# Patient Record
Sex: FEMALE | Race: BLACK OR AFRICAN AMERICAN | NOT HISPANIC OR LATINO | Employment: OTHER | URBAN - METROPOLITAN AREA
[De-identification: names, ages, dates, MRNs, and addresses within clinical notes are randomized per-mention and may not be internally consistent; named-entity substitution may affect disease eponyms.]

---

## 2017-05-25 PROBLEM — M15.9 PRIMARY OSTEOARTHRITIS INVOLVING MULTIPLE JOINTS: Status: ACTIVE | Noted: 2017-05-25

## 2017-07-17 ENCOUNTER — HOSPITAL ENCOUNTER (EMERGENCY)
Facility: HOSPITAL | Age: 62
Discharge: HOME | End: 2017-07-17
Attending: SURGERY
Payer: MEDICAID

## 2017-07-17 VITALS
TEMPERATURE: 98 F | RESPIRATION RATE: 18 BRPM | HEIGHT: 65 IN | SYSTOLIC BLOOD PRESSURE: 160 MMHG | BODY MASS INDEX: 35.99 KG/M2 | HEART RATE: 66 BPM | OXYGEN SATURATION: 98 % | WEIGHT: 216 LBS | DIASTOLIC BLOOD PRESSURE: 70 MMHG

## 2017-07-17 DIAGNOSIS — M54.50 LOW BACK PAIN WITHOUT SCIATICA, UNSPECIFIED BACK PAIN LATERALITY, UNSPECIFIED CHRONICITY: Primary | ICD-10-CM

## 2017-07-17 PROCEDURE — 63600175 PHARM REV CODE 636 W HCPCS: Performed by: SURGERY

## 2017-07-17 PROCEDURE — 99283 EMERGENCY DEPT VISIT LOW MDM: CPT | Mod: 25

## 2017-07-17 PROCEDURE — 96372 THER/PROPH/DIAG INJ SC/IM: CPT

## 2017-07-17 RX ORDER — HYDROCODONE BITARTRATE AND ACETAMINOPHEN 5; 325 MG/1; MG/1
1 TABLET ORAL EVERY 4 HOURS PRN
Qty: 8 TABLET | Refills: 0 | Status: SHIPPED | OUTPATIENT
Start: 2017-07-17 | End: 2017-07-27

## 2017-07-17 RX ORDER — CYCLOBENZAPRINE HCL 10 MG
10 TABLET ORAL 3 TIMES DAILY PRN
Qty: 10 TABLET | Refills: 0 | Status: SHIPPED | OUTPATIENT
Start: 2017-07-17 | End: 2017-07-22

## 2017-07-17 RX ORDER — HYDROMORPHONE HYDROCHLORIDE 1 MG/ML
1 INJECTION, SOLUTION INTRAMUSCULAR; INTRAVENOUS; SUBCUTANEOUS
Status: COMPLETED | OUTPATIENT
Start: 2017-07-17 | End: 2017-07-17

## 2017-07-17 RX ORDER — ONDANSETRON 2 MG/ML
4 INJECTION INTRAMUSCULAR; INTRAVENOUS
Status: COMPLETED | OUTPATIENT
Start: 2017-07-17 | End: 2017-07-17

## 2017-07-17 RX ADMIN — ONDANSETRON 4 MG: 2 INJECTION INTRAMUSCULAR; INTRAVENOUS at 04:07

## 2017-07-17 RX ADMIN — HYDROMORPHONE HYDROCHLORIDE 1 MG: 1 INJECTION, SOLUTION INTRAMUSCULAR; INTRAVENOUS; SUBCUTANEOUS at 04:07

## 2017-07-17 NOTE — ED TRIAGE NOTES
Patient presents to the ER with c/o flaring up of chronic low back pain.  Patient has been suffering with her lower back for years.  Patient reports that the last 3 days have been really bad.  Patient reports that her pain medication is just not working anymore.

## 2017-07-17 NOTE — ED NOTES
Patient discharged home with spouse.  Discharge instructions given with patient verbalizing understanding.  Patient will follow up with PCP next week.  Patient has no questions or concerns at this time.

## 2017-07-17 NOTE — ED PROVIDER NOTES
[facility]                                        2017                   Chief Complaint  62 y.o. female with Back Pain    History of Present Illness  Mary Carrillo presents to the emergency room with chronic low back pain  Patient on exam has a normal lumbar spine just above deformity noted now  Patient states she has chronic low back pain, denies any acute injury today  Normal bowel movements and urination; no signs of cauda equina syndrome  Patient has good distal pulses and capillary refill, neurovascular intact on exam  Patient states she suffers from arthritis, normal lower leg strength noted today    The history is provided by the patient    Past Medical History   -- Arthritis    -- Asthma    -- Encounter for blood transfusion    -- Glaucoma    -- Gout    -- Hyperlipidemia    -- Hypertension    -- Sarcoidosis    -- Vitritis      Surgical history: Cataract, , hysterectomy  ALLERGIES: Naproxen  Social history: Smoker: Children  Family history: Diabetes and kidney disease (mother, father)    Review of Systems and Physical Exam     Review of Systems  -- Constitution - no fever, denies fatigue, no weakness, no chills  -- Eyes - no tearing or redness, no visual disturbance  -- Ear, Nose - no tinnitus or earache, no nasal congestion or discharge  -- Mouth,Throat - no sore throat, no toothache, normal voice, normal swallowing  -- Respiratory - denies cough and congestion, no shortness of breath, no CARRASCO  -- Cardiovascular - denies chest pain, no palpitations, denies claudication  -- Gastrointestinal - denies abdominal pain, nausea, vomiting, or diarrhea  -- Genitourinary - no dysuria, no denies flank pain, no hematuria or frequency   -- Musculoskeletal - back pain, negative for myalgias and arthralgias   -- Neurological - no headache, denies weakness or seizure; no LOC  -- Skin - denies pallor, rash, or changes in skin. no hives or welts noted    Vital Signs  -- Her blood  pressure is 160/70 (abnormal) and her pulse is 66.   -- Her respiration is 18 and oxygen saturation is 98%.      Physical Exam  -- Nursing note and vitals reviewed  -- Constitutional: Appears well-developed and well-nourished  -- Head: Atraumatic. Normocephalic. No obvious abnormality  -- Eyes: Pupils are equal and reactive to light. Normal conjunctiva and lids  -- Cardiac: Normal rate, regular rhythm and normal heart sounds  -- Pulmonary: Normal respiratory effort, breath sounds clear to auscultation  -- Abdominal: Soft, no tenderness. Normal bowel sounds. Normal liver edge  -- Musculoskeletal: Normal range of motion, no effusions. Joints stable   -- Neurological: No focal deficits. Showed good interaction with staff    Emergency Room Course     L-spine x-ray  -- No evidence of acute fracture.  Multilevel degenerative changes.   -- Incompletely visualized cortex along the anterior superior endplate   -- of L1.  Is there a history to suggest a marrow infiltrative process?    -- Non-emergent followup recommended.    Medications Given  -- HYDROmorphone injection 1 mg (1 mg Intramuscular Given 7/17/17 1618)   -- ondansetron injection 4 mg (4 mg Intramuscular Given 7/17/17 1619)     Diagnosis  -- Low back pain without sciatica    Disposition and Plan  -- Disposition: home  -- Condition: stable  -- Follow-up: Patient to follow up with Jose Khan MD in 1-2 days.  -- I advised the patient that we have found no life threatening condition today  -- At this time, I believe the patient is clinically stable for discharge.   -- The patient acknowledges that close follow up with a MD is required   -- Patient agrees to comply with all instruction and direction given in the ER    This note is dictated on Dragon Natural Speaking word recognition program.  There are word recognition mistakes that are occasionally missed on review.           Benjamin Tyler MD  07/17/17 5283

## 2017-08-26 ENCOUNTER — HOSPITAL ENCOUNTER (EMERGENCY)
Facility: HOSPITAL | Age: 62
Discharge: HOME | End: 2017-08-26
Attending: EMERGENCY MEDICINE
Payer: MEDICAID

## 2017-08-26 VITALS
WEIGHT: 218 LBS | DIASTOLIC BLOOD PRESSURE: 67 MMHG | SYSTOLIC BLOOD PRESSURE: 137 MMHG | HEIGHT: 64 IN | HEART RATE: 80 BPM | BODY MASS INDEX: 37.22 KG/M2 | TEMPERATURE: 99 F

## 2017-08-26 DIAGNOSIS — G89.29 CHRONIC LOW BACK PAIN, UNSPECIFIED BACK PAIN LATERALITY, WITH SCIATICA PRESENCE UNSPECIFIED: Primary | ICD-10-CM

## 2017-08-26 DIAGNOSIS — M54.5 CHRONIC LOW BACK PAIN, UNSPECIFIED BACK PAIN LATERALITY, WITH SCIATICA PRESENCE UNSPECIFIED: Primary | ICD-10-CM

## 2017-08-26 PROCEDURE — 63600175 PHARM REV CODE 636 W HCPCS: Performed by: EMERGENCY MEDICINE

## 2017-08-26 PROCEDURE — 99283 EMERGENCY DEPT VISIT LOW MDM: CPT | Mod: 25

## 2017-08-26 PROCEDURE — 96372 THER/PROPH/DIAG INJ SC/IM: CPT

## 2017-08-26 RX ORDER — METHOCARBAMOL 500 MG/1
1000 TABLET, FILM COATED ORAL
Status: DISCONTINUED | OUTPATIENT
Start: 2017-08-26 | End: 2017-08-26

## 2017-08-26 RX ORDER — KETOROLAC TROMETHAMINE 30 MG/ML
60 INJECTION, SOLUTION INTRAMUSCULAR; INTRAVENOUS
Status: COMPLETED | OUTPATIENT
Start: 2017-08-26 | End: 2017-08-26

## 2017-08-26 RX ORDER — KETOROLAC TROMETHAMINE 10 MG/1
10 TABLET, FILM COATED ORAL EVERY 6 HOURS
Qty: 20 TABLET | Refills: 0 | Status: ON HOLD | OUTPATIENT
Start: 2017-08-26 | End: 2018-01-09 | Stop reason: HOSPADM

## 2017-08-26 RX ORDER — METHOCARBAMOL 500 MG/1
1000 TABLET, FILM COATED ORAL 3 TIMES DAILY
Qty: 30 TABLET | Refills: 0 | Status: SHIPPED | OUTPATIENT
Start: 2017-08-26 | End: 2017-08-31

## 2017-08-26 RX ORDER — DEXAMETHASONE SODIUM PHOSPHATE 4 MG/ML
12 INJECTION, SOLUTION INTRA-ARTICULAR; INTRALESIONAL; INTRAMUSCULAR; INTRAVENOUS; SOFT TISSUE
Status: COMPLETED | OUTPATIENT
Start: 2017-08-26 | End: 2017-08-26

## 2017-08-26 RX ADMIN — DEXAMETHASONE SODIUM PHOSPHATE 12 MG: 4 INJECTION, SOLUTION INTRAMUSCULAR; INTRAVENOUS at 05:08

## 2017-08-26 RX ADMIN — KETOROLAC TROMETHAMINE 60 MG: 30 INJECTION, SOLUTION INTRAMUSCULAR at 05:08

## 2017-08-26 NOTE — ED TRIAGE NOTES
"Patient comes in today with complaint of low back pain that she has had for "awhile".  It comes and goes.  No known injury.  "

## 2017-08-26 NOTE — ED PROVIDER NOTES
"[facility]                                                  Chief Complaint  62 y.o. female with Back Pain (low back pain.   has had for a long time.  comes and goes.)    History of Present Illness  Mary Carrillo presents to the emergency room with acute exacerbation of chronic back pain.  Patient reports right lower lumbar pain consistent with her previous and chronic back pain.  She denies fall or other trauma.  She denies urinary symptoms.      Past Medical History:   Diagnosis Date    Arthritis     Asthma     Encounter for blood transfusion     Glaucoma     Gout     Hyperlipidemia     Hypertension     Sarcoidosis     Vitritis     Vitritis      Past Surgical History:   Procedure Laterality Date    CATARACT EXTRACTION Bilateral      SECTION      HYSTERECTOMY      uterus/cervix d/t uterine fibroids.      Review of patient's allergies indicates:   Allergen Reactions    Neurontin [gabapentin]     Prednisone Hives and Itching     Pills only    Naproxen Rash        Review of Systems and Physical Exam     Review of Systems  -- Constitution - no fever, denies fatigue, no weakness, no chills  -- Eyes - no tearing or redness, no visual disturbance  -- Ear, Nose - no tinnitus or earache, no nasal congestion or discharge  -- Mouth,Throat - no sore throat, no toothache, normal voice, normal swallowing  -- Respiratory - denies cough and congestion, no shortness of breath, no wheezing  -- Cardiovascular - denies chest pain, no palpitations, denies claudication  -- Gastrointestinal - denies abdominal pain, nausea, vomiting, or diarrhea  -- Genitourinary - no dysuria, no denies flank pain, no hematuria or frequency   -- Musculoskeletal - reports back pain, negative for myalgias and arthralgias   -- Neurological - no headache, denies weakness or seizure; no LOC  -- Skin - denies pallor, rash, or changes in skin. no hives or welts noted    Vital Signs   height is 5' 4" (1.626 m) and " weight is 98.9 kg (218 lb). Her oral temperature is 99 °F (37.2 °C). Her blood pressure is 137/67 and her pulse is 80.      Physical Exam  -- Nursing note and vitals reviewed  -- Constitutional: Appears well-developed and well-nourished  -- Head: Atraumatic. Normocephalic. No obvious abnormality  -- Eyes: Pupils are equal and reactive to light. Normal conjunctiva and lids  -- Nose: Nose normal in appearance, nares grossly normal. No discharge  -- Throat: Mucous membranes moist, pharynx normal, normal tonsils. No lesions   -- Ears: External ears and TM normal bilaterally. Normal hearing and no drainage  -- Neck: Normal range of motion. Neck supple. No masses, trachea midline  -- Cardiac: Normal rate, regular rhythm and normal heart sounds  -- Pulmonary: Normal respiratory effort, breath sounds clear to auscultation  -- Abdominal: Soft, no tenderness. Normal bowel sounds. Normal liver edge  -- Musculoskeletal: Normal range of motion, no effusions. Joints stable mild paraspinal lumbar pain on right  -- Neurological: No focal deficits. Showed good interaction with staff  -- Vascular: Posterior tibial, dorsalis pedis and radial pulses 2+ bilaterally    -- Lymphatics: No cervical or peripheral lymphadenopathy. No edema noted  -- Skin: Warm and dry. No evidence of rash or cellulitis  -- Psychiatric: Normal mood and affect. Bedside behavior is appropriate    Emergency Room Course     Treatment and Evaluation  1.  Physical exam unremarkable  2.  Toradol 60 IM  3.  Decadron 12 IM  4.  DC home follow-up PCP    Abnormal lab values  Labs Reviewed - No data to display    Medications Given  Medications   methocarbamol tablet 1,000 mg (not administered)   dexamethasone injection 12 mg (12 mg Intramuscular Given 8/26/17 1734)   ketorolac injection 60 mg (60 mg Intramuscular Given 8/26/17 1734)         Diagnosis  -- Acute exacerbation of chronic lumbar back pain    Disposition and Plan  -- Disposition: home  -- Condition: stable  --  Follow-up: Patient to follow up with Jose Khan MD in 1-2 days.  -- I advised the patient that we have found no life threatening condition today  -- At this time, I believe the patient is clinically stable for discharge.   -- The patient acknowledges that close follow up with a MD is required   -- Patient agrees to comply with all instruction and direction given in the ER  -- Patient counseled on strict return precautions as discussed       Dana Edouard MD  08/26/17 1176

## 2017-09-05 ENCOUNTER — HOSPITAL ENCOUNTER (EMERGENCY)
Facility: HOSPITAL | Age: 62
Discharge: HOME | End: 2017-09-05
Attending: SURGERY
Payer: MEDICAID

## 2017-09-05 VITALS
DIASTOLIC BLOOD PRESSURE: 75 MMHG | OXYGEN SATURATION: 99 % | HEIGHT: 64 IN | BODY MASS INDEX: 36.7 KG/M2 | HEART RATE: 85 BPM | TEMPERATURE: 98 F | RESPIRATION RATE: 20 BRPM | WEIGHT: 215 LBS | SYSTOLIC BLOOD PRESSURE: 145 MMHG

## 2017-09-05 DIAGNOSIS — M25.551 RIGHT HIP PAIN: Primary | ICD-10-CM

## 2017-09-05 PROCEDURE — 96372 THER/PROPH/DIAG INJ SC/IM: CPT

## 2017-09-05 PROCEDURE — 63600175 PHARM REV CODE 636 W HCPCS: Performed by: SURGERY

## 2017-09-05 PROCEDURE — 99283 EMERGENCY DEPT VISIT LOW MDM: CPT | Mod: 25

## 2017-09-05 RX ORDER — ONDANSETRON 2 MG/ML
4 INJECTION INTRAMUSCULAR; INTRAVENOUS
Status: COMPLETED | OUTPATIENT
Start: 2017-09-05 | End: 2017-09-05

## 2017-09-05 RX ORDER — ACETAMINOPHEN AND CODEINE PHOSPHATE 300; 30 MG/1; MG/1
1 TABLET ORAL EVERY 6 HOURS PRN
Qty: 10 TABLET | Refills: 0 | Status: SHIPPED | OUTPATIENT
Start: 2017-09-05 | End: 2017-09-15

## 2017-09-05 RX ORDER — HYDROMORPHONE HYDROCHLORIDE 1 MG/ML
0.5 INJECTION, SOLUTION INTRAMUSCULAR; INTRAVENOUS; SUBCUTANEOUS
Status: COMPLETED | OUTPATIENT
Start: 2017-09-05 | End: 2017-09-05

## 2017-09-05 RX ADMIN — ONDANSETRON 4 MG: 2 INJECTION INTRAMUSCULAR; INTRAVENOUS at 01:09

## 2017-09-05 RX ADMIN — HYDROMORPHONE HYDROCHLORIDE 0.5 MG: 1 INJECTION, SOLUTION INTRAMUSCULAR; INTRAVENOUS; SUBCUTANEOUS at 01:09

## 2017-09-05 NOTE — ED TRIAGE NOTES
"Pt c/o hip pain "for a while now." When I lay down it gets betters. Reports she was seen in the ED recently for the same problem.   "

## 2017-09-06 NOTE — ED PROVIDER NOTES
[facility]                                     2017                   Chief Complaint  62 y.o. female with Hip Pain (right)    History of Present Illness  Mary Carrillo presents to the emergency room with chronic hip pain and arthritis issues  Patient has had long-standing issue with arthritis and lower degenerative disc disease   Patient denies any trauma or fall, denies any acute injury on interview in the ER today  Patient states that her right hip arthritis is acting up, medication at home is not helping  Patient on exam has a normal right hip with no deformity instability or leg shortening now  Patient has good distal pulses and capillary refill, is neurovascular intact on exam today    The history is provided by the patient    Past Medical History   -- Arthritis    -- Asthma    -- Encounter for blood transfusion    -- Glaucoma    -- Gout    -- Hyperlipidemia    -- Hypertension    -- Sarcoidosis    -- Vitritis      Past Surgical History   -- CATARACT EXTRACTION     --  SECTION     -- HYSTERECTOMY        Review of patient's allergies indicates:   -- Neurontin [gabapentin]    -- Prednisone    -- Naproxen       Review of Systems and Physical Exam     Review of Systems  -- Constitution - no fever, denies fatigue, no weakness, no chills  -- Eyes - no tearing or redness, no visual disturbance  -- Ear, Nose - no tinnitus or earache, no nasal congestion or discharge  -- Mouth,Throat - no sore throat, no toothache, normal voice, normal swallowing  -- Respiratory - denies cough and congestion, no shortness of breath, no CARRASCO  -- Cardiovascular - denies chest pain, no palpitations, denies claudication  -- Gastrointestinal - denies abdominal pain, nausea, vomiting, or diarrhea  -- Musculoskeletal - right hip pain with a history of arthritis  -- Neurological - no headache, denies weakness or seizure; no LOC  -- Skin - denies pallor, rash, or changes in skin. no hives or welts  noted    Vital Signs  -- Her temperature is 97.6 °F (36.4 °C).   -- Her blood pressure is 145/75 (abnormal) and her pulse is 85.   -- Her respiration is 20 and oxygen saturation is 99%.      Physical Exam  -- Nursing note and vitals reviewed  -- Head: Atraumatic. Normocephalic. No obvious abnormality  -- Eyes: Pupils are equal and reactive to light. Normal conjunctiva and lids  -- Cardiac: Normal rate, regular rhythm and normal heart sounds  -- Pulmonary: Normal respiratory effort, breath sounds clear to auscultation  -- Abdominal: Soft, no tenderness. Normal bowel sounds. Normal liver edge  -- Musculoskeletal: Normal range of motion, no effusions. Joints stable   -- Neurological: No focal deficits. Showed good interaction with staff  -- Vascular: Posterior tibial, dorsalis pedis and radial pulses 2+ bilaterally      Emergency Room Course     Treatment and Evaluation  -- Preliminary ER x-ray readings showed no evidence of fracture or dislocation  -- All x-rays are reviewed with a final disposition given by the radiologist   -- IM 0.5 mg Dilaudid given today in the ER  -- IM 4 mg Zofran given today in the ER   Diagnosis  -- The encounter diagnosis was Right hip pain.    Disposition and Plan  -- Disposition: home  -- Condition: stable  -- Follow-up: Patient to follow up with Jose Khan MD in 1-2 days.  -- I advised the patient that we have found no life threatening condition today  -- At this time, I believe the patient is clinically stable for discharge.   -- The patient acknowledges that close follow up with a MD is required   -- Patient agrees to comply with all instruction and direction given in the ER    This note is dictated on Dragon Natural Speaking word recognition program.  There are word recognition mistakes that are occasionally missed on review.           Benjamin Tyler MD  09/06/17 0539

## 2017-12-08 PROBLEM — M46.45 DISCITIS OF THORACOLUMBAR REGION: Status: ACTIVE | Noted: 2017-12-08

## 2017-12-09 PROBLEM — R63.4 UNINTENTIONAL WEIGHT LOSS: Status: ACTIVE | Noted: 2017-12-09

## 2017-12-09 PROBLEM — N83.8 OVARIAN MASS: Status: ACTIVE | Noted: 2017-12-09

## 2017-12-09 PROBLEM — R63.8 INADEQUATE ORAL INTAKE: Status: ACTIVE | Noted: 2017-12-09

## 2017-12-10 PROBLEM — R10.9 LEFT FLANK PAIN: Status: ACTIVE | Noted: 2017-12-10

## 2017-12-10 PROBLEM — E87.6 HYPOKALEMIA: Status: ACTIVE | Noted: 2017-12-10

## 2017-12-10 PROBLEM — M46.40 DISCITIS: Status: ACTIVE | Noted: 2017-12-08

## 2017-12-11 PROBLEM — R09.89 RALES: Status: ACTIVE | Noted: 2017-12-11

## 2017-12-13 PROBLEM — R79.89 AZOTEMIA: Status: ACTIVE | Noted: 2017-12-13

## 2017-12-20 PROBLEM — R52 INTRACTABLE PAIN: Status: ACTIVE | Noted: 2017-12-20

## 2017-12-21 PROBLEM — R06.00 DYSPNEA: Status: ACTIVE | Noted: 2017-12-21

## 2017-12-22 PROBLEM — J18.9 PNEUMONIA: Status: ACTIVE | Noted: 2017-12-22

## 2017-12-25 PROBLEM — L27.0 DRUG RASH: Status: ACTIVE | Noted: 2017-12-25

## 2017-12-26 PROBLEM — M46.44 DISCITIS OF THORACIC REGION: Status: ACTIVE | Noted: 2017-12-08

## 2017-12-27 PROBLEM — A31.9 MYCOBACTERIAL INFECTION: Status: ACTIVE | Noted: 2017-12-27

## 2017-12-31 PROBLEM — A15.9 TUBERCULOSIS: Status: ACTIVE | Noted: 2017-12-27

## 2018-01-03 PROBLEM — B37.0 THRUSH: Status: ACTIVE | Noted: 2018-01-03

## 2018-01-06 PROBLEM — A18.01: Status: ACTIVE | Noted: 2017-12-08

## 2018-01-09 PROBLEM — M46.40 DISCITIS: Status: ACTIVE | Noted: 2018-01-09

## 2018-03-16 PROBLEM — G89.29 ACUTE EXACERBATION OF CHRONIC LOW BACK PAIN: Status: ACTIVE | Noted: 2018-03-16

## 2018-03-16 PROBLEM — M54.50 ACUTE EXACERBATION OF CHRONIC LOW BACK PAIN: Status: ACTIVE | Noted: 2018-03-16

## 2018-03-16 NOTE — PLAN OF CARE
Outside Transfer Acceptance Note    Transferring Provider/Specialty:  Dr Bud Julien    Accepting Physician: Vikash Washington     Date of Acceptance: 03/16/2018     Code Status: Full    Transferring Facility/Hospital: [facility]    Reason for Transfer to [facility]: NS evaluation    Report from Transferring provider/ Hospital course:     61 yo F with history of Sarcoidosis, HTN, Pott's disease who presented to the ED with worsening pain and weakness of her lower extremities.  She had a prolonged hospital stay at [facility] from 12/17-01/18 for fevers, night sweats, back pain and weakness.  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  Since being discharged pt reports being unable to walk, mostly being bed bound or on the sofa due to severe back pain.  She is currently getting antibiotics via the health unit for her TB. She reports numbness in her feet as well as tingling which has been ongoing for about 1 month.  She wears a diaper because she cannot get out of bed to use the bathroom.  She states that recently there have been times she doesn't realize that she urinated.  She denies any burning on urination, fevers, chills, night sweats at this time. (from [facility] record).      AFB cultures (12/27) > M Tb complex sensitive to RIF/ Strep/ INH/ EMB/ Pyrazinamide per quest. No other microbiology is in the record    The referring providers spoke to NS at [facility] ( Dr Santiago)   [facility]) who recommended transfer for evaluation with consideration for surgical intervention    There is consolidation in the lung apices bilaterally, more prominent on the left than on the right. Additional atelectasis is noted in the right middle lobe lobe and inferior lingula.     There are persistent destructive changes involving the T12 and L1 vertebrae compatible with discitis osteomyelitis. Spondylotic changes are noted along the  right anterolateral aspect of the thoracic spine and there is dextroscoliosis of the thoracic spine.    The referring provider (Dr Julien) was advised to place the patient in respiratory isolation immediately. EMS was advised to place the patient on respiratory precautions for the transport    To do list upon patient arrival:     Consult ID, Infection control, neurosurgery  Place the patient in respiratory isolation  AFB sputum x 3    Please call extension [phone] upon patient arrival to floor for Hospital Medicine admit team assignment and for additional admit orders.    Vikash Washington MD, Staff Physician, Hospital Medicine, [phone]

## 2018-03-17 ENCOUNTER — ANESTHESIA EVENT (OUTPATIENT)
Dept: SURGERY | Facility: HOSPITAL | Age: 63
DRG: 454 | End: 2018-03-17
Payer: MEDICARE

## 2018-03-17 ENCOUNTER — HOSPITAL ENCOUNTER (INPATIENT)
Facility: HOSPITAL | Age: 63
LOS: 10 days | Discharge: NURSING, REHAB OR LONG-TERM CARE | DRG: 454 | End: 2018-03-27
Attending: HOSPITALIST | Admitting: HOSPITALIST
Payer: MEDICARE

## 2018-03-17 DIAGNOSIS — A15.0 PULMONARY TUBERCULOSIS: ICD-10-CM

## 2018-03-17 DIAGNOSIS — I95.9 HYPOTENSION: ICD-10-CM

## 2018-03-17 DIAGNOSIS — A18.01 POTT'S DISEASE (SPINAL TUBERCULOSIS): ICD-10-CM

## 2018-03-17 DIAGNOSIS — M46.45 DISCITIS OF THORACOLUMBAR REGION: ICD-10-CM

## 2018-03-17 DIAGNOSIS — D62 ACUTE BLOOD LOSS AS CAUSE OF POSTOPERATIVE ANEMIA: ICD-10-CM

## 2018-03-17 DIAGNOSIS — G95.20 SPINAL CORD COMPRESSION: ICD-10-CM

## 2018-03-17 PROBLEM — R10.9 LEFT FLANK PAIN: Status: RESOLVED | Noted: 2017-12-10 | Resolved: 2018-03-17

## 2018-03-17 PROBLEM — E87.6 HYPOKALEMIA: Status: RESOLVED | Noted: 2017-12-10 | Resolved: 2018-03-17

## 2018-03-17 PROBLEM — L27.0 DRUG RASH: Status: RESOLVED | Noted: 2017-12-25 | Resolved: 2018-03-17

## 2018-03-17 PROBLEM — J18.9 PNEUMONIA: Status: RESOLVED | Noted: 2017-12-22 | Resolved: 2018-03-17

## 2018-03-17 PROBLEM — R09.89 RALES: Status: RESOLVED | Noted: 2017-12-11 | Resolved: 2018-03-17

## 2018-03-17 LAB
ALBUMIN SERPL BCP-MCNC: 2.7 G/DL
ALP SERPL-CCNC: 67 U/L
ALT SERPL W/O P-5'-P-CCNC: 13 U/L
ANION GAP SERPL CALC-SCNC: 10 MMOL/L
ANION GAP SERPL CALC-SCNC: 10 MMOL/L
AST SERPL-CCNC: 24 U/L
BACTERIA #/AREA URNS AUTO: ABNORMAL /HPF
BASOPHILS # BLD AUTO: 0.04 K/UL
BASOPHILS NFR BLD: 0.5 %
BILIRUB SERPL-MCNC: 0.8 MG/DL
BILIRUB UR QL STRIP: NEGATIVE
BUN SERPL-MCNC: 25 MG/DL
BUN SERPL-MCNC: 29 MG/DL
CALCIUM SERPL-MCNC: 10 MG/DL
CALCIUM SERPL-MCNC: 9.6 MG/DL
CHLORIDE SERPL-SCNC: 105 MMOL/L
CHLORIDE SERPL-SCNC: 108 MMOL/L
CLARITY UR REFRACT.AUTO: ABNORMAL
CO2 SERPL-SCNC: 21 MMOL/L
CO2 SERPL-SCNC: 25 MMOL/L
COLOR UR AUTO: ABNORMAL
CREAT SERPL-MCNC: 1.3 MG/DL
CREAT SERPL-MCNC: 1.3 MG/DL
CREAT UR-MCNC: 138 MG/DL
DIFFERENTIAL METHOD: ABNORMAL
EOSINOPHIL # BLD AUTO: 0 K/UL
EOSINOPHIL NFR BLD: 0.5 %
ERYTHROCYTE [DISTWIDTH] IN BLOOD BY AUTOMATED COUNT: 17.2 %
EST. GFR  (AFRICAN AMERICAN): 50.1 ML/MIN/1.73 M^2
EST. GFR  (AFRICAN AMERICAN): 50.1 ML/MIN/1.73 M^2
EST. GFR  (NON AFRICAN AMERICAN): 43.5 ML/MIN/1.73 M^2
EST. GFR  (NON AFRICAN AMERICAN): 43.5 ML/MIN/1.73 M^2
GLUCOSE SERPL-MCNC: 122 MG/DL
GLUCOSE SERPL-MCNC: 91 MG/DL
GLUCOSE UR QL STRIP: ABNORMAL
HCT VFR BLD AUTO: 36.5 %
HGB BLD-MCNC: 11.5 G/DL
HGB UR QL STRIP: ABNORMAL
HYALINE CASTS UR QL AUTO: 2 /LPF
IMM GRANULOCYTES # BLD AUTO: 0.08 K/UL
IMM GRANULOCYTES NFR BLD AUTO: 0.9 %
KETONES UR QL STRIP: NEGATIVE
LEUKOCYTE ESTERASE UR QL STRIP: NEGATIVE
LYMPHOCYTES # BLD AUTO: 2.1 K/UL
LYMPHOCYTES NFR BLD: 24.3 %
MAGNESIUM SERPL-MCNC: 1.6 MG/DL
MCH RBC QN AUTO: 27.5 PG
MCHC RBC AUTO-ENTMCNC: 31.5 G/DL
MCV RBC AUTO: 87 FL
MICROSCOPIC COMMENT: ABNORMAL
MONOCYTES # BLD AUTO: 1 K/UL
MONOCYTES NFR BLD: 11 %
NEUTROPHILS # BLD AUTO: 5.5 K/UL
NEUTROPHILS NFR BLD: 62.8 %
NITRITE UR QL STRIP: NEGATIVE
NRBC BLD-RTO: 0 /100 WBC
PH UR STRIP: 5 [PH] (ref 5–8)
PHOSPHATE SERPL-MCNC: 5.8 MG/DL
PLATELET # BLD AUTO: 233 K/UL
PMV BLD AUTO: 9.7 FL
POTASSIUM SERPL-SCNC: 4.6 MMOL/L
POTASSIUM SERPL-SCNC: 4.8 MMOL/L
PROT SERPL-MCNC: 7.7 G/DL
PROT UR QL STRIP: ABNORMAL
RBC # BLD AUTO: 4.18 M/UL
RBC #/AREA URNS AUTO: 19 /HPF (ref 0–4)
SODIUM SERPL-SCNC: 139 MMOL/L
SODIUM SERPL-SCNC: 140 MMOL/L
SODIUM UR-SCNC: 84 MMOL/L
SP GR UR STRIP: 1.03 (ref 1–1.03)
SQUAMOUS #/AREA URNS AUTO: 2 /HPF
URN SPEC COLLECT METH UR: ABNORMAL
UROBILINOGEN UR STRIP-ACNC: NEGATIVE EU/DL
WBC # BLD AUTO: 8.75 K/UL
WBC #/AREA URNS AUTO: 8 /HPF (ref 0–5)

## 2018-03-17 PROCEDURE — 63600175 PHARM REV CODE 636 W HCPCS: Performed by: STUDENT IN AN ORGANIZED HEALTH CARE EDUCATION/TRAINING PROGRAM

## 2018-03-17 PROCEDURE — 85025 COMPLETE CBC W/AUTO DIFF WBC: CPT

## 2018-03-17 PROCEDURE — 99223 1ST HOSP IP/OBS HIGH 75: CPT | Mod: ,,, | Performed by: HOSPITALIST

## 2018-03-17 PROCEDURE — 99223 1ST HOSP IP/OBS HIGH 75: CPT | Mod: ,,, | Performed by: INTERNAL MEDICINE

## 2018-03-17 PROCEDURE — 80048 BASIC METABOLIC PNL TOTAL CA: CPT

## 2018-03-17 PROCEDURE — 25000003 PHARM REV CODE 250

## 2018-03-17 PROCEDURE — 99233 SBSQ HOSP IP/OBS HIGH 50: CPT | Mod: ,,, | Performed by: INTERNAL MEDICINE

## 2018-03-17 PROCEDURE — 36415 COLL VENOUS BLD VENIPUNCTURE: CPT

## 2018-03-17 PROCEDURE — 80053 COMPREHEN METABOLIC PANEL: CPT

## 2018-03-17 PROCEDURE — 81001 URINALYSIS AUTO W/SCOPE: CPT

## 2018-03-17 PROCEDURE — 83735 ASSAY OF MAGNESIUM: CPT

## 2018-03-17 PROCEDURE — 99233 SBSQ HOSP IP/OBS HIGH 50: CPT | Mod: ,,, | Performed by: NEUROLOGICAL SURGERY

## 2018-03-17 PROCEDURE — 25000003 PHARM REV CODE 250: Performed by: STUDENT IN AN ORGANIZED HEALTH CARE EDUCATION/TRAINING PROGRAM

## 2018-03-17 PROCEDURE — 84300 ASSAY OF URINE SODIUM: CPT

## 2018-03-17 PROCEDURE — 84100 ASSAY OF PHOSPHORUS: CPT

## 2018-03-17 PROCEDURE — 82570 ASSAY OF URINE CREATININE: CPT

## 2018-03-17 PROCEDURE — 11000001 HC ACUTE MED/SURG PRIVATE ROOM

## 2018-03-17 RX ORDER — HYDROMORPHONE HYDROCHLORIDE 2 MG/1
2 TABLET ORAL
Status: DISCONTINUED | OUTPATIENT
Start: 2018-03-17 | End: 2018-03-17

## 2018-03-17 RX ORDER — DEXAMETHASONE SODIUM PHOSPHATE 4 MG/ML
8 INJECTION, SOLUTION INTRA-ARTICULAR; INTRALESIONAL; INTRAMUSCULAR; INTRAVENOUS; SOFT TISSUE ONCE
Status: COMPLETED | OUTPATIENT
Start: 2018-03-17 | End: 2018-03-17

## 2018-03-17 RX ORDER — HYDROMORPHONE HYDROCHLORIDE 2 MG/1
2 TABLET ORAL EVERY 4 HOURS PRN
Status: DISCONTINUED | OUTPATIENT
Start: 2018-03-17 | End: 2018-03-18

## 2018-03-17 RX ORDER — ACETAMINOPHEN 325 MG/1
650 TABLET ORAL EVERY 4 HOURS PRN
Status: DISCONTINUED | OUTPATIENT
Start: 2018-03-17 | End: 2018-03-27 | Stop reason: HOSPADM

## 2018-03-17 RX ORDER — MEPERIDINE HYDROCHLORIDE 50 MG/ML
50 INJECTION INTRAMUSCULAR; INTRAVENOUS; SUBCUTANEOUS
Status: DISCONTINUED | OUTPATIENT
Start: 2018-03-17 | End: 2018-03-17

## 2018-03-17 RX ORDER — GLUCAGON 1 MG
1 KIT INJECTION
Status: DISCONTINUED | OUTPATIENT
Start: 2018-03-17 | End: 2018-03-27 | Stop reason: HOSPADM

## 2018-03-17 RX ORDER — OXYCODONE AND ACETAMINOPHEN 10; 325 MG/1; MG/1
1 TABLET ORAL EVERY 4 HOURS PRN
Status: DISCONTINUED | OUTPATIENT
Start: 2018-03-17 | End: 2018-03-18

## 2018-03-17 RX ORDER — IBUPROFEN 200 MG
16 TABLET ORAL
Status: DISCONTINUED | OUTPATIENT
Start: 2018-03-17 | End: 2018-03-27 | Stop reason: HOSPADM

## 2018-03-17 RX ORDER — ENOXAPARIN SODIUM 100 MG/ML
40 INJECTION SUBCUTANEOUS EVERY 24 HOURS
Status: DISCONTINUED | OUTPATIENT
Start: 2018-03-17 | End: 2018-03-17

## 2018-03-17 RX ORDER — DEXAMETHASONE SODIUM PHOSPHATE 4 MG/ML
8 INJECTION, SOLUTION INTRA-ARTICULAR; INTRALESIONAL; INTRAMUSCULAR; INTRAVENOUS; SOFT TISSUE EVERY 6 HOURS
Status: DISCONTINUED | OUTPATIENT
Start: 2018-03-17 | End: 2018-03-17

## 2018-03-17 RX ORDER — PYRAZINAMIDE TABLET 500 MG/1
2000 TABLET ORAL DAILY
Status: DISCONTINUED | OUTPATIENT
Start: 2018-03-17 | End: 2018-03-17

## 2018-03-17 RX ORDER — ETHAMBUTOL HYDROCHLORIDE 400 MG/1
1600 TABLET, FILM COATED ORAL DAILY
Status: DISCONTINUED | OUTPATIENT
Start: 2018-03-17 | End: 2018-03-17

## 2018-03-17 RX ORDER — INSULIN ASPART 100 [IU]/ML
0-5 INJECTION, SOLUTION INTRAVENOUS; SUBCUTANEOUS
Status: DISCONTINUED | OUTPATIENT
Start: 2018-03-17 | End: 2018-03-27 | Stop reason: HOSPADM

## 2018-03-17 RX ORDER — DEXAMETHASONE SODIUM PHOSPHATE 4 MG/ML
4 INJECTION, SOLUTION INTRA-ARTICULAR; INTRALESIONAL; INTRAMUSCULAR; INTRAVENOUS; SOFT TISSUE EVERY 6 HOURS
Status: DISCONTINUED | OUTPATIENT
Start: 2018-03-17 | End: 2018-03-19

## 2018-03-17 RX ORDER — IBUPROFEN 200 MG
24 TABLET ORAL
Status: DISCONTINUED | OUTPATIENT
Start: 2018-03-17 | End: 2018-03-27 | Stop reason: HOSPADM

## 2018-03-17 RX ORDER — ISONIAZID 300 MG/1
300 TABLET ORAL DAILY
Status: DISCONTINUED | OUTPATIENT
Start: 2018-03-17 | End: 2018-03-17

## 2018-03-17 RX ORDER — SODIUM CHLORIDE 0.9 % (FLUSH) 0.9 %
5 SYRINGE (ML) INJECTION
Status: DISCONTINUED | OUTPATIENT
Start: 2018-03-17 | End: 2018-03-27 | Stop reason: HOSPADM

## 2018-03-17 RX ORDER — RIFAMPIN 300 MG/1
600 CAPSULE ORAL DAILY
Status: DISCONTINUED | OUTPATIENT
Start: 2018-03-17 | End: 2018-03-17

## 2018-03-17 RX ORDER — PYRIDOXINE HCL (VITAMIN B6) 25 MG
50 TABLET ORAL DAILY
Status: DISCONTINUED | OUTPATIENT
Start: 2018-03-17 | End: 2018-03-17

## 2018-03-17 RX ADMIN — SODIUM CHLORIDE 1000 ML: 0.9 INJECTION, SOLUTION INTRAVENOUS at 11:03

## 2018-03-17 RX ADMIN — OXYCODONE HYDROCHLORIDE AND ACETAMINOPHEN 1 TABLET: 10; 325 TABLET ORAL at 08:03

## 2018-03-17 RX ADMIN — DEXAMETHASONE SODIUM PHOSPHATE 4 MG: 4 INJECTION, SOLUTION INTRAMUSCULAR; INTRAVENOUS at 06:03

## 2018-03-17 RX ADMIN — DEXAMETHASONE SODIUM PHOSPHATE 8 MG: 4 INJECTION, SOLUTION INTRAMUSCULAR; INTRAVENOUS at 05:03

## 2018-03-17 RX ADMIN — MEPERIDINE HYDROCHLORIDE 50 MG: 50 INJECTION, SOLUTION INTRAMUSCULAR; INTRAVENOUS; SUBCUTANEOUS at 09:03

## 2018-03-17 RX ADMIN — OXYCODONE HYDROCHLORIDE AND ACETAMINOPHEN 1 TABLET: 10; 325 TABLET ORAL at 05:03

## 2018-03-17 NOTE — H&P
[facility] Medicine  History & Physical    Patient Name: Mary Carrillo  MRN: [id number]  Admission Date: 3/17/2018  Attending Physician: Aleisha Gee MD   Primary Care Provider: Jose Khan MD    [facility] Medicine Team: Networked reference to record PCT  Mukesh Solis MD     Patient information was obtained from patient, past medical records and ER records.     Subjective:     Principal Problem:Pott's disease (spinal tuberculosis)    Chief Complaint: No chief complaint on file.       HPI: Ms. 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 MRI notable for severe compression of the adjacent cord at the T12-L1 level. Transferred to [facility] for Neurosurgery evaluation.    Patient history dates back to December when she was admitted to [facility] [place] [facility] from 12/17-01/18 for fevers, night sweats, back pain and weakness.  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  Since being discharged pt reports being unable to walk, mostly being bed bound or on the sofa due to severe back pain.  She is currently getting antibiotics via the health unit for her TB.  A home health nurse comes to her house to give her TB meds every Tues and Fri.  She reports numbness in her feet as well as tingling which has been ongoing for about 1 month.  She wears a diaper because she cannot get out of bed to use the bathroom.  She states that recently there have been times she doesn't realize that she urinated.  She denies any burning on urination, fevers, chills, night sweats at this time.        AFB cultures (12/27) > M Tb complex sensitive to RIF/ Strep/ INH/ EMB/  Pyrazinamide per quest.     Past Medical History:   Diagnosis Date    Arthritis     Asthma     Back pain     Encounter for blood transfusion     Glaucoma     Gout     Hyperlipidemia     Hypertension     Sarcoidosis     Vitritis     Vitritis        Past Surgical History:   Procedure Laterality Date    CATARACT EXTRACTION Bilateral      SECTION      HYSTERECTOMY  1977    uterus/cervix d/t uterine fibroids.       Review of patient's allergies indicates:   Allergen Reactions    Prednisone Hives and Itching     Pills only, can take if she needs to    Naproxen Rash       Current Facility-Administered Medications on File Prior to Encounter   Medication    [COMPLETED] amLODIPine tablet 10 mg    [COMPLETED] gadodiamide solution 20 mL    [COMPLETED] hydromorphone (PF) injection 1 mg    [COMPLETED] hydromorphone (PF) injection 1 mg    [DISCONTINUED] acetaminophen tablet 650 mg    [DISCONTINUED] albuterol-ipratropium 2.5mg-0.5mg/3mL nebulizer solution 3 mL    [DISCONTINUED] amLODIPine tablet 10 mg    [DISCONTINUED] carvedilol tablet 25 mg    [DISCONTINUED] dextrose 50% injection 12.5 g    [DISCONTINUED] dextrose 50% injection 25 g    [DISCONTINUED] doxazosin tablet 4 mg    [DISCONTINUED] enoxaparin injection 40 mg    [DISCONTINUED] ethambutol tablet 1,200 mg    [DISCONTINUED] folic acid tablet 1 mg    [DISCONTINUED] glucagon (human recombinant) injection 1 mg    [DISCONTINUED] glucose chewable tablet 16 g    [DISCONTINUED] glucose chewable tablet 24 g    [DISCONTINUED] hydromorphone (PF) (DILAUDID) 2 mg/mL injection    [DISCONTINUED] insulin aspart U-100 pen 0-5 Units    [DISCONTINUED] isoniazid tablet 300 mg    [DISCONTINUED] isosorbide dinitrate tablet 20 mg    [DISCONTINUED] isosorbide-hydrALAZINE 20-37.5 mg per tablet 1 tablet    [DISCONTINUED] ondansetron injection 4 mg    [DISCONTINUED] oxyCODONE-acetaminophen  mg per tablet 1 tablet    [DISCONTINUED]  oxyCODONE-acetaminophen 7.5-325 mg per tablet 1 tablet    [DISCONTINUED] polyethylene glycol packet 17 g    [DISCONTINUED] pyrazinamide tablet 1,500 mg    [DISCONTINUED] pyridoxine (vitamin B6) tablet 50 mg    [DISCONTINUED] rifAMpin capsule 600 mg    [DISCONTINUED] sodium chloride 0.9% flush 5 mL    [DISCONTINUED] traZODone tablet 100 mg     Current Outpatient Prescriptions on File Prior to Encounter   Medication Sig    acetaminophen-codeine 300-60mg (TYLENOL #4) 300-60 mg Tab Take by mouth.    acetaminophen-codeine 300-60mg (TYLENOL #4) 300-60 mg Tab TAKE ONE TABLET BY MOUTH 4 TIMES DAILY AS NEEDED    albuterol (PROVENTIL) 2.5 mg /3 mL (0.083 %) nebulizer solution Take 3 mLs (2.5 mg total) by nebulization every 4 (four) hours as needed for Wheezing or Shortness of Breath (cough). Rescue    albuterol 90 mcg/actuation inhaler Inhale 2 puffs into the lungs every 4 (four) hours as needed for Wheezing or Shortness of Breath (cough).    amLODIPine (NORVASC) 10 MG tablet Take 1 tablet (10 mg total) by mouth once daily.    carvedilol (COREG) 25 MG tablet Take 1 tablet (25 mg total) by mouth 2 (two) times daily with meals.    doxazosin (CARDURA) 4 MG tablet Take 1 tablet (4 mg total) by mouth once daily.    folic acid (FOLVITE) 1 MG tablet Take 1 tablet (1 mg total) by mouth once daily.    isosorbide dinitrate (ISORDIL) 20 MG tablet Take 1 tablet (20 mg total) by mouth 3 (three) times daily.    isosorbide-hydrALAZINE 20-37.5 mg (BIDIL) 20-37.5 mg Tab Take 1 tablet by mouth 3 (three) times daily.    oxyCODONE-acetaminophen (PERCOCET)  mg per tablet 1 max 2 tabs every 4 to 6 hours prn pain.  Causes constipation.    pyridoxine, vitamin B6, (B-6) 50 MG Tab Take 1 tablet (50 mg total) by mouth once daily.    trazodone (DESYREL) 100 MG tablet TAKE 1 TO 3 TABLETS BY MOUTH NIGHTLY AS NEEDED     Family History     Problem Relation (Age of Onset)    Diabetes Mother, Father    Kidney disease Mother, Father         Social History Main Topics    Smoking status: Former Smoker     Packs/day: 1.00     Years: 35.00     Types: Cigarettes     Start date: 1/7/1967     Quit date: 12/23/1994    Smokeless tobacco: Never Used    Alcohol use No    Drug use: No    Sexual activity: Yes     Partners: Male     Birth control/ protection: Post-menopausal, See Surgical Hx     Review of Systems   Constitutional: Negative for chills and fever.   HENT: Negative for congestion, rhinorrhea and sore throat.    Eyes: Negative.    Respiratory: Negative for chest tightness and shortness of breath.    Cardiovascular: Negative for chest pain and leg swelling.   Gastrointestinal: Negative for abdominal pain, diarrhea, nausea and vomiting.   Endocrine: Negative.    Genitourinary: Negative for difficulty urinating, dysuria and hematuria. Urinary incontinence   Musculoskeletal: Positive for back pain. Negative for arthralgias and myalgias.   Skin: Negative.    Neurological: Positive for weakness and numbness. Negative for syncope and headaches.   Hematological: Negative.    Psychiatric/Behavioral: Negative.    All other systems reviewed and are negative.  Objective:     Vital Signs (Most Recent):  Temp: 97.9 °F (36.6 °C) (03/17/18 0355)  Pulse: 96 (03/17/18 0355)  Resp: 18 (03/17/18 0355)  BP: (!) 95/52 (03/17/18 0355)  SpO2: (!) 94 % (03/17/18 0355) Vital Signs (24h Range):  Temp:  [96.1 °F (35.6 °C)-98.4 °F (36.9 °C)] 97.9 °F (36.6 °C)  Pulse:  [76-96] 96  Resp:  [18-20] 18  SpO2:  [94 %-99 %] 94 %  BP: ()/() 95/52        Body mass index is 31.04 kg/m².    Physical Exam      Constitutional: She is oriented to person, place, and time. No distress.   HENT:   Head: Normocephalic and atraumatic.   Eyes: Conjunctivae and EOM are normal. Pupils are equal, round, and reactive to light.   Neck: Normal range of motion. Neck supple.   Cardiovascular: Normal rate, regular rhythm and normal heart sounds.  Exam reveals no gallop and no friction rub.     No murmur heard.  Pulmonary/Chest: Effort normal and breath sounds normal. No respiratory distress. She has no wheezes. She has no rales.   Abdominal: Soft. Bowel sounds are normal. She exhibits no distension. There is no tenderness.   Musculoskeletal: Normal range of motion. She exhibits no edema.   Neurological: She is alert and oriented to person, place, and time. She displays abnormal reflex. No sensory deficit.   Reflex Scores:       Patellar reflexes are 2+ on the right side and 2+ on the left side.       Achilles reflexes are 3+ on the right side and 2+ on the left side.  Clonus with R achilles DTR   Skin: Skin is warm and dry.   Psychiatric: She has a normal mood and affect. Her behavior is normal. Judgment and thought content normal.   Nursing note and vitals reviewed.    Significant Labs:   Blood Culture: No results for input(s): LABBLOO in the last 48 hours.  CBC:   Recent Labs  Lab 03/15/18  2205   WBC 8.27   HGB 12.1   HCT 38.2        CMP:   Recent Labs  Lab 03/15/18  2205      K 3.6      CO2 23      BUN 16   CREATININE 0.8   CALCIUM 9.4   PROT 7.9   ALBUMIN 3.0*   BILITOT 0.4   ALKPHOS 65   AST 32   ALT 13   ANIONGAP 10   EGFRNONAA >60.0     Coagulation:   Recent Labs  Lab 03/16/18  1058   INR 1.1   APTT 29.9     Magnesium: No results for input(s): MG in the last 48 hours.  All pertinent labs within the past 24 hours have been reviewed.    Significant Imaging: I have reviewed and interpreted all pertinent imaging results/findings within the past 24 hours.     MRI (3/17)  Changes of vertebral osteomyelitis/discitis again noted T12-L1 with progression of changes including greater degree of effacement of the ventral and dorsal subarachnoid spaces and now with severe compression of the adjacent cord at the T12-L1 level although no definite cord edema appreciated at this time.    Assessment/Plan:     * Pott's disease (spinal tuberculosis)    -Currently being treated by ID,  medications not on med list but is getting them via home health nurse on Tues and Fri  - Had extensive stay at [facility] 3 months ago when this was diagnosed, since diagnosis pts pain has been unbearable and pt unable to walk  - CT of chest and abdomen done showing signs of discitis/osteomyelitis, possible epidural thickening  - MRI (3/17) Changes of vertebral osteomyelitis/discitis again noted T12-L1 with progression of changes including greater degree of effacement of the ventral and dorsal subarachnoid spaces and now with severe compression of the adjacent cord at the T12-L1 level although no definite cord edema appreciated at this time.    - NPO  - Neurosurgery consult  - TB meds; unknown?  - pain control (Percocet, Dilaudid)  - ID consult; unsure which continuation phase of tx is she in? \  - start RIPE abx.   - Decadron 8mg IV x 1         Sarcoidosis    - Patient of Dr. SANGEETA Ram (Rheum) and Dr. KIRSTIE Hopkins ([place] [facility] [place]  - Currently hold MTX   - Currently not on any treatment due to current treatment of TB          HTN (hypertension), benign    Home meds: Coreg, Norvasc, Cardura, Isosorbide  - hypotensive   - holding BP meds  - restart when appropriate           VTE Risk Mitigation         Ordered     IP VTE LOW RISK PATIENT  Once      03/17/18 7807             Mukesh Solis MD  Department of Hospital Medicine   [facility]

## 2018-03-17 NOTE — HPI
Ms. 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 MRI notable for severe compression of the adjacent cord at the T12-L1 level. Transferred to [facility] for Neurosurgery evaluation.    Patient history dates back to December when she was admitted to [facility] from 12/17-01/18 for fevers, night sweats, back pain and weakness.  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  Since being discharged pt reports being unable to walk, mostly being bed bound or on the sofa due to severe back pain.  She is currently getting antibiotics via the health unit for her TB.  A home health nurse comes to her house to give her TB meds every Tues and Fri.  She reports numbness in her feet as well as tingling which has been ongoing for about 1 month.  She wears a diaper because she cannot get out of bed to use the bathroom.  She states that recently there have been times she doesn't realize that she urinated.  She denies any burning on urination, fevers, chills, night sweats at this time.        AFB cultures (12/27) > M Tb complex sensitive to RIF/ Strep/ INH/ EMB/ Pyrazinamide per quest.

## 2018-03-17 NOTE — SUBJECTIVE & OBJECTIVE
Past Medical History:   Diagnosis Date    Arthritis     Asthma     Back pain     Encounter for blood transfusion     Glaucoma     Gout     Hyperlipidemia     Hypertension     Sarcoidosis     Vitritis     Vitritis        Past Surgical History:   Procedure Laterality Date    CATARACT EXTRACTION Bilateral      SECTION      HYSTERECTOMY  1977    uterus/cervix d/t uterine fibroids.       Review of patient's allergies indicates:   Allergen Reactions    Prednisone Hives and Itching     Pills only, can take if she needs to    Naproxen Rash       Current Facility-Administered Medications on File Prior to Encounter   Medication    [COMPLETED] amLODIPine tablet 10 mg    [COMPLETED] gadodiamide solution 20 mL    [COMPLETED] hydromorphone (PF) injection 1 mg    [COMPLETED] hydromorphone (PF) injection 1 mg    [DISCONTINUED] acetaminophen tablet 650 mg    [DISCONTINUED] albuterol-ipratropium 2.5mg-0.5mg/3mL nebulizer solution 3 mL    [DISCONTINUED] amLODIPine tablet 10 mg    [DISCONTINUED] carvedilol tablet 25 mg    [DISCONTINUED] dextrose 50% injection 12.5 g    [DISCONTINUED] dextrose 50% injection 25 g    [DISCONTINUED] doxazosin tablet 4 mg    [DISCONTINUED] enoxaparin injection 40 mg    [DISCONTINUED] ethambutol tablet 1,200 mg    [DISCONTINUED] folic acid tablet 1 mg    [DISCONTINUED] glucagon (human recombinant) injection 1 mg    [DISCONTINUED] glucose chewable tablet 16 g    [DISCONTINUED] glucose chewable tablet 24 g    [DISCONTINUED] hydromorphone (PF) (DILAUDID) 2 mg/mL injection    [DISCONTINUED] insulin aspart U-100 pen 0-5 Units    [DISCONTINUED] isoniazid tablet 300 mg    [DISCONTINUED] isosorbide dinitrate tablet 20 mg    [DISCONTINUED] isosorbide-hydrALAZINE 20-37.5 mg per tablet 1 tablet    [DISCONTINUED] ondansetron injection 4 mg    [DISCONTINUED] oxyCODONE-acetaminophen  mg per tablet 1 tablet    [DISCONTINUED] oxyCODONE-acetaminophen 7.5-325 mg per  tablet 1 tablet    [DISCONTINUED] polyethylene glycol packet 17 g    [DISCONTINUED] pyrazinamide tablet 1,500 mg    [DISCONTINUED] pyridoxine (vitamin B6) tablet 50 mg    [DISCONTINUED] rifAMpin capsule 600 mg    [DISCONTINUED] sodium chloride 0.9% flush 5 mL    [DISCONTINUED] traZODone tablet 100 mg     Current Outpatient Prescriptions on File Prior to Encounter   Medication Sig    acetaminophen-codeine 300-60mg (TYLENOL #4) 300-60 mg Tab Take by mouth.    acetaminophen-codeine 300-60mg (TYLENOL #4) 300-60 mg Tab TAKE ONE TABLET BY MOUTH 4 TIMES DAILY AS NEEDED    albuterol (PROVENTIL) 2.5 mg /3 mL (0.083 %) nebulizer solution Take 3 mLs (2.5 mg total) by nebulization every 4 (four) hours as needed for Wheezing or Shortness of Breath (cough). Rescue    albuterol 90 mcg/actuation inhaler Inhale 2 puffs into the lungs every 4 (four) hours as needed for Wheezing or Shortness of Breath (cough).    amLODIPine (NORVASC) 10 MG tablet Take 1 tablet (10 mg total) by mouth once daily.    carvedilol (COREG) 25 MG tablet Take 1 tablet (25 mg total) by mouth 2 (two) times daily with meals.    doxazosin (CARDURA) 4 MG tablet Take 1 tablet (4 mg total) by mouth once daily.    folic acid (FOLVITE) 1 MG tablet Take 1 tablet (1 mg total) by mouth once daily.    isosorbide dinitrate (ISORDIL) 20 MG tablet Take 1 tablet (20 mg total) by mouth 3 (three) times daily.    isosorbide-hydrALAZINE 20-37.5 mg (BIDIL) 20-37.5 mg Tab Take 1 tablet by mouth 3 (three) times daily.    oxyCODONE-acetaminophen (PERCOCET)  mg per tablet 1 max 2 tabs every 4 to 6 hours prn pain.  Causes constipation.    pyridoxine, vitamin B6, (B-6) 50 MG Tab Take 1 tablet (50 mg total) by mouth once daily.    trazodone (DESYREL) 100 MG tablet TAKE 1 TO 3 TABLETS BY MOUTH NIGHTLY AS NEEDED     Family History     Problem Relation (Age of Onset)    Diabetes Mother, Father    Kidney disease Mother, Father        Social History Main Topics     Smoking status: Former Smoker     Packs/day: 1.00     Years: 35.00     Types: Cigarettes     Start date: 1/7/1967     Quit date: 12/23/1994    Smokeless tobacco: Never Used    Alcohol use No    Drug use: No    Sexual activity: Yes     Partners: Male     Birth control/ protection: Post-menopausal, See Surgical Hx     Review of Systems   Constitutional: Negative for chills and fever.   HENT: Negative for congestion, rhinorrhea and sore throat.    Eyes: Negative.    Respiratory: Negative for chest tightness and shortness of breath.    Cardiovascular: Negative for chest pain and leg swelling.   Gastrointestinal: Negative for abdominal pain, diarrhea, nausea and vomiting.   Endocrine: Negative.    Genitourinary: Negative for difficulty urinating, dysuria and hematuria. Urinary incontinence   Musculoskeletal: Positive for back pain. Negative for arthralgias and myalgias.   Skin: Negative.    Neurological: Positive for weakness and numbness. Negative for syncope and headaches.   Hematological: Negative.    Psychiatric/Behavioral: Negative.    All other systems reviewed and are negative.  Objective:     Vital Signs (Most Recent):  Temp: 97.9 °F (36.6 °C) (03/17/18 0355)  Pulse: 96 (03/17/18 0355)  Resp: 18 (03/17/18 0355)  BP: (!) 95/52 (03/17/18 0355)  SpO2: (!) 94 % (03/17/18 0355) Vital Signs (24h Range):  Temp:  [96.1 °F (35.6 °C)-98.4 °F (36.9 °C)] 97.9 °F (36.6 °C)  Pulse:  [76-96] 96  Resp:  [18-20] 18  SpO2:  [94 %-99 %] 94 %  BP: ()/() 95/52        Body mass index is 31.04 kg/m².    Physical Exam      Constitutional: She is oriented to person, place, and time. No distress.   HENT:   Head: Normocephalic and atraumatic.   Eyes: Conjunctivae and EOM are normal. Pupils are equal, round, and reactive to light.   Neck: Normal range of motion. Neck supple.   Cardiovascular: Normal rate, regular rhythm and normal heart sounds.  Exam reveals no gallop and no friction rub.    No murmur  heard.  Pulmonary/Chest: Effort normal and breath sounds normal. No respiratory distress. She has no wheezes. She has no rales.   Abdominal: Soft. Bowel sounds are normal. She exhibits no distension. There is no tenderness.   Musculoskeletal: Normal range of motion. She exhibits no edema.   Neurological: She is alert and oriented to person, place, and time. She displays abnormal reflex. No sensory deficit.   Reflex Scores:       Patellar reflexes are 2+ on the right side and 2+ on the left side.       Achilles reflexes are 3+ on the right side and 2+ on the left side.  Clonus with R achilles DTR   Skin: Skin is warm and dry.   Psychiatric: She has a normal mood and affect. Her behavior is normal. Judgment and thought content normal.   Nursing note and vitals reviewed.    Significant Labs:   Blood Culture: No results for input(s): LABBLOO in the last 48 hours.  CBC:   Recent Labs  Lab 03/15/18  2205   WBC 8.27   HGB 12.1   HCT 38.2        CMP:   Recent Labs  Lab 03/15/18  2205      K 3.6      CO2 23      BUN 16   CREATININE 0.8   CALCIUM 9.4   PROT 7.9   ALBUMIN 3.0*   BILITOT 0.4   ALKPHOS 65   AST 32   ALT 13   ANIONGAP 10   EGFRNONAA >60.0     Coagulation:   Recent Labs  Lab 03/16/18  1058   INR 1.1   APTT 29.9     Magnesium: No results for input(s): MG in the last 48 hours.  All pertinent labs within the past 24 hours have been reviewed.    Significant Imaging: I have reviewed and interpreted all pertinent imaging results/findings within the past 24 hours.     MRI (3/17)  Changes of vertebral osteomyelitis/discitis again noted T12-L1 with progression of changes including greater degree of effacement of the ventral and dorsal subarachnoid spaces and now with severe compression of the adjacent cord at the T12-L1 level although no definite cord edema appreciated at this time.

## 2018-03-17 NOTE — SUBJECTIVE & OBJECTIVE
Past Medical History:   Diagnosis Date    Arthritis     Asthma     Back pain     Encounter for blood transfusion     Glaucoma     Gout     Hyperlipidemia     Hypertension     Sarcoidosis     Vitritis     Vitritis        Past Surgical History:   Procedure Laterality Date    CATARACT EXTRACTION Bilateral      SECTION      HYSTERECTOMY  1977    uterus/cervix d/t uterine fibroids.       Review of patient's allergies indicates:   Allergen Reactions    Prednisone Hives and Itching     Pills only, can take if she needs to    Naproxen Rash       Medications:  Prescriptions Prior to Admission   Medication Sig    acetaminophen-codeine 300-60mg (TYLENOL #4) 300-60 mg Tab Take by mouth.    acetaminophen-codeine 300-60mg (TYLENOL #4) 300-60 mg Tab TAKE ONE TABLET BY MOUTH 4 TIMES DAILY AS NEEDED    albuterol (PROVENTIL) 2.5 mg /3 mL (0.083 %) nebulizer solution Take 3 mLs (2.5 mg total) by nebulization every 4 (four) hours as needed for Wheezing or Shortness of Breath (cough). Rescue    albuterol 90 mcg/actuation inhaler Inhale 2 puffs into the lungs every 4 (four) hours as needed for Wheezing or Shortness of Breath (cough).    amLODIPine (NORVASC) 10 MG tablet Take 1 tablet (10 mg total) by mouth once daily.    carvedilol (COREG) 25 MG tablet Take 1 tablet (25 mg total) by mouth 2 (two) times daily with meals.    doxazosin (CARDURA) 4 MG tablet Take 1 tablet (4 mg total) by mouth once daily.    folic acid (FOLVITE) 1 MG tablet Take 1 tablet (1 mg total) by mouth once daily.    isosorbide dinitrate (ISORDIL) 20 MG tablet Take 1 tablet (20 mg total) by mouth 3 (three) times daily.    isosorbide-hydrALAZINE 20-37.5 mg (BIDIL) 20-37.5 mg Tab Take 1 tablet by mouth 3 (three) times daily.    oxyCODONE-acetaminophen (PERCOCET)  mg per tablet 1 max 2 tabs every 4 to 6 hours prn pain.  Causes constipation.    pyridoxine, vitamin B6, (B-6) 50 MG Tab Take 1 tablet (50 mg total) by mouth once  daily.    trazodone (DESYREL) 100 MG tablet TAKE 1 TO 3 TABLETS BY MOUTH NIGHTLY AS NEEDED     Antibiotics     None        Antifungals     None        Antivirals     None           Immunization History   Administered Date(s) Administered    Influenza - Quadrivalent - PF 12/29/2015, 11/14/2016, 10/02/2017    Influenza - Trivalent - PF (PED) 12/26/2014    Tdap 05/27/2016       Family History     Problem Relation (Age of Onset)    Diabetes Mother, Father    Kidney disease Mother, Father        Social History     Social History    Marital status:      Spouse name: N/A    Number of children: 2    Years of education: 10     Social History Main Topics    Smoking status: Former Smoker     Packs/day: 1.00     Years: 35.00     Types: Cigarettes     Start date: 1/7/1967     Quit date: 12/23/1994    Smokeless tobacco: Never Used    Alcohol use No    Drug use: No    Sexual activity: Yes     Partners: Male     Birth control/ protection: Post-menopausal, See Surgical Hx     Other Topics Concern    None     Social History Narrative    None     Review of Systems   Constitutional: Negative for chills, fatigue and fever.   Genitourinary: Positive for enuresis and flank pain.   Musculoskeletal: Positive for back pain and myalgias.     Objective:     Vital Signs (Most Recent):  Temp: 96.4 °F (35.8 °C) (03/17/18 1536)  Pulse: 71 (03/17/18 1536)  Resp: 18 (03/17/18 0745)  BP: 132/63 (03/17/18 1536)  SpO2: 98 % (03/17/18 0745) Vital Signs (24h Range):  Temp:  [96.4 °F (35.8 °C)-98.3 °F (36.8 °C)] 96.4 °F (35.8 °C)  Pulse:  [71-96] 71  Resp:  [18-20] 18  SpO2:  [94 %-98 %] 98 %  BP: ()/(52-85) 132/63     Weight: 84.6 kg (186 lb 9.6 oz)  Body mass index is 31.05 kg/m².    Estimated Creatinine Clearance: 46.9 mL/min (based on SCr of 1.3 mg/dL).    Physical Exam   Constitutional: She is oriented to person, place, and time. She appears well-developed and well-nourished.   HENT:   Head: Normocephalic and atraumatic.    Eyes: EOM are normal. Pupils are equal, round, and reactive to light.   Neck: Normal range of motion.   Cardiovascular: Normal rate, regular rhythm and normal heart sounds.    Pulmonary/Chest: Effort normal and breath sounds normal.   Abdominal: Soft. Bowel sounds are normal.   Musculoskeletal: Normal range of motion. She exhibits no edema.   Neurological: She is alert and oriented to person, place, and time.   Skin: No rash noted.       Significant Labs:   Blood Culture:   Recent Labs  Lab 12/08/17  1810 12/08/17  1825   LABBLOO No growth after 5 days. No growth after 5 days.     BMP:   Recent Labs  Lab 03/17/18  0538 03/17/18  1306   GLU 91 122*    139   K 4.6 4.8    108   CO2 25 21*   BUN 25* 29*   CREATININE 1.3 1.3   CALCIUM 10.0 9.6   MG 1.6  --      CBC:   Recent Labs  Lab 03/15/18  2205 03/17/18  0538   WBC 8.27 8.75   HGB 12.1 11.5*   HCT 38.2 36.5*    233     Microbiology Results (last 7 days)     Procedure Component Value Units Date/Time    AFB Culture & Smear [[id number]]     Order Status:  No result Specimen:  Respiratory from Sputum, Expectorated     AFB Culture & Smear [[id number]]     Order Status:  No result Specimen:  Respiratory from Sputum, Expectorated     AFB Culture & Smear [[id number]]     Order Status:  No result Specimen:  Respiratory from Sputum, Expectorated           Significant Imaging: I have reviewed all pertinent imaging results/findings within the past 24 hours.

## 2018-03-17 NOTE — PLAN OF CARE
Problem: Fall Risk (Adult)  Goal: Absence of Falls  Patient will demonstrate the desired outcomes by discharge/transition of care.   Fall precaution maintained this shift call bell in reach bed in low position. Instructed pt to call for assistance no acute distress noted. Family at bedside    Problem: Infection, Risk/Actual (Adult)  Goal: Infection Prevention/Resolution  Patient will demonstrate the desired outcomes by discharge/transition of care.   Isolation precaution maintained this shift vs stable pt remained afebrile

## 2018-03-17 NOTE — CONSULTS
Consult received. Full note to follow.    Please call for questions.    Thanks,  Franki Chavez MD  Infectious Disease Fellow, PGY-4  Pager: [phone]  [facility]

## 2018-03-17 NOTE — CONSULTS
[facility]  Infectious Disease  Consult Note    Patient Name: Mayr Carrillo  MRN: [id number]  Admission Date: 3/17/2018  Hospital Length of Stay: 0 days  Attending Physician: Aleisha Gee MD  Primary Care Provider: Jose Khan MD     Isolation Status: No active isolations    Patient information was obtained from patient and ER records.      Consults  Assessment/Plan:     Pott's disease (spinal tuberculosis)    63 y/o female with Matta disease on DOT RIPE from the twice a week frequency and the amount of pills  we estimate currently patient is taking Isoniazid 900 mg and 600 mg rifampin from doctor Thalia's note patient should have completed 2 months of initial RIPE therapy. Progression of neurological symptoms and worsening of MRI findings not necessary failure of therapy may need decompression surgery due to nature of disease. We recommend if possible to obtain sample during procedure for new culture and sensitives of Tb. Will recommend to evaluate drug levels of rifampin and isoniazid to determine if therapeutic levels in blood are achieved. Will call health department on Monday to confirm dosis and current regimen of therapy. Last dose was on Friday next to be due on Tuesday.     Recommendation   - Obtain if possible AFB smear and culture from discitis region  - Will contact health department to confirm therapy   - Next dose of DOT due on Tuesday   900mg Isoniazid, 600 mg rifampin with B6   - Does not requre airborne isolation patient completed 2 weeks on inpatient RIPE            Thank you for your consult. I will follow-up with patient. Please contact us if you have any additional questions.    Franki Dougherty MD  Infectious Disease  [facility]    Subjective:     Principal Problem: Spinal cord compression    HPI: Case of a 63 yo F with a medical history significant for HTN, Sarcoidosis, Pott's disease (compliant w/ RIPE / B6) DOT who is transferred from [facility]  [facility] where she presented with w/ advancing neurological sx, incontinence and LE worsening pain. MRI notable for severe compression of the adjacent cord at the T12-L1 level. Transferred to [facility] for Neurosurgery evaluation. Patient history dates back to [place] when [place] [facility] [place] for fevers, night sweats, back pain and weakness.  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  Since being discharged pt reports being unable to walk, mostly being bed bound or on the sofa due to severe back pain.  She is currently getting antibiotics via the health unit for her TB.  A home health nurse comes to her house to give her TB meds every Tues and Fri.  She reports numbness in her feet as well as tingling which has been ongoing for about 1 month.  She wears a diaper because she cannot get out of bed to use the bathroom.  She states that recently there have been times she doesn't realize that she urinated.  She denies any burning on urination, fevers, chills, night sweats at this time.  AFB cultures () > M Tb complex sensitive to RIF/ Strep/ INH/ EMB/ Pyrazinamide per quest. ID consulted for additional recommendations     Past Medical History:   Diagnosis Date    Arthritis     Asthma     Back pain     Encounter for blood transfusion     Glaucoma     Gout     Hyperlipidemia     Hypertension     Sarcoidosis     Vitritis     Vitritis        Past Surgical History:   Procedure Laterality Date    CATARACT EXTRACTION Bilateral      SECTION      HYSTERECTOMY      uterus/cervix d/t uterine fibroids.       Review of patient's allergies indicates:   Allergen Reactions    Prednisone Hives and Itching     Pills only, can take if she needs to    Naproxen Rash       Medications:  Prescriptions Prior to Admission   Medication  Sig    acetaminophen-codeine 300-60mg (TYLENOL #4) 300-60 mg Tab Take by mouth.    acetaminophen-codeine 300-60mg (TYLENOL #4) 300-60 mg Tab TAKE ONE TABLET BY MOUTH 4 TIMES DAILY AS NEEDED    albuterol (PROVENTIL) 2.5 mg /3 mL (0.083 %) nebulizer solution Take 3 mLs (2.5 mg total) by nebulization every 4 (four) hours as needed for Wheezing or Shortness of Breath (cough). Rescue    albuterol 90 mcg/actuation inhaler Inhale 2 puffs into the lungs every 4 (four) hours as needed for Wheezing or Shortness of Breath (cough).    amLODIPine (NORVASC) 10 MG tablet Take 1 tablet (10 mg total) by mouth once daily.    carvedilol (COREG) 25 MG tablet Take 1 tablet (25 mg total) by mouth 2 (two) times daily with meals.    doxazosin (CARDURA) 4 MG tablet Take 1 tablet (4 mg total) by mouth once daily.    folic acid (FOLVITE) 1 MG tablet Take 1 tablet (1 mg total) by mouth once daily.    isosorbide dinitrate (ISORDIL) 20 MG tablet Take 1 tablet (20 mg total) by mouth 3 (three) times daily.    isosorbide-hydrALAZINE 20-37.5 mg (BIDIL) 20-37.5 mg Tab Take 1 tablet by mouth 3 (three) times daily.    oxyCODONE-acetaminophen (PERCOCET)  mg per tablet 1 max 2 tabs every 4 to 6 hours prn pain.  Causes constipation.    pyridoxine, vitamin B6, (B-6) 50 MG Tab Take 1 tablet (50 mg total) by mouth once daily.    trazodone (DESYREL) 100 MG tablet TAKE 1 TO 3 TABLETS BY MOUTH NIGHTLY AS NEEDED     Antibiotics     None        Antifungals     None        Antivirals     None           Immunization History   Administered Date(s) Administered    Influenza - Quadrivalent - PF 12/29/2015, 11/14/2016, 10/02/2017    Influenza - Trivalent - PF (PED) 12/26/2014    Tdap 05/27/2016       Family History     Problem Relation (Age of Onset)    Diabetes Mother, Father    Kidney disease Mother, Father        Social History     Social History    Marital status:      Spouse name: N/A    Number of children: 2    Years of education:  10     Social History Main Topics    Smoking status: Former Smoker     Packs/day: 1.00     Years: 35.00     Types: Cigarettes     Start date: 1/7/1967     Quit date: 12/23/1994    Smokeless tobacco: Never Used    Alcohol use No    Drug use: No    Sexual activity: Yes     Partners: Male     Birth control/ protection: Post-menopausal, See Surgical Hx     Other Topics Concern    None     Social History Narrative    None     Review of Systems   Constitutional: Negative for chills, fatigue and fever.   Genitourinary: Positive for enuresis and flank pain.   Musculoskeletal: Positive for back pain and myalgias.     Objective:     Vital Signs (Most Recent):  Temp: 96.4 °F (35.8 °C) (03/17/18 1536)  Pulse: 71 (03/17/18 1536)  Resp: 18 (03/17/18 0745)  BP: 132/63 (03/17/18 1536)  SpO2: 98 % (03/17/18 0745) Vital Signs (24h Range):  Temp:  [96.4 °F (35.8 °C)-98.3 °F (36.8 °C)] 96.4 °F (35.8 °C)  Pulse:  [71-96] 71  Resp:  [18-20] 18  SpO2:  [94 %-98 %] 98 %  BP: ()/(52-85) 132/63     Weight: 84.6 kg (186 lb 9.6 oz)  Body mass index is 31.05 kg/m².    Estimated Creatinine Clearance: 46.9 mL/min (based on SCr of 1.3 mg/dL).    Physical Exam   Constitutional: She is oriented to person, place, and time. She appears well-developed and well-nourished.   HENT:   Head: Normocephalic and atraumatic.   Eyes: EOM are normal. Pupils are equal, round, and reactive to light.   Neck: Normal range of motion.   Cardiovascular: Normal rate, regular rhythm and normal heart sounds.    Pulmonary/Chest: Effort normal and breath sounds normal.   Abdominal: Soft. Bowel sounds are normal.   Musculoskeletal: Normal range of motion. She exhibits no edema.   Neurological: She is alert and oriented to person, place, and time.   Skin: No rash noted.       Significant Labs:   Blood Culture:   Recent Labs  Lab 12/08/17 1810 12/08/17 1825   LABBLOO No growth after 5 days. No growth after 5 days.     BMP:   Recent Labs  Lab 03/17/18  0538  03/17/18  1306   GLU 91 122*    139   K 4.6 4.8    108   CO2 25 21*   BUN 25* 29*   CREATININE 1.3 1.3   CALCIUM 10.0 9.6   MG 1.6  --      CBC:   Recent Labs  Lab 03/15/18  2205 03/17/18  0538   WBC 8.27 8.75   HGB 12.1 11.5*   HCT 38.2 36.5*    233     Microbiology Results (last 7 days)     Procedure Component Value Units Date/Time    AFB Culture & Smear [[id number]]     Order Status:  No result Specimen:  Respiratory from Sputum, Expectorated     AFB Culture & Smear [[id number]]     Order Status:  No result Specimen:  Respiratory from Sputum, Expectorated     AFB Culture & Smear [[id number]]     Order Status:  No result Specimen:  Respiratory from Sputum, Expectorated           Significant Imaging: I have reviewed all pertinent imaging results/findings within the past 24 hours.

## 2018-03-17 NOTE — ASSESSMENT & PLAN NOTE
- Patient of Dr. SANGEETA Ram (Rheum) and Dr. KIRSTIE Hopkins (Pulm) at [facility]  - Currently hold MTX   - Currently not on any treatment due to current treatment of TB

## 2018-03-17 NOTE — CONSULTS
[facility]  Neurosurgery  Consult Note    Inpatient consult to Neurosurgery  Consult performed by: NOMI HILLIARD  Consult ordered by: REMIGIO CORADO        Subjective:     Chief Complaint/Reason for Admission: Matta disease    History of Present Illness: 63 yo F with history of Sarcoidosis, HTN, Pott's disease who presented to the ED with worsening pain and weakness of her lower extremities.  She had a prolonged hospital stay at [facility] from 12/17-01/18 for fevers, night sweats, back pain and weakness.  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  Since being discharged pt reports being unable to walk, mostly being bed bound or on the sofa due to severe back pain.  She is currently getting antibiotics via the health unit for her TB.  She endorses back pain radiating down both legs to toes.  She reports numbness in her feet as well as tingling which has been ongoing for about 1 month.  She denies overt bowel/bladder incontinence or saddle anesthesia.    PTA Medications   Medication Sig    acetaminophen-codeine 300-60mg (TYLENOL #4) 300-60 mg Tab Take by mouth.    acetaminophen-codeine 300-60mg (TYLENOL #4) 300-60 mg Tab TAKE ONE TABLET BY MOUTH 4 TIMES DAILY AS NEEDED    albuterol (PROVENTIL) 2.5 mg /3 mL (0.083 %) nebulizer solution Take 3 mLs (2.5 mg total) by nebulization every 4 (four) hours as needed for Wheezing or Shortness of Breath (cough). Rescue    albuterol 90 mcg/actuation inhaler Inhale 2 puffs into the lungs every 4 (four) hours as needed for Wheezing or Shortness of Breath (cough).    amLODIPine (NORVASC) 10 MG tablet Take 1 tablet (10 mg total) by mouth once daily.    carvedilol (COREG) 25 MG tablet Take 1 tablet (25 mg total) by mouth 2 (two) times daily with meals.    doxazosin (CARDURA) 4 MG tablet Take 1 tablet (4 mg  total) by mouth once daily.    folic acid (FOLVITE) 1 MG tablet Take 1 tablet (1 mg total) by mouth once daily.    isosorbide dinitrate (ISORDIL) 20 MG tablet Take 1 tablet (20 mg total) by mouth 3 (three) times daily.    isosorbide-hydrALAZINE 20-37.5 mg (BIDIL) 20-37.5 mg Tab Take 1 tablet by mouth 3 (three) times daily.    oxyCODONE-acetaminophen (PERCOCET)  mg per tablet 1 max 2 tabs every 4 to 6 hours prn pain.  Causes constipation.    pyridoxine, vitamin B6, (B-6) 50 MG Tab Take 1 tablet (50 mg total) by mouth once daily.    trazodone (DESYREL) 100 MG tablet TAKE 1 TO 3 TABLETS BY MOUTH NIGHTLY AS NEEDED       Review of patient's allergies indicates:   Allergen Reactions    Prednisone Hives and Itching     Pills only, can take if she needs to    Naproxen Rash       Past Medical History:   Diagnosis Date    Arthritis     Asthma     Back pain     Encounter for blood transfusion     Glaucoma     Gout     Hyperlipidemia     Hypertension     Sarcoidosis     Vitritis     Vitritis      Past Surgical History:   Procedure Laterality Date    CATARACT EXTRACTION Bilateral      SECTION      HYSTERECTOMY      uterus/cervix d/t uterine fibroids.     Family History     Problem Relation (Age of Onset)    Diabetes Mother, Father    Kidney disease Mother, Father        Social History Main Topics    Smoking status: Former Smoker     Packs/day: 1.00     Years: 35.00     Types: Cigarettes     Start date: 1967     Quit date: 1994    Smokeless tobacco: Never Used    Alcohol use No    Drug use: No    Sexual activity: Yes     Partners: Male     Birth control/ protection: Post-menopausal, See Surgical Hx     Review of Systems  Objective:        Body mass index is 31.04 kg/m².  Vital Signs (Most Recent):  Temp: 97.9 °F (36.6 °C) (18)  Pulse: 96 (18)  Resp: 18 (18)  BP: (!) 95/52 (18)  SpO2: (!) 94 % (18) Vital Signs (24h  Range):  Temp:  [96.1 °F (35.6 °C)-98.4 °F (36.9 °C)] 97.9 °F (36.6 °C)  Pulse:  [76-96] 96  Resp:  [18-20] 18  SpO2:  [94 %-99 %] 94 %  BP: ()/() 95/52                           Neurosurgery Physical Exam   AOX3, speech fluent  PERRL  5/5 in BUE  3/5 in bilateral HF,KF,KE. 4+/5 in PF,DF, EHL  Diminished sensation below ankles bilaterally  No clonus, no babinski, no ricardo's    Significant Labs:    Recent Labs  Lab 03/15/18  2205         K 3.6      CO2 23   BUN 16   CREATININE 0.8   CALCIUM 9.4       Recent Labs  Lab 03/15/18  2205   WBC 8.27   HGB 12.1   HCT 38.2          Recent Labs  Lab 03/15/18  2205 03/16/18  1058   INR 1.0 1.1   APTT  --  29.9     Microbiology Results (last 7 days)     Procedure Component Value Units Date/Time    AFB Culture & Smear [[id number]]     Order Status:  No result Specimen:  Respiratory from Sputum, Expectorated     AFB Culture & Smear [[id number]]     Order Status:  No result Specimen:  Respiratory from Sputum, Expectorated     AFB Culture & Smear [[id number]]     Order Status:  No result Specimen:  Respiratory from Sputum, Expectorated           Significant Diagnostics:  MRI T/L spine: reviewed.  T12/L1 osteomyelitis with ventral epidural abscess and moderate to severe canal compromise    Assessment/Plan:     Active Diagnoses:    Diagnosis Date Noted POA   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] 12/08/2017 Yes    HTN (hypertension), benign [I10] 12/26/2014 Yes    Sarcoidosis [D86.9] 12/23/2014 Yes      Problems Resolved During this Admission:    Diagnosis Date Noted Date Resolved POA     62 F with hx of pott's disease and T12/L1 osteo/diskitis.  -Pt with pain limited weakness in BLE  -Rec post void residual, please document in chart  -Continue TB tx, fu ID recs  -IR guided bone biopsy of T12/L1 lesions.  -CT of thoracolumbar jxn  -Rec TLSO brace  -Medical mgmt per primary  -Further recs pending      Keith Guillaume,    Neurosurgery  [facility]

## 2018-03-17 NOTE — ASSESSMENT & PLAN NOTE
-Currently being treated by ID, medications not on med list but is getting them via home health nurse on Tues and Fri  - Had extensive stay at [facility] 3 months ago when this was diagnosed, since diagnosis pts pain has been unbearable and pt unable to walk  - CT of chest and abdomen done showing signs of discitis/osteomyelitis, possible epidural thickening  - MRI (3/17) Changes of vertebral osteomyelitis/discitis again noted T12-L1 with progression of changes including greater degree of effacement of the ventral and dorsal subarachnoid spaces and now with severe compression of the adjacent cord at the T12-L1 level although no definite cord edema appreciated at this time.    - To OR with NSGY today  - Sarcoid vs Pott's (biopsy in Febryary with negative AFB stains)  - pain control (Percocet, Dilaudid)  - Infectious Disease consulted, recs appreciated   - Decadron 4 mg IV q6

## 2018-03-17 NOTE — ASSESSMENT & PLAN NOTE
Home meds: Coreg, Norvasc, Cardura, Isosorbide  - hypotensive   - holding BP meds  - restart when appropriate

## 2018-03-17 NOTE — ANESTHESIA PREPROCEDURE EVALUATION
03/17/2018  [facility]  Anesthesia Pre-Operative Evaluation         Patient Name: Mary Carrillo  YOB: 1955  MRN: [id number]    SUBJECTIVE:     Pre-operative evaluation for Procedure(s) (LRB):  CORPECTOMY THORACIC T12-L1 corpectomy with T10-L3 fusion, neuromonitoring, globus (Left)     03/17/2018    Mary Carrillo is a 62 y.o. female w/ a significant PMHx of HTN, sarcoidosis, Pott's disease (compliant w/ RIPE/B6) who is transferred from [facility] where she presented with w/ advancing neurological sx, incontinence and LE worsening pain. MRI notable for severe compression of the adjacent cord at the T12-L1 level. Transferred to [facility] for Neurosurgery evaluation.    Patient now presents for the above procedure(s).      LDA:        Peripheral IV - Single Lumen 03/15/18 2204 Right Forearm (Active) 20G   Site Assessment Clean;Dry;Intact 3/17/2018 12:28 AM   Line Status Saline locked 3/17/2018 12:28 AM   Dressing Status Clean;Dry;Intact 3/16/2018  7:10 PM   Dressing Intervention Dressing reinforced 3/16/2018 10:48 AM   Dressing Change Due 03/20/18 3/16/2018 10:48 AM   Site Change Due 03/20/18 3/16/2018 10:48 AM   Reason Not Rotated Not due 3/17/2018 12:28 AM   Number of days: 1       Prev airway:   Placement Date: 12/19/17; Placement Time: 1235; Method of Intubation: Direct laryngoscopy; Inserted by: CRNA; Airway Device: Endotracheal Tube; Mask Ventilation: Not Attempted; Intubated: Postinduction; Blade: Artis #3; Airway Device Size: 7.0; Style: Cuffed; Cuff Inflation: Minimal occlusive pressure; Placement Verified By: Auscultation, Capnometry, ETT Condensation; Grade: Grade I; Complicating Factors: None    Drips: None documented.      Patient Active Problem List   Diagnosis    Hemorrhoids, internal    Gout, chronic    Sarcoidosis    Hyperlipidemia     HTN (hypertension), benign    Back pain    Abnormal chest CT    Uveitis    Fatigue    Pulmonary hypertension    CARRASCO (dyspnea on exertion)    IFG (impaired fasting glucose)    Primary osteoarthritis involving multiple joints    Pott's disease (spinal tuberculosis)    Inadequate oral intake    Ovarian mass    Unintentional weight loss    Azotemia    Intractable pain    Dyspnea    Tuberculosis    Thrush    Discitis    Acute exacerbation of chronic low back pain    Spinal cord compression       Review of patient's allergies indicates:   Allergen Reactions    Prednisone Hives and Itching     Pills only, can take if she needs to    Naproxen Rash       Current Inpatient Medications:   enoxaparin  40 mg Subcutaneous Daily       No current facility-administered medications on file prior to encounter.      Current Outpatient Prescriptions on File Prior to Encounter   Medication Sig Dispense Refill    acetaminophen-codeine 300-60mg (TYLENOL #4) 300-60 mg Tab Take by mouth.      acetaminophen-codeine 300-60mg (TYLENOL #4) 300-60 mg Tab TAKE ONE TABLET BY MOUTH 4 TIMES DAILY AS NEEDED 120 tablet 2    albuterol (PROVENTIL) 2.5 mg /3 mL (0.083 %) nebulizer solution Take 3 mLs (2.5 mg total) by nebulization every 4 (four) hours as needed for Wheezing or Shortness of Breath (cough). Rescue 1 Box 0    albuterol 90 mcg/actuation inhaler Inhale 2 puffs into the lungs every 4 (four) hours as needed for Wheezing or Shortness of Breath (cough). 1 Inhaler 0    amLODIPine (NORVASC) 10 MG tablet Take 1 tablet (10 mg total) by mouth once daily. 90 tablet 3    carvedilol (COREG) 25 MG tablet Take 1 tablet (25 mg total) by mouth 2 (two) times daily with meals. 60 tablet 11    doxazosin (CARDURA) 4 MG tablet Take 1 tablet (4 mg total) by mouth once daily. 30 tablet 11    folic acid (FOLVITE) 1 MG tablet Take 1 tablet (1 mg total) by mouth once daily. 90 tablet 3    isosorbide dinitrate (ISORDIL) 20 MG tablet Take  1 tablet (20 mg total) by mouth 3 (three) times daily. 90 tablet 11    isosorbide-hydrALAZINE 20-37.5 mg (BIDIL) 20-37.5 mg Tab Take 1 tablet by mouth 3 (three) times daily. 90 tablet 11    oxyCODONE-acetaminophen (PERCOCET)  mg per tablet 1 max 2 tabs every 4 to 6 hours prn pain.  Causes constipation. 30 tablet 0    pyridoxine, vitamin B6, (B-6) 50 MG Tab Take 1 tablet (50 mg total) by mouth once daily.  0    trazodone (DESYREL) 100 MG tablet TAKE 1 TO 3 TABLETS BY MOUTH NIGHTLY AS NEEDED 90 tablet 3       Past Surgical History:   Procedure Laterality Date    CATARACT EXTRACTION Bilateral      SECTION      HYSTERECTOMY      uterus/cervix d/t uterine fibroids.       Social History     Social History    Marital status:      Spouse name: N/A    Number of children: 2    Years of education: 10     Occupational History    Not on file.     Social History Main Topics    Smoking status: Former Smoker     Packs/day: 1.00     Years: 35.00     Types: Cigarettes     Start date: 1967     Quit date: 1994    Smokeless tobacco: Never Used    Alcohol use No    Drug use: No    Sexual activity: Yes     Partners: Male     Birth control/ protection: Post-menopausal, See Surgical Hx     Other Topics Concern    Not on file     Social History Narrative    No narrative on file       OBJECTIVE:     Vital Signs Range (Last 24H):  Temp:  [35.6 °C (96.1 °F)-36.8 °C (98.3 °F)]   Pulse:  [76-96]   Resp:  [18-20]   BP: ()/(52-85)   SpO2:  [94 %-99 %]       CBC:   Recent Labs      03/15/18   2205  03/17/18   0538   WBC  8.27  8.75   RBC  4.40  4.18   HGB  12.1  11.5*   HCT  38.2  36.5*   PLT  221  233   MCV  87  87   MCH  27.5  27.5   MCHC  31.7*  31.5*       CMP:   Recent Labs      03/15/18   2205  03/17/18   0538  18   1306   NA  137  140  139   K  3.6  4.6  4.8   CL  104  105  108   CO2  23  25  21*   BUN  16  25*  29*   CREATININE  0.8  1.3  1.3   GLU  105  91  122*   MG   --    1.6   --    PHOS   --   5.8*   --    CALCIUM  9.4  10.0  9.6   ALBUMIN  3.0*  2.7*   --    PROT  7.9  7.7   --    ALKPHOS  65  67   --    ALT  13  13   --    AST  32  24   --    BILITOT  0.4  0.8   --        INR:  Recent Labs      03/15/18   2205  03/16/18   1058   INR  1.0  1.1   APTT   --   29.9       Diagnostic Studies: No relevant studies.    EKG: No recent studies available.    2D ECHO:  Results for orders placed or performed during the hospital encounter of 10/01/15   2D Echo w/ Color Flow Doppler   Result Value Ref Range    EF 50 55 - 65    Mitral Valve Regurgitation TRIVIAL     Diastolic Dysfunction Yes (A)     Est. PA Systolic Pressure 30.88     Tricuspid Valve Regurgitation MILD          ASSESSMENT/PLAN:     Anesthesia Evaluation    I have reviewed the Patient Summary Reports.    I have reviewed the Nursing Notes.   I have reviewed the Medications.     Review of Systems  Anesthesia Hx:  No problems with previous Anesthesia  History of prior surgery of interest to airway management or planning: Previous anesthesia: General Denies Family Hx of Anesthesia complications.   Denies Personal Hx of Anesthesia complications.   Social:  Non-Smoker, No Alcohol Use    Hematology/Oncology:  Hematology Normal   Oncology Normal   Denies Current/Recent Cancer   EENT/Dental:  EENT/Dental Normal denies chronic allergic rhinitis    Cardiovascular:   Hypertension, well controlled Denies MI.  Denies CAD.    Denies CABG/stent.  Denies Dysrhythmias.          hyperlipidemia    Pulmonary:   Denies COPD. Asthma asymptomatic  Denies Recent URI.  Denies Sleep Apnea.    Renal/:  Renal/ Normal  Denies Chronic Renal Disease.     Hepatic/GI:   Bowel Prep. Denies GERD. Denies Liver Disease.    Musculoskeletal:   Arthritis     Neurological:  Neurology Normal Denies TIA.  Denies CVA. Denies Seizures.    Endocrine:  Endocrine Normal Denies Diabetes.    Psych:  Psychiatric Normal  Denies Psychiatric History.          Physical  Exam  General:  Well nourished, Obesity    Airway/Jaw/Neck:  Airway Findings: Mouth Opening: Normal Tongue: Large  Pre-Existing Airway Tube(s): Oral Endotracheal tube  General Airway Assessment: Adult  Mallampati: III  Improves to II with phonation.  TM Distance: Normal, at least 6 cm  Jaw/Neck Findings:  Neck ROM: Normal ROM  Neck Findings:  Girth Increased      Dental:  Dental Findings: loose teeth, Periodontal disease, Severe    Chest/Lungs:  Chest/Lungs Findings: Clear to auscultation     Heart/Vascular:  Heart Findings: Rate: Normal  Rhythm: Regular Rhythm  Sounds: Normal     Abdomen:  Abdomen Findings:  Normal, Soft, Nontender     Musculoskeletal:  Musculoskeletal Findings: Normal   Skin:  Skin Findings: Normal    Mental Status:  Mental Status Findings:  Cooperative, Alert and Oriented         Anesthesia Plan  Type of Anesthesia, risks & benefits discussed:  Anesthesia Type:  general  Patient's Preference:   Intra-op Monitoring Plan: standard ASA monitors and arterial line  Intra-op Monitoring Plan Comments:   Post Op Pain Control Plan: multimodal analgesia, IV/PO Opioids PRN and per primary service following discharge from PACU  Post Op Pain Control Plan Comments:   Induction:   IV  Beta Blocker:  Patient is not currently on a Beta-Blocker (No further documentation required).       Informed Consent: Patient understands risks and agrees with Anesthesia plan.  Questions answered. Anesthesia consent signed with patient.  ASA Score: 3     Day of Surgery Review of History & Physical:    H&P update referred to the surgeon.         Ready For Surgery From Anesthesia Perspective.

## 2018-03-17 NOTE — ASSESSMENT & PLAN NOTE
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 Progression of neurological symptoms and worsening of MRI findings not necessary failure of therapy may need decompression surgery due to nature of disease. We recommend if possible to obtain sample during procedure for new culture and sensitives of Tb. Will recommend to evaluate drug levels of rifampin and isoniazid to determine if therapeutic levels in blood are achieved. Will call health department on Monday to confirm dosis and current regimen of therapy. Last dose was on Friday next to be due on Tuesday.     Recommendation   - Obtain if possible AFB smear and culture from discitis region  - Will contact health department to confirm therapy   - Next dose of DOT due on Tuesday   900mg Isoniazid, 600 mg rifampin with B6   - Does not requre airborne isolation patient completed 2 weeks on inpatient RIPE

## 2018-03-17 NOTE — NURSING
Patient expressed that she did not know about a surgery and refused to sign the anesthesiologists consents and requests to speak to a doctor regarding the surgery.  Call placed to team 5 and explained that the patient would like to speak to a doctor regarding surgery.

## 2018-03-17 NOTE — HPI
Case of a 61 yo F with a medical history significant for HTN, Sarcoidosis, Pott's disease (compliant w/ RIPE / B6) DOT who is transferred from [facility] where she presented with w/ advancing neurological sx, incontinence and LE worsening pain. MRI notable for severe compression of the adjacent cord at the T12-L1 level. Transferred to [facility] for Neurosurgery evaluation. Patient history dates back to December when she was admitted to [facility] from 12/17-01/18 for fevers, night sweats, back pain and weakness.  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  Since being discharged pt reports being unable to walk, mostly being bed bound or on the sofa due to severe back pain.  She is currently getting antibiotics via the health unit for her TB.  A home health nurse comes to her house to give her TB meds every Tues and Fri.  She reports numbness in her feet as well as tingling which has been ongoing for about 1 month.  She wears a diaper because she cannot get out of bed to use the bathroom.  She states that recently there have been times she doesn't realize that she urinated.  She denies any burning on urination, fevers, chills, night sweats at this time.  AFB cultures (12/27) > M Tb complex sensitive to RIF/ Strep/ INH/ EMB/ Pyrazinamide per quest. ID consulted for additional recommendations

## 2018-03-17 NOTE — ASSESSMENT & PLAN NOTE
-Currently being treated by ID, medications not on med list but is getting them via home health nurse on Tues and Fri  - Had extensive stay at [facility] 3 months ago when this was diagnosed, since diagnosis pts pain has been unbearable and pt unable to walk  - CT of chest and abdomen done showing signs of discitis/osteomyelitis, possible epidural thickening  - MRI (3/17) Changes of vertebral osteomyelitis/discitis again noted T12-L1 with progression of changes including greater degree of effacement of the ventral and dorsal subarachnoid spaces and now with severe compression of the adjacent cord at the T12-L1 level although no definite cord edema appreciated at this time.    - NPO  - Neurosurgery consult  - TB meds; unknown?  - pain control (Percocet, Dilaudid)  - ID consult; unsure which continuation phase of tx is she in? \  - start RIPE abx.

## 2018-03-18 ENCOUNTER — ANESTHESIA (OUTPATIENT)
Dept: SURGERY | Facility: HOSPITAL | Age: 63
DRG: 454 | End: 2018-03-18
Payer: MEDICARE

## 2018-03-18 LAB
ABO + RH BLD: NORMAL
ALBUMIN SERPL BCP-MCNC: 2.8 G/DL
ALLENS TEST: ABNORMAL
ALP SERPL-CCNC: 67 U/L
ALT SERPL W/O P-5'-P-CCNC: 15 U/L
ANION GAP SERPL CALC-SCNC: 13 MMOL/L
ANION GAP SERPL CALC-SCNC: 9 MMOL/L
APTT BLDCRRT: 23.5 SEC
AST SERPL-CCNC: 24 U/L
BASOPHILS # BLD AUTO: 0.01 K/UL
BASOPHILS # BLD AUTO: 0.02 K/UL
BASOPHILS NFR BLD: 0.1 %
BASOPHILS NFR BLD: 0.2 %
BILIRUB SERPL-MCNC: 0.3 MG/DL
BLD GP AB SCN CELLS X3 SERPL QL: NORMAL
BLD PROD TYP BPU: NORMAL
BLOOD UNIT EXPIRATION DATE: NORMAL
BLOOD UNIT TYPE CODE: 5100
BLOOD UNIT TYPE CODE: 6200
BLOOD UNIT TYPE: NORMAL
BUN SERPL-MCNC: 27 MG/DL
BUN SERPL-MCNC: 36 MG/DL
CALCIUM SERPL-MCNC: 8.4 MG/DL
CALCIUM SERPL-MCNC: 9.6 MG/DL
CHLORIDE SERPL-SCNC: 106 MMOL/L
CHLORIDE SERPL-SCNC: 107 MMOL/L
CO2 SERPL-SCNC: 20 MMOL/L
CO2 SERPL-SCNC: 23 MMOL/L
CODING SYSTEM: NORMAL
CREAT SERPL-MCNC: 1 MG/DL
CREAT SERPL-MCNC: 1.5 MG/DL
DELSYS: ABNORMAL
DIFFERENTIAL METHOD: ABNORMAL
DIFFERENTIAL METHOD: ABNORMAL
DISPENSE STATUS: NORMAL
EOSINOPHIL # BLD AUTO: 0 K/UL
EOSINOPHIL # BLD AUTO: 0.1 K/UL
EOSINOPHIL NFR BLD: 0 %
EOSINOPHIL NFR BLD: 0.7 %
ERYTHROCYTE [DISTWIDTH] IN BLOOD BY AUTOMATED COUNT: 14.8 %
ERYTHROCYTE [DISTWIDTH] IN BLOOD BY AUTOMATED COUNT: 17.2 %
ERYTHROCYTE [SEDIMENTATION RATE] IN BLOOD BY WESTERGREN METHOD: 14 MM/H
EST. GFR  (AFRICAN AMERICAN): 42.1 ML/MIN/1.73 M^2
EST. GFR  (AFRICAN AMERICAN): >60 ML/MIN/1.73 M^2
EST. GFR  (NON AFRICAN AMERICAN): 36.6 ML/MIN/1.73 M^2
EST. GFR  (NON AFRICAN AMERICAN): 59.7 ML/MIN/1.73 M^2
FIO2: 40
GLUCOSE SERPL-MCNC: 121 MG/DL
GLUCOSE SERPL-MCNC: 130 MG/DL
GRAM STN SPEC: NORMAL
HCO3 UR-SCNC: 22.2 MMOL/L (ref 24–28)
HCT VFR BLD AUTO: 25.9 %
HCT VFR BLD AUTO: 34.2 %
HGB BLD-MCNC: 10.7 G/DL
HGB BLD-MCNC: 8.7 G/DL
IMM GRANULOCYTES # BLD AUTO: 0.03 K/UL
IMM GRANULOCYTES # BLD AUTO: 0.15 K/UL
IMM GRANULOCYTES NFR BLD AUTO: 0.4 %
IMM GRANULOCYTES NFR BLD AUTO: 1.3 %
INR PPP: 1.1
LYMPHOCYTES # BLD AUTO: 1.3 K/UL
LYMPHOCYTES # BLD AUTO: 1.9 K/UL
LYMPHOCYTES NFR BLD: 11.1 %
LYMPHOCYTES NFR BLD: 22.8 %
MAGNESIUM SERPL-MCNC: 1.8 MG/DL
MCH RBC QN AUTO: 27.7 PG
MCH RBC QN AUTO: 29.7 PG
MCHC RBC AUTO-ENTMCNC: 31.3 G/DL
MCHC RBC AUTO-ENTMCNC: 33.6 G/DL
MCV RBC AUTO: 88 FL
MCV RBC AUTO: 89 FL
MIN VOL: 10.1
MODE: ABNORMAL
MONOCYTES # BLD AUTO: 0.7 K/UL
MONOCYTES # BLD AUTO: 1 K/UL
MONOCYTES NFR BLD: 7.9 %
MONOCYTES NFR BLD: 8 %
NEUTROPHILS # BLD AUTO: 5.8 K/UL
NEUTROPHILS # BLD AUTO: 9.5 K/UL
NEUTROPHILS NFR BLD: 68.1 %
NEUTROPHILS NFR BLD: 79.4 %
NRBC BLD-RTO: 0 /100 WBC
NRBC BLD-RTO: 0 /100 WBC
PCO2 BLDA: 29.2 MMHG (ref 35–45)
PEEP: 5
PH SMN: 7.49 [PH] (ref 7.35–7.45)
PHOSPHATE SERPL-MCNC: 4.8 MG/DL
PIP: 24
PLATELET # BLD AUTO: 126 K/UL
PLATELET # BLD AUTO: 235 K/UL
PMV BLD AUTO: 10.2 FL
PMV BLD AUTO: 9.9 FL
PO2 BLDA: 119 MMHG (ref 80–100)
POC BE: -1 MMOL/L
POC SATURATED O2: 99 % (ref 95–100)
POC TCO2: 23 MMOL/L (ref 23–27)
POCT GLUCOSE: 124 MG/DL (ref 70–110)
POCT GLUCOSE: 126 MG/DL (ref 70–110)
POCT GLUCOSE: 135 MG/DL (ref 70–110)
POTASSIUM SERPL-SCNC: 3.4 MMOL/L
POTASSIUM SERPL-SCNC: 4.1 MMOL/L
PROT SERPL-MCNC: 7.9 G/DL
PROTHROMBIN TIME: 11.2 SEC
RBC # BLD AUTO: 2.93 M/UL
RBC # BLD AUTO: 3.86 M/UL
SAMPLE: ABNORMAL
SITE: ABNORMAL
SODIUM SERPL-SCNC: 139 MMOL/L
SODIUM SERPL-SCNC: 139 MMOL/L
SP02: 100
TRANS ERYTHROCYTES VOL PATIENT: NORMAL ML
TRANS PLATPHERESIS VOL PATIENT: NORMAL ML
VT: 500
WBC # BLD AUTO: 11.96 K/UL
WBC # BLD AUTO: 8.5 K/UL

## 2018-03-18 PROCEDURE — 20930 SP BONE ALGRFT MORSEL ADD-ON: CPT | Mod: ,,, | Performed by: NEUROLOGICAL SURGERY

## 2018-03-18 PROCEDURE — 22532 ARTHRD LAT XTRCVTRY TQ THRC: CPT | Mod: 51,,, | Performed by: NEUROLOGICAL SURGERY

## 2018-03-18 PROCEDURE — 37000008 HC ANESTHESIA 1ST 15 MINUTES: Performed by: NEUROLOGICAL SURGERY

## 2018-03-18 PROCEDURE — D9220A PRA ANESTHESIA: Mod: CRNA,,, | Performed by: NURSE ANESTHETIST, CERTIFIED REGISTERED

## 2018-03-18 PROCEDURE — 87102 FUNGUS ISOLATION CULTURE: CPT | Mod: 59

## 2018-03-18 PROCEDURE — 0QS004Z REPOSITION LUMBAR VERTEBRA WITH INTERNAL FIXATION DEVICE, OPEN APPROACH: ICD-10-PCS | Performed by: NEUROLOGICAL SURGERY

## 2018-03-18 PROCEDURE — P9017 PLASMA 1 DONOR FRZ W/IN 8 HR: HCPCS

## 2018-03-18 PROCEDURE — 25000003 PHARM REV CODE 250: Performed by: NEUROLOGICAL SURGERY

## 2018-03-18 PROCEDURE — 85025 COMPLETE CBC W/AUTO DIFF WBC: CPT | Mod: 91

## 2018-03-18 PROCEDURE — 25000003 PHARM REV CODE 250: Performed by: NURSE PRACTITIONER

## 2018-03-18 PROCEDURE — 0RG60K1 FUSION OF THORACIC VERTEBRAL JOINT WITH NONAUTOLOGOUS TISSUE SUBSTITUTE, POSTERIOR APPROACH, POSTERIOR COLUMN, OPEN APPROACH: ICD-10-PCS | Performed by: NEUROLOGICAL SURGERY

## 2018-03-18 PROCEDURE — 80048 BASIC METABOLIC PNL TOTAL CA: CPT

## 2018-03-18 PROCEDURE — P9021 RED BLOOD CELLS UNIT: HCPCS

## 2018-03-18 PROCEDURE — D9220A PRA ANESTHESIA: Mod: ANES,,, | Performed by: ANESTHESIOLOGY

## 2018-03-18 PROCEDURE — 0PS404Z REPOSITION THORACIC VERTEBRA WITH INTERNAL FIXATION DEVICE, OPEN APPROACH: ICD-10-PCS | Performed by: NEUROLOGICAL SURGERY

## 2018-03-18 PROCEDURE — C1729 CATH, DRAINAGE: HCPCS | Performed by: NEUROLOGICAL SURGERY

## 2018-03-18 PROCEDURE — 63600175 PHARM REV CODE 636 W HCPCS: Performed by: NURSE ANESTHETIST, CERTIFIED REGISTERED

## 2018-03-18 PROCEDURE — 84100 ASSAY OF PHOSPHORUS: CPT

## 2018-03-18 PROCEDURE — 87070 CULTURE OTHR SPECIMN AEROBIC: CPT

## 2018-03-18 PROCEDURE — 25000003 PHARM REV CODE 250: Performed by: NURSE ANESTHETIST, CERTIFIED REGISTERED

## 2018-03-18 PROCEDURE — 27201423 OPTIME MED/SURG SUP & DEVICES STERILE SUPPLY: Performed by: NEUROLOGICAL SURGERY

## 2018-03-18 PROCEDURE — 25000003 PHARM REV CODE 250: Performed by: STUDENT IN AN ORGANIZED HEALTH CARE EDUCATION/TRAINING PROGRAM

## 2018-03-18 PROCEDURE — 63600175 PHARM REV CODE 636 W HCPCS: Performed by: STUDENT IN AN ORGANIZED HEALTH CARE EDUCATION/TRAINING PROGRAM

## 2018-03-18 PROCEDURE — 99232 SBSQ HOSP IP/OBS MODERATE 35: CPT | Mod: ,,, | Performed by: INTERNAL MEDICINE

## 2018-03-18 PROCEDURE — 25000003 PHARM REV CODE 250

## 2018-03-18 PROCEDURE — 82803 BLOOD GASES ANY COMBINATION: CPT

## 2018-03-18 PROCEDURE — 27201037 HC PRESSURE MONITORING SET UP

## 2018-03-18 PROCEDURE — 0RGA0AJ FUSION OF THORACOLUMBAR VERTEBRAL JOINT WITH INTERBODY FUSION DEVICE, POSTERIOR APPROACH, ANTERIOR COLUMN, OPEN APPROACH: ICD-10-PCS | Performed by: NEUROLOGICAL SURGERY

## 2018-03-18 PROCEDURE — 63103 REMOVE VERTEBRAL BODY ADD-ON: CPT | Mod: ,,, | Performed by: NEUROLOGICAL SURGERY

## 2018-03-18 PROCEDURE — 63016 REMOVE SPINE LAMINA >2 THRC: CPT | Mod: 59,,, | Performed by: NEUROLOGICAL SURGERY

## 2018-03-18 PROCEDURE — 94761 N-INVAS EAR/PLS OXIMETRY MLT: CPT

## 2018-03-18 PROCEDURE — 27100025 HC TUBING, SET FLUID WARMER: Performed by: NURSE ANESTHETIST, CERTIFIED REGISTERED

## 2018-03-18 PROCEDURE — 87206 SMEAR FLUORESCENT/ACID STAI: CPT | Mod: 91

## 2018-03-18 PROCEDURE — 36000711: Performed by: NEUROLOGICAL SURGERY

## 2018-03-18 PROCEDURE — 63101 REMOVE VERT BODY DCMPRN THRC: CPT | Mod: ,,, | Performed by: NEUROLOGICAL SURGERY

## 2018-03-18 PROCEDURE — C1713 ANCHOR/SCREW BN/BN,TIS/BN: HCPCS | Performed by: NEUROLOGICAL SURGERY

## 2018-03-18 PROCEDURE — 87176 TISSUE HOMOGENIZATION CULTR: CPT

## 2018-03-18 PROCEDURE — 86850 RBC ANTIBODY SCREEN: CPT

## 2018-03-18 PROCEDURE — 36620 INSERTION CATHETER ARTERY: CPT | Mod: 59,,, | Performed by: ANESTHESIOLOGY

## 2018-03-18 PROCEDURE — 36415 COLL VENOUS BLD VENIPUNCTURE: CPT

## 2018-03-18 PROCEDURE — 94002 VENT MGMT INPAT INIT DAY: CPT

## 2018-03-18 PROCEDURE — C9290 INJ, BUPIVACAINE LIPOSOME: HCPCS | Performed by: NEUROLOGICAL SURGERY

## 2018-03-18 PROCEDURE — 63600175 PHARM REV CODE 636 W HCPCS: Performed by: NEUROLOGICAL SURGERY

## 2018-03-18 PROCEDURE — 87205 SMEAR GRAM STAIN: CPT | Mod: 59

## 2018-03-18 PROCEDURE — P9045 ALBUMIN (HUMAN), 5%, 250 ML: HCPCS | Mod: JG | Performed by: NURSE ANESTHETIST, CERTIFIED REGISTERED

## 2018-03-18 PROCEDURE — 87075 CULTR BACTERIA EXCEPT BLOOD: CPT

## 2018-03-18 PROCEDURE — P9035 PLATELET PHERES LEUKOREDUCED: HCPCS

## 2018-03-18 PROCEDURE — 99900035 HC TECH TIME PER 15 MIN (STAT)

## 2018-03-18 PROCEDURE — 22610 ARTHRD PST TQ 1NTRSPC THRC: CPT | Mod: 51,,, | Performed by: NEUROLOGICAL SURGERY

## 2018-03-18 PROCEDURE — 37000009 HC ANESTHESIA EA ADD 15 MINS: Performed by: NEUROLOGICAL SURGERY

## 2018-03-18 PROCEDURE — 85730 THROMBOPLASTIN TIME PARTIAL: CPT

## 2018-03-18 PROCEDURE — 85610 PROTHROMBIN TIME: CPT

## 2018-03-18 PROCEDURE — 86920 COMPATIBILITY TEST SPIN: CPT

## 2018-03-18 PROCEDURE — 80053 COMPREHEN METABOLIC PANEL: CPT

## 2018-03-18 PROCEDURE — 99233 SBSQ HOSP IP/OBS HIGH 50: CPT | Mod: 57,,, | Performed by: NEUROLOGICAL SURGERY

## 2018-03-18 PROCEDURE — 83735 ASSAY OF MAGNESIUM: CPT

## 2018-03-18 PROCEDURE — 20000000 HC ICU ROOM

## 2018-03-18 PROCEDURE — 22534 ARTHRD LAT XTRCVTRY TQ EA AD: CPT | Mod: ,,, | Performed by: NEUROLOGICAL SURGERY

## 2018-03-18 PROCEDURE — S0020 INJECTION, BUPIVICAINE HYDRO: HCPCS | Performed by: NEUROLOGICAL SURGERY

## 2018-03-18 PROCEDURE — 99232 SBSQ HOSP IP/OBS MODERATE 35: CPT | Mod: ,,, | Performed by: NURSE PRACTITIONER

## 2018-03-18 PROCEDURE — 36000710: Performed by: NEUROLOGICAL SURGERY

## 2018-03-18 PROCEDURE — 22614 ARTHRD PST TQ 1NTRSPC EA ADD: CPT | Mod: ,,, | Performed by: NEUROLOGICAL SURGERY

## 2018-03-18 PROCEDURE — 27800903 OPTIME MED/SURG SUP & DEVICES OTHER IMPLANTS: Performed by: NEUROLOGICAL SURGERY

## 2018-03-18 PROCEDURE — 37799 UNLISTED PX VASCULAR SURGERY: CPT

## 2018-03-18 PROCEDURE — 0RGA0K1 FUSION OF THORACOLUMBAR VERTEBRAL JOINT WITH NONAUTOLOGOUS TISSUE SUBSTITUTE, POSTERIOR APPROACH, POSTERIOR COLUMN, OPEN APPROACH: ICD-10-PCS | Performed by: NEUROLOGICAL SURGERY

## 2018-03-18 PROCEDURE — 87116 MYCOBACTERIA CULTURE: CPT | Mod: 59

## 2018-03-18 DEVICE — CAP SPINAL LOCK THRD CREO 5.5: Type: IMPLANTABLE DEVICE | Site: BACK | Status: FUNCTIONAL

## 2018-03-18 DEVICE — SCREW BONE SPINAL CREO 6.5X50: Type: IMPLANTABLE DEVICE | Site: BACK | Status: FUNCTIONAL

## 2018-03-18 DEVICE — PUTTY DBX 10CC: Type: IMPLANTABLE DEVICE | Site: BACK | Status: FUNCTIONAL

## 2018-03-18 DEVICE — IMPLANTABLE DEVICE: Type: IMPLANTABLE DEVICE | Site: BACK | Status: FUNCTIONAL

## 2018-03-18 DEVICE — SCREW BONE SPINAL CREO 6.5X45: Type: IMPLANTABLE DEVICE | Site: BACK | Status: FUNCTIONAL

## 2018-03-18 RX ORDER — NICARDIPINE HYDROCHLORIDE 0.2 MG/ML
INJECTION INTRAVENOUS
Status: COMPLETED
Start: 2018-03-18 | End: 2018-03-18

## 2018-03-18 RX ORDER — FENTANYL CITRATE 50 UG/ML
50 INJECTION, SOLUTION INTRAMUSCULAR; INTRAVENOUS
Status: DISCONTINUED | OUTPATIENT
Start: 2018-03-18 | End: 2018-03-18

## 2018-03-18 RX ORDER — CHLORHEXIDINE GLUCONATE ORAL RINSE 1.2 MG/ML
15 SOLUTION DENTAL 2 TIMES DAILY
Status: DISCONTINUED | OUTPATIENT
Start: 2018-03-18 | End: 2018-03-20

## 2018-03-18 RX ORDER — ROCURONIUM BROMIDE 10 MG/ML
INJECTION, SOLUTION INTRAVENOUS
Status: DISCONTINUED | OUTPATIENT
Start: 2018-03-18 | End: 2018-03-18

## 2018-03-18 RX ORDER — FENTANYL CITRATE 50 UG/ML
INJECTION, SOLUTION INTRAMUSCULAR; INTRAVENOUS
Status: DISCONTINUED | OUTPATIENT
Start: 2018-03-18 | End: 2018-03-18

## 2018-03-18 RX ORDER — OXYCODONE AND ACETAMINOPHEN 10; 325 MG/1; MG/1
2 TABLET ORAL EVERY 4 HOURS PRN
Status: DISCONTINUED | OUTPATIENT
Start: 2018-03-18 | End: 2018-03-21

## 2018-03-18 RX ORDER — LABETALOL HYDROCHLORIDE 5 MG/ML
10 INJECTION, SOLUTION INTRAVENOUS ONCE
Status: DISCONTINUED | OUTPATIENT
Start: 2018-03-18 | End: 2018-03-18

## 2018-03-18 RX ORDER — ETOMIDATE 2 MG/ML
20 INJECTION INTRAVENOUS ONCE
Status: DISCONTINUED | OUTPATIENT
Start: 2018-03-18 | End: 2018-03-18

## 2018-03-18 RX ORDER — FENTANYL CITRATE 50 UG/ML
50 INJECTION, SOLUTION INTRAMUSCULAR; INTRAVENOUS
Status: DISCONTINUED | OUTPATIENT
Start: 2018-03-18 | End: 2018-03-19

## 2018-03-18 RX ORDER — NICARDIPINE HYDROCHLORIDE 0.2 MG/ML
2.5 INJECTION INTRAVENOUS CONTINUOUS
Status: DISCONTINUED | OUTPATIENT
Start: 2018-03-18 | End: 2018-03-19

## 2018-03-18 RX ORDER — PHENYLEPHRINE HYDROCHLORIDE 10 MG/ML
INJECTION INTRAVENOUS
Status: DISCONTINUED | OUTPATIENT
Start: 2018-03-18 | End: 2018-03-18

## 2018-03-18 RX ORDER — ACETAMINOPHEN 10 MG/ML
INJECTION, SOLUTION INTRAVENOUS
Status: DISCONTINUED | OUTPATIENT
Start: 2018-03-18 | End: 2018-03-18

## 2018-03-18 RX ORDER — PROPOFOL 10 MG/ML
VIAL (ML) INTRAVENOUS CONTINUOUS PRN
Status: DISCONTINUED | OUTPATIENT
Start: 2018-03-18 | End: 2018-03-18

## 2018-03-18 RX ORDER — SODIUM CHLORIDE 9 MG/ML
INJECTION, SOLUTION INTRAVENOUS CONTINUOUS PRN
Status: DISCONTINUED | OUTPATIENT
Start: 2018-03-18 | End: 2018-03-18

## 2018-03-18 RX ORDER — ONDANSETRON 2 MG/ML
INJECTION INTRAMUSCULAR; INTRAVENOUS
Status: DISCONTINUED | OUTPATIENT
Start: 2018-03-18 | End: 2018-03-18

## 2018-03-18 RX ORDER — LIDOCAINE HCL/PF 100 MG/5ML
SYRINGE (ML) INTRAVENOUS
Status: DISCONTINUED | OUTPATIENT
Start: 2018-03-18 | End: 2018-03-18

## 2018-03-18 RX ORDER — SODIUM CHLORIDE 9 MG/ML
INJECTION, SOLUTION INTRAVENOUS CONTINUOUS
Status: DISCONTINUED | OUTPATIENT
Start: 2018-03-18 | End: 2018-03-20

## 2018-03-18 RX ORDER — HYDROMORPHONE HYDROCHLORIDE 2 MG/1
4 TABLET ORAL EVERY 4 HOURS PRN
Status: DISCONTINUED | OUTPATIENT
Start: 2018-03-18 | End: 2018-03-19

## 2018-03-18 RX ORDER — ALBUMIN HUMAN 50 G/1000ML
SOLUTION INTRAVENOUS CONTINUOUS PRN
Status: DISCONTINUED | OUTPATIENT
Start: 2018-03-18 | End: 2018-03-18

## 2018-03-18 RX ORDER — KETAMINE HCL IN 0.9 % NACL 50 MG/5 ML
SYRINGE (ML) INTRAVENOUS
Status: DISCONTINUED | OUTPATIENT
Start: 2018-03-18 | End: 2018-03-18

## 2018-03-18 RX ORDER — BACITRACIN 50000 [IU]/1
INJECTION, POWDER, FOR SOLUTION INTRAMUSCULAR
Status: DISCONTINUED | OUTPATIENT
Start: 2018-03-18 | End: 2018-03-18 | Stop reason: HOSPADM

## 2018-03-18 RX ORDER — SODIUM CHLORIDE 0.9 % (FLUSH) 0.9 %
3 SYRINGE (ML) INJECTION
Status: DISCONTINUED | OUTPATIENT
Start: 2018-03-18 | End: 2018-03-22

## 2018-03-18 RX ORDER — GLYCOPYRROLATE 0.2 MG/ML
INJECTION INTRAMUSCULAR; INTRAVENOUS
Status: DISCONTINUED | OUTPATIENT
Start: 2018-03-18 | End: 2018-03-18

## 2018-03-18 RX ORDER — MIDAZOLAM HYDROCHLORIDE 1 MG/ML
INJECTION, SOLUTION INTRAMUSCULAR; INTRAVENOUS
Status: DISCONTINUED | OUTPATIENT
Start: 2018-03-18 | End: 2018-03-18

## 2018-03-18 RX ORDER — DEXAMETHASONE SODIUM PHOSPHATE 4 MG/ML
INJECTION, SOLUTION INTRA-ARTICULAR; INTRALESIONAL; INTRAMUSCULAR; INTRAVENOUS; SOFT TISSUE
Status: DISCONTINUED | OUTPATIENT
Start: 2018-03-18 | End: 2018-03-18

## 2018-03-18 RX ORDER — LIDOCAINE HYDROCHLORIDE AND EPINEPHRINE 10; 10 MG/ML; UG/ML
INJECTION, SOLUTION INFILTRATION; PERINEURAL
Status: DISCONTINUED | OUTPATIENT
Start: 2018-03-18 | End: 2018-03-18 | Stop reason: HOSPADM

## 2018-03-18 RX ORDER — BUPIVACAINE HYDROCHLORIDE 5 MG/ML
INJECTION, SOLUTION EPIDURAL; INTRACAUDAL
Status: DISCONTINUED | OUTPATIENT
Start: 2018-03-18 | End: 2018-03-18 | Stop reason: HOSPADM

## 2018-03-18 RX ORDER — POLYETHYLENE GLYCOL 3350 17 G/17G
17 POWDER, FOR SOLUTION ORAL DAILY
Status: DISCONTINUED | OUTPATIENT
Start: 2018-03-18 | End: 2018-03-27 | Stop reason: HOSPADM

## 2018-03-18 RX ORDER — ISOSORBIDE DINITRATE AND HYDRALAZINE HYDROCHLORIDE 37.5; 2 MG/1; MG/1
1 TABLET ORAL 3 TIMES DAILY
Status: DISCONTINUED | OUTPATIENT
Start: 2018-03-18 | End: 2018-03-19

## 2018-03-18 RX ORDER — AMLODIPINE BESYLATE 10 MG/1
10 TABLET ORAL DAILY
Status: DISCONTINUED | OUTPATIENT
Start: 2018-03-18 | End: 2018-03-19

## 2018-03-18 RX ORDER — HYDROMORPHONE HYDROCHLORIDE 1 MG/ML
0.2 INJECTION, SOLUTION INTRAMUSCULAR; INTRAVENOUS; SUBCUTANEOUS EVERY 5 MIN PRN
Status: DISCONTINUED | OUTPATIENT
Start: 2018-03-18 | End: 2018-03-19

## 2018-03-18 RX ORDER — KETAMINE HYDROCHLORIDE 100 MG/ML
INJECTION, SOLUTION INTRAMUSCULAR; INTRAVENOUS
Status: DISCONTINUED | OUTPATIENT
Start: 2018-03-18 | End: 2018-03-18

## 2018-03-18 RX ORDER — PANTOPRAZOLE SODIUM 40 MG/1
40 FOR SUSPENSION ORAL DAILY
Status: DISCONTINUED | OUTPATIENT
Start: 2018-03-19 | End: 2018-03-20

## 2018-03-18 RX ORDER — AMOXICILLIN 250 MG
1 CAPSULE ORAL DAILY PRN
Status: DISCONTINUED | OUTPATIENT
Start: 2018-03-18 | End: 2018-03-22

## 2018-03-18 RX ORDER — PROPOFOL 10 MG/ML
VIAL (ML) INTRAVENOUS
Status: DISCONTINUED | OUTPATIENT
Start: 2018-03-18 | End: 2018-03-18

## 2018-03-18 RX ORDER — NICARDIPINE HYDROCHLORIDE 0.2 MG/ML
1 INJECTION INTRAVENOUS CONTINUOUS
Status: DISCONTINUED | OUTPATIENT
Start: 2018-03-18 | End: 2018-03-18

## 2018-03-18 RX ORDER — PROPOFOL 10 MG/ML
5 INJECTION, EMULSION INTRAVENOUS CONTINUOUS
Status: DISCONTINUED | OUTPATIENT
Start: 2018-03-18 | End: 2018-03-19

## 2018-03-18 RX ORDER — CARVEDILOL 25 MG/1
25 TABLET ORAL 2 TIMES DAILY WITH MEALS
Status: DISCONTINUED | OUTPATIENT
Start: 2018-03-18 | End: 2018-03-19

## 2018-03-18 RX ORDER — PROPOFOL 10 MG/ML
5 INJECTION, EMULSION INTRAVENOUS CONTINUOUS
Status: DISCONTINUED | OUTPATIENT
Start: 2018-03-18 | End: 2018-03-18

## 2018-03-18 RX ORDER — DOXAZOSIN 2 MG/1
4 TABLET ORAL DAILY
Status: DISCONTINUED | OUTPATIENT
Start: 2018-03-18 | End: 2018-03-19

## 2018-03-18 RX ORDER — VANCOMYCIN HYDROCHLORIDE 1 G/20ML
INJECTION, POWDER, LYOPHILIZED, FOR SOLUTION INTRAVENOUS
Status: DISCONTINUED | OUTPATIENT
Start: 2018-03-18 | End: 2018-03-18 | Stop reason: HOSPADM

## 2018-03-18 RX ADMIN — SODIUM CHLORIDE 0.4 MCG/KG/MIN: 9 INJECTION, SOLUTION INTRAVENOUS at 10:03

## 2018-03-18 RX ADMIN — DEXAMETHASONE SODIUM PHOSPHATE 4 MG: 4 INJECTION, SOLUTION INTRAMUSCULAR; INTRAVENOUS at 06:03

## 2018-03-18 RX ADMIN — SODIUM CHLORIDE, SODIUM GLUCONATE, SODIUM ACETATE, POTASSIUM CHLORIDE, MAGNESIUM CHLORIDE, SODIUM PHOSPHATE, DIBASIC, AND POTASSIUM PHOSPHATE: .53; .5; .37; .037; .03; .012; .00082 INJECTION, SOLUTION INTRAVENOUS at 04:03

## 2018-03-18 RX ADMIN — ROCURONIUM BROMIDE 30 MG: 10 INJECTION, SOLUTION INTRAVENOUS at 09:03

## 2018-03-18 RX ADMIN — OXYCODONE HYDROCHLORIDE AND ACETAMINOPHEN 2 TABLET: 10; 325 TABLET ORAL at 10:03

## 2018-03-18 RX ADMIN — SODIUM CHLORIDE, SODIUM GLUCONATE, SODIUM ACETATE, POTASSIUM CHLORIDE, MAGNESIUM CHLORIDE, SODIUM PHOSPHATE, DIBASIC, AND POTASSIUM PHOSPHATE: .53; .5; .37; .037; .03; .012; .00082 INJECTION, SOLUTION INTRAVENOUS at 02:03

## 2018-03-18 RX ADMIN — SODIUM CHLORIDE: 0.9 INJECTION, SOLUTION INTRAVENOUS at 09:03

## 2018-03-18 RX ADMIN — PHENYLEPHRINE HYDROCHLORIDE 100 MCG: 10 INJECTION INTRAVENOUS at 03:03

## 2018-03-18 RX ADMIN — FENTANYL CITRATE 100 MCG: 50 INJECTION, SOLUTION INTRAMUSCULAR; INTRAVENOUS at 09:03

## 2018-03-18 RX ADMIN — SODIUM CHLORIDE, SODIUM GLUCONATE, SODIUM ACETATE, POTASSIUM CHLORIDE, MAGNESIUM CHLORIDE, SODIUM PHOSPHATE, DIBASIC, AND POTASSIUM PHOSPHATE: .53; .5; .37; .037; .03; .012; .00082 INJECTION, SOLUTION INTRAVENOUS at 09:03

## 2018-03-18 RX ADMIN — PROPOFOL 50 MG: 10 INJECTION, EMULSION INTRAVENOUS at 10:03

## 2018-03-18 RX ADMIN — DEXAMETHASONE SODIUM PHOSPHATE 4 MG: 4 INJECTION, SOLUTION INTRAMUSCULAR; INTRAVENOUS at 05:03

## 2018-03-18 RX ADMIN — DEXTROSE 2 G: 50 INJECTION, SOLUTION INTRAVENOUS at 02:03

## 2018-03-18 RX ADMIN — ALBUMIN (HUMAN): 12.5 SOLUTION INTRAVENOUS at 11:03

## 2018-03-18 RX ADMIN — Medication 20 MG: at 09:03

## 2018-03-18 RX ADMIN — SODIUM CHLORIDE: 0.9 INJECTION, SOLUTION INTRAVENOUS at 08:03

## 2018-03-18 RX ADMIN — PHENYLEPHRINE HYDROCHLORIDE 100 MCG: 10 INJECTION INTRAVENOUS at 12:03

## 2018-03-18 RX ADMIN — REMIFENTANIL HYDROCHLORIDE 0.05 MCG/KG/MIN: 1 INJECTION, POWDER, LYOPHILIZED, FOR SOLUTION INTRAVENOUS at 09:03

## 2018-03-18 RX ADMIN — PHENYLEPHRINE HYDROCHLORIDE 100 MCG: 10 INJECTION INTRAVENOUS at 09:03

## 2018-03-18 RX ADMIN — PROPOFOL 120 MG: 10 INJECTION, EMULSION INTRAVENOUS at 09:03

## 2018-03-18 RX ADMIN — MIDAZOLAM HYDROCHLORIDE 2 MG: 1 INJECTION, SOLUTION INTRAMUSCULAR; INTRAVENOUS at 09:03

## 2018-03-18 RX ADMIN — PHENYLEPHRINE HYDROCHLORIDE 100 MCG: 10 INJECTION INTRAVENOUS at 02:03

## 2018-03-18 RX ADMIN — OXYCODONE HYDROCHLORIDE AND ACETAMINOPHEN 1 TABLET: 10; 325 TABLET ORAL at 05:03

## 2018-03-18 RX ADMIN — CALCIUM CHLORIDE 0.2 G: 100 INJECTION, SOLUTION INTRAVENOUS at 02:03

## 2018-03-18 RX ADMIN — ACETAMINOPHEN 1000 MG: 10 INJECTION, SOLUTION INTRAVENOUS at 10:03

## 2018-03-18 RX ADMIN — HYDROMORPHONE HYDROCHLORIDE 2 MG: 2 TABLET ORAL at 01:03

## 2018-03-18 RX ADMIN — ONDANSETRON 4 MG: 2 INJECTION INTRAMUSCULAR; INTRAVENOUS at 05:03

## 2018-03-18 RX ADMIN — CALCIUM CHLORIDE 0.5 G: 100 INJECTION, SOLUTION INTRAVENOUS at 03:03

## 2018-03-18 RX ADMIN — Medication 10 MG: at 01:03

## 2018-03-18 RX ADMIN — Medication 10 MG: at 10:03

## 2018-03-18 RX ADMIN — NICARDIPINE HYDROCHLORIDE 2.5 MG/HR: 0.2 INJECTION, SOLUTION INTRAVENOUS at 08:03

## 2018-03-18 RX ADMIN — PROPOFOL 100 MCG/KG/MIN: 10 INJECTION, EMULSION INTRAVENOUS at 09:03

## 2018-03-18 RX ADMIN — REMIFENTANIL HYDROCHLORIDE: 1 INJECTION, POWDER, LYOPHILIZED, FOR SOLUTION INTRAVENOUS at 04:03

## 2018-03-18 RX ADMIN — LIDOCAINE HYDROCHLORIDE 100 MG: 20 INJECTION, SOLUTION INTRAVENOUS at 09:03

## 2018-03-18 RX ADMIN — POLYETHYLENE GLYCOL 3350 17 G: 17 POWDER, FOR SOLUTION ORAL at 10:03

## 2018-03-18 RX ADMIN — ALBUMIN (HUMAN): 12.5 SOLUTION INTRAVENOUS at 02:03

## 2018-03-18 RX ADMIN — OXYCODONE HYDROCHLORIDE AND ACETAMINOPHEN 1 TABLET: 10; 325 TABLET ORAL at 12:03

## 2018-03-18 RX ADMIN — DEXAMETHASONE SODIUM PHOSPHATE 8 MG: 4 INJECTION, SOLUTION INTRAMUSCULAR; INTRAVENOUS at 09:03

## 2018-03-18 RX ADMIN — CALCIUM CHLORIDE 0.1 G: 100 INJECTION, SOLUTION INTRAVENOUS at 02:03

## 2018-03-18 RX ADMIN — CHLORHEXIDINE GLUCONATE 15 ML: 1.2 RINSE ORAL at 10:03

## 2018-03-18 RX ADMIN — DEXTROSE 2 G: 50 INJECTION, SOLUTION INTRAVENOUS at 04:03

## 2018-03-18 RX ADMIN — FENTANYL CITRATE 100 MCG: 50 INJECTION, SOLUTION INTRAMUSCULAR; INTRAVENOUS at 04:03

## 2018-03-18 RX ADMIN — GLYCOPYRROLATE 0.2 MG: 0.2 INJECTION, SOLUTION INTRAMUSCULAR; INTRAVENOUS at 11:03

## 2018-03-18 RX ADMIN — PHENYLEPHRINE HYDROCHLORIDE 200 MCG: 10 INJECTION INTRAVENOUS at 02:03

## 2018-03-18 RX ADMIN — DEXAMETHASONE SODIUM PHOSPHATE 4 MG: 4 INJECTION, SOLUTION INTRAMUSCULAR; INTRAVENOUS at 12:03

## 2018-03-18 RX ADMIN — Medication 10 MG: at 12:03

## 2018-03-18 RX ADMIN — DEXTROSE 2 G: 50 INJECTION, SOLUTION INTRAVENOUS at 10:03

## 2018-03-18 RX ADMIN — SODIUM CHLORIDE, SODIUM GLUCONATE, SODIUM ACETATE, POTASSIUM CHLORIDE, MAGNESIUM CHLORIDE, SODIUM PHOSPHATE, DIBASIC, AND POTASSIUM PHOSPHATE: .53; .5; .37; .037; .03; .012; .00082 INJECTION, SOLUTION INTRAVENOUS at 12:03

## 2018-03-18 RX ADMIN — HYDROMORPHONE HYDROCHLORIDE 4 MG: 2 TABLET ORAL at 08:03

## 2018-03-18 NOTE — ASSESSMENT & PLAN NOTE
- Patient of Dr. SANGEETA Ram (Rheum) and Dr. KIRSTIE Hopkins (Pulm) at [facility]  - Currently hold MTX   - Currently not on any treatment due to current treatment of TB

## 2018-03-18 NOTE — ASSESSMENT & PLAN NOTE
- TB regimen 900mg Isoniazid, 600 mg rifampin with B6, Fridays and Tuesdays   - Does not requre airborne isolation patient completed 2 weeks on inpatient RIPE

## 2018-03-18 NOTE — HOSPITAL COURSE
3/18 admitted to NICU s/p T12-L1 corpectomy and T10-L3 fusion.  Arrived on phenylephrine gtt @ .4mkm; propofol 75mkm and intubated, on AC 14/500/40%/P5.  3/19 Extubated; Pending 2D Echo, ID recommendations to discontinue Airborne Precautions due to pt being completely treated for TB.   3/20: Patient doing well today, no acute events. De-escalating ICU needs.  3/21: Patient with no acute events overnight, exam stable and patient is stable for transfer to floor  3/22: Patient stable overnight, no acute events, exam stable; awaiting transfer to medicine

## 2018-03-18 NOTE — PLAN OF CARE
Patient was note in her room at the time of rounds.  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  On my review of her EPIC records, her spine was biopsied in the past showed non-necrotizing granulomas raising concerns for TB involvement of her spine.  She will need 9 months of anti-TB therapy.  Per patient, her night sweats and weight loss stopped after initiating TB therapy suggesting some clinic response to anti-TB tretment.  Her worsening back pain may not indicate treatment failure but rather a need for surgery.      Assessment  1.  Tuberculosis    Plan  1.  Will confirm her treatment doses with the health department tomorrow.  She suggests that she is on twice weekly (tuesday and Friday) isoniazid 900mg, rifampin 600mg and vitamin b6 50 mg.

## 2018-03-18 NOTE — ASSESSMENT & PLAN NOTE
62 F with hx of TB and now T12/L1 osteodiscitis concerning for pott's disease  -OR today for T12/L1 corpectomy and T10-L3 fusion.

## 2018-03-18 NOTE — ANESTHESIA PROCEDURE NOTES
Arterial    Diagnosis: Corpectomy/Matta Disease  Doctor requesting consult: Wiliam    Patient location during procedure: done in OR  Procedure start time: 3/18/2018 1:22 PM  Timeout: 3/18/2018 1:20 PM  Procedure end time: 3/18/2018 1:27 PM  Staffing  Anesthesiologist: RACIEL MANCILLA  Resident/CRNA: GRACIELA SHAW  Performed: resident/CRNA   Preanesthetic Checklist  Completed: patient identified, site marked, surgical consent, pre-op evaluation, timeout performed, IV checked, risks and benefits discussed, monitors and equipment checked and anesthesia consent givenArterial  Skin Prep: chlorhexidine gluconate  Local Infiltration: lidocaine  Orientation: right  Location: radial  Catheter Size: 20 G  Catheter placement by Anatomical landmarks. Heme positive aspiration all ports.Insertion Attempts: 2  Assessment  Dressing: secured with tape and tegaderm

## 2018-03-18 NOTE — HPI
63 yo F with history of Sarcoidosis, HTN, HLD, arthritis, gout, asthma Pott's disease who is a transfer from [facility]  [place] [facility] [place] with worsening pain and weakness of her lower extremities. She is here for neurosurgical evaluation and treatment of spinal cord compression due to Matta disease of the spine  She had a prolonged hospital stay at [facility] from 12/17-01/18 for fevers, night sweats, back pain and weakness.  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  Since being discharged pt reports being unable to walk, mostly being bed bound or on the sofa due to severe back pain.  She is currently getting antibiotics via the health unit for her TB.  She endorses back pain radiating down both legs to toes.  She reports numbness in her feet as well as tingling which has been ongoing for about 1 month.  She denies overt bowel/bladder incontinence or saddle anesthesia.   She presents to NICU S/P T12-L1 corpectomy and T10-L3 fusion.

## 2018-03-18 NOTE — PROGRESS NOTES
Pt came up from OR intubated. Placed pt on ventilator at documented settings. Pt tolerating well.

## 2018-03-18 NOTE — HOSPITAL COURSE
3/18: NAEON  3/22: stable for transfer to floor  3/32: drain output 180, continue.   3/25: Drain output 160, no acute events overnight, exam stable

## 2018-03-18 NOTE — PT/OT/SLP PROGRESS
Occupational Therapy      Patient Name:  Mary Carrillo   MRN:  [id number]    Patient not seen today secondary to  (pt being transported to surgery at time of session attempt.). Will follow-up once new orders written post surgery.    DK Souza  3/18/2018

## 2018-03-18 NOTE — PROGRESS NOTES
[facility] Hospital Medicine  Progress Note    Patient Name: Mary Carrillo  MRN: [id number]  Patient Class: IP- Inpatient   Admission Date: 3/17/2018  Length of Stay: 1 days  Attending Physician: Aleisha Gee MD  Primary Care Provider: Jose Khan MD    [facility] Medicine Team: Networked reference to record PCT  Dong Lindsay MD    Subjective:     Principal Problem:Spinal cord compression    HPI:  Ms. Carrillo is a 63 yo F with a medical history significant for HTN, Sarcoidosis, Pott's disease (compliant w/ RIPE / B6) who is transferred from [facility] where she presented with w/ advancing neurological sx, incontinence and LE worsening pain. MRI notable for severe compression of the adjacent cord at the T12-L1 level. Transferred to [facility] for Neurosurgery evaluation.    Patient history dates back to December when she was [place] [facility] [place] for fevers, night sweats, back pain and weakness.  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  Since being discharged pt reports being unable to walk, mostly being bed bound or on the sofa due to severe back pain.  She is currently getting antibiotics via the health unit for her TB.  A home health nurse comes to her house to give her TB meds every Tues and Fri.  She reports numbness in her feet as well as tingling which has been ongoing for about 1 month.  She wears a diaper because she cannot get out of bed to use the bathroom.  She states that recently there have been times she doesn't realize that she urinated.  She denies any burning on urination, fevers, chills, night sweats at this time.        AFB cultures (12/27) > M Tb complex sensitive to RIF/ Strep/ INH/ EMB/ Pyrazinamide per quest.     Hospital Course:  03/18/2018 To OR with NSGY. Patient initially hypotensive at  admission, home BP meds held. Hypertensive overnight, will restart after procedure.     Interval History: Please see above.     Review of Systems  Objective:     Vital Signs (Most Recent):  Temp: 98.1 °F (36.7 °C) (03/18/18 0805)  Pulse: 74 (03/18/18 0805)  Resp: 17 (03/18/18 0724)  BP: (!) 193/94 (03/18/18 0805)  SpO2: 96 % (03/18/18 0724) Vital Signs (24h Range):  Temp:  [96.4 °F (35.8 °C)-98.3 °F (36.8 °C)] 98.1 °F (36.7 °C)  Pulse:  [71-84] 74  Resp:  [17-20] 17  SpO2:  [95 %-100 %] 96 %  BP: (132-197)/(63-94) 193/94     Weight: 84.6 kg (186 lb 9.6 oz)  Body mass index is 31.05 kg/m².    Intake/Output Summary (Last 24 hours) at 03/18/18 1407  Last data filed at 03/18/18 1401   Gross per 24 hour   Intake             4928 ml   Output             2335 ml   Net             2593 ml      Physical Exam   Constitutional: She is oriented to person, place, and time. She appears well-developed and well-nourished. She appears distressed.   HENT:   Head: Normocephalic and atraumatic.   Eyes: Conjunctivae are normal. No scleral icterus.   Neck: Normal range of motion. Neck supple.   Cardiovascular: Normal rate, regular rhythm, normal heart sounds and intact distal pulses.    Pulmonary/Chest: Effort normal and breath sounds normal. No respiratory distress.   Abdominal: Soft. She exhibits no distension. There is no tenderness.   Musculoskeletal: She exhibits no tenderness.   Neurological: She is alert and oriented to person, place, and time. No cranial nerve deficit.   Weakness in bilateral LE; however exam also limited by pain  TLSO in place   Skin: Skin is warm and dry.   Psychiatric: She has a normal mood and affect. Her behavior is normal.   Nursing note and vitals reviewed.      Significant Labs: All pertinent labs within the past 24 hours have been reviewed.    Significant Imaging: I have reviewed and interpreted all pertinent imaging results/findings within the past 24 hours.    Assessment/Plan:      * Spinal cord  compression    - To OR with NSGY today        Tuberculosis    - TB regimen 900mg Isoniazid, 600 mg rifampin with B6, Fridays and Tuesdays   - Does not requre airborne isolation patient completed 2 weeks on inpatient RIPE          Discitis of thoracolumbar region    -Currently being treated by ID, medications not on med list but is getting them via home health nurse on Tues and Fri  - Had [place] [facility] [place] when this was diagnosed, since diagnosis pts pain has been unbearable and pt unable to walk  - CT of chest and abdomen done showing signs of discitis/osteomyelitis, possible epidural thickening  - MRI (3/17) Changes of vertebral osteomyelitis/discitis again noted T12-L1 with progression of changes including greater degree of effacement of the ventral and dorsal subarachnoid spaces and now with severe compression of the adjacent cord at the T12-L1 level although no definite cord edema appreciated at this time.    - To OR with NSGY today  - Sarcoid vs Pott's (biopsy in Febryary with negative AFB stains)  - pain control (Percocet, Dilaudid)  - Infectious Disease consulted, recs appreciated   - Decadron 4 mg IV q6        HTN (hypertension), benign    Home meds: Coreg, Norvasc, Cardura, Isosorbide  - hypotensive at admission, BP meds held  - Will restart after surgery as patient was hypertensive overnight        Sarcoidosis    - Patient of Dr. SANGEETA Ram (Rheum) and Dr. KIRSTIE Hopkins (Pulm[place] [facility]  - Currently hold MTX   - Currently not on any treatment due to current treatment of TB            VTE Risk Mitigation         Ordered     Place sequential compression device  Until discontinued      03/18/18 1134     IP VTE LOW RISK PATIENT  Once      03/17/18 0424          Dong Lindsay MD   Internal Medicine, PGY-1    Department of Hospital Medicine   [facility]

## 2018-03-18 NOTE — TRANSFER OF CARE
"Anesthesia Transfer of Care Note    Patient: Mary Carrillo    Procedure(s) Performed: Procedure(s) (LRB):  CORPECTOMY THORACIC T12-L1 corpectomy with T10-L3 fusion, neuromonitoring, globus (Left)    Patient location: ICU    Anesthesia Type: general    Transport from OR: Continuos invasive BP monitoring in transport. Continuous SpO2 monitoring in transport. Continuous ECG monitoring in transport. Transported from OR on 100% O2 by closed face mask with adequate spontaneous ventilation    Post pain: adequate analgesia    Post assessment: no apparent anesthetic complications    Post vital signs: stable    Level of consciousness: sedated    Nausea/Vomiting: no nausea/vomiting    Complications: none    Transfer of care protocol was followed      Last vitals:   Visit Vitals  BP (!) 193/94 (Patient Position: Lying)   Pulse 74   Temp 36.7 °C (98.1 °F) (Oral)   Resp 17   Ht 5' 5" (1.651 m)   Wt 84.6 kg (186 lb 9.6 oz)   LMP  (LMP Unknown)   SpO2 96%   Breastfeeding? No   BMI 31.05 kg/m²     "

## 2018-03-18 NOTE — SUBJECTIVE & OBJECTIVE
Interval History: NAEON    Medications:  Continuous Infusions:  Scheduled Meds:   dexamethasone  4 mg Intravenous Q6H    sodium chloride 0.9%  1,000 mL Intravenous Once     PRN Meds:acetaminophen, dextrose 50%, dextrose 50%, glucagon (human recombinant), glucose, glucose, HYDROmorphone, insulin aspart U-100, oxyCODONE-acetaminophen, sodium chloride 0.9%     Review of Systems  Objective:     Weight: 84.6 kg (186 lb 9.6 oz)  Body mass index is 31.05 kg/m².  Vital Signs (Most Recent):  Temp: 98.1 °F (36.7 °C) (03/18/18 0805)  Pulse: 74 (03/18/18 0805)  Resp: 17 (03/18/18 0724)  BP: (!) 193/94 (03/18/18 0805)  SpO2: 96 % (03/18/18 0724) Vital Signs (24h Range):  Temp:  [96.4 °F (35.8 °C)-98.3 °F (36.8 °C)] 98.1 °F (36.7 °C)  Pulse:  [71-84] 74  Resp:  [17-20] 17  SpO2:  [95 %-100 %] 96 %  BP: (130-197)/(63-94) 193/94                           Neurosurgery Physical Exam   AOX3, speech fluent  PERRL  5/5 in BUE  3/5 in bilateral HF,KF,KE. 4+/5 in PF,DF, EHL  Diminished sensation below ankles bilaterally  No clonus, no babinski, no ricardo's       Significant Labs:    Recent Labs  Lab 03/17/18  0538 03/17/18  1306 03/18/18  0417   GLU 91 122* 130*    139 139   K 4.6 4.8 4.1    108 107   CO2 25 21* 23   BUN 25* 29* 36*   CREATININE 1.3 1.3 1.5*   CALCIUM 10.0 9.6 9.6   MG 1.6  --  1.8       Recent Labs  Lab 03/17/18  0538 03/18/18  0417   WBC 8.75 8.50   HGB 11.5* 10.7*   HCT 36.5* 34.2*    235       Recent Labs  Lab 03/16/18  1058   INR 1.1   APTT 29.9     Microbiology Results (last 7 days)     Procedure Component Value Units Date/Time    AFB Culture & Smear [[id number]]     Order Status:  No result Specimen:  Respiratory from Sputum, Expectorated     AFB Culture & Smear [[id number]]     Order Status:  No result Specimen:  Respiratory from Sputum, Expectorated     AFB Culture & Smear [[id number]]     Order Status:  No result Specimen:  Respiratory from Sputum, Expectorated             Significant  Diagnostics:

## 2018-03-18 NOTE — ANESTHESIA POSTPROCEDURE EVALUATION
"Anesthesia Post Evaluation    Patient: Mary Carrillo    Procedure(s) Performed: Procedure(s) (LRB):  CORPECTOMY THORACIC T12-L1 corpectomy with T10-L3 fusion, neuromonitoring, globus (Left)    Final Anesthesia Type: general  Patient location during evaluation: NICU  Patient participation: No - Unable to Participate, Intubation  Level of consciousness: sedated  Post-procedure vital signs: reviewed and stable  Pain management: adequate  Airway patency: patent  PONV status at discharge: No PONV  Anesthetic complications: no      Cardiovascular status: hemodynamically stable  Respiratory status: ETT, intubated and ventilator  Hydration status: euvolemic  Follow-up not needed.        Visit Vitals  BP (!) 193/94 (Patient Position: Lying)   Pulse 60   Temp 36.7 °C (98.1 °F) (Oral)   Resp (!) 2   Ht 5' 5" (1.651 m)   Wt 84.6 kg (186 lb 9.6 oz)   LMP  (LMP Unknown)   SpO2 100%   Breastfeeding? No   BMI 31.05 kg/m²       Pain/Isabell Score: Pain Assessment Performed: Yes (3/18/2018  7:24 AM)  Presence of Pain: complains of pain/discomfort (3/18/2018  7:24 AM)  Pain Rating Prior to Med Admin: 10 (3/18/2018  5:15 AM)  Pain Rating Post Med Admin: 5 (3/18/2018  7:24 AM)      "

## 2018-03-18 NOTE — SUBJECTIVE & OBJECTIVE
Interval History: Please see above.     Review of Systems  Objective:     Vital Signs (Most Recent):  Temp: 98.1 °F (36.7 °C) (03/18/18 0805)  Pulse: 74 (03/18/18 0805)  Resp: 17 (03/18/18 0724)  BP: (!) 193/94 (03/18/18 0805)  SpO2: 96 % (03/18/18 0724) Vital Signs (24h Range):  Temp:  [96.4 °F (35.8 °C)-98.3 °F (36.8 °C)] 98.1 °F (36.7 °C)  Pulse:  [71-84] 74  Resp:  [17-20] 17  SpO2:  [95 %-100 %] 96 %  BP: (132-197)/(63-94) 193/94     Weight: 84.6 kg (186 lb 9.6 oz)  Body mass index is 31.05 kg/m².    Intake/Output Summary (Last 24 hours) at 03/18/18 1407  Last data filed at 03/18/18 1401   Gross per 24 hour   Intake             4928 ml   Output             2335 ml   Net             2593 ml      Physical Exam   Constitutional: She is oriented to person, place, and time. She appears well-developed and well-nourished. She appears distressed.   HENT:   Head: Normocephalic and atraumatic.   Eyes: Conjunctivae are normal. No scleral icterus.   Neck: Normal range of motion. Neck supple.   Cardiovascular: Normal rate, regular rhythm, normal heart sounds and intact distal pulses.    Pulmonary/Chest: Effort normal and breath sounds normal. No respiratory distress.   Abdominal: Soft. She exhibits no distension. There is no tenderness.   Musculoskeletal: She exhibits no tenderness.   Neurological: She is alert and oriented to person, place, and time. No cranial nerve deficit.   Weakness in bilateral LE; however exam also limited by pain  TLSO in place   Skin: Skin is warm and dry.   Psychiatric: She has a normal mood and affect. Her behavior is normal.   Nursing note and vitals reviewed.      Significant Labs: All pertinent labs within the past 24 hours have been reviewed.    Significant Imaging: I have reviewed and interpreted all pertinent imaging results/findings within the past 24 hours.

## 2018-03-18 NOTE — BRIEF OP NOTE
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 Item Serial No.  Lot No. LRB No. Used   ViaCell 10cc   [id number] GLOBUS  Left 1   PUTTY DBX 10CC - [id number]  PUTTY DBX 10CC [id number] MUSCULOSKELETAL TRANSPLANT FND  Left 1   [employer]ell     [id number] GLOBUS   Left 1              Condition: Stable    Disposition: PACU - hemodynamically stable.    Attestation: I was present and scrubbed for the entire procedure.

## 2018-03-18 NOTE — PROGRESS NOTES
Discussed with radiology about stat CT ordered this morning, they said patient had refused when called for transport.  After discussing with the patient she says that when anesthesia came to consent her for surgery nobody had discussed surgery with her and she didn't know what was planned.  She wanted to speak to her surgeon before she went for any additional testing or surgery.  It is unclear if this was communicated to the neurosurgery team as it was not communicated to the primary team.  I asked her to go ahead and get the CT scans so we have all the information we need and I would try and contact neurosurgery and ask them to explain what they have planned.  She is agreeable to CT scan but would like to discuss surgery before proceeding.  Ms. Carrillo is on the OR schedule tomorrow at 8am for a thoracic corpectomy T12-L1 with T10-L3 fusion according to anesthesia note and patient calendar.  Awaiting call back from neurosurgery now.    Aleisha Gee MD  [phone]    Spoke to neurosurgery, the person on call does not know the patient or what is planned.  Will discuss with day team in the morning.    Aleisha Gee MD  [phone]

## 2018-03-18 NOTE — NURSING
Pt has orders for prn insulin. Spoke with on call MD for IM 5 for accuchek orders. Per orders will perform BS checks and admin insulin as needed.

## 2018-03-18 NOTE — PROGRESS NOTES
[facility]  Neurosurgery  Progress Note    Subjective:     History of Present Illness: No notes on file    Post-Op Info:  Procedure(s) (LRB):  CORPECTOMY THORACIC T12-L1 corpectomy with T10-L3 fusion, neuromonitoring, globus (Left)   Day of Surgery     Interval History: NAEON    Medications:  Continuous Infusions:  Scheduled Meds:   dexamethasone  4 mg Intravenous Q6H    sodium chloride 0.9%  1,000 mL Intravenous Once     PRN Meds:acetaminophen, dextrose 50%, dextrose 50%, glucagon (human recombinant), glucose, glucose, HYDROmorphone, insulin aspart U-100, oxyCODONE-acetaminophen, sodium chloride 0.9%     Review of Systems  Objective:     Weight: 84.6 kg (186 lb 9.6 oz)  Body mass index is 31.05 kg/m².  Vital Signs (Most Recent):  Temp: 98.1 °F (36.7 °C) (03/18/18 0805)  Pulse: 74 (03/18/18 0805)  Resp: 17 (03/18/18 0724)  BP: (!) 193/94 (03/18/18 0805)  SpO2: 96 % (03/18/18 0724) Vital Signs (24h Range):  Temp:  [96.4 °F (35.8 °C)-98.3 °F (36.8 °C)] 98.1 °F (36.7 °C)  Pulse:  [71-84] 74  Resp:  [17-20] 17  SpO2:  [95 %-100 %] 96 %  BP: (130-197)/(63-94) 193/94                           Neurosurgery Physical Exam   AOX3, speech fluent  PERRL  5/5 in BUE  3/5 in bilateral HF,KF,KE. 4+/5 in PF,DF, EHL  Diminished sensation below ankles bilaterally  No clonus, no babinski, no ricardo's       Significant Labs:    Recent Labs  Lab 03/17/18  0538 03/17/18  1306 03/18/18  0417   GLU 91 122* 130*    139 139   K 4.6 4.8 4.1    108 107   CO2 25 21* 23   BUN 25* 29* 36*   CREATININE 1.3 1.3 1.5*   CALCIUM 10.0 9.6 9.6   MG 1.6  --  1.8       Recent Labs  Lab 03/17/18  0538 03/18/18  0417   WBC 8.75 8.50   HGB 11.5* 10.7*   HCT 36.5* 34.2*    235       Recent Labs  Lab 03/16/18  1058   INR 1.1   APTT 29.9     Microbiology Results (last 7 days)     Procedure Component Value Units Date/Time    AFB Culture & Smear [[id number]]     Order Status:  No result Specimen:  Respiratory from Sputum,  Expectorated     AFB Culture & Smear [[id number]]     Order Status:  No result Specimen:  Respiratory from Sputum, Expectorated     AFB Culture & Smear [[id number]]     Order Status:  No result Specimen:  Respiratory from Sputum, Expectorated             Significant Diagnostics:      Assessment/Plan:     Pott's disease (spinal tuberculosis)    62 F with hx of TB and now T12/L1 osteodiscitis concerning for pott's disease  -OR today for T12/L1 corpectomy and T10-L3 fusion.            Keith Guillaume, DO  Neurosurgery  [facility]

## 2018-03-18 NOTE — ASSESSMENT & PLAN NOTE
Home meds: Coreg, Norvasc, Cardura, Isosorbide  - hypotensive at admission, BP meds held  - Will restart after surgery as patient was hypertensive overnight

## 2018-03-18 NOTE — PROGRESS NOTES
0724- /88, pt asymptomatic, but does verbalize anxiety concerning surgery. Pt does c/o back pain, though states relief from PRN meds. IM 5 paged.     Spoke with MD concerning BP, informed that pt is not scheduled to receive any BP medications this AM. Asked about bolus orders- MD states to recheck BP and hold bolus unless SBP <170. No new medication orders at this time.     0805- /94. Will hold bolus per MD order.

## 2018-03-19 LAB
ALBUMIN SERPL BCP-MCNC: 2.8 G/DL
ALLENS TEST: ABNORMAL
ALP SERPL-CCNC: 42 U/L
ALT SERPL W/O P-5'-P-CCNC: 9 U/L
ANION GAP SERPL CALC-SCNC: 12 MMOL/L
AORTIC VALVE STENOSIS: ABNORMAL
AST SERPL-CCNC: 32 U/L
BASOPHILS # BLD AUTO: 0.01 K/UL
BASOPHILS NFR BLD: 0.1 %
BILIRUB SERPL-MCNC: 0.5 MG/DL
BLD PROD TYP BPU: NORMAL
BLOOD UNIT EXPIRATION DATE: NORMAL
BLOOD UNIT TYPE CODE: 5100
BLOOD UNIT TYPE CODE: 6200
BLOOD UNIT TYPE CODE: 7300
BLOOD UNIT TYPE CODE: 7300
BLOOD UNIT TYPE: NORMAL
BUN SERPL-MCNC: 27 MG/DL
CALCIUM SERPL-MCNC: 8.2 MG/DL
CHLORIDE SERPL-SCNC: 108 MMOL/L
CO2 SERPL-SCNC: 20 MMOL/L
CODING SYSTEM: NORMAL
CREAT SERPL-MCNC: 1 MG/DL
DELSYS: ABNORMAL
DIASTOLIC DYSFUNCTION: YES
DIFFERENTIAL METHOD: ABNORMAL
DISPENSE STATUS: NORMAL
EOSINOPHIL # BLD AUTO: 0 K/UL
EOSINOPHIL NFR BLD: 0 %
ERYTHROCYTE [DISTWIDTH] IN BLOOD BY AUTOMATED COUNT: 15.4 %
ERYTHROCYTE [SEDIMENTATION RATE] IN BLOOD BY WESTERGREN METHOD: 14 MM/H
EST. GFR  (AFRICAN AMERICAN): >60 ML/MIN/1.73 M^2
EST. GFR  (NON AFRICAN AMERICAN): 59.7 ML/MIN/1.73 M^2
ESTIMATED PA SYSTOLIC PRESSURE: 33.64
FIO2: 40
GLUCOSE SERPL-MCNC: 112 MG/DL (ref 70–110)
GLUCOSE SERPL-MCNC: 114 MG/DL
GLUCOSE SERPL-MCNC: 119 MG/DL (ref 70–110)
GLUCOSE SERPL-MCNC: 132 MG/DL (ref 70–110)
HCO3 UR-SCNC: 20.3 MMOL/L (ref 24–28)
HCO3 UR-SCNC: 20.4 MMOL/L (ref 24–28)
HCO3 UR-SCNC: 20.5 MMOL/L (ref 24–28)
HCO3 UR-SCNC: 20.5 MMOL/L (ref 24–28)
HCO3 UR-SCNC: 21.1 MMOL/L (ref 24–28)
HCO3 UR-SCNC: 22.9 MMOL/L (ref 24–28)
HCO3 UR-SCNC: 23.7 MMOL/L (ref 24–28)
HCT VFR BLD AUTO: 25 %
HCT VFR BLD CALC: 20 %PCV (ref 36–54)
HCT VFR BLD CALC: 20 %PCV (ref 36–54)
HCT VFR BLD CALC: 29 %PCV (ref 36–54)
HGB BLD-MCNC: 8.6 G/DL
IMM GRANULOCYTES # BLD AUTO: 0.1 K/UL
IMM GRANULOCYTES NFR BLD AUTO: 1 %
LYMPHOCYTES # BLD AUTO: 1.3 K/UL
LYMPHOCYTES NFR BLD: 13.3 %
MAGNESIUM SERPL-MCNC: 1.7 MG/DL
MCH RBC QN AUTO: 29 PG
MCHC RBC AUTO-ENTMCNC: 34.4 G/DL
MCV RBC AUTO: 84 FL
MIN VOL: 11.1
MIN VOL: 12
MIN VOL: 12.3
MIN VOL: 12.5
MODE: ABNORMAL
MONOCYTES # BLD AUTO: 1 K/UL
MONOCYTES NFR BLD: 10.5 %
NEUTROPHILS # BLD AUTO: 7.3 K/UL
NEUTROPHILS NFR BLD: 75.1 %
NRBC BLD-RTO: 0 /100 WBC
NUM UNITS TRANS FFP: NORMAL
PCO2 BLDA: 22.5 MMHG (ref 35–45)
PCO2 BLDA: 23 MMHG (ref 35–45)
PCO2 BLDA: 23.6 MMHG (ref 35–45)
PCO2 BLDA: 28.8 MMHG (ref 35–45)
PCO2 BLDA: 33.7 MMHG (ref 35–45)
PCO2 BLDA: 36.1 MMHG (ref 35–45)
PCO2 BLDA: 38.9 MMHG (ref 35–45)
PEEP: 5
PH SMN: 7.39 [PH] (ref 7.35–7.45)
PH SMN: 7.41 [PH] (ref 7.35–7.45)
PH SMN: 7.41 [PH] (ref 7.35–7.45)
PH SMN: 7.46 [PH] (ref 7.35–7.45)
PH SMN: 7.54 [PH] (ref 7.35–7.45)
PH SMN: 7.56 [PH] (ref 7.35–7.45)
PH SMN: 7.57 [PH] (ref 7.35–7.45)
PHOSPHATE SERPL-MCNC: 2.7 MG/DL
PIP: 25
PIP: 28
PIP: 34
PLATELET # BLD AUTO: 127 K/UL
PMV BLD AUTO: 10.6 FL
PO2 BLDA: 124 MMHG (ref 80–100)
PO2 BLDA: 128 MMHG (ref 80–100)
PO2 BLDA: 132 MMHG (ref 80–100)
PO2 BLDA: 140 MMHG (ref 80–100)
PO2 BLDA: 278 MMHG (ref 80–100)
PO2 BLDA: 310 MMHG (ref 80–100)
PO2 BLDA: 71 MMHG (ref 40–60)
POC BE: -1 MMOL/L
POC BE: -2 MMOL/L
POC BE: -3 MMOL/L
POC BE: -4 MMOL/L
POC IONIZED CALCIUM: 0.97 MMOL/L (ref 1.06–1.42)
POC IONIZED CALCIUM: 0.99 MMOL/L (ref 1.06–1.42)
POC IONIZED CALCIUM: 1.07 MMOL/L (ref 1.06–1.42)
POC SATURATED O2: 100 % (ref 95–100)
POC SATURATED O2: 100 % (ref 95–100)
POC SATURATED O2: 94 % (ref 95–100)
POC SATURATED O2: 99 % (ref 95–100)
POC TCO2: 21 MMOL/L (ref 23–27)
POC TCO2: 22 MMOL/L (ref 23–27)
POC TCO2: 24 MMOL/L (ref 24–29)
POC TCO2: 25 MMOL/L (ref 23–27)
POCT GLUCOSE: 110 MG/DL (ref 70–110)
POCT GLUCOSE: 115 MG/DL (ref 70–110)
POTASSIUM BLD-SCNC: 3.2 MMOL/L (ref 3.5–5.1)
POTASSIUM BLD-SCNC: 3.5 MMOL/L (ref 3.5–5.1)
POTASSIUM BLD-SCNC: 4 MMOL/L (ref 3.5–5.1)
POTASSIUM SERPL-SCNC: 3.4 MMOL/L
PROT SERPL-MCNC: 5.2 G/DL
PS: 7
RBC # BLD AUTO: 2.97 M/UL
RETIRED EF AND QEF - SEE NOTES: 55 (ref 55–65)
SAMPLE: ABNORMAL
SITE: ABNORMAL
SODIUM BLD-SCNC: 139 MMOL/L (ref 136–145)
SODIUM BLD-SCNC: 141 MMOL/L (ref 136–145)
SODIUM BLD-SCNC: 141 MMOL/L (ref 136–145)
SODIUM SERPL-SCNC: 140 MMOL/L
SP02: 100
SPONT RATE: 32
VT: 420
VT: 450
VT: 500
WBC # BLD AUTO: 9.75 K/UL

## 2018-03-19 PROCEDURE — 37799 UNLISTED PX VASCULAR SURGERY: CPT

## 2018-03-19 PROCEDURE — 20000000 HC ICU ROOM

## 2018-03-19 PROCEDURE — 25000003 PHARM REV CODE 250: Performed by: STUDENT IN AN ORGANIZED HEALTH CARE EDUCATION/TRAINING PROGRAM

## 2018-03-19 PROCEDURE — 99232 SBSQ HOSP IP/OBS MODERATE 35: CPT | Mod: ,,, | Performed by: PHYSICIAN ASSISTANT

## 2018-03-19 PROCEDURE — 83735 ASSAY OF MAGNESIUM: CPT

## 2018-03-19 PROCEDURE — 85025 COMPLETE CBC W/AUTO DIFF WBC: CPT

## 2018-03-19 PROCEDURE — 93306 TTE W/DOPPLER COMPLETE: CPT

## 2018-03-19 PROCEDURE — 27000221 HC OXYGEN, UP TO 24 HOURS

## 2018-03-19 PROCEDURE — 25000003 PHARM REV CODE 250

## 2018-03-19 PROCEDURE — 63600175 PHARM REV CODE 636 W HCPCS: Performed by: STUDENT IN AN ORGANIZED HEALTH CARE EDUCATION/TRAINING PROGRAM

## 2018-03-19 PROCEDURE — 99900026 HC AIRWAY MAINTENANCE (STAT)

## 2018-03-19 PROCEDURE — 25000003 PHARM REV CODE 250: Performed by: NURSE PRACTITIONER

## 2018-03-19 PROCEDURE — 84100 ASSAY OF PHOSPHORUS: CPT

## 2018-03-19 PROCEDURE — 63600175 PHARM REV CODE 636 W HCPCS: Performed by: INTERNAL MEDICINE

## 2018-03-19 PROCEDURE — 99233 SBSQ HOSP IP/OBS HIGH 50: CPT | Mod: ,,, | Performed by: INTERNAL MEDICINE

## 2018-03-19 PROCEDURE — 99900035 HC TECH TIME PER 15 MIN (STAT)

## 2018-03-19 PROCEDURE — 93306 TTE W/DOPPLER COMPLETE: CPT | Mod: 26,,, | Performed by: INTERNAL MEDICINE

## 2018-03-19 PROCEDURE — 94761 N-INVAS EAR/PLS OXIMETRY MLT: CPT

## 2018-03-19 PROCEDURE — 80053 COMPREHEN METABOLIC PANEL: CPT

## 2018-03-19 PROCEDURE — 82803 BLOOD GASES ANY COMBINATION: CPT

## 2018-03-19 RX ORDER — RIFAMPIN 300 MG/1
600 CAPSULE ORAL
Status: DISCONTINUED | OUTPATIENT
Start: 2018-03-20 | End: 2018-03-27 | Stop reason: HOSPADM

## 2018-03-19 RX ORDER — LANOLIN ALCOHOL/MO/W.PET/CERES
50 CREAM (GRAM) TOPICAL DAILY
Status: DISCONTINUED | OUTPATIENT
Start: 2018-03-20 | End: 2018-03-27 | Stop reason: HOSPADM

## 2018-03-19 RX ORDER — ISONIAZID 300 MG/1
900 TABLET ORAL
Status: DISCONTINUED | OUTPATIENT
Start: 2018-03-20 | End: 2018-03-27 | Stop reason: HOSPADM

## 2018-03-19 RX ORDER — PHENYLEPHRINE HCL IN 0.9% NACL 1 MG/10 ML
SYRINGE (ML) INTRAVENOUS
Status: DISCONTINUED
Start: 2018-03-19 | End: 2018-03-19 | Stop reason: WASHOUT

## 2018-03-19 RX ADMIN — OXYCODONE HYDROCHLORIDE AND ACETAMINOPHEN 2 TABLET: 10; 325 TABLET ORAL at 08:03

## 2018-03-19 RX ADMIN — DEXAMETHASONE SODIUM PHOSPHATE 4 MG: 4 INJECTION, SOLUTION INTRAMUSCULAR; INTRAVENOUS at 05:03

## 2018-03-19 RX ADMIN — DEXAMETHASONE SODIUM PHOSPHATE 4 MG: 4 INJECTION, SOLUTION INTRAMUSCULAR; INTRAVENOUS at 12:03

## 2018-03-19 RX ADMIN — CHLORHEXIDINE GLUCONATE 15 ML: 1.2 RINSE ORAL at 08:03

## 2018-03-19 RX ADMIN — DOXAZOSIN 4 MG: 2 TABLET ORAL at 08:03

## 2018-03-19 RX ADMIN — HYDRALAZINE HYDROCHLORIDE AND ISOSORBIDE DINITRATE 1 TABLET: 37.5; 2 TABLET, FILM COATED ORAL at 08:03

## 2018-03-19 RX ADMIN — POLYETHYLENE GLYCOL 3350 17 G: 17 POWDER, FOR SOLUTION ORAL at 08:03

## 2018-03-19 RX ADMIN — OXYCODONE HYDROCHLORIDE AND ACETAMINOPHEN 2 TABLET: 10; 325 TABLET ORAL at 02:03

## 2018-03-19 RX ADMIN — HYDROMORPHONE HYDROCHLORIDE 4 MG: 2 TABLET ORAL at 05:03

## 2018-03-19 RX ADMIN — OXYCODONE HYDROCHLORIDE AND ACETAMINOPHEN 2 TABLET: 10; 325 TABLET ORAL at 04:03

## 2018-03-19 RX ADMIN — FENTANYL CITRATE 50 MCG: 50 INJECTION INTRAMUSCULAR; INTRAVENOUS at 08:03

## 2018-03-19 RX ADMIN — PANTOPRAZOLE SODIUM 40 MG: 40 GRANULE, DELAYED RELEASE ORAL at 08:03

## 2018-03-19 RX ADMIN — AMLODIPINE BESYLATE 10 MG: 10 TABLET ORAL at 08:03

## 2018-03-19 RX ADMIN — HYDROMORPHONE HYDROCHLORIDE 4 MG: 2 TABLET ORAL at 12:03

## 2018-03-19 RX ADMIN — OXYCODONE HYDROCHLORIDE AND ACETAMINOPHEN 2 TABLET: 10; 325 TABLET ORAL at 06:03

## 2018-03-19 RX ADMIN — CARVEDILOL 25 MG: 25 TABLET, FILM COATED ORAL at 08:03

## 2018-03-19 NOTE — ASSESSMENT & PLAN NOTE
Goal SBP < 180 MAP> 65  Post-op arrived on phenylephrine gtt, able to wean to off MAPs >65  cardene gtt to keep SBP <180  Gradually resume antihypertensives, as long as MAPs >65      12/11/17 BRUCE 1.  No evidence of endocarditis.  2.  Bubble study positive for grade I Patent Foramen Ovale.  3.  Preserved left ventricular function, ejection fraction 55% to 60%.  4.  No significant valvular heart disease.  There is trivial mitral and  tricuspid regurgitation.  5.  Some sclerosis of the aortic valve and ascending aorta, but no stenosis  and no dilatation of the aorta.  6.  Tiny pericardial effusion of no hemodynamic significance.  7.  Bubble study positive for grade I Patent Foramen Ovale.  8.  Grade II atherosclerosis of descending aorta and arch.

## 2018-03-19 NOTE — H&P
[facility]  Neurocritical Care  History and Physical Note    Admit Date: 3/17/2018  Service Date: 03/18/2018  Length of Stay: 1    Subjective:     Chief Complaint: Spinal cord compression    History of Present Illness:  61 yo F with history of Sarcoidosis, HTN, HLD, arthritis, gout, asthma Pott's disease who is a transfer from [facility]  to [facility] ED with worsening pain and weakness of her lower extremities. She is here for neurosurgical evaluation and treatment of spinal cord compression due to Mtata disease of the spine  She had a prolonged hospital stay at [facility] from 12/17-01/18 for fevers, night sweats, back pain and weakness.  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  Since being discharged pt reports being unable to walk, mostly being bed bound or on the sofa due to severe back pain.  She is currently getting antibiotics via the health unit for her TB.  She endorses back pain radiating down both legs to toes.  She reports numbness in her feet as well as tingling which has been ongoing for about 1 month.  She denies overt bowel/bladder incontinence or saddle anesthesia.   She presents to NICU S/P T12-L1 corpectomy and T10-L3 fusion.    Hospital Course: 3/18 admitted to NICU s/p T12-L1 corpectomy and T10-L3 fusion.  Arrived on phenylephrine gtt @ .4mkm; propofol 75mkm and intubated, on AC 14/500/40%/P5.    Vitals:   Temp: 96.8 °F (36 °C) (placed heating blanket on pt )  Pulse: 73  BP: (!) 154/70  MAP (mmHg): 82  Resp: 17  SpO2: 100 %  Oxygen Concentration (%): 40  O2 Device (Oxygen Therapy): ventilator  Vent Mode: A/C  Set Rate: 14 bmp  Vt Set: 500 mL  Pressure Support: 0 cmH20  PEEP/CPAP: 5 cmH20  Peak Airway Pressure: 25 cmH2O  Mean Airway Pressure: 12 cmH20  Plateau Pressure: 0 cmH20    Temp  Min: 96.8 °F (36 °C)  Max: 98.1 °F (36.7 °C)  Pulse   Min: 59  Max: 84  BP  Min: 124/61  Max: 197/88  MAP (mmHg)  Min: 82  Max: 135  Resp  Min: 2  Max: 20  SpO2  Min: 95 %  Max: 100 %  Oxygen Concentration (%)  Min: 40  Max: 40    03/17 0701 - 03/18 0700  In: -   Out: 400 [Urine:400]   Unmeasured Output  Urine Occurrence: 0  Stool Occurrence: 1     Examination:   Constitutional: Well-nourished and -developed. No apparent distress.   Eyes: Conjunctiva clear, anicteric. Lids no lesions.  Head/Ears/Nose/Mouth/Throat/Neck: Moist mucous membranes. External ears, nose atraumatic.   Cardiovascular: Regular rhythm. No murmurs. No leg edema.  Respiratory: Comfortable respirations. Clear to auscultation.  Gastrointestinal: No hernia. Soft, nondistended, nontender. + bowel sounds.    Neurologic:   Initially sedated on propofol weaned to off for exam  -[id number]  -Sedated  And intubated on arrival off propofol pt opening eyes to voice  Following simple commands PALOMARES PERRL 3+, + cough, gag, corneals  -Cranial nerves II-XII grossly intact  bilat UE 4/5 moves spontaneously  -- able to move legs weakly 2/5, not able to lift against gravity   sensation intact bilat to all extremities.  Numbness and tingling to lower extremities    Unable to test orientation, language, memory, judgment, insight, fund of knowledge, coordination, gait due to level of consciousness.    Today I independently reviewed pertinent medications, lines/drains/airways, imaging, lab results, microbiology results, notably:   Assessment/Plan:     Neuro   * Spinal cord compression    S/P Corpectomy T12-L1 and T10-L3 fusion POD#0  With 2 drains  NSGY following  Neuro checks Q1h   Keep SBP < 180;  MAPs > 65  Pain management:   fentanyl 50mcg q2h prn severe pain 7-10/10  Dilaudid 4mg q4h prn 7-10/10 pain  Percocet  q4h prn pain 4-6/10  Tylenol 650 q6h prn mild pain and temp  Will need TLSO brace when able to be up out of bed  PT/OT/SLP when pt able to be mobile and extubated          Pulmonary    Received post -op  intubated   7.5 ETT 25@lip  Vent settings: AC 14/500/40%/P5  Monitor ABG/CXR  On propofol gtt weaning to off as tolerated  If stable in a.m. Plan to CPAP and possible extubation.        Cardiac/Vascular   HTN (hypertension), benign    Goal SBP < 180 MAP> 65  Post-op arrived on phenylephrine gtt, able to wean to off MAPs >65  cardene gtt to keep SBP <180  Gradually resume antihypertensives, as long as MAPs >65      12/11/17 BRUCE 1.  No evidence of endocarditis.  2.  Bubble study positive for grade I Patent Foramen Ovale.  3.  Preserved left ventricular function, ejection fraction 55% to 60%.  4.  No significant valvular heart disease.  There is trivial mitral and  tricuspid regurgitation.  5.  Some sclerosis of the aortic valve and ascending aorta, but no stenosis  and no dilatation of the aorta.  6.  Tiny pericardial effusion of no hemodynamic significance.  7.  Bubble study positive for grade I Patent Foramen Ovale.  8.  Grade II atherosclerosis of descending aorta and arch.           Renal/    Monitor Strict I/O  Daily CMP  BUN/Cr 27/1.0        ID   Tuberculosis    ID following: Recommendations:   - Obtain if possible AFB smear and culture from discitis region  - Will contact health department to confirm therapy   - Next dose of DOT due on Tuesday   900mg Isoniazid, 600 mg rifampin with B6   - Does not requre airborne isolation patient completed 2 weeks on inpatient RIPE          Discitis of thoracolumbar region    S/p corpectomy T12-L1 and T10-L3 fusion  Back tissue sent  From surgeryfor AFB smear and culture;aerobic/anerobic and fungal cultures  ID following:         Immunology/Multi System   Sarcoidosis    See spinal cord compression        Endocrine    accuchecks Q6h with SSI  A1C 5.6            Prophylaxis:  Venous Thromboembolism: mechanical  Stress Ulcer: PPI  Ventilator Pneumonia: yes     Activity Orders          Ambulate with assistance starting at 03/17 0901        Full Code    Manda Ugalde,  NP  Neurocritical Care  [facility]

## 2018-03-19 NOTE — PLAN OF CARE
SW is following this Pt for DC planning needs. There are no identified needs at this time. Pt went to OR 3/18 and will need therapy orders when appropriate.    Chrissie Sanchez LMSW  Neurocritical Care   [facility]  [id number]

## 2018-03-19 NOTE — OP NOTE
DATE OF SURGERY: 3/18/18      PREOPERATIVE DIAGNOSIS:  1. 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 H/o TB and Matta Disease     POSTOPERATIVE DIAGNOSIS:  1. 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 H/o TB and Matta Disease     PROCEDURE PERFORMED:  1. Open reduction and internal fixation of pathologic spinal fracture, T12 and L1  2. Laminectomy for decompression of thecal sac, T11, T12, and L1  3. Corpectomy from right posterolateral extracavitary approach, T12 and L1  4. Application of expandable titanium corpectomy cage, spanning T11 to L2 (Globus)  5. Posterior spinal fusion, T10 to L3  6. Segmental fixation with pedicle screws and rods, T10 to L3  7. Viacell allograft   8. Use of intraoperative neuromonitoring with MEPs  9. Use of intraoperative flouroscopy     PRIMARY SURGEON: Andi Swain D.O.     ASSITANT: Keith Guillaume D.O.     ANESTHESIA: GETA     ESTIMATED BLOOD LOSS: 1L     COMPLICATIONS: None     DRAINS: Subfascial Hemovac x2     SPECIMENS SENT: T12 and L1 cultures, disc and vertebral body specimen for microbiology     FINDINGS: Discitis/osteomyelitis.  Her tissues including muscle, fascia, and bone were very oozy despite constant attempts at hemostasis.  She remained hemodynamically stable throughout the case (refer to anesthesia notes).     INDICATIONS:  63 yo F with history of Sarcoidosis, HTN, HLD, arthritis, gout, asthma Pott's disease who is a transfer from [facility]  to [facility] ED with worsening pain and weakness of her lower extremities. 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  She was found to have MTB in her spine and lungs and  was treated with RIPE for 2 weeks inpatient then discharged home for completion of treatment despite efforts of NH/SNF placement.  patient has been unable to walk and has been bed bound since January. She is currently getting antibiotics via the health unit for her TB.  She endorses back pain radiating down both legs to toes.  She reports numbness in her feet as well as tingling which has been ongoing for about 1 month.  She denies overt bowel/bladder incontinence or saddle anesthesia.  Decision was made to proceed with spinal cord decompression and stabilization via corpectomy and fusion.  Consents were obtained and all risks, benefits, and alternatives were discussed with the patient and family.  All were in agreement with the plan.        PROCEDURE:  The patient was brought to the operating room where she was intubated and placed under general anesthesia without difficulty.  All lines were placed.  She was positioned prone onto the operating room table, with his head resting on a prone surgical pillow  All pressure points were appropriately padded.  AP  x-rays were used to localize from T10-L3.  A timeout was performed prior to the procedure.  10ccs of lidocaine with epinephrine were injected into the skin.     A linear skin incision was made with the 10 blade from approximately T10-L3. Supra and subfascial midline dissection was carried out with Bovie electrocautery.  Subperiosteal dissection was carried out with Bovie electrocautery and Chang elevators to expose the posterior elements from approximately T10-L3.  At the end of our preliminary exposure we placed a hemostat at the right T12 and L1 pedicle and confirmed levels on x-ray. We finalized our exposure from T10-L3. Medial facetectomies were then performed from T10-T11 to T12-L1 with the osteotome.     Next, we placed pedicle screws bilaterally at T10, T11, L2 and L3 under flouro guidance.  First we bit off the transverse process of at each thoracic level  (t10-T12) exposing the pedicle and then cannulated with the lenke probe.  Breaches were checked with a ball tip probe to confirm that there were none and the final screws were placed.      We then turned our attention to the neurologic decompression.  A laminectomy was performed from T11 to L1 with a combination of high-speed drill, Leksell rongeurs and Kerrison punches. Adequate dorsal decompression of the thecal sac was confirmed with a [place] probe.     We then began our extra cavitary approach from the right side.  The right T12 rib was exposed with Bovie electrocautery. Both transverse processes of T12 and L1 were removed with the osteotome and careful drilling. The T12 rib head was resected with the kerrisons and leksell.  The T12 and L1 pedicles were resected with combination of the drill and the Leksell. The right T12 exiting nerve root was identified, ligated with silk sutures, and divided with scissors.  The thecal sac was then retracted medially to expose the T12 and L1 bodies.  The T12 and L1 corpectomies were performed with osteotomes, large curettes and pituitary rongeurs. Grossly infected bone and disc material was cultured and sent for microbiologic analysis. The posterior wall was removed with posterior wall impactors. At the end of our corpectomy more than 75% of both T12 and L1 were removed, the thecal sac was circumferentially decompressed and the T11 and L2 endplates were prepared for instrumentation. The defect was irrigated with a antibiotic solution and Vancomycin powder was placed.  The corpectomy defect was sized and an expandable titanium corpectomy cage was countersunk into the defect. ViaCell graft was packed into and around the cage.       The pathologic fracture with kyphosis was reduced with expansion of the cage. AP and lateral x-ray showed excellent position of all hardware.     Titanium rods were sequentially sized contoured and reduced into the tulip heads on both sides.  Set  screws were finally tightened.  A crosslink was placed at T12-L1  The wound was copiously irrigated with sterile normal saline and a dilute Betadyne solution.  Exposed bony surfaces from T10-L1 were decorticated with the high-speed drill. Local bone and remaining ViaCell and DBX was placed from T10-L1 for arthrodesis.      A watertight fascial closure was achieved with interrupted 0 Vicryl sutures and a running Stratafix suture.  Two subfascial Hemovac drains were placed and tunneled through separate incisions where they were secured with a Vicryl suture.     Patient was kept intubated and taken to the ICU were she was moving all extremities when sedation held.  She was hemodynamically stable.     WOUND:  Midline thoracic    WOUND CLASSIFICATION: Clean contaminated (prior infection)    CONDITION: stable    PROGNOSIS: Guarded

## 2018-03-19 NOTE — PLAN OF CARE
[facility] - [place] - [address]  [address]  [place] [address]  Phone: [phone] Fax: [phone]    This CM spoke with patient, spouse and daughter at bedside.       03/19/18 1324   Discharge Assessment   Assessment Type Discharge Planning Assessment   Confirmed/corrected address and phone number on facesheet? Yes   Assessment information obtained from? Caregiver   Expected Length of Stay (days) 3   Communicated expected length of stay with patient/caregiver yes   Prior to hospitilization cognitive status: Alert/Oriented   Prior to hospitalization functional status: Assistive Equipment   Current cognitive status: Alert/Oriented   Current Functional Status: Needs Assistance   Facility Arrived From: (from [facility] via ambulance)   Lives With spouse;child(bing), adult   Able to Return to Prior Arrangements yes   Is patient able to care for self after discharge? Unable to determine at this time (comments)   Who are your caregiver(s) and their phone number(s)? (daughter Alea Carrillo [phone])   Patient's perception of discharge disposition home or selfcare   Readmission Within The Last 30 Days no previous admission in last 30 days   Patient currently being followed by outpatient case management? No   Patient currently receives any other outside agency services? No   Equipment Currently Used at Home wheelchair;walker, standard   Do you have any problems affording any of your prescribed medications? No   Is the patient taking medications as prescribed? yes   Does the patient have transportation home? Yes   Transportation Available family or friend will provide   Does the patient receive services at the [facility]? No   Discharge Plan A Rehab   Discharge Plan B Home;Home Health   Patient/Family In Agreement With Plan yes     Deepali Altamirano RN/BSN/TREVON  [phone]  [facility]

## 2018-03-19 NOTE — ASSESSMENT & PLAN NOTE
S/P Corpectomy T12-L1 and T10-L3 fusion POD#0  With 2 drains  NSGY following  Neuro checks Q1h   Keep SBP < 180;  MAPs > 65  Pain management:   fentanyl 50mcg q2h prn severe pain 7-10/10  Dilaudid 4mg q4h prn 7-10/10 pain  Percocet  q4h prn pain 4-6/10  Tylenol 650 q6h prn mild pain and temp  Will need TLSO brace when able to be up out of bed  PT/OT/SLP when pt able to be mobile and extubated

## 2018-03-19 NOTE — ASSESSMENT & PLAN NOTE
Received post -op intubated   7.5 ETT 25@lip  Vent settings: AC 14/500/40%/P5  Monitor ABG/CXR  On propofol gtt weaning to off as tolerated  If stable in a.m. Plan to CPAP and possible extubation.

## 2018-03-19 NOTE — PROGRESS NOTES
Patient extubated to 2L Nasal Cannula post one hour CPAP trial and NIF (-44 best) and Vital capacity (967 ml best). Vitals and sats are good. Patient tolerating extubation well. Will continue to monitor.    Anna Lester, RRT

## 2018-03-19 NOTE — ASSESSMENT & PLAN NOTE
63 y/o female with Matta disease on DOT RIPE from the twice a week frequency; estimate currently regimen is  Isoniazid 900 mg and 600 mg rifampin. Dr. Vásquez's note suggests that the patient completed 2 months of initial RIPE therapy.We recommend if possible to obtain sample during procedure for new culture and sensitives of Tb. Will recommend to evaluate drug levels of rifampin and isoniazid to determine if therapeutic levels in blood are achieved. Will call health department on Monday to confirm doses and current regimen of therapy. Last dose was on Friday next to be due on Tuesday.     Recommendation   - POD#1 s/p T12-L1 corepctomy and T10/L3 fusion  - AFB smear and culture from discitis region pending/in process; Gram stains w/ rare WBCs and no AFB seen   - Will contact health department to confirm therapy   - Next dose of DOT RI due on Tuesday  900mg Isoniazid, 600 mg rifampin with B6   - Patient does not require airborne isolation precautions as she completed the initial RIPE therapy appropriately; spoke to IM5 resident about the lack of suspicion for active pulmonary TB and this should have been relayed to the current NICU team for clarification.   - Does not requre airborne isolation patient completed 2 weeks on inpatient RIPE

## 2018-03-19 NOTE — SUBJECTIVE & OBJECTIVE
Interval History:  Patient POD#1 s/pT12-T1 corpectomy and T10-L3 fusion. Currently intubated w.o sedation w/ planned extubation later today. Patient had significant EBL ( 3000 mL) during the procedure.  Able to nod appropriately to adequate pain control. Family at bedside.   Afebrile w/o leukocytosis.     Review of Systems   Constitutional: Negative for chills, fatigue and fever.   Genitourinary: Positive for enuresis and flank pain.   Musculoskeletal: Positive for back pain and myalgias.     Objective:     Vital Signs (Most Recent):  Temp: 99 °F (37.2 °C) (03/19/18 0701)  Pulse: 76 (03/19/18 1000)  Resp: 11 (03/19/18 1000)  BP: (!) 106/59 (03/19/18 1000)  SpO2: 100 % (03/19/18 1000) Vital Signs (24h Range):  Temp:  [96.8 °F (36 °C)-99.1 °F (37.3 °C)] 99 °F (37.2 °C)  Pulse:  [] 76  Resp:  [2-29] 11  SpO2:  [100 %] 100 %  BP: (105-169)/(55-81) 106/59  Arterial Line BP: (106-196)/(50-88) 106/50     Weight: 84.8 kg (186 lb 15.2 oz)  Body mass index is 31.11 kg/m².    Estimated Creatinine Clearance: 61.1 mL/min (based on SCr of 1 mg/dL).    Physical Exam   Constitutional: She is oriented to person, place, and time. She appears well-developed and well-nourished.   HENT:   Head: Normocephalic and atraumatic.   Eyes: EOM are normal. Pupils are equal, round, and reactive to light.   Neck: Normal range of motion.   Cardiovascular: Normal rate, regular rhythm and normal heart sounds.    Pulmonary/Chest: Effort normal and breath sounds normal.   Abdominal: Soft. Bowel sounds are normal.   Musculoskeletal: Normal range of motion. She exhibits no edema.   Neurological: She is alert and oriented to person, place, and time.   Skin: No rash noted.       Significant Labs:   Blood Culture:     Recent Labs  Lab 12/08/17 1810 12/08/17  1825   LABBLOO No growth after 5 days. No growth after 5 days.     BMP:     Recent Labs  Lab 03/19/18  0203   *      K 3.4*      CO2 20*   BUN 27*   CREATININE 1.0   CALCIUM  8.2*   MG 1.7     CBC:     Recent Labs  Lab 03/18/18  0417 03/18/18  1825 03/19/18  0203   WBC 8.50 11.96 9.75   HGB 10.7* 8.7* 8.6*   HCT 34.2* 25.9* 25.0*    126* 127*     Microbiology Results (last 7 days)     Procedure Component Value Units Date/Time    Culture, Anaerobe [[id number]] Collected:  03/18/18 1533    Order Status:  Completed Specimen:  Tissue from Back Updated:  03/19/18 0732     Anaerobic Culture Culture in progress     Anaerobic Culture Culture in progress    Narrative:       3. T12-L1 swab#2 Tuberculosis infection    Culture, Anaerobe [[id number]] Collected:  03/18/18 1533    Order Status:  Completed Specimen:  Tissue from Back Updated:  03/19/18 0732     Anaerobic Culture Culture in progress     Anaerobic Culture Culture in progress    Narrative:       1. T12-L1 bones and tissues Tuberculosis infection    Culture, Anaerobe [[id number]] Collected:  03/18/18 1533    Order Status:  Completed Specimen:  Tissue from Back Updated:  03/19/18 0732     Anaerobic Culture Culture in progress     Anaerobic Culture Culture in progress    Narrative:       2. T12-L1 swab#1 Tuberculosis infection    Aerobic culture [[id number]] Collected:  03/18/18 1533    Order Status:  Completed Specimen:  Tissue from Back Updated:  03/19/18 0719     Aerobic Bacterial Culture No growth    Narrative:       3. T12-L1 swab#2 Tuberculosis infection    Aerobic culture [[id number]] Collected:  03/18/18 1533    Order Status:  Completed Specimen:  Tissue from Back Updated:  03/19/18 0719     Aerobic Bacterial Culture No growth    Narrative:       2. T12-L1 swab#1 Tuberculosis infection    Aerobic culture [[id number]] Collected:  03/18/18 1533    Order Status:  Completed Specimen:  Tissue from Back Updated:  03/19/18 0717     Aerobic Bacterial Culture No growth    Narrative:       1. T12-L1 bones and tissues Tuberculosis infection    Gram stain [[id number]] Collected:  03/18/18 1533    Order Status:  Completed Specimen:  Tissue from  Back Updated:  03/18/18 1724     Gram Stain Result No WBC's      No organisms seen    Narrative:       3. T12-L1 swab#2 Tuberculosis infection    Gram stain [[id number]] Collected:  03/18/18 1533    Order Status:  Completed Specimen:  Tissue from Back Updated:  03/18/18 1720     Gram Stain Result Rare WBC's      No organisms seen    Narrative:       1. T12-L1 bones and tissues Tuberculosis infection    Gram stain [[id number]] Collected:  03/18/18 1533    Order Status:  Completed Specimen:  Tissue from Back Updated:  03/18/18 1705     Gram Stain Result Rare WBC's      No organisms seen    Narrative:       2. T12-L1 swab#1 Tuberculosis infection    Fungus culture [[id number]] Collected:  03/18/18 1533    Order Status:  Sent Specimen:  Tissue from Back Updated:  03/18/18 1618    AFB Culture & Smear [[id number]] Collected:  03/18/18 1533    Order Status:  Sent Specimen:  Tissue from Back Updated:  03/18/18 1618    Fungus culture [[id number]] Collected:  03/18/18 1533    Order Status:  Sent Specimen:  Tissue from Back Updated:  03/18/18 1617    AFB Culture & Smear [[id number]] Collected:  03/18/18 1533    Order Status:  Sent Specimen:  Tissue from Back Updated:  03/18/18 1617    Fungus culture [[id number]] Collected:  03/18/18 1533    Order Status:  Sent Specimen:  Tissue from Back Updated:  03/18/18 1615    AFB Culture & Smear [[id number]] Collected:  03/18/18 1533    Order Status:  Sent Specimen:  Tissue from Back Updated:  03/18/18 1615    AFB Culture & Smear [[id number]]     Order Status:  No result Specimen:  Respiratory from Sputum, Expectorated     AFB Culture & Smear [[id number]]     Order Status:  No result Specimen:  Respiratory from Sputum, Expectorated     AFB Culture & Smear [[id number]]     Order Status:  No result Specimen:  Respiratory from Sputum, Expectorated           Significant Imaging: I have reviewed all pertinent imaging results/findings within the past 24 hours.

## 2018-03-19 NOTE — PLAN OF CARE
Problem: Patient Care Overview  Goal: Plan of Care Review  Outcome: Ongoing (interventions implemented as appropriate)  Plan of care reviewed with patient at 1000. Patient unable to verbalize understanding due to intubation. All questions and concerns addressed today. Patient remains free from injury and falls. No acute events. Patient progressing towards goal. Will continue to monitor. See flowsheet for full assessment and vitals throughout shift.

## 2018-03-19 NOTE — ASSESSMENT & PLAN NOTE
ID following: Recommendations:   - Obtain if possible AFB smear and culture from discitis region  - Will contact health department to confirm therapy   - Next dose of DOT due on Tuesday   900mg Isoniazid, 600 mg rifampin with B6   - Does not requre airborne isolation patient completed 2 weeks on inpatient RIPE

## 2018-03-19 NOTE — ASSESSMENT & PLAN NOTE
S/p corpectomy T12-L1 and T10-L3 fusion  Back tissue sent  From surgeryfor AFB smear and culture;aerobic/anerobic and fungal cultures  ID following:

## 2018-03-19 NOTE — PROGRESS NOTES
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 Dr. Vásquez's note suggests that the patient completed 2 months of initial RIPE therapy.We recommend if possible to obtain sample during procedure for new culture and sensitives of Tb. Will recommend to evaluate drug levels of rifampin and isoniazid to determine if therapeutic levels in blood are achieved. Will call health department on Monday to confirm doses and current regimen of therapy. Last dose was on Friday next to be due on Tuesday.     Recommendation   - POD#1 s/p T12-L1 corepctomy and T10/L3 fusion  - AFB smear and culture from discitis region pending/in process; Gram stains w/ rare WBCs and no AFB seen   - Will contact health department to confirm therapy   - Next dose of DOT RI due on Tuesday  900mg Isoniazid, 600 mg rifampin with B6   - Patient does not require airborne isolation precautions as she completed the initial RIPE therapy appropriately; spoke to IM5 resident about the lack of suspicion for active pulmonary TB and this should have been relayed to the current NICU team for clarification.   - Does not requre airborne isolation patient completed 2 weeks on inpatient RIPE            Anticipated Disposition: TBD    Thank you for your consult. I will follow-up with patient. Please contact us if you have any additional questions.    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 MRI notable for severe compression of the adjacent cord at the T12-L1 level. Transferred to [facility] for Neurosurgery evaluation. Patient history dates back to December when she was admitted to [facility] from 12/17-01/18 for fevers, night sweats, back pain and weakness.  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  Since being discharged pt reports being unable to walk, mostly being bed bound or on the sofa due to severe back pain.  She is currently getting antibiotics via the health unit for her TB.  A home health nurse comes to her house to give her TB meds every Tues and Fri.  She reports numbness in her feet as well as tingling which has been ongoing for about 1 month.  She wears a diaper because she cannot get out of bed to use the bathroom.  She states that recently there have been times she doesn't realize that she urinated.  She denies any burning on urination, fevers, chills, night sweats at this time.  AFB cultures (12/27) > M Tb complex sensitive to RIF/ Strep/ INH/ EMB/ Pyrazinamide per quest. ID consulted for additional recommendations   Interval History:  Patient POD#1 s/pT12-T1 corpectomy and T10-L3 fusion. Currently intubated w.o sedation w/ planned extubation later today. Patient had significant EBL ( 3000 mL) during the procedure.  Able to nod appropriately to adequate pain control. Family at bedside.   Afebrile w/o leukocytosis.     Review of Systems   Constitutional: Negative for chills, fatigue and fever.   Genitourinary: Positive for enuresis and flank pain.   Musculoskeletal: Positive for back pain and myalgias.     Objective:     Vital  Signs (Most Recent):  Temp: 99 °F (37.2 °C) (03/19/18 0701)  Pulse: 76 (03/19/18 1000)  Resp: 11 (03/19/18 1000)  BP: (!) 106/59 (03/19/18 1000)  SpO2: 100 % (03/19/18 1000) Vital Signs (24h Range):  Temp:  [96.8 °F (36 °C)-99.1 °F (37.3 °C)] 99 °F (37.2 °C)  Pulse:  [] 76  Resp:  [2-29] 11  SpO2:  [100 %] 100 %  BP: (105-169)/(55-81) 106/59  Arterial Line BP: (106-196)/(50-88) 106/50     Weight: 84.8 kg (186 lb 15.2 oz)  Body mass index is 31.11 kg/m².    Estimated Creatinine Clearance: 61.1 mL/min (based on SCr of 1 mg/dL).    Physical Exam   Constitutional: She is oriented to person, place, and time. She appears well-developed and well-nourished.   HENT:   Head: Normocephalic and atraumatic.   Eyes: EOM are normal. Pupils are equal, round, and reactive to light.   Neck: Normal range of motion.   Cardiovascular: Normal rate, regular rhythm and normal heart sounds.    Pulmonary/Chest: Effort normal and breath sounds normal.   Abdominal: Soft. Bowel sounds are normal.   Musculoskeletal: Normal range of motion. She exhibits no edema.   Neurological: She is alert and oriented to person, place, and time.   Skin: No rash noted.       Significant Labs:   Blood Culture:     Recent Labs  Lab 12/08/17  1810 12/08/17  1825   LABBLOO No growth after 5 days. No growth after 5 days.     BMP:     Recent Labs  Lab 03/19/18  0203   *      K 3.4*      CO2 20*   BUN 27*   CREATININE 1.0   CALCIUM 8.2*   MG 1.7     CBC:     Recent Labs  Lab 03/18/18  0417 03/18/18  1825 03/19/18  0203   WBC 8.50 11.96 9.75   HGB 10.7* 8.7* 8.6*   HCT 34.2* 25.9* 25.0*    126* 127*     Microbiology Results (last 7 days)     Procedure Component Value Units Date/Time    Culture, Anaerobe [[id number]] Collected:  03/18/18 1533    Order Status:  Completed Specimen:  Tissue from Back Updated:  03/19/18 0732     Anaerobic Culture Culture in progress     Anaerobic Culture Culture in progress    Narrative:       3. T12-L1 swab#2  Tuberculosis infection    Culture, Anaerobe [[id number]] Collected:  03/18/18 1533    Order Status:  Completed Specimen:  Tissue from Back Updated:  03/19/18 0732     Anaerobic Culture Culture in progress     Anaerobic Culture Culture in progress    Narrative:       1. T12-L1 bones and tissues Tuberculosis infection    Culture, Anaerobe [[id number]] Collected:  03/18/18 1533    Order Status:  Completed Specimen:  Tissue from Back Updated:  03/19/18 0732     Anaerobic Culture Culture in progress     Anaerobic Culture Culture in progress    Narrative:       2. T12-L1 swab#1 Tuberculosis infection    Aerobic culture [[id number]] Collected:  03/18/18 1533    Order Status:  Completed Specimen:  Tissue from Back Updated:  03/19/18 0719     Aerobic Bacterial Culture No growth    Narrative:       3. T12-L1 swab#2 Tuberculosis infection    Aerobic culture [[id number]] Collected:  03/18/18 1533    Order Status:  Completed Specimen:  Tissue from Back Updated:  03/19/18 0719     Aerobic Bacterial Culture No growth    Narrative:       2. T12-L1 swab#1 Tuberculosis infection    Aerobic culture [[id number]] Collected:  03/18/18 1533    Order Status:  Completed Specimen:  Tissue from Back Updated:  03/19/18 0717     Aerobic Bacterial Culture No growth    Narrative:       1. T12-L1 bones and tissues Tuberculosis infection    Gram stain [[id number]] Collected:  03/18/18 1533    Order Status:  Completed Specimen:  Tissue from Back Updated:  03/18/18 1724     Gram Stain Result No WBC's      No organisms seen    Narrative:       3. T12-L1 swab#2 Tuberculosis infection    Gram stain [[id number]] Collected:  03/18/18 1533    Order Status:  Completed Specimen:  Tissue from Back Updated:  03/18/18 1720     Gram Stain Result Rare WBC's      No organisms seen    Narrative:       1. T12-L1 bones and tissues Tuberculosis infection    Gram stain [[id number]] Collected:  03/18/18 1533    Order Status:  Completed Specimen:  Tissue from Back Updated:   03/18/18 1705     Gram Stain Result Rare WBC's      No organisms seen    Narrative:       2. T12-L1 swab#1 Tuberculosis infection    Fungus culture [[id number]] Collected:  03/18/18 1533    Order Status:  Sent Specimen:  Tissue from Back Updated:  03/18/18 1618    AFB Culture & Smear [[id number]] Collected:  03/18/18 1533    Order Status:  Sent Specimen:  Tissue from Back Updated:  03/18/18 1618    Fungus culture [[id number]] Collected:  03/18/18 1533    Order Status:  Sent Specimen:  Tissue from Back Updated:  03/18/18 1617    AFB Culture & Smear [[id number]] Collected:  03/18/18 1533    Order Status:  Sent Specimen:  Tissue from Back Updated:  03/18/18 1617    Fungus culture [[id number]] Collected:  03/18/18 1533    Order Status:  Sent Specimen:  Tissue from Back Updated:  03/18/18 1615    AFB Culture & Smear [[id number]] Collected:  03/18/18 1533    Order Status:  Sent Specimen:  Tissue from Back Updated:  03/18/18 1615    AFB Culture & Smear [[id number]]     Order Status:  No result Specimen:  Respiratory from Sputum, Expectorated     AFB Culture & Smear [[id number]]     Order Status:  No result Specimen:  Respiratory from Sputum, Expectorated     AFB Culture & Smear [[id number]]     Order Status:  No result Specimen:  Respiratory from Sputum, Expectorated           Significant Imaging: I have reviewed all pertinent imaging results/findings within the past 24 hours.

## 2018-03-19 NOTE — PLAN OF CARE
Problem: Patient Care Overview  Goal: Plan of Care Review  Outcome: Ongoing (interventions implemented as appropriate)  POC reviewed with pt at 0500. Pt unable to verbalize understanding. Questions and concerns addressed with daughter while at bedside. No acute events overnight. Pt progressing toward goals. Will continue to monitor. See flowsheets for full assessment and VS info

## 2018-03-20 LAB
ALBUMIN SERPL BCP-MCNC: 2.4 G/DL
ALP SERPL-CCNC: 36 U/L
ALT SERPL W/O P-5'-P-CCNC: 7 U/L
ANION GAP SERPL CALC-SCNC: 9 MMOL/L
AST SERPL-CCNC: 30 U/L
BASOPHILS # BLD AUTO: 0.03 K/UL
BASOPHILS NFR BLD: 0.3 %
BILIRUB SERPL-MCNC: 0.4 MG/DL
BUN SERPL-MCNC: 22 MG/DL
CALCIUM SERPL-MCNC: 8 MG/DL
CHLORIDE SERPL-SCNC: 110 MMOL/L
CO2 SERPL-SCNC: 19 MMOL/L
CREAT SERPL-MCNC: 0.8 MG/DL
DIFFERENTIAL METHOD: ABNORMAL
EOSINOPHIL # BLD AUTO: 0.3 K/UL
EOSINOPHIL NFR BLD: 2.5 %
ERYTHROCYTE [DISTWIDTH] IN BLOOD BY AUTOMATED COUNT: 16.1 %
EST. GFR  (AFRICAN AMERICAN): >60 ML/MIN/1.73 M^2
EST. GFR  (NON AFRICAN AMERICAN): >60 ML/MIN/1.73 M^2
GLUCOSE SERPL-MCNC: 91 MG/DL
HCT VFR BLD AUTO: 21.5 %
HGB BLD-MCNC: 7.3 G/DL
IMM GRANULOCYTES # BLD AUTO: 0.08 K/UL
IMM GRANULOCYTES NFR BLD AUTO: 0.7 %
LYMPHOCYTES # BLD AUTO: 1.8 K/UL
LYMPHOCYTES NFR BLD: 16.5 %
MAGNESIUM SERPL-MCNC: 1.6 MG/DL
MCH RBC QN AUTO: 29.1 PG
MCHC RBC AUTO-ENTMCNC: 34 G/DL
MCV RBC AUTO: 86 FL
MONOCYTES # BLD AUTO: 1.7 K/UL
MONOCYTES NFR BLD: 15.3 %
NEUTROPHILS # BLD AUTO: 7.1 K/UL
NEUTROPHILS NFR BLD: 64.7 %
NRBC BLD-RTO: 0 /100 WBC
PHOSPHATE SERPL-MCNC: 2.8 MG/DL
PLATELET # BLD AUTO: 97 K/UL
PMV BLD AUTO: 11.2 FL
POCT GLUCOSE: 83 MG/DL (ref 70–110)
POCT GLUCOSE: 96 MG/DL (ref 70–110)
POTASSIUM SERPL-SCNC: 3.2 MMOL/L
PROT SERPL-MCNC: 5 G/DL
RBC # BLD AUTO: 2.51 M/UL
SODIUM SERPL-SCNC: 138 MMOL/L
WBC # BLD AUTO: 11 K/UL

## 2018-03-20 PROCEDURE — 83735 ASSAY OF MAGNESIUM: CPT

## 2018-03-20 PROCEDURE — 25000003 PHARM REV CODE 250: Performed by: STUDENT IN AN ORGANIZED HEALTH CARE EDUCATION/TRAINING PROGRAM

## 2018-03-20 PROCEDURE — 80053 COMPREHEN METABOLIC PANEL: CPT

## 2018-03-20 PROCEDURE — 25000003 PHARM REV CODE 250: Performed by: PHYSICIAN ASSISTANT

## 2018-03-20 PROCEDURE — 99233 SBSQ HOSP IP/OBS HIGH 50: CPT | Mod: ,,, | Performed by: INTERNAL MEDICINE

## 2018-03-20 PROCEDURE — 20000000 HC ICU ROOM

## 2018-03-20 PROCEDURE — 85025 COMPLETE CBC W/AUTO DIFF WBC: CPT

## 2018-03-20 PROCEDURE — 25000003 PHARM REV CODE 250: Performed by: INTERNAL MEDICINE

## 2018-03-20 PROCEDURE — 63600175 PHARM REV CODE 636 W HCPCS: Performed by: STUDENT IN AN ORGANIZED HEALTH CARE EDUCATION/TRAINING PROGRAM

## 2018-03-20 PROCEDURE — 25000003 PHARM REV CODE 250: Performed by: PSYCHIATRY & NEUROLOGY

## 2018-03-20 PROCEDURE — 84100 ASSAY OF PHOSPHORUS: CPT

## 2018-03-20 PROCEDURE — 25000003 PHARM REV CODE 250: Performed by: NURSE PRACTITIONER

## 2018-03-20 PROCEDURE — 99233 SBSQ HOSP IP/OBS HIGH 50: CPT | Mod: ,,, | Performed by: PSYCHIATRY & NEUROLOGY

## 2018-03-20 RX ORDER — AMLODIPINE BESYLATE 10 MG/1
10 TABLET ORAL DAILY
Status: DISCONTINUED | OUTPATIENT
Start: 2018-03-20 | End: 2018-03-27 | Stop reason: HOSPADM

## 2018-03-20 RX ORDER — GABAPENTIN 300 MG/1
300 CAPSULE ORAL EVERY 8 HOURS
Status: DISPENSED | OUTPATIENT
Start: 2018-03-20 | End: 2018-03-23

## 2018-03-20 RX ORDER — SODIUM,POTASSIUM PHOSPHATES 280-250MG
1 POWDER IN PACKET (EA) ORAL ONCE
Status: COMPLETED | OUTPATIENT
Start: 2018-03-20 | End: 2018-03-20

## 2018-03-20 RX ORDER — CARVEDILOL 6.25 MG/1
12.5 TABLET ORAL 2 TIMES DAILY
Status: DISCONTINUED | OUTPATIENT
Start: 2018-03-20 | End: 2018-03-25

## 2018-03-20 RX ORDER — LANOLIN ALCOHOL/MO/W.PET/CERES
100 CREAM (GRAM) TOPICAL DAILY
Status: DISCONTINUED | OUTPATIENT
Start: 2018-03-21 | End: 2018-03-20

## 2018-03-20 RX ORDER — CARVEDILOL 6.25 MG/1
6.25 TABLET ORAL 2 TIMES DAILY
Status: DISCONTINUED | OUTPATIENT
Start: 2018-03-20 | End: 2018-03-20

## 2018-03-20 RX ORDER — LANOLIN ALCOHOL/MO/W.PET/CERES
300 CREAM (GRAM) TOPICAL ONCE
Status: COMPLETED | OUTPATIENT
Start: 2018-03-20 | End: 2018-03-20

## 2018-03-20 RX ORDER — HEPARIN SODIUM 5000 [USP'U]/ML
5000 INJECTION, SOLUTION INTRAVENOUS; SUBCUTANEOUS EVERY 8 HOURS
Status: DISCONTINUED | OUTPATIENT
Start: 2018-03-20 | End: 2018-03-27 | Stop reason: HOSPADM

## 2018-03-20 RX ORDER — SENNOSIDES 8.6 MG/1
8.6 TABLET ORAL DAILY PRN
Status: DISCONTINUED | OUTPATIENT
Start: 2018-03-20 | End: 2018-03-27 | Stop reason: HOSPADM

## 2018-03-20 RX ADMIN — OXYCODONE HYDROCHLORIDE AND ACETAMINOPHEN 2 TABLET: 10; 325 TABLET ORAL at 09:03

## 2018-03-20 RX ADMIN — CHLORHEXIDINE GLUCONATE 15 ML: 1.2 RINSE ORAL at 08:03

## 2018-03-20 RX ADMIN — OXYCODONE HYDROCHLORIDE AND ACETAMINOPHEN 2 TABLET: 10; 325 TABLET ORAL at 01:03

## 2018-03-20 RX ADMIN — Medication 300 MG: at 05:03

## 2018-03-20 RX ADMIN — OXYCODONE HYDROCHLORIDE AND ACETAMINOPHEN 2 TABLET: 10; 325 TABLET ORAL at 05:03

## 2018-03-20 RX ADMIN — POLYETHYLENE GLYCOL 3350 17 G: 17 POWDER, FOR SOLUTION ORAL at 08:03

## 2018-03-20 RX ADMIN — CARVEDILOL 12.5 MG: 6.25 TABLET, FILM COATED ORAL at 09:03

## 2018-03-20 RX ADMIN — CARVEDILOL 6.25 MG: 6.25 TABLET, FILM COATED ORAL at 08:03

## 2018-03-20 RX ADMIN — GABAPENTIN 300 MG: 300 CAPSULE ORAL at 05:03

## 2018-03-20 RX ADMIN — PANTOPRAZOLE SODIUM 40 MG: 40 GRANULE, DELAYED RELEASE ORAL at 08:03

## 2018-03-20 RX ADMIN — ACETAMINOPHEN 650 MG: 325 TABLET ORAL at 08:03

## 2018-03-20 RX ADMIN — HEPARIN SODIUM 5000 UNITS: 5000 INJECTION, SOLUTION INTRAVENOUS; SUBCUTANEOUS at 09:03

## 2018-03-20 RX ADMIN — AMLODIPINE BESYLATE 10 MG: 10 TABLET ORAL at 10:03

## 2018-03-20 RX ADMIN — GABAPENTIN 300 MG: 300 CAPSULE ORAL at 09:03

## 2018-03-20 RX ADMIN — Medication 50 MG: at 08:03

## 2018-03-20 RX ADMIN — POTASSIUM & SODIUM PHOSPHATES POWDER PACK 280-160-250 MG 1 PACKET: 280-160-250 PACK at 10:03

## 2018-03-20 RX ADMIN — HEPARIN SODIUM 5000 UNITS: 5000 INJECTION, SOLUTION INTRAVENOUS; SUBCUTANEOUS at 02:03

## 2018-03-20 NOTE — SUBJECTIVE & OBJECTIVE
Interval History:  Patient POD#2 s/pT12-T1 corpectomy and T10-L3 fusion. Extubated and doing well. No other complaints at this time aside from surgical site pain  Afebrile w/o leukocytosis.     Review of Systems   Constitutional: Negative for chills, fatigue and fever.   Genitourinary: Positive for enuresis and flank pain.   Musculoskeletal: Positive for back pain and myalgias.     Objective:     Vital Signs (Most Recent):  Temp: 98.4 °F (36.9 °C) (03/20/18 1105)  Pulse: 93 (03/20/18 1705)  Resp: (!) 22 (03/20/18 1705)  BP: (!) 181/76 (03/20/18 1705)  SpO2: 100 % (03/20/18 1705) Vital Signs (24h Range):  Temp:  [98.4 °F (36.9 °C)-98.9 °F (37.2 °C)] 98.4 °F (36.9 °C)  Pulse:  [69-98] 93  Resp:  [0-91] 22  SpO2:  [93 %-100 %] 100 %  BP: (124-186)/() 181/76  Arterial Line BP: (107-207)/() 198/73     Weight: 84.8 kg (186 lb 15.2 oz)  Body mass index is 31.11 kg/m².    Estimated Creatinine Clearance: 76.4 mL/min (based on SCr of 0.8 mg/dL).    Physical Exam   Constitutional: She is oriented to person, place, and time. She appears well-developed and well-nourished.   HENT:   Head: Normocephalic and atraumatic.   Eyes: EOM are normal. Pupils are equal, round, and reactive to light.   Neck: Normal range of motion.   Cardiovascular: Normal rate, regular rhythm and normal heart sounds.    Pulmonary/Chest: Effort normal and breath sounds normal.   Abdominal: Soft. Bowel sounds are normal.   Musculoskeletal: Normal range of motion. She exhibits no edema.   2 post surgical drains present, w/ serosanguinous drainage noted    Neurological: She is alert and oriented to person, place, and time.   Skin: No rash noted.       Significant Labs:   Blood Culture:     Recent Labs  Lab 12/08/17 1810 12/08/17 1825   LABBLOO No growth after 5 days. No growth after 5 days.     BMP:     Recent Labs  Lab 03/20/18  0111   GLU 91      K 3.2*      CO2 19*   BUN 22   CREATININE 0.8   CALCIUM 8.0*   MG 1.6     CBC:      Recent Labs  Lab 03/18/18  1825 03/19/18  0203 03/20/18  0111   WBC 11.96 9.75 11.00   HGB 8.7* 8.6* 7.3*   HCT 25.9* 25.0* 21.5*   * 127* 97*     Microbiology Results (last 7 days)     Procedure Component Value Units Date/Time    Aerobic culture [[id number]] Collected:  03/18/18 1533    Order Status:  Completed Specimen:  Tissue from Back Updated:  03/20/18 1253     Aerobic Bacterial Culture No growth    Narrative:       1. T12-L1 bones and tissues Tuberculosis infection    Aerobic culture [[id number]] Collected:  03/18/18 1533    Order Status:  Completed Specimen:  Tissue from Back Updated:  03/20/18 1100     Aerobic Bacterial Culture No growth    Narrative:       3. T12-L1 swab#2 Tuberculosis infection    Aerobic culture [[id number]] Collected:  03/18/18 1533    Order Status:  Completed Specimen:  Tissue from Back Updated:  03/20/18 1100     Aerobic Bacterial Culture No growth    Narrative:       2. T12-L1 swab#1 Tuberculosis infection    AFB Culture & Smear [[id number]] Collected:  03/18/18 1533    Order Status:  Completed Specimen:  Tissue from Back Updated:  03/19/18 2127     AFB Culture & Smear Culture in progress     AFB CULTURE STAIN No acid fast bacilli seen.    Narrative:       1. T12-L1 bones and tissues Tuberculosis infection    AFB Culture & Smear [[id number]] Collected:  03/18/18 1533    Order Status:  Completed Specimen:  Tissue from Back Updated:  03/19/18 2127     AFB Culture & Smear Culture in progress     AFB CULTURE STAIN No acid fast bacilli seen.    Narrative:       2. T12-L1 swab#1 Tuberculosis infection    AFB Culture & Smear [[id number]] Collected:  03/18/18 1533    Order Status:  Completed Specimen:  Tissue from Back Updated:  03/19/18 2127     AFB Culture & Smear Culture in progress     AFB CULTURE STAIN No acid fast bacilli seen.    Narrative:       3. T12-L1 swab#2 Tuberculosis infection    Culture, Anaerobe [[id number]] Collected:  03/18/18 1533    Order Status:  Completed  Specimen:  Tissue from Back Updated:  03/19/18 0732     Anaerobic Culture Culture in progress     Anaerobic Culture Culture in progress    Narrative:       3. T12-L1 swab#2 Tuberculosis infection    Culture, Anaerobe [[id number]] Collected:  03/18/18 1533    Order Status:  Completed Specimen:  Tissue from Back Updated:  03/19/18 0732     Anaerobic Culture Culture in progress     Anaerobic Culture Culture in progress    Narrative:       1. T12-L1 bones and tissues Tuberculosis infection    Culture, Anaerobe [[id number]] Collected:  03/18/18 1533    Order Status:  Completed Specimen:  Tissue from Back Updated:  03/19/18 0732     Anaerobic Culture Culture in progress     Anaerobic Culture Culture in progress    Narrative:       2. T12-L1 swab#1 Tuberculosis infection    Gram stain [[id number]] Collected:  03/18/18 1533    Order Status:  Completed Specimen:  Tissue from Back Updated:  03/18/18 1724     Gram Stain Result No WBC's      No organisms seen    Narrative:       3. T12-L1 swab#2 Tuberculosis infection    Gram stain [[id number]] Collected:  03/18/18 1533    Order Status:  Completed Specimen:  Tissue from Back Updated:  03/18/18 1720     Gram Stain Result Rare WBC's      No organisms seen    Narrative:       1. T12-L1 bones and tissues Tuberculosis infection    Gram stain [[id number]] Collected:  03/18/18 1533    Order Status:  Completed Specimen:  Tissue from Back Updated:  03/18/18 1705     Gram Stain Result Rare WBC's      No organisms seen    Narrative:       2. T12-L1 swab#1 Tuberculosis infection    Fungus culture [[id number]] Collected:  03/18/18 1533    Order Status:  Sent Specimen:  Tissue from Back Updated:  03/18/18 1618    Fungus culture [[id number]] Collected:  03/18/18 1533    Order Status:  Sent Specimen:  Tissue from Back Updated:  03/18/18 1617    Fungus culture [[id number]] Collected:  03/18/18 1533    Order Status:  Sent Specimen:  Tissue from Back Updated:  03/18/18 1615    AFB Culture & Smear  [[id number]]     Order Status:  No result Specimen:  Respiratory from Sputum, Expectorated     AFB Culture & Smear [[id number]]     Order Status:  No result Specimen:  Respiratory from Sputum, Expectorated     AFB Culture & Smear [[id number]]     Order Status:  No result Specimen:  Respiratory from Sputum, Expectorated           Significant Imaging: I have reviewed all pertinent imaging results/findings within the past 24 hours.

## 2018-03-20 NOTE — PROGRESS NOTES
[facility]  Neurosurgery  Progress Note    Subjective:     History of Present Illness: No notes on file    Post-Op Info:  Procedure(s) (LRB):  CORPECTOMY THORACIC T12-L1 corpectomy with T10-L3 fusion, neuromonitoring, globus (Left)   2 Days Post-Op     Interval History: NAEON    Medications:  Continuous Infusions:   sodium chloride 0.9% 75 mL/hr at 03/20/18 1000     Scheduled Meds:   amLODIPine  10 mg Oral Daily    carvedilol  12.5 mg Oral BID    isoniazid  900 mg Oral Twice Weekly    pantoprazole  40 mg Per NG tube Daily    polyethylene glycol  17 g Oral Daily    pyridoxine (vitamin B6)  50 mg Oral Daily    rifAMpin  600 mg Oral Twice Weekly    sodium chloride 0.9%  1,000 mL Intravenous Once     PRN Meds:acetaminophen, dextrose 50%, dextrose 50%, glucagon (human recombinant), glucose, glucose, insulin aspart U-100, microfibrillar collagen, oxyCODONE-acetaminophen, senna, senna-docusate 8.6-50 mg, sodium chloride 0.9%, sodium chloride 0.9%     Review of Systems    Objective:     Weight: 84.8 kg (186 lb 15.2 oz)  Body mass index is 31.11 kg/m².  Vital Signs (Most Recent):  Temp: 98.9 °F (37.2 °C) (03/20/18 0705)  Pulse: 80 (03/20/18 1000)  Resp: (!) 24 (03/20/18 1000)  BP: (!) 186/84 (03/20/18 1000)  SpO2: 100 % (03/20/18 1000) Vital Signs (24h Range):  Temp:  [98.4 °F (36.9 °C)-99.1 °F (37.3 °C)] 98.9 °F (37.2 °C)  Pulse:  [69-98] 80  Resp:  [0-29] 24  SpO2:  [93 %-100 %] 100 %  BP: (110-186)/() 186/84  Arterial Line BP: (107-207)/() 207/82       Date 03/20/18 0700 - 03/21/18 0659   Shift 2977-3070 3056-1096 0992-7795 24 Hour Total   I  N  T  A  K  E   I.V.  (mL/kg) 300  (3.5)   300  (3.5)    Shift Total  (mL/kg) 300  (3.5)   300  (3.5)   O  U  T  P  U  T   Urine  (mL/kg/hr) 260   260    Shift Total  (mL/kg) 260  (3.1)   260  (3.1)   Weight (kg) 84.8 84.8 84.8 84.8              Vent Mode: Spont  Oxygen Concentration (%):  [40] 40  Resp Rate Total:  [19 br/min-29 br/min] 29  br/min  Vt Set:  [420 mL] 420 mL  PEEP/CPAP:  [5 cmH20] 5 cmH20  Pressure Support:  [7 cmH20] 7 cmH20  Mean Airway Pressure:  [7.3 cmH20-9.5 cmH20] 7.3 cmH20         Neurosurgery Physical Exam     AOX3, speech fluent  PERRL  5/5 in BUE  3/5 in bilateral HF,KF,KE. 4/5 in PF,DF, EHL  Diminished sensation below ankles bilaterally  No clonus, no babinski, no ricardo's       Significant Labs:    Recent Labs  Lab 03/18/18 1825 03/19/18  0203 03/20/18  0111   * 114* 91    140 138   K 3.4* 3.4* 3.2*    108 110   CO2 20* 20* 19*   BUN 27* 27* 22   CREATININE 1.0 1.0 0.8   CALCIUM 8.4* 8.2* 8.0*   MG  --  1.7 1.6       Recent Labs  Lab 03/18/18 1825 03/19/18  0203 03/20/18  0111   WBC 11.96 9.75 11.00   HGB 8.7* 8.6* 7.3*   HCT 25.9* 25.0* 21.5*   * 127* 97*       Recent Labs  Lab 03/18/18 1825   INR 1.1   APTT 23.5     Microbiology Results (last 7 days)     Procedure Component Value Units Date/Time    AFB Culture & Smear [[id number]] Collected:  03/18/18 1533    Order Status:  Completed Specimen:  Tissue from Back Updated:  03/19/18 2127     AFB Culture & Smear Culture in progress     AFB CULTURE STAIN No acid fast bacilli seen.    Narrative:       1. T12-L1 bones and tissues Tuberculosis infection    AFB Culture & Smear [[id number]] Collected:  03/18/18 1533    Order Status:  Completed Specimen:  Tissue from Back Updated:  03/19/18 2127     AFB Culture & Smear Culture in progress     AFB CULTURE STAIN No acid fast bacilli seen.    Narrative:       2. T12-L1 swab#1 Tuberculosis infection    AFB Culture & Smear [[id number]] Collected:  03/18/18 1533    Order Status:  Completed Specimen:  Tissue from Back Updated:  03/19/18 2127     AFB Culture & Smear Culture in progress     AFB CULTURE STAIN No acid fast bacilli seen.    Narrative:       3. T12-L1 swab#2 Tuberculosis infection    Culture, Anaerobe [[id number]] Collected:  03/18/18 8843    Order Status:  Completed Specimen:  Tissue from Back Updated:   03/19/18 0732     Anaerobic Culture Culture in progress     Anaerobic Culture Culture in progress    Narrative:       3. T12-L1 swab#2 Tuberculosis infection    Culture, Anaerobe [[id number]] Collected:  03/18/18 1533    Order Status:  Completed Specimen:  Tissue from Back Updated:  03/19/18 0732     Anaerobic Culture Culture in progress     Anaerobic Culture Culture in progress    Narrative:       1. T12-L1 bones and tissues Tuberculosis infection    Culture, Anaerobe [[id number]] Collected:  03/18/18 1533    Order Status:  Completed Specimen:  Tissue from Back Updated:  03/19/18 0732     Anaerobic Culture Culture in progress     Anaerobic Culture Culture in progress    Narrative:       2. T12-L1 swab#1 Tuberculosis infection    Aerobic culture [[id number]] Collected:  03/18/18 1533    Order Status:  Completed Specimen:  Tissue from Back Updated:  03/19/18 0719     Aerobic Bacterial Culture No growth    Narrative:       3. T12-L1 swab#2 Tuberculosis infection    Aerobic culture [[id number]] Collected:  03/18/18 1533    Order Status:  Completed Specimen:  Tissue from Back Updated:  03/19/18 0719     Aerobic Bacterial Culture No growth    Narrative:       2. T12-L1 swab#1 Tuberculosis infection    Aerobic culture [[id number]] Collected:  03/18/18 1533    Order Status:  Completed Specimen:  Tissue from Back Updated:  03/19/18 0717     Aerobic Bacterial Culture No growth    Narrative:       1. T12-L1 bones and tissues Tuberculosis infection    Gram stain [[id number]] Collected:  03/18/18 1533    Order Status:  Completed Specimen:  Tissue from Back Updated:  03/18/18 1724     Gram Stain Result No WBC's      No organisms seen    Narrative:       3. T12-L1 swab#2 Tuberculosis infection    Gram stain [[id number]] Collected:  03/18/18 1533    Order Status:  Completed Specimen:  Tissue from Back Updated:  03/18/18 1720     Gram Stain Result Rare WBC's      No organisms seen    Narrative:       1. T12-L1 bones and tissues  Tuberculosis infection    Gram stain [[id number]] Collected:  03/18/18 1533    Order Status:  Completed Specimen:  Tissue from Back Updated:  03/18/18 1705     Gram Stain Result Rare WBC's      No organisms seen    Narrative:       2. T12-L1 swab#1 Tuberculosis infection    Fungus culture [[id number]] Collected:  03/18/18 1533    Order Status:  Sent Specimen:  Tissue from Back Updated:  03/18/18 1618    Fungus culture [[id number]] Collected:  03/18/18 1533    Order Status:  Sent Specimen:  Tissue from Back Updated:  03/18/18 1617    Fungus culture [[id number]] Collected:  03/18/18 1533    Order Status:  Sent Specimen:  Tissue from Back Updated:  03/18/18 1615    AFB Culture & Smear [[id number]]     Order Status:  No result Specimen:  Respiratory from Sputum, Expectorated     AFB Culture & Smear [[id number]]     Order Status:  No result Specimen:  Respiratory from Sputum, Expectorated     AFB Culture & Smear [[id number]]     Order Status:  No result Specimen:  Respiratory from Sputum, Expectorated             Significant Diagnostics:      Assessment/Plan:     Discitis of thoracolumbar region    62 F with hx of TB and now T12/L1 osteodiscitis 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

## 2018-03-20 NOTE — ASSESSMENT & PLAN NOTE
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

## 2018-03-20 NOTE — ASSESSMENT & PLAN NOTE
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 Dr. Vásquez's note suggests that the patient completed 2 months of initial RIPE therapy.We recommend if possible to obtain sample during procedure for new culture and sensitives of Tb. Will recommend to evaluate drug levels of rifampin and isoniazid to determine if therapeutic levels in blood are achieved. Will call health department on Monday to confirm doses and current regimen of therapy. Last dose was on Friday next to be due on Tuesday.     Recommendation   - POD#2 s/p T12-L1 corepctomy and T10/L3 fusion  - AFB smear and culture from discitis region pending/in process; Gram stains w/ rare WBCs and no AFB seen   - Scheduled DOT RI due on Friday  900mg Isoniazid, 600 mg rifampin with B6   - Regimen confirmed w/ Department of health; Scheduled Isoniazid 900 and rifampin 600 w/ B6 scheduled for Tue/Fri; started on March 5th   - Continue the above therapy

## 2018-03-20 NOTE — PROGRESS NOTES
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  She was found to have MTB in her spine and lungs and was treated with RIPE for 2 weeks inpatient then discharged home for completion of treatment despite efforts of NH/SNF placement    Post-Op Info:  Procedure(s) (LRB):  CORPECTOMY THORACIC T12-L1 corpectomy with T10-L3 fusion, neuromonitoring, globus (Left)   2 Days Post-Op         Review of Systems    Objective:     Vitals:  Temp: 98.4 °F (36.9 °C)  Pulse: 74  Rhythm: normal sinus rhythm  BP: (!) 177/73  MAP (mmHg): 105  Resp: (!) 25  SpO2: 100 %  O2 Device (Oxygen Therapy): nasal cannula    Temp  Min: 98.4 °F (36.9 °C)  Max: 98.9 °F (37.2 °C)  Pulse  Min: 69  Max: 98  BP  Min: 115/59  Max: 186/78  MAP (mmHg)  Min: 80  Max: 128  Resp  Min: 0  Max: 29  SpO2  Min: 93 %  Max: 100 %  Oxygen Concentration (%)  Min: 40  Max: 40    03/19 0701 - 03/20 0700  In: 2125 [I.V.:2125]  Out: 1250 [Urine:835; Drains:415]   Unmeasured Output  Urine Occurrence: 0  Stool Occurrence: 1       Physical Exam  Physical Exam:  GA: Awake, Alert, Extubated, Oriented, Follows commands comfortable, no acute distress.   HEENT: No scleral icterus or JVD.   Pulmonary: Clear to auscultation Anterior. No wheezing, crackles, or rhonchi.  Cardiac: RRR S1 & S2 w/o rubs/murmurs/gallops.   Abdominal: Bowel sounds present x 4. No appreciable hepatosplenomegaly.  Skin: No jaundice, rashes, or visible lesions.  Neuro:  --GCS: E4 V5 M6  --Mental Status: Awake, Alert, Extubated,  Oriented, Follows Commands  --CN II-XII grossly intact.   --Pupils 4mm, PERRL.   --Corneal reflex, gag, cough intact.  --LUE strength: 5/5  --RUE strength: 5/5  --LLE strength: 4/5  --RLE strength: 4/5  Incision bandaged, c/d/i, 2 HV drains in place.    Medications:  Continuous    sodium chloride 0.9% Last Rate: 75 mL/hr at 03/20/18 1200   Scheduled    amLODIPine 10 mg Daily   carvedilol 12.5 mg BID   heparin (porcine) 5,000 Units Q8H   isoniazid 900 mg Twice Weekly   pantoprazole 40 mg Daily   polyethylene glycol 17 g Daily   pyridoxine (vitamin B6) 50 mg Daily   rifAMpin 600 mg Twice Weekly   sodium chloride 0.9% 1,000 mL Once   PRN    acetaminophen 650 mg Q4H PRN   dextrose 50% 12.5 g PRN   dextrose 50% 25 g PRN   glucagon (human recombinant) 1 mg PRN   glucose 16 g PRN   glucose 24 g PRN   insulin aspart U-100 0-5 Units QID (AC + HS) PRN   microfibrillar collagen 1 g PRN   oxyCODONE-acetaminophen 2 tablet Q4H PRN   senna 8.6 mg Daily PRN   senna-docusate 8.6-50 mg 1 tablet Daily PRN   sodium chloride 0.9% 3 mL PRN   sodium chloride 0.9% 5 mL PRN     Today I personally reviewed pertinent medications, lines/drains/airways, imaging, cardiology, lab results, microbiology results, notably:    Diet  Diet clear liquid  Diet clear liquid     CXR 3/20:  There is postoperative change, cardiomegaly, mild edema, aortic plaque, and slight improvement.    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    --Continue care per primary team.  --Continue subfascial hemovac drain x 2.  --Continue prophylactic antiobiotics while drains in place.  --Please obtain and place TLSO brace.  --Will obtain post-operative plain films once pt able to stand.  --Infectious disease consulted for management of osteomyelitis.  --We will continue to monitor closely, please contact us  with any questions or concerns.              Boston Malone MD  Neurosurgery  [facility]

## 2018-03-20 NOTE — PROGRESS NOTES
[facility]  Neurocritical Care  Progress Note    Admit Date: 3/17/2018  Service Date: 03/20/2018  Length of Stay: 3    Subjective:     Chief Complaint: Spinal cord compression    History of Present Illness:  61 yo F with history of Sarcoidosis, HTN, HLD, arthritis, gout, asthma Pott's disease who is a transfer from [facility]  to [facility] ED with worsening pain and weakness of her lower extremities. 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  Since being discharged pt reports being unable to walk, mostly being bed bound or on the sofa due to severe back pain.  She is currently getting antibiotics via the health unit for her TB.  She endorses back pain radiating down both legs to toes.  She reports numbness in her feet as well as tingling which has been ongoing for about 1 month.  She denies overt bowel/bladder incontinence or saddle anesthesia.   She presents to NICU S/P T12-L1 corpectomy and T10-L3 fusion.    Hospital Course: 3/18 admitted to NICU s/p T12-L1 corpectomy and T10-L3 fusion.  Arrived on phenylephrine gtt @ .4mkm; propofol 75mkm and intubated, on AC 14/500/40%/P5.  3/19 Extubated; Pending 2D Echo, ID recommendations to discontinue Airborne Precautions due to pt being completely treated for TB.   3/20: Patient doing well today, no acute events. De-escalating ICU needs.    Interval History:  Patient doing well today, no acute events. De-escalating ICU needs (discontinuing a-line and ge). No longer requiring pressors. Advancing diet. Pain controlled on PO medication. Continuing to trend H/H    Review of  Systems    Objective:     Vitals:  Temp: 98.9 °F (37.2 °C)  Pulse: 88  Rhythm: normal sinus rhythm  BP: (!) 157/100  MAP (mmHg): 117  Resp: (!) 29  SpO2: 96 %  O2 Device (Oxygen Therapy): nasal cannula    Temp  Min: 98.4 °F (36.9 °C)  Max: 99.1 °F (37.3 °C)  Pulse  Min: 69  Max: 98  BP  Min: 110/58  Max: 182/92  MAP (mmHg)  Min: 80  Max: 117  Resp  Min: 0  Max: 29  SpO2  Min: 93 %  Max: 100 %  Oxygen Concentration (%)  Min: 40  Max: 40    03/19 0701 - 03/20 0700  In: 2125 [I.V.:2125]  Out: 1250 [Urine:835; Drains:415]   Unmeasured Output  Urine Occurrence: 0  Stool Occurrence: 1       Physical Exam  Physical Exam:  GA: Awake, Alert, Extubated, Oriented, Follows commands comfortable, no acute distress.   HEENT: No scleral icterus or JVD.   Pulmonary: Clear to auscultation Anterior. No wheezing, crackles, or rhonchi.  Cardiac: RRR S1 & S2 w/o rubs/murmurs/gallops.   Abdominal: Bowel sounds present x 4. No appreciable hepatosplenomegaly.  Skin: No jaundice, rashes, or visible lesions.  Neuro:  --GCS: E4 V5 M6  --Mental Status: Awake, Alert, Extubated, Oriented, Follows Commands  --CN II-XII grossly intact.   --Pupils 4mm, PERRL.   --Corneal reflex, gag, cough intact.  --LUE strength: 5/5  --RUE strength: 5/5  --LLE strength: 4/5  --RLE strength: 4/5  Incision bandaged, c/d/i, 2 HV drains in place.    Medications:  Continuous  sodium chloride 0.9% Last Rate: 75 mL/hr at 03/20/18 0900   Scheduled  amLODIPine 10 mg Daily   carvedilol 6.25 mg BID   chlorhexidine 15 mL BID   isoniazid 900 mg Twice Weekly   pantoprazole 40 mg Daily   polyethylene glycol 17 g Daily   potassium, sodium phosphates 1 packet Once   pyridoxine (vitamin B6) 50 mg Daily   rifAMpin 600 mg Twice Weekly   sodium chloride 0.9% 1,000 mL Once   PRN  acetaminophen 650 mg Q4H PRN   dextrose 50% 12.5 g PRN   dextrose 50% 25 g PRN   glucagon (human recombinant) 1 mg PRN   glucose 16 g PRN   glucose 24 g PRN   insulin aspart U-100 0-5 Units QID (AC + HS) PRN    microfibrillar collagen 1 g PRN   oxyCODONE-acetaminophen 2 tablet Q4H PRN   senna 8.6 mg Daily PRN   senna-docusate 8.6-50 mg 1 tablet Daily PRN   sodium chloride 0.9% 3 mL PRN   sodium chloride 0.9% 5 mL PRN     Today I personally reviewed pertinent medications, lines/drains/airways, imaging, cardiology, lab results, microbiology results, notably:    Diet  Diet clear liquid  Diet clear liquid     CXR 3/20:  There is postoperative change, cardiomegaly, mild edema, aortic plaque, and slight improvement.    Assessment/Plan:     Neuro   * Spinal cord compression    S/P Corpectomy T12-L1 and T10-L3 fusion POD#0  With 2 drains  NSGY following  Neuro checks Q1h  Keep SBP < 180;  MAPs > 65  Pain management:  Percocet  q4h prn pain 4-6/10  Tylenol 650 q6h prn mild pain and temp  Will need TLSO brace when able to be up out of bed  PT/OT/SLP when pt able to be mobile and extubated  Pending further recommendations per NGSY   Diet ordered today  On bowel regimen  -discontinuing ge and a-line today          Cardiac/Vascular   HTN (hypertension), benign    Goal SBP < 180 MAP> 65  Post-op arrived on phenylephrine gtt, able to wean to off MAPs >65  SBP <180  Gradually resume antihypertensives, as long as MAPs >65  -restarted amolodipine 10mg daily  -restarted co-reg 6.25mg daily  Continuing to monitor drain output and trend H/H       12/11/17 BRUCE 1.  No evidence of endocarditis.  2.  Bubble study positive for grade I Patent Foramen Ovale.  3.  Preserved left ventricular function, ejection fraction 55% to 60%.  4.  No significant valvular heart disease.  There is trivial mitral and  tricuspid regurgitation.  5.  Some sclerosis of the aortic valve and ascending aorta, but no stenosis  and no dilatation of the aorta.  6.  Tiny pericardial effusion of no hemodynamic significance.  7.  Bubble study positive for grade I Patent Foramen Ovale.  8.  Grade II atherosclerosis of descending aorta and arch.           ID    Tuberculosis    ID following: Recommendations:   - Obtain if possible AFB smear and culture from discitis region  - CDC notified  to confirm therapy   - Next dose of DOT due today   900mg Isoniazid, 600 mg rifampin with B6   - Does not requre airborne isolation patient completed 2 weeks on inpatient RIPE          Discitis of thoracolumbar region    S/p corpectomy T12-L1 and T10-L3 fusion  Back tissue sent  From surgeryfor AFB smear and culture;aerobic/anerobic and fungal cultures  ID following  Discontinued TB precautions as pt fully tx        Immunology/Multi System   Sarcoidosis    See spinal cord compression            Prophylaxis:  Venous Thromboembolism: mechanical  Stress Ulcer: PPI  Ventilator Pneumonia: not applicable     Activity Orders          Discontinue arterial line starting at 03/20 1005    Ambulate with assistance starting at 03/17 2013        Full Code    Catracho Carolina MD  Neurocritical Care  [facility]

## 2018-03-20 NOTE — ASSESSMENT & PLAN NOTE
ID following: Recommendations:   - Obtain if possible AFB smear and culture from discitis region  - CDC notified  to confirm therapy   - Next dose of DOT due today   900mg Isoniazid, 600 mg rifampin with B6   - Does not requre airborne isolation patient completed 2 weeks on inpatient RIPE

## 2018-03-20 NOTE — PROGRESS NOTES
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 Dr. Vásquez's note suggests that the patient completed 2 months of initial RIPE therapy.We recommend if possible to obtain sample during procedure for new culture and sensitives of Tb. Will recommend to evaluate drug levels of rifampin and isoniazid to determine if therapeutic levels in blood are achieved. Will call health department on Monday to confirm doses and current regimen of therapy. Last dose was on Friday next to be due on Tuesday.     Recommendation   - POD#2 s/p T12-L1 corepctomy and T10/L3 fusion  - AFB smear and culture from discitis region pending/in process; Gram stains w/ rare WBCs and no AFB seen   - Scheduled DOT RI due on Friday  900mg Isoniazid, 600 mg rifampin with B6   - Regimen confirmed w/ Department of health; Scheduled Isoniazid 900 and rifampin 600 w/ B6 scheduled for Tue/Fri; started on March 5th   - Continue the above therapy               Thank you for your consult. I will sign off. Please contact us if you have any additional questions.    Alivia Luu MD  Infectious Disease  [facility]    Subjective:     Principal Problem:Spinal cord compression    HPI: Case of a 61 yo F with a medical history significant for HTN, Sarcoidosis, Pott's disease (compliant w/ RIPE / B6) DOT who is transferred from [facility] where she presented with w/ advancing neurological sx, incontinence and LE worsening pain. MRI notable for severe compression of the adjacent cord at the T12-L1  level. Transferred to [facility] for Neurosurgery evaluation. Patient history dates back to December when she was admitted to [facility] from 12/17-01/18 for fevers, night sweats, back pain and weakness.  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  Since being discharged pt reports being unable to walk, mostly being bed bound or on the sofa due to severe back pain.  She is currently getting antibiotics via the health unit for her TB.  A home health nurse comes to her house to give her TB meds every Tues and Fri.  She reports numbness in her feet as well as tingling which has been ongoing for about 1 month.  She wears a diaper because she cannot get out of bed to use the bathroom.  She states that recently there have been times she doesn't realize that she urinated.  She denies any burning on urination, fevers, chills, night sweats at this time.  AFB cultures (12/27) > M Tb complex sensitive to RIF/ Strep/ INH/ EMB/ Pyrazinamide per quest. ID consulted for additional recommendations   Interval History:  Patient POD#2 s/pT12-T1 corpectomy and T10-L3 fusion. Extubated and doing well. No other complaints at this time aside from surgical site pain  Afebrile w/o leukocytosis.     Review of Systems   Constitutional: Negative for chills, fatigue and fever.   Genitourinary: Positive for enuresis and flank pain.   Musculoskeletal: Positive for back pain and myalgias.     Objective:     Vital Signs (Most Recent):  Temp: 98.4 °F (36.9 °C) (03/20/18 1105)  Pulse: 93 (03/20/18 1705)  Resp: (!) 22 (03/20/18 1705)  BP: (!) 181/76 (03/20/18 1705)  SpO2: 100 % (03/20/18 1705) Vital Signs (24h Range):  Temp:  [98.4 °F (36.9 °C)-98.9 °F (37.2 °C)] 98.4 °F (36.9 °C)  Pulse:  [69-98] 93  Resp:  [0-91] 22  SpO2:  [93 %-100 %] 100 %  BP: (124-186)/() 181/76  Arterial Line BP:  (107-207)/() 198/73     Weight: 84.8 kg (186 lb 15.2 oz)  Body mass index is 31.11 kg/m².    Estimated Creatinine Clearance: 76.4 mL/min (based on SCr of 0.8 mg/dL).    Physical Exam   Constitutional: She is oriented to person, place, and time. She appears well-developed and well-nourished.   HENT:   Head: Normocephalic and atraumatic.   Eyes: EOM are normal. Pupils are equal, round, and reactive to light.   Neck: Normal range of motion.   Cardiovascular: Normal rate, regular rhythm and normal heart sounds.    Pulmonary/Chest: Effort normal and breath sounds normal.   Abdominal: Soft. Bowel sounds are normal.   Musculoskeletal: Normal range of motion. She exhibits no edema.   2 post surgical drains present, w/ serosanguinous drainage noted    Neurological: She is alert and oriented to person, place, and time.   Skin: No rash noted.       Significant Labs:   Blood Culture:     Recent Labs  Lab 12/08/17  1810 12/08/17  1825   LABBLOO No growth after 5 days. No growth after 5 days.     BMP:     Recent Labs  Lab 03/20/18  0111   GLU 91      K 3.2*      CO2 19*   BUN 22   CREATININE 0.8   CALCIUM 8.0*   MG 1.6     CBC:     Recent Labs  Lab 03/18/18  1825 03/19/18  0203 03/20/18  0111   WBC 11.96 9.75 11.00   HGB 8.7* 8.6* 7.3*   HCT 25.9* 25.0* 21.5*   * 127* 97*     Microbiology Results (last 7 days)     Procedure Component Value Units Date/Time    Aerobic culture [[id number]] Collected:  03/18/18 1533    Order Status:  Completed Specimen:  Tissue from Back Updated:  03/20/18 1253     Aerobic Bacterial Culture No growth    Narrative:       1. T12-L1 bones and tissues Tuberculosis infection    Aerobic culture [[id number]] Collected:  03/18/18 1533    Order Status:  Completed Specimen:  Tissue from Back Updated:  03/20/18 1100     Aerobic Bacterial Culture No growth    Narrative:       3. T12-L1 swab#2 Tuberculosis infection    Aerobic culture [[id number]] Collected:  03/18/18 1533    Order Status:   Completed Specimen:  Tissue from Back Updated:  03/20/18 1100     Aerobic Bacterial Culture No growth    Narrative:       2. T12-L1 swab#1 Tuberculosis infection    AFB Culture & Smear [[id number]] Collected:  03/18/18 1533    Order Status:  Completed Specimen:  Tissue from Back Updated:  03/19/18 2127     AFB Culture & Smear Culture in progress     AFB CULTURE STAIN No acid fast bacilli seen.    Narrative:       1. T12-L1 bones and tissues Tuberculosis infection    AFB Culture & Smear [[id number]] Collected:  03/18/18 1533    Order Status:  Completed Specimen:  Tissue from Back Updated:  03/19/18 2127     AFB Culture & Smear Culture in progress     AFB CULTURE STAIN No acid fast bacilli seen.    Narrative:       2. T12-L1 swab#1 Tuberculosis infection    AFB Culture & Smear [[id number]] Collected:  03/18/18 1533    Order Status:  Completed Specimen:  Tissue from Back Updated:  03/19/18 2127     AFB Culture & Smear Culture in progress     AFB CULTURE STAIN No acid fast bacilli seen.    Narrative:       3. T12-L1 swab#2 Tuberculosis infection    Culture, Anaerobe [[id number]] Collected:  03/18/18 1533    Order Status:  Completed Specimen:  Tissue from Back Updated:  03/19/18 0732     Anaerobic Culture Culture in progress     Anaerobic Culture Culture in progress    Narrative:       3. T12-L1 swab#2 Tuberculosis infection    Culture, Anaerobe [[id number]] Collected:  03/18/18 1533    Order Status:  Completed Specimen:  Tissue from Back Updated:  03/19/18 0732     Anaerobic Culture Culture in progress     Anaerobic Culture Culture in progress    Narrative:       1. T12-L1 bones and tissues Tuberculosis infection    Culture, Anaerobe [[id number]] Collected:  03/18/18 1533    Order Status:  Completed Specimen:  Tissue from Back Updated:  03/19/18 0732     Anaerobic Culture Culture in progress     Anaerobic Culture Culture in progress    Narrative:       2. T12-L1 swab#1 Tuberculosis infection    Gram stain [[id number]]  Collected:  03/18/18 1533    Order Status:  Completed Specimen:  Tissue from Back Updated:  03/18/18 1724     Gram Stain Result No WBC's      No organisms seen    Narrative:       3. T12-L1 swab#2 Tuberculosis infection    Gram stain [[id number]] Collected:  03/18/18 1533    Order Status:  Completed Specimen:  Tissue from Back Updated:  03/18/18 1720     Gram Stain Result Rare WBC's      No organisms seen    Narrative:       1. T12-L1 bones and tissues Tuberculosis infection    Gram stain [[id number]] Collected:  03/18/18 1533    Order Status:  Completed Specimen:  Tissue from Back Updated:  03/18/18 1705     Gram Stain Result Rare WBC's      No organisms seen    Narrative:       2. T12-L1 swab#1 Tuberculosis infection    Fungus culture [[id number]] Collected:  03/18/18 1533    Order Status:  Sent Specimen:  Tissue from Back Updated:  03/18/18 1618    Fungus culture [[id number]] Collected:  03/18/18 1533    Order Status:  Sent Specimen:  Tissue from Back Updated:  03/18/18 1617    Fungus culture [[id number]] Collected:  03/18/18 1533    Order Status:  Sent Specimen:  Tissue from Back Updated:  03/18/18 1615    AFB Culture & Smear [[id number]]     Order Status:  No result Specimen:  Respiratory from Sputum, Expectorated     AFB Culture & Smear [[id number]]     Order Status:  No result Specimen:  Respiratory from Sputum, Expectorated     AFB Culture & Smear [[id number]]     Order Status:  No result Specimen:  Respiratory from Sputum, Expectorated           Significant Imaging: I have reviewed all pertinent imaging results/findings within the past 24 hours.

## 2018-03-20 NOTE — ASSESSMENT & PLAN NOTE
62 F with hx of TB and now T12/L1 osteodiscitis concerning for pott's disease s/p T10-L3 fusion and T12,L1 corpectomy (3/18).    --Continue care per primary team.  --Continue subfascial hemovac drain x 2.  --Continue prophylactic antiobiotics while drains in place.  --Please obtain and place TLSO brace.  --Will obtain post-operative plain films once pt able to stand.  --Infectious disease consulted for management of osteomyelitis.  --We will continue to monitor closely, please contact us with any questions or concerns.

## 2018-03-20 NOTE — PROGRESS NOTES
Pt's SBP on A-line sustaining 200's. SBP on cuff reading 160's. MAPs correlating on both. A-line wave form appropriate. NCC team notified. Orders placed for 6.25 Coreg BID starting now. Will continue to monitor.

## 2018-03-20 NOTE — ASSESSMENT & PLAN NOTE
Goal SBP < 180 MAP> 65  Post-op arrived on phenylephrine gtt, able to wean to off MAPs >65  SBP <180  Gradually resume antihypertensives, as long as MAPs >65  -restarted amolodipine 10mg daily  -restarted co-reg 6.25mg daily  Continuing to monitor drain output and trend H/H       12/11/17 BRUCE 1.  No evidence of endocarditis.  2.  Bubble study positive for grade I Patent Foramen Ovale.  3.  Preserved left ventricular function, ejection fraction 55% to 60%.  4.  No significant valvular heart disease.  There is trivial mitral and  tricuspid regurgitation.  5.  Some sclerosis of the aortic valve and ascending aorta, but no stenosis  and no dilatation of the aorta.  6.  Tiny pericardial effusion of no hemodynamic significance.  7.  Bubble study positive for grade I Patent Foramen Ovale.  8.  Grade II atherosclerosis of descending aorta and arch.

## 2018-03-20 NOTE — ASSESSMENT & PLAN NOTE
S/p corpectomy T12-L1 and T10-L3 fusion  Back tissue sent  From surgeryfor AFB smear and culture;aerobic/anerobic and fungal cultures  ID following  Discontinued TB precautions as pt fully tx

## 2018-03-20 NOTE — PLAN OF CARE
Problem: Patient Care Overview  Goal: Plan of Care Review  Outcome: Ongoing (interventions implemented as appropriate)  POC reviewed with pt and pts spouse at 0500. Pt verbalized understanding. Questions and concerns addressed. No acute events overnight. Pt progressing toward goals. Will continue to monitor. See flowsheets for full assessment and VS info

## 2018-03-20 NOTE — HPI
61 yo F with history of Sarcoidosis, HTN, HLD, arthritis, gout, asthma Pott's disease who is a transfer from [facility]  to [facility] ED with worsening pain and weakness of her lower extremities. 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  She was found to have MTB in her spine and lungs and was treated with RIPE for 2 weeks inpatient then discharged home for completion of treatment despite efforts of NH/SNF placement

## 2018-03-20 NOTE — PROGRESS NOTES
Spoke w/ X-ray department regarding thoracolumbar x-rays. States pt can come down at 1300. Will continue to monitor.

## 2018-03-20 NOTE — SUBJECTIVE & OBJECTIVE
Interval History: NAEON    Medications:  Continuous Infusions:   sodium chloride 0.9% 75 mL/hr at 03/20/18 1000     Scheduled Meds:   amLODIPine  10 mg Oral Daily    carvedilol  12.5 mg Oral BID    isoniazid  900 mg Oral Twice Weekly    pantoprazole  40 mg Per NG tube Daily    polyethylene glycol  17 g Oral Daily    pyridoxine (vitamin B6)  50 mg Oral Daily    rifAMpin  600 mg Oral Twice Weekly    sodium chloride 0.9%  1,000 mL Intravenous Once     PRN Meds:acetaminophen, dextrose 50%, dextrose 50%, glucagon (human recombinant), glucose, glucose, insulin aspart U-100, microfibrillar collagen, oxyCODONE-acetaminophen, senna, senna-docusate 8.6-50 mg, sodium chloride 0.9%, sodium chloride 0.9%     Review of Systems    Objective:     Weight: 84.8 kg (186 lb 15.2 oz)  Body mass index is 31.11 kg/m².  Vital Signs (Most Recent):  Temp: 98.9 °F (37.2 °C) (03/20/18 0705)  Pulse: 80 (03/20/18 1000)  Resp: (!) 24 (03/20/18 1000)  BP: (!) 186/84 (03/20/18 1000)  SpO2: 100 % (03/20/18 1000) Vital Signs (24h Range):  Temp:  [98.4 °F (36.9 °C)-99.1 °F (37.3 °C)] 98.9 °F (37.2 °C)  Pulse:  [69-98] 80  Resp:  [0-29] 24  SpO2:  [93 %-100 %] 100 %  BP: (110-186)/() 186/84  Arterial Line BP: (107-207)/() 207/82       Date 03/20/18 0700 - 03/21/18 0659   Shift 8952-8203 8974-3582 3357-8482 24 Hour Total   I  N  T  A  K  E   I.V.  (mL/kg) 300  (3.5)   300  (3.5)    Shift Total  (mL/kg) 300  (3.5)   300  (3.5)   O  U  T  P  U  T   Urine  (mL/kg/hr) 260   260    Shift Total  (mL/kg) 260  (3.1)   260  (3.1)   Weight (kg) 84.8 84.8 84.8 84.8              Vent Mode: Spont  Oxygen Concentration (%):  [40] 40  Resp Rate Total:  [19 br/min-29 br/min] 29 br/min  Vt Set:  [420 mL] 420 mL  PEEP/CPAP:  [5 cmH20] 5 cmH20  Pressure Support:  [7 cmH20] 7 cmH20  Mean Airway Pressure:  [7.3 cmH20-9.5 cmH20] 7.3 cmH20         Neurosurgery Physical Exam     AOX3, speech fluent  PERRL  5/5 in BUE  3/5 in bilateral HF,KF,KE. 4/5 in  PF,DF, EHL  Diminished sensation below ankles bilaterally  No clonus, no babinski, no ricardo's       Significant Labs:    Recent Labs  Lab 03/18/18  1825 03/19/18  0203 03/20/18  0111   * 114* 91    140 138   K 3.4* 3.4* 3.2*    108 110   CO2 20* 20* 19*   BUN 27* 27* 22   CREATININE 1.0 1.0 0.8   CALCIUM 8.4* 8.2* 8.0*   MG  --  1.7 1.6       Recent Labs  Lab 03/18/18  1825 03/19/18  0203 03/20/18  0111   WBC 11.96 9.75 11.00   HGB 8.7* 8.6* 7.3*   HCT 25.9* 25.0* 21.5*   * 127* 97*       Recent Labs  Lab 03/18/18 1825   INR 1.1   APTT 23.5     Microbiology Results (last 7 days)     Procedure Component Value Units Date/Time    AFB Culture & Smear [[id number]] Collected:  03/18/18 1533    Order Status:  Completed Specimen:  Tissue from Back Updated:  03/19/18 2127     AFB Culture & Smear Culture in progress     AFB CULTURE STAIN No acid fast bacilli seen.    Narrative:       1. T12-L1 bones and tissues Tuberculosis infection    AFB Culture & Smear [[id number]] Collected:  03/18/18 1533    Order Status:  Completed Specimen:  Tissue from Back Updated:  03/19/18 2127     AFB Culture & Smear Culture in progress     AFB CULTURE STAIN No acid fast bacilli seen.    Narrative:       2. T12-L1 swab#1 Tuberculosis infection    AFB Culture & Smear [[id number]] Collected:  03/18/18 1533    Order Status:  Completed Specimen:  Tissue from Back Updated:  03/19/18 2127     AFB Culture & Smear Culture in progress     AFB CULTURE STAIN No acid fast bacilli seen.    Narrative:       3. T12-L1 swab#2 Tuberculosis infection    Culture, Anaerobe [[id number]] Collected:  03/18/18 1533    Order Status:  Completed Specimen:  Tissue from Back Updated:  03/19/18 0732     Anaerobic Culture Culture in progress     Anaerobic Culture Culture in progress    Narrative:       3. T12-L1 swab#2 Tuberculosis infection    Culture, Anaerobe [[id number]] Collected:  03/18/18 1533    Order Status:  Completed Specimen:  Tissue from  Back Updated:  03/19/18 0732     Anaerobic Culture Culture in progress     Anaerobic Culture Culture in progress    Narrative:       1. T12-L1 bones and tissues Tuberculosis infection    Culture, Anaerobe [[id number]] Collected:  03/18/18 1533    Order Status:  Completed Specimen:  Tissue from Back Updated:  03/19/18 0732     Anaerobic Culture Culture in progress     Anaerobic Culture Culture in progress    Narrative:       2. T12-L1 swab#1 Tuberculosis infection    Aerobic culture [[id number]] Collected:  03/18/18 1533    Order Status:  Completed Specimen:  Tissue from Back Updated:  03/19/18 0719     Aerobic Bacterial Culture No growth    Narrative:       3. T12-L1 swab#2 Tuberculosis infection    Aerobic culture [[id number]] Collected:  03/18/18 1533    Order Status:  Completed Specimen:  Tissue from Back Updated:  03/19/18 0719     Aerobic Bacterial Culture No growth    Narrative:       2. T12-L1 swab#1 Tuberculosis infection    Aerobic culture [[id number]] Collected:  03/18/18 1533    Order Status:  Completed Specimen:  Tissue from Back Updated:  03/19/18 0717     Aerobic Bacterial Culture No growth    Narrative:       1. T12-L1 bones and tissues Tuberculosis infection    Gram stain [[id number]] Collected:  03/18/18 1533    Order Status:  Completed Specimen:  Tissue from Back Updated:  03/18/18 1724     Gram Stain Result No WBC's      No organisms seen    Narrative:       3. T12-L1 swab#2 Tuberculosis infection    Gram stain [[id number]] Collected:  03/18/18 1533    Order Status:  Completed Specimen:  Tissue from Back Updated:  03/18/18 1720     Gram Stain Result Rare WBC's      No organisms seen    Narrative:       1. T12-L1 bones and tissues Tuberculosis infection    Gram stain [[id number]] Collected:  03/18/18 1533    Order Status:  Completed Specimen:  Tissue from Back Updated:  03/18/18 1705     Gram Stain Result Rare WBC's      No organisms seen    Narrative:       2. T12-L1 swab#1 Tuberculosis infection     Fungus culture [[id number]] Collected:  03/18/18 1533    Order Status:  Sent Specimen:  Tissue from Back Updated:  03/18/18 1618    Fungus culture [[id number]] Collected:  03/18/18 1533    Order Status:  Sent Specimen:  Tissue from Back Updated:  03/18/18 1617    Fungus culture [[id number]] Collected:  03/18/18 1533    Order Status:  Sent Specimen:  Tissue from Back Updated:  03/18/18 1615    AFB Culture & Smear [[id number]]     Order Status:  No result Specimen:  Respiratory from Sputum, Expectorated     AFB Culture & Smear [[id number]]     Order Status:  No result Specimen:  Respiratory from Sputum, Expectorated     AFB Culture & Smear [[id number]]     Order Status:  No result Specimen:  Respiratory from Sputum, Expectorated             Significant Diagnostics:

## 2018-03-20 NOTE — PROGRESS NOTES
1310- Pt transported to X-ray w/ RN and PCT while on portable ventilator and monitor. Pt stable during transport. Will continue to monitor in X-ray.

## 2018-03-20 NOTE — ASSESSMENT & PLAN NOTE
S/P Corpectomy T12-L1 and T10-L3 fusion POD#0  With 2 drains  NSGY following  Neuro checks Q1h  Keep SBP < 180;  MAPs > 65  Pain management:  Percocet  q4h prn pain 4-6/10  Tylenol 650 q6h prn mild pain and temp  Will need TLSO brace when able to be up out of bed  PT/OT/SLP when pt able to be mobile and extubated  Pending further recommendations per NGSY   Diet ordered today  On bowel regimen  -discontinuing ge and a-line today

## 2018-03-20 NOTE — SUBJECTIVE & OBJECTIVE
Interval History:  Patient doing well today, no acute events. De-escalating ICU needs (discontinuing a-line and ge). No longer requiring pressors. Advancing diet. Pain controlled on PO medication. Continuing to trend H/H    Review of Systems    Objective:     Vitals:  Temp: 98.9 °F (37.2 °C)  Pulse: 88  Rhythm: normal sinus rhythm  BP: (!) 157/100  MAP (mmHg): 117  Resp: (!) 29  SpO2: 96 %  O2 Device (Oxygen Therapy): nasal cannula    Temp  Min: 98.4 °F (36.9 °C)  Max: 99.1 °F (37.3 °C)  Pulse  Min: 69  Max: 98  BP  Min: 110/58  Max: 182/92  MAP (mmHg)  Min: 80  Max: 117  Resp  Min: 0  Max: 29  SpO2  Min: 93 %  Max: 100 %  Oxygen Concentration (%)  Min: 40  Max: 40    03/19 0701 - 03/20 0700  In: 2125 [I.V.:2125]  Out: 1250 [Urine:835; Drains:415]   Unmeasured Output  Urine Occurrence: 0  Stool Occurrence: 1       Physical Exam  Physical Exam:  GA: Awake, Alert, Extubated, Oriented, Follows commands comfortable, no acute distress.   HEENT: No scleral icterus or JVD.   Pulmonary: Clear to auscultation Anterior. No wheezing, crackles, or rhonchi.  Cardiac: RRR S1 & S2 w/o rubs/murmurs/gallops.   Abdominal: Bowel sounds present x 4. No appreciable hepatosplenomegaly.  Skin: No jaundice, rashes, or visible lesions.  Neuro:  --GCS: E4 V5 M6  --Mental Status: Awake, Alert, Extubated, Oriented, Follows Commands  --CN II-XII grossly intact.   --Pupils 4mm, PERRL.   --Corneal reflex, gag, cough intact.  --LUE strength: 5/5  --RUE strength: 5/5  --LLE strength: 4/5  --RLE strength: 4/5  Incision bandaged, c/d/i, 2 HV drains in place.    Medications:  Continuous  sodium chloride 0.9% Last Rate: 75 mL/hr at 03/20/18 0900   Scheduled  amLODIPine 10 mg Daily   carvedilol 6.25 mg BID   chlorhexidine 15 mL BID   isoniazid 900 mg Twice Weekly   pantoprazole 40 mg Daily   polyethylene glycol 17 g Daily   potassium, sodium phosphates 1 packet Once   pyridoxine (vitamin B6) 50 mg Daily   rifAMpin 600 mg Twice Weekly   sodium  chloride 0.9% 1,000 mL Once   PRN  acetaminophen 650 mg Q4H PRN   dextrose 50% 12.5 g PRN   dextrose 50% 25 g PRN   glucagon (human recombinant) 1 mg PRN   glucose 16 g PRN   glucose 24 g PRN   insulin aspart U-100 0-5 Units QID (AC + HS) PRN   microfibrillar collagen 1 g PRN   oxyCODONE-acetaminophen 2 tablet Q4H PRN   senna 8.6 mg Daily PRN   senna-docusate 8.6-50 mg 1 tablet Daily PRN   sodium chloride 0.9% 3 mL PRN   sodium chloride 0.9% 5 mL PRN     Today I personally reviewed pertinent medications, lines/drains/airways, imaging, cardiology, lab results, microbiology results, notably:    Diet  Diet clear liquid  Diet clear liquid     CXR 3/20:  There is postoperative change, cardiomegaly, mild edema, aortic plaque, and slight improvement.

## 2018-03-20 NOTE — PROGRESS NOTES
Pt's /77. [facility] team notified. Pt receiving 12.5 mg Coreg at 2100. Instructed to continue to monitor and administer Coreg at scheduled time. Will continue to monitor.

## 2018-03-20 NOTE — PLAN OF CARE
Problem: Patient Care Overview  Goal: Plan of Care Review  Outcome: Ongoing (interventions implemented as appropriate)  POC reviewed with pt and family at 1400. Pt and family verbalized understanding. Questions and concerns addressed. Diet started. Pain controlled w/ PRN medications. Spinal x-rays obtained. No acute events today. Pt progressing toward goals. Will continue to monitor. See flowsheets for full assessment and VS info.

## 2018-03-20 NOTE — SUBJECTIVE & OBJECTIVE
Review of Systems    Objective:     Vitals:  Temp: 98.4 °F (36.9 °C)  Pulse: 74  Rhythm: normal sinus rhythm  BP: (!) 177/73  MAP (mmHg): 105  Resp: (!) 25  SpO2: 100 %  O2 Device (Oxygen Therapy): nasal cannula    Temp  Min: 98.4 °F (36.9 °C)  Max: 98.9 °F (37.2 °C)  Pulse  Min: 69  Max: 98  BP  Min: 115/59  Max: 186/78  MAP (mmHg)  Min: 80  Max: 128  Resp  Min: 0  Max: 29  SpO2  Min: 93 %  Max: 100 %  Oxygen Concentration (%)  Min: 40  Max: 40    03/19 0701 - 03/20 0700  In: 2125 [I.V.:2125]  Out: 1250 [Urine:835; Drains:415]   Unmeasured Output  Urine Occurrence: 0  Stool Occurrence: 1       Physical Exam  Physical Exam:  GA: Awake, Alert, Extubated, Oriented, Follows commands comfortable, no acute distress.   HEENT: No scleral icterus or JVD.   Pulmonary: Clear to auscultation Anterior. No wheezing, crackles, or rhonchi.  Cardiac: RRR S1 & S2 w/o rubs/murmurs/gallops.   Abdominal: Bowel sounds present x 4. No appreciable hepatosplenomegaly.  Skin: No jaundice, rashes, or visible lesions.  Neuro:  --GCS: E4 V5 M6  --Mental Status: Awake, Alert, Extubated, Oriented, Follows Commands  --CN II-XII grossly intact.   --Pupils 4mm, PERRL.   --Corneal reflex, gag, cough intact.  --LUE strength: 5/5  --RUE strength: 5/5  --LLE strength: 4/5  --RLE strength: 4/5  Incision bandaged, c/d/i, 2 HV drains in place.    Medications:  Continuous    sodium chloride 0.9% Last Rate: 75 mL/hr at 03/20/18 1200   Scheduled    amLODIPine 10 mg Daily   carvedilol 12.5 mg BID   heparin (porcine) 5,000 Units Q8H   isoniazid 900 mg Twice Weekly   pantoprazole 40 mg Daily   polyethylene glycol 17 g Daily   pyridoxine (vitamin B6) 50 mg Daily   rifAMpin 600 mg Twice Weekly   sodium chloride 0.9% 1,000 mL Once   PRN    acetaminophen 650 mg Q4H PRN   dextrose 50% 12.5 g PRN   dextrose 50% 25 g PRN   glucagon (human recombinant) 1 mg PRN   glucose 16 g PRN   glucose 24 g PRN   insulin aspart U-100 0-5 Units QID (AC + HS) PRN   microfibrillar  collagen 1 g PRN   oxyCODONE-acetaminophen 2 tablet Q4H PRN   senna 8.6 mg Daily PRN   senna-docusate 8.6-50 mg 1 tablet Daily PRN   sodium chloride 0.9% 3 mL PRN   sodium chloride 0.9% 5 mL PRN     Today I personally reviewed pertinent medications, lines/drains/airways, imaging, cardiology, lab results, microbiology results, notably:    Diet  Diet clear liquid  Diet clear liquid     CXR 3/20:  There is postoperative change, cardiomegaly, mild edema, aortic plaque, and slight improvement.    Neurosurgery Physical Exam

## 2018-03-20 NOTE — PROGRESS NOTES
[facility]  Neurocritical Care  Progress Note    Admit Date: 3/17/2018  Service Date: 03/19/2018  Length of Stay: 2    Subjective:     Chief Complaint: Spinal cord compression    History of Present Illness:  63 yo F with history of Sarcoidosis, HTN, HLD, arthritis, gout, asthma Pott's disease who is a transfer from [facility]  to [facility] ED with worsening pain and weakness of her lower extremities. 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  Since being discharged pt reports being unable to walk, mostly being bed bound or on the sofa due to severe back pain.  She is currently getting antibiotics via the health unit for her TB.  She endorses back pain radiating down both legs to toes.  She reports numbness in her feet as well as tingling which has been ongoing for about 1 month.  She denies overt bowel/bladder incontinence or saddle anesthesia.   She presents to NICU S/P T12-L1 corpectomy and T10-L3 fusion.    Hospital Course: 3/18 admitted to NICU s/p T12-L1 corpectomy and T10-L3 fusion.  Arrived on phenylephrine gtt @ .4mkm; propofol 75mkm and intubated, on AC 14/500/40%/P5.  3/19 Extubated; Pending 2D Echo, ID recommendations to discontinue Airborne Precautions due to pt being completely treated for TB.       Interval History:   3/19 Extubated; Pending 2D Echo, ID recommendations to discontinue Airborne Precautions due to pt being completely treated for TB.   Review of Systems  Review of symptoms: Denies all   Constitutional: Denies fevers or chills.  Pulmonary: Denies shortness of breath or cough.  Cardiology: Denies chest pain or  palpitations.  GI: Denies abdominal pain or constipation.  Neurologic: Denies new weakness,  headache, or paresthesias.    Objective:     Vitals:  Temp: 99.1 °F (37.3 °C)  Pulse: 98  Rhythm: normal sinus rhythm  BP: (!) 182/92  MAP (mmHg): 89  Resp: (!) 21  SpO2: 99 %  Oxygen Concentration (%): 40  O2 Device (Oxygen Therapy): nasal cannula  Vent Mode: Spont  Set Rate: 0 bmp  Vt Set: 420 mL  Pressure Support: 7 cmH20  PEEP/CPAP: 5 cmH20  Peak Airway Pressure: 13 cmH2O  Mean Airway Pressure: 7.3 cmH20  Plateau Pressure: 0 cmH20    Temp  Min: 97.5 °F (36.4 °C)  Max: 99.1 °F (37.3 °C)  Pulse  Min: 67  Max: 101  BP  Min: 105/55  Max: 182/92  MAP (mmHg)  Min: 75  Max: 114  Resp  Min: 2  Max: 29  SpO2  Min: 97 %  Max: 100 %  Oxygen Concentration (%)  Min: 40  Max: 40    03/18 0701 - 03/19 0700  In: 90231.8 [I.V.:8426.8]  Out: 4915 [Urine:1530; Drains:385]   Unmeasured Output  Urine Occurrence: 0  Stool Occurrence: 1       Physical Exam   Physical Exam:  GA: Awake, Alert, Extubated, Oriented, Follows commands comfortable, no acute distress.   HEENT: No scleral icterus or JVD.   Pulmonary: Clear to auscultation Anterior. No wheezing, crackles, or rhonchi.  Cardiac: RRR S1 & S2 w/o rubs/murmurs/gallops.   Abdominal: Bowel sounds present x 4. No appreciable hepatosplenomegaly.  Skin: No jaundice, rashes, or visible lesions.  Neuro:  --GCS: E4 V5 M6  --Mental Status: Awake, Alert, Extubated, Oriented, Follows Commands  --CN II-XII grossly intact.   --Pupils 4mm, PERRL.   --Corneal reflex, gag, cough intact.  --LUE strength: 5/5  --RUE strength: 5/5  --LLE strength: 4/5  --RLE strength: 4/5  Unable to test gait due to level of consciousness.    Medications:  Continuous  sodium chloride 0.9% Last Rate: 75 mL/hr at 03/19/18 1800   Scheduled  chlorhexidine 15 mL BID   [START ON 3/20/2018] isoniazid 900 mg Twice Weekly   pantoprazole 40 mg Daily   polyethylene glycol 17 g Daily   [START ON 3/20/2018] pyridoxine (vitamin B6) 50 mg  Daily   [START ON 3/20/2018] rifAMpin 600 mg Twice Weekly   sodium chloride 0.9% 1,000 mL Once   PRN  acetaminophen 650 mg Q4H PRN   dextrose 50% 12.5 g PRN   dextrose 50% 25 g PRN   glucagon (human recombinant) 1 mg PRN   glucose 16 g PRN   glucose 24 g PRN   insulin aspart U-100 0-5 Units QID (AC + HS) PRN   microfibrillar collagen 1 g PRN   oxyCODONE-acetaminophen 2 tablet Q4H PRN   senna-docusate 8.6-50 mg 1 tablet Daily PRN   sodium chloride 0.9% 3 mL PRN   sodium chloride 0.9% 5 mL PRN     Today I personally reviewed pertinent medications, lines/drains/airways, lab results, notably:    Diet  Diet NPO Except for: Sips with Medication  Diet NPO Except for: Sips with Medication        Assessment/Plan:     Neuro   * Spinal cord compression    S/P Corpectomy T12-L1 and T10-L3 fusion POD#0  With 2 drains  NSGY following  Neuro checks Q1h   Keep SBP < 180;  MAPs > 65  Pain management:   fentanyl 50mcg q2h prn severe pain 7-10/10  Dilaudid 4mg q4h prn 7-10/10 pain  Percocet  q4h prn pain 4-6/10  Tylenol 650 q6h prn mild pain and temp  Will need TLSO brace when able to be up out of bed  PT/OT/SLP when pt able to be mobile and extubated  Pending further recommendations per NGSY           Pulmonary    Extubated today 3/19 today  Monitor ABG/CXR          Cardiac/Vascular   HTN (hypertension), benign    Goal SBP < 180 MAP> 65  Post-op arrived on phenylephrine gtt, able to wean to off MAPs >65  cardene gtt to keep SBP <180  Gradually resume antihypertensives, as long as MAPs >65      12/11/17 BRUCE 1.  No evidence of endocarditis.  2.  Bubble study positive for grade I Patent Foramen Ovale.  3.  Preserved left ventricular function, ejection fraction 55% to 60%.  4.  No significant valvular heart disease.  There is trivial mitral and  tricuspid regurgitation.  5.  Some sclerosis of the aortic valve and ascending aorta, but no stenosis  and no dilatation of the aorta.  6.  Tiny pericardial effusion of no hemodynamic  significance.  7.  Bubble study positive for grade I Patent Foramen Ovale.  8.  Grade II atherosclerosis of descending aorta and arch.           Renal/    Monitor Strict I/O  Daily CMP          ID   Discitis of thoracolumbar region    S/p corpectomy T12-L1 and T10-L3 fusion  Back tissue sent  From surgeryfor AFB smear and culture;aerobic/anerobic and fungal cultures  ID following  Discontinued TB precautions as pt fully tx        Immunology/Multi System   Sarcoidosis    See spinal cord compression        Endocrine    accuchecks Q6h with SSI  A1C 5.6            Prophylaxis:  Venous Thromboembolism: none  Stress Ulcer: None  Ventilator Pneumonia: not applicable     Activity Orders          Ambulate with assistance starting at 03/17 5183        Full Code    Adriane Garrett PA-C  Neurocritical Care  [facility]

## 2018-03-20 NOTE — ASSESSMENT & PLAN NOTE
S/P Corpectomy T12-L1 and T10-L3 fusion POD#0  With 2 drains  NSGY following  Neuro checks Q1h   Keep SBP < 180;  MAPs > 65  Pain management:   fentanyl 50mcg q2h prn severe pain 7-10/10  Dilaudid 4mg q4h prn 7-10/10 pain  Percocet  q4h prn pain 4-6/10  Tylenol 650 q6h prn mild pain and temp  Will need TLSO brace when able to be up out of bed  PT/OT/SLP when pt able to be mobile and extubated  Pending further recommendations per NGSY

## 2018-03-21 LAB
BACTERIA SPEC AEROBE CULT: NO GROWTH
BACTERIA SPEC AEROBE CULT: NO GROWTH
BASOPHILS # BLD AUTO: 0.03 K/UL
BASOPHILS NFR BLD: 0.2 %
DIFFERENTIAL METHOD: ABNORMAL
EOSINOPHIL # BLD AUTO: 0.4 K/UL
EOSINOPHIL NFR BLD: 2.3 %
ERYTHROCYTE [DISTWIDTH] IN BLOOD BY AUTOMATED COUNT: 16.3 %
HCT VFR BLD AUTO: 24.8 %
HGB BLD-MCNC: 8.1 G/DL
IMM GRANULOCYTES # BLD AUTO: 0.16 K/UL
IMM GRANULOCYTES NFR BLD AUTO: 1 %
LYMPHOCYTES # BLD AUTO: 2 K/UL
LYMPHOCYTES NFR BLD: 12.9 %
MCH RBC QN AUTO: 29.5 PG
MCHC RBC AUTO-ENTMCNC: 32.7 G/DL
MCV RBC AUTO: 90 FL
MONOCYTES # BLD AUTO: 1.7 K/UL
MONOCYTES NFR BLD: 11 %
NEUTROPHILS # BLD AUTO: 11.3 K/UL
NEUTROPHILS NFR BLD: 72.6 %
NRBC BLD-RTO: 0 /100 WBC
PLATELET # BLD AUTO: 137 K/UL
PMV BLD AUTO: 10.6 FL
POCT GLUCOSE: 156 MG/DL (ref 70–110)
POCT GLUCOSE: 181 MG/DL (ref 70–110)
POCT GLUCOSE: 184 MG/DL (ref 70–110)
RBC # BLD AUTO: 2.75 M/UL
WBC # BLD AUTO: 15.61 K/UL

## 2018-03-21 PROCEDURE — 25000003 PHARM REV CODE 250: Performed by: NURSE PRACTITIONER

## 2018-03-21 PROCEDURE — 63600175 PHARM REV CODE 636 W HCPCS: Performed by: STUDENT IN AN ORGANIZED HEALTH CARE EDUCATION/TRAINING PROGRAM

## 2018-03-21 PROCEDURE — 20000000 HC ICU ROOM

## 2018-03-21 PROCEDURE — 99024 POSTOP FOLLOW-UP VISIT: CPT | Mod: ,,, | Performed by: NEUROLOGICAL SURGERY

## 2018-03-21 PROCEDURE — 25000003 PHARM REV CODE 250: Performed by: INTERNAL MEDICINE

## 2018-03-21 PROCEDURE — 97166 OT EVAL MOD COMPLEX 45 MIN: CPT

## 2018-03-21 PROCEDURE — 97162 PT EVAL MOD COMPLEX 30 MIN: CPT

## 2018-03-21 PROCEDURE — 99233 SBSQ HOSP IP/OBS HIGH 50: CPT | Mod: ,,, | Performed by: PSYCHIATRY & NEUROLOGY

## 2018-03-21 PROCEDURE — 25000003 PHARM REV CODE 250: Performed by: STUDENT IN AN ORGANIZED HEALTH CARE EDUCATION/TRAINING PROGRAM

## 2018-03-21 PROCEDURE — 25000003 PHARM REV CODE 250: Performed by: PHYSICIAN ASSISTANT

## 2018-03-21 PROCEDURE — 85025 COMPLETE CBC W/AUTO DIFF WBC: CPT

## 2018-03-21 RX ORDER — OXYCODONE AND ACETAMINOPHEN 10; 325 MG/1; MG/1
1 TABLET ORAL EVERY 4 HOURS PRN
Status: DISCONTINUED | OUTPATIENT
Start: 2018-03-21 | End: 2018-03-27 | Stop reason: HOSPADM

## 2018-03-21 RX ORDER — OXYCODONE HCL 10 MG/1
10 TABLET, FILM COATED, EXTENDED RELEASE ORAL EVERY 12 HOURS
Status: DISCONTINUED | OUTPATIENT
Start: 2018-03-21 | End: 2018-03-27

## 2018-03-21 RX ADMIN — OXYCODONE HYDROCHLORIDE 10 MG: 10 TABLET, FILM COATED, EXTENDED RELEASE ORAL at 04:03

## 2018-03-21 RX ADMIN — AMLODIPINE BESYLATE 10 MG: 10 TABLET ORAL at 09:03

## 2018-03-21 RX ADMIN — CARVEDILOL 12.5 MG: 6.25 TABLET, FILM COATED ORAL at 08:03

## 2018-03-21 RX ADMIN — CARVEDILOL 12.5 MG: 6.25 TABLET, FILM COATED ORAL at 09:03

## 2018-03-21 RX ADMIN — OXYCODONE AND ACETAMINOPHEN 1 TABLET: 10; 325 TABLET ORAL at 11:03

## 2018-03-21 RX ADMIN — HEPARIN SODIUM 5000 UNITS: 5000 INJECTION, SOLUTION INTRAVENOUS; SUBCUTANEOUS at 09:03

## 2018-03-21 RX ADMIN — STANDARDIZED SENNA CONCENTRATE AND DOCUSATE SODIUM 1 TABLET: 8.6; 5 TABLET, FILM COATED ORAL at 09:03

## 2018-03-21 RX ADMIN — STANDARDIZED SENNA CONCENTRATE AND DOCUSATE SODIUM 1 TABLET: 8.6; 5 TABLET, FILM COATED ORAL at 11:03

## 2018-03-21 RX ADMIN — POLYETHYLENE GLYCOL 3350 17 G: 17 POWDER, FOR SOLUTION ORAL at 09:03

## 2018-03-21 RX ADMIN — GABAPENTIN 300 MG: 300 CAPSULE ORAL at 05:03

## 2018-03-21 RX ADMIN — HEPARIN SODIUM 5000 UNITS: 5000 INJECTION, SOLUTION INTRAVENOUS; SUBCUTANEOUS at 05:03

## 2018-03-21 RX ADMIN — Medication 50 MG: at 09:03

## 2018-03-21 RX ADMIN — HEPARIN SODIUM 5000 UNITS: 5000 INJECTION, SOLUTION INTRAVENOUS; SUBCUTANEOUS at 03:03

## 2018-03-21 NOTE — PROGRESS NOTES
Home Oxygen Evaluation    Patient seen and evaluated for home oxygen needs per physician order.  A summary of the evaluation is listed below.       03/01/18 1100   Exercise (Home) O2 Eval   Liter Flow 0   Heart rate at rest 86 beats/min   SpO2 at rest 94 %   Heart rate during activity 96 beats/min   SpO2 during activity 91 %   Heart rate after activity 97 beats/min   SpO2 after activity 92 %   Distance (feet) 25 ft   Tolerance fine   *ear probe used*        Recommendations are as follows: Patient does not require home oxygen at this time   [facility]  Neurocritical Care  Progress Note    Admit Date: 3/17/2018  Service Date: 03/21/2018  Length of Stay: 4    Subjective:     Chief Complaint: Spinal cord compression    History of Present Illness:  63 yo F with history of Sarcoidosis, HTN, HLD, arthritis, gout, asthma Pott's disease who is a transfer from [facility]  to [facility] with worsening pain and weakness of her lower extremities. 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  Since being discharged pt reports being unable to walk, mostly being bed bound or on the sofa due to severe back pain.  She is currently getting antibiotics via the health unit for her TB.  She endorses back pain radiating down both legs to toes.  She reports numbness in her feet as well as tingling which has been ongoing for about 1 month.  She denies overt bowel/bladder incontinence or saddle anesthesia.   She presents to NICU S/P T12-L1 corpectomy and T10-L3 fusion.    Hospital Course: 3/18 admitted to NICU s/p T12-L1 corpectomy and T10-L3 fusion.  Arrived on phenylephrine gtt @ .4mkm; propofol 75mkm and intubated, on AC 14/500/40%/P5.  3/19 Extubated; Pending 2D Echo, ID recommendations to discontinue Airborne Precautions due to pt being completely treated for TB.   3/20: Patient doing well today, no acute events. De-escalating ICU needs.  3/21: Patient with no acute events overnight, exam stable and patient is stable for transfer to floor    Interval History:  Patient doing well today, no acute events. Patient no longer with ICU needs and transfer to floor orders are in. No longer requiring  pressors. Advanced diet. PO pain control reconciled. Continuing to trend H/H    Review of Systems      Objective:     Vitals:  Temp: 98.6 °F (37 °C)  Pulse: 102  Rhythm: normal sinus rhythm  BP: 136/62  MAP (mmHg): 89  Resp: (!) 39  SpO2: 97 %  O2 Device (Oxygen Therapy): nasal cannula    Temp  Min: 98.5 °F (36.9 °C)  Max: 99.1 °F (37.3 °C)  Pulse  Min: 69  Max: 102  BP  Min: 110/54  Max: 186/77  MAP (mmHg)  Min: 78  Max: 124  Resp  Min: 15  Max: 39  SpO2  Min: 94 %  Max: 100 %    03/20 0701 - 03/21 0700  In: 825 [I.V.:825]  Out: 1905 [Urine:1685; Drains:220]   Unmeasured Output  Urine Occurrence: 0  Stool Occurrence: 0       Physical Exam    Physical Exam:  GA: Awake, Alert, Extubated, Oriented, Follows commands comfortable, no acute distress.   HEENT: No scleral icterus or JVD.   Pulmonary: Clear to auscultation Anterior. No wheezing, crackles, or rhonchi.  Cardiac: RRR S1 & S2 w/o rubs/murmurs/gallops.   Abdominal: Bowel sounds present x 4. No appreciable hepatosplenomegaly.  Skin: No jaundice, rashes, or visible lesions.  Neuro:  --GCS: E4 V5 M6  --Mental Status: Awake, Alert, Extubated, Oriented, Follows Commands  --CN II-XII grossly intact.   --Pupils 4mm, PERRL.   --Corneal reflex, gag, cough intact.  --LUE strength: 5/5  --RUE strength: 5/5  --LLE strength: 4/5  --RLE strength: 4/5  Incision bandaged, c/d/i, 2 HV drains in place.    Medications:  Continuous Scheduled    amLODIPine 10 mg Daily   carvedilol 12.5 mg BID   gabapentin 300 mg Q8H   heparin (porcine) 5,000 Units Q8H   isoniazid 900 mg Twice Weekly   polyethylene glycol 17 g Daily   pyridoxine (vitamin B6) 50 mg Daily   rifAMpin 600 mg Twice Weekly   sodium chloride 0.9% 1,000 mL Once   PRN    acetaminophen 650 mg Q4H PRN   dextrose 50% 12.5 g PRN   dextrose 50% 25 g PRN   glucagon (human recombinant) 1 mg PRN   glucose 16 g PRN   glucose 24 g PRN   insulin aspart U-100 0-5 Units QID (AC + HS) PRN   microfibrillar collagen 1 g PRN    oxyCODONE-acetaminophen 2 tablet Q4H PRN   senna 8.6 mg Daily PRN   senna-docusate 8.6-50 mg 1 tablet Daily PRN   sodium chloride 0.9% 3 mL PRN   sodium chloride 0.9% 5 mL PRN     Today I personally reviewed pertinent medications, lines/drains/airways, imaging, cardiology, lab results, microbiology results, notably:    Diet  Diet Adult Regular (IDDSI Level 7)  Diet Adult Regular (IDDSI Level 7)     Thoracolumbar spine AP/Lateral 3/20:  There is corpectomy fixation rods and pedicle screws from lower thoracic levels to the level of L3.  There is severe compression of L1.  Corpectomy spans from bottom of T11 to upper L2.  Bones show good alignment and no complication.  There is DJD.    Assessment/Plan:     Neuro   * Spinal cord compression    S/P Corpectomy T12-L1 and T10-L3 fusion POD#3  With 2 drains  NSGY following  Neuro checks Q1h  Keep SBP < 180  Pain management:  Oxycodone LR 10q12h scheduled  Percocet  q4h prn pain 4-6/10 for breakthrough  Tylenol 650 q6h prn mild pain and temp  Will need TLSO brace when able to be up out of bed  PT/OT/SLP when pt able to be mobile and extubated  Pending further recommendations per NGSY   Diet ordered today  On bowel regimen  -discontinuing ge and a-line today          Cardiac/Vascular   HTN (hypertension), benign    SBP <180, successfully controlled on PO medication  -Continue amolodipine 10mg daily  -Continue co-reg 12.5mg daily  Continuing to monitor drain output and trend H/H       12/11/17 BRUCE 1.  No evidence of endocarditis.  2.  Bubble study positive for grade I Patent Foramen Ovale.  3.  Preserved left ventricular function, ejection fraction 55% to 60%.  4.  No significant valvular heart disease.  There is trivial mitral and  tricuspid regurgitation.  5.  Some sclerosis of the aortic valve and ascending aorta, but no stenosis  and no dilatation of the aorta.  6.  Tiny pericardial effusion of no hemodynamic significance.  7.  Bubble study positive for grade I  Patent Foramen Ovale.  8.  Grade II atherosclerosis of descending aorta and arch.           ID   Tuberculosis    ID following: Recommendations:   - Obtain if possible AFB smear and culture from discitis region  - CDC notified  to confirm therapy   - DOT dosing per ID  900mg Isoniazid, 600 mg rifampin with B6   - Does not requre airborne isolation patient completed 2 weeks on inpatient RIPE          Discitis of thoracolumbar region    S/p corpectomy T12-L1 and T10-L3 fusion  Back tissue sent  From surgeryfor AFB smear and culture;aerobic/anerobic and fungal cultures  ID following  Discontinued TB precautions as pt fully tx        Immunology/Multi System   Sarcoidosis    See spinal cord compression             Prophylaxis:  Venous Thromboembolism: chemical  Stress Ulcer: None  Ventilator Pneumonia: not applicable     Activity Orders          Up with assistance starting at 03/21 0300    Ambulate with assistance starting at 03/17 0423        Full Code    Catracho Carloina MD  Neurocritical Care  [facility]

## 2018-03-21 NOTE — ASSESSMENT & PLAN NOTE
62 F with hx of TB and now T12/L1 osteodiscitis concerning for pott's disease s/p T10-L3 fusion and T12,L1 corpectomy (3/18).    --Continue care per primary team.  --Continue subfascial hemovac drain x 2, will discontinue one drain today.  --Continue prophylactic antiobiotics while drains in place.  --Continue TLSO brace.  --Will obtain post-operative plain films once pt able to stand.  --Pt cleared from neurosurgical perspective for stepdown to floor under medicine service.   --We will continue to monitor closely, please contact us with any questions or concerns.

## 2018-03-21 NOTE — PT/OT/SLP EVAL
Physical Therapy Evaluation    Patient Name:  Mary Carrillo   MRN:  [id number]    Recommendations:     Discharge Recommendations:  rehabilitation facility   Discharge Equipment Recommendations: other (see comments) (TBD)   Barriers to discharge: Decreased caregiver support    Assessment:     Mary Carrillo is a 62 y.o. female admitted with a medical diagnosis of Spinal cord compression.  She presents with the following impairments/functional limitations:  impaired endurance, weakness, impaired balance, decreased coordination, decreased safety awareness, pain, decreased lower extremity function, gait instability, impaired sensation, impaired functional mobilty, impaired coordination. Pt performed bed mobility total A and sat EOB for ~10min with max-CGA. Pt performed sit to stand transfers total A (assist x2), unable to fully ext hips. Pt will benefit from skilled PT to improve deficits and increase overall functional mobility.     Rehab Prognosis:  Good; patient would benefit from acute skilled PT services to address these deficits and reach maximum level of function.      Recent Surgery: Procedure(s) (LRB):  CORPECTOMY THORACIC T12-L1 corpectomy with T10-L3 fusion, neuromonitoring, globus (Left) 3 Days Post-Op    Plan:     During this hospitalization, patient to be seen 4 x/week to address the above listed problems via gait training, therapeutic activities, therapeutic exercises, neuromuscular re-education, wheelchair management/training  · Plan of Care Expires:  04/21/18   Plan of Care Reviewed with: patient    Subjective     Communicated with RN prior to session.  Patient found supine in bed upon PT entry to room, agreeable to evaluation.      Chief Complaint: back pain  Patient comments/goals: return home PLOF  Pain/Comfort:  Pain Rating 1: 7/10  Location - Side 1: Bilateral  Location - Orientation 1: generalized  Location 1: back  Pain Addressed 1: Reposition, Distraction  Pain Rating Post-Intervention 1:  7/10    Living Environment:  Pt lives with  in 1-story house without GASTON. Pt reports (I) with ADLs and amb. 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   Prior to admission, patients level of function was (I).  Patient has the following equipment: walker, rolling, wheelchair.  DME owned (not currently used): rolling walker and wheelchair.  Upon discharge, patient will have assistance from family.    Objective:     Patient found with: blood pressure cuff, pulse ox (continuous), telemetry, hemovac     General Precautions: Standard, fall   Orthopedic Precautions:spinal precautions   Braces: TLSO     Exams:  · Cognitive Exam:  Patient is oriented to Person, Place, Time and Situation and follows 100% of multi-step commands   · Gross Motor Coordination:  Impaired B LE; unable to perfrom heel-to-shin, R ankle clonus  · Postural Exam:  Patient presented with the following abnormalities:    · -       Rounded shoulders  · -       Forward head  · Sensation:    · -       Impaired  light/touch B LE  · Skin Integrity/Edema:      · -       Skin integrity: Visible skin intact  · RLE ROM: WFL  · RLE Strength: Deficits: gross 3/5, ankle DF 0/5  · LLE ROM: WFL  · LLE Strength: Deficits: gross 3/5, ankle DF 0/5    Functional Mobility:  Bed Mobility:     · Scooting: total assistance and of 2 persons  · Supine to Sit: total assistance and of 2 persons  · Sit to Supine: total assistance and of 2 persons    Transfers:     · Sit to Stand:  total assistance and of 2 persons with no AD, pt unable to fully ext hips; x2 trials    AM-PAC 6 CLICK MOBILITY  Total Score:7       Therapeutic Activities and Exercises:  Pt sat EOB for ~10min ranging from max to CGA for post and L lat lean.  Pt required assist with donning TLSO  Pt stood EOB with total A (assist x2) with increased flex posture and unable to fully ext hips to a full stand; x2 trials.   Pt educated on:  -role of PT/POC  -log  rolling  -safety with mobility  -bed in chair position until able to progress to chair transfer    Patient left with bed in chair position with all lines intact, call button in reach and RN notified.    GOALS:    Physical Therapy Goals        Problem: Physical Therapy Goal    Goal Priority Disciplines Outcome Goal Variances Interventions   Physical Therapy Goal     PT/OT, PT Ongoing (interventions implemented as appropriate)     Description:  Goals to be met by: 2018     Patient will increase functional independence with mobility by performin. Supine to sit with Moderate Assistance  2. Sit to supine with Moderate Assistance  3. Sit to stand transfer with Maximum Assistance  4. Bed to chair transfer with Maximum Assistance   5. Wheelchair propulsion x150 feet with Stand-by Assistance using bilateral uppper extremities  6. Lower extremity exercise program x15 reps per handout, with assistance as needed                      History:     Past Medical History:   Diagnosis Date    Arthritis     Asthma     Back pain     Encounter for blood transfusion     Glaucoma     Gout     Hyperlipidemia     Hypertension     Sarcoidosis     Vitritis     Vitritis        Past Surgical History:   Procedure Laterality Date    CATARACT EXTRACTION Bilateral      SECTION      HYSTERECTOMY      uterus/cervix d/t uterine fibroids.       Clinical Decision Making:     History  Co-morbidities and personal factors that may impact the plan of care Examination  Body Structures and Functions, activity limitations and participation restrictions that may impact the plan of care Clinical Presentation   Decision Making/ Complexity Score   Co-morbidities:   [x] Time since onset of injury / illness / exacerbation  [x] Status of current condition  []Patient's cognitive status and safety concerns    [x] Multiple Medical Problems (see med hx)  Personal Factors:   [] Patient's age  [] Prior Level of function   [x]  Patient's home situation (environment and family support)  [] Patient's level of motivation  [] Expected progression of patient      HISTORY:(criteria)    [] 06531 - no personal factors/history    [] 54635 - has 1-2 personal factor/comorbidity     [x] 50748 - has >3 personal factor/comorbidity     Body Regions:  [x] Objective examination findings  [] Head     []  Neck  [x] Trunk   [] Upper Extremity  [x] Lower Extremity    Body Systems:  [x] For communication ability, affect, cognition, language, and learning style: the assessment of the ability to make needs known, consciousness, orientation (person, place, and time), expected emotional /behavioral responses, and learning preferences (eg, learning barriers, education  needs)  [x] For the neuromuscular system: a general assessment of gross coordinated movement (eg, balance, gait, locomotion, transfers, and transitions) and motor function  (motor control and motor learning)  [x] For the musculoskeletal system: the assessment of gross symmetry, gross range of motion, gross strength, height, and weight  [] For the integumentary system: the assessment of pliability(texture), presence of scar formation, skin color, and skin integrity  [x] For cardiovascular/pulmonary system: the assessment of heart rate, respiratory rate, blood pressure, and edema     Activity limitations:    [x] Patient's cognitive status and saf ety concerns          [x] Status of current condition      [] Weight bearing restriction  [] Cardiopulmunary Restriction    Participation Restrictions:   [] Goals and goal agreement with the patient     [] Rehab potential (prognosis) and probable outcome      Examination of Body System: (criteria)    [] 22545 - addressing 1-2 elements    [] 43434 - addressing a total of 3 or more elements     [x] 19595 -  Addressing a total of 4 or more elements         Clinical Presentation: (criteria)  Evolving - 63431     On examination of body system using standardized  tests and measures patient presents with 3 or more elements from any of the following: body structures and functions, activity limitations, and/or participation restrictions.  Leading to a clinical presentation that is considered evolving with changing characteristics                              Clinical Decision Making  (Eval Complexity):  Moderate - 97335     Time Tracking:     PT Received On: 03/21/18  PT Start Time: 0907     PT Stop Time: 0937  PT Total Time (min): 30 min     Billable Minutes: Evaluation 30      ALFREDO DELAROSA, PT  03/21/2018

## 2018-03-21 NOTE — PLAN OF CARE
Problem: Physical Therapy Goal  Goal: Physical Therapy Goal  Goals to be met by: 2018     Patient will increase functional independence with mobility by performin. Supine to sit with Moderate Assistance  2. Sit to supine with Moderate Assistance  3. Sit to stand transfer with Maximum Assistance  4. Bed to chair transfer with Maximum Assistance   5. Wheelchair propulsion x150 feet with Stand-by Assistance using bilateral uppper extremities  6. Lower extremity exercise program x15 reps per handout, with assistance as needed    Outcome: Ongoing (interventions implemented as appropriate)  Pt evaluation complete.     ALFREDO DELAROSA, PT  3/21/2018

## 2018-03-21 NOTE — ASSESSMENT & PLAN NOTE
SBP <180, successfully controlled on PO medication  -Continue amolodipine 10mg daily  -Continue co-reg 12.5mg daily  Continuing to monitor drain output and trend H/H       12/11/17 BRUCE 1.  No evidence of endocarditis.  2.  Bubble study positive for grade I Patent Foramen Ovale.  3.  Preserved left ventricular function, ejection fraction 55% to 60%.  4.  No significant valvular heart disease.  There is trivial mitral and  tricuspid regurgitation.  5.  Some sclerosis of the aortic valve and ascending aorta, but no stenosis  and no dilatation of the aorta.  6.  Tiny pericardial effusion of no hemodynamic significance.  7.  Bubble study positive for grade I Patent Foramen Ovale.  8.  Grade II atherosclerosis of descending aorta and arch.

## 2018-03-21 NOTE — PROVIDER TRANSFER
Admit Date: 3/17/2018  Service Date: 03/21/2018  Length of Stay: 4     Subjective:      Chief Complaint: Spinal cord compression     History of Present Illness:  63 yo F with history of Sarcoidosis, HTN, HLD, arthritis, gout, asthma Pott's disease who is a transfer from [facility]  to [facility] ED with worsening pain and weakness of her lower extremities. 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  Since being discharged pt reports being unable to walk, mostly being bed bound or on the sofa due to severe back pain.  She is currently getting antibiotics via the health unit for her TB.  She endorses back pain radiating down both legs to toes.  She reports numbness in her feet as well as tingling which has been ongoing for about 1 month.  She denies overt bowel/bladder incontinence or saddle anesthesia.   She presents to NICU S/P T12-L1 corpectomy and T10-L3 fusion.     Hospital Course: 3/18 admitted to NICU s/p T12-L1 corpectomy and T10-L3 fusion.  Arrived on phenylephrine gtt @ .4mkm; propofol 75mkm and intubated, on AC 14/500/40%/P5.  3/19 Extubated; Pending 2D Echo, ID recommendations to discontinue Airborne Precautions due to pt being completely treated for TB.   3/20: Patient doing well today, no acute events. De-escalating ICU needs.  3/21: Patient with no acute events overnight, exam stable and patient is stable for transfer to floor     Interval History:  Patient doing well today, no acute events. Patient no longer with ICU needs and transfer to floor orders are in. No longer requiring pressors. Advanced diet. PO pain control reconciled. Continuing  to trend H/H     Review of Systems        Objective:      Vitals:  Temp: 98.6 °F (37 °C)  Pulse: 102  Rhythm: normal sinus rhythm  BP: 136/62  MAP (mmHg): 89  Resp: (!) 39  SpO2: 97 %  O2 Device (Oxygen Therapy): nasal cannula     Temp  Min: 98.5 °F (36.9 °C)  Max: 99.1 °F (37.3 °C)  Pulse  Min: 69  Max: 102  BP  Min: 110/54  Max: 186/77  MAP (mmHg)  Min: 78  Max: 124  Resp  Min: 15  Max: 39  SpO2  Min: 94 %  Max: 100 %     03/20 0701 - 03/21 0700  In: 825 [I.V.:825]  Out: 1905 [Urine:1685; Drains:220]   Unmeasured Output  Urine Occurrence: 0  Stool Occurrence: 0         Physical Exam     Physical Exam:  GA: Awake, Alert, Extubated, Oriented, Follows commands comfortable, no acute distress.   HEENT: No scleral icterus or JVD.   Pulmonary: Clear to auscultation Anterior. No wheezing, crackles, or rhonchi.  Cardiac: RRR S1 & S2 w/o rubs/murmurs/gallops.   Abdominal: Bowel sounds present x 4. No appreciable hepatosplenomegaly.  Skin: No jaundice, rashes, or visible lesions.  Neuro:  --GCS: E4 V5 M6  --Mental Status: Awake, Alert, Extubated, Oriented, Follows Commands  --CN II-XII grossly intact.   --Pupils 4mm, PERRL.   --Corneal reflex, gag, cough intact.  --LUE strength: 5/5  --RUE strength: 5/5  --LLE strength: 4/5  --RLE strength: 4/5  Incision bandaged, c/d/i, 2 HV drains in place.     Medications:  Continuous Scheduled     amLODIPine 10 mg Daily   carvedilol 12.5 mg BID   gabapentin 300 mg Q8H   heparin (porcine) 5,000 Units Q8H   isoniazid 900 mg Twice Weekly   polyethylene glycol 17 g Daily   pyridoxine (vitamin B6) 50 mg Daily   rifAMpin 600 mg Twice Weekly   sodium chloride 0.9% 1,000 mL Once   PRN     acetaminophen 650 mg Q4H PRN   dextrose 50% 12.5 g PRN   dextrose 50% 25 g PRN   glucagon (human recombinant) 1 mg PRN   glucose 16 g PRN   glucose 24 g PRN   insulin aspart U-100 0-5 Units QID (AC + HS) PRN   microfibrillar collagen 1 g PRN   oxyCODONE-acetaminophen 2 tablet Q4H PRN   senna 8.6 mg Daily PRN    senna-docusate 8.6-50 mg 1 tablet Daily PRN   sodium chloride 0.9% 3 mL PRN   sodium chloride 0.9% 5 mL PRN      Today I personally reviewed pertinent medications, lines/drains/airways, imaging, cardiology, lab results, microbiology results, notably:     Diet  Diet Adult Regular (IDDSI Level 7)  Diet Adult Regular (IDDSI Level 7)     Thoracolumbar spine AP/Lateral 3/20:  There is corpectomy fixation rods and pedicle screws from lower thoracic levels to the level of L3.  There is severe compression of L1.  Corpectomy spans from bottom of T11 to upper L2.  Bones show good alignment and no complication.  There is DJD.     Assessment/Plan:          Neuro   * Spinal cord compression     S/P Corpectomy T12-L1 and T10-L3 fusion POD#3  With 2 drains  NSGY following  Neuro checks Q1h  Keep SBP < 180  Pain management:  Oxycodone LR 10q12h scheduled  Percocet  q4h prn pain 4-6/10 for breakthrough  Tylenol 650 q6h prn mild pain and temp  Will need TLSO brace when able to be up out of bed  PT/OT/SLP when pt able to be mobile and extubated  Pending further recommendations per NGSY   Diet ordered  On bowel regimen  -discontinuing ge and a-line             Cardiac/Vascular   HTN (hypertension), benign     SBP <180, successfully controlled on PO medication  -Continue amolodipine 10mg daily  -Continue co-reg 12.5mg daily  Continuing to monitor drain output and trend H/H        12/11/17 BRUCE 1.  No evidence of endocarditis.  2.  Bubble study positive for grade I Patent Foramen Ovale.  3.  Preserved left ventricular function, ejection fraction 55% to 60%.  4.  No significant valvular heart disease.  There is trivial mitral and  tricuspid regurgitation.  5.  Some sclerosis of the aortic valve and ascending aorta, but no stenosis  and no dilatation of the aorta.  6.  Tiny pericardial effusion of no hemodynamic significance.  7.  Bubble study positive for grade I Patent Foramen Ovale.  8.  Grade II atherosclerosis of descending  aorta and arch.             ID   Tuberculosis     ID following: Recommendations:   - Obtain if possible AFB smear and culture from discitis region  - [facility] notified  to confirm therapy   - DOT dosing per ID  900mg Isoniazid, 600 mg rifampin with B6   - Does not requre airborne isolation patient completed 2 weeks on inpatient RIPE             Discitis of thoracolumbar region     S/p corpectomy T12-L1 and T10-L3 fusion  Back tissue sent  From surgeryfor AFB smear and culture;aerobic/anerobic and fungal cultures  ID following  Discontinued TB precautions as pt fully tx          Immunology/Multi System   Sarcoidosis     See spinal cord compression               Prophylaxis:  Venous Thromboembolism: chemical  Stress Ulcer: None  Ventilator Pneumonia: not applicable          Activity Orders            Up with assistance starting at 03/21 0300     Ambulate with assistance starting at 03/17 0423          Full Code     Catracho Carolina MD  Neurocritical Care  [facility]

## 2018-03-21 NOTE — PROGRESS NOTES
[facility]  Neurosurgery  Progress Note    Subjective:     History of Present Illness: 63 yo F with history of Sarcoidosis, HTN, HLD, arthritis, gout, asthma Pott's disease who is a transfer from [facility]  to [facility] ED with worsening pain and weakness of her lower extremities. 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  She was found to have MTB in her spine and lungs and was treated with RIPE for 2 weeks inpatient then discharged home for completion of treatment despite efforts of NH/SNF placement    Post-Op Info:  Procedure(s) (LRB):  CORPECTOMY THORACIC T12-L1 corpectomy with T10-L3 fusion, neuromonitoring, globus (Left)   3 Days Post-Op         Review of Systems    Objective:     Vitals:  Temp: 99.1 °F (37.3 °C)  Pulse: 80  Rhythm: normal sinus rhythm  BP: 128/61  MAP (mmHg): 88  Resp: 18  SpO2: 100 %  O2 Device (Oxygen Therapy): nasal cannula    Temp  Min: 98.4 °F (36.9 °C)  Max: 99.1 °F (37.3 °C)  Pulse  Min: 69  Max: 94  BP  Min: 118/72  Max: 186/77  MAP (mmHg)  Min: 84  Max: 128  Resp  Min: 15  Max: 91  SpO2  Min: 95 %  Max: 100 %    03/20 0701 - 03/21 0700  In: 825 [I.V.:825]  Out: 1905 [Urine:1685; Drains:220]   Unmeasured Output  Urine Occurrence: 0  Stool Occurrence: 0       Physical Exam  Physical Exam:  GA: Awake, Alert, Extubated, Oriented, Follows commands comfortable, no acute distress.   HEENT: No scleral icterus or JVD.   Pulmonary: Clear to auscultation Anterior. No wheezing, crackles, or rhonchi.  Cardiac: RRR S1 & S2 w/o rubs/murmurs/gallops.   Abdominal: Bowel sounds present x 4. No appreciable hepatosplenomegaly.  Skin: No jaundice, rashes, or visible lesions.  Neuro:  --GCS: E4 V5 M6  --Mental Status: Awake, Alert, Extubated, Oriented, Follows Commands  --CN II-XII grossly intact.    --Pupils 4mm, PERRL.   --Corneal reflex, gag, cough intact.  --LUE strength: 5/5  --RUE strength: 5/5  --LLE strength: 4/5  --RLE strength: 4/5  Incision bandaged, c/d/i, 2 HV drains in place.    Medications:  Continuous   Scheduled    amLODIPine 10 mg Daily   carvedilol 12.5 mg BID   gabapentin 300 mg Q8H   heparin (porcine) 5,000 Units Q8H   isoniazid 900 mg Twice Weekly   polyethylene glycol 17 g Daily   pyridoxine (vitamin B6) 50 mg Daily   rifAMpin 600 mg Twice Weekly   sodium chloride 0.9% 1,000 mL Once   PRN    acetaminophen 650 mg Q4H PRN   dextrose 50% 12.5 g PRN   dextrose 50% 25 g PRN   glucagon (human recombinant) 1 mg PRN   glucose 16 g PRN   glucose 24 g PRN   insulin aspart U-100 0-5 Units QID (AC + HS) PRN   microfibrillar collagen 1 g PRN   oxyCODONE-acetaminophen 2 tablet Q4H PRN   senna 8.6 mg Daily PRN   senna-docusate 8.6-50 mg 1 tablet Daily PRN   sodium chloride 0.9% 3 mL PRN   sodium chloride 0.9% 5 mL PRN     Today I personally reviewed pertinent medications, lines/drains/airways, imaging, cardiology, lab results, microbiology results, notably:    Diet  Diet Adult Regular (IDDSI Level 7)  Diet Adult Regular (IDDSI Level 7)     CXR 3/20:  There is postoperative change, cardiomegaly, mild edema, aortic plaque, and slight improvement.    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    --Continue care per primary team.  --Continue subfascial hemovac drain x 2, will discontinue one drain today.  --Continue prophylactic antiobiotics while drains in place.  --Continue TLSO brace.  --Will obtain post-operative plain films once pt able to stand.  --Pt cleared from neurosurgical perspective for stepdown to floor under medicine service.   --We will continue to monitor closely, please contact us with any questions or concerns.               Boston Malone MD  Neurosurgery  [facility]

## 2018-03-21 NOTE — PLAN OF CARE
Problem: Occupational Therapy Goal  Goal: Occupational Therapy Goal  Goals to be met by: 4/4   Patient will increase functional independence with ADLs by performing:    UE Dressing including TLSO brace with Moderate Assistance while seated.  LE Dressing with Moderate Assistance.  Grooming while seated at sink with Minimal Assistance.  Toileting from bedside commode with Moderate Assistance for hygiene and clothing management.   Sitting at edge of bed x20 minutes with CGA for postural control, no LOB.  Rolling to Bilateral with Minimal Assistance.   Supine to sit with Minimal Assistance while maintaining spinal precautions.  Squat pivot transfers to various surfaces (chair, BSC) with Moderate Assistance, use of AD as needed.  Upper extremity exercise program x15 reps per handout, with independence to increase endurance to functional tasks.  Pt/CG demo proper donning and doffing of TLSO brace with setup and assist as needed.   Pt verbalize and demo spinal precautions.        Outcome: Ongoing (interventions implemented as appropriate)  OT andrzej completed this date. OT to f/u in acute setting. Recommend Rehab upon D/C. ALMA Albert 3/21/2018

## 2018-03-21 NOTE — PLAN OF CARE
Problem: Patient Care Overview  Goal: Plan of Care Review  Outcome: Ongoing (interventions implemented as appropriate)  POC reviewed with pt and family at 1400. Pt verbalized understanding. Questions and concerns addressed. No acute events today. Plan to stepdown tonight. Pt progressing toward goals. Will continue to monitor. See flowsheets for full assessment and VS info.

## 2018-03-21 NOTE — PLAN OF CARE
Outside Transfer Summary Note    Transferring Physician or Mid Level Provider/Speciality:  Dr. Haines/ Neuro critical care    Accepting Physician: Yamileth Pisano     Date of Acceptance: 03/21/2018     Code Status:  Full     Reason for Transfer to [facility] For further care and treatment of discitis amd anti-TB meds, discitis of thorocolumbar region T12/ L1, hx of TB, s/p T10 fusion and s/p T12/L1 corpectomy     Report from Transferring Physician or Mid-Level provider/Hospital course:  61 yo F with history of Sarcoidosis, HTN, HLD, arthritis, gout, asthma Pott's disease who is a transfer from [facility]  to [facility] [place] with worsening pain and weakness of her lower extremities.  She is on the NCC service and is s/p  T12/L1 CORPECTOMY.  She was extubated and is stable from a pulmonary standpoint.     To do list   cont drains, neurosurgery managing and ID  following, cultures from spine pending, including AFB cultures  -TLSO brace to wear when Up or about,  not in bed  Pain xy la and q 4.  -f/u ID consult  -PT/OT    Medicine w ill accept pt in the am.     Yamileth Pisano M.D.  Pager:  [phone]  Spectrolink: [phone]

## 2018-03-21 NOTE — SUBJECTIVE & OBJECTIVE
Review of Systems    Objective:     Vitals:  Temp: 99.1 °F (37.3 °C)  Pulse: 80  Rhythm: normal sinus rhythm  BP: 128/61  MAP (mmHg): 88  Resp: 18  SpO2: 100 %  O2 Device (Oxygen Therapy): nasal cannula    Temp  Min: 98.4 °F (36.9 °C)  Max: 99.1 °F (37.3 °C)  Pulse  Min: 69  Max: 94  BP  Min: 118/72  Max: 186/77  MAP (mmHg)  Min: 84  Max: 128  Resp  Min: 15  Max: 91  SpO2  Min: 95 %  Max: 100 %    03/20 0701 - 03/21 0700  In: 825 [I.V.:825]  Out: 1905 [Urine:1685; Drains:220]   Unmeasured Output  Urine Occurrence: 0  Stool Occurrence: 0       Physical Exam  Physical Exam:  GA: Awake, Alert, Extubated, Oriented, Follows commands comfortable, no acute distress.   HEENT: No scleral icterus or JVD.   Pulmonary: Clear to auscultation Anterior. No wheezing, crackles, or rhonchi.  Cardiac: RRR S1 & S2 w/o rubs/murmurs/gallops.   Abdominal: Bowel sounds present x 4. No appreciable hepatosplenomegaly.  Skin: No jaundice, rashes, or visible lesions.  Neuro:  --GCS: E4 V5 M6  --Mental Status: Awake, Alert, Extubated, Oriented, Follows Commands  --CN II-XII grossly intact.   --Pupils 4mm, PERRL.   --Corneal reflex, gag, cough intact.  --LUE strength: 5/5  --RUE strength: 5/5  --LLE strength: 4/5  --RLE strength: 4/5  Incision bandaged, c/d/i, 2 HV drains in place.    Medications:  Continuous   Scheduled    amLODIPine 10 mg Daily   carvedilol 12.5 mg BID   gabapentin 300 mg Q8H   heparin (porcine) 5,000 Units Q8H   isoniazid 900 mg Twice Weekly   polyethylene glycol 17 g Daily   pyridoxine (vitamin B6) 50 mg Daily   rifAMpin 600 mg Twice Weekly   sodium chloride 0.9% 1,000 mL Once   PRN    acetaminophen 650 mg Q4H PRN   dextrose 50% 12.5 g PRN   dextrose 50% 25 g PRN   glucagon (human recombinant) 1 mg PRN   glucose 16 g PRN   glucose 24 g PRN   insulin aspart U-100 0-5 Units QID (AC + HS) PRN   microfibrillar collagen 1 g PRN   oxyCODONE-acetaminophen 2 tablet Q4H PRN   senna 8.6 mg Daily PRN   senna-docusate 8.6-50 mg 1  tablet Daily PRN   sodium chloride 0.9% 3 mL PRN   sodium chloride 0.9% 5 mL PRN     Today I personally reviewed pertinent medications, lines/drains/airways, imaging, cardiology, lab results, microbiology results, notably:    Diet  Diet Adult Regular (IDDSI Level 7)  Diet Adult Regular (IDDSI Level 7)     CXR 3/20:  There is postoperative change, cardiomegaly, mild edema, aortic plaque, and slight improvement.    Neurosurgery Physical Exam

## 2018-03-21 NOTE — PT/OT/SLP EVAL
"Occupational Therapy   Evaluation    Name: Mary Carrillo  MRN: [id number]  Admitting Diagnosis:  Spinal cord compression 3 Days Post-Op    Recommendations:     Discharge Recommendations: rehabilitation facility  Discharge Equipment Recommendations:   (TBD at next level of care )  Barriers to discharge:   (level of skilled assistance required )    History:     Occupational Profile:  Living Environment: Pt lives in a one story home with her . She has a walk in shower without a seat.   Previous level of function: Pt was independent with all ADL and IADL tasks, including driving. She cares for her 4 grandchildren (ages 1-10) during the day, and her daughter lives nearby. She was not utilizing any DME at baseline. She is retired from working in a nursing home for 18 years.     Roles and Routines: caretaker to self and home, caregiver to grandchildren, community dweller, , wife, grandmother, mother  Equipment Owned:  walker, rolling, wheelchair  Assistance upon Discharge: limited from  and daughter    Past Medical History:   Diagnosis Date    Arthritis     Asthma     Back pain     Encounter for blood transfusion     Glaucoma     Gout     Hyperlipidemia     Hypertension     Sarcoidosis     Vitritis     Vitritis        Past Surgical History:   Procedure Laterality Date    CATARACT EXTRACTION Bilateral      SECTION      HYSTERECTOMY      uterus/cervix d/t uterine fibroids.       Subjective     Chief Complaint: back pain  "it would be a dream to sit up in a chair"  Patient/Family stated goals: to get better and return to the things she was doing  Communicated with: RN prior to session. Hieu completed with PT.  Pain/Comfort:  · Pain Rating 1: 7/10  · Location - Side 1: Bilateral  · Location - Orientation 1: generalized  · Location 1: back  · Pain Addressed 1: Reposition, Distraction  · Pain Rating Post-Intervention 1: 7/10    Patients cultural, spiritual, Evangelical conflicts given " the current situation: none reported     Objective:     Patient found with: blood pressure cuff, peripheral IV, pulse ox (continuous), telemetry, SCD, hemovac    General Precautions: Standard, fall   Orthopedic Precautions:spinal precautions   Braces: TLSO     Occupational Performance:    Bed Mobility:    *TLSO donned at EOB and remained throughout session, doffed at EOB when finished*  · Patient completed Rolling/Turning to Right with total assistance  · Patient completed Scooting/Bridging with total assistance and in forward plane to EOB  · Patient completed Supine to Sit with total assistance  · Patient completed Sit to Supine with total assistance    Functional Mobility/Transfers:  · Patient completed Sit <> Stand Transfer with total assistance, of 2 persons and x2 trials from EOB  with  no assistive device , pt unable to fully extend hips and knees in order to complete upright position  · Functional Mobility: Not appropriate at this time due to impaired postural control and BLE function/sensation    Activities of Daily Living:  · Grooming: SBA to clean B hands at midline with sanitizing wipes after setup and while sitting with bed in chair position  · UB Dressing: maximal assistance to don/doff TLSO brace at EOB  · LB Dressing: total assistance at this time    Cognitive/Visual Perceptual:  Cognitive/Psychosocial Skills:     -       Oriented to: Person, Place, Time and Situation   -       Follows Commands/attention:Follows multistep  commands  -       Communication: clear/fluent  -       Safety awareness/insight to disability: intact   -       Mood/Affect/Coping skills/emotional control: Appropriate to situation, Tearful, Cooperative and Pleasant  Visual/Perceptual:      -Intact  tracking and saccades   -pt wears glasses at baseline, does not have them at this time    Physical Exam:  Balance:    -       initially max A due to posterior lean with static sitting; progressed to CGA-min A and L lateral lean with  static and dynamic sitting, required BUE support to maintain  Postural examination/scapula alignment:    -       Rounded shoulders  -       Posterior pelvic tilt  Skin integrity: Dry  Sensation:    -       Impaired  Proprioception and body awareness   Dominant hand:    -       R  Upper Extremity Range of Motion:     -       Right Upper Extremity: Deficits: shoulder flexion limited to 90*  -       Left Upper Extremity: Deficits: shoulder flexion limited to 90*  Upper Extremity Strength:    -       Right Upper Extremity: WFL  -       Left Upper Extremity: WFL   Strength:    -       Right Upper Extremity: WFL  -       Left Upper Extremity: WFL  Fine Motor Coordination:    -       Intact  Left hand thumb/finger opposition skills and Right hand thumb/finger opposition skills     BMI: 31.0 (obese)    Patient left with bed in chair position with all lines intact, call button in reach, bed alarm on and RN notified    [facility] 6 Click:  [facility] Total Score: 14    Treatment & Education:  -Communication board updated, no family present for education  -Education for OT role and POC  -Pt sat EOB for ~10 min, initially max A due to posterior lean    -Progressed to CGA with BUE support and min A with unilateral UE support and demo L lateral lean   -Pt reported feeling dizzy upon sitting up  -Questions and concerns addressed within OT scope of practice   Education:    Assessment:     Mary Carrillo is a 62 y.o. female with a medical diagnosis of Spinal cord compression.  She presents with impaired functional independence across various areas of her life. She demo decreased postural control and balance. She demo good motivation and willingness to participate. She demo good insight and future oriented goals. She demo decreased endurance to functional tasks this date. Pt would continue to benefit from skilled OT services for maximum functional outcome and safety. Performance deficits affecting function are weakness, impaired endurance,  "impaired sensation, impaired self care skills, impaired functional mobilty, decreased coordination, impaired balance, gait instability, decreased lower extremity function, decreased upper extremity function, pain, impaired coordination, impaired cardiopulmonary response to activity, orthopedic precautions.      Rehab Prognosis:  good; patient would benefit from acute skilled OT services to address these deficits and reach maximum level of function.         Clinical Decision Makin.  OT Mod:  "Pt evaluation falls under moderate complexity for evaluation coding due to identification of 3-5 performance deficits noted as stated above. Eval required Min/Mod assistance to complete on this date and detailed assessment(s) were utilized. Moreover, an expanded review of history and occupational profile obtained with additional review of cognitive, physical and psychosocial hx."     Plan:     Patient to be seen 4 x/week to address the above listed problems via self-care/home management, therapeutic activities, therapeutic exercises, neuromuscular re-education  · Plan of Care Expires: 18  · Plan of Care Reviewed with: patient    This Plan of care has been discussed with the patient who was involved in its development and understands and is in agreement with the identified goals and treatment plan    GOALS:    Occupational Therapy Goals        Problem: Occupational Therapy Goal    Goal Priority Disciplines Outcome Interventions   Occupational Therapy Goal     OT, PT/OT Ongoing (interventions implemented as appropriate)    Description:  Goals to be met by:    Patient will increase functional independence with ADLs by performing:    UE Dressing including TLSO brace with Moderate Assistance while seated.  LE Dressing with Moderate Assistance.  Grooming while seated at sink with Minimal Assistance.  Toileting from bedside commode with Moderate Assistance for hygiene and clothing management.   Sitting at edge of bed " x20 minutes with CGA for postural control, no LOB.  Rolling to Bilateral with Minimal Assistance.   Supine to sit with Minimal Assistance while maintaining spinal precautions.  Squat pivot transfers to various surfaces (chair, BSC) with Moderate Assistance, use of AD as needed.  Upper extremity exercise program x15 reps per handout, with independence to increase endurance to functional tasks.  Pt/CG demo proper donning and doffing of TLSO brace with setup and assist as needed.   Pt verbalize and demo spinal precautions.                         Time Tracking:     OT Date of Treatment: 03/21/18  OT Start Time: 0905  OT Stop Time: 0937  OT Total Time (min): 32 min    Billable Minutes:Evaluation 32    ALMA Albert  3/21/2018

## 2018-03-21 NOTE — PLAN OF CARE
SW met with Pt at bedside. No family was present. Discussed therapy recs for rehab and provided list. She will review with her daughter and give choices asap.    Chrissie Sanchez LMSW  Neurocritical Care   [facility]  [id number]

## 2018-03-21 NOTE — ASSESSMENT & PLAN NOTE
ID following: Recommendations:   - Obtain if possible AFB smear and culture from discitis region  - CDC notified  to confirm therapy   - DOT dosing per ID  900mg Isoniazid, 600 mg rifampin with B6   - Does not requre airborne isolation patient completed 2 weeks on inpatient [facility]

## 2018-03-21 NOTE — SUBJECTIVE & OBJECTIVE
Interval History:  Patient doing well today, no acute events. Patient no longer with ICU needs and transfer to floor orders are in. No longer requiring pressors. Advanced diet. PO pain control reconciled. Continuing to trend H/H    Review of Systems      Objective:     Vitals:  Temp: 98.6 °F (37 °C)  Pulse: 102  Rhythm: normal sinus rhythm  BP: 136/62  MAP (mmHg): 89  Resp: (!) 39  SpO2: 97 %  O2 Device (Oxygen Therapy): nasal cannula    Temp  Min: 98.5 °F (36.9 °C)  Max: 99.1 °F (37.3 °C)  Pulse  Min: 69  Max: 102  BP  Min: 110/54  Max: 186/77  MAP (mmHg)  Min: 78  Max: 124  Resp  Min: 15  Max: 39  SpO2  Min: 94 %  Max: 100 %    03/20 0701 - 03/21 0700  In: 825 [I.V.:825]  Out: 1905 [Urine:1685; Drains:220]   Unmeasured Output  Urine Occurrence: 0  Stool Occurrence: 0       Physical Exam    Physical Exam:  GA: Awake, Alert, Extubated, Oriented, Follows commands comfortable, no acute distress.   HEENT: No scleral icterus or JVD.   Pulmonary: Clear to auscultation Anterior. No wheezing, crackles, or rhonchi.  Cardiac: RRR S1 & S2 w/o rubs/murmurs/gallops.   Abdominal: Bowel sounds present x 4. No appreciable hepatosplenomegaly.  Skin: No jaundice, rashes, or visible lesions.  Neuro:  --GCS: E4 V5 M6  --Mental Status: Awake, Alert, Extubated, Oriented, Follows Commands  --CN II-XII grossly intact.   --Pupils 4mm, PERRL.   --Corneal reflex, gag, cough intact.  --LUE strength: 5/5  --RUE strength: 5/5  --LLE strength: 4/5  --RLE strength: 4/5  Incision bandaged, c/d/i, 2 HV drains in place.    Medications:  Continuous Scheduled    amLODIPine 10 mg Daily   carvedilol 12.5 mg BID   gabapentin 300 mg Q8H   heparin (porcine) 5,000 Units Q8H   isoniazid 900 mg Twice Weekly   polyethylene glycol 17 g Daily   pyridoxine (vitamin B6) 50 mg Daily   rifAMpin 600 mg Twice Weekly   sodium chloride 0.9% 1,000 mL Once   PRN    acetaminophen 650 mg Q4H PRN   dextrose 50% 12.5 g PRN   dextrose 50% 25 g PRN   glucagon (human  recombinant) 1 mg PRN   glucose 16 g PRN   glucose 24 g PRN   insulin aspart U-100 0-5 Units QID (AC + HS) PRN   microfibrillar collagen 1 g PRN   oxyCODONE-acetaminophen 2 tablet Q4H PRN   senna 8.6 mg Daily PRN   senna-docusate 8.6-50 mg 1 tablet Daily PRN   sodium chloride 0.9% 3 mL PRN   sodium chloride 0.9% 5 mL PRN     Today I personally reviewed pertinent medications, lines/drains/airways, imaging, cardiology, lab results, microbiology results, notably:    Diet  Diet Adult Regular (IDDSI Level 7)  Diet Adult Regular (IDDSI Level 7)     Thoracolumbar spine AP/Lateral 3/20:  There is corpectomy fixation rods and pedicle screws from lower thoracic levels to the level of L3.  There is severe compression of L1.  Corpectomy spans from bottom of T11 to upper L2.  Bones show good alignment and no complication.  There is DJD.

## 2018-03-21 NOTE — PHYSICIAN QUERY
"PT Name: Mary Carrillo  MR #: [id number]    Physician Query Form - Hematology Clarification      CDS/: Jaylin Calderon               Contact information: [phone]    This form is a permanent document in the medical record.      Query Date: March 21, 2018    By submitting this query, we are merely seeking further clarification of documentation. Please utilize your independent clinical judgment when addressing the question(s) below.    The Medical record contains the following:   Indicators  Supporting Clinical Findings Location in Medical Record   x "Anemia" documented Ms. Carrillo is a 61 yo F w/ a hx of sarcoid (on methotrexate) and asthma, here in clinic today for f/u visit.  Pt states she is doing well.  She is compliant w/meds (JULIAN and Advair).  Denies any SOB, F/C/night sweats.  Pt has had recent lab work - no signs of anemia.   Progress Note 3/22 PD   x H & H = Hgb 11.5..>7.3    Hct 36.5...>21.5   Labs 3/17...>3/20    BP =                     HR=      "GI bleeding" documented     x Acute bleeding (Non GI site) Estimated Blood Loss (EBL): 3000 mL    ESTIMATED BLOOD LOSS: 1L                 Brief OP Note 3/18      OP Note 3/19    Transfusion(s)      Treatment:      Other:        Provider, please specify diagnosis or diagnoses associated with above clinical findings.    [x  ] Acute blood loss anemia expected post-operatively  [  ] Acute blood loss anemia  [  ] Clinically Undetermined       Please document in your progress notes daily for the duration of treatment, until resolved, and include in your discharge summary.                                                                                                      "

## 2018-03-21 NOTE — ASSESSMENT & PLAN NOTE
S/P Corpectomy T12-L1 and T10-L3 fusion POD#3  With 2 drains  NSGY following  Neuro checks Q1h  Keep SBP < 180  Pain management:  Oxycodone LR 10q12h scheduled  Percocet  q4h prn pain 4-6/10 for breakthrough  Tylenol 650 q6h prn mild pain and temp  Will need TLSO brace when able to be up out of bed  PT/OT/SLP when pt able to be mobile and extubated  Pending further recommendations per NGSY   Diet ordered today  On bowel regimen  -discontinuing ge and a-line today

## 2018-03-22 LAB
ALBUMIN SERPL BCP-MCNC: 2 G/DL
ALP SERPL-CCNC: 65 U/L
ALT SERPL W/O P-5'-P-CCNC: 8 U/L
ANION GAP SERPL CALC-SCNC: 6 MMOL/L
AST SERPL-CCNC: 21 U/L
BACTERIA SPEC AEROBE CULT: NO GROWTH
BASOPHILS # BLD AUTO: 0.04 K/UL
BASOPHILS NFR BLD: 0.3 %
BILIRUB SERPL-MCNC: 0.2 MG/DL
BLD PROD TYP BPU: NORMAL
BLOOD UNIT EXPIRATION DATE: NORMAL
BLOOD UNIT TYPE CODE: 5100
BLOOD UNIT TYPE: NORMAL
BUN SERPL-MCNC: 19 MG/DL
CALCIUM SERPL-MCNC: 8 MG/DL
CHLORIDE SERPL-SCNC: 109 MMOL/L
CO2 SERPL-SCNC: 23 MMOL/L
CODING SYSTEM: NORMAL
CREAT SERPL-MCNC: 0.9 MG/DL
DIFFERENTIAL METHOD: ABNORMAL
DISPENSE STATUS: NORMAL
EOSINOPHIL # BLD AUTO: 0.5 K/UL
EOSINOPHIL NFR BLD: 3.8 %
ERYTHROCYTE [DISTWIDTH] IN BLOOD BY AUTOMATED COUNT: 16.5 %
EST. GFR  (AFRICAN AMERICAN): >60 ML/MIN/1.73 M^2
EST. GFR  (NON AFRICAN AMERICAN): >60 ML/MIN/1.73 M^2
GLUCOSE SERPL-MCNC: 103 MG/DL
HCT VFR BLD AUTO: 23 %
HGB BLD-MCNC: 7.4 G/DL
IMM GRANULOCYTES # BLD AUTO: 0.18 K/UL
IMM GRANULOCYTES NFR BLD AUTO: 1.4 %
LYMPHOCYTES # BLD AUTO: 2.4 K/UL
LYMPHOCYTES NFR BLD: 18.6 %
MAGNESIUM SERPL-MCNC: 1.5 MG/DL
MCH RBC QN AUTO: 29.5 PG
MCHC RBC AUTO-ENTMCNC: 32.2 G/DL
MCV RBC AUTO: 92 FL
MONOCYTES # BLD AUTO: 1.8 K/UL
MONOCYTES NFR BLD: 14.3 %
NEUTROPHILS # BLD AUTO: 7.8 K/UL
NEUTROPHILS NFR BLD: 61.6 %
NRBC BLD-RTO: 0 /100 WBC
PHOSPHATE SERPL-MCNC: 2.2 MG/DL
PLATELET # BLD AUTO: 144 K/UL
PMV BLD AUTO: 10.7 FL
POCT GLUCOSE: 100 MG/DL (ref 70–110)
POCT GLUCOSE: 113 MG/DL (ref 70–110)
POCT GLUCOSE: 144 MG/DL (ref 70–110)
POTASSIUM SERPL-SCNC: 3.1 MMOL/L
PROT SERPL-MCNC: 5.2 G/DL
RBC # BLD AUTO: 2.51 M/UL
SODIUM SERPL-SCNC: 138 MMOL/L
TRANS ERYTHROCYTES VOL PATIENT: NORMAL ML
WBC # BLD AUTO: 12.74 K/UL

## 2018-03-22 PROCEDURE — 25000003 PHARM REV CODE 250: Performed by: NURSE PRACTITIONER

## 2018-03-22 PROCEDURE — 80053 COMPREHEN METABOLIC PANEL: CPT

## 2018-03-22 PROCEDURE — 99024 POSTOP FOLLOW-UP VISIT: CPT | Mod: ,,, | Performed by: NEUROLOGICAL SURGERY

## 2018-03-22 PROCEDURE — 97112 NEUROMUSCULAR REEDUCATION: CPT

## 2018-03-22 PROCEDURE — 63600175 PHARM REV CODE 636 W HCPCS: Performed by: STUDENT IN AN ORGANIZED HEALTH CARE EDUCATION/TRAINING PROGRAM

## 2018-03-22 PROCEDURE — 25000003 PHARM REV CODE 250: Performed by: STUDENT IN AN ORGANIZED HEALTH CARE EDUCATION/TRAINING PROGRAM

## 2018-03-22 PROCEDURE — 84100 ASSAY OF PHOSPHORUS: CPT

## 2018-03-22 PROCEDURE — 85025 COMPLETE CBC W/AUTO DIFF WBC: CPT

## 2018-03-22 PROCEDURE — 20600001 HC STEP DOWN PRIVATE ROOM

## 2018-03-22 PROCEDURE — 25000003 PHARM REV CODE 250: Performed by: INTERNAL MEDICINE

## 2018-03-22 PROCEDURE — 97530 THERAPEUTIC ACTIVITIES: CPT

## 2018-03-22 PROCEDURE — 36415 COLL VENOUS BLD VENIPUNCTURE: CPT

## 2018-03-22 PROCEDURE — 99233 SBSQ HOSP IP/OBS HIGH 50: CPT | Mod: ,,, | Performed by: PSYCHIATRY & NEUROLOGY

## 2018-03-22 PROCEDURE — 83735 ASSAY OF MAGNESIUM: CPT

## 2018-03-22 RX ORDER — POTASSIUM CHLORIDE 20 MEQ/15ML
60 SOLUTION ORAL
Status: DISCONTINUED | OUTPATIENT
Start: 2018-03-22 | End: 2018-03-27 | Stop reason: HOSPADM

## 2018-03-22 RX ORDER — LANOLIN ALCOHOL/MO/W.PET/CERES
800 CREAM (GRAM) TOPICAL
Status: DISCONTINUED | OUTPATIENT
Start: 2018-03-22 | End: 2018-03-27 | Stop reason: HOSPADM

## 2018-03-22 RX ORDER — POTASSIUM CHLORIDE 20 MEQ/15ML
40 SOLUTION ORAL
Status: DISCONTINUED | OUTPATIENT
Start: 2018-03-22 | End: 2018-03-27 | Stop reason: HOSPADM

## 2018-03-22 RX ORDER — ISOSORBIDE DINITRATE 20 MG/1
20 TABLET ORAL 3 TIMES DAILY
Status: DISCONTINUED | OUTPATIENT
Start: 2018-03-22 | End: 2018-03-25

## 2018-03-22 RX ORDER — SODIUM,POTASSIUM PHOSPHATES 280-250MG
2 POWDER IN PACKET (EA) ORAL
Status: DISCONTINUED | OUTPATIENT
Start: 2018-03-22 | End: 2018-03-27 | Stop reason: HOSPADM

## 2018-03-22 RX ORDER — AMOXICILLIN 250 MG
2 CAPSULE ORAL DAILY
Status: DISCONTINUED | OUTPATIENT
Start: 2018-03-22 | End: 2018-03-27 | Stop reason: HOSPADM

## 2018-03-22 RX ADMIN — ISOSORBIDE DINITRATE 20 MG: 10 TABLET ORAL at 02:03

## 2018-03-22 RX ADMIN — OXYCODONE HYDROCHLORIDE 10 MG: 10 TABLET, FILM COATED, EXTENDED RELEASE ORAL at 09:03

## 2018-03-22 RX ADMIN — HEPARIN SODIUM 5000 UNITS: 5000 INJECTION, SOLUTION INTRAVENOUS; SUBCUTANEOUS at 02:03

## 2018-03-22 RX ADMIN — MAGNESIUM OXIDE TAB 400 MG (241.3 MG ELEMENTAL MG) 800 MG: 400 (241.3 MG) TAB at 02:03

## 2018-03-22 RX ADMIN — RIFAMPIN 600 MG: 300 CAPSULE ORAL at 10:03

## 2018-03-22 RX ADMIN — ISOSORBIDE DINITRATE 20 MG: 10 TABLET ORAL at 09:03

## 2018-03-22 RX ADMIN — CARVEDILOL 12.5 MG: 6.25 TABLET, FILM COATED ORAL at 09:03

## 2018-03-22 RX ADMIN — POTASSIUM CHLORIDE 40 MEQ: 20 SOLUTION ORAL at 12:03

## 2018-03-22 RX ADMIN — Medication 50 MG: at 09:03

## 2018-03-22 RX ADMIN — HEPARIN SODIUM 5000 UNITS: 5000 INJECTION, SOLUTION INTRAVENOUS; SUBCUTANEOUS at 05:03

## 2018-03-22 RX ADMIN — OXYCODONE AND ACETAMINOPHEN 1 TABLET: 10; 325 TABLET ORAL at 04:03

## 2018-03-22 RX ADMIN — ISONIAZID 900 MG: 300 TABLET ORAL at 10:03

## 2018-03-22 RX ADMIN — POTASSIUM CHLORIDE 40 MEQ: 20 SOLUTION ORAL at 09:03

## 2018-03-22 RX ADMIN — POTASSIUM & SODIUM PHOSPHATES POWDER PACK 280-160-250 MG 2 PACKET: 280-160-250 PACK at 12:03

## 2018-03-22 RX ADMIN — MAGNESIUM OXIDE TAB 400 MG (241.3 MG ELEMENTAL MG) 800 MG: 400 (241.3 MG) TAB at 10:03

## 2018-03-22 RX ADMIN — STANDARDIZED SENNA CONCENTRATE AND DOCUSATE SODIUM 2 TABLET: 8.6; 5 TABLET, FILM COATED ORAL at 09:03

## 2018-03-22 RX ADMIN — POTASSIUM & SODIUM PHOSPHATES POWDER PACK 280-160-250 MG 2 PACKET: 280-160-250 PACK at 09:03

## 2018-03-22 RX ADMIN — POLYETHYLENE GLYCOL 3350 17 G: 17 POWDER, FOR SOLUTION ORAL at 09:03

## 2018-03-22 RX ADMIN — HEPARIN SODIUM 5000 UNITS: 5000 INJECTION, SOLUTION INTRAVENOUS; SUBCUTANEOUS at 09:03

## 2018-03-22 RX ADMIN — AMLODIPINE BESYLATE 10 MG: 10 TABLET ORAL at 09:03

## 2018-03-22 RX ADMIN — ISOSORBIDE DINITRATE 20 MG: 10 TABLET ORAL at 08:03

## 2018-03-22 NOTE — PT/OT/SLP PROGRESS
Physical Therapy      Patient Name:  Mary Carrillo   MRN:  [id number]    Patient not seen today secondary to patient unavailable.  Patient was attempted in the ICU.  Transport was present to bring the patient to step down unit (10th floor).  Time needed to settle patient in new room and for RN assessment of patient.  PT unable to return on this date.  Will follow up per POC (4 days/wk).       Kim Aponte, PT   3/22/2018

## 2018-03-22 NOTE — ASSESSMENT & PLAN NOTE
S/P Corpectomy T12-L1 and T10-L3 fusion POD#4  1 HV drain remains  NSGY following  Neuro checks Q1h  Keep SBP < 180  Pain management:  -Oxycodone LR 10q12h scheduled  -Percocet  q4h prn pain 4-6/10 for breakthrough  -Tylenol 650 q6h prn mild pain and temp  Will need TLSO brace when able to be up out of bed  PT/OT/SLP daily  Pending further recommendations per NGSY   Diet  On bowel regimen  ge and a-line discontinued

## 2018-03-22 NOTE — ASSESSMENT & PLAN NOTE
ID following: Recommendations:   - Obtain if possible AFB smear and culture from discitis region  - CDC notified  to confirm therapy   - DOT dosing per ID  900mg Isoniazid, 600 mg rifampin with B6   - Does not requre airborne isolation, patient completed 2 weeks on inpatient [facility]

## 2018-03-22 NOTE — PROGRESS NOTES
Right forearm PIV dressing changed this morning. Upon charting, noted that right peripheral IV was inserted on 3/15 and should have been changed on 3/19.     Left hand IV flushed and leaking. PIV removed.     Will insert new IV today to replace right forearm IV.

## 2018-03-22 NOTE — ASSESSMENT & PLAN NOTE
SBP <180, successfully controlled on PO medication  -Continue amolodipine 10mg daily  -Continue co-reg 12.5mg daily  -Started isosorbide dinitrate 20mg TID  Continuing to monitor drain output and trend H/H        12/11/17 BRUCE 1.  No evidence of endocarditis.  2.  Bubble study positive for grade I Patent Foramen Ovale.  3.  Preserved left ventricular function, ejection fraction 55% to 60%.  4.  No significant valvular heart disease.  There is trivial mitral and  tricuspid regurgitation.  5.  Some sclerosis of the aortic valve and ascending aorta, but no stenosis  and no dilatation of the aorta.  6.  Tiny pericardial effusion of no hemodynamic significance.  7.  Bubble study positive for grade I Patent Foramen Ovale.  8.  Grade II atherosclerosis of descending aorta and arch.

## 2018-03-22 NOTE — PT/OT/SLP PROGRESS
Occupational Therapy   Treatment    Name: Mary Carrillo  MRN: [id number]  Admitting Diagnosis:  Spinal cord compression  4 Days Post-Op    Recommendations:     Discharge Recommendations: rehabilitation facility  Discharge Equipment Recommendations:   (TBD at next level of care )  Barriers to discharge:   (level of skilled assistance required)    Subjective     Communicated with: RN prior to session.  Pain/Comfort:  · Pain Rating 1: 8/10  · Location - Side 1: Bilateral  · Location - Orientation 1: generalized  · Location 1: back  · Pain Addressed 1: Reposition, Distraction, Cessation of Activity  · Pain Rating Post-Intervention 1: 8/10    Patients cultural, spiritual, Zoroastrianism conflicts given the current situation: none reported     Objective:     Patient found with: blood pressure cuff, peripheral IV, pulse ox (continuous), telemetry, SCD, hemovac    General Precautions: Standard, fall   Orthopedic Precautions:spinal precautions   Braces:  TLSO brace     Occupational Performance:    Bed Mobility:    *TLSO donned EOB, remained throughout session, and doffed EOB*  · Patient completed Rolling/Turning to Right with maximal assistance, with side rail and HOB flat, maintaining spinal precautions  · Patient completed Scooting/Bridging with maximal assistance and HOB flat, in forward and backward plane at EOB  · Max A and facilitation of trunk flexion to scoot laterally x2 to the right and x3 to the left   · Patient completed Supine to Sit with maximal assistance and HOB flat   · Pt reports feeling dizzy following this task and activity was halted temporarily  · Patient completed Sit to Supine with maximal assistance and HOB flat      Functional Mobility/Transfers:  · Not appropriate at this time due to impaired trunk control and decreased BLE functioning    Activities of Daily Living:  · UB Dressing: moderate assistance to don gown like jacket while seated EOB (additional assist for )    Patient left with  bed in chair position with all lines intact, call button in reach and RN notified of broken call button/remote    [facility] 6 Click:  [facility] Total Score: 14    Treatment & Education:  -Communication board updated, no family present for education  -Education for OT role and POC  -Education for log roll method due to spinal precautions  -Education for diaphragmatic breathing  -Education for preparatory activity of scooting as prep for transfers   -Dynamic seated task EOB with unilateral support and reaching forward for trunk flexion, emphasis on utilizing antigravity muscles to return to midline in order to increase postural control--x3reps per side performed with max A, B knee blocking, and facilitation at pelvis for trunk flexion  -Pt sat EOB for 20 minutes with min-max A varying for postural control across various activities and demo L lateral lean  Education:    Assessment:     Mary Carrillo is a 62 y.o. female with a medical diagnosis of Spinal cord compression.  She presents with impaired functional independence across various areas of her life. She demo good motivation and willingness to participate. Pt demo decreased endurance to functional tasks as she becomes SOB quickly. She demo decreased postural control and a L lateral lean at this time. Pt would continue to benefit from skilled OT services for maximum functional outcome ans safety. Performance deficits affecting function are weakness, impaired endurance, impaired sensation, impaired functional mobilty, gait instability, impaired self care skills, impaired balance, decreased lower extremity function, decreased upper extremity function, decreased coordination, pain, impaired coordination, impaired cardiopulmonary response to activity.      Rehab Prognosis:  good; patient would benefit from acute skilled OT services to address these deficits and reach maximum level of function.       Plan:     Patient to be seen 4 x/week to address the above listed problems via  self-care/home management, therapeutic activities, therapeutic exercises, neuromuscular re-education  · Plan of Care Expires: 04/19/18  · Plan of Care Reviewed with: patient    This Plan of care has been discussed with the patient who was involved in its development and understands and is in agreement with the identified goals and treatment plan    GOALS:    Occupational Therapy Goals        Problem: Occupational Therapy Goal    Goal Priority Disciplines Outcome Interventions   Occupational Therapy Goal     OT, PT/OT Ongoing (interventions implemented as appropriate)    Description:  Goals to be met by: 4/4   Patient will increase functional independence with ADLs by performing:    UE Dressing including TLSO brace with Moderate Assistance while seated.  LE Dressing with Moderate Assistance.  Grooming while seated at sink with Minimal Assistance.  Toileting from bedside commode with Moderate Assistance for hygiene and clothing management.   Sitting at edge of bed x20 minutes with CGA for postural control, no LOB.  Rolling to Bilateral with Minimal Assistance.   Supine to sit with Minimal Assistance while maintaining spinal precautions.  Squat pivot transfers to various surfaces (chair, BSC) with Moderate Assistance, use of AD as needed.  Upper extremity exercise program x15 reps per handout, with independence to increase endurance to functional tasks.  Pt/CG demo proper donning and doffing of TLSO brace with setup and assist as needed.   Pt verbalize and demo spinal precautions.                         Time Tracking:     OT Date of Treatment: 03/22/18  OT Start Time: 1303  OT Stop Time: 1335  OT Total Time (min): 32 min    Billable Minutes:Therapeutic Activity 15  Neuromuscular Re-education 12    ALMA Albert  3/22/2018

## 2018-03-22 NOTE — SUBJECTIVE & OBJECTIVE
Interval History:  Patient doing well today, no acute events. Patient no longer with ICU needs and transfer to floor orders are in pending medicine approval. No pressor needs. Advanced diet. Pain controlled overnight on new regimen. Continuing to trend H/H    Review of Systems      Objective:     Vitals:  Temp: 98.8 °F (37.1 °C)  Pulse: 66  Rhythm: normal sinus rhythm  BP: (!) 159/70  MAP (mmHg): 101  Resp: 18  SpO2: 100 %  O2 Device (Oxygen Therapy): room air    Temp  Min: 98 °F (36.7 °C)  Max: 99 °F (37.2 °C)  Pulse  Min: 65  Max: 99  BP  Min: 111/54  Max: 159/70  MAP (mmHg)  Min: 76  Max: 103  Resp  Min: 15  Max: 42  SpO2  Min: 47 %  Max: 100 %    03/21 0701 - 03/22 0700  In: 600 [P.O.:600]  Out: 560 [Urine:350; Drains:210]   Unmeasured Output  Urine Occurrence: 1  Stool Occurrence: 0  Pad Count: 2       Physical Exam    Physical Exam:  GA: Awake, Alert, Extubated, Oriented, Follows commands comfortable, no acute distress.   HEENT: No scleral icterus or JVD.   Pulmonary: Clear to auscultation Anterior. No wheezing, crackles, or rhonchi.  Cardiac: RRR S1 & S2 w/o rubs/murmurs/gallops.   Abdominal: Bowel sounds present x 4. No appreciable hepatosplenomegaly.  Skin: No jaundice, rashes, or visible lesions.  Neuro:  --GCS: E4 V5 M6  --Mental Status: Awake, Alert, Extubated, Oriented, Follows Commands  --CN II-XII grossly intact.   --Pupils 4mm, PERRL.   --Corneal reflex, gag, cough intact.  --LUE strength: 5/5  --RUE strength: 5/5  --LLE strength: 4/5  --RLE strength: 4/5  Incision bandaged, c/d/i, 2 HV drains in place.    Medications:  Continuous Scheduled    amLODIPine 10 mg Daily   carvedilol 12.5 mg BID   gabapentin 300 mg Q8H   heparin (porcine) 5,000 Units Q8H   isoniazid 900 mg Twice Weekly   isosorbide dinitrate 20 mg TID   oxyCODONE 10 mg Q12H   polyethylene glycol 17 g Daily   pyridoxine (vitamin B6) 50 mg Daily   rifAMpin 600 mg Twice Weekly   senna-docusate 8.6-50 mg 2 tablet Daily   PRN    acetaminophen  650 mg Q4H PRN   dextrose 50% 12.5 g PRN   dextrose 50% 25 g PRN   glucagon (human recombinant) 1 mg PRN   glucose 16 g PRN   glucose 24 g PRN   insulin aspart U-100 0-5 Units QID (AC + HS) PRN   magnesium oxide 800 mg PRN   magnesium oxide 800 mg PRN   microfibrillar collagen 1 g PRN   oxyCODONE-acetaminophen 1 tablet Q4H PRN   potassium chloride 10% 40 mEq PRN   potassium chloride 10% 40 mEq PRN   potassium chloride 10% 60 mEq PRN   potassium, sodium phosphates 2 packet PRN   potassium, sodium phosphates 2 packet PRN   potassium, sodium phosphates 2 packet PRN   senna 8.6 mg Daily PRN   sodium chloride 0.9% 5 mL PRN     Today I personally reviewed pertinent medications, lines/drains/airways, imaging, cardiology, lab results, microbiology results, notably:    Diet  Diet Adult Regular (IDDSI Level 7)  Diet Adult Regular (IDDSI Level 7)     Thoracolumbar spine AP/Lateral 3/20:  There is corpectomy fixation rods and pedicle screws from lower thoracic levels to the level of L3.  There is severe compression of L1.  Corpectomy spans from bottom of T11 to upper L2.  Bones show good alignment and no complication.  There is DJD.

## 2018-03-22 NOTE — ASSESSMENT & PLAN NOTE
S/p corpectomy T12-L1 and T10-L3 fusion  Back tissue sent  From surgeryfor AFB smear and culture;aerobic/anerobic and fungal cultures  ID following   Discontinued TB precautions as pt fully tx'd

## 2018-03-22 NOTE — PLAN OF CARE
Problem: Patient Care Overview  Goal: Plan of Care Review  Outcome: Ongoing (interventions implemented as appropriate)  Patient recently transferred from neuro ICU. Patient resting comfortably in bed with side rails x4. VS stable. Patient received PRN pain medications and states that she feels some relief. No acute events.

## 2018-03-22 NOTE — PLAN OF CARE
SW attempted to meet with the Pt to see if she had reviewed the list for rehab facilities. Pt was not at bedside and has stepped down. Pt will still need rehab preferences and referrals sent.    Chrissie Sanchez LMSW  Neurocritical Care   [facility]  [id number]

## 2018-03-22 NOTE — PROGRESS NOTES
[facility]  Neurocritical Care  Progress Note    Admit Date: 3/17/2018  Service Date: 03/22/2018  Length of Stay: 5    Subjective:     Chief Complaint: Spinal cord compression    History of Present Illness:  63 yo F with history of Sarcoidosis, HTN, HLD, arthritis, gout, asthma Pott's disease who is a transfer from [facility]  to [facility] ED with worsening pain and weakness of her lower extremities. 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  Since being discharged pt reports being unable to walk, mostly being bed bound or on the sofa due to severe back pain.  She is currently getting antibiotics via the health unit for her TB.  She endorses back pain radiating down both legs to toes.  She reports numbness in her feet as well as tingling which has been ongoing for about 1 month.  She denies overt bowel/bladder incontinence or saddle anesthesia.   She presents to NICU S/P T12-L1 corpectomy and T10-L3 fusion.    Hospital Course: 3/18 admitted to NICU s/p T12-L1 corpectomy and T10-L3 fusion.  Arrived on phenylephrine gtt @ .4mkm; propofol 75mkm and intubated, on AC 14/500/40%/P5.  3/19 Extubated; Pending 2D Echo, ID recommendations to discontinue Airborne Precautions due to pt being completely treated for TB.   3/20: Patient doing well today, no acute events. De-escalating ICU needs.  3/21: Patient with no acute events overnight, exam stable and patient is stable for transfer to floor  3/22: Patient stable overnight, no acute events, exam stable; awaiting transfer to medicine    Interval History:  Patient doing well today, no acute  events. Patient no longer with ICU needs and transfer to floor orders are in pending medicine approval. No pressor needs. Advanced diet. Pain controlled overnight on new regimen. Continuing to trend H/H    Review of Systems      Objective:     Vitals:  Temp: 98.8 °F (37.1 °C)  Pulse: 66  Rhythm: normal sinus rhythm  BP: (!) 159/70  MAP (mmHg): 101  Resp: 18  SpO2: 100 %  O2 Device (Oxygen Therapy): room air    Temp  Min: 98 °F (36.7 °C)  Max: 99 °F (37.2 °C)  Pulse  Min: 65  Max: 99  BP  Min: 111/54  Max: 159/70  MAP (mmHg)  Min: 76  Max: 103  Resp  Min: 15  Max: 42  SpO2  Min: 47 %  Max: 100 %    03/21 0701 - 03/22 0700  In: 600 [P.O.:600]  Out: 560 [Urine:350; Drains:210]   Unmeasured Output  Urine Occurrence: 1  Stool Occurrence: 0  Pad Count: 2       Physical Exam    Physical Exam:  GA: Awake, Alert, Extubated, Oriented, Follows commands comfortable, no acute distress.   HEENT: No scleral icterus or JVD.   Pulmonary: Clear to auscultation Anterior. No wheezing, crackles, or rhonchi.  Cardiac: RRR S1 & S2 w/o rubs/murmurs/gallops.   Abdominal: Bowel sounds present x 4. No appreciable hepatosplenomegaly.  Skin: No jaundice, rashes, or visible lesions.  Neuro:  --GCS: E4 V5 M6  --Mental Status: Awake, Alert, Extubated, Oriented, Follows Commands  --CN II-XII grossly intact.   --Pupils 4mm, PERRL.   --Corneal reflex, gag, cough intact.  --LUE strength: 5/5  --RUE strength: 5/5  --LLE strength: 4/5  --RLE strength: 4/5  Incision bandaged, c/d/i, 2 HV drains in place.    Medications:  Continuous Scheduled    amLODIPine 10 mg Daily   carvedilol 12.5 mg BID   gabapentin 300 mg Q8H   heparin (porcine) 5,000 Units Q8H   isoniazid 900 mg Twice Weekly   isosorbide dinitrate 20 mg TID   oxyCODONE 10 mg Q12H   polyethylene glycol 17 g Daily   pyridoxine (vitamin B6) 50 mg Daily   rifAMpin 600 mg Twice Weekly   senna-docusate 8.6-50 mg 2 tablet Daily   PRN    acetaminophen 650 mg Q4H PRN   dextrose 50% 12.5 g PRN   dextrose  50% 25 g PRN   glucagon (human recombinant) 1 mg PRN   glucose 16 g PRN   glucose 24 g PRN   insulin aspart U-100 0-5 Units QID (AC + HS) PRN   magnesium oxide 800 mg PRN   magnesium oxide 800 mg PRN   microfibrillar collagen 1 g PRN   oxyCODONE-acetaminophen 1 tablet Q4H PRN   potassium chloride 10% 40 mEq PRN   potassium chloride 10% 40 mEq PRN   potassium chloride 10% 60 mEq PRN   potassium, sodium phosphates 2 packet PRN   potassium, sodium phosphates 2 packet PRN   potassium, sodium phosphates 2 packet PRN   senna 8.6 mg Daily PRN   sodium chloride 0.9% 5 mL PRN     Today I personally reviewed pertinent medications, lines/drains/airways, imaging, cardiology, lab results, microbiology results, notably:    Diet  Diet Adult Regular (IDDSI Level 7)  Diet Adult Regular (IDDSI Level 7)     Thoracolumbar spine AP/Lateral 3/20:  There is corpectomy fixation rods and pedicle screws from lower thoracic levels to the level of L3.  There is severe compression of L1.  Corpectomy spans from bottom of T11 to upper L2.  Bones show good alignment and no complication.  There is DJD.    Assessment/Plan:     Neuro   * Spinal cord compression    S/P Corpectomy T12-L1 and T10-L3 fusion POD#4  1 HV drain remains  NSGY following  Neuro checks Q1h  Keep SBP < 180  Pain management:  -Oxycodone LR 10q12h scheduled  -Percocet  q4h prn pain 4-6/10 for breakthrough  -Tylenol 650 q6h prn mild pain and temp  Will need TLSO brace when able to be up out of bed  PT/OT/SLP daily  Pending further recommendations per NGSY   Diet  On bowel regimen  ge and a-line discontinued          Cardiac/Vascular   HTN (hypertension), benign    SBP <180, successfully controlled on PO medication  -Continue amolodipine 10mg daily  -Continue co-reg 12.5mg daily  -Started isosorbide dinitrate 20mg TID  Continuing to monitor drain output and trend H/H        12/11/17 BRUCE 1.  No evidence of endocarditis.  2.  Bubble study positive for grade I Patent Foramen  [place]vale.  3.  Preserved left ventricular function, ejection fraction 55% to 60%.  4.  No significant valvular heart disease.  There is trivial mitral and  tricuspid regurgitation.  5.  Some sclerosis of the aortic valve and ascending aorta, but no stenosis  and no dilatation of the aorta.  6.  Tiny pericardial effusion of no hemodynamic significance.  7.  Bubble study positive for grade I Patent Foramen [place]vale.  8.  Grade II atherosclerosis of descending aorta and arch.           ID   Tuberculosis    ID following: Recommendations:   - Obtain if possible AFB smear and culture from discitis region  - [facility] notified  to confirm therapy   - DOT dosing per ID  900mg Isoniazid, 600 mg rifampin with B6   - Does not requre airborne isolation, patient completed 2 weeks on inpatient RIPE          Discitis of thoracolumbar region    S/p corpectomy T12-L1 and T10-L3 fusion  Back tissue sent  From surgeryfor AFB smear and culture;aerobic/anerobic and fungal cultures  ID following   Discontinued TB precautions as pt fully tx'd        Immunology/Multi System   Sarcoidosis    See spinal cord compression            Prophylaxis:  Venous Thromboembolism: chemical  Stress Ulcer: None  Ventilator Pneumonia: not applicable     Activity Orders          Up with assistance starting at 03/21 0300    Ambulate with assistance starting at 03/17 0423        Full Code    Catracho Carolina MD  Neurocritical Care  [facility]

## 2018-03-22 NOTE — PROGRESS NOTES
[facility]  Neurosurgery  Progress Note    Subjective:     History of Present Illness: 61 yo F with history of Sarcoidosis, HTN, HLD, arthritis, gout, asthma Pott's disease who is a transfer from [facility]  to [facility] [place] with worsening pain and weakness of her lower extremities. 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  She was found to have MTB in her spine and lungs and was treated with RIPE for 2 weeks inpatient then discharged home for completion of treatment despite efforts of NH/SNF placement    Post-Op Info:  Procedure(s) (LRB):  CORPECTOMY THORACIC T12-L1 corpectomy with T10-L3 fusion, neuromonitoring, globus (Left)   4 Days Post-Op         Review of Systems    Objective:     Vitals:  Temp: 98.6 °F (37 °C)  Pulse: 65  Rhythm: normal sinus rhythm  BP: 136/64  MAP (mmHg): 92  Resp: 17  SpO2: 100 %    Temp  Min: 98 °F (36.7 °C)  Max: 99 °F (37.2 °C)  Pulse  Min: 65  Max: 102  BP  Min: 110/54  Max: 152/75  MAP (mmHg)  Min: 76  Max: 103  Resp  Min: 15  Max: 42  SpO2  Min: 47 %  Max: 100 %    03/21 0701 - 03/22 0700  In: 600 [P.O.:600]  Out: 560 [Urine:350; Drains:210]   Unmeasured Output  Urine Occurrence: 3  Stool Occurrence: 0       Physical Exam  Physical Exam:  GA: Awake, Alert, Extubated, Oriented, Follows commands comfortable, no acute distress.   HEENT: No scleral icterus or JVD.   Pulmonary: Clear to auscultation Anterior. No wheezing, crackles, or rhonchi.  Cardiac: RRR S1 & S2 w/o rubs/murmurs/gallops.   Abdominal: Bowel sounds present x 4. No appreciable hepatosplenomegaly.  Skin: No jaundice, rashes, or visible lesions.  Neuro:  --GCS: E4 V5 M6  --Mental Status: Awake, Alert, Extubated, Oriented, Follows Commands  --CN II-XII grossly intact.   --Pupils 4mm, PERRL.   --Corneal reflex, gag, cough  intact.  --LUE strength: 5/5  --RUE strength: 5/5  --LLE strength: 4/5  --RLE strength: 4/5  Incision bandaged, c/d/i, 2 HV drains in place.    Medications:  Continuous   Scheduled    amLODIPine 10 mg Daily   carvedilol 12.5 mg BID   gabapentin 300 mg Q8H   heparin (porcine) 5,000 Units Q8H   isoniazid 900 mg Twice Weekly   oxyCODONE 10 mg Q12H   polyethylene glycol 17 g Daily   pyridoxine (vitamin B6) 50 mg Daily   rifAMpin 600 mg Twice Weekly   sodium chloride 0.9% 1,000 mL Once   PRN    acetaminophen 650 mg Q4H PRN   dextrose 50% 12.5 g PRN   dextrose 50% 25 g PRN   glucagon (human recombinant) 1 mg PRN   glucose 16 g PRN   glucose 24 g PRN   insulin aspart U-100 0-5 Units QID (AC + HS) PRN   microfibrillar collagen 1 g PRN   oxyCODONE-acetaminophen 1 tablet Q4H PRN   senna 8.6 mg Daily PRN   senna-docusate 8.6-50 mg 1 tablet Daily PRN   sodium chloride 0.9% 5 mL PRN     Today I personally reviewed pertinent medications, lines/drains/airways, imaging, cardiology, lab results, microbiology results, notably:    Diet  Diet Adult Regular (IDDSI Level 7)  Diet Adult Regular (IDDSI Level 7)     CXR 3/20:  There is postoperative change, cardiomegaly, mild edema, aortic plaque, and slight improvement.    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    Interval discontinuation of subfascial hemovac drain.    --Continue care per primary team.  --Continue subfascial hemovac drain x 1.  --Continue prophylactic antiobiotics while drains in place.  --Continue TLSO brace.  --Pt cleared from neurosurgical perspective for stepdown to floor under medicine service.   --We will continue to monitor closely, please contact us with any questions or concerns.              Boston Malone MD  Neurosurgery  [facility]

## 2018-03-22 NOTE — PLAN OF CARE
Problem: Occupational Therapy Goal  Goal: Occupational Therapy Goal  Goals to be met by: 4/4   Patient will increase functional independence with ADLs by performing:    UE Dressing including TLSO brace with Moderate Assistance while seated.  LE Dressing with Moderate Assistance.  Grooming while seated at sink with Minimal Assistance.  Toileting from bedside commode with Moderate Assistance for hygiene and clothing management.   Sitting at edge of bed x20 minutes with CGA for postural control, no LOB.  Rolling to Bilateral with Minimal Assistance.   Supine to sit with Minimal Assistance while maintaining spinal precautions.  Squat pivot transfers to various surfaces (chair, BSC) with Moderate Assistance, use of AD as needed.  Upper extremity exercise program x15 reps per handout, with independence to increase endurance to functional tasks.  Pt/CG demo proper donning and doffing of TLSO brace with setup and assist as needed.   Pt verbalize and demo spinal precautions.        Outcome: Ongoing (interventions implemented as appropriate)  Goals remain appropriate. Pt progressing in POC. ALMA Albert 3/22/2018

## 2018-03-22 NOTE — PLAN OF CARE
Problem: Patient Care Overview  Goal: Plan of Care Review  Outcome: Ongoing (interventions implemented as appropriate)  POC reviewed with pt at 0500. Pt demonstrated and verbalized understanding. Questions and concerns addressed. No acute events overnight. Pt progressing toward goals. Will continue to monitor. See flowsheets for full assessment and VS info

## 2018-03-22 NOTE — SUBJECTIVE & OBJECTIVE
Review of Systems    Objective:     Vitals:  Temp: 98.6 °F (37 °C)  Pulse: 65  Rhythm: normal sinus rhythm  BP: 136/64  MAP (mmHg): 92  Resp: 17  SpO2: 100 %    Temp  Min: 98 °F (36.7 °C)  Max: 99 °F (37.2 °C)  Pulse  Min: 65  Max: 102  BP  Min: 110/54  Max: 152/75  MAP (mmHg)  Min: 76  Max: 103  Resp  Min: 15  Max: 42  SpO2  Min: 47 %  Max: 100 %    03/21 0701 - 03/22 0700  In: 600 [P.O.:600]  Out: 560 [Urine:350; Drains:210]   Unmeasured Output  Urine Occurrence: 3  Stool Occurrence: 0       Physical Exam  Physical Exam:  GA: Awake, Alert, Extubated, Oriented, Follows commands comfortable, no acute distress.   HEENT: No scleral icterus or JVD.   Pulmonary: Clear to auscultation Anterior. No wheezing, crackles, or rhonchi.  Cardiac: RRR S1 & S2 w/o rubs/murmurs/gallops.   Abdominal: Bowel sounds present x 4. No appreciable hepatosplenomegaly.  Skin: No jaundice, rashes, or visible lesions.  Neuro:  --GCS: E4 V5 M6  --Mental Status: Awake, Alert, Extubated, Oriented, Follows Commands  --CN II-XII grossly intact.   --Pupils 4mm, PERRL.   --Corneal reflex, gag, cough intact.  --LUE strength: 5/5  --RUE strength: 5/5  --LLE strength: 4/5  --RLE strength: 4/5  Incision bandaged, c/d/i, 2 HV drains in place.    Medications:  Continuous   Scheduled    amLODIPine 10 mg Daily   carvedilol 12.5 mg BID   gabapentin 300 mg Q8H   heparin (porcine) 5,000 Units Q8H   isoniazid 900 mg Twice Weekly   oxyCODONE 10 mg Q12H   polyethylene glycol 17 g Daily   pyridoxine (vitamin B6) 50 mg Daily   rifAMpin 600 mg Twice Weekly   sodium chloride 0.9% 1,000 mL Once   PRN    acetaminophen 650 mg Q4H PRN   dextrose 50% 12.5 g PRN   dextrose 50% 25 g PRN   glucagon (human recombinant) 1 mg PRN   glucose 16 g PRN   glucose 24 g PRN   insulin aspart U-100 0-5 Units QID (AC + HS) PRN   microfibrillar collagen 1 g PRN   oxyCODONE-acetaminophen 1 tablet Q4H PRN   senna 8.6 mg Daily PRN   senna-docusate 8.6-50 mg 1 tablet Daily PRN   sodium  chloride 0.9% 5 mL PRN     Today I personally reviewed pertinent medications, lines/drains/airways, imaging, cardiology, lab results, microbiology results, notably:    Diet  Diet Adult Regular (IDDSI Level 7)  Diet Adult Regular (IDDSI Level 7)     CXR 3/20:  There is postoperative change, cardiomegaly, mild edema, aortic plaque, and slight improvement.    Neurosurgery Physical Exam

## 2018-03-22 NOTE — ASSESSMENT & PLAN NOTE
62 F with hx of TB and now T12/L1 osteodiscitis concerning for pott's disease s/p T10-L3 fusion and T12,L1 corpectomy (3/18).    Interval discontinuation of subfascial hemovac drain.    --Continue care per primary team.  --Continue subfascial hemovac drain x 1.  --Continue prophylactic antiobiotics while drains in place.  --Continue TLSO brace.  --Pt cleared from neurosurgical perspective for stepdown to floor under medicine service.   --We will continue to monitor closely, please contact us with any questions or concerns.

## 2018-03-23 PROBLEM — A15.0 PULMONARY TUBERCULOSIS: Status: ACTIVE | Noted: 2017-12-27

## 2018-03-23 PROBLEM — D62 ACUTE BLOOD LOSS AS CAUSE OF POSTOPERATIVE ANEMIA: Status: ACTIVE | Noted: 2018-03-23

## 2018-03-23 LAB
ABO + RH BLD: NORMAL
ALBUMIN SERPL BCP-MCNC: 2 G/DL
ALP SERPL-CCNC: 61 U/L
ALT SERPL W/O P-5'-P-CCNC: 11 U/L
ANION GAP SERPL CALC-SCNC: 3 MMOL/L
AST SERPL-CCNC: 25 U/L
BASOPHILS # BLD AUTO: 0.04 K/UL
BASOPHILS # BLD AUTO: 0.05 K/UL
BASOPHILS NFR BLD: 0.3 %
BASOPHILS NFR BLD: 0.4 %
BILIRUB SERPL-MCNC: 0.4 MG/DL
BLD GP AB SCN CELLS X3 SERPL QL: NORMAL
BLD PROD TYP BPU: NORMAL
BLD PROD TYP BPU: NORMAL
BLOOD UNIT EXPIRATION DATE: NORMAL
BLOOD UNIT EXPIRATION DATE: NORMAL
BLOOD UNIT TYPE CODE: 5100
BLOOD UNIT TYPE CODE: 5100
BLOOD UNIT TYPE: NORMAL
BLOOD UNIT TYPE: NORMAL
BUN SERPL-MCNC: 16 MG/DL
CALCIUM SERPL-MCNC: 8.3 MG/DL
CHLORIDE SERPL-SCNC: 110 MMOL/L
CO2 SERPL-SCNC: 26 MMOL/L
CODING SYSTEM: NORMAL
CODING SYSTEM: NORMAL
CREAT SERPL-MCNC: 0.8 MG/DL
DIFFERENTIAL METHOD: ABNORMAL
DIFFERENTIAL METHOD: ABNORMAL
DISPENSE STATUS: NORMAL
DISPENSE STATUS: NORMAL
EOSINOPHIL # BLD AUTO: 0.4 K/UL
EOSINOPHIL # BLD AUTO: 0.4 K/UL
EOSINOPHIL NFR BLD: 3.2 %
EOSINOPHIL NFR BLD: 3.4 %
ERYTHROCYTE [DISTWIDTH] IN BLOOD BY AUTOMATED COUNT: 16.7 %
ERYTHROCYTE [DISTWIDTH] IN BLOOD BY AUTOMATED COUNT: 17 %
EST. GFR  (AFRICAN AMERICAN): >60 ML/MIN/1.73 M^2
EST. GFR  (NON AFRICAN AMERICAN): >60 ML/MIN/1.73 M^2
GLUCOSE SERPL-MCNC: 106 MG/DL
HCT VFR BLD AUTO: 20.7 %
HCT VFR BLD AUTO: 21.4 %
HGB BLD-MCNC: 6.6 G/DL
HGB BLD-MCNC: 6.8 G/DL
IMM GRANULOCYTES # BLD AUTO: 0.23 K/UL
IMM GRANULOCYTES # BLD AUTO: 0.31 K/UL
IMM GRANULOCYTES NFR BLD AUTO: 1.9 %
IMM GRANULOCYTES NFR BLD AUTO: 2.5 %
LYMPHOCYTES # BLD AUTO: 2.5 K/UL
LYMPHOCYTES # BLD AUTO: 2.5 K/UL
LYMPHOCYTES NFR BLD: 20 %
LYMPHOCYTES NFR BLD: 20.6 %
MAGNESIUM SERPL-MCNC: 1.7 MG/DL
MCH RBC QN AUTO: 29.5 PG
MCH RBC QN AUTO: 29.8 PG
MCHC RBC AUTO-ENTMCNC: 31.8 G/DL
MCHC RBC AUTO-ENTMCNC: 31.9 G/DL
MCV RBC AUTO: 92 FL
MCV RBC AUTO: 94 FL
MONOCYTES # BLD AUTO: 1.6 K/UL
MONOCYTES # BLD AUTO: 1.9 K/UL
MONOCYTES NFR BLD: 12.8 %
MONOCYTES NFR BLD: 15.5 %
NEUTROPHILS # BLD AUTO: 7.1 K/UL
NEUTROPHILS # BLD AUTO: 7.7 K/UL
NEUTROPHILS NFR BLD: 58.5 %
NEUTROPHILS NFR BLD: 60.9 %
NRBC BLD-RTO: 0 /100 WBC
NRBC BLD-RTO: 0 /100 WBC
PHOSPHATE SERPL-MCNC: 1.7 MG/DL
PLATELET # BLD AUTO: 185 K/UL
PLATELET # BLD AUTO: 206 K/UL
PMV BLD AUTO: 10 FL
PMV BLD AUTO: 10.4 FL
POCT GLUCOSE: 104 MG/DL (ref 70–110)
POCT GLUCOSE: 120 MG/DL (ref 70–110)
POCT GLUCOSE: 132 MG/DL (ref 70–110)
POCT GLUCOSE: 139 MG/DL (ref 70–110)
POCT GLUCOSE: 143 MG/DL (ref 70–110)
POTASSIUM SERPL-SCNC: 4.5 MMOL/L
PROT SERPL-MCNC: 5.4 G/DL
RBC # BLD AUTO: 2.24 M/UL
RBC # BLD AUTO: 2.28 M/UL
SODIUM SERPL-SCNC: 139 MMOL/L
TRANS ERYTHROCYTES VOL PATIENT: NORMAL ML
TRANS ERYTHROCYTES VOL PATIENT: NORMAL ML
WBC # BLD AUTO: 12.06 K/UL
WBC # BLD AUTO: 12.62 K/UL

## 2018-03-23 PROCEDURE — 86901 BLOOD TYPING SEROLOGIC RH(D): CPT

## 2018-03-23 PROCEDURE — 25000003 PHARM REV CODE 250: Performed by: PHYSICIAN ASSISTANT

## 2018-03-23 PROCEDURE — 97112 NEUROMUSCULAR REEDUCATION: CPT

## 2018-03-23 PROCEDURE — 36415 COLL VENOUS BLD VENIPUNCTURE: CPT

## 2018-03-23 PROCEDURE — 25000003 PHARM REV CODE 250: Performed by: STUDENT IN AN ORGANIZED HEALTH CARE EDUCATION/TRAINING PROGRAM

## 2018-03-23 PROCEDURE — 83735 ASSAY OF MAGNESIUM: CPT

## 2018-03-23 PROCEDURE — 25000003 PHARM REV CODE 250: Performed by: INTERNAL MEDICINE

## 2018-03-23 PROCEDURE — 20600001 HC STEP DOWN PRIVATE ROOM

## 2018-03-23 PROCEDURE — 86920 COMPATIBILITY TEST SPIN: CPT

## 2018-03-23 PROCEDURE — P9021 RED BLOOD CELLS UNIT: HCPCS

## 2018-03-23 PROCEDURE — 63600175 PHARM REV CODE 636 W HCPCS: Performed by: STUDENT IN AN ORGANIZED HEALTH CARE EDUCATION/TRAINING PROGRAM

## 2018-03-23 PROCEDURE — 80053 COMPREHEN METABOLIC PANEL: CPT

## 2018-03-23 PROCEDURE — 25000003 PHARM REV CODE 250: Performed by: NURSE PRACTITIONER

## 2018-03-23 PROCEDURE — 85025 COMPLETE CBC W/AUTO DIFF WBC: CPT | Mod: 91

## 2018-03-23 PROCEDURE — 97530 THERAPEUTIC ACTIVITIES: CPT

## 2018-03-23 PROCEDURE — 99232 SBSQ HOSP IP/OBS MODERATE 35: CPT | Mod: ,,, | Performed by: HOSPITALIST

## 2018-03-23 PROCEDURE — 84100 ASSAY OF PHOSPHORUS: CPT

## 2018-03-23 RX ORDER — RAMELTEON 8 MG/1
8 TABLET ORAL NIGHTLY PRN
Status: DISCONTINUED | OUTPATIENT
Start: 2018-03-23 | End: 2018-03-27 | Stop reason: HOSPADM

## 2018-03-23 RX ORDER — HYDROCODONE BITARTRATE AND ACETAMINOPHEN 500; 5 MG/1; MG/1
TABLET ORAL
Status: DISCONTINUED | OUTPATIENT
Start: 2018-03-23 | End: 2018-03-24

## 2018-03-23 RX ORDER — HYDROCODONE BITARTRATE AND ACETAMINOPHEN 500; 5 MG/1; MG/1
TABLET ORAL
Status: DISCONTINUED | OUTPATIENT
Start: 2018-03-23 | End: 2018-03-27 | Stop reason: HOSPADM

## 2018-03-23 RX ADMIN — AMLODIPINE BESYLATE 10 MG: 10 TABLET ORAL at 08:03

## 2018-03-23 RX ADMIN — OXYCODONE HYDROCHLORIDE 10 MG: 10 TABLET, FILM COATED, EXTENDED RELEASE ORAL at 08:03

## 2018-03-23 RX ADMIN — OXYCODONE AND ACETAMINOPHEN 1 TABLET: 10; 325 TABLET ORAL at 05:03

## 2018-03-23 RX ADMIN — HEPARIN SODIUM 5000 UNITS: 5000 INJECTION, SOLUTION INTRAVENOUS; SUBCUTANEOUS at 09:03

## 2018-03-23 RX ADMIN — ISOSORBIDE DINITRATE 20 MG: 10 TABLET ORAL at 09:03

## 2018-03-23 RX ADMIN — Medication 50 MG: at 08:03

## 2018-03-23 RX ADMIN — ISOSORBIDE DINITRATE 20 MG: 10 TABLET ORAL at 04:03

## 2018-03-23 RX ADMIN — HEPARIN SODIUM 5000 UNITS: 5000 INJECTION, SOLUTION INTRAVENOUS; SUBCUTANEOUS at 06:03

## 2018-03-23 RX ADMIN — OXYCODONE AND ACETAMINOPHEN 1 TABLET: 10; 325 TABLET ORAL at 01:03

## 2018-03-23 RX ADMIN — CARVEDILOL 12.5 MG: 6.25 TABLET, FILM COATED ORAL at 08:03

## 2018-03-23 RX ADMIN — ISOSORBIDE DINITRATE 20 MG: 10 TABLET ORAL at 08:03

## 2018-03-23 RX ADMIN — CARVEDILOL 12.5 MG: 6.25 TABLET, FILM COATED ORAL at 09:03

## 2018-03-23 RX ADMIN — HEPARIN SODIUM 5000 UNITS: 5000 INJECTION, SOLUTION INTRAVENOUS; SUBCUTANEOUS at 01:03

## 2018-03-23 RX ADMIN — RAMELTEON 8 MG: 8 TABLET, FILM COATED ORAL at 09:03

## 2018-03-23 RX ADMIN — OXYCODONE HYDROCHLORIDE 10 MG: 10 TABLET, FILM COATED, EXTENDED RELEASE ORAL at 09:03

## 2018-03-23 NOTE — SUBJECTIVE & OBJECTIVE
Interval History:  NAEON.  Pt reports continued back pain.  Denies new/worsening weakness today.  Tolerating diet.  Voiding.  Denies bowel/bladder dysfunction.        Medications:  Continuous Infusions:  Scheduled Meds:   amLODIPine  10 mg Oral Daily    carvedilol  12.5 mg Oral BID    heparin (porcine)  5,000 Units Subcutaneous Q8H    isoniazid  900 mg Oral Twice Weekly    isosorbide dinitrate  20 mg Oral TID    oxyCODONE  10 mg Oral Q12H    polyethylene glycol  17 g Oral Daily    pyridoxine (vitamin B6)  50 mg Oral Daily    rifAMpin  600 mg Oral Twice Weekly    senna-docusate 8.6-50 mg  2 tablet Oral Daily     PRN Meds:sodium chloride, acetaminophen, dextrose 50%, dextrose 50%, glucagon (human recombinant), glucose, glucose, insulin aspart U-100, magnesium oxide, magnesium oxide, microfibrillar collagen, oxyCODONE-acetaminophen, potassium chloride 10%, potassium chloride 10%, potassium chloride 10%, potassium, sodium phosphates, potassium, sodium phosphates, potassium, sodium phosphates, senna, sodium chloride 0.9%     Review of Systems  Objective:     Weight: 94.8 kg (208 lb 15.9 oz)  Body mass index is 34.78 kg/m².  Vital Signs (Most Recent):  Temp: 98.8 °F (37.1 °C) (03/23/18 1648)  Pulse: 85 (03/23/18 1648)  Resp: 18 (03/23/18 1648)  BP: 136/65 (03/23/18 1648)  SpO2: (!) 93 % (03/23/18 1648) Vital Signs (24h Range):  Temp:  [98.2 °F (36.8 °C)-99.8 °F (37.7 °C)] 98.8 °F (37.1 °C)  Pulse:  [85-98] 85  Resp:  [16-20] 18  SpO2:  [93 %-97 %] 93 %  BP: (102-136)/(53-84) 136/65                           Closed/Suction Drain 03/18/18 1627 Left Back Accordion 10 Fr. (Active)   Site Description Leaking at site 3/23/2018  8:00 AM   Dressing Type No dressing 3/23/2018  8:00 AM   Dressing Status Clean;Dry;Intact 3/23/2018  8:00 AM   Drainage Sanguineous 3/23/2018  8:00 AM   Status Other (Comment) 3/23/2018  8:00 AM   Output (mL) 100 mL 3/23/2018  6:00 AM       Neurosurgery Physical Exam   General: no  distress  Neurologic: Alert and oriented. Thought content appropriate.  Head: normocephalic  GCS: Motor: 6/Verbal: 5/Eyes: 4 GCS Total: 15  Cranial nerves: face symmetric, tongue midline, pupils equal, round, reactive to light with accomodation, extraocular muscles intact  Sensory: response to light touch throughout  Motor Strength: RLE 4/5, otherwise good strength upper and lower extremities  Pronator Drift: no drift noted  Finger to nose normal  No focal numbness or weakness  Lungs:  normal respiratory effort  Abdomen: soft, non-tender   Extremities: no cyanosis or edema, or clubbing      Significant Labs:    Recent Labs  Lab 03/22/18  0410 03/23/18  0446    106    139   K 3.1* 4.5    110   CO2 23 26   BUN 19 16   CREATININE 0.9 0.8   CALCIUM 8.0* 8.3*   MG 1.5* 1.7       Recent Labs  Lab 03/22/18  0410 03/23/18  0446 03/23/18  1536   WBC 12.74* 12.06 12.62   HGB 7.4* 6.6* 6.8*   HCT 23.0* 20.7* 21.4*   * 185 206     No results for input(s): LABPT, INR, APTT in the last 48 hours.  Microbiology Results (last 7 days)     Procedure Component Value Units Date/Time    Culture, Anaerobe [[id number]] Collected:  03/18/18 1533    Order Status:  Completed Specimen:  Tissue from Back Updated:  03/23/18 1341     Anaerobic Culture Culture in progress     Anaerobic Culture Culture in progress    Narrative:       3. T12-L1 swab#2 Tuberculosis infection    Culture, Anaerobe [[id number]] Collected:  03/18/18 1533    Order Status:  Completed Specimen:  Tissue from Back Updated:  03/23/18 1341     Anaerobic Culture Culture in progress     Anaerobic Culture Culture in progress    Narrative:       2. T12-L1 swab#1 Tuberculosis infection    Culture, Anaerobe [[id number]] Collected:  03/18/18 1533    Order Status:  Completed Specimen:  Tissue from Back Updated:  03/23/18 1340     Anaerobic Culture Culture in progress     Anaerobic Culture Culture in progress    Narrative:       1. T12-L1 bones and tissues  Tuberculosis infection    Aerobic culture [[id number]] Collected:  03/18/18 1533    Order Status:  Completed Specimen:  Tissue from Back Updated:  03/22/18 1259     Aerobic Bacterial Culture No growth    Narrative:       1. T12-L1 bones and tissues Tuberculosis infection    Fungus culture [[id number]] Collected:  03/18/18 1533    Order Status:  Completed Specimen:  Tissue from Back Updated:  03/22/18 1109     Fungus (Mycology) Culture Culture in progress    Narrative:       2. T12-L1 swab#1 Tuberculosis infection    Fungus culture [[id number]] Collected:  03/18/18 1533    Order Status:  Completed Specimen:  Tissue from Back Updated:  03/22/18 1109     Fungus (Mycology) Culture Culture in progress    Narrative:       1. T12-L1 bones and tissues Tuberculosis infection    Fungus culture [[id number]] Collected:  03/18/18 1533    Order Status:  Completed Specimen:  Tissue from Back Updated:  03/22/18 1109     Fungus (Mycology) Culture Culture in progress    Narrative:       3. T12-L1 swab#2 Tuberculosis infection    Aerobic culture [[id number]] Collected:  03/18/18 1533    Order Status:  Completed Specimen:  Tissue from Back Updated:  03/21/18 1043     Aerobic Bacterial Culture No growth    Narrative:       3. T12-L1 swab#2 Tuberculosis infection    Aerobic culture [[id number]] Collected:  03/18/18 1533    Order Status:  Completed Specimen:  Tissue from Back Updated:  03/21/18 1043     Aerobic Bacterial Culture No growth    Narrative:       2. T12-L1 swab#1 Tuberculosis infection    AFB Culture & Smear [[id number]] Collected:  03/18/18 1533    Order Status:  Completed Specimen:  Tissue from Back Updated:  03/19/18 2127     AFB Culture & Smear Culture in progress     AFB CULTURE STAIN No acid fast bacilli seen.    Narrative:       1. T12-L1 bones and tissues Tuberculosis infection    AFB Culture & Smear [[id number]] Collected:  03/18/18 1533    Order Status:  Completed Specimen:  Tissue from Back Updated:  03/19/18 2127     AFB  Culture & Smear Culture in progress     AFB CULTURE STAIN No acid fast bacilli seen.    Narrative:       2. T12-L1 swab#1 Tuberculosis infection    AFB Culture & Smear [[id number]] Collected:  03/18/18 1533    Order Status:  Completed Specimen:  Tissue from Back Updated:  03/19/18 2127     AFB Culture & Smear Culture in progress     AFB CULTURE STAIN No acid fast bacilli seen.    Narrative:       3. T12-L1 swab#2 Tuberculosis infection    Gram stain [[id number]] Collected:  03/18/18 1533    Order Status:  Completed Specimen:  Tissue from Back Updated:  03/18/18 1724     Gram Stain Result No WBC's      No organisms seen    Narrative:       3. T12-L1 swab#2 Tuberculosis infection    Gram stain [[id number]] Collected:  03/18/18 1533    Order Status:  Completed Specimen:  Tissue from Back Updated:  03/18/18 1720     Gram Stain Result Rare WBC's      No organisms seen    Narrative:       1. T12-L1 bones and tissues Tuberculosis infection    Gram stain [[id number]] Collected:  03/18/18 1533    Order Status:  Completed Specimen:  Tissue from Back Updated:  03/18/18 1705     Gram Stain Result Rare WBC's      No organisms seen    Narrative:       2. T12-L1 swab#1 Tuberculosis infection    AFB Culture & Smear [[id number]]     Order Status:  No result Specimen:  Respiratory from Sputum, Expectorated     AFB Culture & Smear [[id number]]     Order Status:  No result Specimen:  Respiratory from Sputum, Expectorated     AFB Culture & Smear [[id number]]     Order Status:  No result Specimen:  Respiratory from Sputum, Expectorated

## 2018-03-23 NOTE — PLAN OF CARE
Problem: Occupational Therapy Goal  Goal: Occupational Therapy Goal  Goals to be met by: 4/4   Patient will increase functional independence with ADLs by performing:    UE Dressing including TLSO brace with Moderate Assistance while seated.  LE Dressing with Moderate Assistance.  Grooming while seated at sink with Minimal Assistance.  Toileting from bedside commode with Moderate Assistance for hygiene and clothing management.   Sitting at edge of bed x20 minutes with CGA for postural control, no LOB.  Rolling to Bilateral with Minimal Assistance.   Supine to sit with Minimal Assistance while maintaining spinal precautions.  Squat pivot transfers to various surfaces (chair, BSC) with Moderate Assistance, use of AD as needed.  Upper extremity exercise program x15 reps per handout, with independence to increase endurance to functional tasks.  Pt/CG demo proper donning and doffing of TLSO brace with setup and assist as needed.   Pt verbalize and demo spinal precautions.        Outcome: Ongoing (interventions implemented as appropriate)  Goals remain appropriate. Pt progressing well in POC. ALMA Albert 3/23/2018

## 2018-03-23 NOTE — PLAN OF CARE
Problem: Patient Care Overview  Goal: Plan of Care Review  Outcome: Ongoing (interventions implemented as appropriate)  No acute events.  Patient AAOx4 resting comfortably in room, calm and in no distress. AVSS.  Standard precautions maintained and patient remains afebrile.  H/H 6.6/20.7 (significant drop from previous day).  Still awaiting orders to transfuse PRBC.  Worked with PT/OT today; sat up in med chair for several hours midday. Remained free of falls. Surgical site open to air with staples, clean dry and intact.  Pain well controlled with PRN percocet.  BG checks continued Q6h.    Hemovac drain to remain in place for new more days, per neurosurgery.  Small amount of drainage visible about drain site, which is open to air.  Side rails up x2, call light in reach.  WCTM.

## 2018-03-23 NOTE — PROGRESS NOTES
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  She was found to have MTB in her spine and lungs and was treated with RIPE for 2 weeks inpatient then discharged home for completion of treatment despite efforts of NH/SNF placement    Post-Op Info:  Procedure(s) (LRB):  CORPECTOMY THORACIC T12-L1 corpectomy with T10-L3 fusion, neuromonitoring, globus (Left)   5 Days Post-Op     Interval History:  NAEON.  Pt reports continued back pain.  Denies new/worsening weakness today.  Tolerating diet.  Voiding.  Denies bowel/bladder dysfunction.        Medications:  Continuous Infusions:  Scheduled Meds:   amLODIPine  10 mg Oral Daily    carvedilol  12.5 mg Oral BID    heparin (porcine)  5,000 Units Subcutaneous Q8H    isoniazid  900 mg Oral Twice Weekly    isosorbide dinitrate  20 mg Oral TID    oxyCODONE  10 mg Oral Q12H    polyethylene glycol  17 g Oral Daily    pyridoxine (vitamin B6)  50 mg Oral Daily    rifAMpin  600 mg Oral Twice Weekly    senna-docusate 8.6-50 mg  2 tablet Oral Daily     PRN Meds:sodium chloride, acetaminophen, dextrose 50%, dextrose 50%, glucagon (human recombinant), glucose, glucose, insulin aspart U-100, magnesium oxide, magnesium oxide, microfibrillar collagen, oxyCODONE-acetaminophen, potassium chloride 10%, potassium chloride 10%, potassium chloride 10%, potassium, sodium phosphates, potassium, sodium phosphates, potassium, sodium phosphates, senna, sodium chloride 0.9%     Review of Systems  Objective:     Weight:  94.8 kg (208 lb 15.9 oz)  Body mass index is 34.78 kg/m².  Vital Signs (Most Recent):  Temp: 98.8 °F (37.1 °C) (03/23/18 1648)  Pulse: 85 (03/23/18 1648)  Resp: 18 (03/23/18 1648)  BP: 136/65 (03/23/18 1648)  SpO2: (!) 93 % (03/23/18 1648) Vital Signs (24h Range):  Temp:  [98.2 °F (36.8 °C)-99.8 °F (37.7 °C)] 98.8 °F (37.1 °C)  Pulse:  [85-98] 85  Resp:  [16-20] 18  SpO2:  [93 %-97 %] 93 %  BP: (102-136)/(53-84) 136/65                           Closed/Suction Drain 03/18/18 1627 Left Back Accordion 10 Fr. (Active)   Site Description Leaking at site 3/23/2018  8:00 AM   Dressing Type No dressing 3/23/2018  8:00 AM   Dressing Status Clean;Dry;Intact 3/23/2018  8:00 AM   Drainage Sanguineous 3/23/2018  8:00 AM   Status Other (Comment) 3/23/2018  8:00 AM   Output (mL) 100 mL 3/23/2018  6:00 AM       Neurosurgery Physical Exam   General: no distress  Neurologic: Alert and oriented. Thought content appropriate.  Head: normocephalic  GCS: Motor: 6/Verbal: 5/Eyes: 4 GCS Total: 15  Cranial nerves: face symmetric, tongue midline, pupils equal, round, reactive to light with accomodation, extraocular muscles intact  Sensory: response to light touch throughout  Motor Strength: RLE 4/5, otherwise good strength upper and lower extremities  Pronator Drift: no drift noted  Finger to nose normal  No focal numbness or weakness  Lungs:  normal respiratory effort  Abdomen: soft, non-tender   Extremities: no cyanosis or edema, or clubbing      Significant Labs:    Recent Labs  Lab 03/22/18  0410 03/23/18  0446    106    139   K 3.1* 4.5    110   CO2 23 26   BUN 19 16   CREATININE 0.9 0.8   CALCIUM 8.0* 8.3*   MG 1.5* 1.7       Recent Labs  Lab 03/22/18  0410 03/23/18  0446 03/23/18  1536   WBC 12.74* 12.06 12.62   HGB 7.4* 6.6* 6.8*   HCT 23.0* 20.7* 21.4*   * 185 206     No results for input(s): LABPT, INR, APTT in the last 48 hours.  Microbiology Results (last 7 days)     Procedure Component Value Units  Date/Time    Culture, Anaerobe [[id number]] Collected:  03/18/18 1533    Order Status:  Completed Specimen:  Tissue from Back Updated:  03/23/18 1341     Anaerobic Culture Culture in progress     Anaerobic Culture Culture in progress    Narrative:       3. T12-L1 swab#2 Tuberculosis infection    Culture, Anaerobe [[id number]] Collected:  03/18/18 1533    Order Status:  Completed Specimen:  Tissue from Back Updated:  03/23/18 1341     Anaerobic Culture Culture in progress     Anaerobic Culture Culture in progress    Narrative:       2. T12-L1 swab#1 Tuberculosis infection    Culture, Anaerobe [[id number]] Collected:  03/18/18 1533    Order Status:  Completed Specimen:  Tissue from Back Updated:  03/23/18 1340     Anaerobic Culture Culture in progress     Anaerobic Culture Culture in progress    Narrative:       1. T12-L1 bones and tissues Tuberculosis infection    Aerobic culture [[id number]] Collected:  03/18/18 1533    Order Status:  Completed Specimen:  Tissue from Back Updated:  03/22/18 1259     Aerobic Bacterial Culture No growth    Narrative:       1. T12-L1 bones and tissues Tuberculosis infection    Fungus culture [[id number]] Collected:  03/18/18 1533    Order Status:  Completed Specimen:  Tissue from Back Updated:  03/22/18 1109     Fungus (Mycology) Culture Culture in progress    Narrative:       2. T12-L1 swab#1 Tuberculosis infection    Fungus culture [[id number]] Collected:  03/18/18 1533    Order Status:  Completed Specimen:  Tissue from Back Updated:  03/22/18 1109     Fungus (Mycology) Culture Culture in progress    Narrative:       1. T12-L1 bones and tissues Tuberculosis infection    Fungus culture [[id number]] Collected:  03/18/18 1533    Order Status:  Completed Specimen:  Tissue from Back Updated:  03/22/18 1109     Fungus (Mycology) Culture Culture in progress    Narrative:       3. T12-L1 swab#2 Tuberculosis infection    Aerobic culture [[id number]] Collected:  03/18/18 1533    Order Status:   Completed Specimen:  Tissue from Back Updated:  03/21/18 1043     Aerobic Bacterial Culture No growth    Narrative:       3. T12-L1 swab#2 Tuberculosis infection    Aerobic culture [[id number]] Collected:  03/18/18 1533    Order Status:  Completed Specimen:  Tissue from Back Updated:  03/21/18 1043     Aerobic Bacterial Culture No growth    Narrative:       2. T12-L1 swab#1 Tuberculosis infection    AFB Culture & Smear [[id number]] Collected:  03/18/18 1533    Order Status:  Completed Specimen:  Tissue from Back Updated:  03/19/18 2127     AFB Culture & Smear Culture in progress     AFB CULTURE STAIN No acid fast bacilli seen.    Narrative:       1. T12-L1 bones and tissues Tuberculosis infection    AFB Culture & Smear [[id number]] Collected:  03/18/18 1533    Order Status:  Completed Specimen:  Tissue from Back Updated:  03/19/18 2127     AFB Culture & Smear Culture in progress     AFB CULTURE STAIN No acid fast bacilli seen.    Narrative:       2. T12-L1 swab#1 Tuberculosis infection    AFB Culture & Smear [[id number]] Collected:  03/18/18 1533    Order Status:  Completed Specimen:  Tissue from Back Updated:  03/19/18 2127     AFB Culture & Smear Culture in progress     AFB CULTURE STAIN No acid fast bacilli seen.    Narrative:       3. T12-L1 swab#2 Tuberculosis infection    Gram stain [[id number]] Collected:  03/18/18 1533    Order Status:  Completed Specimen:  Tissue from Back Updated:  03/18/18 1724     Gram Stain Result No WBC's      No organisms seen    Narrative:       3. T12-L1 swab#2 Tuberculosis infection    Gram stain [[id number]] Collected:  03/18/18 1533    Order Status:  Completed Specimen:  Tissue from Back Updated:  03/18/18 1720     Gram Stain Result Rare WBC's      No organisms seen    Narrative:       1. T12-L1 bones and tissues Tuberculosis infection    Gram stain [[id number]] Collected:  03/18/18 1533    Order Status:  Completed Specimen:  Tissue from Back Updated:  03/18/18 1705     Gram Stain  Result Rare WBC's      No organisms seen    Narrative:       2. 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 Continue prophylactic antiobiotics while drains in place.  - Continue TLSO brace  - Inpatient Rehab pending   - Will continue to follow, discussed with Dr. Wiliam Napier PA  Neurosurgery  [facility]

## 2018-03-23 NOTE — ASSESSMENT & PLAN NOTE
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 Continue prophylactic antiobiotics while drains in place.  - Continue TLSO brace  - Inpatient Rehab pending   - Will continue to follow, discussed with Dr. Swain

## 2018-03-23 NOTE — PLAN OF CARE
3:04 PM  SW received notification from Adelita with [facility] in [place] who stated they are not just a rehab they are also a LTAC. [facility] only has rehab in [place]. Faxed referral to [facility] also faxed referral to [facility].     Tonya Hyman, DORCAS   [facility]  Ext [phone]

## 2018-03-23 NOTE — PLAN OF CARE
10:52 AM  SW called daughter to indicate preferences chosen for rehab. Daughter stated she would like rehab facility in [place]. Informed daughter that [facility] re[facility] is in [place]. Daughter would like [facility] rehab. Contacted Nellie with [facility] rehab and he will forward packet to their [place] location. Will f/u as needed.      Tonya Hyman LMSW   [facility]  Ext [phone]

## 2018-03-23 NOTE — PT/OT/SLP PROGRESS
"Occupational Therapy   Treatment    Name: Mary Carrillo  MRN: [id number]  Admitting Diagnosis:  Spinal cord compression  5 Days Post-Op    Recommendations:     Discharge Recommendations: rehabilitation facility  Discharge Equipment Recommendations:   (TBD )  Barriers to discharge:   (level of skilled assistance required )    Subjective     Communicated with: RN prior to session. Son and daughter in law present for return session.   "it felt wonderful (to sit up in chair)"  Pain/Comfort:  · Pain Rating 1: 5/10  · Location - Side 1: Bilateral  · Location - Orientation 1: generalized  · Location 1: back  · Pain Addressed 1: Pre-medicate for activity, Reposition, Cessation of Activity, Distraction  · Pain Rating Post-Intervention 1: 5/10    Patients cultural, spiritual, Sikh conflicts given the current situation: none reported     Objective:     Patient found with: peripheral IV, hemovac, PureWick    General Precautions: Standard, fall   Orthopedic Precautions:spinal precautions   Braces: TLSO     Occupational Performance:    Bed Mobility:    *TLSO donned EOB, remained throughout session and return session, doffed EOB*  · *spinal precautions maintained throughout bed mobility tasks  · Patient completed Rolling/Turning to Left with  moderate assistance, with side rail and HOB flat   · Patient completed Scooting/Bridging with maximal assistance and in forward plane to EOB   · Max A to scoot laterally to R x2 at EOB   · Mod-max A for forward trunk flexion and raising B hips from bed x2 trials as prep for transfer  · Patient completed Supine to Sit with mod-max assist, HOB flat   · Patient completed Sit to Supine with maximal assistance and HOB flat      Functional Mobility/Transfers:  · Patient completed Bed <> Chair Transfer x2 trials using Squat Pivot technique to the medichair with maximal assistance with no assistive device  · Functional Mobility: Not appropriate at this time due to impaired postural control and " decreased BLE functioning    Activities of Daily Living:  · Feeding:  modified independence after setup to eat lunch while seated in medichair  · Grooming: modified independence after setup to wipe face and to clean hands while seated in medichair  · UB Dressing: maximal assistance to don gown like jacket and to don TLSO brace while seated EOB     Patient left up in medichair with SCDs on and seatbelt fastened  with all lines intact, call button in reach, RN notified after each session and son and daughter in law present after return session.     [facility] 6 Click:  [facility] Total Score: 15    Treatment & Education:  -Communication board updated  -Family education for progress during return session  -Education for OT role and POC  -Education for log roll method due to spinal precautions  -Education for diaphragmatic breathing  -Education for preparatory activity of scooting as prep for transfers    -Pt demo increased anxiety with transfers, but demo improvement with t/f during return session from medichair to bed   -Pt sat EOB with CGA-min A for postural control and BUE support for ~12 min before transferring to medichair  -Pt tolerated sitting up in medichair with TLSO brace donned for 1 hour this date  Education:    Assessment:     Mary Carrillo is a 62 y.o. female with a medical diagnosis of Spinal cord compression.  She presents with impaired functional independence across various areas of her life. She demo improved sitting balance and postural control this date, but continues to require BUE support. She demo fear of falling and anxiety regarding transfer to medichair; this was improved during return session for transfer from medichair to bed. Pt demo good motivation other than fear. She would continue to benefit from skilled OT services for maximum functional outcome and safety. Performance deficits affecting function are weakness, impaired endurance, impaired sensation, gait instability, impaired functional mobilty,  impaired self care skills, impaired balance, decreased lower extremity function, decreased upper extremity function, decreased coordination, pain, impaired coordination, impaired cardiopulmonary response to activity, orthopedic precautions.      Rehab Prognosis:  good; patient would benefit from acute skilled OT services to address these deficits and reach maximum level of function.       Plan:     Patient to be seen 4 x/week to address the above listed problems via self-care/home management, therapeutic activities, therapeutic exercises, neuromuscular re-education  · Plan of Care Expires: 04/19/18  · Plan of Care Reviewed with: patient, son (daughter in law)    This Plan of care has been discussed with the patient who was involved in its development and understands and is in agreement with the identified goals and treatment plan    GOALS:    Occupational Therapy Goals        Problem: Occupational Therapy Goal    Goal Priority Disciplines Outcome Interventions   Occupational Therapy Goal     OT, PT/OT Ongoing (interventions implemented as appropriate)    Description:  Goals to be met by: 4/4   Patient will increase functional independence with ADLs by performing:    UE Dressing including TLSO brace with Moderate Assistance while seated.  LE Dressing with Moderate Assistance.  Grooming while seated at sink with Minimal Assistance.  Toileting from bedside commode with Moderate Assistance for hygiene and clothing management.   Sitting at edge of bed x20 minutes with CGA for postural control, no LOB.  Rolling to Bilateral with Minimal Assistance.   Supine to sit with Minimal Assistance while maintaining spinal precautions.  Squat pivot transfers to various surfaces (chair, BSC) with Moderate Assistance, use of AD as needed.  Upper extremity exercise program x15 reps per handout, with independence to increase endurance to functional tasks.  Pt/CG demo proper donning and doffing of TLSO brace with setup and assist as  needed.   Pt verbalize and demo spinal precautions.                         Time Tracking:     OT Date of Treatment: 03/23/18  OT Start Time: 1029 (return 1152)  OT Stop Time: 1100 (return end 1204; total time 43min)  OT Total Time (min): 31 min    Billable Minutes:Therapeutic Activity 33  Neuromuscular Re-education 10    ALMA Albert  3/23/2018

## 2018-03-23 NOTE — CONSULTS
Inpatient consult to Physical Medicine Rehab  Consult performed by: MAEGAN JAY  Consult ordered by: BO GARCIA  Reason for consult: rehab evaluation      Reviewed patient history and current admission.  Rehab team following.  Full consult to follow.    SULEMAN Cazares, FNP-C  Physical Medicine & Rehabilitation   03/23/2018  Spectralink: [id number]

## 2018-03-24 PROBLEM — R10.84 GENERALIZED ABDOMINAL PAIN: Status: ACTIVE | Noted: 2018-03-24

## 2018-03-24 LAB
ALBUMIN SERPL BCP-MCNC: 2.1 G/DL
ALP SERPL-CCNC: 67 U/L
ALT SERPL W/O P-5'-P-CCNC: 11 U/L
ANION GAP SERPL CALC-SCNC: 4 MMOL/L
AST SERPL-CCNC: 29 U/L
BASOPHILS # BLD AUTO: 0.05 K/UL
BASOPHILS NFR BLD: 0.3 %
BILIRUB SERPL-MCNC: 1.4 MG/DL
BUN SERPL-MCNC: 12 MG/DL
CALCIUM SERPL-MCNC: 8.3 MG/DL
CHLORIDE SERPL-SCNC: 106 MMOL/L
CO2 SERPL-SCNC: 26 MMOL/L
CREAT SERPL-MCNC: 0.7 MG/DL
DIFFERENTIAL METHOD: ABNORMAL
EOSINOPHIL # BLD AUTO: 0.4 K/UL
EOSINOPHIL NFR BLD: 2.1 %
ERYTHROCYTE [DISTWIDTH] IN BLOOD BY AUTOMATED COUNT: 18.4 %
EST. GFR  (AFRICAN AMERICAN): >60 ML/MIN/1.73 M^2
EST. GFR  (NON AFRICAN AMERICAN): >60 ML/MIN/1.73 M^2
GLUCOSE SERPL-MCNC: 91 MG/DL
HCT VFR BLD AUTO: 27.8 %
HGB BLD-MCNC: 9 G/DL
IMM GRANULOCYTES # BLD AUTO: 0.5 K/UL
IMM GRANULOCYTES NFR BLD AUTO: 2.9 %
LYMPHOCYTES # BLD AUTO: 2.6 K/UL
LYMPHOCYTES NFR BLD: 14.8 %
MAGNESIUM SERPL-MCNC: 1.5 MG/DL
MCH RBC QN AUTO: 28.6 PG
MCHC RBC AUTO-ENTMCNC: 32.4 G/DL
MCV RBC AUTO: 88 FL
MONOCYTES # BLD AUTO: 2 K/UL
MONOCYTES NFR BLD: 11.3 %
NEUTROPHILS # BLD AUTO: 11.9 K/UL
NEUTROPHILS NFR BLD: 68.6 %
NRBC BLD-RTO: 0 /100 WBC
PHOSPHATE SERPL-MCNC: 1.9 MG/DL
PLATELET # BLD AUTO: 209 K/UL
PMV BLD AUTO: 10.6 FL
POCT GLUCOSE: 100 MG/DL (ref 70–110)
POTASSIUM SERPL-SCNC: 4.5 MMOL/L
PROT SERPL-MCNC: 5.8 G/DL
RBC # BLD AUTO: 3.15 M/UL
SODIUM SERPL-SCNC: 136 MMOL/L
WBC # BLD AUTO: 17.38 K/UL

## 2018-03-24 PROCEDURE — 99232 SBSQ HOSP IP/OBS MODERATE 35: CPT | Mod: ,,, | Performed by: HOSPITALIST

## 2018-03-24 PROCEDURE — 36430 TRANSFUSION BLD/BLD COMPNT: CPT

## 2018-03-24 PROCEDURE — 25000003 PHARM REV CODE 250: Performed by: NURSE PRACTITIONER

## 2018-03-24 PROCEDURE — 25000003 PHARM REV CODE 250: Performed by: PHYSICIAN ASSISTANT

## 2018-03-24 PROCEDURE — 80053 COMPREHEN METABOLIC PANEL: CPT

## 2018-03-24 PROCEDURE — 85025 COMPLETE CBC W/AUTO DIFF WBC: CPT

## 2018-03-24 PROCEDURE — 25000003 PHARM REV CODE 250: Performed by: HOSPITALIST

## 2018-03-24 PROCEDURE — 83735 ASSAY OF MAGNESIUM: CPT

## 2018-03-24 PROCEDURE — 84100 ASSAY OF PHOSPHORUS: CPT

## 2018-03-24 PROCEDURE — 25000003 PHARM REV CODE 250: Performed by: INTERNAL MEDICINE

## 2018-03-24 PROCEDURE — 25000003 PHARM REV CODE 250: Performed by: STUDENT IN AN ORGANIZED HEALTH CARE EDUCATION/TRAINING PROGRAM

## 2018-03-24 PROCEDURE — 63600175 PHARM REV CODE 636 W HCPCS: Performed by: STUDENT IN AN ORGANIZED HEALTH CARE EDUCATION/TRAINING PROGRAM

## 2018-03-24 PROCEDURE — 20600001 HC STEP DOWN PRIVATE ROOM

## 2018-03-24 PROCEDURE — 36415 COLL VENOUS BLD VENIPUNCTURE: CPT

## 2018-03-24 RX ORDER — LANOLIN ALCOHOL/MO/W.PET/CERES
400 CREAM (GRAM) TOPICAL 2 TIMES DAILY
Status: COMPLETED | OUTPATIENT
Start: 2018-03-24 | End: 2018-03-24

## 2018-03-24 RX ORDER — HYDROXYZINE PAMOATE 25 MG/1
50 CAPSULE ORAL EVERY 8 HOURS PRN
Status: DISCONTINUED | OUTPATIENT
Start: 2018-03-24 | End: 2018-03-27 | Stop reason: HOSPADM

## 2018-03-24 RX ORDER — SODIUM,POTASSIUM PHOSPHATES 280-250MG
2 POWDER IN PACKET (EA) ORAL ONCE
Status: COMPLETED | OUTPATIENT
Start: 2018-03-24 | End: 2018-03-24

## 2018-03-24 RX ORDER — HYDROXYZINE PAMOATE 25 MG/1
50 CAPSULE ORAL ONCE
Status: COMPLETED | OUTPATIENT
Start: 2018-03-24 | End: 2018-03-24

## 2018-03-24 RX ORDER — SIMETHICONE 80 MG
1 TABLET,CHEWABLE ORAL 3 TIMES DAILY PRN
Status: DISCONTINUED | OUTPATIENT
Start: 2018-03-24 | End: 2018-03-27 | Stop reason: HOSPADM

## 2018-03-24 RX ORDER — ALBUTEROL SULFATE 0.83 MG/ML
2.5 SOLUTION RESPIRATORY (INHALATION) EVERY 6 HOURS PRN
Status: DISCONTINUED | OUTPATIENT
Start: 2018-03-24 | End: 2018-03-27 | Stop reason: HOSPADM

## 2018-03-24 RX ORDER — DICYCLOMINE HYDROCHLORIDE 10 MG/1
10 CAPSULE ORAL 4 TIMES DAILY
Status: DISCONTINUED | OUTPATIENT
Start: 2018-03-24 | End: 2018-03-25

## 2018-03-24 RX ADMIN — HEPARIN SODIUM 5000 UNITS: 5000 INJECTION, SOLUTION INTRAVENOUS; SUBCUTANEOUS at 09:03

## 2018-03-24 RX ADMIN — DICYCLOMINE HYDROCHLORIDE 10 MG: 10 CAPSULE ORAL at 03:03

## 2018-03-24 RX ADMIN — OXYCODONE AND ACETAMINOPHEN 1 TABLET: 10; 325 TABLET ORAL at 05:03

## 2018-03-24 RX ADMIN — HEPARIN SODIUM 5000 UNITS: 5000 INJECTION, SOLUTION INTRAVENOUS; SUBCUTANEOUS at 03:03

## 2018-03-24 RX ADMIN — POTASSIUM & SODIUM PHOSPHATES POWDER PACK 280-160-250 MG 2 PACKET: 280-160-250 PACK at 12:03

## 2018-03-24 RX ADMIN — ISOSORBIDE DINITRATE 20 MG: 10 TABLET ORAL at 08:03

## 2018-03-24 RX ADMIN — OXYCODONE AND ACETAMINOPHEN 1 TABLET: 10; 325 TABLET ORAL at 12:03

## 2018-03-24 RX ADMIN — Medication 50 MG: at 08:03

## 2018-03-24 RX ADMIN — OXYCODONE HYDROCHLORIDE 10 MG: 10 TABLET, FILM COATED, EXTENDED RELEASE ORAL at 08:03

## 2018-03-24 RX ADMIN — MAGNESIUM OXIDE TAB 400 MG (241.3 MG ELEMENTAL MG) 400 MG: 400 (241.3 MG) TAB at 12:03

## 2018-03-24 RX ADMIN — DICYCLOMINE HYDROCHLORIDE 10 MG: 10 CAPSULE ORAL at 12:03

## 2018-03-24 RX ADMIN — OXYCODONE AND ACETAMINOPHEN 1 TABLET: 10; 325 TABLET ORAL at 04:03

## 2018-03-24 RX ADMIN — ISOSORBIDE DINITRATE 20 MG: 10 TABLET ORAL at 03:03

## 2018-03-24 RX ADMIN — CARVEDILOL 12.5 MG: 6.25 TABLET, FILM COATED ORAL at 08:03

## 2018-03-24 RX ADMIN — HYDROXYZINE PAMOATE 50 MG: 25 CAPSULE ORAL at 08:03

## 2018-03-24 RX ADMIN — MAGNESIUM OXIDE TAB 400 MG (241.3 MG ELEMENTAL MG) 400 MG: 400 (241.3 MG) TAB at 08:03

## 2018-03-24 RX ADMIN — DICYCLOMINE HYDROCHLORIDE 10 MG: 10 CAPSULE ORAL at 08:03

## 2018-03-24 RX ADMIN — HEPARIN SODIUM 5000 UNITS: 5000 INJECTION, SOLUTION INTRAVENOUS; SUBCUTANEOUS at 06:03

## 2018-03-24 RX ADMIN — RAMELTEON 8 MG: 8 TABLET, FILM COATED ORAL at 08:03

## 2018-03-24 RX ADMIN — AMLODIPINE BESYLATE 10 MG: 10 TABLET ORAL at 08:03

## 2018-03-24 NOTE — PLAN OF CARE
Problem: Patient Care Overview  Goal: Plan of Care Review  Outcome: Ongoing (interventions implemented as appropriate)  Pt Aox4, Pt received 2 Units of PRBS during shift. Pt complains of pain in back incision. Pts BP had become elevated during am hours and pt complains of SOB and states she has asthma and takes an inhaler @ home. Pt lungs sound are clear and equal. Med A paged and waiting for call to be returned. Safety maintained and WCTM.

## 2018-03-24 NOTE — NURSING
AAO x4, VSS, pain well controlled with PO medication. 2 assist OOB with brace, but pt wanted to remain in bed throughout shift. C/o abdominal cramping - Bentyl ordered. C/o anxiety and insomnia at beginning of shift - Vistiril ordered. Spinal incision well approximated, accordion drain in place. Voiding on bedpan, no BM today. Plan for d/c to SNF or LTACH.

## 2018-03-24 NOTE — ASSESSMENT & PLAN NOTE
- noted on 3/24, report similar to abd spasm  - Not impressive on exam, trial of bentyl and simethicone  - Pt is having BMs, CTM

## 2018-03-24 NOTE — PROGRESS NOTES
[facility] Hospital Medicine  Progress Note    Patient Name: Mary Carrillo  MRN: [id number]  Patient Class: IP- Inpatient   Admission Date: 3/17/2018  Length of Stay: 7 days  Attending Physician: Luke Quiñones MD  Primary Care Provider: Jose Khan MD    [facility] Medicine Team: [facility] Luke Quiñones MD    Subjective:     Principal Problem:Discitis of thoracolumbar region    HPI:  Ms. 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 MRI notable for severe compression of the adjacent cord at the T12-L1 level. Transferred to [facility] for Neurosurgery evaluation.    Patient history dates back to December when [place] [facility] [place] for fevers, night sweats, back pain and weakness.  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  Since being discharged pt reports being unable to walk, mostly being bed bound or on the sofa due to severe back pain.  She is currently getting antibiotics via the health unit for her TB.  A home health nurse comes to her house to give her TB meds every Tues and Fri.  She reports numbness in her feet as well as tingling which has been ongoing for about 1 month.  She wears a diaper because she cannot get out of bed to use the bathroom.  She states that recently there have been times she doesn't realize that she urinated.  She denies any burning on urination, fevers, chills, night sweats at this time.        AFB cultures (12/27) > M Tb complex sensitive to RIF/ Strep/ INH/ EMB/ Pyrazinamide per quest.     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 On 3/18 admitted to NICU s/p T12-L1 corpectomy and T10-L3 fusion. ID recommended to discontinue Airborne Precautions due to pt being completely treated for TB.  Patient with stable exam, Pt is neurologically stable.  Pain controlled overnight on new regimen. Continue subfascial hemovac drain per NSGY. Continue TLSO brace.     She was transferred to hospital medicine service on 3/23 for further care, Hgb drop, 2U PRBC given on 3/23. Rehab consulted. CM/SW are working on placement             Interval History: NAEON.  Pt reports continued back pain but controlled. Report insomnia over the last few days. Report abd spasm started this AM, pt is having BMs.  Denies new/worsening weakness today.   Denies bowel/bladder dysfunction    Review of Systems   Constitutional: Negative for fever.   HENT: Negative for congestion.    Respiratory: Negative for chest tightness and shortness of breath.    Cardiovascular: Negative for chest pain.   Gastrointestinal: Negative for abdominal distention, abdominal pain, constipation, diarrhea, nausea and vomiting.   Genitourinary: Negative for dysuria.   Musculoskeletal: Positive for back pain. Negative for arthralgias and neck pain.   Skin: Negative for rash.   Neurological: Positive for weakness.   Psychiatric/Behavioral: Negative for confusion.     Objective:     Vital Signs (Most Recent):  Temp: 98.3 °F (36.8 °C) (03/24/18 0740)  Pulse: 78 (03/24/18 0740)  Resp: 18 (03/24/18 0740)  BP: (!) 175/76 (03/24/18 0740)  SpO2: 98 % (03/24/18 0740) Vital Signs (24h Range):  Temp:  [98.3 °F (36.8 °C)-99.7 °F (37.6 °C)] 98.3 °F (36.8 °C)  Pulse:  [] 78  Resp:  [18-20] 18  SpO2:  [92 %-98 %] 98 %  BP: (112-181)/(55-81) 175/76     Weight: 94.8 kg (208 lb 15.9 oz)  Body mass index is 34.78 kg/m².    Intake/Output Summary (Last 24 hours) at 03/24/18 1111  Last data filed at 03/24/18 0700   Gross per 24 hour   Intake           1190.83 ml   Output              130 ml   Net          1060.83 ml      Physical Exam    GA: Awake, Alert, Extubated, Oriented, Follows commands comfortable, no acute distress.   HEENT: No scleral icterus or JVD.   Pulmonary: Clear to auscultation Anterior. No wheezing, crackles, or rhonchi.  Cardiac: RRR S1 & S2 w/o rubs/murmurs/gallops.   Abdominal: Bowel sounds present x 4. No appreciable hepatosplenomegaly.  Skin: No jaundice, rashes, or visible lesions.  Neuro: Motor Strength: RLE 4/5, otherwise good strength upper and lower extremities  Extremities: no cyanosis or edema, or clubbing        MELD-Na score: 7 at 3/20/2018  1:11 AM  MELD score: 7 at 3/20/2018  1:11 AM  Calculated from:  Serum Creatinine: 0.8 mg/dL (Rounded to 1) at 3/20/2018  1:11 AM  Serum Sodium: 138 mmol/L (Rounded to 137) at 3/20/2018  1:11 AM  Total Bilirubin: 0.4 mg/dL (Rounded to 1) at 3/20/2018  1:11 AM  INR(ratio): 1.1 at 3/18/2018  6:25 PM  Age: 62 years    Significant Labs:  CBC:    Recent Labs  Lab 03/23/18  0446 03/23/18  1536 03/24/18  0442   WBC 12.06 12.62 17.38*   HGB 6.6* 6.8* 9.0*   HCT 20.7* 21.4* 27.8*    206 209     CMP:    Recent Labs  Lab 03/23/18  0446 03/24/18  0442    136   K 4.5 4.5    106   CO2 26 26    91   BUN 16 12   CREATININE 0.8 0.7   CALCIUM 8.3* 8.3*   PROT 5.4* 5.8*   ALBUMIN 2.0* 2.1*   BILITOT 0.4 1.4*   ALKPHOS 61 67   AST 25 29   ALT 11 11   ANIONGAP 3* 4*   EGFRNONAA >60.0 >60.0     PTINR:  No results for input(s): INR in the last 48 hours.    Significant Procedures:   Dobutamine Stress Test with Color Flow: No results found for this or any previous visit.      Assessment/Plan:      * Discitis of thoracolumbar region    62 F with hx of TB and now T12/L1 osteodiscitis concerning for pott's disease s/p T10-L3 fusion and T12,L1 corpectomy on 3/18  - Pt is neurologically stable  - Continue subfascial hemovac drain - 180 cc's in last 24 hrs. Per NSGY  - Continue TLSO brace  - Inpatient  Rehab pending   - pain controlled, PT/OT           Pott's disease (spinal tuberculosis)    - Per ID, Spoke to the health department.  She started RIPE around December 25.  On march 5 she was transitioned to rifampin and isoniazid.    - Confirmed that she is on twice weekly dosing; isoniazid 900 mg on tuesdays and fridays, rifampin 600 mg on tuesdays and fridays and vitamin b 6.  Medications ordered.    - Osteomyelitis and diskitis of lumbar spine:  S/p surgery. See above        Spinal cord compression    - S/P Corpectomy T12-L1 and T10-L3 fusion  - 1 HV drain remains, NSGY following  - Neuro checks Q4h  Pain management:  - Oxycodone LR 10 q12h scheduled  - Percocet  q4h prn pain 4-6/10 for breakthrough  - Tylenol 650 q6h prn mild pain and temp        Generalized abdominal pain    - noted on 3/24, report similar to abd spasm  - Not impressive on exam, trial of bentyl and simethicone  - Pt is having BMs, CTM          Acute blood loss as cause of postoperative anemia    - Baseline 10-12 Hgb  - h/h drop s/p surgery   - 3/18, 4U PRBC, 2FFP and 1U Plt given  - continue to trend down, 6.6/20, 2units PRBCs transfused 3/23  - will follow h/h, goal Hgb>7          Pulmonary tuberculosis    - Appreciate ID eval, She started RIPE around December 25.  On march 5 she was transitioned to rifampin and isoniazid.    - TB regimen 900mg Isoniazid, 600 mg rifampin with B6, Fridays and Tuesdays   - Does not requre airborne isolation patient completed 2 weeks on inpatient RIPE          HTN (hypertension), benign    - On Home meds: Coreg, Norvasc, Cardura, Isosorbide          Sarcoidosis    - Patient of Dr. SANGEETA Ram (Rheum) and Dr. KIRSTIE Hopkins (Pulm) at [facility]  - Currently hold MTX   - Currently not on any treatment due to current treatment of TB            VTE Risk Mitigation         Ordered     heparin (porcine) injection 5,000 Units  Every 8 hours     Route:  Subcutaneous        03/20/18 1100     Place sequential compression device   Until discontinued      03/18/18 1134     IP VTE LOW RISK PATIENT  Once      03/17/18 0424              Luke Quiñones MD  Department of Hospital Medicine   [facility]

## 2018-03-24 NOTE — PROGRESS NOTES
[facility] Hospital Medicine  Progress Note    Patient Name: Mary Carrillo  MRN: [id number]  Patient Class: IP- Inpatient   Admission Date: 3/17/2018  Length of Stay: 6 days  Attending Physician: Luke Quiñones MD  Primary Care Provider: Jose Khan MD    [facility] Medicine Team: [facility] Luke Quiñones MD    Subjective:     Principal Problem:Discitis of thoracolumbar region    HPI:  Ms. 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 MRI notable for severe compression of the adjacent cord at the T12-L1 level. Transferred to [facility] for Neurosurgery evaluation.    Patient history dates back to December when she was [place] [facility] [place]01/18 for fevers, night sweats, back pain and weakness.  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  Since being discharged pt reports being unable to walk, mostly being bed bound or on the sofa due to severe back pain.  She is currently getting antibiotics via the health unit for her TB.  A home health nurse comes to her house to give her TB meds every Tues and Fri.  She reports numbness in her feet as well as tingling which has been ongoing for about 1 month.  She wears a diaper because she cannot get out of bed to use the bathroom.  She states that recently there have been times she doesn't realize that she urinated.  She denies any burning on urination, fevers, chills, night sweats at this time.        AFB cultures (12/27) > M Tb complex sensitive to RIF/ Strep/ INH/ EMB/ Pyrazinamide per quest.     Hospital Course:  [place] [facility] [place] [facility] on 3/17/18 for NSGY evaluation and  treatment of spinal cord compression due to Matta disease of the spine. On 3/18 admitted to NICU s/p T12-L1 corpectomy and T10-L3 fusion. ID recommended to discontinue Airborne Precautions due to pt being completely treated for TB.  Patient with stable exam, Pt is neurologically stable.  Pain controlled overnight on new regimen. Continue subfascial hemovac drain per NSGY. Continue TLSO brace.     She was transferred to hospital medicine service on 3/23 for further care, Hgb drop, 1U PRBC to be give. Rehab consulted. CM/SW are working on placement             Interval History: NAEON.  Pt reports continued back pain but controlled.  Denies new/worsening weakness today.  Tolerating diet.  Voiding.  Denies bowel/bladder dysfunction    Review of Systems   Constitutional: Negative for fever.   HENT: Negative for congestion.    Respiratory: Negative for chest tightness and shortness of breath.    Cardiovascular: Negative for chest pain.   Gastrointestinal: Negative for abdominal distention, abdominal pain, constipation, diarrhea, nausea and vomiting.   Genitourinary: Negative for dysuria.   Musculoskeletal: Positive for back pain. Negative for arthralgias and neck pain.   Skin: Negative for rash.   Neurological: Positive for weakness.   Psychiatric/Behavioral: Negative for confusion.     Objective:     Vital Signs (Most Recent):  Temp: 98.3 °F (36.8 °C) (03/23/18 1809)  Pulse: 102 (03/23/18 1809)  Resp: 18 (03/23/18 1809)  BP: (!) 112/55 (03/23/18 1809)  SpO2: 96 % (03/23/18 1809) Vital Signs (24h Range):  Temp:  [98.2 °F (36.8 °C)-99.8 °F (37.7 °C)] 98.3 °F (36.8 °C)  Pulse:  [] 102  Resp:  [16-18] 18  SpO2:  [93 %-97 %] 96 %  BP: (102-136)/(53-84) 112/55     Weight: 94.8 kg (208 lb 15.9 oz)  Body mass index is 34.78 kg/m².    Intake/Output Summary (Last 24 hours) at 03/23/18 2118  Last data filed at 03/23/18 1800   Gross per 24 hour   Intake              720 ml   Output              330 ml   Net              390 ml       Physical Exam  GA: Awake, Alert, Extubated, Oriented, Follows commands comfortable, no acute distress.   HEENT: No scleral icterus or JVD.   Pulmonary: Clear to auscultation Anterior. No wheezing, crackles, or rhonchi.  Cardiac: RRR S1 & S2 w/o rubs/murmurs/gallops.   Abdominal: Bowel sounds present x 4. No appreciable hepatosplenomegaly.  Skin: No jaundice, rashes, or visible lesions.  Neuro: Motor Strength: RLE 4/5, otherwise good strength upper and lower extremities  Extremities: no cyanosis or edema, or clubbing        MELD-Na score: 7 at 3/20/2018  1:11 AM  MELD score: 7 at 3/20/2018  1:11 AM  Calculated from:  Serum Creatinine: 0.8 mg/dL (Rounded to 1) at 3/20/2018  1:11 AM  Serum Sodium: 138 mmol/L (Rounded to 137) at 3/20/2018  1:11 AM  Total Bilirubin: 0.4 mg/dL (Rounded to 1) at 3/20/2018  1:11 AM  INR(ratio): 1.1 at 3/18/2018  6:25 PM  Age: 62 years    Significant Labs:  CBC:    Recent Labs  Lab 03/22/18  0410 03/23/18  0446 03/23/18  1536   WBC 12.74* 12.06 12.62   HGB 7.4* 6.6* 6.8*   HCT 23.0* 20.7* 21.4*   * 185 206     CMP:    Recent Labs  Lab 03/22/18  0410 03/23/18  0446    139   K 3.1* 4.5    110   CO2 23 26    106   BUN 19 16   CREATININE 0.9 0.8   CALCIUM 8.0* 8.3*   PROT 5.2* 5.4*   ALBUMIN 2.0* 2.0*   BILITOT 0.2 0.4   ALKPHOS 65 61   AST 21 25   ALT 8* 11   ANIONGAP 6* 3*   EGFRNONAA >60.0 >60.0     PTINR:  No results for input(s): INR in the last 48 hours.    Significant Procedures:   Dobutamine Stress Test with Color Flow: No results found for this or any previous visit.      Assessment/Plan:      * Discitis of thoracolumbar region    62 F with hx of TB and now T12/L1 osteodiscitis concerning for pott's disease s/p T10-L3 fusion and T12,L1 corpectomy on 3/18  - Pt is neurologically stable  - Continue subfascial hemovac drain - 180 cc's in last 24 hrs. Per NSGY  - Continue TLSO brace  - Inpatient Rehab pending   - pain controlled, PT/OT           Pott's disease  (spinal tuberculosis)    - Per ID, Spoke to the health department.  She started RIPE around December 25.  On march 5 she was transitioned to rifampin and isoniazid.    - Confirmed that she is on twice weekly dosing; isoniazid 900 mg on tuesdays and fridays, rifampin 600 mg on tuesdays and fridays and vitamin b 6.  Medications ordered.    - Osteomyelitis and diskitis of lumbar spine:  S/p surgery. See above        Spinal cord compression    - S/P Corpectomy T12-L1 and T10-L3 fusion  - 1 HV drain remains, NSGY following  - Neuro checks Q4h  Pain management:  - Oxycodone LR 10 q12h scheduled  - Percocet  q4h prn pain 4-6/10 for breakthrough  - Tylenol 650 q6h prn mild pain and temp        Acute blood loss as cause of postoperative anemia    - Baseline 10-12 Hgb  - h/h drop s/p surgery   - 3/18, 4U PRBC, 2FFP and 1U Plt given  - continue to trend down, 6.6/20, 1units PRBCs transfused 3/23  - will follow h/h          Pulmonary tuberculosis    - Appreciate ID eval, She started RIPE around December 25.  On march 5 she was transitioned to rifampin and isoniazid.    - TB regimen 900mg Isoniazid, 600 mg rifampin with B6, Fridays and Tuesdays   - Does not requre airborne isolation patient completed 2 weeks on inpatient RIPE          HTN (hypertension), benign    - On Home meds: Coreg, Norvasc, Cardura, Isosorbide          Sarcoidosis    - Patient of Dr. SANGEETA Ram (Rheum) and Dr. KIRSTIE Hopkins (Pulm) at [facility]  - Currently hold MTX   - Currently not on any treatment due to current treatment of TB            VTE Risk Mitigation         Ordered     heparin (porcine) injection 5,000 Units  Every 8 hours     Route:  Subcutaneous        03/20/18 1100     Place sequential compression device  Until discontinued      03/18/18 1134     IP VTE LOW RISK PATIENT  Once      03/17/18 0424              Luke Quiñones MD  Department of Hospital Medicine   [facility]

## 2018-03-24 NOTE — ASSESSMENT & PLAN NOTE
- Baseline 10-12 Hgb  - h/h drop s/p surgery   - 3/18, 4U PRBC, 2FFP and 1U Plt given  - continue to trend down, 6.6/20, 1units PRBCs transfused 3/23  - will follow h/h

## 2018-03-24 NOTE — SUBJECTIVE & OBJECTIVE
Interval History: NAEON.  Pt reports continued back pain but controlled. Report insomnia over the last few days. Report abd spasm started this AM, pt is having BMs.  Denies new/worsening weakness today.   Denies bowel/bladder dysfunction    Review of Systems   Constitutional: Negative for fever.   HENT: Negative for congestion.    Respiratory: Negative for chest tightness and shortness of breath.    Cardiovascular: Negative for chest pain.   Gastrointestinal: Negative for abdominal distention, abdominal pain, constipation, diarrhea, nausea and vomiting.   Genitourinary: Negative for dysuria.   Musculoskeletal: Positive for back pain. Negative for arthralgias and neck pain.   Skin: Negative for rash.   Neurological: Positive for weakness.   Psychiatric/Behavioral: Negative for confusion.     Objective:     Vital Signs (Most Recent):  Temp: 98.3 °F (36.8 °C) (03/24/18 0740)  Pulse: 78 (03/24/18 0740)  Resp: 18 (03/24/18 0740)  BP: (!) 175/76 (03/24/18 0740)  SpO2: 98 % (03/24/18 0740) Vital Signs (24h Range):  Temp:  [98.3 °F (36.8 °C)-99.7 °F (37.6 °C)] 98.3 °F (36.8 °C)  Pulse:  [] 78  Resp:  [18-20] 18  SpO2:  [92 %-98 %] 98 %  BP: (112-181)/(55-81) 175/76     Weight: 94.8 kg (208 lb 15.9 oz)  Body mass index is 34.78 kg/m².    Intake/Output Summary (Last 24 hours) at 03/24/18 1111  Last data filed at 03/24/18 0700   Gross per 24 hour   Intake          1190.83 ml   Output              130 ml   Net          1060.83 ml      Physical Exam    GA: Awake, Alert, Extubated, Oriented, Follows commands comfortable, no acute distress.   HEENT: No scleral icterus or JVD.   Pulmonary: Clear to auscultation Anterior. No wheezing, crackles, or rhonchi.  Cardiac: RRR S1 & S2 w/o rubs/murmurs/gallops.   Abdominal: Bowel sounds present x 4. No appreciable hepatosplenomegaly.  Skin: No jaundice, rashes, or visible lesions.  Neuro: Motor Strength: RLE 4/5, otherwise good strength upper and lower extremities  Extremities: no  cyanosis or edema, or clubbing        MELD-Na score: 7 at 3/20/2018  1:11 AM  MELD score: 7 at 3/20/2018  1:11 AM  Calculated from:  Serum Creatinine: 0.8 mg/dL (Rounded to 1) at 3/20/2018  1:11 AM  Serum Sodium: 138 mmol/L (Rounded to 137) at 3/20/2018  1:11 AM  Total Bilirubin: 0.4 mg/dL (Rounded to 1) at 3/20/2018  1:11 AM  INR(ratio): 1.1 at 3/18/2018  6:25 PM  Age: 62 years    Significant Labs:  CBC:    Recent Labs  Lab 03/23/18  0446 03/23/18  1536 03/24/18  0442   WBC 12.06 12.62 17.38*   HGB 6.6* 6.8* 9.0*   HCT 20.7* 21.4* 27.8*    206 209     CMP:    Recent Labs  Lab 03/23/18  0446 03/24/18  0442    136   K 4.5 4.5    106   CO2 26 26    91   BUN 16 12   CREATININE 0.8 0.7   CALCIUM 8.3* 8.3*   PROT 5.4* 5.8*   ALBUMIN 2.0* 2.1*   BILITOT 0.4 1.4*   ALKPHOS 61 67   AST 25 29   ALT 11 11   ANIONGAP 3* 4*   EGFRNONAA >60.0 >60.0     PTINR:  No results for input(s): INR in the last 48 hours.    Significant Procedures:   Dobutamine Stress Test with Color Flow: No results found for this or any previous visit.

## 2018-03-24 NOTE — ASSESSMENT & PLAN NOTE
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 Per NSGY  - Continue TLSO brace  - Inpatient Rehab pending   - pain controlled, PT/OT

## 2018-03-24 NOTE — ASSESSMENT & PLAN NOTE
- Baseline 10-12 Hgb  - h/h drop s/p surgery   - 3/18, 4U PRBC, 2FFP and 1U Plt given  - continue to trend down, 6.6/20, 2units PRBCs transfused 3/23  - will follow h/h, goal Hgb>7

## 2018-03-24 NOTE — ASSESSMENT & PLAN NOTE
- Per ID, Spoke to the health department.  She started RIPE around December 25.  On march 5 she was transitioned to rifampin and isoniazid.    - Confirmed that she is on twice weekly dosing; isoniazid 900 mg on tuesdays and fridays, rifampin 600 mg on tuesdays and fridays and vitamin b 6.  Medications ordered.    - Osteomyelitis and diskitis of lumbar spine:  S/p surgery. See above

## 2018-03-24 NOTE — SUBJECTIVE & OBJECTIVE
Interval History: NAEON.  Pt reports continued back pain but controlled.  Denies new/worsening weakness today.  Tolerating diet.  Voiding.  Denies bowel/bladder dysfunction    Review of Systems   Constitutional: Negative for fever.   HENT: Negative for congestion.    Respiratory: Negative for chest tightness and shortness of breath.    Cardiovascular: Negative for chest pain.   Gastrointestinal: Negative for abdominal distention, abdominal pain, constipation, diarrhea, nausea and vomiting.   Genitourinary: Negative for dysuria.   Musculoskeletal: Positive for back pain. Negative for arthralgias and neck pain.   Skin: Negative for rash.   Neurological: Positive for weakness.   Psychiatric/Behavioral: Negative for confusion.     Objective:     Vital Signs (Most Recent):  Temp: 98.3 °F (36.8 °C) (03/23/18 1809)  Pulse: 102 (03/23/18 1809)  Resp: 18 (03/23/18 1809)  BP: (!) 112/55 (03/23/18 1809)  SpO2: 96 % (03/23/18 1809) Vital Signs (24h Range):  Temp:  [98.2 °F (36.8 °C)-99.8 °F (37.7 °C)] 98.3 °F (36.8 °C)  Pulse:  [] 102  Resp:  [16-18] 18  SpO2:  [93 %-97 %] 96 %  BP: (102-136)/(53-84) 112/55     Weight: 94.8 kg (208 lb 15.9 oz)  Body mass index is 34.78 kg/m².    Intake/Output Summary (Last 24 hours) at 03/23/18 2118  Last data filed at 03/23/18 1800   Gross per 24 hour   Intake              720 ml   Output              330 ml   Net              390 ml      Physical Exam  GA: Awake, Alert, Extubated, Oriented, Follows commands comfortable, no acute distress.   HEENT: No scleral icterus or JVD.   Pulmonary: Clear to auscultation Anterior. No wheezing, crackles, or rhonchi.  Cardiac: RRR S1 & S2 w/o rubs/murmurs/gallops.   Abdominal: Bowel sounds present x 4. No appreciable hepatosplenomegaly.  Skin: No jaundice, rashes, or visible lesions.  Neuro: Motor Strength: RLE 4/5, otherwise good strength upper and lower extremities  Extremities: no cyanosis or edema, or clubbing        MELD-Na score: 7 at 3/20/2018   1:11 AM  MELD score: 7 at 3/20/2018  1:11 AM  Calculated from:  Serum Creatinine: 0.8 mg/dL (Rounded to 1) at 3/20/2018  1:11 AM  Serum Sodium: 138 mmol/L (Rounded to 137) at 3/20/2018  1:11 AM  Total Bilirubin: 0.4 mg/dL (Rounded to 1) at 3/20/2018  1:11 AM  INR(ratio): 1.1 at 3/18/2018  6:25 PM  Age: 62 years    Significant Labs:  CBC:    Recent Labs  Lab 03/22/18  0410 03/23/18  0446 03/23/18  1536   WBC 12.74* 12.06 12.62   HGB 7.4* 6.6* 6.8*   HCT 23.0* 20.7* 21.4*   * 185 206     CMP:    Recent Labs  Lab 03/22/18  0410 03/23/18  0446    139   K 3.1* 4.5    110   CO2 23 26    106   BUN 19 16   CREATININE 0.9 0.8   CALCIUM 8.0* 8.3*   PROT 5.2* 5.4*   ALBUMIN 2.0* 2.0*   BILITOT 0.2 0.4   ALKPHOS 65 61   AST 21 25   ALT 8* 11   ANIONGAP 6* 3*   EGFRNONAA >60.0 >60.0     PTINR:  No results for input(s): INR in the last 48 hours.    Significant Procedures:   Dobutamine Stress Test with Color Flow: No results found for this or any previous visit.

## 2018-03-24 NOTE — ASSESSMENT & PLAN NOTE
- S/P Corpectomy T12-L1 and T10-L3 fusion  - 1 HV drain remains, NSGY following  - Neuro checks Q4h  Pain management:  - Oxycodone LR 10 q12h scheduled  - Percocet  q4h prn pain 4-6/10 for breakthrough  - Tylenol 650 q6h prn mild pain and temp

## 2018-03-24 NOTE — ASSESSMENT & PLAN NOTE
- Patient of Dr. SANGEETA Ram (Rheum) and Dr. KIRSTIE Hopkins (Pulm) at [facility] [place]  - Currently hold MTX   - Currently not on any treatment due to current treatment of TB

## 2018-03-24 NOTE — ASSESSMENT & PLAN NOTE
- Appreciate ID [email or web address], She started RIPE around December 25.  On march 5 she was transitioned to rifampin and isoniazid.    - TB regimen 900mg Isoniazid, 600 mg rifampin with B6, Fridays and Tuesdays   - Does not requre airborne isolation patient completed 2 weeks on inpatient RIPE

## 2018-03-25 PROBLEM — G47.09 OTHER INSOMNIA: Status: ACTIVE | Noted: 2018-03-25

## 2018-03-25 LAB
ALBUMIN SERPL BCP-MCNC: 2.1 G/DL
ALP SERPL-CCNC: 73 U/L
ALT SERPL W/O P-5'-P-CCNC: 13 U/L
ANION GAP SERPL CALC-SCNC: 9 MMOL/L
AST SERPL-CCNC: 33 U/L
BASOPHILS # BLD AUTO: 0.05 K/UL
BASOPHILS NFR BLD: 0.4 %
BILIRUB SERPL-MCNC: 0.4 MG/DL
BUN SERPL-MCNC: 10 MG/DL
CALCIUM SERPL-MCNC: 8.9 MG/DL
CHLORIDE SERPL-SCNC: 106 MMOL/L
CO2 SERPL-SCNC: 23 MMOL/L
CREAT SERPL-MCNC: 0.7 MG/DL
DIFFERENTIAL METHOD: ABNORMAL
EOSINOPHIL # BLD AUTO: 0.5 K/UL
EOSINOPHIL NFR BLD: 3.7 %
ERYTHROCYTE [DISTWIDTH] IN BLOOD BY AUTOMATED COUNT: 19 %
EST. GFR  (AFRICAN AMERICAN): >60 ML/MIN/1.73 M^2
EST. GFR  (NON AFRICAN AMERICAN): >60 ML/MIN/1.73 M^2
GLUCOSE SERPL-MCNC: 85 MG/DL
HCT VFR BLD AUTO: 30.4 %
HGB BLD-MCNC: 9.9 G/DL
IMM GRANULOCYTES # BLD AUTO: 0.27 K/UL
IMM GRANULOCYTES NFR BLD AUTO: 2.2 %
LYMPHOCYTES # BLD AUTO: 2.6 K/UL
LYMPHOCYTES NFR BLD: 20.9 %
MAGNESIUM SERPL-MCNC: 1.5 MG/DL
MCH RBC QN AUTO: 28.5 PG
MCHC RBC AUTO-ENTMCNC: 32.6 G/DL
MCV RBC AUTO: 88 FL
MONOCYTES # BLD AUTO: 1.6 K/UL
MONOCYTES NFR BLD: 12.5 %
NEUTROPHILS # BLD AUTO: 7.6 K/UL
NEUTROPHILS NFR BLD: 60.3 %
NRBC BLD-RTO: 1 /100 WBC
PHOSPHATE SERPL-MCNC: 2.7 MG/DL
PLATELET # BLD AUTO: 288 K/UL
PMV BLD AUTO: 9.6 FL
POTASSIUM SERPL-SCNC: 4.6 MMOL/L
PROT SERPL-MCNC: 6.4 G/DL
RBC # BLD AUTO: 3.47 M/UL
SODIUM SERPL-SCNC: 138 MMOL/L
WBC # BLD AUTO: 12.52 K/UL

## 2018-03-25 PROCEDURE — 36415 COLL VENOUS BLD VENIPUNCTURE: CPT

## 2018-03-25 PROCEDURE — 25000003 PHARM REV CODE 250: Performed by: INTERNAL MEDICINE

## 2018-03-25 PROCEDURE — 94761 N-INVAS EAR/PLS OXIMETRY MLT: CPT

## 2018-03-25 PROCEDURE — 25000003 PHARM REV CODE 250: Performed by: NURSE PRACTITIONER

## 2018-03-25 PROCEDURE — 80053 COMPREHEN METABOLIC PANEL: CPT

## 2018-03-25 PROCEDURE — 25000003 PHARM REV CODE 250: Performed by: HOSPITALIST

## 2018-03-25 PROCEDURE — 20600001 HC STEP DOWN PRIVATE ROOM

## 2018-03-25 PROCEDURE — 63600175 PHARM REV CODE 636 W HCPCS: Performed by: STUDENT IN AN ORGANIZED HEALTH CARE EDUCATION/TRAINING PROGRAM

## 2018-03-25 PROCEDURE — 99232 SBSQ HOSP IP/OBS MODERATE 35: CPT | Mod: ,,, | Performed by: HOSPITALIST

## 2018-03-25 PROCEDURE — 85025 COMPLETE CBC W/AUTO DIFF WBC: CPT

## 2018-03-25 PROCEDURE — 84100 ASSAY OF PHOSPHORUS: CPT

## 2018-03-25 PROCEDURE — 83735 ASSAY OF MAGNESIUM: CPT

## 2018-03-25 PROCEDURE — 97530 THERAPEUTIC ACTIVITIES: CPT

## 2018-03-25 PROCEDURE — 25000003 PHARM REV CODE 250: Performed by: STUDENT IN AN ORGANIZED HEALTH CARE EDUCATION/TRAINING PROGRAM

## 2018-03-25 PROCEDURE — 97110 THERAPEUTIC EXERCISES: CPT

## 2018-03-25 RX ORDER — TRAZODONE HYDROCHLORIDE 50 MG/1
50 TABLET ORAL NIGHTLY
Status: DISCONTINUED | OUTPATIENT
Start: 2018-03-25 | End: 2018-03-26

## 2018-03-25 RX ORDER — DICYCLOMINE HYDROCHLORIDE 10 MG/1
10 CAPSULE ORAL 2 TIMES DAILY
Status: DISCONTINUED | OUTPATIENT
Start: 2018-03-25 | End: 2018-03-27

## 2018-03-25 RX ORDER — CARVEDILOL 6.25 MG/1
25 TABLET ORAL 2 TIMES DAILY
Status: DISCONTINUED | OUTPATIENT
Start: 2018-03-25 | End: 2018-03-27 | Stop reason: HOSPADM

## 2018-03-25 RX ADMIN — Medication 50 MG: at 08:03

## 2018-03-25 RX ADMIN — HYDROXYZINE PAMOATE 50 MG: 25 CAPSULE ORAL at 02:03

## 2018-03-25 RX ADMIN — OXYCODONE AND ACETAMINOPHEN 1 TABLET: 10; 325 TABLET ORAL at 11:03

## 2018-03-25 RX ADMIN — OXYCODONE HYDROCHLORIDE 10 MG: 10 TABLET, FILM COATED, EXTENDED RELEASE ORAL at 08:03

## 2018-03-25 RX ADMIN — CARVEDILOL 12.5 MG: 6.25 TABLET, FILM COATED ORAL at 08:03

## 2018-03-25 RX ADMIN — HEPARIN SODIUM 5000 UNITS: 5000 INJECTION, SOLUTION INTRAVENOUS; SUBCUTANEOUS at 08:03

## 2018-03-25 RX ADMIN — AMLODIPINE BESYLATE 10 MG: 10 TABLET ORAL at 08:03

## 2018-03-25 RX ADMIN — CARVEDILOL 25 MG: 6.25 TABLET, FILM COATED ORAL at 08:03

## 2018-03-25 RX ADMIN — TRAZODONE HYDROCHLORIDE 50 MG: 50 TABLET ORAL at 08:03

## 2018-03-25 RX ADMIN — HEPARIN SODIUM 5000 UNITS: 5000 INJECTION, SOLUTION INTRAVENOUS; SUBCUTANEOUS at 01:03

## 2018-03-25 RX ADMIN — ISOSORBIDE DINITRATE 20 MG: 10 TABLET ORAL at 08:03

## 2018-03-25 RX ADMIN — OXYCODONE AND ACETAMINOPHEN 1 TABLET: 10; 325 TABLET ORAL at 02:03

## 2018-03-25 RX ADMIN — HEPARIN SODIUM 5000 UNITS: 5000 INJECTION, SOLUTION INTRAVENOUS; SUBCUTANEOUS at 05:03

## 2018-03-25 NOTE — ASSESSMENT & PLAN NOTE
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 Continue prophylactic antiobiotics while drains in place.  - Continue anti-TB medications per primary team  - Continue TLSO brace  - Inpatient Rehab pending   - Will continue to follow, discussed with Dr. wSain

## 2018-03-25 NOTE — NURSING
Spoke to Dr. Quiñones. Notified that pt refuses isosorbide and requesting another med. Says he will place orders.

## 2018-03-25 NOTE — SUBJECTIVE & OBJECTIVE
Interval History:  NAEON.  Pt reports continued back pain. Tolerating diet.  Voiding.  Denies bowel/bladder dysfunction.        Medications:  Continuous Infusions:  Scheduled Meds:   amLODIPine  10 mg Oral Daily    carvedilol  12.5 mg Oral BID    heparin (porcine)  5,000 Units Subcutaneous Q8H    isoniazid  900 mg Oral Twice Weekly    isosorbide dinitrate  20 mg Oral TID    oxyCODONE  10 mg Oral Q12H    polyethylene glycol  17 g Oral Daily    pyridoxine (vitamin B6)  50 mg Oral Daily    rifAMpin  600 mg Oral Twice Weekly    senna-docusate 8.6-50 mg  2 tablet Oral Daily    traZODone  50 mg Oral QHS     PRN Meds:sodium chloride, acetaminophen, albuterol sulfate, dextrose 50%, dextrose 50%, glucagon (human recombinant), glucose, glucose, hydrOXYzine pamoate, insulin aspart U-100, magnesium oxide, magnesium oxide, microfibrillar collagen, oxyCODONE-acetaminophen, potassium chloride 10%, potassium chloride 10%, potassium chloride 10%, potassium, sodium phosphates, potassium, sodium phosphates, potassium, sodium phosphates, ramelteon, senna, simethicone, sodium chloride 0.9%     Review of Systems    Objective:     Weight: 94.8 kg (208 lb 15.9 oz)  Body mass index is 34.78 kg/m².  Vital Signs (Most Recent):  Temp: 98.4 °F (36.9 °C) (03/25/18 0801)  Pulse: 87 (03/25/18 0801)  Resp: 18 (03/25/18 0801)  BP: (!) 142/89 (03/25/18 0801)  SpO2: 96 % (03/25/18 0801) Vital Signs (24h Range):  Temp:  [98.4 °F (36.9 °C)-99.2 °F (37.3 °C)] 98.4 °F (36.9 °C)  Pulse:  [78-87] 87  Resp:  [17-18] 18  SpO2:  [93 %-97 %] 96 %  BP: (142-166)/(71-89) 142/89                           Closed/Suction Drain 03/18/18 1627 Left Back Accordion 10 Fr. (Active)   Site Description Leaking at site 3/23/2018  8:00 AM   Dressing Type No dressing 3/23/2018  8:00 AM   Dressing Status Clean;Dry;Intact 3/23/2018  8:00 AM   Drainage Sanguineous 3/23/2018  8:00 AM   Status Other (Comment) 3/23/2018  8:00 AM   Output (mL) 100 mL 3/23/2018  6:00 AM        Neurosurgery Physical Exam     General: no distress  Neurologic: Alert and oriented. Thought content appropriate.  Head: normocephalic  GCS: Motor: 6/Verbal: 5/Eyes: 4 GCS Total: 15  Cranial nerves: face symmetric, tongue midline, pupils equal, round, reactive to light with accomodation, extraocular muscles intact  Sensory: response to light touch throughout  Motor Strength: RLE 4/5, otherwise good strength upper and lower extremities  Pronator Drift: no drift noted  Finger to nose normal  No focal numbness or weakness  Lungs:  normal respiratory effort  Abdomen: soft, non-tender   Extremities: no cyanosis or edema, or clubbing      Significant Labs:    Recent Labs  Lab 03/24/18  0442 03/25/18  0512   GLU 91 85    138   K 4.5 4.6    106   CO2 26 23   BUN 12 10   CREATININE 0.7 0.7   CALCIUM 8.3* 8.9   MG 1.5* 1.5*       Recent Labs  Lab 03/23/18  1536 03/24/18  0442 03/25/18  0512   WBC 12.62 17.38* 12.52   HGB 6.8* 9.0* 9.9*   HCT 21.4* 27.8* 30.4*    209 288     No results for input(s): LABPT, INR, APTT in the last 48 hours.  Microbiology Results (last 7 days)     Procedure Component Value Units Date/Time    Culture, Anaerobe [[id number]] Collected:  03/18/18 1533    Order Status:  Completed Specimen:  Tissue from Back Updated:  03/23/18 1341     Anaerobic Culture Culture in progress     Anaerobic Culture Culture in progress    Narrative:       3. T12-L1 swab#2 Tuberculosis infection    Culture, Anaerobe [[id number]] Collected:  03/18/18 1533    Order Status:  Completed Specimen:  Tissue from Back Updated:  03/23/18 1341     Anaerobic Culture Culture in progress     Anaerobic Culture Culture in progress    Narrative:       2. T12-L1 swab#1 Tuberculosis infection    Culture, Anaerobe [[id number]] Collected:  03/18/18 1533    Order Status:  Completed Specimen:  Tissue from Back Updated:  03/23/18 1340     Anaerobic Culture Culture in progress     Anaerobic Culture Culture in progress     Narrative:       1. T12-L1 bones and tissues Tuberculosis infection    Aerobic culture [[id number]] Collected:  03/18/18 1533    Order Status:  Completed Specimen:  Tissue from Back Updated:  03/22/18 1259     Aerobic Bacterial Culture No growth    Narrative:       1. T12-L1 bones and tissues Tuberculosis infection    Fungus culture [[id number]] Collected:  03/18/18 1533    Order Status:  Completed Specimen:  Tissue from Back Updated:  03/22/18 1109     Fungus (Mycology) Culture Culture in progress    Narrative:       2. T12-L1 swab#1 Tuberculosis infection    Fungus culture [[id number]] Collected:  03/18/18 1533    Order Status:  Completed Specimen:  Tissue from Back Updated:  03/22/18 1109     Fungus (Mycology) Culture Culture in progress    Narrative:       1. T12-L1 bones and tissues Tuberculosis infection    Fungus culture [[id number]] Collected:  03/18/18 1533    Order Status:  Completed Specimen:  Tissue from Back Updated:  03/22/18 1109     Fungus (Mycology) Culture Culture in progress    Narrative:       3. T12-L1 swab#2 Tuberculosis infection    Aerobic culture [[id number]] Collected:  03/18/18 1533    Order Status:  Completed Specimen:  Tissue from Back Updated:  03/21/18 1043     Aerobic Bacterial Culture No growth    Narrative:       3. T12-L1 swab#2 Tuberculosis infection    Aerobic culture [[id number]] Collected:  03/18/18 1533    Order Status:  Completed Specimen:  Tissue from Back Updated:  03/21/18 1043     Aerobic Bacterial Culture No growth    Narrative:       2. T12-L1 swab#1 Tuberculosis infection    AFB Culture & Smear [[id number]] Collected:  03/18/18 1533    Order Status:  Completed Specimen:  Tissue from Back Updated:  03/19/18 2127     AFB Culture & Smear Culture in progress     AFB CULTURE STAIN No acid fast bacilli seen.    Narrative:       1. T12-L1 bones and tissues Tuberculosis infection    AFB Culture & Smear [[id number]] Collected:  03/18/18 1533    Order Status:  Completed Specimen:   Tissue from Back Updated:  03/19/18 2127     AFB Culture & Smear Culture in progress     AFB CULTURE STAIN No acid fast bacilli seen.    Narrative:       2. T12-L1 swab#1 Tuberculosis infection    AFB Culture & Smear [[id number]] Collected:  03/18/18 1533    Order Status:  Completed Specimen:  Tissue from Back Updated:  03/19/18 2127     AFB Culture & Smear Culture in progress     AFB CULTURE STAIN No acid fast bacilli seen.    Narrative:       3. T12-L1 swab#2 Tuberculosis infection    Gram stain [[id number]] Collected:  03/18/18 1533    Order Status:  Completed Specimen:  Tissue from Back Updated:  03/18/18 1724     Gram Stain Result No WBC's      No organisms seen    Narrative:       3. T12-L1 swab#2 Tuberculosis infection    Gram stain [[id number]] Collected:  03/18/18 1533    Order Status:  Completed Specimen:  Tissue from Back Updated:  03/18/18 1720     Gram Stain Result Rare WBC's      No organisms seen    Narrative:       1. T12-L1 bones and tissues Tuberculosis infection    Gram stain [[id number]] Collected:  03/18/18 1533    Order Status:  Completed Specimen:  Tissue from Back Updated:  03/18/18 1705     Gram Stain Result Rare WBC's      No organisms seen    Narrative:       2. T12-L1 swab#1 Tuberculosis infection        Diagnostics:  No new diagnostics at this time

## 2018-03-25 NOTE — PROGRESS NOTES
[facility]  Neurosurgery  Progress Note    Subjective:     History of Present Illness: 61 yo F with history of Sarcoidosis, HTN, HLD, arthritis, gout, asthma Pott's disease who is a transfer from [facility]  to [facility] ED with worsening pain and weakness of her lower extremities. 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  She was found to have MTB in her spine and lungs and was treated with RIPE for 2 weeks inpatient then discharged home for completion of treatment despite efforts of NH/SNF placement    Post-Op Info:  Procedure(s) (LRB):  CORPECTOMY THORACIC T12-L1 corpectomy with T10-L3 fusion, neuromonitoring, globus (Left)   7 Days Post-Op     Interval History:  NAEON.  Pt reports continued back pain. Tolerating diet.  Voiding.  Denies bowel/bladder dysfunction.        Medications:  Continuous Infusions:  Scheduled Meds:   amLODIPine  10 mg Oral Daily    carvedilol  12.5 mg Oral BID    heparin (porcine)  5,000 Units Subcutaneous Q8H    isoniazid  900 mg Oral Twice Weekly    isosorbide dinitrate  20 mg Oral TID    oxyCODONE  10 mg Oral Q12H    polyethylene glycol  17 g Oral Daily    pyridoxine (vitamin B6)  50 mg Oral Daily    rifAMpin  600 mg Oral Twice Weekly    senna-docusate 8.6-50 mg  2 tablet Oral Daily    traZODone  50 mg Oral QHS     PRN Meds:sodium chloride, acetaminophen, albuterol sulfate, dextrose 50%, dextrose 50%, glucagon (human recombinant), glucose, glucose, hydrOXYzine pamoate, insulin aspart U-100, magnesium oxide, magnesium oxide, microfibrillar collagen, oxyCODONE-acetaminophen, potassium chloride 10%, potassium chloride 10%, potassium chloride 10%, potassium, sodium phosphates, potassium, sodium phosphates, potassium, sodium phosphates, ramelteon, senna, simethicone, sodium  chloride 0.9%     Review of Systems    Objective:     Weight: 94.8 kg (208 lb 15.9 oz)  Body mass index is 34.78 kg/m².  Vital Signs (Most Recent):  Temp: 98.4 °F (36.9 °C) (03/25/18 0801)  Pulse: 87 (03/25/18 0801)  Resp: 18 (03/25/18 0801)  BP: (!) 142/89 (03/25/18 0801)  SpO2: 96 % (03/25/18 0801) Vital Signs (24h Range):  Temp:  [98.4 °F (36.9 °C)-99.2 °F (37.3 °C)] 98.4 °F (36.9 °C)  Pulse:  [78-87] 87  Resp:  [17-18] 18  SpO2:  [93 %-97 %] 96 %  BP: (142-166)/(71-89) 142/89                           Closed/Suction Drain 03/18/18 1627 Left Back Accordion 10 Fr. (Active)   Site Description Leaking at site 3/23/2018  8:00 AM   Dressing Type No dressing 3/23/2018  8:00 AM   Dressing Status Clean;Dry;Intact 3/23/2018  8:00 AM   Drainage Sanguineous 3/23/2018  8:00 AM   Status Other (Comment) 3/23/2018  8:00 AM   Output (mL) 100 mL 3/23/2018  6:00 AM       Neurosurgery Physical Exam     General: no distress  Neurologic: Alert and oriented. Thought content appropriate.  Head: normocephalic  GCS: Motor: 6/Verbal: 5/Eyes: 4 GCS Total: 15  Cranial nerves: face symmetric, tongue midline, pupils equal, round, reactive to light with accomodation, extraocular muscles intact  Sensory: response to light touch throughout  Motor Strength: RLE 4/5, otherwise good strength upper and lower extremities  Pronator Drift: no drift noted  Finger to nose normal  No focal numbness or weakness  Lungs:  normal respiratory effort  Abdomen: soft, non-tender   Extremities: no cyanosis or edema, or clubbing      Significant Labs:    Recent Labs  Lab 03/24/18  0442 03/25/18  0512   GLU 91 85    138   K 4.5 4.6    106   CO2 26 23   BUN 12 10   CREATININE 0.7 0.7   CALCIUM 8.3* 8.9   MG 1.5* 1.5*       Recent Labs  Lab 03/23/18  1536 03/24/18  0442 03/25/18  0512   WBC 12.62 17.38* 12.52   HGB 6.8* 9.0* 9.9*   HCT 21.4* 27.8* 30.4*    209 288     No results for input(s): LABPT, INR, APTT in the last 48 hours.  Microbiology  Results (last 7 days)     Procedure Component Value Units Date/Time    Culture, Anaerobe [[id number]] Collected:  03/18/18 1533    Order Status:  Completed Specimen:  Tissue from Back Updated:  03/23/18 1341     Anaerobic Culture Culture in progress     Anaerobic Culture Culture in progress    Narrative:       3. T12-L1 swab#2 Tuberculosis infection    Culture, Anaerobe [[id number]] Collected:  03/18/18 1533    Order Status:  Completed Specimen:  Tissue from Back Updated:  03/23/18 1341     Anaerobic Culture Culture in progress     Anaerobic Culture Culture in progress    Narrative:       2. T12-L1 swab#1 Tuberculosis infection    Culture, Anaerobe [[id number]] Collected:  03/18/18 1533    Order Status:  Completed Specimen:  Tissue from Back Updated:  03/23/18 1340     Anaerobic Culture Culture in progress     Anaerobic Culture Culture in progress    Narrative:       1. T12-L1 bones and tissues Tuberculosis infection    Aerobic culture [[id number]] Collected:  03/18/18 1533    Order Status:  Completed Specimen:  Tissue from Back Updated:  03/22/18 1259     Aerobic Bacterial Culture No growth    Narrative:       1. T12-L1 bones and tissues Tuberculosis infection    Fungus culture [[id number]] Collected:  03/18/18 1533    Order Status:  Completed Specimen:  Tissue from Back Updated:  03/22/18 1109     Fungus (Mycology) Culture Culture in progress    Narrative:       2. T12-L1 swab#1 Tuberculosis infection    Fungus culture [[id number]] Collected:  03/18/18 1533    Order Status:  Completed Specimen:  Tissue from Back Updated:  03/22/18 1109     Fungus (Mycology) Culture Culture in progress    Narrative:       1. T12-L1 bones and tissues Tuberculosis infection    Fungus culture [[id number]] Collected:  03/18/18 1533    Order Status:  Completed Specimen:  Tissue from Back Updated:  03/22/18 1109     Fungus (Mycology) Culture Culture in progress    Narrative:       3. T12-L1 swab#2 Tuberculosis infection    Aerobic culture  [[id number]] Collected:  03/18/18 1533    Order Status:  Completed Specimen:  Tissue from Back Updated:  03/21/18 1043     Aerobic Bacterial Culture No growth    Narrative:       3. T12-L1 swab#2 Tuberculosis infection    Aerobic culture [[id number]] Collected:  03/18/18 1533    Order Status:  Completed Specimen:  Tissue from Back Updated:  03/21/18 1043     Aerobic Bacterial Culture No growth    Narrative:       2. T12-L1 swab#1 Tuberculosis infection    AFB Culture & Smear [[id number]] Collected:  03/18/18 1533    Order Status:  Completed Specimen:  Tissue from Back Updated:  03/19/18 2127     AFB Culture & Smear Culture in progress     AFB CULTURE STAIN No acid fast bacilli seen.    Narrative:       1. T12-L1 bones and tissues Tuberculosis infection    AFB Culture & Smear [[id number]] Collected:  03/18/18 1533    Order Status:  Completed Specimen:  Tissue from Back Updated:  03/19/18 2127     AFB Culture & Smear Culture in progress     AFB CULTURE STAIN No acid fast bacilli seen.    Narrative:       2. T12-L1 swab#1 Tuberculosis infection    AFB Culture & Smear [[id number]] Collected:  03/18/18 1533    Order Status:  Completed Specimen:  Tissue from Back Updated:  03/19/18 2127     AFB Culture & Smear Culture in progress     AFB CULTURE STAIN No acid fast bacilli seen.    Narrative:       3. T12-L1 swab#2 Tuberculosis infection    Gram stain [[id number]] Collected:  03/18/18 1533    Order Status:  Completed Specimen:  Tissue from Back Updated:  03/18/18 1724     Gram Stain Result No WBC's      No organisms seen    Narrative:       3. T12-L1 swab#2 Tuberculosis infection    Gram stain [[id number]] Collected:  03/18/18 1533    Order Status:  Completed Specimen:  Tissue from Back Updated:  03/18/18 1720     Gram Stain Result Rare WBC's      No organisms seen    Narrative:       1. T12-L1 bones and tissues Tuberculosis infection    Gram stain [[id number]] Collected:  03/18/18 1533    Order Status:  Completed Specimen:   Tissue from Back Updated:  03/18/18 1700     Gram Stain Result Rare WBC's      No organisms seen    Narrative:       2. 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 Continue prophylactic antiobiotics while drains in place.  - Continue anti-TB medications per primary team  - Continue TLSO brace  - Inpatient Rehab pending   - Will continue to follow, discussed with Dr. Wiliam Carolina MD  Neurosurgery  [facility]

## 2018-03-25 NOTE — PT/OT/SLP PROGRESS
Physical Therapy Treatment    Patient Name:  Mary Carrillo   MRN:  [id number]    Recommendations:     Discharge Recommendations:  rehabilitation facility   Discharge Equipment Recommendations:  (TBD)   Barriers to discharge: Decreased caregiver support    Assessment:     Mary Carrillo is a 62 y.o. female admitted with a medical diagnosis of Discitis of thoracolumbar region.  She presents with the following impairments/functional limitations:  weakness, impaired endurance, impaired self care skills, gait instability, impaired functional mobilty, orthopedic precautions, impaired cardiopulmonary response to activity, decreased coordination, pain, decreased lower extremity function, decreased upper extremity function, impaired coordination .Pt motivated and cooperative with treatment session. Pt Progressing  Slowly with PT Intervention.pt required decreased (A) for mobility this date.  Pt would continue to benefit from skilled PT to address overall functional mobility and goals. Goals remain appropriate      Rehab Prognosis:  good; patient would benefit from acute skilled PT services to address these deficits and reach maximum level of function.      Recent Surgery: Procedure(s) (LRB):  CORPECTOMY THORACIC T12-L1 corpectomy with T10-L3 fusion, neuromonitoring, globus (Left) 7 Days Post-Op    Plan:     During this hospitalization, patient to be seen 4 x/week to address the above listed problems via gait training, therapeutic activities, therapeutic exercises, neuromuscular re-education, wheelchair management/training  · Plan of Care Expires:  04/21/18   Plan of Care Reviewed with: patient    Subjective     Communicated with RN prior to session.  Patient found supine upon PT entry to room, agreeable to treatment.      Chief Complaint: weakness and pain    Pain/Comfort:  · Pain Rating 1: 5/10  · Location - Side 1: Bilateral  · Location - Orientation 1: generalized  · Location 1: back  · Pain Addressed 1: Pre-medicate for  activity, Reposition, Cessation of Activity, Distraction  · Pain Rating Post-Intervention 1: 5/10    Patients cultural, spiritual, Nondenominational conflicts given the current situation: none noted    Objective:     Patient found with: peripheral IV, hemovac, PureWick     General Precautions: Standard, fall   Orthopedic Precautions:spinal precautions   Braces: TLSO     Functional Mobility:  · Bed Mobility:     · Rolling Left:  minimum assistance  · Rolling Right: minimum assistance  · Scooting: minimum assistance  · Supine to Sit: moderate assistance  · Donned TLSO with totalA  · Sit to Supine: moderate assistance  · Transfers:     Sit to Stand:  maximal assistance with rolling walker x 2 attmepts pt with difficulty coming to complete stand, pt sit to stand with support of therapist, pt able to come to complete stand x 10 secs with maxA.   Pt sat EOB with CGA/SBA for postural control and BUE support     AM-PAC 6 CLICK MOBILITY  Turning over in bed (including adjusting bedclothes, sheets and blankets)?: 2  Sitting down on and standing up from a chair with arms (e.g., wheelchair, bedside commode, etc.): 2  Moving from lying on back to sitting on the side of the bed?: 2  Moving to and from a bed to a chair (including a wheelchair)?: 1  Need to walk in hospital room?: 1  Climbing 3-5 steps with a railing?: 1  Total Score: 9       Therapeutic Activities and Exercises:   Discussed/educated patient on progress, safety, importance of OOB mobility for improving outcomes after surgery, d/c, PT POC   Patient performed therex X 15 reps seatedB LE A/AAROM AP, LAQ, Hip Flexion  White board updated      Patient left supine with all lines intact, call button in reach, nsg notified and family present..    GOALS:    Physical Therapy Goals        Problem: Physical Therapy Goal    Goal Priority Disciplines Outcome Goal Variances Interventions   Physical Therapy Goal     PT/OT, PT Ongoing (interventions implemented as appropriate)      Description:  Goals to be met by: 2018     Patient will increase functional independence with mobility by performin. Supine to sit with Moderate Assistance-met  2. Sit to supine with Moderate Assistance-met  3. Sit to stand transfer with Maximum Assistance-met  4. Bed to chair transfer with Maximum Assistance   5. Wheelchair propulsion x150 feet with Stand-by Assistance using bilateral uppper extremities  6. Lower extremity exercise program x15 reps per handout, with assistance as needed                       Time Tracking:     PT Received On: 18  PT Start Time: 1211     PT Stop Time: 1249  PT Total Time (min): 38 min     Billable Minutes: Therapeutic Activity 25 and Therapeutic Exercise 13    Treatment Type: Treatment  PT/PTA: ZULEYKA Patino PTA  2018

## 2018-03-25 NOTE — PLAN OF CARE
Problem: Patient Care Overview  Goal: Plan of Care Review  Outcome: Ongoing (interventions implemented as appropriate)  No acute changes overnight. Pt complained of pain and inability to sleep. Administered pain meds and vistaril with moderate relief noted. Hemovac in place to deep suction with bloody drainage noted. Call light in reach. WCTM

## 2018-03-25 NOTE — PLAN OF CARE
Problem: Patient Care Overview  Goal: Plan of Care Review  Outcome: Ongoing (interventions implemented as appropriate)  Pt. Rested in bed through shift. Scheduled Oxy given in AM. No PRN's given. Pt. Says pain is tolerable. Pt. On RA; tolerating well. Midline back incision DANNA; hemovac drain to suction. 40ml output noted in drain this shift. Pt. On RA; tolerating well. SpO2 stable.  Pt. Resting in bed. Family members at bedside. Bed alarm in place. Free of injuries or falls. NADN, VSS. Safety maintained. Call light within reach. WCTM.

## 2018-03-26 DIAGNOSIS — Z98.1 STATUS POST THORACIC SPINAL FUSION: ICD-10-CM

## 2018-03-26 PROBLEM — R79.89 AZOTEMIA: Status: RESOLVED | Noted: 2017-12-13 | Resolved: 2018-03-26

## 2018-03-26 PROBLEM — B37.0 THRUSH: Status: RESOLVED | Noted: 2018-01-03 | Resolved: 2018-03-26

## 2018-03-26 PROBLEM — R06.00 DYSPNEA: Status: RESOLVED | Noted: 2017-12-21 | Resolved: 2018-03-26

## 2018-03-26 PROBLEM — R52 INTRACTABLE PAIN: Status: RESOLVED | Noted: 2017-12-20 | Resolved: 2018-03-26

## 2018-03-26 LAB
ALBUMIN SERPL BCP-MCNC: 2 G/DL
ALP SERPL-CCNC: 71 U/L
ALT SERPL W/O P-5'-P-CCNC: 15 U/L
ANION GAP SERPL CALC-SCNC: 8 MMOL/L
AST SERPL-CCNC: 35 U/L
BACTERIA SPEC ANAEROBE CULT: NORMAL
BASOPHILS # BLD AUTO: 0.05 K/UL
BASOPHILS NFR BLD: 0.5 %
BILIRUB SERPL-MCNC: 0.4 MG/DL
BUN SERPL-MCNC: 10 MG/DL
CALCIUM SERPL-MCNC: 8.8 MG/DL
CHLORIDE SERPL-SCNC: 104 MMOL/L
CO2 SERPL-SCNC: 26 MMOL/L
CREAT SERPL-MCNC: 0.7 MG/DL
DIFFERENTIAL METHOD: ABNORMAL
EOSINOPHIL # BLD AUTO: 0.3 K/UL
EOSINOPHIL NFR BLD: 2.5 %
ERYTHROCYTE [DISTWIDTH] IN BLOOD BY AUTOMATED COUNT: 19.2 %
EST. GFR  (AFRICAN AMERICAN): >60 ML/MIN/1.73 M^2
EST. GFR  (NON AFRICAN AMERICAN): >60 ML/MIN/1.73 M^2
GLUCOSE SERPL-MCNC: 82 MG/DL
HCT VFR BLD AUTO: 29.4 %
HGB BLD-MCNC: 9.6 G/DL
IMM GRANULOCYTES # BLD AUTO: 0.12 K/UL
IMM GRANULOCYTES NFR BLD AUTO: 1.1 %
LYMPHOCYTES # BLD AUTO: 2.3 K/UL
LYMPHOCYTES NFR BLD: 21.4 %
MAGNESIUM SERPL-MCNC: 1.4 MG/DL
MCH RBC QN AUTO: 28.3 PG
MCHC RBC AUTO-ENTMCNC: 32.7 G/DL
MCV RBC AUTO: 87 FL
MONOCYTES # BLD AUTO: 1.4 K/UL
MONOCYTES NFR BLD: 12.5 %
NEUTROPHILS # BLD AUTO: 6.7 K/UL
NEUTROPHILS NFR BLD: 62 %
NRBC BLD-RTO: 0 /100 WBC
PHOSPHATE SERPL-MCNC: 3.6 MG/DL
PLATELET # BLD AUTO: 281 K/UL
PMV BLD AUTO: 9.6 FL
POTASSIUM SERPL-SCNC: 3.9 MMOL/L
PROT SERPL-MCNC: 6 G/DL
RBC # BLD AUTO: 3.39 M/UL
SODIUM SERPL-SCNC: 138 MMOL/L
WBC # BLD AUTO: 10.77 K/UL

## 2018-03-26 PROCEDURE — 25000003 PHARM REV CODE 250: Performed by: INTERNAL MEDICINE

## 2018-03-26 PROCEDURE — 99232 SBSQ HOSP IP/OBS MODERATE 35: CPT | Mod: ,,, | Performed by: HOSPITALIST

## 2018-03-26 PROCEDURE — 97535 SELF CARE MNGMENT TRAINING: CPT

## 2018-03-26 PROCEDURE — 80053 COMPREHEN METABOLIC PANEL: CPT

## 2018-03-26 PROCEDURE — 25000003 PHARM REV CODE 250: Performed by: HOSPITALIST

## 2018-03-26 PROCEDURE — 97530 THERAPEUTIC ACTIVITIES: CPT

## 2018-03-26 PROCEDURE — 85025 COMPLETE CBC W/AUTO DIFF WBC: CPT

## 2018-03-26 PROCEDURE — 63600175 PHARM REV CODE 636 W HCPCS: Performed by: STUDENT IN AN ORGANIZED HEALTH CARE EDUCATION/TRAINING PROGRAM

## 2018-03-26 PROCEDURE — 99222 1ST HOSP IP/OBS MODERATE 55: CPT | Mod: ,,, | Performed by: NURSE PRACTITIONER

## 2018-03-26 PROCEDURE — 84100 ASSAY OF PHOSPHORUS: CPT

## 2018-03-26 PROCEDURE — 36415 COLL VENOUS BLD VENIPUNCTURE: CPT

## 2018-03-26 PROCEDURE — 25000003 PHARM REV CODE 250: Performed by: NURSE PRACTITIONER

## 2018-03-26 PROCEDURE — 25000003 PHARM REV CODE 250: Performed by: STUDENT IN AN ORGANIZED HEALTH CARE EDUCATION/TRAINING PROGRAM

## 2018-03-26 PROCEDURE — 99024 POSTOP FOLLOW-UP VISIT: CPT | Mod: POP,,, | Performed by: PHYSICIAN ASSISTANT

## 2018-03-26 PROCEDURE — 20600001 HC STEP DOWN PRIVATE ROOM

## 2018-03-26 PROCEDURE — 83735 ASSAY OF MAGNESIUM: CPT

## 2018-03-26 RX ORDER — LANOLIN ALCOHOL/MO/W.PET/CERES
400 CREAM (GRAM) TOPICAL ONCE
Status: COMPLETED | OUTPATIENT
Start: 2018-03-26 | End: 2018-03-26

## 2018-03-26 RX ADMIN — SIMETHICONE CHEW TAB 80 MG 80 MG: 80 TABLET ORAL at 05:03

## 2018-03-26 RX ADMIN — OXYCODONE HYDROCHLORIDE 10 MG: 10 TABLET, FILM COATED, EXTENDED RELEASE ORAL at 08:03

## 2018-03-26 RX ADMIN — SIMETHICONE CHEW TAB 80 MG 80 MG: 80 TABLET ORAL at 07:03

## 2018-03-26 RX ADMIN — RIFAMPIN 600 MG: 300 CAPSULE ORAL at 09:03

## 2018-03-26 RX ADMIN — HEPARIN SODIUM 5000 UNITS: 5000 INJECTION, SOLUTION INTRAVENOUS; SUBCUTANEOUS at 05:03

## 2018-03-26 RX ADMIN — HEPARIN SODIUM 5000 UNITS: 5000 INJECTION, SOLUTION INTRAVENOUS; SUBCUTANEOUS at 02:03

## 2018-03-26 RX ADMIN — AMLODIPINE BESYLATE 10 MG: 10 TABLET ORAL at 08:03

## 2018-03-26 RX ADMIN — STANDARDIZED SENNA CONCENTRATE AND DOCUSATE SODIUM 2 TABLET: 8.6; 5 TABLET, FILM COATED ORAL at 08:03

## 2018-03-26 RX ADMIN — MAGNESIUM OXIDE TAB 400 MG (241.3 MG ELEMENTAL MG) 400 MG: 400 (241.3 MG) TAB at 08:03

## 2018-03-26 RX ADMIN — CARVEDILOL 25 MG: 6.25 TABLET, FILM COATED ORAL at 08:03

## 2018-03-26 RX ADMIN — ISONIAZID 900 MG: 300 TABLET ORAL at 09:03

## 2018-03-26 RX ADMIN — SIMETHICONE CHEW TAB 80 MG 80 MG: 80 TABLET ORAL at 08:03

## 2018-03-26 RX ADMIN — TRAZODONE HYDROCHLORIDE 100 MG: 50 TABLET ORAL at 08:03

## 2018-03-26 RX ADMIN — Medication 50 MG: at 08:03

## 2018-03-26 RX ADMIN — DICYCLOMINE HYDROCHLORIDE 10 MG: 10 CAPSULE ORAL at 08:03

## 2018-03-26 RX ADMIN — HEPARIN SODIUM 5000 UNITS: 5000 INJECTION, SOLUTION INTRAVENOUS; SUBCUTANEOUS at 08:03

## 2018-03-26 NOTE — ASSESSMENT & PLAN NOTE
62 F with hx of TB and now T12/L1 osteodiscitis concerning for pott's disease s/p T10-L3 fusion and T12,L1 corpectomy on 3/18  - Pt is neurologically stable  - All subfascial hemovac drains have been removed by NSGY  - Continue TLSO brace  - pain controlled, PT/OT   - pt is able to sit up in chair, standing up, improvement since her admission   - Looking at Inpatient Rehab closer to [place]   - medically stable for discharge pending Rehab

## 2018-03-26 NOTE — ASSESSMENT & PLAN NOTE
- On Home meds: Coreg, Norvasc  - Pt refuse to take Isosorbine, so we will hold med  - PRN hydralazine

## 2018-03-26 NOTE — HOSPITAL COURSE
3/21/18:  Evaluated by PT and OT.  Bed mobility totalA.  Sit to stand totalA.  No further transfers or gait.  UBD maxA and LBD totalA.  3/23/18:  Participated with OT.  Bed mobility mod-maxA.  Sit to stand maxA.  UBD maxA.  Grooming mod(I).  Feeding mod(I).   3/25/18:  Participated with PT.  Bed mobility min-modA.  Sit to stand maxA with RW.  No further transfers or gait.  EOB SBA-CGA.   3/26/18:  Participated with PT and OT.  Bed mobility mod-maxA.  Sit to stand totalA (maxA x 2) and transfers totalA (maxA x 2).  UBD modA and toileting totalA.

## 2018-03-26 NOTE — SUBJECTIVE & OBJECTIVE
Interval History: NAEON. Report insomnia over the last few days Trazadone was mildly effectivel. ABd cramping resolved.     Review of Systems   Constitutional: Negative for fever.   HENT: Negative for congestion.    Respiratory: Negative for chest tightness and shortness of breath.    Cardiovascular: Negative for chest pain.   Gastrointestinal: Negative for abdominal distention, abdominal pain, constipation, diarrhea, nausea and vomiting.   Genitourinary: Negative for dysuria.   Musculoskeletal: Positive for back pain. Negative for arthralgias and neck pain.   Skin: Negative for rash.   Neurological: Positive for weakness.   Psychiatric/Behavioral: Negative for confusion.     Objective:     Vital Signs (Most Recent):  Temp: 98.4 °F (36.9 °C) (03/26/18 1508)  Pulse: 75 (03/26/18 1508)  Resp: 18 (03/26/18 1508)  BP: 136/68 (03/26/18 1508)  SpO2: (!) 92 % (03/26/18 1508) Vital Signs (24h Range):  Temp:  [98.3 °F (36.8 °C)-99.3 °F (37.4 °C)] 98.4 °F (36.9 °C)  Pulse:  [75-82] 75  Resp:  [16-18] 18  SpO2:  [92 %-98 %] 92 %  BP: (131-166)/(63-82) 136/68     Weight: 94.8 kg (208 lb 15.9 oz)  Body mass index is 34.78 kg/m².    Intake/Output Summary (Last 24 hours) at 03/26/18 1834  Last data filed at 03/26/18 0600   Gross per 24 hour   Intake              360 ml   Output               30 ml   Net              330 ml      Physical Exam    GA: Awake, Alert, Extubated, Oriented, Follows commands comfortable, no acute distress.   HEENT: No scleral icterus or JVD.   Pulmonary: Clear to auscultation Anterior. No wheezing, crackles, or rhonchi.  Cardiac: RRR S1 & S2 w/o rubs/murmurs/gallops.   Abdominal: Bowel sounds present x 4. No appreciable hepatosplenomegaly.  Skin: No jaundice, rashes, or visible lesions.  Neuro: Motor Strength: RLE 4/5, otherwise good strength upper and lower extremities  Extremities: no cyanosis or edema, or clubbing        MELD-Na score: 7 at 3/20/2018  1:11 AM  MELD score: 7 at 3/20/2018  1:11  AM  Calculated from:  Serum Creatinine: 0.8 mg/dL (Rounded to 1) at 3/20/2018  1:11 AM  Serum Sodium: 138 mmol/L (Rounded to 137) at 3/20/2018  1:11 AM  Total Bilirubin: 0.4 mg/dL (Rounded to 1) at 3/20/2018  1:11 AM  INR(ratio): 1.1 at 3/18/2018  6:25 PM  Age: 62 years    Significant Labs:  CBC:    Recent Labs  Lab 03/25/18  0512 03/26/18  0442   WBC 12.52 10.77   HGB 9.9* 9.6*   HCT 30.4* 29.4*    281     CMP:    Recent Labs  Lab 03/25/18  0512 03/26/18  0442    138   K 4.6 3.9    104   CO2 23 26   GLU 85 82   BUN 10 10   CREATININE 0.7 0.7   CALCIUM 8.9 8.8   PROT 6.4 6.0   ALBUMIN 2.1* 2.0*   BILITOT 0.4 0.4   ALKPHOS 73 71   AST 33 35   ALT 13 15   ANIONGAP 9 8   EGFRNONAA >60.0 >60.0     PTINR:  No results for input(s): INR in the last 48 hours.    Significant Procedures:   Dobutamine Stress Test with Color Flow: No results found for this or any previous visit.

## 2018-03-26 NOTE — ASSESSMENT & PLAN NOTE
-  Presented with worsening back and BLE pain and weakness  -  MRI T-spine revealed vertebral osteomyelitis/discitis T12-L1 with severe compression and effacement of the ventral and dorsal subarachnoid spaces  -  S/p T12-L1 corpectomy with T10-L3 fusion on 3/18/18  Recommendations  -  Encourage mobility, OOB in chair, and early ambulation as appropriate  -  PT/OT evaluate and treat  -  Pain management  -  Monitor for bowel and bladder dysfunction  -  Monitor for spasticity  -  Monitor for and prevent skin breakdown and pressure ulcers  Early mobility, repositioning/weight shifting every 20-30 minutes when sitting, turn patient every 2 hours, proper mattress/overlay and chair cushioning, pressure relief/heel protector boots  -  DVT prophylaxis:  on H

## 2018-03-26 NOTE — CONSULTS
[facility]  Physical Medicine & Rehab  Consult Note    Patient Name: Mary Carrillo  MRN: [id number]  Admission Date: 3/17/2018  Hospital Length of Stay: 9 days  Attending Physician: Luke Quiñones MD     Inpatient consult to Physical Medicine & Rehabilitation  Consult performed by: Tram Chambers NP  Consult requested by:  Luke Quiñones MD    Collaborating Physician: Lexi Rodrigues MD  Reason for Consult:  Rehab evaluation    Consults  Subjective:     Principal Problem: Discitis of thoracolumbar region    HPI: Mary Carrillo is a 62-year-old female with PMHx of HTN, HLD, glaucoma, gout, arthritis, asthma, sarcoidosis, and Pott's disease with recent admission at [facility] (12/17/17-1/18/18) for MTB in spine and lungs s/p RIPE x 2 weeks while inpatient with discharge home for completion of treatment despite attempts for NH/SNF placement.  Since discharge, patient has been mostly bed/sofa bound 2/2 severe back pain.  Patient presented to [facility] with worsening back and BLE pain with associated BLE weakness.  MRI thoracic spine revealed vertebral osteomyelitis/discitis T12-L1 with severe compression and effacement of the ventral and dorsal subarachnoid spaces.  [place] then [place] [facility] [place] for further evaluation and management.  Upon admission, evaluated by Neurosurgery and underwent T12-L1 corpectomy with T10-L3 fusion on 3/18/18.  ID consulted and recommended airborne precautions be discontinued 2/2 completion of TB treatment.  Post-op course complicated by anemia requiring 4 units PRBC, 2 FFP, and 1 unit platelets on 3/18/18 and 2 units PRBC on 3/23/18.     Functional History: Patient lives in [place] with her  in a single story home without steps to enter.  Prior to recent admission (December/January), she was (I) with ADLs, including driving, and mobility.  Since that time, she has mostly been bed/sofa bound and required assistance with ADLs and  mobility.  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  DME: [employer].    Hospital Course:   3/21/18:  Evaluated by PT and OT.  Bed mobility totalA.  Sit to stand totalA.  No further transfers or gait.  UBD maxA and LBD totalA.  3/23/18:  Participated with OT.  Bed mobility mod-maxA.  Sit to stand maxA.  UBD maxA.  Grooming mod(I).  Feeding mod(I).   3/25/18:  Participated with PT.  Bed mobility min-modA.  Sit to stand maxA with RW.  No further transfers or gait.  EOB SBA-CGA.     Past Medical History:   Diagnosis Date    Arthritis     Asthma     Back pain     Encounter for blood transfusion     Glaucoma     Gout     Hyperlipidemia     Hypertension     Sarcoidosis     Vitritis     Vitritis      Past Surgical History:   Procedure Laterality Date    CATARACT EXTRACTION Bilateral      SECTION      HYSTERECTOMY      uterus/cervix d/t uterine fibroids.     Review of patient's allergies indicates:   Allergen Reactions    Prednisone Hives and Itching     Pills only, can take if she needs to    Naproxen Rash       Scheduled Medications:    amLODIPine  10 mg Oral Daily    carvedilol  25 mg Oral BID    dicyclomine  10 mg Oral BID    heparin (porcine)  5,000 Units Subcutaneous Q8H    isoniazid  900 mg Oral Twice Weekly    oxyCODONE  10 mg Oral Q12H    polyethylene glycol  17 g Oral Daily    pyridoxine (vitamin B6)  50 mg Oral Daily    rifAMpin  600 mg Oral Twice Weekly    senna-docusate 8.6-50 mg  2 tablet Oral Daily    traZODone  100 mg Oral QHS       PRN Medications: sodium chloride, acetaminophen, albuterol sulfate, dextrose 50%, dextrose 50%, glucagon (human recombinant), glucose, glucose, hydrOXYzine pamoate, insulin aspart U-100, magnesium oxide, magnesium oxide, microfibrillar collagen, oxyCODONE-acetaminophen, potassium chloride 10%, potassium chloride 10%, potassium chloride 10%, potassium, sodium phosphates, potassium, sodium phosphates, potassium, sodium phosphates, ramelteon, senna,  simethicone, sodium chloride 0.9%    Family History     Problem Relation (Age of Onset)    Diabetes Mother, Father    Kidney disease Mother, Father        Social History Main Topics    Smoking status: Former Smoker     Packs/day: 1.00     Years: 35.00     Types: Cigarettes     Start date: 1/7/1967     Quit date: 12/23/1994    Smokeless tobacco: Never Used    Alcohol use No    Drug use: No    Sexual activity: Yes     Partners: Male     Birth control/ protection: Post-menopausal, See Surgical Hx     Review of Systems   Constitutional: Positive for activity change. Negative for chills, fatigue and fever.   HENT: Negative for drooling, hearing loss, trouble swallowing and voice change.    Eyes: Negative for pain and visual disturbance.   Respiratory: Negative for cough, shortness of breath and wheezing.    Cardiovascular: Negative for chest pain and palpitations.   Gastrointestinal: Negative for abdominal distention, nausea and vomiting.   Genitourinary: Negative for difficulty urinating and flank pain.   Musculoskeletal: Positive for back pain, gait problem and myalgias. Negative for arthralgias and neck pain.   Skin: Positive for wound. Negative for rash.   Neurological: Positive for weakness. Negative for dizziness, numbness and headaches.   Psychiatric/Behavioral: Negative for agitation and hallucinations. The patient is not nervous/anxious.      Objective:     Vital Signs (Most Recent):  Temp: 98.7 °F (37.1 °C) (03/26/18 1142)  Pulse: 76 (03/26/18 1142)  Resp: 16 (03/26/18 1142)  BP: 131/63 (03/26/18 1142)  SpO2: 95 % (03/26/18 1142)    Vital Signs (24h Range):  Temp:  [98.3 °F (36.8 °C)-99.5 °F (37.5 °C)] 98.7 °F (37.1 °C)  Pulse:  [75-89] 76  Resp:  [16-18] 16  SpO2:  [94 %-98 %] 95 %  BP: (131-166)/(63-83) 131/63     Body mass index is 34.78 kg/m².    Physical Exam   Constitutional: She is oriented to person, place, and time. She appears well-developed and well-nourished. No distress.   Sitting up in  medichair   HENT:   Head: Normocephalic and atraumatic.   Right Ear: External ear normal.   Left Ear: External ear normal.   Nose: Nose normal.   Eyes: Right eye exhibits no discharge. Left eye exhibits no discharge. No scleral icterus.   Neck: Normal range of motion.   Cardiovascular: Normal rate, regular rhythm and intact distal pulses.    Pulmonary/Chest: Effort normal. No respiratory distress. She has no wheezes.   Abdominal: Soft. She exhibits no distension. There is no tenderness.   Musculoskeletal: She exhibits no edema or tenderness.        Right ankle: She exhibits decreased range of motion (foot drop).   TLSO brace intact   Neurological: She is alert and oriented to person, place, and time. She exhibits normal muscle tone.   -  Mental Status:  AAOx3.  Follows commands.  Answers correct age and .  Recent and remote memory intact.  -  Speech and language:  no aphasia or dysarthria.    -  Vision:  no hemianopsia or ptosis.    -  Facial movement (CN VII): symmetrical.   -  Motor:  RUE: 4/5.  LUE: 4/5.  RLE:  Hip Flex 2/5, Knee Ext/Flex 3-/5,  DF 1/5, PF 4-/5.  LLE:  Hip Flex 2+/5, Knee Ext/Flex 4-/5,  DF 4/5, PF 4/5.  -  Sensory:  Intact to light touch and pin prick.   Skin: Skin is warm and dry. No rash noted.   Psychiatric: She has a normal mood and affect. Her behavior is normal. Thought content normal.   Vitals reviewed.    Diagnostic Results:   Labs: Reviewed  X-Ray: Reviewed  CT: Reviewed  MRI: Reviewed    Assessment/Plan:     * Discitis of thoracolumbar region    -  Presented with worsening back and BLE pain and weakness  -  MRI T-spine revealed vertebral osteomyelitis/discitis T12-L1 with severe compression and effacement of the ventral and dorsal subarachnoid spaces  -  S/p T12-L1 corpectomy with T10-L3 fusion on 3/18/18  Recommendations  -  Encourage mobility, OOB in chair, and early ambulation as appropriate  -  PT/OT evaluate and treat  -  Pain management  -  Monitor for bowel and bladder  dysfunction  -  Monitor for spasticity  -  Monitor for and prevent skin breakdown and pressure ulcers  Early mobility, repositioning/weight shifting every 20-30 minutes when sitting, turn patient every 2 hours, proper mattress/overlay and chair cushioning, pressure relief/heel protector boots  -  DVT prophylaxis:  on H        Acute blood loss as cause of postoperative anemia    -  S/p 4 units PRBC, 2 FFP, and 1 unit platelets on 3/18/18 and 2 units PRBC on 3/23/18        Pott's disease (spinal tuberculosis)    -  See pulmonary TB and discitis of thoracolumbar region        Spinal cord compression    -  See discitis of thoracolumbar region        Pulmonary tuberculosis    -  Recent admission at [facility] (12/17/17-1/18/18) for MTB in spine and lungs s/p RIPE x 2 weeks while inpatient with discharge home for completion of treatment   -  ID consulted and recommended airborne precautions be discontinued 2/2 completion of TB treatment        Recommend IRF.  Patient approved for [facility].  Transfer to rehab when medically ready for discharge.    Thank you for your consult.     DOROTHEA Cazares  Department of Physical Medicine & Rehab  [facility]

## 2018-03-26 NOTE — DISCHARGE INSTRUCTIONS
Neurosurgery Patient Instructions    Activity Restrictions:  [x]  Return to work will be determined on an individual basis.  [x]  No lifting greater than 5-10 pounds.  [x]  Avoid bending and twisting the area of your surgery more than 45 degrees from neutral position in any direction.  [x]  No driving or operating machinery:  [x]  until cleared by your surgeon.  [x]  while taking narcotic pain medications or muscle relaxants.  [x]  Wear your brace at all times except when in bed or while showering.  [x]  Increase ambulation over the next 2 weeks so that you are walking 2 miles per day at 2 weeks post-operatively.  [x]  Walk on paved surfaces only. It is okay to walk up and down stairs while holding onto a side rail.    Discharge Medication/Follow-up:  [x]  Please refer to discharge medication reconciliation form.  [x]  Do not take ANY non-steroidal anti-inflammatory drugs (NSAIDS), including the following: ibuprofen, naprosyn, Aleve, Advil, Indocin, Mobic, or Celebrex for:  [x]  6 weeks  [x]  Prescriptions for appropriate medication will be given upon discharge.  [x]  Take docusate (Colace 100 mg): take one capsule a day as needed for constipation. You can get this over the counter.  [x]  Follow-up appointment:  [x]  10-14 days post-op for wound check by physician assistant/nurse. If still at Rehab on 4/1/18, ok for Rehab to remove staples.   [x]  4-6 weeks with MD:  [x]  with x-rays  [x]  An appointment will be mailed to you.    Wound Care:  [x]  No bandage required. Keep your incision open to the air.  [x]  You may shower on the 2nd day after your surgery. Have the force of water hit you opposite from the incision. Pat the incision dry after your shower; do not scrub the incision.  [x]  You cannot take a bath until 8 weeks after surgery.  [x]  Apply bacitracin to incision twice a day for 2 weeks.     Call your doctor or go to the Emergency Room for any signs of infection, including: increased redness, drainage,  pain, or fever (temperature ?101.5 for 24 hours). Call your doctor or go to the Emergency Room if there are any localized neurological changes; problems with speech, vision, numbness, tingling, weakness, or severe headache; or for other concerns.    Special Instructions:  [x]  No use of tobacco products.    Physicians need 3 days' notice for pain medicine to be refilled. Pain medicine will only be refilled between 8 AM and 5 PM, Monday through Friday, due to Food and Drug Administration regulation of documentation.    If you have any questions about this form, please call [phone].    Form No. 45188 (Revised 10/31/2013)

## 2018-03-26 NOTE — PT/OT/SLP PROGRESS
Occupational Therapy   Treatment    Name: Mary Carrillo  MRN: [id number]  Admitting Diagnosis:  Discitis of thoracolumbar region  8 Days Post-Op    Recommendations:     Discharge Recommendations: rehabilitation facility  Discharge Equipment Recommendations:   (TBD at next level of care )  Barriers to discharge:   (level of skilled assistance needed)    Subjective     Communicated with: RN prior to session. 2 friends present for session.   Pain/Comfort:  · Pain Rating 1:  (pain in back--not rated)    Patients cultural, spiritual, Evangelical conflicts given the current situation: none reported     Objective:     Patient found with: peripheral IV, hemovac    General Precautions: Standard, fall   Orthopedic Precautions:spinal precautions   Braces: TLSO     Occupational Performance:    Bed Mobility:    *TLSO brace donned EOB and remained throughout session, spinal precautions maintained throughout bed mobility*  · Patient completed Rolling/Turning to Left with  moderate assistance, with side rail and with HOB flat   · Patient completed Scooting/Bridging with maximal assistance and HOB flat, in forward plane to EOB  · Max A for lateral scoot to L x3 trials and to R x1 trial  · Patient completed Supine to Sit with maximal assistance and HOB flat      Functional Mobility/Transfers:  · Patient completed Bed <> Chair Transfer using Squat Pivot technique with maximal assistance and of 2 persons with no assistive device  · Patient completed bed<>BSC Transfer Squat Pivot technique x2 trials with maximal assistance and of 2 persons with  no AD  · Functional Mobility: Not appropriate due to impaired postural control and decreased BLE functioning    Activities of Daily Living:  · UB Dressing: moderate assistance to don gown like jacket while seated EOB, max A to don TLSO brace  · Toileting: total assistance for perineal care x3 trials    Patient left up in medichair with all lines intact, call button in reach and RN and PT notified.  RN notified that pt max A x1 with squat pivot to BSC and appropriate to perform with staff.     [facility] 6 Click:  [facility] Total Score: 15    Treatment & Education:  -Communication board updated  -Education for OT role and POC  -Education for spinal precaution via teachback method--fair carryover  -SBA for postural control with BUE support while seated EOB and while on BSC this date    -~10 min EOB   -~20 min on BSC  -Pt unable to perform hip elevation in order to achieve full stand   Education:    Assessment:     Mary Carrillo is a 62 y.o. female with a medical diagnosis of Discitis of thoracolumbar region.  She presents with impaired functional independence across various areas of her life. She demo good motivation and willingness to participate. She demo increased postural control during seated functional tasks this date. She demo increased skill with functional transfers. Pt would continue to benefit from skilled OT services for maximum functional outcome and safety. Performance deficits affecting function are weakness, impaired endurance, impaired self care skills, impaired sensation, impaired functional mobilty, gait instability, impaired balance, decreased coordination, decreased lower extremity function, decreased upper extremity function, pain, impaired coordination, impaired cardiopulmonary response to activity, orthopedic precautions.      Rehab Prognosis:  good; patient would benefit from acute skilled OT services to address these deficits and reach maximum level of function.       Plan:     Patient to be seen 4 x/week to address the above listed problems via self-care/home management, therapeutic activities, therapeutic exercises, neuromuscular re-education  · Plan of Care Expires: 04/19/18  · Plan of Care Reviewed with: patient    This Plan of care has been discussed with the patient who was involved in its development and understands and is in agreement with the identified goals and treatment  plan    GOALS:    Occupational Therapy Goals        Problem: Occupational Therapy Goal    Goal Priority Disciplines Outcome Interventions   Occupational Therapy Goal     OT, PT/OT Ongoing (interventions implemented as appropriate)    Description:  Goals to be met by: 4/4   Patient will increase functional independence with ADLs by performing:    UE Dressing including TLSO brace with Moderate Assistance while seated.  LE Dressing with Moderate Assistance.  Grooming while seated at sink with Minimal Assistance.  Toileting from bedside commode with Moderate Assistance for hygiene and clothing management.   Sitting at edge of bed x20 minutes with CGA for postural control, no LOB.  Rolling to Bilateral with Minimal Assistance.   Supine to sit with Minimal Assistance while maintaining spinal precautions.  Squat pivot transfers to various surfaces (chair, BSC) with Moderate Assistance, use of AD as needed.  Upper extremity exercise program x15 reps per handout, with independence to increase endurance to functional tasks.  Pt/CG demo proper donning and doffing of TLSO brace with setup and assist as needed.   Pt verbalize and demo spinal precautions.                         Time Tracking:     OT Date of Treatment: 03/26/18  OT Start Time: 0915 (return 1005)  OT Stop Time: 0945 (end 1020 after return (total time 45 min))  OT Total Time (min): 30 min    Billable Minutes:Self Care/Home Management 25  Therapeutic Activity 20    ALMA Albert  3/26/2018

## 2018-03-26 NOTE — HPI
Mary Carrillo is a 62-year-old female with PMHx of HTN, HLD, glaucoma, gout, arthritis, asthma, sarcoidosis, and Pott's disease with recent admission at  (12/17/17-1/18/18) for MTB in spine and lungs s/p RIPE x 2 weeks while inpatient with discharge home for completion of treatment despite attempts for NH/SNF placement.  Since discharge, patient has been mostly bed/sofa bound 2/2 severe back pain.  [facility] and [facility]LE pain with associated BLE weakness.  MRI thoracic spine revealed vertebral osteomyelitis/discitis T12-L1 with severe compression and effacement of the ventral and dorsal subarachnoid spaces.  She was then [place] [facility] [place] for further evaluation and management.  Upon admission, evaluated by Neurosurgery and underwent T12-L1 corpectomy with T10-L3 fusion on 3/18/18.  ID consulted and recommended airborne precautions be discontinued 2/2 completion of TB treatment.  Post-op course complicated by anemia requiring 4 units PRBC, 2 FFP, and 1 unit platelets on 3/18/18 and 2 units PRBC on 3/23/18.     Functional History: Patient lives in [place] with her  in a single story home without steps to enter.  Prior to recent admission (December/January), she was (I) with ADLs, including driving, and mobility.  Since that time, she has mostly been bed/sofa bound and required assistance with ADLs and mobility.  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  DME: [employer].

## 2018-03-26 NOTE — PLAN OF CARE
Problem: Patient Care Overview  Goal: Plan of Care Review  Outcome: Ongoing (interventions implemented as appropriate)  Overnight, pt complained of pain to back. Scheduled oxycontin and PRN percocet given with moderate relief noted. Pt stated trazadone didn't really help for insomnia. Purewick intact. Hemovac drain in place. Call light in reach. WCTM

## 2018-03-26 NOTE — PLAN OF CARE
Rehab referrals per MSW Jameel      03/26/18 1035   Right Care Assessment   Can the patient answer the patient profile reliably? Yes, cognitively intact   How often would a person be available to care for the patient? Often   Describe the patient's ability to walk at the present time. Major restrictions/daily assistance from another person   Number of comorbid conditions (as recorded on the chart) Two   During the past month, has the patient often been bothered by feeling down, depressed or hopeless? No   During the past month, has the patient often been bothered by little interest or pleasure in doing things? No

## 2018-03-26 NOTE — PROGRESS NOTES
[facility] Hospital Medicine  Progress Note    Patient Name: Mary Carrillo  MRN: [id number]  Patient Class: IP- Inpatient   Admission Date: 3/17/2018  Length of Stay: 8 days  Attending Physician: Luke Quiñones MD  Primary Care Provider: Jose Khan MD    [place] [facility] Medicine Team: [facility] HOSP MED A Luke Quiñones MD    Subjective:     Principal Problem:Discitis of thoracolumbar region    HPI:  Ms. 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 MRI notable for severe compression of the adjacent cord at the T12-L1 level. Transferred to [facility] for Neurosurgery evaluation.    Patient history dates back to December when she was admitted to [facility] from 12/17-01/18 for fevers, night sweats, back pain and weakness.  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  Since being discharged pt reports being unable to walk, mostly being bed bound or on the sofa due to severe back pain.  She is currently getting antibiotics via the health unit for her TB.  A home health nurse comes to her house to give her TB meds every Tues and Fri.  She reports numbness in her feet as well as tingling which has been ongoing for about 1 month.  She wears a diaper because she cannot get out of bed to use the bathroom.  She states that recently there have been times she doesn't realize that she urinated.  She denies any burning on urination, fevers, chills, night sweats at this time.        AFB cultures (12/27) > M Tb complex sensitive to RIF/ Strep/ INH/ EMB/ Pyrazinamide per quest.     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 On 3/18 admitted to NICU s/p T12-L1 corpectomy and T10-L3 fusion. ID recommended to discontinue Airborne Precautions due to pt being completely treated for TB.  Patient with stable exam, Pt is neurologically stable.  Pain controlled overnight on new regimen. Continue subfascial hemovac drain per NSGY. Continue TLSO brace.     She was transferred to hospital medicine service on 3/23 for further care, Hgb drop, 2U PRBC given on 3/23. Hgb now stabilized. Rehab consulted. CM/SW are working on placement             Interval History: NAEON.  Pt reports continued back pain but controlled. Report insomnia over the last few days with some sleep after getting Vistaril. ABd cramping resolved.     Review of Systems   Constitutional: Negative for fever.   HENT: Negative for congestion.    Respiratory: Negative for chest tightness and shortness of breath.    Cardiovascular: Negative for chest pain.   Gastrointestinal: Negative for abdominal distention, abdominal pain, constipation, diarrhea, nausea and vomiting.   Genitourinary: Negative for dysuria.   Musculoskeletal: Positive for back pain. Negative for arthralgias and neck pain.   Skin: Negative for rash.   Neurological: Positive for weakness.   Psychiatric/Behavioral: Negative for confusion.     Objective:     Vital Signs (Most Recent):  Temp: 99.3 °F (37.4 °C) (03/25/18 1621)  Pulse: 82 (03/25/18 1621)  Resp: 18 (03/25/18 1621)  BP: (!) 159/83 (03/25/18 1621)  SpO2: 97 % (03/25/18 1621) Vital Signs (24h Range):  Temp:  [98.4 °F (36.9 °C)-99.5 °F (37.5 °C)] 99.3 °F (37.4 °C)  Pulse:  [78-89] 82  Resp:  [17-18] 18  SpO2:  [94 %-97 %] 97 %  BP: (132-166)/(71-89) 159/83     Weight: 94.8 kg (208 lb 15.9 oz)  Body mass index is 34.78 kg/m².    Intake/Output Summary (Last 24 hours) at 03/25/18 1903  Last data filed at 03/25/18 1600   Gross per 24 hour   Intake              440 ml   Output              930 ml    Net             -490 ml      Physical Exam    GA: Awake, Alert, Extubated, Oriented, Follows commands comfortable, no acute distress.   HEENT: No scleral icterus or JVD.   Pulmonary: Clear to auscultation Anterior. No wheezing, crackles, or rhonchi.  Cardiac: RRR S1 & S2 w/o rubs/murmurs/gallops.   Abdominal: Bowel sounds present x 4. No appreciable hepatosplenomegaly.  Skin: No jaundice, rashes, or visible lesions.  Neuro: Motor Strength: RLE 4/5, otherwise good strength upper and lower extremities  Extremities: no cyanosis or edema, or clubbing        MELD-Na score: 7 at 3/20/2018  1:11 AM  MELD score: 7 at 3/20/2018  1:11 AM  Calculated from:  Serum Creatinine: 0.8 mg/dL (Rounded to 1) at 3/20/2018  1:11 AM  Serum Sodium: 138 mmol/L (Rounded to 137) at 3/20/2018  1:11 AM  Total Bilirubin: 0.4 mg/dL (Rounded to 1) at 3/20/2018  1:11 AM  INR(ratio): 1.1 at 3/18/2018  6:25 PM  Age: 62 years    Significant Labs:  CBC:    Recent Labs  Lab 03/24/18  0442 03/25/18  0512   WBC 17.38* 12.52   HGB 9.0* 9.9*   HCT 27.8* 30.4*    288     CMP:    Recent Labs  Lab 03/24/18  0442 03/25/18  0512    138   K 4.5 4.6    106   CO2 26 23   GLU 91 85   BUN 12 10   CREATININE 0.7 0.7   CALCIUM 8.3* 8.9   PROT 5.8* 6.4   ALBUMIN 2.1* 2.1*   BILITOT 1.4* 0.4   ALKPHOS 67 73   AST 29 33   ALT 11 13   ANIONGAP 4* 9   EGFRNONAA >60.0 >60.0     PTINR:  No results for input(s): INR in the last 48 hours.    Significant Procedures:   Dobutamine Stress Test with Color Flow: No results found for this or any previous visit.      Assessment/Plan:      * Discitis of thoracolumbar region    62 F with hx of TB and now T12/L1 osteodiscitis concerning for pott's disease s/p T10-L3 fusion and T12,L1 corpectomy on 3/18  - Pt is neurologically stable  - Continue subfascial hemovac drain - 180 cc's in last 24 hrs. Per NSGY  - Continue TLSO brace  - Inpatient Rehab pending   - pain controlled, PT/OT           Pott's disease (spinal  tuberculosis)    - Per ID, Spoke to the health department.  She started RIPE around December 25.  On march 5 she was transitioned to rifampin and isoniazid.    - Confirmed that she is on twice weekly dosing; isoniazid 900 mg on tuesdays and fridays, rifampin 600 mg on tuesdays and fridays and vitamin b 6.  Medications ordered.    - Osteomyelitis and diskitis of lumbar spine:  S/p surgery. See above        Spinal cord compression    - S/P Corpectomy T12-L1 and T10-L3 fusion  - 1 HV drain remains, NSGY following  - Neuro checks Q4h  Pain management:  - Oxycodone LR 10 q12h scheduled  - Percocet  q4h prn pain 4-6/10 for breakthrough  - Tylenol 650 q6h prn mild pain and temp        Other insomnia    - PRN Vistaril with minimal effect   - Start Trazodone QHS trial           Generalized abdominal pain    - noted on 3/24, report similar to abd spasm  - Not impressive on exam, improvement with trial of bentyl and simethicone  - Pt is having BMs, CTM          Acute blood loss as cause of postoperative anemia    - Baseline 10-12 Hgb  - h/h drop s/p surgery   - 3/18, 4U PRBC, 2FFP and 1U Plt given  - continue to trend down, 6.6/20, 2units PRBCs transfused 3/23, Hgb at 9  - will follow h/h, goal Hgb>7          Pulmonary tuberculosis    - Appreciate ID eval, She started RIPE around December 25.  On march 5 she was transitioned to rifampin and isoniazid.    - TB regimen 900mg Isoniazid, 600 mg rifampin with B6, Fridays and Tuesdays   - Does not requre airborne isolation patient completed 2 weeks on inpatient RIPE          Back pain    - see above          HTN (hypertension), benign    - On Home meds: Coreg, Norvasc  - Pt refuse to take Isosorbine, so we will hold med  - PRN hydralazine             Sarcoidosis    - Patient of Dr. SANGEETA Ram (Rheum) and Dr. KIRSTIE Hopkins (Pulm[place] [facility]  - Currently hold MTX   - Currently not on any treatment due to current treatment of TB            VTE Risk Mitigation         Ordered     heparin  (porcine) injection 5,000 Units  Every 8 hours     Route:  Subcutaneous        03/20/18 1100     Place sequential compression device  Until discontinued      03/18/18 1134     IP VTE LOW RISK PATIENT  Once      03/17/18 0424              Luke Quiñones MD  Department of Hospital Medicine   [facility]

## 2018-03-26 NOTE — PT/OT/SLP PROGRESS
Physical Therapy Treatment    Patient Name:  Mary Carrillo   MRN:  [id number]    Recommendations:     Discharge Recommendations:  rehabilitation facility   Discharge Equipment Recommendations: other (see comments) (TBD)   Barriers to discharge: Decreased caregiver support    Assessment:     Mary Carrillo is a 62 y.o. female admitted with a medical diagnosis of Discitis of thoracolumbar region.  She presents with the following impairments/functional limitations:  weakness, gait instability, decreased lower extremity function, impaired balance, impaired endurance, impaired functional mobilty, decreased coordination, abnormal tone. Pt performed bed mobility mod A and transfers max A. Pt will continue to benefit from skilled PT to improve deficits and increase overall functional mobility. Pt is highly motivated and will benefit from IP Rehab to return to PLOF, decrease caregiver burden and ensure safety upon d/c.     Rehab Prognosis:  Good; patient would benefit from acute skilled PT services to address these deficits and reach maximum level of function.      Recent Surgery: Procedure(s) (LRB):  CORPECTOMY THORACIC T12-L1 corpectomy with T10-L3 fusion, neuromonitoring, globus (Left) 8 Days Post-Op    Plan:     During this hospitalization, patient to be seen 4 x/week to address the above listed problems via gait training, therapeutic exercises, therapeutic activities, neuromuscular re-education, wheelchair management/training  · Plan of Care Expires:  04/21/18   Plan of Care Reviewed with: patient    Subjective     Communicated with RN prior to session.  Patient found up in chair upon PT entry to room, agreeable to treatment.      Chief Complaint: fatigue after sitting up in chair   Patient comments/goals: return to PLOF  Pain/Comfort:  · Pain Rating 1: 0/10  · Pain Rating Post-Intervention 1: 0/10    Patients cultural, spiritual, Restoration conflicts given the current situation: none noted    Objective:     Patient found  with: TLSO     General Precautions: Standard, fall   Orthopedic Precautions:spinal precautions   Braces: TLSO     Functional Mobility:  Bed Mobility:     · Sit to Supine: moderate assistance    Transfers:     · Sit to Stand:  maximal assistance with no AD  · Chair to bed: maximal assistance with  no AD  using  Squat Pivot    AM-PAC 6 CLICK MOBILITY  Turning over in bed (including adjusting bedclothes, sheets and blankets)?: 2  Sitting down on and standing up from a chair with arms (e.g., wheelchair, bedside commode, etc.): 2  Moving from lying on back to sitting on the side of the bed?: 2  Moving to and from a bed to a chair (including a wheelchair)?: 2  Need to walk in hospital room?: 1  Climbing 3-5 steps with a railing?: 1  Total Score: 10       Therapeutic Activities and Exercises:  Pt sat up in chair for ~1.5 hours.  Pt performed fwd scooting in medichair with CGA.  Pt performed lat seated scooting mod A EOB.  Pt required assist with doffing TLSO.  Pt educated on log rolling and importance of OOB activity.     Patient left HOB elevated with all lines intact, call button in reach and RN notified.    GOALS:    Physical Therapy Goals        Problem: Physical Therapy Goal    Goal Priority Disciplines Outcome Goal Variances Interventions   Physical Therapy Goal     PT/OT, PT Ongoing (interventions implemented as appropriate)     Description:  Goals to be met by: 2018     Patient will increase functional independence with mobility by performin. Supine to sit with Moderate Assistance-met   Revised: Supine to sit CGA  2. Sit to supine with Moderate Assistance-met   Revised: Sit to supine with CGA  3. Sit to stand transfer with Maximum Assistance-met   Revised: Sit to stand transfer with min A  4. Bed to chair transfer with Maximum Assistance- met  5. Wheelchair propulsion x150 feet with Stand-by Assistance using bilateral uppper extremities  6. Lower extremity exercise program x15 reps per handout, with  assistance as needed   7. Gait x 10 feet with max A with or without appropriate AD.                        Time Tracking:     PT Received On: 03/26/18  PT Start Time: 1200     PT Stop Time: 1215  PT Total Time (min): 15 min     Billable Minutes: Therapeutic Activity 15    Treatment Type: Treatment  PT/PTA: PT     PTA Visit Number: 0     ALFREDO DELAROSA, PT  03/26/2018

## 2018-03-26 NOTE — SUBJECTIVE & OBJECTIVE
Past Medical History:   Diagnosis Date    Arthritis     Asthma     Back pain     Encounter for blood transfusion     Glaucoma     Gout     Hyperlipidemia     Hypertension     Sarcoidosis     Vitritis     Vitritis      Past Surgical History:   Procedure Laterality Date    CATARACT EXTRACTION Bilateral      SECTION      HYSTERECTOMY      uterus/cervix d/t uterine fibroids.     Review of patient's allergies indicates:   Allergen Reactions    Prednisone Hives and Itching     Pills only, can take if she needs to    Naproxen Rash       Scheduled Medications:    amLODIPine  10 mg Oral Daily    carvedilol  25 mg Oral BID    dicyclomine  10 mg Oral BID    heparin (porcine)  5,000 Units Subcutaneous Q8H    isoniazid  900 mg Oral Twice Weekly    oxyCODONE  10 mg Oral Q12H    polyethylene glycol  17 g Oral Daily    pyridoxine (vitamin B6)  50 mg Oral Daily    rifAMpin  600 mg Oral Twice Weekly    senna-docusate 8.6-50 mg  2 tablet Oral Daily    traZODone  100 mg Oral QHS       PRN Medications: sodium chloride, acetaminophen, albuterol sulfate, dextrose 50%, dextrose 50%, glucagon (human recombinant), glucose, glucose, hydrOXYzine pamoate, insulin aspart U-100, magnesium oxide, magnesium oxide, microfibrillar collagen, oxyCODONE-acetaminophen, potassium chloride 10%, potassium chloride 10%, potassium chloride 10%, potassium, sodium phosphates, potassium, sodium phosphates, potassium, sodium phosphates, ramelteon, senna, simethicone, sodium chloride 0.9%    Family History     Problem Relation (Age of Onset)    Diabetes Mother, Father    Kidney disease Mother, Father        Social History Main Topics    Smoking status: Former Smoker     Packs/day: 1.00     Years: 35.00     Types: Cigarettes     Start date: 1967     Quit date: 1994    Smokeless tobacco: Never Used    Alcohol use No    Drug use: No    Sexual activity: Yes     Partners: Male     Birth control/ protection:  Post-menopausal, See Surgical Hx     Review of Systems   Constitutional: Positive for activity change. Negative for chills, fatigue and fever.   HENT: Negative for drooling, hearing loss, trouble swallowing and voice change.    Eyes: Negative for pain and visual disturbance.   Respiratory: Negative for cough, shortness of breath and wheezing.    Cardiovascular: Negative for chest pain and palpitations.   Gastrointestinal: Negative for abdominal distention, nausea and vomiting.   Genitourinary: Negative for difficulty urinating and flank pain.   Musculoskeletal: Positive for back pain, gait problem and myalgias. Negative for arthralgias and neck pain.   Skin: Positive for wound. Negative for rash.   Neurological: Positive for weakness. Negative for dizziness, numbness and headaches.   Psychiatric/Behavioral: Negative for agitation and hallucinations. The patient is not nervous/anxious.      Objective:     Vital Signs (Most Recent):  Temp: 98.7 °F (37.1 °C) (03/26/18 1142)  Pulse: 76 (03/26/18 1142)  Resp: 16 (03/26/18 1142)  BP: 131/63 (03/26/18 1142)  SpO2: 95 % (03/26/18 1142)    Vital Signs (24h Range):  Temp:  [98.3 °F (36.8 °C)-99.5 °F (37.5 °C)] 98.7 °F (37.1 °C)  Pulse:  [75-89] 76  Resp:  [16-18] 16  SpO2:  [94 %-98 %] 95 %  BP: (131-166)/(63-83) 131/63     Body mass index is 34.78 kg/m².    Physical Exam   Constitutional: She is oriented to person, place, and time. She appears well-developed and well-nourished. No distress.   Sitting up in medichair   HENT:   Head: Normocephalic and atraumatic.   Right Ear: External ear normal.   Left Ear: External ear normal.   Nose: Nose normal.   Eyes: Right eye exhibits no discharge. Left eye exhibits no discharge. No scleral icterus.   Neck: Normal range of motion.   Cardiovascular: Normal rate, regular rhythm and intact distal pulses.    Pulmonary/Chest: Effort normal. No respiratory distress. She has no wheezes.   Abdominal: Soft. She exhibits no distension. There is  no tenderness.   Musculoskeletal: She exhibits no edema or tenderness.        Right ankle: She exhibits decreased range of motion (foot drop).   TLSO brace intact   Neurological: She is alert and oriented to person, place, and time. She exhibits normal muscle tone.   -  Mental Status:  AAOx3.  Follows commands.  Answers correct age and .  Recent and remote memory intact.  -  Speech and language:  no aphasia or dysarthria.    -  Vision:  no hemianopsia or ptosis.    -  Facial movement (CN VII): symmetrical.   -  Motor:  RUE: 4/5.  LUE: 4/5.  RLE:  Hip Flex 2/5, Knee Ext/Flex 3-/5,  DF 1/5, PF 4-/5.  LLE:  Hip Flex 2+/5, Knee Ext/Flex 4-/5,  DF 4/5, PF 4/5.  -  Sensory:  Intact to light touch and pin prick.   Skin: Skin is warm and dry. No rash noted.   Psychiatric: She has a normal mood and affect. Her behavior is normal. Thought content normal.   Vitals reviewed.    NEUROLOGICAL EXAMINATION:     MENTAL STATUS   Oriented to person, place, and time.       Diagnostic Results:   Labs: Reviewed  X-Ray: Reviewed  CT: Reviewed  MRI: Reviewed

## 2018-03-26 NOTE — PLAN OF CARE
Problem: Physical Therapy Goal  Goal: Physical Therapy Goal  Goals to be met by: 2018     Patient will increase functional independence with mobility by performin. Supine to sit with Moderate Assistance-met   Revised: Supine to sit CGA  2. Sit to supine with Moderate Assistance-met   Revised: Sit to supine with CGA  3. Sit to stand transfer with Maximum Assistance-met   Revised: Sit to stand transfer with min A  4. Bed to chair transfer with Maximum Assistance- met  5. Wheelchair propulsion x150 feet with Stand-by Assistance using bilateral uppper extremities  6. Lower extremity exercise program x15 reps per handout, with assistance as needed   7. Gait x 10 feet with max A with or without appropriate AD.      Outcome: Ongoing (interventions implemented as appropriate)  Pt goals revised.     ALFREDO DELAROSA, PT  3/26/2018

## 2018-03-26 NOTE — ASSESSMENT & PLAN NOTE
-  Recent admission at [facility] (12/17/17-1/18/18) for MTB in spine and lungs s/p RIPE x 2 weeks while inpatient with discharge home for completion of treatment   -  ID consulted and recommended airborne precautions be discontinued 2/2 completion of TB treatment

## 2018-03-26 NOTE — SUBJECTIVE & OBJECTIVE
Interval History: NAEON.  Pt reports continued back pain but controlled. Report insomnia over the last few days with some sleep after getting Vistaril. ABd cramping resolved.     Review of Systems   Constitutional: Negative for fever.   HENT: Negative for congestion.    Respiratory: Negative for chest tightness and shortness of breath.    Cardiovascular: Negative for chest pain.   Gastrointestinal: Negative for abdominal distention, abdominal pain, constipation, diarrhea, nausea and vomiting.   Genitourinary: Negative for dysuria.   Musculoskeletal: Positive for back pain. Negative for arthralgias and neck pain.   Skin: Negative for rash.   Neurological: Positive for weakness.   Psychiatric/Behavioral: Negative for confusion.     Objective:     Vital Signs (Most Recent):  Temp: 99.3 °F (37.4 °C) (03/25/18 1621)  Pulse: 82 (03/25/18 1621)  Resp: 18 (03/25/18 1621)  BP: (!) 159/83 (03/25/18 1621)  SpO2: 97 % (03/25/18 1621) Vital Signs (24h Range):  Temp:  [98.4 °F (36.9 °C)-99.5 °F (37.5 °C)] 99.3 °F (37.4 °C)  Pulse:  [78-89] 82  Resp:  [17-18] 18  SpO2:  [94 %-97 %] 97 %  BP: (132-166)/(71-89) 159/83     Weight: 94.8 kg (208 lb 15.9 oz)  Body mass index is 34.78 kg/m².    Intake/Output Summary (Last 24 hours) at 03/25/18 1903  Last data filed at 03/25/18 1600   Gross per 24 hour   Intake              440 ml   Output              930 ml   Net             -490 ml      Physical Exam    GA: Awake, Alert, Extubated, Oriented, Follows commands comfortable, no acute distress.   HEENT: No scleral icterus or JVD.   Pulmonary: Clear to auscultation Anterior. No wheezing, crackles, or rhonchi.  Cardiac: RRR S1 & S2 w/o rubs/murmurs/gallops.   Abdominal: Bowel sounds present x 4. No appreciable hepatosplenomegaly.  Skin: No jaundice, rashes, or visible lesions.  Neuro: Motor Strength: RLE 4/5, otherwise good strength upper and lower extremities  Extremities: no cyanosis or edema, or clubbing        MELD-Na score: 7 at  3/20/2018  1:11 AM  MELD score: 7 at 3/20/2018  1:11 AM  Calculated from:  Serum Creatinine: 0.8 mg/dL (Rounded to 1) at 3/20/2018  1:11 AM  Serum Sodium: 138 mmol/L (Rounded to 137) at 3/20/2018  1:11 AM  Total Bilirubin: 0.4 mg/dL (Rounded to 1) at 3/20/2018  1:11 AM  INR(ratio): 1.1 at 3/18/2018  6:25 PM  Age: 62 years    Significant Labs:  CBC:    Recent Labs  Lab 03/24/18  0442 03/25/18  0512   WBC 17.38* 12.52   HGB 9.0* 9.9*   HCT 27.8* 30.4*    288     CMP:    Recent Labs  Lab 03/24/18  0442 03/25/18  0512    138   K 4.5 4.6    106   CO2 26 23   GLU 91 85   BUN 12 10   CREATININE 0.7 0.7   CALCIUM 8.3* 8.9   PROT 5.8* 6.4   ALBUMIN 2.1* 2.1*   BILITOT 1.4* 0.4   ALKPHOS 67 73   AST 29 33   ALT 11 13   ANIONGAP 4* 9   EGFRNONAA >60.0 >60.0     PTINR:  No results for input(s): INR in the last 48 hours.    Significant Procedures:   Dobutamine Stress Test with Color Flow: No results found for this or any previous visit.

## 2018-03-26 NOTE — PLAN OF CARE
Problem: Occupational Therapy Goal  Goal: Occupational Therapy Goal  Goals to be met by: 4/4   Patient will increase functional independence with ADLs by performing:    UE Dressing including TLSO brace with Moderate Assistance while seated.  LE Dressing with Moderate Assistance.  Grooming while seated at sink with Minimal Assistance.  Toileting from bedside commode with Moderate Assistance for hygiene and clothing management.   Sitting at edge of bed x20 minutes with CGA for postural control, no LOB.  Rolling to Bilateral with Minimal Assistance.   Supine to sit with Minimal Assistance while maintaining spinal precautions.  Squat pivot transfers to various surfaces (chair, BSC) with Moderate Assistance, use of AD as needed.  Upper extremity exercise program x15 reps per handout, with independence to increase endurance to functional tasks.  Pt/CG demo proper donning and doffing of TLSO brace with setup and assist as needed.   Pt verbalize and demo spinal precautions.        Outcome: Ongoing (interventions implemented as appropriate)  Goals remain appropriate. Pt progressing well in POC. ALMA Albert 3/26/2018

## 2018-03-26 NOTE — ASSESSMENT & PLAN NOTE
62 year old female with a PMHx of TB, now with T12/L1 osteodiscitis concerning for pott's disease, s/p T10-L3 fusion and T12, L1 corpectomies on 3/18.    -Patient neurologically stable on exam  -Pain well controlled on current regimen  -Drain output 70 cc. Drain removed without complication. Patient tolerated removal well.   -H&H 9.6/29.4 today. Transfused on 3/18. Continue management per primary team.  -FU with NSGY RN in 2 weeks for wound check and staple removal. If still at rehab, ok for facility to remove staples on 4/1/18. Please contact NSGY clinic for any questions or concerns about the incision.   -FU with Dr. Swain in 6 weeks with xrays.   -NSGY will schedule FU appts   -Discharge instructions reviewed with patient. All of her questions were answered. She was encouraged to call the clinic with any questions or concerns prior to follow up appt. DC instructions also placed in patient instructions tab in epic.   -Will sign off. Please reconsult if NSGY can be of any further assistance.

## 2018-03-26 NOTE — PLAN OF CARE
Problem: Patient Care Overview  Goal: Plan of Care Review  Outcome: Ongoing (interventions implemented as appropriate)  Pt. Assisted to sit up in chair by OT. Pt. Sat up for 2-3 hrs and assisted back to bed by PT. Ortho brace worn by pt when out of bed. Scheduled Oxy given in AM. PRN simethicone given for c/o abdominal/gas pains. 2 bm today. Pt. On RA; tolerating well. Midline back incision DANNA; no drainage noted. Hemovac pulled by MD today. Pt. On RA; tolerating well. SpO2 stable.  Pt. Resting in bed. Bed alarm in place. Free of injuries or falls. NADN, VSS. Safety maintained. Call light within reach. WCTM.

## 2018-03-26 NOTE — PROGRESS NOTES
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  She was found to have MTB in her spine and lungs and was treated with RIPE for 2 weeks inpatient then discharged home for completion of treatment despite efforts of NH/SNF placement    Post-Op Info:  Procedure(s) (LRB):  CORPECTOMY THORACIC T12-L1 corpectomy with T10-L3 fusion, neuromonitoring, globus (Left)   8 Days Post-Op     Interval History:   NAEON. Patient sitting upright in chair. Denies any UE/LE pain or paresthesias, except for tingling in both feet. Denies any worsening weakness. Denies any incontinence.        Medications:  Continuous Infusions:  Scheduled Meds:   amLODIPine  10 mg Oral Daily    carvedilol  25 mg Oral BID    dicyclomine  10 mg Oral BID    heparin (porcine)  5,000 Units Subcutaneous Q8H    isoniazid  900 mg Oral Twice Weekly    oxyCODONE  10 mg Oral Q12H    polyethylene glycol  17 g Oral Daily    pyridoxine (vitamin B6)  50 mg Oral Daily    rifAMpin  600 mg Oral Twice Weekly    senna-docusate 8.6-50 mg  2 tablet Oral Daily    traZODone  100 mg Oral QHS     PRN Meds:sodium chloride, acetaminophen, albuterol sulfate, dextrose 50%, dextrose 50%, glucagon (human recombinant), glucose, glucose, hydrOXYzine pamoate, insulin aspart U-100, magnesium oxide, magnesium oxide, microfibrillar collagen, oxyCODONE-acetaminophen, potassium chloride 10%, potassium chloride 10%, potassium chloride 10%, potassium, sodium phosphates, potassium, sodium phosphates, potassium,  sodium phosphates, ramelteon, senna, simethicone, sodium chloride 0.9%     Review of Systems  Objective:     Weight: 94.8 kg (208 lb 15.9 oz)  Body mass index is 34.78 kg/m².  Vital Signs (Most Recent):  Temp: 98.3 °F (36.8 °C) (03/26/18 0717)  Pulse: 75 (03/26/18 0717)  Resp: 18 (03/26/18 0717)  BP: (!) 166/82 (03/26/18 0717)  SpO2: 95 % (03/26/18 0717) Vital Signs (24h Range):  Temp:  [98.3 °F (36.8 °C)-99.5 °F (37.5 °C)] 98.3 °F (36.8 °C)  Pulse:  [75-89] 75  Resp:  [17-18] 18  SpO2:  [94 %-98 %] 95 %  BP: (132-166)/(69-83) 166/82                           Closed/Suction Drain 03/18/18 1627 Left Back Accordion 10 Fr. (Active)   Site Description Healing 3/25/2018  8:01 AM   Dressing Type No dressing 3/25/2018  8:40 PM   Dressing Status Clean;Dry;Intact 3/23/2018  7:35 PM   Drainage Serosanguineous 3/25/2018  8:40 PM   Status To bulb suction 3/25/2018  8:40 PM   Output (mL) 30 mL 3/26/2018  4:20 AM       Female External Urinary Catheter 03/26/18 0306 (Active)   Skin no redness;no breakdown;perineum cleansed w/ soap and water 3/26/2018  3:04 AM   Tolerance no signs/symptoms of discomfort 3/26/2018  3:04 AM   Suction Continuous suction at 60 mmHg 3/26/2018  3:04 AM   Date of last wick change 03/26/18 3/26/2018  3:04 AM   Time of last wick change 0306 3/26/2018  3:04 AM       Neurosurgery Physical Exam   General: well developed, well nourished, no distress.   Head: normocephalic, atraumatic  Neurologic: Alert and oriented. Thought content appropriate.  GCS: Motor: 6/Verbal: 5/Eyes: 4 GCS Total: 15  Mental Status: Awake, Alert, Oriented x 4  Language: No aphasia  Speech: No dysarthria  Cranial nerves: face symmetric, tongue midline, CN II-XII grossly intact.   Eyes: pupils equal, round, reactive to light with accomodation, EOMI.  Pulmonary: normal respirations, no signs of respiratory distress  Abdomen: soft, non-distended, not tender to palpation  Sensory: intact to light touch throughout    Motor Strength: No  abnormal movements seen.     Strength  Deltoids Triceps Biceps Wrist Extension Wrist Flexion Hand    Upper: R 5/5 5/5 5/5 5/5 5/5 5/5    L 5/5 5/5 5/5 5/5 5/5 5/5     Iliopsoas Quadriceps Knee  Flexion Tibialis  anterior Gastro- cnemius EHL   Lower: R 2/5 3/5 4/5 2/5 3/5 2/5    L 2/5 3/5 4+/5 4/5 4-/5 4/5       Vasquez: absent  Clonus: absent  No LE edema  Skin: Dry, flaky skin to BLE. Chronic vascular changes in BLE.       Incision:  Clean, dry, staples intact. No surrounding erythema or edema. No drainage.       Significant Labs:    Recent Labs  Lab 03/25/18  0512 03/26/18 0442   GLU 85 82    138   K 4.6 3.9    104   CO2 23 26   BUN 10 10   CREATININE 0.7 0.7   CALCIUM 8.9 8.8   MG 1.5* 1.4*       Recent Labs  Lab 03/25/18  0512 03/26/18 0442   WBC 12.52 10.77   HGB 9.9* 9.6*   HCT 30.4* 29.4*    281     No results for input(s): LABPT, INR, APTT in the last 48 hours.  Microbiology Results (last 7 days)     Procedure Component Value Units Date/Time    Culture, Anaerobe [[id number]] Collected:  03/18/18 1533    Order Status:  Completed Specimen:  Tissue from Back Updated:  03/26/18 0734     Anaerobic Culture No anaerobes isolated    Narrative:       1. T12-L1 bones and tissues Tuberculosis infection    Culture, Anaerobe [[id number]] Collected:  03/18/18 1533    Order Status:  Completed Specimen:  Tissue from Back Updated:  03/26/18 0734     Anaerobic Culture No anaerobes isolated    Narrative:       3. T12-L1 swab#2 Tuberculosis infection    Culture, Anaerobe [[id number]] Collected:  03/18/18 1533    Order Status:  Completed Specimen:  Tissue from Back Updated:  03/26/18 0734     Anaerobic Culture No anaerobes isolated    Narrative:       2. T12-L1 swab#1 Tuberculosis infection    Aerobic culture [[id number]] Collected:  03/18/18 1533    Order Status:  Completed Specimen:  Tissue from Back Updated:  03/22/18 1259     Aerobic Bacterial Culture No growth    Narrative:       1. T12-L1 bones and  tissues Tuberculosis infection    Fungus culture [[id number]] Collected:  03/18/18 1533    Order Status:  Completed Specimen:  Tissue from Back Updated:  03/22/18 1109     Fungus (Mycology) Culture Culture in progress    Narrative:       2. T12-L1 swab#1 Tuberculosis infection    Fungus culture [[id number]] Collected:  03/18/18 1533    Order Status:  Completed Specimen:  Tissue from Back Updated:  03/22/18 1109     Fungus (Mycology) Culture Culture in progress    Narrative:       1. T12-L1 bones and tissues Tuberculosis infection    Fungus culture [[id number]] Collected:  03/18/18 1533    Order Status:  Completed Specimen:  Tissue from Back Updated:  03/22/18 1109     Fungus (Mycology) Culture Culture in progress    Narrative:       3. T12-L1 swab#2 Tuberculosis infection    Aerobic culture [[id number]] Collected:  03/18/18 1533    Order Status:  Completed Specimen:  Tissue from Back Updated:  03/21/18 1043     Aerobic Bacterial Culture No growth    Narrative:       3. T12-L1 swab#2 Tuberculosis infection    Aerobic culture [[id number]] Collected:  03/18/18 1533    Order Status:  Completed Specimen:  Tissue from Back Updated:  03/21/18 1043     Aerobic Bacterial Culture No growth    Narrative:       2. T12-L1 swab#1 Tuberculosis infection    AFB Culture & Smear [[id number]] Collected:  03/18/18 1533    Order Status:  Completed Specimen:  Tissue from Back Updated:  03/19/18 2127     AFB Culture & Smear Culture in progress     AFB CULTURE STAIN No acid fast bacilli seen.    Narrative:       1. T12-L1 bones and tissues Tuberculosis infection    AFB Culture & Smear [[id number]] Collected:  03/18/18 1533    Order Status:  Completed Specimen:  Tissue from Back Updated:  03/19/18 2127     AFB Culture & Smear Culture in progress     AFB CULTURE STAIN No acid fast bacilli seen.    Narrative:       2. T12-L1 swab#1 Tuberculosis infection    AFB Culture & Smear [[id number]] Collected:  03/18/18 1533    Order Status:  Completed  Specimen:  Tissue from Back Updated:  03/19/18 2127     AFB Culture & Smear Culture in progress     AFB CULTURE STAIN No acid fast bacilli seen.    Narrative:       3. 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    -Patient neurologically stable on exam  -Pain well controlled on current regimen  -Drain output 70 cc. Drain removed without complication. Patient tolerated removal well.   -H&H 9.6/29.4 today. Transfused on 3/18. Continue management per primary team.  -FU with NSGY RN in 2 weeks for wound check and staple removal. If still at rehab, ok for facility to remove staples on 4/1/18. Please contact NSGY clinic for any questions or concerns about the incision.   -FU with Dr. Swain in 6 weeks with xrays.   -NSGY will schedule FU appts   -Discharge instructions reviewed with patient. All of her questions were answered. She was encouraged to call the clinic with any questions or concerns prior to follow up appt. DC instructions also placed in patient instructions tab in epic.   -Will sign off. Please reconsult if NSGY can be of any further assistance.             Discussed with Dr. Swain  Please call with any questions    Megan Emery PA-C   Neurosurgery   Pager: [phone]

## 2018-03-26 NOTE — PLAN OF CARE
2:20 PM  SW [phone] stating they would like to see how pt progresses with therapy today and tomorrow. They will have beds tomorrow. [facility] also stated they would like to see how pt progresses with therapy over the next few days.     [facility] has accepted pt.     Tonya Hyman, DORCAS   [facility]  Ext [phone]

## 2018-03-26 NOTE — SUBJECTIVE & OBJECTIVE
Interval History:   NAEON. Patient sitting upright in chair. Denies any UE/LE pain or paresthesias, except for tingling in both feet. Denies any worsening weakness. Denies any incontinence.        Medications:  Continuous Infusions:  Scheduled Meds:   amLODIPine  10 mg Oral Daily    carvedilol  25 mg Oral BID    dicyclomine  10 mg Oral BID    heparin (porcine)  5,000 Units Subcutaneous Q8H    isoniazid  900 mg Oral Twice Weekly    oxyCODONE  10 mg Oral Q12H    polyethylene glycol  17 g Oral Daily    pyridoxine (vitamin B6)  50 mg Oral Daily    rifAMpin  600 mg Oral Twice Weekly    senna-docusate 8.6-50 mg  2 tablet Oral Daily    traZODone  100 mg Oral QHS     PRN Meds:sodium chloride, acetaminophen, albuterol sulfate, dextrose 50%, dextrose 50%, glucagon (human recombinant), glucose, glucose, hydrOXYzine pamoate, insulin aspart U-100, magnesium oxide, magnesium oxide, microfibrillar collagen, oxyCODONE-acetaminophen, potassium chloride 10%, potassium chloride 10%, potassium chloride 10%, potassium, sodium phosphates, potassium, sodium phosphates, potassium, sodium phosphates, ramelteon, senna, simethicone, sodium chloride 0.9%     Review of Systems  Objective:     Weight: 94.8 kg (208 lb 15.9 oz)  Body mass index is 34.78 kg/m².  Vital Signs (Most Recent):  Temp: 98.3 °F (36.8 °C) (03/26/18 0717)  Pulse: 75 (03/26/18 0717)  Resp: 18 (03/26/18 0717)  BP: (!) 166/82 (03/26/18 0717)  SpO2: 95 % (03/26/18 0717) Vital Signs (24h Range):  Temp:  [98.3 °F (36.8 °C)-99.5 °F (37.5 °C)] 98.3 °F (36.8 °C)  Pulse:  [75-89] 75  Resp:  [17-18] 18  SpO2:  [94 %-98 %] 95 %  BP: (132-166)/(69-83) 166/82                           Closed/Suction Drain 03/18/18 1627 Left Back Accordion 10 Fr. (Active)   Site Description Healing 3/25/2018  8:01 AM   Dressing Type No dressing 3/25/2018  8:40 PM   Dressing Status Clean;Dry;Intact 3/23/2018  7:35 PM   Drainage Serosanguineous 3/25/2018  8:40 PM   Status To bulb suction 3/25/2018   8:40 PM   Output (mL) 30 mL 3/26/2018  4:20 AM       Female External Urinary Catheter 03/26/18 0306 (Active)   Skin no redness;no breakdown;perineum cleansed w/ soap and water 3/26/2018  3:04 AM   Tolerance no signs/symptoms of discomfort 3/26/2018  3:04 AM   Suction Continuous suction at 60 mmHg 3/26/2018  3:04 AM   Date of last wick change 03/26/18 3/26/2018  3:04 AM   Time of last wick change 0306 3/26/2018  3:04 AM       Neurosurgery Physical Exam   General: well developed, well nourished, no distress.   Head: normocephalic, atraumatic  Neurologic: Alert and oriented. Thought content appropriate.  GCS: Motor: 6/Verbal: 5/Eyes: 4 GCS Total: 15  Mental Status: Awake, Alert, Oriented x 4  Language: No aphasia  Speech: No dysarthria  Cranial nerves: face symmetric, tongue midline, CN II-XII grossly intact.   Eyes: pupils equal, round, reactive to light with accomodation, EOMI.  Pulmonary: normal respirations, no signs of respiratory distress  Abdomen: soft, non-distended, not tender to palpation  Sensory: intact to light touch throughout    Motor Strength: No abnormal movements seen.     Strength  Deltoids Triceps Biceps Wrist Extension Wrist Flexion Hand    Upper: R 5/5 5/5 5/5 5/5 5/5 5/5    L 5/5 5/5 5/5 5/5 5/5 5/5     Iliopsoas Quadriceps Knee  Flexion Tibialis  anterior Gastro- cnemius EHL   Lower: R 2/5 3/5 4/5 2/5 3/5 2/5    L 2/5 3/5 4+/5 4/5 4-/5 4/5       Vasquez: absent  Clonus: absent  No LE edema  Skin: Dry, flaky skin to BLE. Chronic vascular changes in BLE.       Incision:  Clean, dry, staples intact. No surrounding erythema or edema. No drainage.       Significant Labs:    Recent Labs  Lab 03/25/18  0512 03/26/18  0442   GLU 85 82    138   K 4.6 3.9    104   CO2 23 26   BUN 10 10   CREATININE 0.7 0.7   CALCIUM 8.9 8.8   MG 1.5* 1.4*       Recent Labs  Lab 03/25/18  0512 03/26/18  0442   WBC 12.52 10.77   HGB 9.9* 9.6*   HCT 30.4* 29.4*    281     No results for input(s):  LABPT, INR, APTT in the last 48 hours.  Microbiology Results (last 7 days)     Procedure Component Value Units Date/Time    Culture, Anaerobe [[id number]] Collected:  03/18/18 1533    Order Status:  Completed Specimen:  Tissue from Back Updated:  03/26/18 0734     Anaerobic Culture No anaerobes isolated    Narrative:       1. T12-L1 bones and tissues Tuberculosis infection    Culture, Anaerobe [[id number]] Collected:  03/18/18 1533    Order Status:  Completed Specimen:  Tissue from Back Updated:  03/26/18 0734     Anaerobic Culture No anaerobes isolated    Narrative:       3. T12-L1 swab#2 Tuberculosis infection    Culture, Anaerobe [[id number]] Collected:  03/18/18 1533    Order Status:  Completed Specimen:  Tissue from Back Updated:  03/26/18 0734     Anaerobic Culture No anaerobes isolated    Narrative:       2. T12-L1 swab#1 Tuberculosis infection    Aerobic culture [[id number]] Collected:  03/18/18 1533    Order Status:  Completed Specimen:  Tissue from Back Updated:  03/22/18 1259     Aerobic Bacterial Culture No growth    Narrative:       1. T12-L1 bones and tissues Tuberculosis infection    Fungus culture [[id number]] Collected:  03/18/18 1533    Order Status:  Completed Specimen:  Tissue from Back Updated:  03/22/18 1109     Fungus (Mycology) Culture Culture in progress    Narrative:       2. T12-L1 swab#1 Tuberculosis infection    Fungus culture [[id number]] Collected:  03/18/18 1533    Order Status:  Completed Specimen:  Tissue from Back Updated:  03/22/18 1109     Fungus (Mycology) Culture Culture in progress    Narrative:       1. T12-L1 bones and tissues Tuberculosis infection    Fungus culture [[id number]] Collected:  03/18/18 1533    Order Status:  Completed Specimen:  Tissue from Back Updated:  03/22/18 1109     Fungus (Mycology) Culture Culture in progress    Narrative:       3. T12-L1 swab#2 Tuberculosis infection    Aerobic culture [[id number]] Collected:  03/18/18 1533    Order Status:  Completed  Specimen:  Tissue from Back Updated:  03/21/18 1043     Aerobic Bacterial Culture No growth    Narrative:       3. T12-L1 swab#2 Tuberculosis infection    Aerobic culture [[id number]] Collected:  03/18/18 1533    Order Status:  Completed Specimen:  Tissue from Back Updated:  03/21/18 1043     Aerobic Bacterial Culture No growth    Narrative:       2. T12-L1 swab#1 Tuberculosis infection    AFB Culture & Smear [[id number]] Collected:  03/18/18 1533    Order Status:  Completed Specimen:  Tissue from Back Updated:  03/19/18 2127     AFB Culture & Smear Culture in progress     AFB CULTURE STAIN No acid fast bacilli seen.    Narrative:       1. T12-L1 bones and tissues Tuberculosis infection    AFB Culture & Smear [[id number]] Collected:  03/18/18 1533    Order Status:  Completed Specimen:  Tissue from Back Updated:  03/19/18 2127     AFB Culture & Smear Culture in progress     AFB CULTURE STAIN No acid fast bacilli seen.    Narrative:       2. T12-L1 swab#1 Tuberculosis infection    AFB Culture & Smear [[id number]] Collected:  03/18/18 1533    Order Status:  Completed Specimen:  Tissue from Back Updated:  03/19/18 2127     AFB Culture & Smear Culture in progress     AFB CULTURE STAIN No acid fast bacilli seen.    Narrative:       3. T12-L1 swab#2 Tuberculosis infection

## 2018-03-26 NOTE — ASSESSMENT & PLAN NOTE
- noted on 3/24, report similar to abd spasm  - Not impressive on exam, improvement with trial of bentyl and simethicone  - Pt is having BMs, CTM

## 2018-03-26 NOTE — PROGRESS NOTES
[facility] Hospital Medicine  Progress Note    Patient Name: Mary Carrillo  MRN: [id number]  Patient Class: IP- Inpatient   Admission Date: 3/17/2018  Length of Stay: 9 days  Attending Physician: Luke Quiñones MD  Primary Care Provider: Jose Khan MD    [facility] Medicine Team: [facility] HOSP MED A Luke Quiñones MD    Subjective:     Principal Problem:Discitis of thoracolumbar region    HPI:  Ms. 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 MRI notable for severe compression of the adjacent cord at the T12-L1 level. Transferred to [facility] for Neurosurgery evaluation.    Patient history dates back to December when she was admitted to [facility] from 12/17-01/18 for fevers, night sweats, back pain and weakness.  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  Since being discharged pt reports being unable to walk, mostly being bed bound or on the sofa due to severe back pain.  She is currently getting antibiotics via the health unit for her TB.  A home health nurse comes to her house to give her TB meds every Tues and Fri.  She reports numbness in her feet as well as tingling which has been ongoing for about 1 month.  She wears a diaper because she cannot get out of bed to use the bathroom.  She states that recently there have been times she doesn't realize that she urinated.  She denies any burning on urination, fevers, chills, night sweats at this time.        AFB cultures (12/27) > M Tb complex sensitive to RIF/ Strep/ INH/ EMB/ Pyrazinamide per quest.     Hospital Course:  Patient was transferred [facility] from [facility] on 3/17/18 for NSGY evaluation and  treatment of spinal cord compression due to Matta disease of the spine. On 3/18 admitted to NICU s/p T12-L1 corpectomy and T10-L3 fusion. ID recommended to discontinue Airborne Precautions due to pt being completely treated for TB.  Patient with stable exam, Pt is neurologically stable.  Pain controlled overnight on new regimen. Continue subfascial hemovac drain per NSGY. Continue TLSO brace.     She was transferred to hospital medicine service on 3/23 for further care, Hgb drop, 2U PRBC given on 3/23. Hgb now stabilized. Rehab consulted. CM/SW are working on placement. Drained pulled by NSGY on 3/26. Pt has been accepted to [facility] but family wants closer facility to [place]. CM is aware.              Interval History: NAEON. Report insomnia over the last few days Trazadone was mildly effectivel. ABd cramping resolved.     Review of Systems   Constitutional: Negative for fever.   HENT: Negative for congestion.    Respiratory: Negative for chest tightness and shortness of breath.    Cardiovascular: Negative for chest pain.   Gastrointestinal: Negative for abdominal distention, abdominal pain, constipation, diarrhea, nausea and vomiting.   Genitourinary: Negative for dysuria.   Musculoskeletal: Positive for back pain. Negative for arthralgias and neck pain.   Skin: Negative for rash.   Neurological: Positive for weakness.   Psychiatric/Behavioral: Negative for confusion.     Objective:     Vital Signs (Most Recent):  Temp: 98.4 °F (36.9 °C) (03/26/18 1508)  Pulse: 75 (03/26/18 1508)  Resp: 18 (03/26/18 1508)  BP: 136/68 (03/26/18 1508)  SpO2: (!) 92 % (03/26/18 1508) Vital Signs (24h Range):  Temp:  [98.3 °F (36.8 °C)-99.3 °F (37.4 °C)] 98.4 °F (36.9 °C)  Pulse:  [75-82] 75  Resp:  [16-18] 18  SpO2:  [92 %-98 %] 92 %  BP: (131-166)/(63-82) 136/68     Weight: 94.8 kg (208 lb 15.9 oz)  Body mass index is 34.78 kg/m².    Intake/Output Summary (Last 24 hours) at 03/26/18 1833  Last data filed at 03/26/18 0600   Gross  per 24 hour   Intake              360 ml   Output               30 ml   Net              330 ml      Physical Exam    GA: Awake, Alert, Extubated, Oriented, Follows commands comfortable, no acute distress.   HEENT: No scleral icterus or JVD.   Pulmonary: Clear to auscultation Anterior. No wheezing, crackles, or rhonchi.  Cardiac: RRR S1 & S2 w/o rubs/murmurs/gallops.   Abdominal: Bowel sounds present x 4. No appreciable hepatosplenomegaly.  Skin: No jaundice, rashes, or visible lesions.  Neuro: Motor Strength: RLE 4/5, otherwise good strength upper and lower extremities  Extremities: no cyanosis or edema, or clubbing        MELD-Na score: 7 at 3/20/2018  1:11 AM  MELD score: 7 at 3/20/2018  1:11 AM  Calculated from:  Serum Creatinine: 0.8 mg/dL (Rounded to 1) at 3/20/2018  1:11 AM  Serum Sodium: 138 mmol/L (Rounded to 137) at 3/20/2018  1:11 AM  Total Bilirubin: 0.4 mg/dL (Rounded to 1) at 3/20/2018  1:11 AM  INR(ratio): 1.1 at 3/18/2018  6:25 PM  Age: 62 years    Significant Labs:  CBC:    Recent Labs  Lab 03/25/18  0512 03/26/18  0442   WBC 12.52 10.77   HGB 9.9* 9.6*   HCT 30.4* 29.4*    281     CMP:    Recent Labs  Lab 03/25/18  0512 03/26/18  0442    138   K 4.6 3.9    104   CO2 23 26   GLU 85 82   BUN 10 10   CREATININE 0.7 0.7   CALCIUM 8.9 8.8   PROT 6.4 6.0   ALBUMIN 2.1* 2.0*   BILITOT 0.4 0.4   ALKPHOS 73 71   AST 33 35   ALT 13 15   ANIONGAP 9 8   EGFRNONAA >60.0 >60.0     PTINR:  No results for input(s): INR in the last 48 hours.    Significant Procedures:   Dobutamine Stress Test with Color Flow: No results found for this or any previous visit.      Assessment/Plan:      * Discitis of thoracolumbar region    62 F with hx of TB and now T12/L1 osteodiscitis concerning for pott's disease s/p T10-L3 fusion and T12,L1 corpectomy on 3/18  - Pt is neurologically stable  - All subfascial hemovac drains have been removed by NSGY  - Continue TLSO brace  - pain controlled, PT/OT   - pt is able  to sit up in chair, standing up, improvement since her admission   - Looking at Inpatient Rehab closer to [place]   - medically stable for discharge pending Rehab          Pott's disease (spinal tuberculosis)    - Per ID, Spoke to the health department.  She started RIPE around December 25.  On march 5 she was transitioned to rifampin and isoniazid.    - Confirmed that she is on twice weekly dosing; isoniazid 900 mg on tuesdays and fridays, rifampin 600 mg on tuesdays and fridays and vitamin b 6.  Medications ordered.    - Osteomyelitis and diskitis of lumbar spine:  S/p surgery. See above        Spinal cord compression    - S/P Corpectomy T12-L1 and T10-L3 fusion  - 1 HV drain remains, NSGY following  - Neuro checks Q4h  Pain management:  - Oxycodone LR 10 q12h scheduled  - Percocet  q4h prn pain 4-6/10 for breakthrough  - Tylenol 650 q6h prn mild pain and temp        Other insomnia    - PRN Vistaril with minimal effect   - Increase Trazodone QHS trial           Generalized abdominal pain    - noted on 3/24, report similar to abd spasm  - Not impressive on exam, improvement with trial of bentyl and simethicone  - Pt is having BMs, CTM          Acute blood loss as cause of postoperative anemia    - Baseline 10-12 Hgb  - h/h drop s/p surgery   - 3/18, 4U PRBC, 2FFP and 1U Plt given  - continue to trend down, 6.6/20, 2units PRBCs transfused 3/23, Hgb at 9  - will follow h/h, goal Hgb>7          Pulmonary tuberculosis    - Appreciate ID eval, She started RIPE around December 25.  On march 5 she was transitioned to rifampin and isoniazid.    - TB regimen 900mg Isoniazid, 600 mg rifampin with B6, Fridays and Tuesdays   - Does not requre airborne isolation patient completed 2 weeks on inpatient RIPE          Back pain    - see above          HTN (hypertension), benign    - On Home meds: Coreg, Norvasc  - Pt refuse to take Isosorbine, so we will hold med  - PRN hydralazine             Sarcoidosis    - Patient of Dr. RUTHERFORD  Leanna (Rheum) and Dr. KIRSTIE Hopkins [place] [facility]  - Currently hold MTX   - Currently not on any treatment due to current treatment of TB            VTE Risk Mitigation         Ordered     heparin (porcine) injection 5,000 Units  Every 8 hours     Route:  Subcutaneous        03/20/18 1100     Place sequential compression device  Until discontinued      03/18/18 1134     IP VTE LOW RISK PATIENT  Once      03/17/18 0424        Dispo: stable, pending Inpatient rehab      Luke Quiñones MD  Department of Hospital Medicine   [facility]

## 2018-03-27 ENCOUNTER — HOSPITAL ENCOUNTER (INPATIENT)
Facility: HOSPITAL | Age: 63
LOS: 22 days | Discharge: HOME | DRG: 949 | End: 2018-04-18
Attending: PHYSICAL MEDICINE & REHABILITATION | Admitting: PHYSICAL MEDICINE & REHABILITATION
Payer: MEDICARE

## 2018-03-27 VITALS
HEART RATE: 76 BPM | HEIGHT: 65 IN | TEMPERATURE: 99 F | SYSTOLIC BLOOD PRESSURE: 135 MMHG | OXYGEN SATURATION: 93 % | WEIGHT: 209 LBS | BODY MASS INDEX: 34.82 KG/M2 | DIASTOLIC BLOOD PRESSURE: 61 MMHG | RESPIRATION RATE: 18 BRPM

## 2018-03-27 DIAGNOSIS — M46.45 DISCITIS OF THORACOLUMBAR REGION: ICD-10-CM

## 2018-03-27 DIAGNOSIS — D62 ACUTE BLOOD LOSS AS CAUSE OF POSTOPERATIVE ANEMIA: ICD-10-CM

## 2018-03-27 DIAGNOSIS — D86.9 SARCOIDOSIS: ICD-10-CM

## 2018-03-27 DIAGNOSIS — T81.31XS DEHISCENCE OF OPERATIVE WOUND, SEQUELA: ICD-10-CM

## 2018-03-27 DIAGNOSIS — M15.9 PRIMARY OSTEOARTHRITIS INVOLVING MULTIPLE JOINTS: Primary | ICD-10-CM

## 2018-03-27 DIAGNOSIS — M47.15 OSTEOARTHRITIS OF SPINE WITH MYELOPATHY, THORACOLUMBAR REGION: ICD-10-CM

## 2018-03-27 DIAGNOSIS — A18.01 POTT'S DISEASE (SPINAL TUBERCULOSIS): ICD-10-CM

## 2018-03-27 DIAGNOSIS — Z98.1 STATUS POST THORACIC SPINAL FUSION: ICD-10-CM

## 2018-03-27 DIAGNOSIS — I10 HTN (HYPERTENSION), BENIGN: ICD-10-CM

## 2018-03-27 LAB
ANION GAP SERPL CALC-SCNC: 7 MMOL/L
BASOPHILS # BLD AUTO: 0.04 K/UL
BASOPHILS NFR BLD: 0.4 %
BUN SERPL-MCNC: 12 MG/DL
CALCIUM SERPL-MCNC: 8.5 MG/DL
CHLORIDE SERPL-SCNC: 104 MMOL/L
CO2 SERPL-SCNC: 25 MMOL/L
CREAT SERPL-MCNC: 0.8 MG/DL
DIFFERENTIAL METHOD: ABNORMAL
EOSINOPHIL # BLD AUTO: 0.2 K/UL
EOSINOPHIL NFR BLD: 2.1 %
ERYTHROCYTE [DISTWIDTH] IN BLOOD BY AUTOMATED COUNT: 19 %
EST. GFR  (AFRICAN AMERICAN): >60 ML/MIN/1.73 M^2
EST. GFR  (NON AFRICAN AMERICAN): >60 ML/MIN/1.73 M^2
GLUCOSE SERPL-MCNC: 88 MG/DL
HCT VFR BLD AUTO: 27.8 %
HGB BLD-MCNC: 8.9 G/DL
IMM GRANULOCYTES # BLD AUTO: 0.13 K/UL
IMM GRANULOCYTES NFR BLD AUTO: 1.2 %
LYMPHOCYTES # BLD AUTO: 2.2 K/UL
LYMPHOCYTES NFR BLD: 19.3 %
MAGNESIUM SERPL-MCNC: 1.6 MG/DL
MCH RBC QN AUTO: 28.3 PG
MCHC RBC AUTO-ENTMCNC: 32 G/DL
MCV RBC AUTO: 88 FL
MONOCYTES # BLD AUTO: 1.4 K/UL
MONOCYTES NFR BLD: 12.1 %
NEUTROPHILS # BLD AUTO: 7.3 K/UL
NEUTROPHILS NFR BLD: 64.9 %
NRBC BLD-RTO: 0 /100 WBC
PHOSPHATE SERPL-MCNC: 3.4 MG/DL
PLATELET # BLD AUTO: 287 K/UL
PMV BLD AUTO: 10.1 FL
POTASSIUM SERPL-SCNC: 3.7 MMOL/L
RBC # BLD AUTO: 3.15 M/UL
SODIUM SERPL-SCNC: 136 MMOL/L
WBC # BLD AUTO: 11.15 K/UL

## 2018-03-27 PROCEDURE — 36415 COLL VENOUS BLD VENIPUNCTURE: CPT

## 2018-03-27 PROCEDURE — 11800000 HC REHAB PRIVATE ROOM

## 2018-03-27 PROCEDURE — 94799 UNLISTED PULMONARY SVC/PX: CPT

## 2018-03-27 PROCEDURE — 97110 THERAPEUTIC EXERCISES: CPT

## 2018-03-27 PROCEDURE — 63600175 PHARM REV CODE 636 W HCPCS: Performed by: HOSPITALIST

## 2018-03-27 PROCEDURE — 25000003 PHARM REV CODE 250: Performed by: HOSPITALIST

## 2018-03-27 PROCEDURE — 97802 MEDICAL NUTRITION INDIV IN: CPT

## 2018-03-27 PROCEDURE — 99232 SBSQ HOSP IP/OBS MODERATE 35: CPT | Mod: ,,, | Performed by: NURSE PRACTITIONER

## 2018-03-27 PROCEDURE — 25000003 PHARM REV CODE 250: Performed by: INTERNAL MEDICINE

## 2018-03-27 PROCEDURE — 99239 HOSP IP/OBS DSCHRG MGMT >30: CPT | Mod: ,,, | Performed by: HOSPITALIST

## 2018-03-27 PROCEDURE — 84100 ASSAY OF PHOSPHORUS: CPT

## 2018-03-27 PROCEDURE — 97530 THERAPEUTIC ACTIVITIES: CPT

## 2018-03-27 PROCEDURE — 85025 COMPLETE CBC W/AUTO DIFF WBC: CPT

## 2018-03-27 PROCEDURE — 25000003 PHARM REV CODE 250: Performed by: NURSE PRACTITIONER

## 2018-03-27 PROCEDURE — 25000003 PHARM REV CODE 250: Performed by: STUDENT IN AN ORGANIZED HEALTH CARE EDUCATION/TRAINING PROGRAM

## 2018-03-27 PROCEDURE — 80048 BASIC METABOLIC PNL TOTAL CA: CPT

## 2018-03-27 PROCEDURE — 94761 N-INVAS EAR/PLS OXIMETRY MLT: CPT

## 2018-03-27 PROCEDURE — 83735 ASSAY OF MAGNESIUM: CPT

## 2018-03-27 PROCEDURE — 63600175 PHARM REV CODE 636 W HCPCS: Performed by: STUDENT IN AN ORGANIZED HEALTH CARE EDUCATION/TRAINING PROGRAM

## 2018-03-27 RX ORDER — AMLODIPINE BESYLATE 10 MG/1
10 TABLET ORAL DAILY
Status: DISCONTINUED | OUTPATIENT
Start: 2018-03-28 | End: 2018-04-18 | Stop reason: HOSPADM

## 2018-03-27 RX ORDER — RIFAMPIN 300 MG/1
600 CAPSULE ORAL
Status: DISCONTINUED | OUTPATIENT
Start: 2018-03-29 | End: 2018-03-29

## 2018-03-27 RX ORDER — SENNOSIDES 8.6 MG/1
8.6 TABLET ORAL DAILY PRN
Status: DISCONTINUED | OUTPATIENT
Start: 2018-03-27 | End: 2018-04-18 | Stop reason: HOSPADM

## 2018-03-27 RX ORDER — POLYETHYLENE GLYCOL 3350 17 G/17G
17 POWDER, FOR SOLUTION ORAL DAILY
Status: CANCELLED | OUTPATIENT
Start: 2018-03-28

## 2018-03-27 RX ORDER — OXYCODONE AND ACETAMINOPHEN 10; 325 MG/1; MG/1
1 TABLET ORAL EVERY 4 HOURS PRN
Status: CANCELLED | OUTPATIENT
Start: 2018-03-27

## 2018-03-27 RX ORDER — HYDROXYZINE PAMOATE 25 MG/1
50 CAPSULE ORAL EVERY 8 HOURS PRN
Status: CANCELLED | OUTPATIENT
Start: 2018-03-27

## 2018-03-27 RX ORDER — TRAZODONE HYDROCHLORIDE 50 MG/1
100 TABLET ORAL NIGHTLY
Status: DISCONTINUED | OUTPATIENT
Start: 2018-03-27 | End: 2018-04-18 | Stop reason: HOSPADM

## 2018-03-27 RX ORDER — ISONIAZID 300 MG/1
900 TABLET ORAL
Status: CANCELLED | OUTPATIENT
Start: 2018-03-29

## 2018-03-27 RX ORDER — AMOXICILLIN 250 MG
2 CAPSULE ORAL DAILY
Status: CANCELLED | OUTPATIENT
Start: 2018-03-28

## 2018-03-27 RX ORDER — AMLODIPINE BESYLATE 10 MG/1
10 TABLET ORAL DAILY
Status: CANCELLED | OUTPATIENT
Start: 2018-03-28

## 2018-03-27 RX ORDER — OXYCODONE HCL 10 MG/1
10 TABLET, FILM COATED, EXTENDED RELEASE ORAL 2 TIMES DAILY PRN
Status: DISCONTINUED | OUTPATIENT
Start: 2018-03-27 | End: 2018-03-27

## 2018-03-27 RX ORDER — CARVEDILOL 25 MG/1
25 TABLET ORAL 2 TIMES DAILY
Status: DISCONTINUED | OUTPATIENT
Start: 2018-03-27 | End: 2018-04-18 | Stop reason: HOSPADM

## 2018-03-27 RX ORDER — LANOLIN ALCOHOL/MO/W.PET/CERES
50 CREAM (GRAM) TOPICAL DAILY
Status: DISCONTINUED | OUTPATIENT
Start: 2018-03-28 | End: 2018-04-18 | Stop reason: HOSPADM

## 2018-03-27 RX ORDER — CARVEDILOL 6.25 MG/1
25 TABLET ORAL 2 TIMES DAILY
Status: CANCELLED | OUTPATIENT
Start: 2018-03-27

## 2018-03-27 RX ORDER — BISACODYL 10 MG
10 SUPPOSITORY, RECTAL RECTAL DAILY PRN
Status: DISCONTINUED | OUTPATIENT
Start: 2018-03-27 | End: 2018-04-18 | Stop reason: HOSPADM

## 2018-03-27 RX ORDER — SIMETHICONE 80 MG
1 TABLET,CHEWABLE ORAL 3 TIMES DAILY PRN
Status: DISCONTINUED | OUTPATIENT
Start: 2018-03-27 | End: 2018-04-18 | Stop reason: HOSPADM

## 2018-03-27 RX ORDER — ALBUTEROL SULFATE 0.83 MG/ML
2.5 SOLUTION RESPIRATORY (INHALATION) EVERY 6 HOURS PRN
Status: DISCONTINUED | OUTPATIENT
Start: 2018-03-27 | End: 2018-04-18 | Stop reason: HOSPADM

## 2018-03-27 RX ORDER — ONDANSETRON 8 MG/1
8 TABLET, ORALLY DISINTEGRATING ORAL EVERY 8 HOURS PRN
Status: DISCONTINUED | OUTPATIENT
Start: 2018-03-27 | End: 2018-04-18 | Stop reason: HOSPADM

## 2018-03-27 RX ORDER — RAMELTEON 8 MG/1
8 TABLET ORAL NIGHTLY PRN
Status: CANCELLED | OUTPATIENT
Start: 2018-03-27

## 2018-03-27 RX ORDER — POLYETHYLENE GLYCOL 3350 17 G/17G
17 POWDER, FOR SOLUTION ORAL DAILY
Status: DISCONTINUED | OUTPATIENT
Start: 2018-03-28 | End: 2018-04-18 | Stop reason: HOSPADM

## 2018-03-27 RX ORDER — HYDROXYZINE PAMOATE 25 MG/1
50 CAPSULE ORAL EVERY 8 HOURS PRN
Status: DISCONTINUED | OUTPATIENT
Start: 2018-03-27 | End: 2018-04-18 | Stop reason: HOSPADM

## 2018-03-27 RX ORDER — RAMELTEON 8 MG/1
8 TABLET ORAL NIGHTLY PRN
Status: DISCONTINUED | OUTPATIENT
Start: 2018-03-27 | End: 2018-04-09

## 2018-03-27 RX ORDER — HEPARIN SODIUM 5000 [USP'U]/ML
5000 INJECTION, SOLUTION INTRAVENOUS; SUBCUTANEOUS EVERY 8 HOURS
Status: CANCELLED | OUTPATIENT
Start: 2018-03-27

## 2018-03-27 RX ORDER — ACETAMINOPHEN 325 MG/1
650 TABLET ORAL EVERY 4 HOURS PRN
Status: CANCELLED | OUTPATIENT
Start: 2018-03-27

## 2018-03-27 RX ORDER — AMOXICILLIN 250 MG
2 CAPSULE ORAL DAILY
Status: DISCONTINUED | OUTPATIENT
Start: 2018-03-28 | End: 2018-04-18 | Stop reason: HOSPADM

## 2018-03-27 RX ORDER — CALCIUM CARBONATE 200(500)MG
500 TABLET,CHEWABLE ORAL 2 TIMES DAILY PRN
Status: CANCELLED | OUTPATIENT
Start: 2018-03-27

## 2018-03-27 RX ORDER — SIMETHICONE 80 MG
1 TABLET,CHEWABLE ORAL 3 TIMES DAILY PRN
Status: CANCELLED | OUTPATIENT
Start: 2018-03-27

## 2018-03-27 RX ORDER — ALBUTEROL SULFATE 0.83 MG/ML
2.5 SOLUTION RESPIRATORY (INHALATION) EVERY 6 HOURS PRN
Status: CANCELLED | OUTPATIENT
Start: 2018-03-27

## 2018-03-27 RX ORDER — ACETAMINOPHEN 325 MG/1
650 TABLET ORAL EVERY 4 HOURS PRN
Status: DISCONTINUED | OUTPATIENT
Start: 2018-03-27 | End: 2018-04-18 | Stop reason: HOSPADM

## 2018-03-27 RX ORDER — CALCIUM CARBONATE 200(500)MG
500 TABLET,CHEWABLE ORAL 2 TIMES DAILY PRN
Status: DISCONTINUED | OUTPATIENT
Start: 2018-03-27 | End: 2018-04-18 | Stop reason: HOSPADM

## 2018-03-27 RX ORDER — SENNOSIDES 8.6 MG/1
8.6 TABLET ORAL DAILY PRN
Status: CANCELLED | OUTPATIENT
Start: 2018-03-27

## 2018-03-27 RX ORDER — HEPARIN SODIUM 5000 [USP'U]/ML
5000 INJECTION, SOLUTION INTRAVENOUS; SUBCUTANEOUS EVERY 8 HOURS
Status: DISCONTINUED | OUTPATIENT
Start: 2018-03-27 | End: 2018-04-18 | Stop reason: HOSPADM

## 2018-03-27 RX ORDER — OXYCODONE AND ACETAMINOPHEN 10; 325 MG/1; MG/1
1 TABLET ORAL EVERY 4 HOURS PRN
Status: DISCONTINUED | OUTPATIENT
Start: 2018-03-27 | End: 2018-04-18 | Stop reason: HOSPADM

## 2018-03-27 RX ORDER — LANOLIN ALCOHOL/MO/W.PET/CERES
50 CREAM (GRAM) TOPICAL DAILY
Status: CANCELLED | OUTPATIENT
Start: 2018-03-28

## 2018-03-27 RX ORDER — ADHESIVE BANDAGE
30 BANDAGE TOPICAL DAILY PRN
Status: DISCONTINUED | OUTPATIENT
Start: 2018-03-27 | End: 2018-04-18 | Stop reason: HOSPADM

## 2018-03-27 RX ORDER — ISONIAZID 300 MG/1
900 TABLET ORAL
Status: DISCONTINUED | OUTPATIENT
Start: 2018-03-29 | End: 2018-03-29

## 2018-03-27 RX ORDER — RIFAMPIN 300 MG/1
600 CAPSULE ORAL
Status: CANCELLED | OUTPATIENT
Start: 2018-03-29

## 2018-03-27 RX ADMIN — OXYCODONE HYDROCHLORIDE 10 MG: 10 TABLET, FILM COATED, EXTENDED RELEASE ORAL at 08:03

## 2018-03-27 RX ADMIN — TRAZODONE HYDROCHLORIDE 100 MG: 50 TABLET ORAL at 09:03

## 2018-03-27 RX ADMIN — HEPARIN SODIUM 5000 UNITS: 5000 INJECTION, SOLUTION INTRAVENOUS; SUBCUTANEOUS at 02:03

## 2018-03-27 RX ADMIN — OXYCODONE AND ACETAMINOPHEN 1 TABLET: 10; 325 TABLET ORAL at 09:03

## 2018-03-27 RX ADMIN — DICYCLOMINE HYDROCHLORIDE 10 MG: 10 CAPSULE ORAL at 08:03

## 2018-03-27 RX ADMIN — Medication 50 MG: at 08:03

## 2018-03-27 RX ADMIN — CARVEDILOL 25 MG: 6.25 TABLET, FILM COATED ORAL at 08:03

## 2018-03-27 RX ADMIN — STANDARDIZED SENNA CONCENTRATE AND DOCUSATE SODIUM 2 TABLET: 8.6; 5 TABLET, FILM COATED ORAL at 08:03

## 2018-03-27 RX ADMIN — HEPARIN SODIUM 5000 UNITS: 5000 INJECTION, SOLUTION INTRAVENOUS; SUBCUTANEOUS at 09:03

## 2018-03-27 RX ADMIN — AMLODIPINE BESYLATE 10 MG: 10 TABLET ORAL at 08:03

## 2018-03-27 RX ADMIN — CARVEDILOL 25 MG: 25 TABLET, FILM COATED ORAL at 09:03

## 2018-03-27 RX ADMIN — HEPARIN SODIUM 5000 UNITS: 5000 INJECTION, SOLUTION INTRAVENOUS; SUBCUTANEOUS at 05:03

## 2018-03-27 RX ADMIN — SIMETHICONE CHEW TAB 80 MG 80 MG: 80 TABLET ORAL at 08:03

## 2018-03-27 NOTE — NURSING
Written and verbal discharge instructions given; pt verbalized understanding. IV removed. Pt. Transferred by stretcher per Goldie. They will accompany pt to rehab.

## 2018-03-27 NOTE — PLAN OF CARE
Problem: Patient Care Overview  Goal: Plan of Care Review  Outcome: Ongoing (interventions implemented as appropriate)  No acute changes overnight. No falls or injury during shift. Pt slept much better overnight with trazadone. Awaiting placement for rehab. Call light in reach. [facility]

## 2018-03-27 NOTE — PLAN OF CARE
3:32 PM  SW received notification from [place] with [facility] who stated they have a bed available and would like to transfer patient today.     Nurse to call report to [phone].     Arranged transportation with Goldie. Goldie will arrive at 5:30PM.      Informed RN, Layla.     Tonya Hyman, LMSW [facility]  Ext [phone]

## 2018-03-27 NOTE — PT/OT/SLP PROGRESS
Physical Therapy Treatment    Patient Name:  Mary Carrillo   MRN:  [id number]    Recommendations:     Discharge Recommendations:  rehabilitation facility   Discharge Equipment Recommendations: other (see comments) (TBD)   Barriers to discharge: Decreased caregiver support    Assessment:     Mary Carrillo is a 62 y.o. female admitted with a medical diagnosis of Discitis of thoracolumbar region.  She presents with the following impairments/functional limitations:  weakness, impaired balance, impaired endurance, impaired functional mobilty, gait instability, decreased lower extremity function, impaired sensation, decreased coordination. Pt performed bed mobility min A and transfers max A without AD. Pt stood EOB max A without AD for ~15-30sec x4 trials. Pt will continue to benefit from skilled PT to improve deficits and increase overall functional mobility. Pt is highly motivated and will benefit from IP Rehab to return to PLOF, decrease caregiver burden and ensure safety upon d/c.     Rehab Prognosis:  Good; patient would benefit from acute skilled PT services to address these deficits and reach maximum level of function.      Recent Surgery: Procedure(s) (LRB):  CORPECTOMY THORACIC T12-L1 corpectomy with T10-L3 fusion, neuromonitoring, globus (Left) 9 Days Post-Op    Plan:     During this hospitalization, patient to be seen 4 x/week to address the above listed problems via gait training, therapeutic activities, therapeutic exercises, neuromuscular re-education, wheelchair management/training  · Plan of Care Expires:  04/21/18   Plan of Care Reviewed with: patient    Subjective     Communicated with RN prior to session.  Patient found supine in bed upon PT entry to room, agreeable to treatment.      Chief Complaint: weakness and fatigue  Patient comments/goals: return to PLOF  Pain/Comfort:  · Pain Rating 1: 0/10  · Pain Rating Post-Intervention 1: 0/10    Patients cultural, spiritual, Faith conflicts given the  current situation: none noted    Objective:     Patient found with: TLSO     General Precautions: Standard, fall   Orthopedic Precautions:spinal precautions   Braces: TLSO     Functional Mobility:  Bed Mobility:     · Supine to Sit: minimum assistance  · Sit to Supine: minimum assistance    Transfers:     · Sit to Stand:  maximal assistance with no AD; x4 trials from EOB      AM-PAC 6 CLICK MOBILITY  Turning over in bed (including adjusting bedclothes, sheets and blankets)?: 3  Sitting down on and standing up from a chair with arms (e.g., wheelchair, bedside commode, etc.): 2  Moving from lying on back to sitting on the side of the bed?: 3  Moving to and from a bed to a chair (including a wheelchair)?: 2  Need to walk in hospital room?: 1  Climbing 3-5 steps with a railing?: 1  Total Score: 12       Therapeutic Activities and Exercises:  Pt sat EOB with S, required assist with donning TLSO.  Pt performed fwd/bwd seated scooting with SBA.  Pt stood EOB max A without AD for ~15-30sec x4 trials.  Pt performed seated there-ex B LE x10 reps; B hip flex and R DF AAROM, B knee ext and L DF AROM.  Pt educated on:  -safety with transfers  -performing ankle pumps throughout the day  Pt safe to perform drawsheet transfer to medichair with RN staff.     Patient left HOB elevated with all lines intact, call button in reach and RN notified..    GOALS:    Physical Therapy Goals        Problem: Physical Therapy Goal    Goal Priority Disciplines Outcome Goal Variances Interventions   Physical Therapy Goal     PT/OT, PT Ongoing (interventions implemented as appropriate)     Description:  Goals to be met by: 2018     Patient will increase functional independence with mobility by performin. Supine to sit with Moderate Assistance-met   Revised: Supine to sit CGA  2. Sit to supine with Moderate Assistance-met   Revised: Sit to supine with CGA  3. Sit to stand transfer with Maximum Assistance-met   Revised: Sit to stand  transfer with min A  4. Bed to chair transfer with Maximum Assistance- met  5. Wheelchair propulsion x150 feet with Stand-by Assistance using bilateral uppper extremities  6. Lower extremity exercise program x15 reps per handout, with assistance as needed   7. Gait x 10 feet with max A with or without appropriate AD.                        Time Tracking:     PT Received On: 03/27/18  PT Start Time: 1005     PT Stop Time: 1030  PT Total Time (min): 25 min     Billable Minutes: Therapeutic Activity 15 and Therapeutic Exercise 10    Treatment Type: Treatment  PT/PTA: PT     PTA Visit Number: 0     ALFREDO DELAROSA, PT  03/27/2018

## 2018-03-27 NOTE — PROGRESS NOTES
"  [facility]  Adult Nutrition  Consult Note    SUMMARY     Recommendations    1. Continue current regular diet.   2. Add Boost Plus ONS to aid in caloric intake.   3. RD to monitor & follow-up.    Goals: PO intake >50%  Nutrition Goal Status: new  Communication of RD Recs: reviewed with RN    Reason for Assessment    Reason for Assessment: length of stay  Diagnosis: other (see comments) (Discitis of thoracolumbar)  Relevant Medical History: No sign. PMH  Interdisciplinary Rounds: did not attend    General Information Comments: Per RN notes, pt consuming 0-25% of meals, awaiting rehab placement.  Nutrition Discharge Planning: Adequate PO intake    Nutrition/Diet History    Patient Reported Diet/Restrictions/Preferences: general  Do you have any cultural, spiritual, Pentecostalism conflicts, given your current situation?: none reported   Factors Affecting Nutritional Intake: decreased appetite    Anthropometrics    Temp: 98.3 °F (36.8 °C)  Height Method: Stated  Height: 5' 5" (165.1 cm)  Height (inches): 65 in  Weight Method: Bed Scale  Weight: 94.8 kg (208 lb 15.9 oz)  Weight (lb): 209 lb  Ideal Body Weight (IBW), Female: 125 lb  % Ideal Body Weight, Female (lb): 167.2 lb  BMI (Calculated): 34.9  BMI Grade: 30 - 34.9- obesity - grade I    Lab/Procedures/Meds    Pertinent Labs Reviewed: reviewed  Pertinent Labs Comments: Stable  Pertinent Medications Reviewed: reviewed    Physical Findings/Assessment    Overall Physical Appearance: nourished  Oral/Mouth Cavity: WDL  Skin: incision(s)    Estimated/Assessed Needs    Weight Used For Calorie Calculations: 94.8 kg (208 lb 15.9 oz)  Height: 5' 5" (165.1 cm)    Energy Calorie Requirements (kcal): 1873 kcal/d  Energy Need Method: Sac-St Jeor (1.25 PAL)    Protein Requirements: 95 g/d (1 g/kg)  Weight Used For Protein Calculations: 94.8 kg (208 lb 15.9 oz)    Fluid Need Method: other (see comments) (Per MD or 1 mL/kcal)    Nutrition Prescription " Ordered    Current Diet Order: Regular    Nutrition Risk    Level of Risk/Frequency of Follow-up: moderate     Assessment and Plan    Nutrition Problem  Inadequate energy intake    Related to (etiology):   Decreased appetite    Signs and Symptoms (as evidenced by):   Poor PO intake    Nutrition Diagnosis Status:   New    Monitor and Evaluation    Food and Nutrient Intake: energy intake, food and beverage intake  Food and Nutrient Adminstration: diet order  Physical Activity and Function: nutrition-related ADLs and IADLs  Anthropometric Measurements: weight, weight change  Biochemical Data, Medical Tests and Procedures: inflammatory profile, lipid profile, glucose/endocrine profile, gastrointestinal profile, electrolyte and renal panel  Nutrition-Focused Physical Findings: overall appearance     Nutrition Follow-Up    RD Follow-up?: Yes

## 2018-03-27 NOTE — PLAN OF CARE
"9:08 AM  SW received this message from admissions coordinator with  [facility]:     "I reviewed updates with our medical director. He doesn't feel she is quite ready to tolerate three hours of therapy yet and also wants our infectious disease physician to review her chart before accepting."    Informed [facility] that [facility] has accepted pt. Will contact daughter to inform her.     10:40 AM  SW called daughter to inform her that [facility] has accepted pt. Daughter stated that was fine she just wants to be able to stay overnight since she will be traveling. Called and spoke with Arben with [facility] who stated that should not be an issue but they do not have a bed available today. Updated CM.       Tonya Hyman LMSW   [facility]  Ext [phone]      "

## 2018-03-27 NOTE — SUBJECTIVE & OBJECTIVE
Interval History 3/27/2018:  Patient is seen for follow-up rehab evaluation and recommendations: No acute events over night.  Doing well, without new complaints.  Participating with therapy.  Looking forward to rehab.  Barriers for discharge/rehab admission: rehab bed    HPI, Past Medical, Family, and Social History remains the same as documented in the initial encounter.    Scheduled Medications:    amLODIPine  10 mg Oral Daily    carvedilol  25 mg Oral BID    heparin (porcine)  5,000 Units Subcutaneous Q8H    isoniazid  900 mg Oral Twice Weekly    polyethylene glycol  17 g Oral Daily    pyridoxine (vitamin B6)  50 mg Oral Daily    rifAMpin  600 mg Oral Twice Weekly    senna-docusate 8.6-50 mg  2 tablet Oral Daily    traZODone  100 mg Oral QHS     PRN Medications: sodium chloride, acetaminophen, albuterol sulfate, dextrose 50%, dextrose 50%, glucagon (human recombinant), glucose, glucose, hydrOXYzine pamoate, insulin aspart U-100, magnesium oxide, magnesium oxide, microfibrillar collagen, oxyCODONE-acetaminophen, potassium chloride 10%, potassium chloride 10%, potassium chloride 10%, potassium, sodium phosphates, potassium, sodium phosphates, potassium, sodium phosphates, ramelteon, senna, simethicone, sodium chloride 0.9%    Review of Systems   Constitutional: Positive for activity change. Negative for chills, fatigue and fever.   HENT: Negative for drooling, hearing loss, trouble swallowing and voice change.    Eyes: Negative for pain and visual disturbance.   Respiratory: Negative for cough, shortness of breath and wheezing.    Cardiovascular: Negative for chest pain and palpitations.   Gastrointestinal: Negative for abdominal distention, nausea and vomiting.   Genitourinary: Negative for difficulty urinating and flank pain.   Musculoskeletal: Positive for back pain, gait problem and myalgias. Negative for arthralgias and neck pain.   Skin: Positive for wound. Negative for rash.   Neurological: Positive  for weakness. Negative for dizziness, numbness and headaches.   Psychiatric/Behavioral: Negative for agitation and hallucinations. The patient is not nervous/anxious.      Objective:     Vital Signs (Most Recent):  Temp: 98.6 °F (37 °C) (18 1234)  Pulse: 76 (18 1234)  Resp: 18 (18 1234)  BP: (!) 116/52 (18 1234)  SpO2: 96 % (18 1234)    Vital Signs (24h Range):  Temp:  [98.1 °F (36.7 °C)-98.8 °F (37.1 °C)] 98.6 °F (37 °C)  Pulse:  [71-82] 76  Resp:  [17-18] 18  SpO2:  [92 %-96 %] 96 %  BP: (116-157)/(52-69) 116/52     Physical Exam   Constitutional: She is oriented to person, place, and time. She appears well-developed and well-nourished. No distress.   Sitting up in medichair   HENT:   Head: Normocephalic and atraumatic.   Right Ear: External ear normal.   Left Ear: External ear normal.   Nose: Nose normal.   Eyes: Right eye exhibits no discharge. Left eye exhibits no discharge. No scleral icterus.   Neck: Normal range of motion.   Cardiovascular: Normal rate, regular rhythm and intact distal pulses.    Pulmonary/Chest: Effort normal. No respiratory distress. She has no wheezes.   Abdominal: Soft. She exhibits no distension. There is no tenderness.   Musculoskeletal: She exhibits no edema or tenderness.        Right ankle: She exhibits decreased range of motion (foot drop).   TLSO brace intact   Neurological: She is alert and oriented to person, place, and time. She exhibits normal muscle tone.   -  Mental Status:  AAOx3.  Follows commands.  Answers correct age and .  Recent and remote memory intact.  -  Speech and language:  no aphasia or dysarthria.    -  Vision:  no hemianopsia or ptosis.    -  Facial movement (CN VII): symmetrical.   -  Motor:  RUE: 4/5.  LUE: 4/5.  RLE:  Hip Flex 2/5, Knee Ext/Flex 3-/5,  DF 1/5, PF 4-/5.  LLE:  Hip Flex 2+/5, Knee Ext/Flex 4-/5,  DF 4/5, PF 4/5.  -  Sensory:  Intact to light touch and pin prick.   Skin: Skin is warm and dry. No rash noted.    Psychiatric: She has a normal mood and affect. Her behavior is normal. Thought content normal.   Vitals reviewed.    Diagnostic Results:   Labs: Reviewed  X-Ray: Reviewed  CT: Reviewed  MRI: Reviewed  NEUROLOGICAL EXAMINATION:     MENTAL STATUS   Oriented to person, place, and time.

## 2018-03-27 NOTE — PLAN OF CARE
rec'd msg from MICHELLE Garcia at extension [phone] that pt may be able to dc to [facility] today so Dr Edwards notified to write orders     Notified pt that this may happen today; she wants me to call daughter Alea [phone]  MICHELLE garcia notified that pt needs stretcher     Cm notified daughter of this potential dc today to [facility]  Alea in agreement . Pt in agreement but wants MSW to notify her with time. MICHELLE Garcia notified.

## 2018-03-27 NOTE — PROGRESS NOTES
[facility]  Physical Medicine & Rehab  Progress Note    Patient Name: Mary Carrillo  MRN: [id number]  Admission Date: 3/17/2018  Length of Stay: 10 days  Attending Physician: Rian Edwards MD    Subjective:     Principal Problem:Discitis of thoracolumbar region    Hospital Course:   3/21/18:  Evaluated by PT and OT.  Bed mobility totalA.  Sit to stand totalA.  No further transfers or gait.  UBD maxA and LBD totalA.  3/23/18:  Participated with OT.  Bed mobility mod-maxA.  Sit to stand maxA.  UBD maxA.  Grooming mod(I).  Feeding mod(I).   3/25/18:  Participated with PT.  Bed mobility min-modA.  Sit to stand maxA with RW.  No further transfers or gait.  EOB SBA-CGA.   3/26/18:  Participated with PT and OT.  Bed mobility mod-maxA.  Sit to stand totalA (maxA x 2) and transfers totalA (maxA x 2).  UBD modA and toileting totalA.    Interval History 3/27/2018:  Patient is seen for follow-up rehab evaluation and recommendations: No acute events over night.  Doing well, without new complaints.  Participating with therapy.  Looking forward to rehab.  Barriers for discharge/rehab admission: rehab bed    HPI, Past Medical, Family, and Social History remains the same as documented in the initial encounter.    Scheduled Medications:    amLODIPine  10 mg Oral Daily    carvedilol  25 mg Oral BID    heparin (porcine)  5,000 Units Subcutaneous Q8H    isoniazid  900 mg Oral Twice Weekly    polyethylene glycol  17 g Oral Daily    pyridoxine (vitamin B6)  50 mg Oral Daily    rifAMpin  600 mg Oral Twice Weekly    senna-docusate 8.6-50 mg  2 tablet Oral Daily    traZODone  100 mg Oral QHS     PRN Medications: sodium chloride, acetaminophen, albuterol sulfate, dextrose 50%, dextrose 50%, glucagon (human recombinant), glucose, glucose, hydrOXYzine pamoate, insulin aspart U-100, magnesium oxide, magnesium oxide, microfibrillar collagen, oxyCODONE-acetaminophen, potassium chloride 10%, potassium chloride 10%,  potassium chloride 10%, potassium, sodium phosphates, potassium, sodium phosphates, potassium, sodium phosphates, ramelteon, senna, simethicone, sodium chloride 0.9%    Review of Systems   Constitutional: Positive for activity change. Negative for chills, fatigue and fever.   HENT: Negative for drooling, hearing loss, trouble swallowing and voice change.    Eyes: Negative for pain and visual disturbance.   Respiratory: Negative for cough, shortness of breath and wheezing.    Cardiovascular: Negative for chest pain and palpitations.   Gastrointestinal: Negative for abdominal distention, nausea and vomiting.   Genitourinary: Negative for difficulty urinating and flank pain.   Musculoskeletal: Positive for back pain, gait problem and myalgias. Negative for arthralgias and neck pain.   Skin: Positive for wound. Negative for rash.   Neurological: Positive for weakness. Negative for dizziness, numbness and headaches.   Psychiatric/Behavioral: Negative for agitation and hallucinations. The patient is not nervous/anxious.      Objective:     Vital Signs (Most Recent):  Temp: 98.6 °F (37 °C) (03/27/18 1234)  Pulse: 76 (03/27/18 1234)  Resp: 18 (03/27/18 1234)  BP: (!) 116/52 (03/27/18 1234)  SpO2: 96 % (03/27/18 1234)    Vital Signs (24h Range):  Temp:  [98.1 °F (36.7 °C)-98.8 °F (37.1 °C)] 98.6 °F (37 °C)  Pulse:  [71-82] 76  Resp:  [17-18] 18  SpO2:  [92 %-96 %] 96 %  BP: (116-157)/(52-69) 116/52     Physical Exam   Constitutional: She is oriented to person, place, and time. She appears well-developed and well-nourished. No distress.   Sitting up in medichair   HENT:   Head: Normocephalic and atraumatic.   Right Ear: External ear normal.   Left Ear: External ear normal.   Nose: Nose normal.   Eyes: Right eye exhibits no discharge. Left eye exhibits no discharge. No scleral icterus.   Neck: Normal range of motion.   Cardiovascular: Normal rate, regular rhythm and intact distal pulses.    Pulmonary/Chest: Effort normal. No  respiratory distress. She has no wheezes.   Abdominal: Soft. She exhibits no distension. There is no tenderness.   Musculoskeletal: She exhibits no edema or tenderness.        Right ankle: She exhibits decreased range of motion (foot drop).   TLSO brace intact   Neurological: She is alert and oriented to person, place, and time. She exhibits normal muscle tone.   -  Mental Status:  AAOx3.  Follows commands.  Answers correct age and .  Recent and remote memory intact.  -  Speech and language:  no aphasia or dysarthria.    -  Vision:  no hemianopsia or ptosis.    -  Facial movement (CN VII): symmetrical.   -  Motor:  RUE: 4/5.  LUE: 4/5.  RLE:  Hip Flex 2/5, Knee Ext/Flex 3-/5,  DF 1/5, PF 4-/5.  LLE:  Hip Flex 2+/5, Knee Ext/Flex 4-/5,  DF 4/5, PF 4/5.  -  Sensory:  Intact to light touch and pin prick.   Skin: Skin is warm and dry. No rash noted.   Psychiatric: She has a normal mood and affect. Her behavior is normal. Thought content normal.   Vitals reviewed.    Diagnostic Results:   Labs: Reviewed  X-Ray: Reviewed  CT: Reviewed  MRI: Reviewed    Assessment/Plan:      * Discitis of thoracolumbar region    -  Presented with worsening back and BLE pain and weakness  -  MRI T-spine revealed vertebral osteomyelitis/discitis T12-L1 with severe compression and effacement of the ventral and dorsal subarachnoid spaces  -  S/p T12-L1 corpectomy with T10-L3 fusion on 3/18/18  Recommendations  -  Encourage mobility, OOB in chair, and early ambulation as appropriate  -  PT/OT evaluate and treat  -  Pain management  -  Monitor for bowel and bladder dysfunction  -  Monitor for spasticity  -  Monitor for and prevent skin breakdown and pressure ulcers  Early mobility, repositioning/weight shifting every 20-30 minutes when sitting, turn patient every 2 hours, proper mattress/overlay and chair cushioning, pressure relief/heel protector boots  -  DVT prophylaxis:  on [facility]        Acute blood loss as cause of postoperative anemia    -   S/p 4 units PRBC, 2 FFP, and 1 unit platelets on 3/18/18 and 2 units PRBC on 3/23/18        Pott's disease (spinal tuberculosis)    -  See pulmonary TB and discitis of thoracolumbar region        Spinal cord compression    -  See discitis of thoracolumbar region        Pulmonary tuberculosis    -  Recent admission at [facility] (12/17/17-1/18/18) for MTB in spine and lungs s/p RIPE x 2 weeks while inpatient with discharge home for completion of treatment   -  ID consulted and recommended airborne precautions be discontinued 2/2 completion of TB treatment        Recommend IRF.  Patient approved for [facility].  Transfer to rehab when medically ready for discharge and rehab bed available.     DOROTHEA Cazares  Department of Physical Medicine & Rehab   [facility]

## 2018-03-27 NOTE — PLAN OF CARE
Problem: Physical Therapy Goal  Goal: Physical Therapy Goal  Goals to be met by: 2018     Patient will increase functional independence with mobility by performin. Supine to sit with Moderate Assistance-met   Revised: Supine to sit CGA  2. Sit to supine with Moderate Assistance-met   Revised: Sit to supine with CGA  3. Sit to stand transfer with Maximum Assistance-met   Revised: Sit to stand transfer with min A  4. Bed to chair transfer with Maximum Assistance- met  5. Wheelchair propulsion x150 feet with Stand-by Assistance using bilateral uppper extremities  6. Lower extremity exercise program x15 reps per handout, with assistance as needed   7. Gait x 10 feet with max A with or without appropriate AD.       Outcome: Ongoing (interventions implemented as appropriate)  Pt progressing towards goals.     ALFREDO DELAROSA, PT  3/27/2018

## 2018-03-28 PROCEDURE — 63600175 PHARM REV CODE 636 W HCPCS: Performed by: HOSPITALIST

## 2018-03-28 PROCEDURE — 97167 OT EVAL HIGH COMPLEX 60 MIN: CPT

## 2018-03-28 PROCEDURE — 99223 1ST HOSP IP/OBS HIGH 75: CPT | Mod: AI,,, | Performed by: PHYSICAL MEDICINE & REHABILITATION

## 2018-03-28 PROCEDURE — 25000003 PHARM REV CODE 250: Performed by: PHYSICAL MEDICINE & REHABILITATION

## 2018-03-28 PROCEDURE — 97802 MEDICAL NUTRITION INDIV IN: CPT

## 2018-03-28 PROCEDURE — 97803 MED NUTRITION INDIV SUBSEQ: CPT

## 2018-03-28 PROCEDURE — 25000003 PHARM REV CODE 250: Performed by: HOSPITALIST

## 2018-03-28 PROCEDURE — 97535 SELF CARE MNGMENT TRAINING: CPT

## 2018-03-28 PROCEDURE — 97530 THERAPEUTIC ACTIVITIES: CPT

## 2018-03-28 PROCEDURE — 11800000 HC REHAB PRIVATE ROOM

## 2018-03-28 PROCEDURE — 97542 WHEELCHAIR MNGMENT TRAINING: CPT

## 2018-03-28 PROCEDURE — 94799 UNLISTED PULMONARY SVC/PX: CPT

## 2018-03-28 PROCEDURE — 97163 PT EVAL HIGH COMPLEX 45 MIN: CPT

## 2018-03-28 RX ADMIN — ONDANSETRON 8 MG: 8 TABLET, ORALLY DISINTEGRATING ORAL at 08:03

## 2018-03-28 RX ADMIN — OXYCODONE AND ACETAMINOPHEN 1 TABLET: 10; 325 TABLET ORAL at 01:03

## 2018-03-28 RX ADMIN — HEPARIN SODIUM 5000 UNITS: 5000 INJECTION, SOLUTION INTRAVENOUS; SUBCUTANEOUS at 02:03

## 2018-03-28 RX ADMIN — AMLODIPINE BESYLATE 10 MG: 10 TABLET ORAL at 09:03

## 2018-03-28 RX ADMIN — TRAZODONE HYDROCHLORIDE 100 MG: 50 TABLET ORAL at 08:03

## 2018-03-28 RX ADMIN — CARVEDILOL 25 MG: 25 TABLET, FILM COATED ORAL at 09:03

## 2018-03-28 RX ADMIN — STANDARDIZED SENNA CONCENTRATE AND DOCUSATE SODIUM 2 TABLET: 8.6; 5 TABLET, FILM COATED ORAL at 08:03

## 2018-03-28 RX ADMIN — OXYCODONE AND ACETAMINOPHEN 1 TABLET: 10; 325 TABLET ORAL at 05:03

## 2018-03-28 RX ADMIN — Medication 50 MG: at 09:03

## 2018-03-28 RX ADMIN — OXYCODONE AND ACETAMINOPHEN 1 TABLET: 10; 325 TABLET ORAL at 09:03

## 2018-03-28 RX ADMIN — HEPARIN SODIUM 5000 UNITS: 5000 INJECTION, SOLUTION INTRAVENOUS; SUBCUTANEOUS at 09:03

## 2018-03-28 RX ADMIN — OXYCODONE AND ACETAMINOPHEN 1 TABLET: 10; 325 TABLET ORAL at 06:03

## 2018-03-28 RX ADMIN — CARVEDILOL 25 MG: 25 TABLET, FILM COATED ORAL at 08:03

## 2018-03-28 RX ADMIN — HEPARIN SODIUM 5000 UNITS: 5000 INJECTION, SOLUTION INTRAVENOUS; SUBCUTANEOUS at 05:03

## 2018-03-28 NOTE — CONSULTS
"  [facility]  Adult Nutrition  Consult Note    SUMMARY     Recommendations    Recommendation/Intervention:  1. Continue Cardiac diet.   2. RD to follow.     Goals: PO intake >85%.  Nutrition Goal Status: new  Communication of RD Recs:  (POC)    Reason for Assessment    Reason for Assessment: RD follow-up  Diagnosis:  (Discitis of thoracolumbar region)  Relevant Medical History: HTN, sarcoidosis, Pott's disease, HLD  Interdisciplinary Rounds: did not attend    General Information Comments: Pt reports an okay appetite with a 100% intake. Pt's daughter was unaware that the Pt is on a cardiac diet and brought her fried chicken from outside the facility. Pt complains of stomach pains for the past 4 days. Food preferences were obtained. Will follow up with a low fat/low Na diet education.     Nutrition Discharge Planning: Adequate nutrition on Cardiac diet.    Nutrition Risk Screen    Nutrition Risk Screen: no indicators present    Nutrition/Diet History    Patient Reported Diet/Restrictions/Preferences: general  Food Preferences: Fresh fruit  Do you have any cultural, spiritual, Shinto conflicts, given your current situation?: none    Anthropometrics    Temp: 98.6 °F (37 °C)  Height: 5' 5" (165.1 cm)  Height (inches): 65 in  Weight Method: Stated  Weight: 94.8 kg (208 lb 15.9 oz)  Weight (lb): 209 lb  Ideal Body Weight (IBW), Female: 125 lb  % Ideal Body Weight, Female (lb): 167.2 lb  BMI (Calculated): 34.9      Lab/Procedures/Meds    Pertinent Labs Reviewed: reviewed  Pertinent Labs Comments: Ca 8.5, Alb 2.0  Pertinent Medications Reviewed: reviewed  Pertinent Medications Comments: Carvedilol, heparin, ondansetron, senna, Ca     Physical Findings/Assessment    Skin: Incisions    Estimated/Assessed Needs    Weight Used For Calorie Calculations: 94.8 kg (208 lb 15.9 oz)  Height: 5' 5" (165.1 cm)  Energy Calorie Requirements (kcal): 1873 kcals/day  Energy Need Method: [place]-St Jeor (PAL x " 1.25)    RMR ([place]-St. Usman Equation): 1493.88  Protein Requirements: 95-114g/day (1.0-1.2g/kg)  Weight Used For Protein Calculations: 94.8 kg (208 lb 15.9 oz)    Fluid Requirements (mL): 1mL/kg or per MD  RDA Method (mL): 1873       Nutrition Prescription Ordered    Current Diet Order: Cardiac    Evaluation of Received Nutrient/Fluid Intake    Comments: LBM: 3/27/18  % Intake of Estimated Energy Needs: 50 - 75 %  % Meal Intake: 50 - 75 %    Nutrition Risk    Level of Risk/Frequency of Follow-up: low     Assessment and Plan    Nutrition Problem  Food an nutrition related knowledge deficit     Related to (etiology):   No previous nutrition education on cardiac diet    Signs and Symptoms (as evidenced by):   Pt consuming fried food and unaware of cardiac diet restricitons    Interventions/Recommendations (treatment strategy):  Low Fat/Low Na diet education     Nutrition Diagnosis Status:   New      Monitor and Evaluation    Food and Nutrient Intake: energy intake, food and beverage intake  Food and Nutrient Adminstration: diet order  Knowledge/Beliefs/Attitudes: food and nutrition knowledge/skill, beliefs and attitudes  Physical Activity and Function: nutrition-related ADLs and IADLs  Anthropometric Measurements: weight, body mass index, weight change  Biochemical Data, Medical Tests and Procedures: electrolyte and renal panel, gastrointestinal profile, glucose/endocrine profile, inflammatory profile, lipid profile  Nutrition-Focused Physical Findings: overall appearance     Nutrition Follow-Up    RD Follow-up?: Yes

## 2018-03-28 NOTE — PROGRESS NOTES
Occupational Therapy   Evaluation and AM Treatment    Mary Carrillo  MRN: [id number]  Room/Bed: E257/E257 A     03/28/18 0915   OT Time Calculation   OT Start Time 0915   OT Stop Time 1047   OT Total Time (min) 92 min   General   OT Date of Treatment 03/28/18   Chart Reviewed Yes   Onset of Illness/Injury or Date of Surgery 03/28/18   Diagnosis Corpectomy T12-L1 corpectomy with T10-L3 fusion; discitis of thoracolumbar region   Additional Pertinent History 12/17/2017   Date Referred to OT 03/27/18   Referring Physician Dr. Squires   Family/Caregiver Present Yes  (daughter)   Patient Found (position) Supine in bed   Precautions   General Precautions fall   Orthopedic Yes   Required Braces or Orthoses Yes   Spinal Brace TLSO   Visual/Auditory Vision impaired  (glasses)   BADL History   Bed Mobility/Transfers needs assist   Grooming independent   Bathing needs assist   Upper Body Dressing other (see comments)  (set-up as pt did not retrieve clothing items)   Lower Body Dressing needs assist   Toileting needs assist   Home Management Skills unable to perform  (limited mobility without AD/DME)   IADL History   Homemaking Responsibilities Yes   Meal Prep Responsibility Secondary   Laundry Responsibility No   Cleaning Responsibility No   Bill Paying/Finance Responsibility Secondary   Shopping Responsibility No   Driving License Yes  (per pt report, has not driven since mid 2017)   Mode of Transportation Car;Family   Occupation Retired   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 Pt reports requiring (A) for ADLs/IADLs while at home. Pt has a walk in shower with no DME. Pt reports having a w/c at home,  "but stated that she does not own it and may not be able to keep it. Pt reports her daughter assists her at home with ADLs. Pt has a 's license but has not driven her car since mid 2017.   Subjective   Patient states "I haven't moved around by myself in a while. My daughter would help me bathe at home, and I'd stay on the sofa."    Family states "I can't help you do it today, you need to do it yourself." Pt's daughter stated to pt during ADLs   Pain/Comfort   Pain Rating 7/10   Location - Orientation lower   Location abdomen   Pain Addressed Reposition;Distraction   Pain Comment Pt notified Dr. Squires of abdomen pain   Cognitive Status    Comprehension:    - Needs cues to understand (basic tasks)  (5)  - Understands basic 90% of the time  (5)  - Needs help to insert/set up hearing aids (Auditory) or apply glasses (Visual) - Needs slowed speech to understand   (5)      Expression:    - Needs cues to express basic needs  (5)  - Expresses basic needs or ideas 90% of the time  (5)  - Needs staff member to set up communication device (talk trach valve)   (5)      Social Interaction:    - Interacts appropriately 90% of time - needs monitoring or encouragement for participation or interaction  (5)      Problem Solving:    - Solves basic problems 50-74% of the time  (3)    Memory:      - Recognizes, recalls, or executes 50-74% of time 2 steps of 2 step request (3)         Level of Consciousness (AVPU) alert   Arousal Level opens eyes spontaneously   Orientation disoriented to;place   Able to Follow Commands (Communication) WFL   Speech clear/fluent   Mood/Behavior calm;cooperative   Sensory Exam   Additional Documenation yes   Light Touch   LUE w/i normal limits   RUE w/i normal limits   Proprioception   LUE w/i normal limits   RUE w/i normal limits   Range of Motion (ROM)   Range of Motion Examination other (see comments)   Additional Documentation (BUE AROM ~90* shld flexion; ER, digit opposition WNL)   Manual Muscle " Testing (MMT)   Manual Muscle Testing Results other (see comments)   Additional Documentation (BUE grossly tested 3+/5)   Fine Motor Coordination Examination   Additional Documentation Fine Motor Coordination   Fine Motor Coordination   Left Hand Thumb/Finger Opposition Skills normal performance   Right Hand Thumb/Finger Opposition Skills normal performance   Left Hand, Manipulation of Objects normal performance   Right Hand, Manipulation of Objects normal performance   Tone   Right UE  normal   Left UE normal   Observation   Appearance 63 y.o F supine in bed    Posture Rounded shoulders;Other (see comments)  (forward head)   Skin Other (see comments)  (incision with staples at T12-L1)   Bed Mobility   Bed Mobility yes   Rolling/Turning to Left Minimum assistance   Rolling/Turning Left Comments without use of bed rail   Rolling/Turning Right Minimum assistance   Rolling/Turning Right Comments without use of bed rail   Supine to Sit Maximum Assistance   Supine to Sit Comments Max (A) for RLE over EOB and trunk elevation   Sit to Supine Moderate Assistance   Sit to Supine Comments Mod (A) for BLE over EOB   Transfers   Transfer yes   Bed to Chair   Bed <> Chair Technique Scoot Pivot   Bed <> Chair Transfer Assistance Total Assistance   Bed <> Chair Assistive Device No Assistive Device   Trials/Comments Total (A) from EOB > w/c   Shower Transfer   Shower Transfer Technique Stand Pivot   Shower Transfer Assistance Total Assistance   Shower Transfer Assistive Device No Assistive Device   Trials/Comments Total (A) as pt would require x2 helpers to safely manage BLE over shower threshold without DME as she did PTA   Toilet Transfer   Toilet Transfer Technique Scoot Pivot   Toilet Transfer Assistance Total Assistance   Toilet Transfer Assistive Device drop arm commode   Trials/Comments Total (A) from w/c <> drop arm commode   Feeding   Feeding Level of Assistance Set-up Assistance   Feeding Where Assessed Wheelchair    Feeding Comments Set-up for items within reach; pt scoops food, brings hand to mouth, chews/swallows   Grooming   Additional Grooming yes   Grooming Level of Assistance Stand by assistance   Hand Washing Level of Assistance Stand by assistance   Face Washing Level of Assistance Stand by assistance   Oral Hygeine Level of Assistance Stand by assistance   Grooming Where Assessed Wheelchair   Grooming Comments SBA for 3/3 G/H tasks sitting sinkside in w/c   Bathing   Bathing Level of Assistance Moderate assistance   Bathing Where Assessed Edge of bed   Bathing Comments Mod (A) for (A) with buttocks, BLE below knees   UE Dressing   UE Dressing Level of Assistance Set-up Assistance   UE Dressing Where Assessed Edge of bed   UE Dressing Comments Set-up for donning TLSO; pt completes 4/4 steps with pullover shirt   LE Dressing   LE Dressing Yes   LE Dressing  Level of Assistance Total assistance   LE Dressing Level of Assistance Total assistance   Sock Level of Assistance Total assistance   Adult Briefs Level of Assistance Total assistance   LE Dressing Where Assessed Edge of bed   LE Dressing Comments Total (A) for 3/4 steps with donning pants (pt able to fasten/tie knot); pt completed 1/9 steps overall)    Toileting   Toileting Level of Assistance Total assistance   Toileting Where Assessed Bed level   Toileting Comments Total (A) for 3/3 steps   Balance   Balance Yes   Dynamic Sitting Balance   Dynamic Sitting-Balance Support Unilateral upper extremity supported   Dynamic Sitting-Balance Reaching for objects;Reaching across midline   Dynamic Sitting-Comments CGA - SBA for dynamic sitting balance ther ax    Dynamic Standing Balance   Dynamic Standing-Balance Support Unilateral upper extremity supported   Dynamic Standing-Balance Reaching for objects;Reaching across midline   Dynamic Standing-Comments Total (A) to maintain dynamic standing balance during ther ax    Treatment   Treatment Pt performed ADLs, including  bathing, UB dressing, LB dressing, grooming; bed mobility, functional t/fs   Activity Tolerance   Activity Tolerance Patient tolerated treatment well   After Treatment   Patient Position After Treatment Seated in wheelchair   Patient after treatment left call button in reach  (daughter present; safety belt intact)   Assessment   Prognosis Good   Problem List Decreased Self Care skills;Decreased upper extremity range of motion;Decreased upper extremity strength;Decreased safe judgment during ADL;Decreased cognition;Decreased endurance;Decreased sensation;Decreased functional mobility;Decreased gross motor control;Decreased IADLs;Decreased trunk control for functional activities   Assessment Pt tolerated eval and tx session well today. Pt requires VCs for reminders for spinal precautions prior to performing bed mobility. Pt requires total (A) for LB dressing, as pt cannot cross BLE to don pants 2* to decreased BLE strength. Pt presents with decreased functional endurance, BLE strength, and functional mobility. Pt reports numbness in R foot. Pt would continue to benefit from skilled OT services.    Level of Motivation/Participation good   Short Term Goals   Additional Documentation yes   Time For Goal Achievement 7 days   Pt Will Perform Supine To Sit New goal;With moderate assist   Supine to Sit - Met/Not Met Not Met   Pt Will Perform Sit to Supine New goal;With minimal assist   Supine to Sit - Met/Not Met Not Met   Pt Will Transfer Bed/Chair New goal;With moderate assist   Transfer Bed/Chair - Met/Not Met Not Met   Pt Will Perform LE Dressing New goal;With maximum assistance   LE Dressing - Met/Not Met Not Met   Long Term Goals   Additional Documentation yes   Time For Goal Achievement 2 weeks   Pt Will Transfer Bed/Chair New goal;With minimal assist   Transfer Bed/Chair - Met/Not Met Not Met   Pt Will Transfer To Bedside Commode New goal;With minimal assist   Transfer Bedside Commode -Met/Not Met Not Met   Pt Will  Transfer To Shower New goal;With minimal assist   Transfer to Shower-Met/Not Met Not Met   Pt Will Perform Eating New goal;With modified independence  (dentures)   Eating - Met/Not Met Not Met   Pt Will Perform Grooming New goal;Independently   Grooming - Met/Not Met Not Met   Pt Will Perform Bathing New goal;With minimal assistance   Bathing - Met/Not Met Not Met   Pt Will Perform UE Dressing New goal;With modified independence   UE Dressing - Met/Not Met Not Met   Pt Will Perform LE Dressing New goal;With minimal assistance;With adaptive equipment   LE Dressing - Met/Not Met Not Met   Pt Will Perform Toileting New goal;With moderate assistance   Toileting-Met/Not Met Not Met   Discharge Recommendations   Equipment Needed After Discharge 3-in-1 commode;shower chair;wheelchair   Discharge Facility/Level Of Care Needs home health OT   Plan   Plan Self care retraining;Functional transfer training;UE thereex;Equipment Training;Family training;Safety training;Functional endurance training;Patient education;Postural control;Cognitive Retraining;Community re-training;AROM;PROM;AAROM;Neuromuscular Re-education;Functional Standing Activities   Therapy Frequency 2 times/day;Monday-Friday;Saturday or Sunday   Occupational Therapy Follow-up   OT Follow-up? Yes   Treatment/Billable Minutes   Evaluation 60   Self Care/Home Management 32   Total Time 92       Deb Kim OT  3/28/2018     Patient with wheelchair safety belt fastened and able to self release wheelchair safety belt. Pt left seated in w/c next to bedside in pt's room with pt's daughter (location) with call light and all necessities in reach, nursing notified.     LEGEND:   CGA: Contact Guard Assist   EOB: Edge of Bed   HHA: Hand Held Assist   HOB: Head of Bed   (I): Independent-patient performs task in a timely manner   Max (A): Maximal Assist-patient performs 25-49% of task   Min (A): Minimal Assist- patient performs 75% or more of task   Mod (A): Moderate  Assist- patient performs 50-74% of task   NA: Not applicable   NT: Not tested   OOB: Out of Bed   PTA: Prior to admit   QC: Quad Cane   RW: Rolling Walker   (S): Supervision- patient requires cues, coaxing, prompting   SBA: Stand By Assist   SC: Straight Cane   SW: Standard Walker   TBA: To be assessed   Total (A): Total Assist- patient performs less than 25% of task   WC: Wheelchair   WFL: Within Functional Limits   WNL: Within Normal Limits

## 2018-03-28 NOTE — PROGRESS NOTES
Assumed care of patient. No changes in assessment noted from previous nurse. Patient resting, call light in reach, bed in low, locked position and bed alarm set.

## 2018-03-28 NOTE — PROGRESS NOTES
Food & Nutrition  Education    Diet Education: Cardiac diet (low Na/low fat)  Time Spent:15 minutes   Learners: Patient and family members       Nutrition Education provided with handouts: Low sodium/Low fat      Comments: Educated Pt and family members on the importance of a cardiac diet and went over high sodium and high fat foods. I also provided examples of low fat and sodium free alternatives. Pt and family verbalized understanding.   All questions and concerns answered. Dietitian's contact information provided.

## 2018-03-28 NOTE — PROGRESS NOTES
Physical Therapy   Treatment    Mary Carrillo   MRN: [id number]                03/28/18 1300   PT Time Calculation   PT Start Time 1300   PT Stop Time 1345   PT Total Time (min) 45 min   Treatment   Treatment Type Treatment   PT/PTA PT   General   PT Received On 03/28/18   Family/Caregiver Present Yes  (daughter present in room)   Patient Found (position) Seated in wheelchair;Other (comment)  (in room)   Pt found with TLSO  (seat belt)   Precautions   General Precautions fall   Orthopedic No   Required Braces or Orthoses Yes   Spinal Brace TLSO   Visual/Auditory Vision impaired;Other (see comments)  (glasses)   Subjective   Patient states she has R hip pain this afternoon.   Pain/Comfort   Pain Rating 1 8/10   Location - Side 1 Right   Location - Orientation 1 generalized   Location 1 hip   Pain Addressed 1 Reposition;Distraction;Cessation of Activity;Nurse notified  (nurse notified at end of session( pt due for pain meds))   Pain Rating Post-Intervention 1 8/10   Activity Tolerance   Activity Tolerance Patient tolerated treatment well;Patient limited by pain   After Treatment   Patient Position After Treatment Left side lying in bed   Patient after treatment left call button in reach;nursing notified   Assessment   Prognosis Fair   Plan   Planned Therapy Intervention Continue with current plan   Therapy Frequency 2 times/day;daily;Monday-Friday;Saturday or Sunday   Physical Therapy Follow-up   PT Follow-up? Yes   PT - Next Visit Date 03/29/18   Treatment/Billable Minutes   Therapeutic Activity 37   Train/Wheelchair Management 8   Total Time 45           Objective data:    Pt performs squat pivot tub bench transfer with max/total A  Pt performs squat pivot transfer to 3 in 1 drop arm commode with max A  Sit<>  parallel bars with max A x 3 trials  Pt stands statically in bars x 1: 21, 51 sec., and 1: 15 with mod A for balance ~ seated rest break between trials  Pt propels w/c x 100 feet with B UE and SBA  Pt  returns to room and performs squat pivot transfer w/c to bed with max A  Sit> L sidelying with mod A  PT educated pt and daughter regarding PT eval findings and potential LOS        Huey Caicedo, PT 3/28/2018         LATE ENTRY(something completely left out of the record) documented on _3/28/18_________(date that the late entry) for services provided on _3/28/18__________(date of actual service) by ___Huey Caicedo, PT________________ (name and title of person making change) due to _omission of assistance needed for balance while standing in parallel bars___________________ (reason).          Huey Caicedo, PT 3/29/2018

## 2018-03-28 NOTE — PLAN OF CARE
Problem: Patient Care Overview  Goal: Plan of Care Review  Outcome: Ongoing (interventions implemented as appropriate)  Assessment and Plan    Nutrition Problem  Food an nutrition related knowledge deficit     Related to (etiology):   No previous nutrition education on cardiac diet    Signs and Symptoms (as evidenced by):   Pt consuming fried food and unaware of cardiac diet restricitons    Interventions/Recommendations (treatment strategy):  Low Fat/Low Na diet education     Nutrition Diagnosis Status:   New      Recommendation/Intervention:  1. Continue Cardiac diet.   2. RD to follow.     Goals: PO intake >85%.  Nutrition Goal Status: new

## 2018-03-28 NOTE — ASSESSMENT & PLAN NOTE
-  MRI T-spine revealed vertebral osteomyelitis/discitis T12-L1 with severe compression and effacement of the ventral and dorsal subarachnoid spaces  -  S/p T12-L1 corpectomy with T10-L3 fusion on 3/18/18  -  PT/OT evaluate and treat  -  Pain management  -  Monitor for bowel and bladder dysfunction  -  Monitor for spasticity  -  Monitor for and prevent skin breakdown and pressure ulcers  Early mobility, repositioning/weight shifting every 20-30 minutes when sitting, turn patient every 2 hours, proper mattress/overlay and chair cushioning, pressure relief/heel protector boots  -  DVT prophylaxis:  on SQH

## 2018-03-28 NOTE — PLAN OF CARE
Per chart notes, pt dc to [facility] late yesterday evening.     03/28/18 0754   Final Note   Assessment Type Final Discharge Note   Discharge Disposition ([facility])   Hospital Follow Up  Appt(s) scheduled? (per rehab)   Discharge plans and expectations educations in teach back method with documentation complete? Yes   Right Care Referral Info   Post Acute Recommendation IRF

## 2018-03-28 NOTE — PROGRESS NOTES
Physical Therapy   Admission FIM scores( PT)    Mary Carrillo   MRN: [id number]                  03/28/18 1115   Transfers   Bed/Chair/WC 2   Toilet 2   Tub 1   Locomotion   Distance Walked 0   Distance Wheelchair 1   Walk 0   Wheelchair 1   Mode C   Stairs 1           Huey Caicedo, PT 3/28/2018         Correction(making changes or corrections to something that was documented in error) documented on _3/28/18_________(date of correction or error) for services provided on _3/28/18__________(date of actual service) by __Huey Caicedo, PT_________________ (name and title of person making change) due to __correction to tub transfer score__________________ (reason).        Huey Caicedo, PT 3/28/2018         Correction(making changes or corrections to something that was documented in error) documented on __3/28/18________(date of correction or error) for services provided on __3/28/18_________(date of actual service) by __Huey Caicedo, PT_________________ (name and title of person making change) due to _incorrect score for stairs~ incorrectly scored as 0 instead of 1___________________ (reason).          Huey Caicedo, PT 4/19/2018

## 2018-03-28 NOTE — PLAN OF CARE
"   03/28/18 0900   Discharge Assessment   Assessment Type Discharge Planning Assessment   Confirmed/corrected address and phone number on facesheet? Yes   Assessment information obtained from? Patient;Caregiver   Expected Length of Stay (days) 14   Communicated expected length of stay with patient/caregiver yes   Prior to hospitilization cognitive status: Alert/Oriented   Prior to hospitalization functional status: Assistive Equipment;Needs Assistance;Partially Dependent   Current cognitive status: Alert/Oriented   Current Functional Status: Assistive Equipment;Needs Assistance;Partially Dependent;Wheelchair Bound   Facility Arrived From: [facility]   Lives With spouse   Able to Return to Prior Arrangements unable to determine at this time (comments)   Is patient able to care for self after discharge? Unable to determine at this time (comments)   Patient's perception of discharge disposition home health   Equipment Currently Used at Home 3-in-1 commode;wheelchair   Is the patient taking medications as prescribed? yes   Does the patient have transportation home? Yes   Transportation Available car   Discharge Plan A Home with family;Home Health   Discharge Plan B Skilled Nursing Facility   Patient/Family In Agreement With Plan yes     Spoke with patient with daughter Alea at bedside. Pt is alert and oriented. Physical address of [address] [place] [id number]. Lives with , Bebo who can provide little physical assistance but can supervise. Daughter lives "out of town." Has transportation home and to appointments. Pt has a wheelchair and BSC. Has had home health in the past but cannot remember the name and has no preference. Educated on rehab process and team conference. States understanding, CM will continue to follow.   "

## 2018-03-28 NOTE — PROGRESS NOTES
Occupational Therapy  ADMIT FIM SCORES    Mary Carrillo  MRN: [id number]  Room/Bed: E257/E257 A       03/28/18 0915   Transfers   Bed/Chair/WC 1   Toilet 1   Shower 1   Self Care   Eating 5   Grooming 5   Bathing 3   Dressing-Upper 5   Dressing-Lower 1   Toileting 1   Communication   Comprehension 5   Mode B   Expression 5   Mode B   Social Cognition   Social Interaction 5   Problem Solving 3   Memory 3       Deb Kim OT  3/28/2018

## 2018-03-28 NOTE — PROGRESS NOTES
Physical Therapy   Evaluation    Mary Carrillo   MRN: [id number]              03/28/18 1115   PT Time Calculation   PT Start Time 1115   PT Stop Time 1200   PT Total Time (min) 45 min   Treatment   Treatment Type Evaluation   General   PT Received On 03/28/18   Chart Reviewed Yes   Onset of Illness/Injury or Date of Surgery 03/28/18   Additional Pertinent History see MD Cabrera P   Date Referred to PT 03/28/18   Referring Physician Dr. 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 with her  in one story home.  Pt's daughter comes to her home every morning to assist her( from [place]).  Pt was basically living on her couch( she performed bathing, dressing, eating and toileting there).  Pt has access to RW and w/c but was not using PTA.  Pt has not ambulated since at least December of 2017.   Equipment Currently Used at Home wheelchair;walker, rolling   Subjective   Patient states she has some pain to her mid-lower back area.   Pain/Comfort   Pain Rating 1 8/10   Location - Side 1 Bilateral   Location - Orientation 1 lower  (mid to lower)   Location 1 back   Pain Addressed 1 Pre-medicate for  activity;Reposition;Distraction;Other (see comments)  (PT adjusted TLSO)   Pain Rating Post-Intervention 1 8/10   Cognitive Status   Level of Consciousness (AVPU) alert   Arousal Level opens eyes spontaneously   Orientation oriented x 4   Able to Follow Commands (Communication) WFL   Speech clear/fluent;follows commands   Mood/Behavior calm;cooperative;behavior appropriate to situation   Sensory Examination   Additional Documentation yes   Light Touch   LUE w/i normal limits   RUE w/i normal limits   LLE mild impairment  (pt reports some numbness and tingling to feet)   RLE mild impairment  (pt reports some numbness and tingling to feet)   Proprioception   LUE w/i normal limits   RUE w/i normal limits   LLE w/i normal limits   RLE w/i normal limits   Range of Motion (ROM)   Range of Motion Examination deficits as listed below;other (see comments)  (R shoulder flex approx. 120; R hip IR limited( not measured))   Manual Muscle Testing (MMT)   Manual Muscle Testing Results other (see comments)  (see comments section below)   Tone   Right UE  normal   Left UE normal   Right LE normal   Left LE normal   Observation   Appearance pleasant female seated in w/c in NAD; TLSO donned loosely   Posture Kyphosis in Thoracic Spine;Rounded shoulders   Skin intact;Other (see comments)  (where visible; PT did not inspect surgical site)   Bed Mobility   Bed Mobility yes   Rolling/Turning to Left Modified independent  (mat)   Rolling/Turning Right Supervision  (mat)   Supine to Sit Moderate Assistance;Other (see comments)  (for trunk elevation)   Supine to Sit Comments rising from R side of mat   Sit to Supine Minimum Assistance;Other (see comments)  (lowering to R side of mat)   Transfers   Transfer yes   Sit to Stand   Trials/Comments to be attempted in PM   Chair to Mat   Chair<> Mat Technique Squat Pivot   Chair<>Mat Assistance Maximum Assistance   Chair <> Mat Assistive Device No Assistive Device   Trials/Comments 1 trial~ cues  for hand placement and technique   Mat to Chair   Technique Squat Pivot   Assistance Maximum Assistance   Assistive Device No Assistive Device   Trials/Comments 1 trial~ cues for hand placement and technique   Tub Bench Transfer   Trials/Comments to be attempted in PM   Toilet Transfer   Trials/Comments to be attempted in PM   Car Transfer   Trials/Comments not tested due to safety concerns~ will address this transfer when appropriate   Wheelchair Activities   Propulsion Yes   Propulsion Type 1 Manual   Level 1 Level tile   Method 1 Right upper extremity;Left upper extremity   Level of Assistance 1 Stand by assistsance   Description/ Details 1 pt propels w/c x 30 feet, then an additional 20 feet after a short rest period  (pt also performs 2 180 degree turns)   Gait   Gait No  (unsafe to attempt; pt was not performing PTA; FIM=0)   Stairs   Stairs No  (pt not performing PTA and needs to be bumped up via w/c; FIM=1   Endurance Deficit   Endurance Deficit Yes   Endurance Deficit Description pt not at PLOF   Balance   Balance Yes   Static Sitting Balance   Static Sitting-Balance Support Right upper extremity supported;Left upper extremity supported;Feet supported   Static Sitting-Level of Assistance Stand by Assistance   Static Sitting-Comment/# of Minutes pt sits edge of mat for several minutes while PT assesses posture   Activity Tolerance   Activity Tolerance Patient tolerated treatment well;Patient limited by pain   After Treatment   Patient Position After Treatment Seated in wheelchair;Other (comment)  (seat belt in place)   Patient after treatment left (pt escorted to room for lunch)   Treatment   Treatment Evaluation   Assessment   Prognosis Fair   Problem List Decreased strength;Decreased range of motion;Decreased endurance;Impaired balance;Decreased mobility;Obesity;Pain;Impaired sensation   Assessment Pt is a 64 y/o female who comes to rehab following recent spinal surgery.  PTA, pt was very sedentary due to  inabilty to transfer or ambulate away from couch.  Pt appears to have decent physical potential, based on today's assessment.  Expect pt to progress gradually, as she is not used to performing 3 hrs. of activity each day.  Pt is currently limited by pain, obesity, decreased strength and limited endurance.  Rehab potential appears fair to good.   Level of Motivation/Participation good   Barriers to Discharge Decreased caregiver support   Barriers to Discharge Comments pt has family available to assist; pt likely to need physical assistance at discharge   Short Term Goals   Additional Documentation yes   Time For Goal Achievement 7 days   Pt Will Perform Supine To Sit With minimal assist;With moderate assist   Pt Will Perform Sit to Supine With minimal assist;With moderate assist   Pt Will Logroll Modified Indepent  (to R and L)   Pt Will Transfer Sit to Stand With moderate assist;With maximum assist  (inside parallel bars)   Pt Will Transfer Bed/Chair Squat Pivot;With moderate assist;With maximum assist   Pt Will Sit Edge of Bed 3-5 min;With Supervision   Pt Will Stand 1-2 min;With minimal assist;With moderate assist  (inside parallel bars)   Pt Will Tolerate Exercise 11-15 reps;With active assist  (to B LE)   Pt Will Propel Wheelchair  feet;Supervision;With (B) UE   Other Goal squat pivot transfers w/c<> 3 in 1 drop arm commode, mod/max A   Other Goal 2 squat pivot transfers w/c<> tub bench with mod/max A   Long Term Goals   Additional Documentation yes   Time For Goal Achievement 2 weeks   Pt Will Perform Supine To Sit With minimal assist   Pt Will Perform Sit to Supine With minimal assist;With contact guard assist   Pt Will Logroll Modified Indepent  (to R and L)   Pt Will Transfer Sit to Stand With moderate assist  (inside parallel bars)   Pt Will Transfer Bed/Chair Squat Pivot;With minimal assist;With moderate assist   Pt Will Sit Edge of Bed 6-10 min;Modified Independently   Pt Will Stand 3-5 min;With  minimal assist  (inside parallel bars)   Pt Will Tolerate Exercise 15-20 reps;With active assist  (to B LE)   Pt Will Propel Wheelchair > 150 feet;Modfied Independently;With (B) UE   Other Goal squat pivot transfers w/c <> 3 in 1 drop arm commode with min/mod A   Other Goal 2 squat pivot transfers w/c<> tub bench with mod A   Other Goal 3 caregiver(s) to be independent with assisting pt as needed at time of discharge OR alternate placement provided   Discharge Recommendations   Equipment Needed After Discharge 3-in-1 commode;bath bench;wheelchair   Discharge Facility/Level Of Care Needs home health PT   Plan   Planned Therapy Intervention Plan of care initiated;Bed mobility training;Transfer training;Balance Training;ROM;Strengthening;Neuromuscular Re-education;Postural Re-education;Motor Coordination Training;Wheelchair Management/Propulsion   Therapy Frequency 2 times/day;daily;Monday-Friday;Saturday or Sunday   Physical Therapy Follow-up   PT Follow-up? Yes   PT - Next Visit Date 03/28/18   Other Comments   Comments MMT performed to B LE while pt seated in w/c:  R= hip flex 1+, knee ext 3, knee flex 3+, ankle df 1, ankle pf 3; L= 2+ hip flex, 3+ knee ext, 3+ knee flex, 3(-) ankle df, 3+ ankle pf   Treatment/Billable Minutes   Evaluation 45   Total Time 45             Huey Caicedo, PT 3/28/2018         LATE ENTRY(something completely left out of the record) documented on __3/28/18________(date that the late entry) for services provided on __3/28/18_________(date of actual service) by _Huey Caicedo, PT__________________ (name and title of person making change) due to _omission of below admitting diagnosis to rehab___________________ (reason).        Pt admitted to rehab with diagnosis of spinal cord compression; s/p T12-L1 corpectomy with T10-L3 fusion on 3/18/18        Huey Caicedo, PT 3/29/2018         Correction(making changes or corrections to something that was documented in error) documented on  _3/28/18_________(date of correction or error) for services provided on _3/28/18__________(date of actual service) by Huey Caicedo, PT___________________ (name and title of person making change) due to __clarification of admission FIM score for stairs__________________ (reason).        Huey Caicedo, PT 4/24/2018

## 2018-03-28 NOTE — PLAN OF CARE
Problem: Patient Care Overview  Goal: Plan of Care Review  Outcome: Ongoing (interventions implemented as appropriate)  Ms. Carrillo requested pain medication during this shift.  She went downstairs for an x-ray of her abdomen per Dr. Squires.

## 2018-03-28 NOTE — H&P
[facility]  Physical Medicine & Rehab  History & Physical    Patient Name: Mary Carrillo  MRN: [id number]  Admission Date: 3/27/2018  Attending Physician: Jack Squires MD   Primary Care Provider: Jose Khan MD    Subjective:     Principal Problem: Spinal cord compression    HPI: Mary Carrillo is a 62-year-old female with PMHx of HTN, HLD, glaucoma, gout, arthritis, asthma, sarcoidosis, and Pott's disease with recent admission at [facility] (12/17/17-1/18/18) for MTB in spine and lungs s/p RIPE x 2 weeks while inpatient with discharge home for completion of treatment despite attempts for NH/SNF placement.  Since discharge, patient has been mostly bed/sofa bound 2/2 severe back pain.  Patient presented to [facility] with worsening back and BLE pain with associated BLE weakness.  MRI thoracic spine revealed vertebral osteomyelitis/discitis T12-L1 with severe compression and effacement of the ventral and dorsal subarachnoid spaces.  She was then transferred to [facility] on 3/17/18 for further evaluation and management.  Upon admission, evaluated by Neurosurgery and underwent T12-L1 corpectomy with T10-L3 fusion on 3/18/18.  ID consulted and recommended airborne precautions be discontinued 2/2 completion of TB treatment.  Post-op course complicated by anemia requiring 4 units PRBC, 2 FFP, and 1 unit platelets on 3/18/18 and 2 units PRBC on 3/23/18.     Current Functional Status:  Participating with therapy.  Bed mobility min-modA.  Sit to stand maxA with RW.  No further transfers or gait.  EOB SBA-CGA. Bed mobility mod-maxA.  Sit to stand maxA.  UBD maxA.  Grooming mod(I).  Feeding mod(I).     Functional History: Patient lives in [place] with her  in a single story home without steps to enter.  Prior to recent admission (December/January), she was (I) with ADLs, including driving, and mobility.  Since that time, she has mostly been bed/sofa bound and required  assistance with ADLs and mobility.  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  DME: RW, Gautam.    Past Medical History:   Diagnosis Date    Arthritis     Asthma     Back pain     Encounter for blood transfusion     Glaucoma     Gout     Hyperlipidemia     Hypertension     Sarcoidosis     Vitritis     Vitritis      Past Surgical History:   Procedure Laterality Date    BACK SURGERY  2018    Laminectomy.     CATARACT EXTRACTION Bilateral      SECTION      HYSTERECTOMY      uterus/cervix d/t uterine fibroids.     Review of patient's allergies indicates:   Allergen Reactions    Prednisone Hives and Itching     Pills only, can take if she needs to    Naproxen Rash       Scheduled Medications:    amLODIPine  10 mg Oral Daily    carvedilol  25 mg Oral BID    heparin (porcine)  5,000 Units Subcutaneous Q8H    [START ON 3/29/2018] isoniazid  900 mg Oral Twice Weekly    polyethylene glycol  17 g Oral Daily    pyridoxine (vitamin B6)  50 mg Oral Daily    [START ON 3/29/2018] rifAMpin  600 mg Oral Twice Weekly    senna-docusate 8.6-50 mg  2 tablet Oral Daily    traZODone  100 mg Oral QHS       PRN Medications: acetaminophen, albuterol sulfate, bisacodyl, calcium carbonate, hydrOXYzine pamoate, magnesium hydroxide 400 mg/5 ml, ondansetron, oxyCODONE-acetaminophen, ramelteon, senna, simethicone    Family History     Problem Relation (Age of Onset)    Diabetes Mother, Father    Kidney disease Mother, Father        Social History Main Topics    Smoking status: Former Smoker     Packs/day: 1.00     Years: 35.00     Types: Cigarettes     Start date: 1967     Quit date: 1994    Smokeless tobacco: Never Used    Alcohol use No    Drug use: No    Sexual activity: Yes     Partners: Male     Birth control/ protection: Post-menopausal, See Surgical Hx     Review of Systems   Constitutional: Positive for activity change and fatigue. Negative for chills and diaphoresis.   HENT: Negative for congestion,  facial swelling, mouth sores and postnasal drip.    Eyes: Negative for pain, discharge, redness and visual disturbance.   Respiratory: Negative for cough, chest tightness and wheezing.    Cardiovascular: Negative for chest pain and palpitations.   Gastrointestinal: Positive for abdominal pain. Negative for constipation, diarrhea and nausea.   Endocrine: Negative for cold intolerance and heat intolerance.   Genitourinary: Negative for dysuria.   Musculoskeletal: Positive for arthralgias, back pain, gait problem and myalgias.   Skin: Negative for color change and wound.   Neurological: Negative for dizziness, facial asymmetry and speech difficulty.   Hematological: Negative for adenopathy.   Psychiatric/Behavioral: Negative for agitation, confusion and hallucinations.     Objective:     Vital Signs (Most Recent):  Temp: 98.6 °F (37 °C) (03/28/18 0655)  Pulse: 77 (03/28/18 0655)  Resp: 16 (03/28/18 0655)  BP: (!) 149/72 (03/28/18 0655)  SpO2: (!) 94 % (03/28/18 0655)    Vital Signs (24h Range):  Temp:  [98.6 °F (37 °C)] 98.6 °F (37 °C)  Pulse:  [75-77] 77  Resp:  [16-19] 16  SpO2:  [94 %-96 %] 94 %  BP: (149-172)/(72-73) 149/72     Body mass index is 34.78 kg/m².    Physical Exam   Constitutional: She appears well-developed and well-nourished.   HENT:   Head: Normocephalic and atraumatic.   Right Ear: External ear normal.   Left Ear: External ear normal.   Eyes: Conjunctivae and EOM are normal. Pupils are equal, round, and reactive to light. Right eye exhibits no discharge. Left eye exhibits no discharge.   Neck: Normal range of motion. Neck supple. No thyromegaly present.   Cardiovascular: Normal rate, regular rhythm, normal heart sounds and intact distal pulses.  Exam reveals no gallop.    No murmur heard.  Pulmonary/Chest: Effort normal and breath sounds normal. No respiratory distress. She exhibits no tenderness.   Abdominal: Soft. Bowel sounds are normal. She exhibits no distension. There is tenderness (lower  abdomen). There is no guarding.   Musculoskeletal: She exhibits no tenderness.   Lymphadenopathy:     She has no cervical adenopathy.   Neurological:   -  Mental Status:  AAOx3.  Follows commands.  Answers correct age and .  Recent and remote memory intact.  -  Speech and language:  no aphasia or dysarthria.    -  Vision:  no hemianopsia or ptosis.    -  Facial movement (CN VII): symmetrical.   -  Motor:  RUE: 4/5.  LUE: 4/5.  RLE:  Hip Flex 2/5, Knee Ext/Flex 3-/5,  DF 1/5, PF 4-/5.  LLE:  Hip Flex 2+/5, Knee Ext/Flex 4-/5,  DF 4/5, PF 4/5.  -  Sensory:  Intact to light touch and pin prick.    Skin: Skin is warm and dry. Capillary refill takes less than 2 seconds.   Psychiatric: She has a normal mood and affect. Her behavior is normal. Judgment and thought content normal.   Vitals reviewed.    NEUROLOGICAL EXAMINATION:     CRANIAL NERVES     CN III, IV, VI   Pupils are equal, round, and reactive to light.  Extraocular motions are normal.       Diagnostic Results: Labs: Reviewed  CT: Reviewed  MRI: Reviewed    Assessment/Plan:     Mary Carrillo is a 63 y.o. female being admitted to inpatient rehabilitation on 3/27/2018 for Spinal cord compression with impaired mobility and ADLs.     * Spinal cord compression    -  MRI T-spine revealed vertebral osteomyelitis/discitis T12-L1 with severe compression and effacement of the ventral and dorsal subarachnoid spaces  -  S/p T12-L1 corpectomy with T10-L3 fusion on 3/18/18  -  PT/OT evaluate and treat  -  Pain management  -  Monitor for bowel and bladder dysfunction  -  Monitor for spasticity  -  Monitor for and prevent skin breakdown and pressure ulcers  Early mobility, repositioning/weight shifting every 20-30 minutes when sitting, turn patient every 2 hours, proper mattress/overlay and chair cushioning, pressure relief/heel protector boots  -  DVT prophylaxis:  on [facility]        Acute blood loss as cause of postoperative anemia    Continue to monitor H/H. Previous  transfusions performed        HTN (hypertension), benign    Continue Coreg and Amlodipine        Sarcoidosis    Currently holding Methotrexate due to TB            Jack Squires MD  Department of Physical Medicine & Rehab   [facility]    Post Admission Assessment:    Mary Carrillo is a 63 y.o. female being admitted to inpatient rehabilitation on 3/27/2018 for spinal cord compression with impaired mobility and ADLs.   Patient is appropriate for inpatient rehabilitation and is expected to tolerate 3 hours of therapy a day or 15 hours of therapy a week.  Patient is expected to benefit from the following therapy services: Physical Therapy, Occupational Therapy, as well as Recreational Therapy  Impairments include: Impaired balance, Decreased Endurance, Impaired activity tolerance  Goals: Supervision to Mod I with Mobility, ADLs, Transfers using LRAD   Precautions:  Fall  ELOS: 10-14 days   Disposition: Home with family     I have had the opportunity to examine the patient within 24 hours of admission and have reviewed the Pre-Admission Assessment and find it consistent with my examination and evaluation of the patient. I confirm that this patient is appropriate for admission and treatment in this inpatient rehabilitation hospital, needs intense interdisciplinary rehabilitation care under my direction and is expected to achieve meaningful goals within a reasonable period of time that are consistent with the planned discharge disposition.     The patient will be admitted for inpatient comprehensive interdisciplinary rehabilitation to address the impairments and medical conditions listed above while assessing equipment needs and compensatory strategies, with coordinated interdisciplinary services that will include physical therapy, occupational therapy, and close monitoring and treatment with 24-hour rehabilitative nursing. This interdisciplinary program will be performed under the direction of a  physiatrist.

## 2018-03-29 PROBLEM — M47.15 OSTEOARTHRITIS OF SPINE WITH MYELOPATHY, THORACOLUMBAR REGION: Status: ACTIVE | Noted: 2018-03-17

## 2018-03-29 LAB
ANION GAP SERPL CALC-SCNC: 7 MMOL/L
BASOPHILS # BLD AUTO: 0.06 K/UL
BASOPHILS NFR BLD: 0.5 %
BUN SERPL-MCNC: 12 MG/DL
CALCIUM SERPL-MCNC: 8.6 MG/DL
CHLORIDE SERPL-SCNC: 106 MMOL/L
CO2 SERPL-SCNC: 27 MMOL/L
CREAT SERPL-MCNC: 0.8 MG/DL
DIFFERENTIAL METHOD: ABNORMAL
EOSINOPHIL # BLD AUTO: 0.3 K/UL
EOSINOPHIL NFR BLD: 2.3 %
ERYTHROCYTE [DISTWIDTH] IN BLOOD BY AUTOMATED COUNT: 19 %
EST. GFR  (AFRICAN AMERICAN): >60 ML/MIN/1.73 M^2
EST. GFR  (NON AFRICAN AMERICAN): >60 ML/MIN/1.73 M^2
GLUCOSE SERPL-MCNC: 85 MG/DL
HCT VFR BLD AUTO: 28.2 %
HGB BLD-MCNC: 8.8 G/DL
IMM GRANULOCYTES # BLD AUTO: 0.13 K/UL
IMM GRANULOCYTES NFR BLD AUTO: 1.1 %
LYMPHOCYTES # BLD AUTO: 2.6 K/UL
LYMPHOCYTES NFR BLD: 22.5 %
MAGNESIUM SERPL-MCNC: 1.8 MG/DL
MCH RBC QN AUTO: 28.1 PG
MCHC RBC AUTO-ENTMCNC: 31.2 G/DL
MCV RBC AUTO: 90 FL
MONOCYTES # BLD AUTO: 1.4 K/UL
MONOCYTES NFR BLD: 12.3 %
NEUTROPHILS # BLD AUTO: 7 K/UL
NEUTROPHILS NFR BLD: 61.3 %
NRBC BLD-RTO: 0 /100 WBC
PHOSPHATE SERPL-MCNC: 3.2 MG/DL
PLATELET # BLD AUTO: 353 K/UL
PMV BLD AUTO: 9.4 FL
POTASSIUM SERPL-SCNC: 4.1 MMOL/L
RBC # BLD AUTO: 3.13 M/UL
SODIUM SERPL-SCNC: 140 MMOL/L
WBC # BLD AUTO: 11.35 K/UL

## 2018-03-29 PROCEDURE — 97150 GROUP THERAPEUTIC PROCEDURES: CPT

## 2018-03-29 PROCEDURE — 83735 ASSAY OF MAGNESIUM: CPT

## 2018-03-29 PROCEDURE — 99233 SBSQ HOSP IP/OBS HIGH 50: CPT | Mod: ,,, | Performed by: PHYSICAL MEDICINE & REHABILITATION

## 2018-03-29 PROCEDURE — 25000003 PHARM REV CODE 250: Performed by: HOSPITALIST

## 2018-03-29 PROCEDURE — 97535 SELF CARE MNGMENT TRAINING: CPT

## 2018-03-29 PROCEDURE — 11800000 HC REHAB PRIVATE ROOM

## 2018-03-29 PROCEDURE — 97542 WHEELCHAIR MNGMENT TRAINING: CPT

## 2018-03-29 PROCEDURE — 84100 ASSAY OF PHOSPHORUS: CPT

## 2018-03-29 PROCEDURE — 80048 BASIC METABOLIC PNL TOTAL CA: CPT

## 2018-03-29 PROCEDURE — 97530 THERAPEUTIC ACTIVITIES: CPT

## 2018-03-29 PROCEDURE — 36415 COLL VENOUS BLD VENIPUNCTURE: CPT

## 2018-03-29 PROCEDURE — 97110 THERAPEUTIC EXERCISES: CPT

## 2018-03-29 PROCEDURE — 85025 COMPLETE CBC W/AUTO DIFF WBC: CPT

## 2018-03-29 PROCEDURE — 25000003 PHARM REV CODE 250: Performed by: PHYSICAL MEDICINE & REHABILITATION

## 2018-03-29 PROCEDURE — 63600175 PHARM REV CODE 636 W HCPCS: Performed by: HOSPITALIST

## 2018-03-29 RX ORDER — ISONIAZID 300 MG/1
900 TABLET ORAL
Status: DISCONTINUED | OUTPATIENT
Start: 2018-03-30 | End: 2018-04-18 | Stop reason: HOSPADM

## 2018-03-29 RX ORDER — LISINOPRIL 10 MG/1
10 TABLET ORAL DAILY
Status: DISCONTINUED | OUTPATIENT
Start: 2018-03-29 | End: 2018-04-02

## 2018-03-29 RX ORDER — GABAPENTIN 300 MG/1
300 CAPSULE ORAL 3 TIMES DAILY
Status: DISCONTINUED | OUTPATIENT
Start: 2018-03-29 | End: 2018-04-18 | Stop reason: HOSPADM

## 2018-03-29 RX ORDER — METHOCARBAMOL 750 MG/1
750 TABLET, FILM COATED ORAL 4 TIMES DAILY PRN
Status: DISCONTINUED | OUTPATIENT
Start: 2018-03-29 | End: 2018-04-16

## 2018-03-29 RX ORDER — RIFAMPIN 300 MG/1
600 CAPSULE ORAL
Status: DISCONTINUED | OUTPATIENT
Start: 2018-04-03 | End: 2018-04-18 | Stop reason: HOSPADM

## 2018-03-29 RX ORDER — RIFAMPIN 300 MG/1
600 CAPSULE ORAL
Status: DISCONTINUED | OUTPATIENT
Start: 2018-03-30 | End: 2018-04-18 | Stop reason: HOSPADM

## 2018-03-29 RX ORDER — ISONIAZID 300 MG/1
900 TABLET ORAL
Status: DISCONTINUED | OUTPATIENT
Start: 2018-04-03 | End: 2018-04-18 | Stop reason: HOSPADM

## 2018-03-29 RX ADMIN — STANDARDIZED SENNA CONCENTRATE AND DOCUSATE SODIUM 2 TABLET: 8.6; 5 TABLET, FILM COATED ORAL at 08:03

## 2018-03-29 RX ADMIN — TRAZODONE HYDROCHLORIDE 100 MG: 50 TABLET ORAL at 08:03

## 2018-03-29 RX ADMIN — OXYCODONE AND ACETAMINOPHEN 1 TABLET: 10; 325 TABLET ORAL at 01:03

## 2018-03-29 RX ADMIN — AMLODIPINE BESYLATE 10 MG: 10 TABLET ORAL at 08:03

## 2018-03-29 RX ADMIN — Medication 50 MG: at 08:03

## 2018-03-29 RX ADMIN — RAMELTEON 8 MG: 8 TABLET, FILM COATED ORAL at 10:03

## 2018-03-29 RX ADMIN — HEPARIN SODIUM 5000 UNITS: 5000 INJECTION, SOLUTION INTRAVENOUS; SUBCUTANEOUS at 05:03

## 2018-03-29 RX ADMIN — OXYCODONE AND ACETAMINOPHEN 1 TABLET: 10; 325 TABLET ORAL at 09:03

## 2018-03-29 RX ADMIN — LISINOPRIL 10 MG: 10 TABLET ORAL at 11:03

## 2018-03-29 RX ADMIN — HEPARIN SODIUM 5000 UNITS: 5000 INJECTION, SOLUTION INTRAVENOUS; SUBCUTANEOUS at 03:03

## 2018-03-29 RX ADMIN — OXYCODONE AND ACETAMINOPHEN 1 TABLET: 10; 325 TABLET ORAL at 12:03

## 2018-03-29 RX ADMIN — CARVEDILOL 25 MG: 25 TABLET, FILM COATED ORAL at 08:03

## 2018-03-29 RX ADMIN — HEPARIN SODIUM 5000 UNITS: 5000 INJECTION, SOLUTION INTRAVENOUS; SUBCUTANEOUS at 09:03

## 2018-03-29 RX ADMIN — GABAPENTIN 300 MG: 300 CAPSULE ORAL at 03:03

## 2018-03-29 RX ADMIN — GABAPENTIN 300 MG: 300 CAPSULE ORAL at 08:03

## 2018-03-29 RX ADMIN — OXYCODONE AND ACETAMINOPHEN 1 TABLET: 10; 325 TABLET ORAL at 08:03

## 2018-03-29 NOTE — PLAN OF CARE
Problem: Patient Care Overview  Goal: Plan of Care Review  Ms. Carrillo has complained of pain that is not being relieved by the pain medication that she is receiving.

## 2018-03-29 NOTE — DISCHARGE SUMMARY
[facility] Hospital Medicine  Discharge Summary      Patient Name: Mary Carrillo  MRN: [id number]  Admission Date: 3/17/2018  Hospital Length of Stay: 10 days  Discharge Date and Time: 3/27/2018  6:52 PM  Attending Physician: No att. providers found   Discharging Provider: Rian Edwards MD  Primary Care Provider: Jose Khan MD  [facility] Medicine Team: [facility] HOSP MED A Rian Edwards MD    HPI:   Ms. 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 MRI notable for severe compression of the adjacent cord at the T12-L1 level. Transferred to [facility] for Neurosurgery evaluation.    Patient history dates back to December when she was admitted to [facility] from 12/17-01/18 for fevers, night sweats, back pain and weakness.  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  Since being discharged pt reports being unable to walk, mostly being bed bound or on the sofa due to severe back pain.  She is currently getting antibiotics via the health unit for her TB.  A home health nurse comes to her house to give her TB meds every Tues and Fri.  She reports numbness in her feet as well as tingling which has been ongoing for about 1 month.  She wears a diaper because she cannot get out of bed to use the bathroom.  She states that recently there have been times she doesn't realize that she urinated.  She denies any burning on urination, fevers, chills, night sweats at this time.        AFB cultures (12/27) > M Tb complex sensitive to RIF/ Strep/ INH/ EMB/ Pyrazinamide per quest.     Procedure(s) (LRB):  CORPECTOMY THORACIC T12-L1 corpectomy with T10-L3 fusion,  neuromonitoring, globus (Left)      Hospital Course:   Patient was transferred OM from [facility] on 3/17/18 for NSGY evaluation and treatment of spinal cord compression due to Matta disease of the spine. On 3/18 admitted to NICU s/p T12-L1 corpectomy and T10-L3 fusion. ID consulted and recommended continuing patient on current therapy. Discharged to rehab. Ok for facility to remove staples on 4/1/18.              Consults:   Consults         Status Ordering Provider     Inpatient consult to Infectious Diseases  Once     Provider:  (Not yet assigned)    Completed REMIGIO CORADO     Inpatient consult to Neurosurgery  Once     Provider:  (Not yet assigned)    Completed REMIGIO CORADO     Inpatient consult to Physical Medicine Rehab  Once     Provider:  (Not yet assigned)    Completed GARCIA, BO NAM          No new Assessment & Plan notes have been filed under this hospital service since the last note was generated.  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      Vitals:    03/27/18 1550   BP: 135/61   Pulse: 76   Resp: 18   Temp: 99.1 °F (37.3 °C)     GA: Awake, Alert, Extubated, Oriented, Follows commands comfortable, no  acute distress.   HEENT: No scleral icterus or JVD.   Pulmonary: Clear to auscultation Anterior. No wheezing, crackles, or rhonchi.  Cardiac: RRR S1 & S2 w/o rubs/murmurs/gallops.   Abdominal: Bowel sounds present x 4. No appreciable hepatosplenomegaly.  Skin: No jaundice, rashes, or visible lesions.  Neuro: Motor Strength: RLE 4/5, otherwise good strength upper and lower extremities  Extremities: no cyanosis or edema, or clubbing   Skin: staples down back, clean, dry, and intact.     Significant Diagnostic Studies: Labs:   BMP:   Recent Labs  Lab 03/27/18 0414   GLU 88      K 3.7      CO2 25   BUN 12   CREATININE 0.8   CALCIUM 8.5*   MG 1.6   , CMP   Recent Labs  Lab 03/27/18 0414      K 3.7      CO2 25   GLU 88   BUN 12   CREATININE 0.8   CALCIUM 8.5*   ANIONGAP 7*   ESTGFRAFRICA >60.0   EGFRNONAA >60.0    and CBC   Recent Labs  Lab 03/27/18 0414   WBC 11.15   HGB 8.9*   HCT 27.8*          Pending Diagnostic Studies:     Procedure Component Value Units Date/Time    CBC with Automated Differential [[id number]] Collected:  03/21/18 0306    Order Status:  Sent Lab Status:  In process Updated:  03/21/18 0307    Specimen:  Blood from Blood     Comprehensive Metabolic Panel (CMP) [[id number]] Collected:  03/21/18 0306    Order Status:  Sent Lab Status:  In process Updated:  03/21/18 0307    Specimen:  Blood from Blood     Magnesium [[id number]] Collected:  03/21/18 0306    Order Status:  Sent Lab Status:  In process Updated:  03/21/18 0307    Specimen:  Blood from Blood     Phosphorus [[id number]] Collected:  03/21/18 0306    Order Status:  Sent Lab Status:  In process Updated:  03/21/18 0307    Specimen:  Blood from Blood          Medications:  Reconciled Home Medications:      Medication List      ASK your doctor about these medications    * acetaminophen-codeine 300-60mg 300-60 mg Tab  Commonly known as:  TYLENOL #4     * acetaminophen-codeine 300-60mg 300-60 mg Tab  Commonly known as:   TYLENOL #4  TAKE ONE TABLET BY MOUTH 4 TIMES DAILY AS NEEDED     * albuterol 90 mcg/actuation inhaler  Inhale 2 puffs into the lungs every 4 (four) hours as needed for Wheezing or Shortness of Breath (cough).     * albuterol 2.5 mg /3 mL (0.083 %) nebulizer solution  Commonly known as:  PROVENTIL  Take 3 mLs (2.5 mg total) by nebulization every 4 (four) hours as needed for Wheezing or Shortness of Breath (cough). Rescue     amLODIPine 10 MG tablet  Commonly known as:  NORVASC  Take 1 tablet (10 mg total) by mouth once daily.     carvedilol 25 MG tablet  Commonly known as:  COREG  Take 1 tablet (25 mg total) by mouth 2 (two) times daily with meals.     doxazosin 4 MG tablet  Commonly known as:  CARDURA  Take 1 tablet (4 mg total) by mouth once daily.     folic acid 1 MG tablet  Commonly known as:  FOLVITE  Take 1 tablet (1 mg total) by mouth once daily.     isosorbide dinitrate 20 MG tablet  Commonly known as:  ISORDIL  Take 1 tablet (20 mg total) by mouth 3 (three) times daily.     isosorbide-hydrALAZINE 20-37.5 mg 20-37.5 mg Tab  Commonly known as:  BIDIL  Take 1 tablet by mouth 3 (three) times daily.     oxyCODONE-acetaminophen  mg per tablet  Commonly known as:  PERCOCET  1 max 2 tabs every 4 to 6 hours prn pain.  Causes constipation.     pyridoxine (vitamin B6) 50 MG Tab  Commonly known as:  B-6  Take 1 tablet (50 mg total) by mouth once daily.     traZODone 100 MG tablet  Commonly known as:  DESYREL  TAKE 1 TO 3 TABLETS BY MOUTH NIGHTLY AS NEEDED        * This list has 4 medication(s) that are the same as other medications prescribed for you. Read the directions carefully, and ask your doctor or other care provider to review them with you.                Indwelling Lines/Drains at time of discharge:   Lines/Drains/Airways          No matching active lines, drains, or airways          Time spent on the discharge of patient: 40 minutes  Patient was seen and examined on the date of discharge and determined  to be suitable for discharge.         Rian Edwards MD  Department of Hospital Medicine  [facility]

## 2018-03-29 NOTE — PROGRESS NOTES
"Occupational Therapy  Seated Endurance Group    Mary Carrillo  MRN: [id number]  Room/Bed: E257/E257 A       03/29/18 1420   OT Time Calculation   OT Start Time 1420   OT Stop Time 1505   OT Total Time (min) 45 min   General   OT Date of Treatment 03/29/18   Family/Caregiver Present Yes  (son)   Patient Found (position) Seated in wheelchair   Precautions   General Precautions fall   Required Braces or Orthoses Yes   Spinal Brace TLSO   Visual/Auditory Vision impaired  (glasses)   Subjective   Patient states "I won't twist or bend."    OT Therapeutic Groups   OT Therapeutic Yes   Other Patient participated in 45 minute seated high endurance group. The group activity involved bilateral upper extremities in addition to cardiovascular and functional endurance challenges during group exercises.    Patient completed warm-up and cool-down exercises as well as aerobic wheelchair exercise as follows:     - Alternating lateral and overhead punches, side punches, diagonal reaches with trunk in midline, ABD, ADD, elbow flex/ext in rapid succession in varied sequences.    Pt required minimal rest breaks during therapeutic exercises. These activities were performed in a group setting to encourage participation with peers and social interaction skills.    Occupational Therapy Follow-up   OT Follow-up? Yes   Treatment/Billable Minutes   Therapeutic Group 45   Total Time 45       Deb Kim, OT  3/29/2018     Patient with wheelchair safety belt fastened and able to self release wheelchair safety belt. Pt left seated in w/c with son in pt's room (location) with call light and all necessities in reach, nursing notified.     LEGEND:   CGA: Contact Guard Assist   EOB: Edge of Bed   HHA: Hand Held Assist   HOB: Head of Bed   (I): Independent-patient performs task in a timely manner   Max (A): Maximal Assist-patient performs 25-49% of task   Min (A): Minimal Assist- patient performs 75% or more of task   Mod (A): Moderate Assist- patient " performs 50-74% of task   NA: Not applicable   NT: Not tested   OOB: Out of Bed   PTA: Prior to admit   QC: Quad Cane   RW: Rolling Walker   (S): Supervision- patient requires cues, coaxing, prompting   SBA: Stand By Assist   SC: Straight Cane   SW: Standard Walker   TBA: To be assessed   Total (A): Total Assist- patient performs less than 25% of task   WC: Wheelchair   WFL: Within Functional Limits   WNL: Within Normal Limits

## 2018-03-29 NOTE — PROGRESS NOTES
Physical Therapy  FIM scores    Mary Carrillo   MRN: [id number]              03/29/18 0945   Transfers   Bed/Chair/WC 2   Tub 1   Locomotion   Distance Wheelchair 2   Wheelchair 2   Mode C             Huey Caicedo, PT 3/29/2018

## 2018-03-29 NOTE — PLAN OF CARE
Problem: Patient Care Overview  Goal: Plan of Care Review  Outcome: Ongoing (interventions implemented as appropriate)  Patient has complained of pain that has be addressed with pain medication with mild results. Patient  with no falls or injuries noted.Plan of care explained to patient and patient verbally acknowledged understanding. Patient is resting with call light in reach, bed in low position and bed alarm set. Will continue to monitor patient.

## 2018-03-29 NOTE — PROGRESS NOTES
Spoke with patient and son after team conference and discussed DC date 4/16. Pt is in agreement although is questioning if she will be ready. Encouraged patient to keep working toward goal. Spoke about family training and she states her  and daughter will be able to come. Will set a date closer to discharge. CM will continue to follow.

## 2018-03-29 NOTE — NURSING
Ms. Carrillo complained of pain that was not being helped by the pain medication that she has been taking.  Dr. Squires added gabapentin and methacarbamol.

## 2018-03-29 NOTE — PROGRESS NOTES
"Physical Therapy   Treatment    Mary Carrillo   MRN: [id number]                03/29/18 0945   PT Time Calculation   PT Start Time 0945   PT Stop Time 1115   PT Total Time (min) 90 min   Treatment   Treatment Type Treatment   PT/PTA PT   General   PT Received On 03/29/18   Family/Caregiver Present Yes  (Pt's son, Bebo, arrived during session)   Patient Found (position) Seated in wheelchair   Pt found with TLSO   Precautions   General Precautions fall   Orthopedic No   Required Braces or Orthoses Yes   Spinal Brace TLSO   Visual/Auditory Vision impaired  (glasses)   Subjective   Patient states "I'm okay, it's just my hip"   Pain/Comfort   Pain Rating 1 7/10   Location - Side 1 Right   Location - Orientation 1 generalized   Location 1 hip   Pain Addressed 1 Reposition;Distraction;Pre-medicate for activity   Pain Rating Post-Intervention 1 6/10   Bed Mobility   Bed Mobility yes   Rolling/Turning to Left Supervision  (VCs)   Rolling/Turning Right Supervision  (VCs)   Supine to Sit Moderate Assistance  (assist with trunk elevation)   Supine to Sit Comments rising from R sidelying position    Sit to Supine Moderate Assistance  (assist with lifting B LEs)   Transfers   Transfer yes   Sit to Stand   Sit <> Stand Assistance Maximum Assistance  (assist with lifting and LE control)   Sit <> Stand Assistive Device Other (see comments)  (parallel bars)   Trials/Comments 3 trials in parallel bars   Stand to Sit   Assistance Maximum Assistance  (assist with lowering)   Assistive Device Other (see comments)  (parallel bars)   Trials/Comments 3 trials in parallel bars   Chair to Mat   Chair<> Mat Technique Squat Pivot   Chair<>Mat Assistance Maximum Assistance   Chair <> Mat Assistive Device No Assistive Device   Trials/Comments 1 trial with VCs for sequencing and hand placement    Mat to Chair   Technique Squat Pivot   Assistance Maximum Assistance   Assistive Device No Assistive Device   Trials/Comments 1 trial with VCs for " sequencing and hand placement    Tub Bench Transfer   Technique Squat Pivot   Assistance Maximum Assistance;Total Assistance   Assistive Device No Assistive Device   Trials/Comments 1 trial with VCs for technique and hand placement    Wheelchair Activities   Propulsion Yes   Propulsion Type 1 Manual   Level 1 Level tile   Method 1 Right upper extremity;Left upper extremity   Level of Assistance 1 Stand by assistsance   Description/ Details 1 pt propels w/c x 100', takes one short rest break, then propels an additional 110'    Gait   Gait No   Stairs   Stairs No   Supine   Supine-Exercises Lower extremity   Supine-Exercise Comments Pt performs B LE therex including:  2 x10 - active glute sets and hip add with ball squeezes; 2x10 active assisted hip abd and heel slides (with sliding board); 2x20 ankle pumps    Seated   Seated-Exercises Lower extremity   Seated-Exercise Comments Pt performs 2x10 active assisted LAQs (edge of mat) and hip flex (seated in w/c)   Activity Tolerance   Activity Tolerance Patient tolerated treatment well;Patient limited by pain   Other Comments   Comments Pt performs standing in parallel bars x 3 trials: 1 = 1:36s; 2= 51s; 3 = 1:03s; Max A to stand with VC/TCs for technique and hand placement; Mod A to Max A while standing to maintain erect posture    After Treatment   Patient Position After Treatment Seated in wheelchair;Other (comment)  (seat belt in place)   Patient after treatment left call button in reach  (Pt's son in room with her)   Assessment   Prognosis Fair   Problem List Decreased strength;Decreased range of motion;Decreased endurance;Impaired balance;Decreased mobility;Obesity;Pain;Impaired sensation   Assessment Pt agreeable to participating in PT session.  Pt's functional mobility and endurance continues to be limited 2/2 pain; however pt cooperates well with all activities. Pt demonstrates slight increase in standing tolerance during her initial trial; however requires VC/TCs  for initiating and maintaining trunk and hip extension for erect posture.  Pt expected to progress fairly with POC.    Level of Motivation/Participation good   Barriers to Discharge Decreased caregiver support   Barriers to Discharge Comments Pt's family is available to assist upon discharge but pt will need physical assistance   Discharge Recommendations   Equipment Needed After Discharge 3-in-1 commode;bath bench;wheelchair   Discharge Facility/Level Of Care Needs home health PT   Plan   Planned Therapy Intervention Continue with current plan   Therapy Frequency 2 times/day;Monday-Friday;Saturday or Sunday   Physical Therapy Follow-up   PT Follow-up? Yes   PT - Next Visit Date 03/30/18   Treatment/Billable Minutes   Therapeutic Activity 48   Therapeutic Exercise 32   Train/Wheelchair Management 10   Total Time 90         Radha Colin, PT, DPT/Huey Caicedo PT 3/29/2018

## 2018-03-29 NOTE — PROGRESS NOTES
"Occupational Therapy   PM Treatment    Mary Carrillo   MRN: [id number]      03/29/18 1300   OT Time Calculation   OT Start Time 1300   OT Stop Time 1345   OT Total Time (min) 45 min   General   OT Date of Treatment 03/29/18   Family/Caregiver Present Yes  (son-Bebo)   Precautions   General Precautions fall   Spinal Brace TLSO   Visual/Auditory Vision impaired  (glasses)   Subjective   Patient states "I really like Buddhist"   Pain/Comfort   Pain Rating 7/10   Location - Side Right   Location hip   Transfers   Transfer yes   Chair to Mat   Chair <>Mat Technique Scoot Pivot   Chair <> Mat Assistance Minimum Assistance   Chair<> Mat Assistive Device No Assistive Device   Trials/Comments assist to stabilize knees during scooting   Mat to Chair   Technique Scoot Pivot   Assistance Minimum Assistance   Assistive Device No Assistive Device   Trials/Comments assist to stabilize knees during scooting onto level surface (mat)   Toilet Transfer   Toilet Transfer Assistance Minimum Assistance   Toilet Transfer Assistive Device drop arm commode   Trials/Comments assist to stabilize knees while pt scooting from level surface (mat)   UE Dressing   UE Dressing Comments set up TLSO brace   LE Dressing   Sock Level of Assistance Moderate assistance  (Assist for R sock with sock aid)   LE Dressing Where Assessed (seated at EOM)   Additional Activities:    - Lateral scooting along mat in prep for transfers along mat   Exercise Tools   Exercise Tools Yes   Adrienne 10lb x 20 reps x 5 sets   Discharge Recommendations   Equipment Needed After Discharge 3-in-1 commode;bath bench;wheelchair   Discharge Facility/Level Of Care Needs home health OT   Occupational Therapy Follow-up   OT Follow-up? Yes   Treatment/Billable Minutes   Self Care/Home Management 15   Therapeutic Activity 15   Therapeutic Exercise 15   Total Time 45     DK Sullivan   3/29/2018        "

## 2018-03-30 PROCEDURE — 97535 SELF CARE MNGMENT TRAINING: CPT

## 2018-03-30 PROCEDURE — 63600175 PHARM REV CODE 636 W HCPCS: Performed by: HOSPITALIST

## 2018-03-30 PROCEDURE — 97110 THERAPEUTIC EXERCISES: CPT

## 2018-03-30 PROCEDURE — 99233 SBSQ HOSP IP/OBS HIGH 50: CPT | Mod: ,,, | Performed by: PHYSICAL MEDICINE & REHABILITATION

## 2018-03-30 PROCEDURE — 97530 THERAPEUTIC ACTIVITIES: CPT

## 2018-03-30 PROCEDURE — 97150 GROUP THERAPEUTIC PROCEDURES: CPT

## 2018-03-30 PROCEDURE — 97542 WHEELCHAIR MNGMENT TRAINING: CPT

## 2018-03-30 PROCEDURE — 25000003 PHARM REV CODE 250: Performed by: HOSPITALIST

## 2018-03-30 PROCEDURE — 25000003 PHARM REV CODE 250: Performed by: PHYSICAL MEDICINE & REHABILITATION

## 2018-03-30 PROCEDURE — 11800000 HC REHAB PRIVATE ROOM

## 2018-03-30 RX ADMIN — GABAPENTIN 300 MG: 300 CAPSULE ORAL at 09:03

## 2018-03-30 RX ADMIN — POLYETHYLENE GLYCOL 3350 17 G: 17 POWDER, FOR SOLUTION ORAL at 08:03

## 2018-03-30 RX ADMIN — Medication 50 MG: at 08:03

## 2018-03-30 RX ADMIN — OXYCODONE AND ACETAMINOPHEN 1 TABLET: 10; 325 TABLET ORAL at 07:03

## 2018-03-30 RX ADMIN — GABAPENTIN 300 MG: 300 CAPSULE ORAL at 08:03

## 2018-03-30 RX ADMIN — OXYCODONE AND ACETAMINOPHEN 1 TABLET: 10; 325 TABLET ORAL at 08:03

## 2018-03-30 RX ADMIN — HEPARIN SODIUM 5000 UNITS: 5000 INJECTION, SOLUTION INTRAVENOUS; SUBCUTANEOUS at 05:03

## 2018-03-30 RX ADMIN — CARVEDILOL 25 MG: 25 TABLET, FILM COATED ORAL at 08:03

## 2018-03-30 RX ADMIN — STANDARDIZED SENNA CONCENTRATE AND DOCUSATE SODIUM 2 TABLET: 8.6; 5 TABLET, FILM COATED ORAL at 08:03

## 2018-03-30 RX ADMIN — LISINOPRIL 10 MG: 10 TABLET ORAL at 08:03

## 2018-03-30 RX ADMIN — HEPARIN SODIUM 5000 UNITS: 5000 INJECTION, SOLUTION INTRAVENOUS; SUBCUTANEOUS at 02:03

## 2018-03-30 RX ADMIN — GABAPENTIN 300 MG: 300 CAPSULE ORAL at 03:03

## 2018-03-30 RX ADMIN — AMLODIPINE BESYLATE 10 MG: 10 TABLET ORAL at 08:03

## 2018-03-30 RX ADMIN — TRAZODONE HYDROCHLORIDE 100 MG: 50 TABLET ORAL at 09:03

## 2018-03-30 RX ADMIN — ISONIAZID 900 MG: 300 TABLET ORAL at 08:03

## 2018-03-30 RX ADMIN — HEPARIN SODIUM 5000 UNITS: 5000 INJECTION, SOLUTION INTRAVENOUS; SUBCUTANEOUS at 09:03

## 2018-03-30 NOTE — SUBJECTIVE & OBJECTIVE
Interval History 3/29/2018:  Patient is seen for follow-up rehab evaluation and recommendations: Pt with complaint of right hip pain.  I have reviewed the HPI and PMFSH and there are no changes from admission.       Scheduled Medications:    amLODIPine  10 mg Oral Daily    carvedilol  25 mg Oral BID    gabapentin  300 mg Oral TID    heparin (porcine)  5,000 Units Subcutaneous Q8H    [START ON 4/3/2018] isoniazid  900 mg Oral Every Tues    [START ON 3/30/2018] isoniazid  900 mg Oral Every Fri    lisinopril  10 mg Oral Daily    polyethylene glycol  17 g Oral Daily    pyridoxine (vitamin B6)  50 mg Oral Daily    [START ON 4/3/2018] rifAMpin  600 mg Oral Every Tues    [START ON 3/30/2018] rifAMpin  600 mg Oral Every Fri    senna-docusate 8.6-50 mg  2 tablet Oral Daily    traZODone  100 mg Oral QHS       Diagnostic Results: Labs: Reviewed    PRN Medications: acetaminophen, albuterol sulfate, bisacodyl, calcium carbonate, hydrOXYzine pamoate, magnesium hydroxide 400 mg/5 ml, methocarbamol, ondansetron, oxyCODONE-acetaminophen, ramelteon, senna, simethicone    Review of Systems   Constitutional: Positive for activity change. Negative for chills, fatigue and fever.   HENT: Negative for drooling, hearing loss, trouble swallowing and voice change.    Eyes: Negative for pain and visual disturbance.   Respiratory: Negative for cough, shortness of breath and wheezing.    Cardiovascular: Negative for chest pain and palpitations.   Gastrointestinal: Negative for abdominal distention, nausea and vomiting.   Genitourinary: Negative for difficulty urinating and flank pain.   Musculoskeletal: Positive for back pain, gait problem and myalgias. Negative for arthralgias and neck pain.   Skin: Positive for wound. Negative for rash.   Neurological: Positive for weakness. Negative for dizziness, numbness and headaches.   Psychiatric/Behavioral: Negative for agitation and hallucinations. The patient is not nervous/anxious.       Objective:     Vital Signs (Most Recent):  Temp: 98.3 °F (36.8 °C) (18)  Pulse: 84 (18)  Resp: 18 (18)  BP: (!) 144/69 (18)  SpO2: 97 % (18)    Vital Signs (24h Range):  Temp:  [98.3 °F (36.8 °C)-98.9 °F (37.2 °C)] 98.3 °F (36.8 °C)  Pulse:  [71-84] 84  Resp:  [18] 18  SpO2:  [95 %-97 %] 97 %  BP: (133-164)/(61-69) 144/69     Physical Exam   Constitutional: She is oriented to person, place, and time. She appears well-developed and well-nourished. No distress.   Sitting up in medichair   HENT:   Head: Normocephalic and atraumatic.   Right Ear: External ear normal.   Left Ear: External ear normal.   Nose: Nose normal.   Eyes: Right eye exhibits no discharge. Left eye exhibits no discharge. No scleral icterus.   Neck: Normal range of motion.   Cardiovascular: Normal rate, regular rhythm and intact distal pulses.    Pulmonary/Chest: Effort normal. No respiratory distress. She has no wheezes.   Abdominal: Soft. She exhibits no distension. There is no tenderness.   Musculoskeletal: She exhibits no edema or tenderness.        Right ankle: She exhibits decreased range of motion (foot drop).   TLSO brace intact   Neurological: She is alert and oriented to person, place, and time. She exhibits normal muscle tone.   -  Mental Status:  AAOx3.  Follows commands.  Answers correct age and .  Recent and remote memory intact.  -  Speech and language:  no aphasia or dysarthria.    -  Vision:  no hemianopsia or ptosis.    -  Facial movement (CN VII): symmetrical.   -  Motor:  RUE: 4/5.  LUE: 4/5.  RLE:  Hip Flex 2/5, Knee Ext/Flex 3-/5,  DF 1/5, PF 4-/5.  LLE:  Hip Flex 2+/5, Knee Ext/Flex 4-/5,  DF 4/5, PF 4/5.  -  Sensory:  Intact to light touch and pin prick.   Skin: Skin is warm and dry. No rash noted.   Psychiatric: She has a normal mood and affect. Her behavior is normal. Thought content normal.   Vitals reviewed.    NEUROLOGICAL EXAMINATION:     MENTAL STATUS    Oriented to person, place, and time.

## 2018-03-30 NOTE — PLAN OF CARE
Problem: Patient Care Overview  Goal: Plan of Care Review  Outcome: Ongoing (interventions implemented as appropriate)  Frequent rounds to assess pain & safety.    Problem: Fall Risk (Adult)  Goal: Absence of Falls  Patient will demonstrate the desired outcomes by discharge/transition of care.   Outcome: Ongoing (interventions implemented as appropriate)  Patient remains free of fall or injury this shift, safety measures maintained.    Problem: Pain, Acute (Adult)  Goal: Acceptable Pain Control/Comfort Level  Patient will demonstrate the desired outcomes by discharge/transition of care.   Outcome: Ongoing (interventions implemented as appropriate)  Pain is being managed by prn meds

## 2018-03-30 NOTE — PROGRESS NOTES
[facility]  Physical Medicine & Rehab  Progress Note    Patient Name: Mary Carrillo  MRN: [id number]  Patient Class: IP- Rehab   Admission Date: 3/27/2018  Length of Stay: 3 days  Attending Physician: Jack Squires MD  Primary Care Provider: Jose Khan MD    Subjective:     Principal Problem:Osteoarthritis of spine with myelopathy, thoracolumbar region    Interval History 3/30/2018:  Patient is seen for follow-up rehab evaluation and recommendations: Pt without new c/o's. Still with right leg pain. TLSO brace seems to not be fitting correctly. I have reviewed the HPI and PMFSH and there are no changes from admission.       Scheduled Medications:    amLODIPine  10 mg Oral Daily    carvedilol  25 mg Oral BID    gabapentin  300 mg Oral TID    heparin (porcine)  5,000 Units Subcutaneous Q8H    [START ON 4/3/2018] isoniazid  900 mg Oral Every Tues    isoniazid  900 mg Oral Every Fri    lisinopril  10 mg Oral Daily    polyethylene glycol  17 g Oral Daily    pyridoxine (vitamin B6)  50 mg Oral Daily    [START ON 4/3/2018] rifAMpin  600 mg Oral Every Tues    rifAMpin  600 mg Oral Every Fri    senna-docusate 8.6-50 mg  2 tablet Oral Daily    traZODone  100 mg Oral QHS       PRN Medications: acetaminophen, albuterol sulfate, bisacodyl, calcium carbonate, hydrOXYzine pamoate, magnesium hydroxide 400 mg/5 ml, methocarbamol, ondansetron, oxyCODONE-acetaminophen, ramelteon, senna, simethicone    Review of Systems   Constitutional: Positive for activity change. Negative for chills, fatigue and fever.   HENT: Negative for drooling, hearing loss, trouble swallowing and voice change.    Eyes: Negative for pain and visual disturbance.   Respiratory: Negative for cough, shortness of breath and wheezing.    Cardiovascular: Negative for chest pain and palpitations.   Gastrointestinal: Negative for abdominal distention, nausea and vomiting.   Genitourinary: Negative for difficulty urinating and flank  pain.   Musculoskeletal: Positive for back pain, gait problem and myalgias. Negative for arthralgias and neck pain.   Skin: Positive for wound. Negative for rash.   Neurological: Positive for weakness. Negative for dizziness, numbness and headaches.   Psychiatric/Behavioral: Negative for agitation and hallucinations. The patient is not nervous/anxious.      Objective:     Vital Signs (Most Recent):  Temp: 98.3 °F (36.8 °C) (18)  Pulse: 84 (18)  Resp: 18 (18)  BP: (!) 144/69 (18)  SpO2: 97 % (18)    Vital Signs (24h Range):  Temp:  [98.3 °F (36.8 °C)] 98.3 °F (36.8 °C)  Pulse:  [84] 84  Resp:  [18] 18  SpO2:  [97 %] 97 %  BP: (144)/(69) 144/69     Physical Exam   Constitutional: She is oriented to person, place, and time. She appears well-developed and well-nourished. No distress.   Sitting up in medichair   HENT:   Head: Normocephalic and atraumatic.   Right Ear: External ear normal.   Left Ear: External ear normal.   Nose: Nose normal.   Eyes: Right eye exhibits no discharge. Left eye exhibits no discharge. No scleral icterus.   Neck: Normal range of motion.   Cardiovascular: Normal rate, regular rhythm and intact distal pulses.    Pulmonary/Chest: Effort normal. No respiratory distress. She has no wheezes.   Abdominal: Soft. She exhibits no distension. There is no tenderness.   Musculoskeletal: She exhibits no edema or tenderness.        Right ankle: She exhibits decreased range of motion (foot drop).   TLSO brace intact   Neurological: She is alert and oriented to person, place, and time. She exhibits normal muscle tone.   -  Mental Status:  AAOx3.  Follows commands.  Answers correct age and .  Recent and remote memory intact.  -  Speech and language:  no aphasia or dysarthria.    -  Vision:  no hemianopsia or ptosis.    -  Facial movement (CN VII): symmetrical.   -  Motor:  RUE: 4/5.  LUE: 4/5.  RLE:  Hip Flex 2/5, Knee Ext/Flex 3-/5,  DF 1/5, PF 4-/5.   LLE:  Hip Flex 2+/5, Knee Ext/Flex 4-/5,  DF 4/5, PF 4/5.  -  Sensory:  Intact to light touch and pin prick.   Skin: Skin is warm and dry. No rash noted.   Psychiatric: She has a normal mood and affect. Her behavior is normal. Thought content normal.   Vitals reviewed.    NEUROLOGICAL EXAMINATION:     MENTAL STATUS   Oriented to person, place, and time.       Assessment/Plan:      Mary Carrillo is a 63 y.o. female admitted to inpatient rehabilitation on 3/27/2018 for Osteoarthritis of spine with myelopathy, thoracolumbar region with impaired mobility and ADLs. Patient remains appropriate for PT, OT, and as required Speech therapy. Patient continues to require 24 hour nursing care as well as daily Physician assessment.    * Osteoarthritis of spine with myelopathy, thoracolumbar region    -  MRI T-spine revealed vertebral osteomyelitis/discitis T12-L1 with severe compression and effacement of the ventral and dorsal subarachnoid spaces  -  S/p T12-L1 corpectomy with T10-L3 fusion on 3/18/18  -  PT/OT evaluate and treat  -  Pain management  -  Monitor for bowel and bladder dysfunction  -  Monitor for spasticity  -  Monitor for and prevent skin breakdown and pressure ulcers  Early mobility, repositioning/weight shifting every 20-30 minutes when sitting, turn patient every 2 hours, proper mattress/overlay and chair cushioning, pressure relief/heel protector boots  -  DVT prophylaxis:  on H        Acute blood loss as cause of postoperative anemia    Continue to monitor H/H. Previous transfusions performed        HTN (hypertension), benign    Continue Coreg and Amlodipine    3/29 - Lisinopril 10mg daily  3/30 - improved        Sarcoidosis    Currently holding Methotrexate due to TB            DISCHARGE PLANNING:  Tentative Discharge Date:     Future Appointments  Date Time Provider Department Center   4/9/2018 10:40 AM Lakeisha Bledsoe PA-C [facility] NEURO [place]   5/7/2018 2:00 PM [facility] XR1 300 LB LIMIT [facility] XRAY [place]    5/7/2018 2:40 PM Andi Swain DO [facility] NEURO [place]       Jack Squires MD  Department of Physical Medicine & Rehab   [facility]

## 2018-03-30 NOTE — PROGRESS NOTES
Occupational Therapy  [facility]    Mary Carrillo  MRN: [id number]  Room/Bed: E257/E257 A       03/30/18 1304   Transfers   Bed/Chair/WC 2   Self Care   Dressing-Lower 4   Communication   Comprehension 5   Mode B   Expression 5   Mode B   Social Cognition   Social Interaction 5   Problem Solving 3   Memory 3       Deb Kim OT  3/30/2018

## 2018-03-30 NOTE — PROGRESS NOTES
Occupational Therapy  Volleyball Group Treatment    Mary Carrillo  MRN: [id number]  Room/Bed: E257/E257 A       03/30/18 1116   OT Time Calculation   OT Start Time 1116   OT Stop Time 1201   OT Total Time (min) 45 min   General   OT Date of Treatment 03/30/18   Family/Caregiver Present No   Patient Found (position) Seated in wheelchair   Pt found with TLSO   Precautions   General Precautions fall   Required Braces or Orthoses Yes   Spinal Brace TLSO   Visual/Auditory Vision impaired   Subjective   Patient states Pt agreeable to group tx session    OT Therapeutic Groups   OT Therapeutic Yes   Other Patient participated in 45 minute volleyball group activity.  The group activity challenged dynamic standing/sitting skills, bilateral upper extremity strengthening, cardiovascular and respiratory capacity, hand -eye coordination, visual scanning and social affect/mood.  The patient performed this activity at w/c level with  standby assist. The patient required  minimal rest breaks during this activity.  Patient performed activity using bilateral upper extremities to volley the ball, lateral arm movement noted throughout the activity.  Endurance  sufficient, no pain complaints voiced are observed , social affect good as patient was observed throughout this activity ie., appropriately engaged in social exchange with peers and staff.   Occupational Therapy Follow-up   OT Follow-up? Yes   Treatment/Billable Minutes   Therapeutic Group 45   Total Time 45       Deb Kim, ELIZABETH  3/30/2018     Patient with wheelchair safety belt fastened and able to self release wheelchair safety belt. Pt left seated in w/c in pt's room (location) with call light and all necessities in reach, nursing notified.     LEGEND:   CGA: Contact Guard Assist   EOB: Edge of Bed   HHA: Hand Held Assist   HOB: Head of Bed   (I): Independent-patient performs task in a timely manner   Max (A): Maximal Assist-patient performs 25-49% of task   Min (A): Minimal  Assist- patient performs 75% or more of task   Mod (A): Moderate Assist- patient performs 50-74% of task   NA: Not applicable   NT: Not tested   OOB: Out of Bed   PTA: Prior to admit   QC: Quad Cane   RW: Rolling Walker   (S): Supervision- patient requires cues, coaxing, prompting   SBA: Stand By Assist   SC: Straight Cane   SW: Standard Walker   TBA: To be assessed   Total (A): Total Assist- patient performs less than 25% of task   WC: Wheelchair   WFL: Within Functional Limits   WNL: Within Normal Limits

## 2018-03-30 NOTE — PROGRESS NOTES
Physical Therapy   Treatment    Mary Carrillo   MRN: [id number]   PTA visit #: 1   03/30/18 1000   PT Time Calculation   PT Start Time 1000   PT Stop Time 1045   PT Total Time (min) 45 min   Treatment   Treatment Type Treatment   PT/PTA PTA   PTA Visit Number 1   General   PT Received On 03/30/18   Family/Caregiver Present No   Patient Found (position) Seated in wheelchair  (seat belt)   Pt found with TLSO   Precautions   General Precautions fall   Orthopedic No   Required Braces or Orthoses Yes   Spinal Brace TLSO   Visual/Auditory Vision impaired   Subjective   Patient states Pt agreeable to tx but with c/o R hip pain   Pain/Comfort   Pain Rating 1 6/10   Location - Side 1 Right   Location 1 hip   Pain Addressed 1 Distraction   Bed Mobility   Bed Mobility yes   Supine to Sit Moderate Assistance   Supine to Sit Comments mat   Sit to Supine Moderate Assistance   Transfers   Transfer yes   Sit to Stand   Sit <> Stand Assistance Moderate Assistance   Sit <> Stand Assistive Device (// bars)   Trials/Comments x 3 trials   Stand to Sit   Assistance Moderate Assistance   Chair to Mat   Chair<> Mat Technique Squat Pivot   Chair<>Mat Assistance Moderate Assistance   Chair <> Mat Assistive Device No Assistive Device   Mat to Chair   Technique Squat Pivot   Assistance Moderate Assistance   Assistive Device No Assistive Device   Wheelchair Activities   Propulsion Yes   Propulsion Type 1 Manual   Level 1 Level tile   Method 1 Right upper extremity;Left upper extremity   Level of Assistance 1 Stand by assistsance   Description/ Details 1 200'   Gait   Gait No   Stairs   Stairs No   Balance   Balance Yes   Static Standing Balance   Static Standing-Balance Support Right upper extremity supported;Left upper extremity supported   Static Standing-Level of Assistance Minimum assistance   Static Standing-Comment/# of Minutes x 3 trial of 1 min each   Supine   Supine-Exercises Lower extremity;Specific exercises   Supine-Exercise Type Glut  sets;Short arc quads;ABD/ADD;Heel slides   Supine-Exercise Comments B LE x 15   Seated   Seated-Exercises Lower extremity;Specific exercises   Seated-Exercise Type Ankle pumps;Hip flexion;Long arc quads;ABduction;ADduction   Seated-Exercise Comments B LE x 15 reps   Activity Tolerance   Activity Tolerance Patient tolerated treatment well   Other Comments   Comments Pt needing mod A for repositioning of TLSO   After Treatment   Patient Position After Treatment Seated in wheelchair  (seat belt, TLSO)   Patient after treatment left call button in reach   Assessment   Prognosis Fair   Problem List Decreased strength;Decreased endurance;Impaired balance;Decreased mobility;Obesity;Pain   Session Assessment progressing toward goals   Assessment Pt damon tx well and req less a for sit to  // bars but still limited by pain R hip.  Cont POC   Level of Motivation/Participation good   Barriers to Discharge Decreased caregiver support   Discharge Recommendations   Equipment Needed After Discharge 3-in-1 commode;bath bench;wheelchair   Discharge Facility/Level Of Care Needs home health PT   Plan   Planned Therapy Intervention Continue with current plan   Therapy Frequency 2 times/day;Monday-Friday;Saturday or Sunday   Physical Therapy Follow-up   PT Follow-up? Yes   Treatment/Billable Minutes   Therapeutic Activity 15   Therapeutic Exercise 20   Train/Wheelchair Management 10   Total Time 45       Ginny Salas, PTA  3/30/2018

## 2018-03-30 NOTE — PROGRESS NOTES
"Occupational Therapy  PM Treatment  Mary Carrillo   MRN: [id number]   Room/Bed: E257/E257 A       03/30/18 1304   OT Time Calculation   OT Start Time 1304   OT Stop Time 1437   OT Total Time (min) 93 min   General   OT Date of Treatment 03/30/18   Family/Caregiver Present No   Patient Found (position) Seated in wheelchair   Pt found with TLSO   Precautions   General Precautions fall   Required Braces or Orthoses Yes   Spinal Brace TLSO   Visual/Auditory Vision impaired   Subjective   Patient states "I can't remember the last time I stood up."    Pain/Comfort   Pain Rating 8/10   Location - Side Right   Location - Orientation lateral   Location hip   Pain Addressed Reposition;Distraction;Nurse notified   Pain Comment JABARI Estrella notified    Bed Mobility   Bed Mobility yes   Supine to Sit Moderate Assistance   Supine to Sit Comments assist for trunk    Sit to Supine Moderate Assistance   Sit to Supine Comments assist for BLEs + cues to log roll   Transfers   Transfer yes   Chair to Mat   Chair <>Mat Technique Scoot Pivot   Chair <> Mat Assistance Maximum Assistance   Chair<> Mat Assistive Device No Assistive Device   Trials/Comments Max (A) from w/c > EOM with B knee stabilization   Mat to Chair   Technique Scoot Pivot   Assistance Maximum Assistance   Assistive Device No Assistive Device   Trials/Comments Max (A) from EOM > w/c with B knee stabilization   LE Dressing   LE Dressing Yes   LE Dressing Adaptive Equipment Reacher;Sock aide   LE Dressing Level of Assistance Minimum assistance   Sock Level of Assistance Minimum assistance  (sockaid; cues for setup + assistance with guiding LLE )   LE Dressing Where Assessed (edge of mat & supine on mat)   LE Dressing Comments Pt able to doff B socks using reacher with use for technique; don with sockaid with cues + visual demonstration & assist to guide into sockaid. Don pants using reacher with cues for technique; pt unable to come to stand to pull over buttocks; attempted " bridging in supine; pt unable to lift bottom off completely; pt performed 3/4 parts.     Therapeutic Exercise - Strength   Strength Yes   Strength Exercise Pt completed 3x15 B prashant flat surface with 2lb weight in shoulder flexion/extension and circumduction to continue increasing upper body strength to increase independence with transfers   Exercise Tools   Exercise Tools Yes   Rickshaw 5lbs x 5 sets of 20 reps to strengthen triceps needed for improved performance with functional transfers   Additional Activities   Additional Activities Other (Comment)   Additional Activities Comments Pt completed BITS ther ax seated EOM with CGA as pt demonstrated LOB while in sitting once; pt completed BITS to increase dynamic sitting balance, endurance, weight-shifting with the following results: bell cancellation task 5:15 minutes, x0 misses, x0 distractors; memory task 60% accuracy, 9 trials; geoboard 43 seconds with good accuracy. Pt performed partial stands while edge of mat X 5 trials using transfer training blocks with mod assist + cues for positioning.  Facilitated therapeutic activity to increase dynamic sitting balance, functional activity tolerance and overall coordination with pt reaching unilaterally to grasp bean bags to toss into target on floor. Pt instructed in using reacher to pick bean bags off floor and place them onto table to prevent twisting and bending. Pt demonstrated understanding. Pt performed x20 reps each of scap protraction/retraction, elevation/depression, circumduction seated EOM to increase BUE AROM for ADL prep and shouldergirdle joint integrity.    Activity Tolerance   Activity Tolerance Patient tolerated treatment well   After Treatment   Patient Position After Treatment Seated in wheelchair   Patient after treatment left call button in reach  (posey belt intact)   Assessment   Prognosis Good   Problem List Decreased Self Care skills;Decreased upper extremity range of motion;Decreased upper  extremity strength;Decreased safe judgment during ADL;Decreased cognition;Decreased endurance;Decreased sensation;Decreased functional mobility;Decreased gross motor control;Decreased IADLs;Decreased trunk control for functional activities   Assessment Pt tolerated tx session well today, although with c/o pain in R hips. Pt progressing with scoot pivot t/fs, but requires VCs for BLE and BUE positioning. Pt progressing with LB dressing with AE, but requires min (A) in supine to don pants over hips 2* to decreased BLE strength. Pt would continue to benefit from skilled OT services to increase (I) with ADLs/IADLs.    Level of Motivation/Participation good   Discharge Recommendations   Equipment Needed After Discharge 3-in-1 commode;bath bench;wheelchair   Discharge Facility/Level Of Care Needs home health OT   Plan   Plan Continue with current plan   Therapy Frequency 2 times/day   Occupational Therapy Follow-up   OT Follow-up? Yes   Treatment/Billable Minutes   Self Care/Home Management 33   Therapeutic Activity 25   Therapeutic Exercise 35   Total Time 93       Deb Kim OT  3/30/2018    Patient with wheelchair safety belt fastened and able to self release wheelchair safety belt. Pt left seated in w/c next to bedside in pt's room (location) with call light and all necessities in reach, nursing notified.     LEGEND:   CGA: Contact Guard Assist   EOB: Edgeof Bed   HHA: Hand Held Assist   HOB: Head of Bed   (I): Independent-patient performs task in a timely manner   Max (A): Maximal Assist-patient performs 25-49% of task   Min (A): Minimal Assist- patient performs 75% or more of task   Mod (A): Moderate Assist- patient performs 50-74% of task   NA: Not applicable   NT: Not tested   OOB: Out of Bed   PTA: Prior to admit   QC: Quad Cane   RW: Rolling Walker   (S): Supervision- patient requires cues, coaxing, prompting   SBA: Stand By Assist   SC: Straight Cane   SW: Standard Walker   TBA: To be assessed   Total  (A): Total Assist- patient performs less than 25% of task   WC: Wheelchair   WFL: Within Functional Limits   WNL: Within Normal Limits

## 2018-03-30 NOTE — PLAN OF CARE
Problem: Skin Integrity Impairment, Risk/Actual (Adult)  Goal: Skin Integrity/Wound Healing  Patient will demonstrate the desired outcomes by discharge/transition of care.  Outcome: Ongoing (interventions implemented as appropriate)  No new skin breakdown noted this, assistance is provided to turn & reposition often.

## 2018-03-30 NOTE — SUBJECTIVE & OBJECTIVE
Interval History 3/30/2018:  Patient is seen for follow-up rehab evaluation and recommendations: Pt without new c/o's. Still with right leg pain. TLSO brace seems to not be fitting correctly. I have reviewed the HPI and PMFSH and there are no changes from admission.       Scheduled Medications:    amLODIPine  10 mg Oral Daily    carvedilol  25 mg Oral BID    gabapentin  300 mg Oral TID    heparin (porcine)  5,000 Units Subcutaneous Q8H    [START ON 4/3/2018] isoniazid  900 mg Oral Every Tues    isoniazid  900 mg Oral Every Fri    lisinopril  10 mg Oral Daily    polyethylene glycol  17 g Oral Daily    pyridoxine (vitamin B6)  50 mg Oral Daily    [START ON 4/3/2018] rifAMpin  600 mg Oral Every Tues    rifAMpin  600 mg Oral Every Fri    senna-docusate 8.6-50 mg  2 tablet Oral Daily    traZODone  100 mg Oral QHS       PRN Medications: acetaminophen, albuterol sulfate, bisacodyl, calcium carbonate, hydrOXYzine pamoate, magnesium hydroxide 400 mg/5 ml, methocarbamol, ondansetron, oxyCODONE-acetaminophen, ramelteon, senna, simethicone    Review of Systems   Constitutional: Positive for activity change. Negative for chills, fatigue and fever.   HENT: Negative for drooling, hearing loss, trouble swallowing and voice change.    Eyes: Negative for pain and visual disturbance.   Respiratory: Negative for cough, shortness of breath and wheezing.    Cardiovascular: Negative for chest pain and palpitations.   Gastrointestinal: Negative for abdominal distention, nausea and vomiting.   Genitourinary: Negative for difficulty urinating and flank pain.   Musculoskeletal: Positive for back pain, gait problem and myalgias. Negative for arthralgias and neck pain.   Skin: Positive for wound. Negative for rash.   Neurological: Positive for weakness. Negative for dizziness, numbness and headaches.   Psychiatric/Behavioral: Negative for agitation and hallucinations. The patient is not nervous/anxious.      Objective:     Vital  Signs (Most Recent):  Temp: 98.3 °F (36.8 °C) (18)  Pulse: 84 (18)  Resp: 18 (18)  BP: (!) 144/69 (18)  SpO2: 97 % (18)    Vital Signs (24h Range):  Temp:  [98.3 °F (36.8 °C)] 98.3 °F (36.8 °C)  Pulse:  [84] 84  Resp:  [18] 18  SpO2:  [97 %] 97 %  BP: (144)/(69) 144/69     Physical Exam   Constitutional: She is oriented to person, place, and time. She appears well-developed and well-nourished. No distress.   Sitting up in medichair   HENT:   Head: Normocephalic and atraumatic.   Right Ear: External ear normal.   Left Ear: External ear normal.   Nose: Nose normal.   Eyes: Right eye exhibits no discharge. Left eye exhibits no discharge. No scleral icterus.   Neck: Normal range of motion.   Cardiovascular: Normal rate, regular rhythm and intact distal pulses.    Pulmonary/Chest: Effort normal. No respiratory distress. She has no wheezes.   Abdominal: Soft. She exhibits no distension. There is no tenderness.   Musculoskeletal: She exhibits no edema or tenderness.        Right ankle: She exhibits decreased range of motion (foot drop).   TLSO brace intact   Neurological: She is alert and oriented to person, place, and time. She exhibits normal muscle tone.   -  Mental Status:  AAOx3.  Follows commands.  Answers correct age and .  Recent and remote memory intact.  -  Speech and language:  no aphasia or dysarthria.    -  Vision:  no hemianopsia or ptosis.    -  Facial movement (CN VII): symmetrical.   -  Motor:  RUE: 4/5.  LUE: 4/5.  RLE:  Hip Flex 2/5, Knee Ext/Flex 3-/5,  DF 1/5, PF 4-/5.  LLE:  Hip Flex 2+/5, Knee Ext/Flex 4-/5,  DF 4/5, PF 4/5.  -  Sensory:  Intact to light touch and pin prick.   Skin: Skin is warm and dry. No rash noted.   Psychiatric: She has a normal mood and affect. Her behavior is normal. Thought content normal.   Vitals reviewed.    NEUROLOGICAL EXAMINATION:     MENTAL STATUS   Oriented to person, place, and time.

## 2018-03-30 NOTE — PROGRESS NOTES
[facility]  Physical Medicine & Rehab  Progress Note    Patient Name: Mary Carrillo  MRN: [id number]  Patient Class: IP- Rehab   Admission Date: 3/27/2018  Length of Stay: 2 days  Attending Physician: Jack Squires MD  Primary Care Provider: Jose Khan MD    Subjective:     Principal Problem:Osteoarthritis of spine with myelopathy, thoracolumbar region    Interval History 3/29/2018:  Patient is seen for follow-up rehab evaluation and recommendations: Pt with complaint of right hip pain.  I have reviewed the HPI and PMFSH and there are no changes from admission.       Scheduled Medications:    amLODIPine  10 mg Oral Daily    carvedilol  25 mg Oral BID    gabapentin  300 mg Oral TID    heparin (porcine)  5,000 Units Subcutaneous Q8H    [START ON 4/3/2018] isoniazid  900 mg Oral Every Tues    [START ON 3/30/2018] isoniazid  900 mg Oral Every Fri    lisinopril  10 mg Oral Daily    polyethylene glycol  17 g Oral Daily    pyridoxine (vitamin B6)  50 mg Oral Daily    [START ON 4/3/2018] rifAMpin  600 mg Oral Every Tues    [START ON 3/30/2018] rifAMpin  600 mg Oral Every Fri    senna-docusate 8.6-50 mg  2 tablet Oral Daily    traZODone  100 mg Oral QHS       Diagnostic Results: Labs: Reviewed    PRN Medications: acetaminophen, albuterol sulfate, bisacodyl, calcium carbonate, hydrOXYzine pamoate, magnesium hydroxide 400 mg/5 ml, methocarbamol, ondansetron, oxyCODONE-acetaminophen, ramelteon, senna, simethicone    Review of Systems   Constitutional: Positive for activity change. Negative for chills, fatigue and fever.   HENT: Negative for drooling, hearing loss, trouble swallowing and voice change.    Eyes: Negative for pain and visual disturbance.   Respiratory: Negative for cough, shortness of breath and wheezing.    Cardiovascular: Negative for chest pain and palpitations.   Gastrointestinal: Negative for abdominal distention, nausea and vomiting.   Genitourinary: Negative for  difficulty urinating and flank pain.   Musculoskeletal: Positive for back pain, gait problem and myalgias. Negative for arthralgias and neck pain.   Skin: Positive for wound. Negative for rash.   Neurological: Positive for weakness. Negative for dizziness, numbness and headaches.   Psychiatric/Behavioral: Negative for agitation and hallucinations. The patient is not nervous/anxious.      Objective:     Vital Signs (Most Recent):  Temp: 98.3 °F (36.8 °C) (18)  Pulse: 84 (18)  Resp: 18 (18)  BP: (!) 144/69 (18)  SpO2: 97 % (18)    Vital Signs (24h Range):  Temp:  [98.3 °F (36.8 °C)-98.9 °F (37.2 °C)] 98.3 °F (36.8 °C)  Pulse:  [71-84] 84  Resp:  [18] 18  SpO2:  [95 %-97 %] 97 %  BP: (133-164)/(61-69) 144/69     Physical Exam   Constitutional: She is oriented to person, place, and time. She appears well-developed and well-nourished. No distress.   Sitting up in medichair   HENT:   Head: Normocephalic and atraumatic.   Right Ear: External ear normal.   Left Ear: External ear normal.   Nose: Nose normal.   Eyes: Right eye exhibits no discharge. Left eye exhibits no discharge. No scleral icterus.   Neck: Normal range of motion.   Cardiovascular: Normal rate, regular rhythm and intact distal pulses.    Pulmonary/Chest: Effort normal. No respiratory distress. She has no wheezes.   Abdominal: Soft. She exhibits no distension. There is no tenderness.   Musculoskeletal: She exhibits no edema or tenderness.        Right ankle: She exhibits decreased range of motion (foot drop).   TLSO brace intact   Neurological: She is alert and oriented to person, place, and time. She exhibits normal muscle tone.   -  Mental Status:  AAOx3.  Follows commands.  Answers correct age and .  Recent and remote memory intact.  -  Speech and language:  no aphasia or dysarthria.    -  Vision:  no hemianopsia or ptosis.    -  Facial movement (CN VII): symmetrical.   -  Motor:  RUE: 4/5.  LUE:  4/5.  RLE:  Hip Flex 2/5, Knee Ext/Flex 3-/5,  DF 1/5, PF 4-/5.  LLE:  Hip Flex 2+/5, Knee Ext/Flex 4-/5,  DF 4/5, PF 4/5.  -  Sensory:  Intact to light touch and pin prick.   Skin: Skin is warm and dry. No rash noted.   Psychiatric: She has a normal mood and affect. Her behavior is normal. Thought content normal.   Vitals reviewed.    NEUROLOGICAL EXAMINATION:     MENTAL STATUS   Oriented to person, place, and time.       Assessment/Plan:      Mary Carrillo is a 63 y.o. female admitted to inpatient rehabilitation on 3/27/2018 for Osteoarthritis of spine with myelopathy, thoracolumbar region with impaired mobility and ADLs. Patient remains appropriate for PT, OT, and as required Speech therapy. Patient continues to require 24 hour nursing care as well as daily Physician assessment.    * Osteoarthritis of spine with myelopathy, thoracolumbar region    -  MRI T-spine revealed vertebral osteomyelitis/discitis T12-L1 with severe compression and effacement of the ventral and dorsal subarachnoid spaces  -  S/p T12-L1 corpectomy with T10-L3 fusion on 3/18/18  -  PT/OT evaluate and treat  -  Pain management  -  Monitor for bowel and bladder dysfunction  -  Monitor for spasticity  -  Monitor for and prevent skin breakdown and pressure ulcers  Early mobility, repositioning/weight shifting every 20-30 minutes when sitting, turn patient every 2 hours, proper mattress/overlay and chair cushioning, pressure relief/heel protector boots  -  DVT prophylaxis:  on H        Acute blood loss as cause of postoperative anemia    Continue to monitor H/H. 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

## 2018-03-31 PROCEDURE — 97112 NEUROMUSCULAR REEDUCATION: CPT

## 2018-03-31 PROCEDURE — 25000003 PHARM REV CODE 250: Performed by: HOSPITALIST

## 2018-03-31 PROCEDURE — 97530 THERAPEUTIC ACTIVITIES: CPT

## 2018-03-31 PROCEDURE — 25000003 PHARM REV CODE 250: Performed by: PHYSICAL MEDICINE & REHABILITATION

## 2018-03-31 PROCEDURE — 97110 THERAPEUTIC EXERCISES: CPT

## 2018-03-31 PROCEDURE — 11800000 HC REHAB PRIVATE ROOM

## 2018-03-31 PROCEDURE — 97535 SELF CARE MNGMENT TRAINING: CPT

## 2018-03-31 PROCEDURE — 63600175 PHARM REV CODE 636 W HCPCS: Performed by: HOSPITALIST

## 2018-03-31 PROCEDURE — 87070 CULTURE OTHR SPECIMN AEROBIC: CPT

## 2018-03-31 RX ORDER — BACITRACIN 500 [USP'U]/G
OINTMENT TOPICAL DAILY
Status: DISCONTINUED | OUTPATIENT
Start: 2018-03-31 | End: 2018-04-03

## 2018-03-31 RX ADMIN — CARVEDILOL 25 MG: 25 TABLET, FILM COATED ORAL at 08:03

## 2018-03-31 RX ADMIN — LISINOPRIL 10 MG: 10 TABLET ORAL at 08:03

## 2018-03-31 RX ADMIN — TRAZODONE HYDROCHLORIDE 100 MG: 50 TABLET ORAL at 09:03

## 2018-03-31 RX ADMIN — AMLODIPINE BESYLATE 10 MG: 10 TABLET ORAL at 08:03

## 2018-03-31 RX ADMIN — HEPARIN SODIUM 5000 UNITS: 5000 INJECTION, SOLUTION INTRAVENOUS; SUBCUTANEOUS at 02:03

## 2018-03-31 RX ADMIN — GABAPENTIN 300 MG: 300 CAPSULE ORAL at 02:03

## 2018-03-31 RX ADMIN — Medication 50 MG: at 08:03

## 2018-03-31 RX ADMIN — HEPARIN SODIUM 5000 UNITS: 5000 INJECTION, SOLUTION INTRAVENOUS; SUBCUTANEOUS at 09:03

## 2018-03-31 RX ADMIN — OXYCODONE AND ACETAMINOPHEN 1 TABLET: 10; 325 TABLET ORAL at 07:03

## 2018-03-31 RX ADMIN — OXYCODONE AND ACETAMINOPHEN 1 TABLET: 10; 325 TABLET ORAL at 08:03

## 2018-03-31 RX ADMIN — GABAPENTIN 300 MG: 300 CAPSULE ORAL at 09:03

## 2018-03-31 RX ADMIN — GABAPENTIN 300 MG: 300 CAPSULE ORAL at 08:03

## 2018-03-31 RX ADMIN — BACITRACIN: 500 OINTMENT TOPICAL at 05:03

## 2018-03-31 RX ADMIN — HEPARIN SODIUM 5000 UNITS: 5000 INJECTION, SOLUTION INTRAVENOUS; SUBCUTANEOUS at 05:03

## 2018-03-31 NOTE — NURSING
Assumed care of patient. Patient resting quietly in bed. No change in assessment noted. Safety measures maintained.

## 2018-03-31 NOTE — PROGRESS NOTES
Physical Therapy                                                     Daily FIM Scores        Mary Carrillo  MRN: [id number]         03/31/18 1200   Transfers   Bed/Chair/WC 2   Locomotion   Distance Walked 0   Distance Wheelchair 3   Walk 0   Wheelchair 5   Mode C   Stairs 0           Keisha MCandida Howard, PTA  3/31/2018

## 2018-03-31 NOTE — PROGRESS NOTES
"Occupational Therapy   Treatment    03/31/18 0700   OT Time Calculation   OT Start Time 0730   OT Stop Time 0901   OT Total Time (min) 91 min   General   OT Date of Treatment 03/31/18   Family/Caregiver Present No   Patient Found (position) (supine in bed - no lines)   Pt found with TLSO   Precautions   General Precautions fall   Orthopedic No   Required Braces or Orthoses Yes   Spinal Brace TLSO   Visual/Auditory Vision impaired  (wears glasses)   Subjective   Patient states ("You must have gotten that right because this feels better right here."  Pt referring to breast plate on TLSO)   Pain/Comfort   Pain Rating 7/10   Location - Side Right   Location - Orientation lateral   Location hip   Pain Addressed Reposition;Distraction;Cessation of Activity;Nurse notified   Pain Comment (Pt received pain medication during last 30 minutes of session)   Vital Signs   Pulse 75   SpO2 (!) 94 %   /68   BP Location Left arm   BP Method Automatic   Patient Position Lying   Bed Mobility   Bed Mobility yes   Rolling/Turning to Left Stand by assistance   Rolling/Turning Left Comments (using bed rail and cues for LE placement to maintain straight back during log roll)   Rolling/Turning Right Minimum assistance   Rolling/Turning Right Comments (using bed rail and cues to lower R LE and bend L LE to facilitate log roll to R - min A with initiation of movement)   Scooting/Bridging Stand by Assistance  (scooting L while seated EOB - pt able to scoot 4x towards w/c before transfer)   Supine to Sit Moderate Assistance   Supine to Sit Comments (assist to lift trunk - able to lower LEs off of bed)   Transfers   Transfer yes   Bed to Chair   Bed <> Chair Technique Scoot Pivot   Bed <> Chair Transfer Assistance Maximum Assistance   Bed <> Chair Assistive Device No Assistive Device   Trials/Comments (pt able to initiate scoot to w/c - needed assist to complete lifting and lowering into w/c)   Feeding   Feeding Level of Assistance " Independent   Feeding Where Assessed Wheelchair  (in dining room)   Feeding Comments (Pt able to eat at safe rate, manage utensils and open containers - pt has dentures but did not use them as they are home - pt able to eat safely without them)   Grooming   Additional Grooming yes   Grooming Level of Assistance Supervision   Hand Washing Level of Assistance Supervision   Face Washing Level of Assistance Supervision   Oral Hygeine Level of Assistance Supervision   Grooming Where Assessed Wheelchair   Grooming Comments (Pt completed all steps of grooming in w/c at sink)   Bathing   Bathing Level of Assistance Moderate assistance   Bathing Where Assessed Bed   Bathing Comments (sponge bath - 7/10 - A needed for B lower legs and buttocks - pt bathed supine in bed to maintain neutral back during bathing task)   UE Dressing   UE Dressing Level of Assistance Minimum assistance   UE Dressing Where Assessed Edge of bed   UE Dressing Comments (A with TLSO - 3/4 for pullover shirt - A needed to pull down to waist in back)   LE Dressing   LE Dressing Yes   LE Dressing Adaptive Equipment Reacher;Sock aide   LE Dressing  Level of Assistance Maximum assistance  (completed 3/8 steps - pt able to doff socks using reacher with set up and insert L LE into pants with reacher)   LE Dressing Level of Assistance Minimum assistance   Sock Level of Assistance (Used reacher to doff and sock aide to ruben - A with initiation of doffing sock to insert reacher into socks - needed assist with donning sock to pull reacher out of sock)   Adult Briefs Level of Assistance Total assistance   LE Dressing Where Assessed Bed level   LE Dressing Comments (Pt used reacher to ruben pants and doff socks - used sock aid to ruben socks - needed A with guidance to insert reacher into socks, sock on sock aide, pulling assist to remove sock aide from sock when donning - needed assist lifting R LE to insert into pant legs and to pull up to waist)   Toileting    Toileting Level of Assistance Moderate assistance   Toileting Where Assessed Bed level   Toileting Comments (Mod A - able to perform perineal care but A needed with brief to ruben/doff)   Therapeutic Exercise - Strength   Strength Yes   Exercise Tools   Exercise Tools Yes   Basiliogurmeet (7.5lbs - 6 sets of 20 reps - increased wt and reps)   Balance   Balance Yes   Dynamic Sitting Balance   Dynamic Sitting-Balance Support (Sitting EOB - R hand on bed rail)   Dynamic Sitting-Balance (SBA - scooting and reaching for items)   Dynamic Sitting-Comments (SBA - donning of TLSO)   Activity Tolerance   Activity Tolerance Patient tolerated treatment well   Medical Staff Made Aware (Nurse notified of 2 areas on spine with drainage)   After Treatment   Patient Position After Treatment Seated in wheelchair   Patient after treatment left (w/c - seated in therapy gym)   Assessment   Prognosis Good   Problem List Decreased Self Care skills;Decreased upper extremity strength;Decreased safe judgment during ADL;Decreased endurance;Decreased sensation;Decreased functional mobility;Decreased gross motor control;Decreased IADLs;Decreased trunk control for functional activities   Assessment (Pt was agreeable to tx session and tolerated it well.  She was noted with some improvement with bed mobility and initiation of transfers by scooting towards her w/c with SBA when seated on EOB.  Pt's pain continues to be a barrier with progress, along with, decreased UE strength and endurance.  She is working towards her goals and will continue to benefit from skilled OT services to meet her ADLs and func mobility goals. )   Level of Motivation/Participation good   Discharge Recommendations   Equipment Needed After Discharge 3-in-1 commode;bedside commode;bath bench   Discharge Facility/Level Of Care Needs home health OT   Plan   Plan Continue with current plan   Therapy Frequency 2 times/day   Plan of Care Expires on 04/19/18   Occupational Therapy  Follow-up   OT Follow-up? Yes   Treatment/Billable Minutes   Self Care/Home Management 60   Therapeutic Activity 15   Therapeutic Exercise 16   Total Time 91     Mary Ann Peck, OT  3/31/2018    Mary Carrillo   MRN: [id number]

## 2018-03-31 NOTE — PROGRESS NOTES
"Physical Therapy   Treatment    Mary Carrillo   MRN: [id number]   PTA visit #: 2     03/31/18 0914   PT Time Calculation   PT Start Time 0914   PT Stop Time 1046   PT Total Time (min) 92 min   Treatment   PT/PTA PTA   PTA Visit Number 2   General   PT Received On 03/31/18   Family/Caregiver Present No   Patient Found (position) Seated in wheelchair  (in gym)   Pt found with TLSO  (seat belt in place)   Precautions   General Precautions fall  (spinal precautions)   Orthopedic Yes   Required Braces or Orthoses No   Spinal Brace TLSO   Visual/Auditory Vision impaired  (wears glasses)   Subjective   Patient states "My side hurts"   Pain/Comfort   Pain Rating 1 7/10   Location - Side 1 Right   Location - Orientation 1 generalized   Location 1 hip   Pain Addressed 1 Pre-medicate for activity;Reposition;Distraction   Pain Rating Post-Intervention 1 (no rating provided)   Bed Mobility   Rolling/Turning Right Moderate assistance  (for trunk B LE's, on mat)   Supine to Sit Moderate Assistance  (for trunk and B LE's)   Supine to Sit Comments mat   Sit to Supine Moderate Assistance  (for trunk and B LE's)   Sit to Stand   Sit <> Stand Assistance Maximum Assistance;Moderate Assistance  (max A on 1st trial; mod on 2nd and 3rd trials from w/c)   Sit <> Stand Assistive Device (parallel bars)   Trials/Comments 3 trials   Stand to Sit   Assistance Moderate Assistance  (for controlled descent)   Assistive Device (parallel)   Trials/Comments 3 trials   Chair to Mat   Chair<> Mat Technique Squat Pivot  (to the R)   Chair<>Mat Assistance Moderate Assistance;Maximum Assistance   Chair <> Mat Assistive Device No Assistive Device   Trials/Comments 1 trial with vc's for hand placement and safety   Mat to Chair   Technique Squat Pivot  (to the L)   Assistance Moderate Assistance   Assistive Device No Assistive Device   Trials/Comments 1 trial with vc's for hand placement and safety   Wheelchair Activities   Propulsion Type 1 Manual   Level 1 " Level tile   Method 1 Right upper extremity;Left upper extremity   Level of Assistance 1 Stand by assistsance;Supervision   Description/ Details 1 220 ft   Static Sitting Balance   Static Sitting-Balance Support Right upper extremity supported;Left upper extremity supported;Feet supported   Static Sitting-Level of Assistance Stand by Assistance   Static Sitting-Comment/# of Minutes while sitting at the EOB   Static Standing Balance   Static Standing-Balance Support Right upper extremity supported;Left upper extremity supported   Static Standing-Level of Assistance Moderate assistance;Minimum assistance   Static Standing-Comment/# of Minutes 3 trials of 1:54 min, 2:30 min and 3:03 min   Supine   Supine-Exercises Lower extremity   Supine-Exercise Type Ankle pumps;Quad sets;Glut sets;Short arc quads;ABD/ADD;Heel slides  (IR/ER of B hips)   Supine-Exercise Comments B JUAN UGARTE's x20 reps   Seated   Seated-Exercises Lower extremity   Seated-Exercise Type Quad sets;Ankle pumps;Glut sets;Hip flexion;Long arc quads   Seated-Exercise Comments B JUAN UGARTE's x15 reps   Activity Tolerance   Activity Tolerance Patient tolerated treatment well   Other Comments   Comments Pt performs tapping/kicking of beach ball with B UE's/SHANEL's respectively while seated in w/c approx 6min   After Treatment   Patient Position After Treatment Seated in wheelchair  (in room)   Patient after treatment left call button in reach   Assessment   Prognosis Fair   Problem List Decreased strength;Decreased range of motion;Decreased endurance;Impaired balance;Decreased mobility;Decreased coordination;Decreased safety awareness;Obesity;Pain   Session Assessment progressing toward goals   Assessment Pt with increased endurance for standing endurance with vc's for postural control and appropriate weight shift.     Level of Motivation/Participation Good   Barriers to Discharge Decreased caregiver support   Discharge Recommendations   Equipment Needed After  Discharge 3-in-1 commode;bedside commode;bath bench   Discharge Facility/Level Of Care Needs home health PT   Plan   Planned Therapy Intervention Continue with current plan   Therapy Frequency 2 times/day;Monday-Friday;Saturday or Sunday   Physical Therapy Follow-up   PT Follow-up? Yes   Treatment/Billable Minutes   Therapeutic Activity 54   Therapeutic Exercise 30   Neuromuscular Re-education 8   Total Time 92         Keisha Howard, PTA  3/31/2018

## 2018-03-31 NOTE — PLAN OF CARE
Problem: Patient Care Overview  Goal: Plan of Care Review  Outcome: Ongoing (interventions implemented as appropriate)  Patient AAox4 with no complaints. Vs stable, instructed patient to use call light for assistance and before getting out of bed. Patient incision had a small about of serosanguinous drainage coming from top and bottom of site. Wound culture sent off. Dressing covered with an island dressing. Patient stated no discomfort to site. Will continue to monitor patient.

## 2018-04-01 PROBLEM — T81.31XA DEHISCENCE OF OPERATIVE WOUND: Status: ACTIVE | Noted: 2018-04-01

## 2018-04-01 LAB
BASOPHILS # BLD AUTO: 0.06 K/UL
BASOPHILS NFR BLD: 0.5 %
DIFFERENTIAL METHOD: ABNORMAL
EOSINOPHIL # BLD AUTO: 0.2 K/UL
EOSINOPHIL NFR BLD: 1.7 %
ERYTHROCYTE [DISTWIDTH] IN BLOOD BY AUTOMATED COUNT: 19.7 %
HCT VFR BLD AUTO: 25.3 %
HGB BLD-MCNC: 8 G/DL
IMM GRANULOCYTES # BLD AUTO: 0.1 K/UL
IMM GRANULOCYTES NFR BLD AUTO: 0.9 %
LYMPHOCYTES # BLD AUTO: 2.3 K/UL
LYMPHOCYTES NFR BLD: 20 %
MCH RBC QN AUTO: 28.7 PG
MCHC RBC AUTO-ENTMCNC: 31.6 G/DL
MCV RBC AUTO: 91 FL
MONOCYTES # BLD AUTO: 1.7 K/UL
MONOCYTES NFR BLD: 14.8 %
NEUTROPHILS # BLD AUTO: 7.2 K/UL
NEUTROPHILS NFR BLD: 62.1 %
NRBC BLD-RTO: 0 /100 WBC
PLATELET # BLD AUTO: 318 K/UL
PMV BLD AUTO: 9.9 FL
RBC # BLD AUTO: 2.79 M/UL
WBC # BLD AUTO: 11.65 K/UL

## 2018-04-01 PROCEDURE — 63600175 PHARM REV CODE 636 W HCPCS: Performed by: HOSPITALIST

## 2018-04-01 PROCEDURE — 85025 COMPLETE CBC W/AUTO DIFF WBC: CPT

## 2018-04-01 PROCEDURE — 11800000 HC REHAB PRIVATE ROOM

## 2018-04-01 PROCEDURE — 99233 SBSQ HOSP IP/OBS HIGH 50: CPT | Mod: ,,, | Performed by: PHYSICAL MEDICINE & REHABILITATION

## 2018-04-01 PROCEDURE — 25000003 PHARM REV CODE 250: Performed by: PHYSICAL MEDICINE & REHABILITATION

## 2018-04-01 PROCEDURE — 25000003 PHARM REV CODE 250: Performed by: HOSPITALIST

## 2018-04-01 RX ADMIN — GABAPENTIN 300 MG: 300 CAPSULE ORAL at 08:04

## 2018-04-01 RX ADMIN — OXYCODONE AND ACETAMINOPHEN 1 TABLET: 10; 325 TABLET ORAL at 08:04

## 2018-04-01 RX ADMIN — HEPARIN SODIUM 5000 UNITS: 5000 INJECTION, SOLUTION INTRAVENOUS; SUBCUTANEOUS at 05:04

## 2018-04-01 RX ADMIN — LISINOPRIL 10 MG: 10 TABLET ORAL at 08:04

## 2018-04-01 RX ADMIN — METHOCARBAMOL 750 MG: 750 TABLET ORAL at 08:04

## 2018-04-01 RX ADMIN — CARVEDILOL 25 MG: 25 TABLET, FILM COATED ORAL at 08:04

## 2018-04-01 RX ADMIN — HEPARIN SODIUM 5000 UNITS: 5000 INJECTION, SOLUTION INTRAVENOUS; SUBCUTANEOUS at 02:04

## 2018-04-01 RX ADMIN — Medication 50 MG: at 08:04

## 2018-04-01 RX ADMIN — AMLODIPINE BESYLATE 10 MG: 10 TABLET ORAL at 08:04

## 2018-04-01 RX ADMIN — HEPARIN SODIUM 5000 UNITS: 5000 INJECTION, SOLUTION INTRAVENOUS; SUBCUTANEOUS at 10:04

## 2018-04-01 RX ADMIN — TRAZODONE HYDROCHLORIDE 100 MG: 50 TABLET ORAL at 08:04

## 2018-04-01 RX ADMIN — BACITRACIN: 500 OINTMENT TOPICAL at 08:04

## 2018-04-01 NOTE — PLAN OF CARE
Problem: Patient Care Overview  Goal: Plan of Care Review  Patient AAox4 with no complaints. Vs stable, instructed patient to use call light for assistance and before getting out of bed. BM today. Patient incision had a small amount of serosanguinous drainage coming from top and bottom of site. Incision covered with an island dressing. Patient stated no discomfort to site. Will continue to monitor patient.

## 2018-04-01 NOTE — PLAN OF CARE
Problem: Patient Care Overview  Goal: Plan of Care Review  Outcome: Ongoing (interventions implemented as appropriate)  POC reviewed with pt who verbalized understanding. Pt AAOX4.  Remains free of falls and injury. VSS and afebrile.  C/o slight pain controlled with PRN medication. Encouraged pt repositioned to help decrease pressure. Pt sleeping throughout the night.  Infection control measures taken, such as hand washing and proper disposal of contaminated materials.Safety rounds maintained according to protocol, environmental consistency maintained. Mary Carrillo agrees to call when assistance is needed per call light which is within reach. No acute events. No distress noted. WCTM.

## 2018-04-01 NOTE — ASSESSMENT & PLAN NOTE
Noted on 3/31. No signs of infection. Culture sent and Bacitracin ointment started. Continue to monitor    4/1 - CBC sent to monitor WBC. Will consult wound care

## 2018-04-01 NOTE — PROGRESS NOTES
[facility]  Physical Medicine & Rehab  Progress Note    Patient Name: Mary Carrillo  MRN: [id number]  Patient Class: IP- Rehab   Admission Date: 3/27/2018  Length of Stay: 5 days  Attending Physician: Jack Squires MD  Primary Care Provider: Jose Khan MD    Subjective:     Principal Problem:Osteoarthritis of spine with myelopathy, thoracolumbar region    Interval History 4/1/2018:  Patient is seen for follow-up rehab evaluation and recommendations: Pt without new c/o's. Denies fever or chills. Back incision showing areas of superficial dehiscence. No signs of infection. No erythema or warmth noted.  I have reviewed the HPI and PMFSH and there are no changes from admission.       Scheduled Medications:    amLODIPine  10 mg Oral Daily    bacitracin   Topical (Top) Daily    carvedilol  25 mg Oral BID    gabapentin  300 mg Oral TID    heparin (porcine)  5,000 Units Subcutaneous Q8H    [START ON 4/3/2018] isoniazid  900 mg Oral Every Tues    isoniazid  900 mg Oral Every Fri    lisinopril  10 mg Oral Daily    polyethylene glycol  17 g Oral Daily    pyridoxine (vitamin B6)  50 mg Oral Daily    [START ON 4/3/2018] rifAMpin  600 mg Oral Every Tues    rifAMpin  600 mg Oral Every Fri    senna-docusate 8.6-50 mg  2 tablet Oral Daily    traZODone  100 mg Oral QHS       PRN Medications: acetaminophen, albuterol sulfate, bisacodyl, calcium carbonate, hydrOXYzine pamoate, magnesium hydroxide 400 mg/5 ml, methocarbamol, ondansetron, oxyCODONE-acetaminophen, ramelteon, senna, simethicone    Review of Systems   Constitutional: Positive for activity change. Negative for chills, fatigue and fever.   HENT: Negative for drooling, hearing loss, trouble swallowing and voice change.    Eyes: Negative for pain and visual disturbance.   Respiratory: Negative for cough, shortness of breath and wheezing.    Cardiovascular: Negative for chest pain and palpitations.   Gastrointestinal: Negative for  abdominal distention, nausea and vomiting.   Genitourinary: Negative for difficulty urinating and flank pain.   Musculoskeletal: Positive for back pain, gait problem and myalgias. Negative for arthralgias and neck pain.   Skin: Positive for wound. Negative for rash.   Neurological: Positive for weakness. Negative for dizziness, numbness and headaches.   Psychiatric/Behavioral: Negative for agitation and hallucinations. The patient is not nervous/anxious.      Objective:     Vital Signs (Most Recent):  Temp: 99.8 °F (37.7 °C) (18 0650)  Pulse: 91 (18 0650)  Resp: 20 (18 0650)  BP: (!) 140/57 (18 0650)  SpO2: 96 % (18)    Vital Signs (24h Range):  Temp:  [98.6 °F (37 °C)-99.8 °F (37.7 °C)] 99.8 °F (37.7 °C)  Pulse:  [78-91] 91  Resp:  [17-20] 20  SpO2:  [96 %] 96 %  BP: (134-140)/(57-64) 140/57     Physical Exam   Constitutional: She is oriented to person, place, and time. She appears well-developed and well-nourished. No distress.   Sitting up in medichair   HENT:   Head: Normocephalic and atraumatic.   Right Ear: External ear normal.   Left Ear: External ear normal.   Nose: Nose normal.   Eyes: Right eye exhibits no discharge. Left eye exhibits no discharge. No scleral icterus.   Neck: Normal range of motion.   Cardiovascular: Normal rate, regular rhythm and intact distal pulses.    Pulmonary/Chest: Effort normal. No respiratory distress. She has no wheezes.   Abdominal: Soft. She exhibits no distension. There is no tenderness.   Musculoskeletal: She exhibits no edema or tenderness.        Right ankle: She exhibits decreased range of motion (foot drop).   TLSO brace intact   Neurological: She is alert and oriented to person, place, and time. She exhibits normal muscle tone.   -  Mental Status:  AAOx3.  Follows commands.  Answers correct age and .  Recent and remote memory intact.  -  Speech and language:  no aphasia or dysarthria.    -  Vision:  no hemianopsia or ptosis.    -   Facial movement (CN VII): symmetrical.   -  Motor:  RUE: 4/5.  LUE: 4/5.  RLE:  Hip Flex 2/5, Knee Ext/Flex 3-/5,  DF 1/5, PF 4-/5.  LLE:  Hip Flex 2+/5, Knee Ext/Flex 4-/5,  DF 4/5, PF 4/5.  -  Sensory:  Intact to light touch and pin prick.   Skin: Skin is warm and dry. No rash noted.   Psychiatric: She has a normal mood and affect. Her behavior is normal. Thought content normal.   Vitals reviewed.    NEUROLOGICAL EXAMINATION:     MENTAL STATUS   Oriented to person, place, and time.       Assessment/Plan:      Mary Carrillo is a 63 y.o. female admitted to inpatient rehabilitation on 3/27/2018 for Osteoarthritis of spine with myelopathy, thoracolumbar region with impaired mobility and ADLs. Patient remains appropriate for PT, OT, and as required Speech therapy. Patient continues to require 24 hour nursing care as well as daily Physician assessment.    * Osteoarthritis of spine with myelopathy, thoracolumbar region    -  MRI T-spine revealed vertebral osteomyelitis/discitis T12-L1 with severe compression and effacement of the ventral and dorsal subarachnoid spaces  -  S/p T12-L1 corpectomy with T10-L3 fusion on 3/18/18  -  PT/OT evaluate and treat  -  Pain management  -  Monitor for bowel and bladder dysfunction  -  Monitor for spasticity  -  Monitor for and prevent skin breakdown and pressure ulcers  Early mobility, repositioning/weight shifting every 20-30 minutes when sitting, turn patient every 2 hours, proper mattress/overlay and chair cushioning, pressure relief/heel protector boots  -  DVT prophylaxis:  on SQH        Dehiscence of operative wound    Noted on 3/31. No signs of infection. Culture sent and Bacitracin ointment started. Continue to monitor    4/1 - CBC sent to monitor WBC. Will consult wound care        Acute blood loss as cause of postoperative anemia    Continue to monitor H/H. Previous transfusions performed        HTN (hypertension), benign    Continue Coreg and Amlodipine    3/29 - Lisinopril  10mg daily  3/30 - improved        Sarcoidosis    Currently holding Methotrexate due to TB            DISCHARGE PLANNING:  Tentative Discharge Date:     Future Appointments  Date Time Provider Department Center   4/9/2018 10:40 AM Lakeisha Bledsoe PA-C [facility] NEURO [place]   5/7/2018 2:00 PM [facility] XR1 300 LB LIMIT [facility] XRAY [place]   5/7/2018 2:40 PM Andi Swain DO [facility] NEURO [place]       Jack Squires MD  Department of Physical Medicine & Rehab   [facility]

## 2018-04-01 NOTE — SUBJECTIVE & OBJECTIVE
Interval History 4/1/2018:  Patient is seen for follow-up rehab evaluation and recommendations: Pt without new c/o's. Denies fever or chills. Back incision showing areas of superficial dehiscence. No signs of infection. No erythema or warmth noted.  I have reviewed the HPI and PMFSH and there are no changes from admission.       Scheduled Medications:    amLODIPine  10 mg Oral Daily    bacitracin   Topical (Top) Daily    carvedilol  25 mg Oral BID    gabapentin  300 mg Oral TID    heparin (porcine)  5,000 Units Subcutaneous Q8H    [START ON 4/3/2018] isoniazid  900 mg Oral Every Tues    isoniazid  900 mg Oral Every Fri    lisinopril  10 mg Oral Daily    polyethylene glycol  17 g Oral Daily    pyridoxine (vitamin B6)  50 mg Oral Daily    [START ON 4/3/2018] rifAMpin  600 mg Oral Every Tues    rifAMpin  600 mg Oral Every Fri    senna-docusate 8.6-50 mg  2 tablet Oral Daily    traZODone  100 mg Oral QHS       PRN Medications: acetaminophen, albuterol sulfate, bisacodyl, calcium carbonate, hydrOXYzine pamoate, magnesium hydroxide 400 mg/5 ml, methocarbamol, ondansetron, oxyCODONE-acetaminophen, ramelteon, senna, simethicone    Review of Systems   Constitutional: Positive for activity change. Negative for chills, fatigue and fever.   HENT: Negative for drooling, hearing loss, trouble swallowing and voice change.    Eyes: Negative for pain and visual disturbance.   Respiratory: Negative for cough, shortness of breath and wheezing.    Cardiovascular: Negative for chest pain and palpitations.   Gastrointestinal: Negative for abdominal distention, nausea and vomiting.   Genitourinary: Negative for difficulty urinating and flank pain.   Musculoskeletal: Positive for back pain, gait problem and myalgias. Negative for arthralgias and neck pain.   Skin: Positive for wound. Negative for rash.   Neurological: Positive for weakness. Negative for dizziness, numbness and headaches.   Psychiatric/Behavioral: Negative for  agitation and hallucinations. The patient is not nervous/anxious.      Objective:     Vital Signs (Most Recent):  Temp: 99.8 °F (37.7 °C) (18 0650)  Pulse: 91 (18 0650)  Resp: 20 (18 0650)  BP: (!) 140/57 (18 0650)  SpO2: 96 % (18 2030)    Vital Signs (24h Range):  Temp:  [98.6 °F (37 °C)-99.8 °F (37.7 °C)] 99.8 °F (37.7 °C)  Pulse:  [78-91] 91  Resp:  [17-20] 20  SpO2:  [96 %] 96 %  BP: (134-140)/(57-64) 140/57     Physical Exam   Constitutional: She is oriented to person, place, and time. She appears well-developed and well-nourished. No distress.   Sitting up in medichair   HENT:   Head: Normocephalic and atraumatic.   Right Ear: External ear normal.   Left Ear: External ear normal.   Nose: Nose normal.   Eyes: Right eye exhibits no discharge. Left eye exhibits no discharge. No scleral icterus.   Neck: Normal range of motion.   Cardiovascular: Normal rate, regular rhythm and intact distal pulses.    Pulmonary/Chest: Effort normal. No respiratory distress. She has no wheezes.   Abdominal: Soft. She exhibits no distension. There is no tenderness.   Musculoskeletal: She exhibits no edema or tenderness.        Right ankle: She exhibits decreased range of motion (foot drop).   TLSO brace intact   Neurological: She is alert and oriented to person, place, and time. She exhibits normal muscle tone.   -  Mental Status:  AAOx3.  Follows commands.  Answers correct age and .  Recent and remote memory intact.  -  Speech and language:  no aphasia or dysarthria.    -  Vision:  no hemianopsia or ptosis.    -  Facial movement (CN VII): symmetrical.   -  Motor:  RUE: 4/5.  LUE: 4/5.  RLE:  Hip Flex 2/5, Knee Ext/Flex 3-/5,  DF 1/5, PF 4-/5.  LLE:  Hip Flex 2+/5, Knee Ext/Flex 4-/5,  DF 4/5, PF 4/5.  -  Sensory:  Intact to light touch and pin prick.   Skin: Skin is warm and dry. No rash noted.   Psychiatric: She has a normal mood and affect. Her behavior is normal. Thought content normal.   Vitals  reviewed.    NEUROLOGICAL EXAMINATION:     MENTAL STATUS   Oriented to person, place, and time.

## 2018-04-02 LAB
ANION GAP SERPL CALC-SCNC: 8 MMOL/L
BACTERIA SPEC AEROBE CULT: NORMAL
BASOPHILS # BLD AUTO: 0.06 K/UL
BASOPHILS NFR BLD: 0.5 %
BUN SERPL-MCNC: 23 MG/DL
CALCIUM SERPL-MCNC: 8.7 MG/DL
CHLORIDE SERPL-SCNC: 110 MMOL/L
CO2 SERPL-SCNC: 25 MMOL/L
CREAT SERPL-MCNC: 0.8 MG/DL
DIFFERENTIAL METHOD: ABNORMAL
EOSINOPHIL # BLD AUTO: 0.3 K/UL
EOSINOPHIL NFR BLD: 2.5 %
ERYTHROCYTE [DISTWIDTH] IN BLOOD BY AUTOMATED COUNT: 19.8 %
EST. GFR  (AFRICAN AMERICAN): >60 ML/MIN/1.73 M^2
EST. GFR  (NON AFRICAN AMERICAN): >60 ML/MIN/1.73 M^2
GLUCOSE SERPL-MCNC: 83 MG/DL
HCT VFR BLD AUTO: 28.4 %
HGB BLD-MCNC: 8.5 G/DL
IMM GRANULOCYTES # BLD AUTO: 0.16 K/UL
IMM GRANULOCYTES NFR BLD AUTO: 1.4 %
LYMPHOCYTES # BLD AUTO: 2.8 K/UL
LYMPHOCYTES NFR BLD: 24.1 %
MAGNESIUM SERPL-MCNC: 1.6 MG/DL
MCH RBC QN AUTO: 27.9 PG
MCHC RBC AUTO-ENTMCNC: 29.9 G/DL
MCV RBC AUTO: 93 FL
MONOCYTES # BLD AUTO: 1.6 K/UL
MONOCYTES NFR BLD: 13.9 %
NEUTROPHILS # BLD AUTO: 6.6 K/UL
NEUTROPHILS NFR BLD: 57.6 %
NRBC BLD-RTO: 0 /100 WBC
PHOSPHATE SERPL-MCNC: 3.3 MG/DL
PLATELET # BLD AUTO: 359 K/UL
PMV BLD AUTO: 9.9 FL
POTASSIUM SERPL-SCNC: 4.1 MMOL/L
RBC # BLD AUTO: 3.05 M/UL
SODIUM SERPL-SCNC: 143 MMOL/L
WBC # BLD AUTO: 11.52 K/UL

## 2018-04-02 PROCEDURE — 36415 COLL VENOUS BLD VENIPUNCTURE: CPT

## 2018-04-02 PROCEDURE — 85025 COMPLETE CBC W/AUTO DIFF WBC: CPT

## 2018-04-02 PROCEDURE — 97530 THERAPEUTIC ACTIVITIES: CPT

## 2018-04-02 PROCEDURE — 80048 BASIC METABOLIC PNL TOTAL CA: CPT

## 2018-04-02 PROCEDURE — 97110 THERAPEUTIC EXERCISES: CPT

## 2018-04-02 PROCEDURE — 97542 WHEELCHAIR MNGMENT TRAINING: CPT

## 2018-04-02 PROCEDURE — 83735 ASSAY OF MAGNESIUM: CPT

## 2018-04-02 PROCEDURE — 97535 SELF CARE MNGMENT TRAINING: CPT

## 2018-04-02 PROCEDURE — 11800000 HC REHAB PRIVATE ROOM

## 2018-04-02 PROCEDURE — 25000003 PHARM REV CODE 250: Performed by: HOSPITALIST

## 2018-04-02 PROCEDURE — 84100 ASSAY OF PHOSPHORUS: CPT

## 2018-04-02 PROCEDURE — 97150 GROUP THERAPEUTIC PROCEDURES: CPT

## 2018-04-02 PROCEDURE — 25000003 PHARM REV CODE 250: Performed by: PHYSICAL MEDICINE & REHABILITATION

## 2018-04-02 PROCEDURE — 99232 SBSQ HOSP IP/OBS MODERATE 35: CPT | Mod: ,,, | Performed by: PHYSICAL MEDICINE & REHABILITATION

## 2018-04-02 PROCEDURE — 63600175 PHARM REV CODE 636 W HCPCS: Performed by: HOSPITALIST

## 2018-04-02 RX ORDER — LISINOPRIL 20 MG/1
20 TABLET ORAL DAILY
Status: DISCONTINUED | OUTPATIENT
Start: 2018-04-03 | End: 2018-04-04

## 2018-04-02 RX ADMIN — HEPARIN SODIUM 5000 UNITS: 5000 INJECTION, SOLUTION INTRAVENOUS; SUBCUTANEOUS at 08:04

## 2018-04-02 RX ADMIN — CARVEDILOL 25 MG: 25 TABLET, FILM COATED ORAL at 08:04

## 2018-04-02 RX ADMIN — TRAZODONE HYDROCHLORIDE 100 MG: 50 TABLET ORAL at 08:04

## 2018-04-02 RX ADMIN — GABAPENTIN 300 MG: 300 CAPSULE ORAL at 08:04

## 2018-04-02 RX ADMIN — GABAPENTIN 300 MG: 300 CAPSULE ORAL at 03:04

## 2018-04-02 RX ADMIN — Medication 50 MG: at 08:04

## 2018-04-02 RX ADMIN — AMLODIPINE BESYLATE 10 MG: 10 TABLET ORAL at 08:04

## 2018-04-02 RX ADMIN — OXYCODONE AND ACETAMINOPHEN 1 TABLET: 10; 325 TABLET ORAL at 08:04

## 2018-04-02 RX ADMIN — OXYCODONE AND ACETAMINOPHEN 1 TABLET: 10; 325 TABLET ORAL at 12:04

## 2018-04-02 RX ADMIN — HEPARIN SODIUM 5000 UNITS: 5000 INJECTION, SOLUTION INTRAVENOUS; SUBCUTANEOUS at 05:04

## 2018-04-02 RX ADMIN — STANDARDIZED SENNA CONCENTRATE AND DOCUSATE SODIUM 2 TABLET: 8.6; 5 TABLET, FILM COATED ORAL at 08:04

## 2018-04-02 RX ADMIN — LISINOPRIL 10 MG: 10 TABLET ORAL at 08:04

## 2018-04-02 RX ADMIN — BACITRACIN: 500 OINTMENT TOPICAL at 08:04

## 2018-04-02 RX ADMIN — HEPARIN SODIUM 5000 UNITS: 5000 INJECTION, SOLUTION INTRAVENOUS; SUBCUTANEOUS at 03:04

## 2018-04-02 NOTE — PROGRESS NOTES
Report received from Kiesha. I have reviewed the documentation and care plan and/or discussed the patient with the LPN and visualized/assessed the patient myself and agree with the documentation and care plan. I assumed care.  Pt is AAOx4. Pt is comfortable resting with eyes closed. Behavior is age appropriate and pleasant.  Airway is patent.  Respirations are full, even, and non labored.   No needs verbalized or noted. Will continue to monitor.

## 2018-04-02 NOTE — ASSESSMENT & PLAN NOTE
Continue Coreg and Amlodipine    3/29 - Lisinopril 10mg daily  3/30 - improved  4/2 - sbp 130s-160. Monitor, consider titrating up meds.

## 2018-04-02 NOTE — ASSESSMENT & PLAN NOTE
Noted on 3/31. No signs of infection. Culture sent and Bacitracin ointment started. Continue to monitor    4/1 - CBC sent to monitor WBC. Will consult wound care  4/2 WBC 11, stable

## 2018-04-02 NOTE — PROGRESS NOTES
"Occupational Therapy  AM Treatment/Reassessment  Mary Carrillo   MRN: [id number]   Room/Bed: E257/E257 A     04/02/18 1016   OT Time Calculation   OT Start Time 1016   OT Stop Time 1103   OT Total Time (min) 47 min   General   OT Date of Treatment 04/02/18   Family/Caregiver Present No   Patient Found (position) Supine in bed   Precautions   General Precautions fall   Orthopedic Yes   Required Braces or Orthoses Yes   Spinal Brace TLSO   Visual/Auditory Vision impaired  (glasses)   Subjective   Patient states "The pain is pretty constant around there." Pt referring to R hip    Pain/Comfort   Pain Rating 4/10   Location - Side Right   Location - Orientation lateral   Location hip   Pain Addressed Reposition;Distraction   Bed Mobility   Bed Mobility yes   Rolling/Turning to Left Stand by assistance   Rolling/Turning Left Comments SBA for safety with precautions; with bed rail   Rolling/Turning Right Stand by assistance   Rolling/Turning Right Comments SBA for safety with precautions; with bed rail   Scooting/Bridging Stand by Assistance   Scooting/Bridging Comments to EOB   Supine to Sit Stand by Assistance   Supine to Sit Comments SBA for safety with trunk control to EOB   Sit to Supine Moderate Assistance   Sit to Supine Comments Mod (A) for BLE over EOB   Transfers   Transfer yes   Bed to Chair   Bed <> Chair Technique Scoot Pivot   Bed <> Chair Transfer Assistance Moderate Assistance   Bed <> Chair Assistive Device No Assistive Device   Trials/Comments Mod (A) from EOB > w/c with support at B knees   Grooming   Additional Grooming yes   Grooming Level of Assistance Supervision   Hand Washing Level of Assistance Supervision   Face Washing Level of Assistance Supervision   Oral Hygeine Level of Assistance Supervision   Grooming Where Assessed Wheelchair   Grooming Comments (S) for 3/3 G/H tasks sitting sinkside in w/c   Bathing   Bathing Level of Assistance Moderate assistance   Bathing Where Assessed Edge of bed "   Bathing Comments Mod (A) for bathing buttocks, BLE below knees seated at EOB   UE Dressing   UE Dressing Level of Assistance Set-up Assistance   UE Dressing Where Assessed Edge of bed   UE Dressing Comments Set-up for TLSO; pt completed 4/4 steps with pullover shirt   LE Dressing   LE Dressing Yes   LE Dressing Adaptive Equipment Reacher;Sock aide;Dressing stick   LE Dressing  Level of Assistance Moderate assistance  (pt completed 5/9 steps)   LE Dressing Level of Assistance Minimum assistance   Sock Level of Assistance Stand by assistance   Adult Briefs Level of Assistance Total assistance   LE Dressing Where Assessed Edge of bed;Bed level   LE Dressing Comments Pt completed donning pants over hips at bed level with slight (A) for donning over R hip   Activity Tolerance   Activity Tolerance Patient tolerated treatment well   After Treatment   Patient Position After Treatment Seated in wheelchair   Patient after treatment left call button in reach  (safety belt intact)   Assessment   Prognosis Good   Problem List Decreased Self Care skills;Decreased upper extremity range of motion;Decreased upper extremity strength;Decreased safe judgment during ADL;Decreased endurance;Visual deficit;Decreased functional mobility;Decreased gross motor control;Decreased IADLs;Decreased trunk control for functional activities   Assessment Pt has met 3/4 STG upon reassessment today and is progressing towards LTG. Pt is making good progress with bed mobility, bed > chr t/fs, and LB dressing. Pt demonstrates R lateral lean with slight LOB when seated at EOB, with CGA for steadying assist at times and VCs for proper posture. Pt continues with decreased BLE strength, therefor completing rolling to L and R side at bed level to don pants over hips. Pt with good effort towards therapy and would continue to benefit from skilled OT services to increase (I) with ADLs/IADLs.    Level of Motivation/Participation good   Short Term Goals    Additional Documentation yes   Time For Goal Achievement 7 days   Pt Will Perform Supine To Sit With moderate assist   Supine to Sit - Met/Not Met Met   Pt Will Perform Sit to Supine With minimal assist   Supine to Sit - Met/Not Met Not Met   Pt Will Transfer Bed/Chair With moderate assist   Transfer Bed/Chair - Met/Not Met Met   Pt Will Perform LE Dressing With maximum assistance   LE Dressing - Met/Not Met Met   Discharge Recommendations   Equipment Needed After Discharge 3-in-1 commode;bedside commode;bath bench   Discharge Facility/Level Of Care Needs home health OT   Plan   Plan Continue with current plan   Therapy Frequency 2 times/day   Occupational Therapy Follow-up   OT Follow-up? Yes   Treatment/Billable Minutes   Self Care/Home Management 47   Total Time 47       Deb Kim OT  4/2/2018     Patient with wheelchair safety belt fastened and able to self release wheelchair safety belt. Pt left seated next to bedside in pt's room (location) with call light and all necessities in reach, nursing notified.     LEGEND:   CGA: Contact Guard Assist   EOB: Edgeof Bed   HHA: Hand Held Assist   HOB: Head of Bed   (I): Independent-patient performs task in a timely manner   Max (A): Maximal Assist-patient performs 25-49% of task   Min (A): Minimal Assist- patient performs 75% or more of task   Mod (A): Moderate Assist- patient performs 50-74% of task   NA: Not applicable   NT: Not tested   OOB: Out of Bed   PTA: Prior to admit   QC: Quad Cane   RW: Rolling Walker   (S): Supervision- patient requires cues, coaxing, prompting   SBA: Stand By Assist   SC: Straight Cane   SW: Standard Walker   TBA: To be assessed   Total (A): Total Assist- patient performs less than 25% of task   WC: Wheelchair   WFL: Within Functional Limits   WNL: Within Normal Limits

## 2018-04-02 NOTE — SUBJECTIVE & OBJECTIVE
Interval History 4/2/2018:  Patient is seen for follow-up rehab evaluation and recommendations: Pt without new c/o's. Had a good weekend. Right hip pain feels better today.  I have reviewed the HPI and PMFSH and there are no changes from admission.       Scheduled Medications:    amLODIPine  10 mg Oral Daily    bacitracin   Topical (Top) Daily    carvedilol  25 mg Oral BID    gabapentin  300 mg Oral TID    heparin (porcine)  5,000 Units Subcutaneous Q8H    [START ON 4/3/2018] isoniazid  900 mg Oral Every Tues    isoniazid  900 mg Oral Every Fri    lisinopril  10 mg Oral Daily    polyethylene glycol  17 g Oral Daily    pyridoxine (vitamin B6)  50 mg Oral Daily    [START ON 4/3/2018] rifAMpin  600 mg Oral Every Tues    rifAMpin  600 mg Oral Every Fri    senna-docusate 8.6-50 mg  2 tablet Oral Daily    traZODone  100 mg Oral QHS       PRN Medications: acetaminophen, albuterol sulfate, bisacodyl, calcium carbonate, hydrOXYzine pamoate, magnesium hydroxide 400 mg/5 ml, methocarbamol, ondansetron, oxyCODONE-acetaminophen, ramelteon, senna, simethicone    Review of Systems   Constitutional: Positive for activity change. Negative for chills, fatigue and fever.   HENT: Negative for drooling, hearing loss, trouble swallowing and voice change.    Eyes: Negative for pain and visual disturbance.   Respiratory: Negative for cough, shortness of breath and wheezing.    Cardiovascular: Negative for chest pain and palpitations.   Gastrointestinal: Negative for abdominal distention, nausea and vomiting.   Genitourinary: Negative for difficulty urinating and flank pain.   Musculoskeletal: Positive for back pain, gait problem and myalgias. Negative for arthralgias and neck pain.   Skin: Positive for wound. Negative for rash.   Neurological: Positive for weakness. Negative for dizziness, numbness and headaches.   Psychiatric/Behavioral: Negative for agitation and hallucinations. The patient is not nervous/anxious.       Objective:     Vital Signs (Most Recent):  Temp: 98.7 °F (37.1 °C) (18)  Pulse: 77 (18)  Resp: 18 (18)  BP: (!) 160/70 (18)  SpO2: (!) 93 % (18)    Vital Signs (24h Range):  Temp:  [98.7 °F (37.1 °C)-99 °F (37.2 °C)] 98.7 °F (37.1 °C)  Pulse:  [77-94] 77  Resp:  [18] 18  SpO2:  [93 %-95 %] 93 %  BP: (137-160)/(70-73) 160/70     Physical Exam   Constitutional: She is oriented to person, place, and time. She appears well-developed and well-nourished. No distress.   Sitting up in medichair   HENT:   Head: Normocephalic and atraumatic.   Right Ear: External ear normal.   Left Ear: External ear normal.   Nose: Nose normal.   Eyes: Right eye exhibits no discharge. Left eye exhibits no discharge. No scleral icterus.   Neck: Normal range of motion.   Cardiovascular: Normal rate, regular rhythm and intact distal pulses.    Pulmonary/Chest: Effort normal. No respiratory distress. She has no wheezes.   Abdominal: Soft. She exhibits no distension. There is no tenderness.   Musculoskeletal: She exhibits no edema or tenderness.        Right ankle: She exhibits decreased range of motion (foot drop).   TLSO brace intact   Neurological: She is alert and oriented to person, place, and time. She exhibits normal muscle tone.   -  Mental Status:  AAOx3.  Follows commands.  Answers correct age and .  Recent and remote memory intact.  -  Speech and language:  no aphasia or dysarthria.    -  Vision:  no hemianopsia or ptosis.    -  Facial movement (CN VII): symmetrical.   -  Motor:  RUE: 4/5.  LUE: 4/5.  RLE:  Hip Flex 2/5, Knee Ext/Flex 3-/5,  DF 1/5, PF 4-/5.  LLE:  Hip Flex 2+/5, Knee Ext/Flex 4-/5,  DF 4/5, PF 4/5.  -  Sensory:  Intact to light touch and pin prick.   Skin: Skin is warm and dry. No rash noted.   Psychiatric: She has a normal mood and affect. Her behavior is normal. Thought content normal.   Vitals reviewed.    NEUROLOGICAL EXAMINATION:     MENTAL STATUS    Oriented to person, place, and time.

## 2018-04-02 NOTE — PROGRESS NOTES
"Physical Therapy   Treatment    Mary Carrillo   MRN: [id number]              04/02/18 1358   PT Time Calculation   PT Start Time 1358   PT Stop Time 1447   PT Total Time (min) 49 min   Treatment   Treatment Type Treatment   PT/PTA PT   General   PT Received On 04/02/18   Family/Caregiver Present No   Patient Found (position) Seated in wheelchair   Pt found with TLSO   Precautions   General Precautions fall   Orthopedic Yes   Required Braces or Orthoses Yes   Spinal Brace TLSO   Visual/Auditory Vision impaired  (glasses)   Subjective   Patient states "I'm okay, it's just my side hurting"   Pain/Comfort   Pain Rating 1 6/10   Location - Side 1 Right   Location - Orientation 1 generalized   Location 1 hip   Pain Addressed 1 Pre-medicate for activity;Distraction;Reposition   Pain Rating Post-Intervention 1 5/10   Bed Mobility   Bed Mobility yes   Rolling/Turning to Left Stand by assistance  (VCs for sequencing and leg placement)   Rolling/Turning Right Stand by assistance  (VCs for sequencing and leg placement)   Supine to Sit Moderate Assistance  (for trunk elevation)   Supine to Sit Comments rising from R sidelying   Sit to Supine Moderate Assistance  (assist with R LE and trunk descent)   Transfers   Transfer yes   Sit to Stand   Sit <> Stand Assistance Maximum Assistance   Sit <> Stand Assistive Device Other (see comments)  (parallel bars)   Trials/Comments 2 trials   Stand to Sit   Assistance Maximum Assistance  (assist with controlling descent)   Assistive Device Other (see comments)  (parallel bars)   Trials/Comments 2 trials   Chair to Mat   Chair<> Mat Technique Scoot Pivot   Chair<>Mat Assistance Moderate Assistance  (Mod A with initial scooting; Min A with scooting once on mat)   Chair <> Mat Assistive Device No Assistive Device   Trials/Comments 1 trial with VCs for technique   Mat to Chair   Trials/Comments scoot pivot mat to BSC with Mod A; squat pivot BSC to w/c with Mod A   Wheelchair Activities "   Propulsion Yes   Propulsion Type 1 Manual   Level 1 Level tile   Method 1 Right upper extremity;Left upper extremity   Level of Assistance 1 Stand by assistsance   Description/ Details 1 200 ft   Gait   Gait No   Stairs   Stairs No   Balance   Balance Yes   Static Standing Balance   Static Standing-Balance Support Right upper extremity supported;Left upper extremity supported   Static Standing-Level of Assistance Moderate assistance   Static Standing-Comment/# of Minutes 2 trials x 1:55 and 0:58   Supine   Supine-Exercises Lower extremity   Supine-Exercise Comments 2x20 - B ankle pumps; B AAROM - 2x10 hip abd; 2x10 with 3s hold glute sets; 1x10 with 3s hold B quad sets   Seated   Seated-Exercises No   Activity Tolerance   Activity Tolerance Patient tolerated treatment well;Patient limited by fatigue   After Treatment   Patient Position After Treatment Seated in wheelchair  (seatbelt on)   Patient after treatment left (Pt seated in gym for group PT session)   Assessment   Prognosis Fair   Problem List Decreased strength;Decreased range of motion;Decreased endurance;Impaired balance;Decreased mobility;Obesity;Pain;Impaired sensation   Assessment Pt demonstrated improved efficiency with mat to chair transfer using scooting technique.  Pt continues to demonstrate decreased muscle endurance limiting static standing tolerance and ability to maintain quad contractions during supine therex.       Level of Motivation/Participation good   Barriers to Discharge Decreased caregiver support   Plan   Planned Therapy Intervention Continue with current plan   Therapy Frequency 2 times/day;Monday-Friday;Saturday or Sunday   Physical Therapy Follow-up   PT Follow-up? Yes   PT - Next Visit Date 04/02/18   Treatment/Billable Minutes   Therapeutic Activity 22   Therapeutic Exercise 17   Train/Wheelchair Management 10   Total Time 49       Radha Colin, PT, DPT

## 2018-04-02 NOTE — PROGRESS NOTES
[facility]  Physical Medicine & Rehab  Progress Note    Patient Name: Mary Carrillo  MRN: [id number]  Patient Class: IP- Rehab   Admission Date: 3/27/2018  Length of Stay: 6 days  Attending Physician: Jack Squires MD  Primary Care Provider: Jose Khan MD    Subjective:     Principal Problem:Osteoarthritis of spine with myelopathy, thoracolumbar region    Interval History 4/2/2018:  Patient is seen for follow-up rehab evaluation and recommendations: Pt without new c/o's. Had a good weekend. Right hip pain feels better today.  I have reviewed the HPI and PMFSH and there are no changes from admission.       Scheduled Medications:    amLODIPine  10 mg Oral Daily    bacitracin   Topical (Top) Daily    carvedilol  25 mg Oral BID    gabapentin  300 mg Oral TID    heparin (porcine)  5,000 Units Subcutaneous Q8H    [START ON 4/3/2018] isoniazid  900 mg Oral Every Tues    isoniazid  900 mg Oral Every Fri    lisinopril  10 mg Oral Daily    polyethylene glycol  17 g Oral Daily    pyridoxine (vitamin B6)  50 mg Oral Daily    [START ON 4/3/2018] rifAMpin  600 mg Oral Every Tues    rifAMpin  600 mg Oral Every Fri    senna-docusate 8.6-50 mg  2 tablet Oral Daily    traZODone  100 mg Oral QHS       PRN Medications: acetaminophen, albuterol sulfate, bisacodyl, calcium carbonate, hydrOXYzine pamoate, magnesium hydroxide 400 mg/5 ml, methocarbamol, ondansetron, oxyCODONE-acetaminophen, ramelteon, senna, simethicone    Review of Systems   Constitutional: Positive for activity change. Negative for chills, fatigue and fever.   HENT: Negative for drooling, hearing loss, trouble swallowing and voice change.    Eyes: Negative for pain and visual disturbance.   Respiratory: Negative for cough, shortness of breath and wheezing.    Cardiovascular: Negative for chest pain and palpitations.   Gastrointestinal: Negative for abdominal distention, nausea and vomiting.   Genitourinary: Negative for difficulty  urinating and flank pain.   Musculoskeletal: Positive for back pain, gait problem and myalgias. Negative for arthralgias and neck pain.   Skin: Positive for wound. Negative for rash.   Neurological: Positive for weakness. Negative for dizziness, numbness and headaches.   Psychiatric/Behavioral: Negative for agitation and hallucinations. The patient is not nervous/anxious.      Objective:     Vital Signs (Most Recent):  Temp: 98.7 °F (37.1 °C) (18)  Pulse: 77 (18)  Resp: 18 (18)  BP: (!) 160/70 (18)  SpO2: (!) 93 % (18)    Vital Signs (24h Range):  Temp:  [98.7 °F (37.1 °C)-99 °F (37.2 °C)] 98.7 °F (37.1 °C)  Pulse:  [77-94] 77  Resp:  [18] 18  SpO2:  [93 %-95 %] 93 %  BP: (137-160)/(70-73) 160/70     Physical Exam   Constitutional: She is oriented to person, place, and time. She appears well-developed and well-nourished. No distress.   Sitting up in medichair   HENT:   Head: Normocephalic and atraumatic.   Right Ear: External ear normal.   Left Ear: External ear normal.   Nose: Nose normal.   Eyes: Right eye exhibits no discharge. Left eye exhibits no discharge. No scleral icterus.   Neck: Normal range of motion.   Cardiovascular: Normal rate, regular rhythm and intact distal pulses.    Pulmonary/Chest: Effort normal. No respiratory distress. She has no wheezes.   Abdominal: Soft. She exhibits no distension. There is no tenderness.   Musculoskeletal: She exhibits no edema or tenderness.        Right ankle: She exhibits decreased range of motion (foot drop).   TLSO brace intact   Neurological: She is alert and oriented to person, place, and time. She exhibits normal muscle tone.   -  Mental Status:  AAOx3.  Follows commands.  Answers correct age and .  Recent and remote memory intact.  -  Speech and language:  no aphasia or dysarthria.    -  Vision:  no hemianopsia or ptosis.    -  Facial movement (CN VII): symmetrical.   -  Motor:  RUE: 4/5.  LUE: 4/.  RLE:   Hip Flex 2/5, Knee Ext/Flex 3-/5,  DF 1/5, PF 4-/5.  LLE:  Hip Flex 2+/5, Knee Ext/Flex 4-/5,  DF 4/5, PF 4/5.  -  Sensory:  Intact to light touch and pin prick.   Skin: Skin is warm and dry. No rash noted.   Psychiatric: She has a normal mood and affect. Her behavior is normal. Thought content normal.   Vitals reviewed.    NEUROLOGICAL EXAMINATION:     MENTAL STATUS   Oriented to person, place, and time.       Assessment/Plan:      Mary Carrillo is a 63 y.o. female admitted to inpatient rehabilitation on 3/27/2018 for Osteoarthritis of spine with myelopathy, thoracolumbar region with impaired mobility and ADLs. Patient remains appropriate for PT, OT, and as required Speech therapy. Patient continues to require 24 hour nursing care as well as daily Physician assessment.    * Osteoarthritis of spine with myelopathy, thoracolumbar region    -  MRI T-spine revealed vertebral osteomyelitis/discitis T12-L1 with severe compression and effacement of the ventral and dorsal subarachnoid spaces  -  S/p T12-L1 corpectomy with T10-L3 fusion on 3/18/18  -  PT/OT evaluate and treat  -  Pain management  -  Monitor for bowel and bladder dysfunction  -  Monitor for spasticity  -  Monitor for and prevent skin breakdown and pressure ulcers  Early mobility, repositioning/weight shifting every 20-30 minutes when sitting, turn patient every 2 hours, proper mattress/overlay and chair cushioning, pressure relief/heel protector boots  -  DVT prophylaxis:  on [facility]        Dehiscence of operative wound    Noted on 3/31. No signs of infection. Culture sent and Bacitracin ointment started. Continue to monitor    4/1 - CBC sent to monitor WBC. Will consult wound care  4/2 WBC 11, stable        Acute blood loss as cause of postoperative anemia    Continue to monitor H/H. Previous transfusions performed    4/2 H/H improved, 8.5/28.4          HTN (hypertension), benign    Continue Coreg and Amlodipine    3/29 - Lisinopril 10mg daily  3/30 -  improved  4/2 - sbp 130s-160. Increase lisinopril to 20      Sarcoidosis    Currently holding Methotrexate due to TB            DISCHARGE PLANNING:  Tentative Discharge Date:     Future Appointments  Date Time Provider Department Center   4/9/2018 10:40 AM Lakeisha Bledsoe PA-C [facility] NEURO [place]   [facility] [place]   5/7/2018 2:40 PM Andi Swain DO [facility] NEURO [place]       Edith Quiles MD  Department of Physical Medicine & Rehab   [facility]

## 2018-04-02 NOTE — PROGRESS NOTES
Physical Therapy   Treatment/ Wheelchair Group    Mary Carrillo   MRN: [id number]            04/02/18 1512   PT Time Calculation   PT Start Time 1512   PT Stop Time 1557   PT Total Time (min) 45 min   Treatment   Treatment Type Treatment   PT/PTA PT   General   PT Received On 04/03/18   Family/Caregiver Present No   Patient Found (position) Seated in wheelchair   Pt found with TLSO   Precautions   General Precautions fall   Orthopedic Yes   Required Braces or Orthoses Yes   Spinal Brace TLSO   Visual/Auditory Vision impaired  (glasses)   Subjective   Patient states Pt reports she feels a little better   Pain/Comfort   Pain Rating 1 4/10   Location - Side 1 Right   Location - Orientation 1 generalized   Location 1 hip   Pain Addressed 1 Pre-medicate for activity   Pain Rating Post-Intervention 1 4/10   Activity Tolerance   Activity Tolerance Patient tolerated treatment well   After Treatment   Patient Position After Treatment Seated in wheelchair   Patient after treatment left call button in reach  (Pt escorted to her room; seatbelt on)   Assessment   Prognosis Fair   Plan   Planned Therapy Intervention Continue with current plan   Therapy Frequency 2 times/day;Monday-Friday;Saturday or Sunday   Physical Therapy Follow-up   PT Follow-up? Yes   PT - Next Visit Date 04/03/18   Treatment/Billable Minutes   Therapeutic Activity Group 45   Total Time 45       Objective:  Patient participated in a wheelchair management and mobility 45 min group session.  The patients were involved in group activities with emphasis on education, patients propulsion of wheelchair, management of all wheelchair parts and endurance building. The patient performed six minutes of a wheelchair endurance activity with a distance of 200 ft with stand-by assist. Patient performed wheelchair management including locking/unlocking brakes and adjusting leg rests with Minimal assistance.  Patient propelled wheelchair weaving in and out of canes for 4 sets  x 50 feet with stand-by assist and verbal cueing for propulsion. Patient also practiced parallel parking x 2 trials while in the wheelchair with stand-by assist and verbal cueing.  Patient propelled on level surface with carpet for 2 sets x 60 feet with stand-by assistance.Pt was also educated in and performed lateral weight shifting in w/c. These activities were performed to improve pts functional independence with wheelchair mobility in both household and community distances.  Endurance 9 on RPE scale  - Locomotion: Wheelchair (FIM) = 5  6: Modified [place]- Patient operates a manual or motorized wheelchair independently for a minimum of 150 feet    5: Supervision: The patient requires standby supervision, curing or coaxing to go a minimum of 150 feet (50 meters) in a wheelchair    4: Minimal Contact Guard Assistance- The patient performed 75% or more of locomotion effort to go a minimum of 150 feet    3: Moderate Assistance- The patient performs 50-74% locomotion effort to go a minimum of 150 feet    2: Maximum Assistance: The patient performed 25-49% of locomotion effort to go a minimum of 50 feet and requires the assistance of only 1 person    1: Total Assistance: The patient performs less than 25% of effort, or requires the assistance of two people, or wheels less than 50 feet      Radha Colin PT, DPT/Huey Caicedo PT   4/2/2018

## 2018-04-02 NOTE — PROGRESS NOTES
"Occupational Therapy  PM Treatment  Mary Carrillo   MRN: [id number]   Room/Bed: E257/E257 A       04/02/18 1300   OT Time Calculation   OT Start Time 1300   OT Stop Time 1345   OT Total Time (min) 45 min   General   OT Date of Treatment 04/02/18   Family/Caregiver Present No   Patient Found (position) Seated in wheelchair   Pt found with TLSO   Precautions   General Precautions fall   Orthopedic Yes   Required Braces or Orthoses Yes   Spinal Brace TLSO   Visual/Auditory Vision impaired  (glasses)   Subjective   Patient states "The hip is feeling okay, but now my back hurts, like there is a pressure."    Pain/Comfort   Pain Rating 6/10   Location - Side Bilateral   Location - Orientation lower   Location back   Pain Addressed Pre-medicate for activity;Reposition   Pain Comment Pt reported RN gave her pain meds prior to session   Transfers   Transfer yes   Chair to Mat   Chair <>Mat Technique Scoot Pivot   Chair <> Mat Assistance Moderate Assistance   Chair<> Mat Assistive Device No Assistive Device   Trials/Comments Mod (A) from w/c > EOM with VCs for BLE positioning   Mat to Chair   Technique Scoot Pivot   Assistance Moderate Assistance   Assistive Device No Assistive Device   Trials/Comments same   Exercise Tools   Exercise Tools Yes   Rickshaw 5x20 reps 10# to increase B triceps strength for functional t/fs   Other Exercise Tool 1 3x15 reps each of shld flexion and chest presses with 3# dowel sitting EOM to increase trunk control and BUE AROM for ADL prep   Additional Activities   Additional Activities Other (Comment)   Additional Activities Comments Pt completed t/f training seated at EOM to perform squats for increased (I) with functional t/fs and BLE strength; pt required max (A) to maintain squat. Pt also performed functional laundry sorting/folding ther ax while seated EOM with emphasis on performing BUE AROM to sort laundry with VCs for adhering to BLT precautions.    Activity Tolerance   Activity Tolerance " Patient tolerated treatment well   After Treatment   Patient Position After Treatment Seated in wheelchair   Patient after treatment left call button in reach  (safety belt intact; in gym for PT)   Assessment   Prognosis Good   Problem List Decreased Self Care skills;Decreased upper extremity range of motion;Decreased upper extremity strength;Decreased safe judgment during ADL;Decreased cognition;Decreased endurance;Decreased sensation;Decreased functional mobility;Decreased gross motor control;Decreased IADLs;Decreased trunk control for functional activities   Assessment Pt tolerated tx session well today. Pt requires minimal rest breaks throughout session 2* to decreased endurance. Pt requires VCs for BLE positioning during t/fs, but is able to initiate scoot pivot with increased (I). Pt would continue to benefit from skilled OT services to increase (I) with ADLs/IADLs.    Level of Motivation/Participation good   Discharge Recommendations   Equipment Needed After Discharge 3-in-1 commode;bedside commode;bath bench   Discharge Facility/Level Of Care Needs home health OT   Plan   Plan Continue with current plan   Therapy Frequency 2 times/day   Occupational Therapy Follow-up   OT Follow-up? Yes   Treatment/Billable Minutes   Therapeutic Activity 22   Therapeutic Exercise 23   Total Time 45       Deb Kim OT  4/2/2018    Patient with wheelchair safety belt fastened and able to self release wheelchair safety belt. Pt left in therapy gym (location) with all necessities in reach, PT notified.     LEGEND:   CGA: Contact Guard Assist   EOB: Edgeof Bed   HHA: Hand Held Assist   HOB: Head of Bed   (I): Independent-patient performs task in a timely manner   Max (A): Maximal Assist-patient performs 25-49% of task   Min (A): Minimal Assist- patient performs 75% or more of task   Mod (A): Moderate Assist- patient performs 50-74% of task   NA: Not applicable   NT: Not tested   OOB: Out of Bed   PTA: Prior to admit   QC:  Quad Cane   RW: Rolling Walker   (S): Supervision- patient requires cues, coaxing, prompting   SBA: Stand By Assist   SC: Straight Cane   SW: Standard Walker   TBA: To be assessed   Total (A): Total Assist- patient performs less than 25% of task   WC: Wheelchair   WFL: Within Functional Limits   WNL: Within Normal Limits

## 2018-04-03 PROBLEM — R23.9 ALTERATION IN SKIN INTEGRITY RELATED TO SURGICAL INCISION: Status: ACTIVE | Noted: 2018-04-03

## 2018-04-03 PROCEDURE — 11800000 HC REHAB PRIVATE ROOM

## 2018-04-03 PROCEDURE — 97535 SELF CARE MNGMENT TRAINING: CPT

## 2018-04-03 PROCEDURE — 99232 SBSQ HOSP IP/OBS MODERATE 35: CPT | Mod: ,,, | Performed by: PHYSICAL MEDICINE & REHABILITATION

## 2018-04-03 PROCEDURE — 63600175 PHARM REV CODE 636 W HCPCS: Performed by: HOSPITALIST

## 2018-04-03 PROCEDURE — 97530 THERAPEUTIC ACTIVITIES: CPT

## 2018-04-03 PROCEDURE — 25000003 PHARM REV CODE 250: Performed by: PHYSICAL MEDICINE & REHABILITATION

## 2018-04-03 PROCEDURE — 97110 THERAPEUTIC EXERCISES: CPT

## 2018-04-03 PROCEDURE — 97150 GROUP THERAPEUTIC PROCEDURES: CPT

## 2018-04-03 PROCEDURE — 25000003 PHARM REV CODE 250: Performed by: HOSPITALIST

## 2018-04-03 RX ADMIN — OXYCODONE AND ACETAMINOPHEN 1 TABLET: 10; 325 TABLET ORAL at 08:04

## 2018-04-03 RX ADMIN — TRAZODONE HYDROCHLORIDE 100 MG: 50 TABLET ORAL at 08:04

## 2018-04-03 RX ADMIN — AMLODIPINE BESYLATE 10 MG: 10 TABLET ORAL at 08:04

## 2018-04-03 RX ADMIN — HEPARIN SODIUM 5000 UNITS: 5000 INJECTION, SOLUTION INTRAVENOUS; SUBCUTANEOUS at 05:04

## 2018-04-03 RX ADMIN — GABAPENTIN 300 MG: 300 CAPSULE ORAL at 08:04

## 2018-04-03 RX ADMIN — RIFAMPIN 600 MG: 300 CAPSULE ORAL at 08:04

## 2018-04-03 RX ADMIN — GABAPENTIN 300 MG: 300 CAPSULE ORAL at 02:04

## 2018-04-03 RX ADMIN — HEPARIN SODIUM 5000 UNITS: 5000 INJECTION, SOLUTION INTRAVENOUS; SUBCUTANEOUS at 08:04

## 2018-04-03 RX ADMIN — OXYCODONE AND ACETAMINOPHEN 1 TABLET: 10; 325 TABLET ORAL at 12:04

## 2018-04-03 RX ADMIN — Medication 50 MG: at 08:04

## 2018-04-03 RX ADMIN — ISONIAZID 900 MG: 300 TABLET ORAL at 08:04

## 2018-04-03 RX ADMIN — CARVEDILOL 25 MG: 25 TABLET, FILM COATED ORAL at 08:04

## 2018-04-03 RX ADMIN — BACITRACIN: 500 OINTMENT TOPICAL at 08:04

## 2018-04-03 RX ADMIN — LISINOPRIL 20 MG: 20 TABLET ORAL at 08:04

## 2018-04-03 RX ADMIN — HEPARIN SODIUM 5000 UNITS: 5000 INJECTION, SOLUTION INTRAVENOUS; SUBCUTANEOUS at 02:04

## 2018-04-03 NOTE — PLAN OF CARE
Problem: Patient Care Overview  Goal: Plan of Care Review  Outcome: Ongoing (interventions implemented as appropriate)  Ms. Carrillo requests pain medication prior to therapies in the AM and PM.  She is compliant with safety guidelines.  The staples to the back incision are to be d/c'd today and painted with iodine and steristrips applied.  She is independent with bed mobility and asks for pillows to be placed behind her back to relieve pressure on her back.

## 2018-04-03 NOTE — PLAN OF CARE
Problem: Patient Care Overview  Goal: Plan of Care Review  Outcome: Ongoing (interventions implemented as appropriate)  Patient's plan of care reviewed     Problem: Pain, Acute (Adult)  Goal: Identify Related Risk Factors and Signs and Symptoms  Related risk factors and signs and symptoms are identified upon initiation of Human Response Clinical Practice Guideline (CPG)   Outcome: Ongoing (interventions implemented as appropriate)  Patient's pain is controlled with medication     Problem: Fall Risk (Adult)  Goal: Identify Related Risk Factors and Signs and Symptoms  Related risk factors and signs and symptoms are identified upon initiation of Human Response Clinical Practice Guideline (CPG)   Outcome: Ongoing (interventions implemented as appropriate)  Patient has not had a fall     Problem: Pressure Ulcer Risk (Bryant Scale) (Adult,Obstetrics,Pediatric)  Goal: Identify Related Risk Factors and Signs and Symptoms  Related risk factors and signs and symptoms are identified upon initiation of Human Response Clinical Practice Guideline (CPG)   Outcome: Ongoing (interventions implemented as appropriate)  Patient has not developed new pressure ulcers

## 2018-04-03 NOTE — PROGRESS NOTES
"Physical Therapy   Treatment    Mary Carrillo   MRN: [id number]              04/03/18 0900   PT Time Calculation   PT Start Time 0900   PT Stop Time 0945   PT Total Time (min) 45 min   Treatment   Treatment Type Treatment   PT/PTA PT   General   PT Received On 04/03/18   Family/Caregiver Present No   Patient Found (position) Seated in wheelchair   Pt found with TLSO   Precautions   General Precautions fall   Orthopedic Yes   Required Braces or Orthoses Yes   Spinal Brace TLSO   Visual/Auditory Vision impaired  (glasses)   Subjective   Patient states "My back is hurting by my cut"   Pain/Comfort   Pain Rating 1 5/10   Location - Side 1 Bilateral   Location - Orientation 1 lower   Location 1 back  (lower back along incision)   Pain Addressed 1 Pre-medicate for activity;Reposition;Distraction   Pain Rating Post-Intervention 1 5/10   Transfers   Transfer yes   Sit to Stand   Sit <> Stand Assistance Moderate Assistance;Maximum Assistance  (Mod A for trials 1-3; Max A for trial 4)   Sit <> Stand Assistive Device Other (see comments)  (parallel bars)   Trials/Comments 4 trials   Stand to Sit   Assistance Moderate Assistance   Assistive Device Other (see comments)  (parallel bars)   Trials/Comments 4 trials   Chair to Mat   Chair<> Mat Technique Squat Pivot   Chair<>Mat Assistance Minimum Assistance   Chair <> Mat Assistive Device No Assistive Device   Trials/Comments 1 trial with VCs for hand placement and technique   Mat to Chair   Technique Squat Pivot   Assistance Minimum Assistance   Assistive Device No Assistive Device   Trials/Comments 1 trial with VCs for technique   Wheelchair Activities   Propulsion Yes   Propulsion Type 1 Manual   Level 1 Level tile   Method 1 Right upper extremity;Left upper extremity   Level of Assistance 1 Supervision   Description/ Details 1 200 ft   Gait   Gait No   Stairs   Stairs No   Balance   Balance Yes   Static Standing Balance   Static Standing-Balance Support Right upper extremity " supported;Left upper extremity supported   Static Standing-Level of Assistance Moderate assistance   Static Standing-Comment/# of Minutes 4 trials ~ 3:46, 2:55, 1:53, 1:12  (seated rest breaks between trials)   Seated   Seated-Exercises Lower extremity   Seated-Exercise Comments Pt performs w/c pushups x 5; B ankle pumps, ankle inversion and eversion - 2x20; B LAQs 1x15 while seated in w/c.  Visual and tactile cues given to promote increased knee ext for LAQs   Activity Tolerance   Activity Tolerance Patient tolerated treatment well   After Treatment   Patient Position After Treatment Seated in wheelchair   Patient after treatment left (Pt escorted to room to rest before OT session )   Assessment   Prognosis Fair   Problem List Decreased strength;Decreased range of motion;Decreased endurance;Impaired balance;Decreased mobility;Impaired sensation;Obesity;Pain   Session Assessment required decreased assistance for functional mobility   Assessment Pt able to perform squat pivot w/c to mat transfer with decreased cueing and less assistance from PT. Demonstrated improvement in standing tolerance with increased # of trials performed and increased time in static standing.  Pt requires Mod A and TCs to maintain hip extension and to prevent hyperextension in R knee while standing.  Pt tolerated wheelchair pushups to improve forward weight shift during sit to stand transitions fairly well with Min A to lift from w/c.    Level of Motivation/Participation Good   Barriers to Discharge Decreased caregiver support   Barriers to Discharge Comments pt will need physical assistance at discharge   Discharge Recommendations   Equipment Needed After Discharge 3-in-1 commode;bath bench;wheelchair   Discharge Facility/Level Of Care Needs home health PT   Plan   Planned Therapy Intervention Continue with current plan;Bed mobility training;Transfer training;Balance Training;ROM;Strengthening;Neuromuscular Re-education;Motor Coordination  Training;Postural Re-education;Wheelchair Management/Propulsion   Therapy Frequency 2 times/day;Monday-Friday;Saturday or Sunday   Physical Therapy Follow-up   PT Follow-up? Yes   PT - Next Visit Date 04/03/18   Treatment/Billable Minutes   Therapeutic Activity 35   Therapeutic Exercise 10   Total Time 45           Cristi Garcia DPT/Huey Caicedo, PT   4/3/2018

## 2018-04-03 NOTE — ASSESSMENT & PLAN NOTE
Continue Coreg and Amlodipine    3/29 - Lisinopril 10mg daily  3/30 - improved  4/2 - sbp 130s-160. Lisinopril increased to 20  4/3  this morning, improved

## 2018-04-03 NOTE — PT/OT/SLP DISCHARGE
Physical Therapy Discharge Summary    Name: Mary Carrillo  MRN: [id number]   Principal Problem: Discitis of thoracolumbar region     Patient Discharged from acute Physical Therapy on 2018.  Please refer to prior PT noted date on 2018 for functional status.     Assessment:     Patient was discharged unexpectedly.  Information required to complete an accurate discharge summary is unknown.  Refer to therapy initial evaluation and last progress note for initial and most recent functional status and goal achievement.  Recommendations made may be found in medical record.    Objective:     GOALS:    Physical Therapy Goals     Not on file          Multidisciplinary Problems (Resolved)        Problem: Physical Therapy Goal    Goal Priority Disciplines Outcome Goal Variances Interventions   Physical Therapy Goal   (Resolved)     PT/OT, PT Outcome(s) achieved     Description:  Goals to be met by: 2018     Patient will increase functional independence with mobility by performin. Supine to sit with Moderate Assistance-met   Revised: Supine to sit CGA  2. Sit to supine with Moderate Assistance-met   Revised: Sit to supine with CGA  3. Sit to stand transfer with Maximum Assistance-met   Revised: Sit to stand transfer with min A  4. Bed to chair transfer with Maximum Assistance- met  5. Wheelchair propulsion x150 feet with Stand-by Assistance using bilateral uppper extremities  6. Lower extremity exercise program x15 reps per handout, with assistance as needed   7. Gait x 10 feet with max A with or without appropriate AD.                        Reasons for Discontinuation of Therapy Services  Transfer to alternate level of care.      Plan:     Patient Discharged to: Inpatient Rehab.    ALFREDO DELAROSA, PT  4/3/2018

## 2018-04-03 NOTE — NURSING
Removed staples from back incision.  Dehisced at proximal and distal ends.  Betadine and steristrips applied and island dressing placed.

## 2018-04-03 NOTE — PROGRESS NOTES
[facility]  Physical Medicine & Rehab  Progress Note    Patient Name: Mary Carrillo  MRN: [id number]  Patient Class: IP- Rehab   Admission Date: 3/27/2018  Length of Stay: 7 days  Attending Physician: Jack Squires MD  Primary Care Provider: Jose Khan MD    Subjective:     Principal Problem:Osteoarthritis of spine with myelopathy, thoracolumbar region    Interval History 4/3/2018:  Patient is seen for follow-up rehab evaluation and recommendations: Pt without new c/o's.Right hip pain feels the same. Some pain around incision site. Seen by wound care who recommends staples to be removed, covering with steristrips. I have reviewed the HPI and PMFSH and there are no changes from admission.       Scheduled Medications:    amLODIPine  10 mg Oral Daily    bacitracin   Topical (Top) Daily    carvedilol  25 mg Oral BID    gabapentin  300 mg Oral TID    heparin (porcine)  5,000 Units Subcutaneous Q8H    isoniazid  900 mg Oral Every Tues    isoniazid  900 mg Oral Every Fri    lisinopril  20 mg Oral Daily    polyethylene glycol  17 g Oral Daily    pyridoxine (vitamin B6)  50 mg Oral Daily    rifAMpin  600 mg Oral Every Tues    rifAMpin  600 mg Oral Every Fri    senna-docusate 8.6-50 mg  2 tablet Oral Daily    traZODone  100 mg Oral QHS       PRN Medications: acetaminophen, albuterol sulfate, bisacodyl, calcium carbonate, hydrOXYzine pamoate, magnesium hydroxide 400 mg/5 ml, methocarbamol, ondansetron, oxyCODONE-acetaminophen, ramelteon, senna, simethicone    Review of Systems   Constitutional: Positive for activity change. Negative for chills, fatigue and fever.   HENT: Negative for drooling, hearing loss, trouble swallowing and voice change.    Eyes: Negative for pain and visual disturbance.   Respiratory: Negative for cough, shortness of breath and wheezing.    Cardiovascular: Negative for chest pain and palpitations.   Gastrointestinal: Negative for abdominal distention, nausea and  vomiting.   Genitourinary: Negative for difficulty urinating and flank pain.   Musculoskeletal: Positive for back pain, gait problem and myalgias. Negative for arthralgias and neck pain.   Skin: Positive for wound. Negative for rash.   Neurological: Positive for weakness. Negative for dizziness, numbness and headaches.   Psychiatric/Behavioral: Negative for agitation and hallucinations. The patient is not nervous/anxious.      Objective:     Vital Signs (Most Recent):  Temp: 99 °F (37.2 °C) (18)  Pulse: 74 (18)  Resp: 16 (18)  BP: 125/61 (18)  SpO2: (!) 93 % (18)    Vital Signs (24h Range):  Temp:  [98.3 °F (36.8 °C)-99 °F (37.2 °C)] 99 °F (37.2 °C)  Pulse:  [72-74] 74  Resp:  [16-18] 16  SpO2:  [93 %] 93 %  BP: (125-136)/(61-76) 125/61     Physical Exam   Constitutional: She is oriented to person, place, and time. She appears well-developed and well-nourished. No distress.   Sitting up in medichair   HENT:   Head: Normocephalic and atraumatic.   Right Ear: External ear normal.   Left Ear: External ear normal.   Nose: Nose normal.   Eyes: Right eye exhibits no discharge. Left eye exhibits no discharge. No scleral icterus.   Neck: Normal range of motion.   Cardiovascular: Normal rate, regular rhythm and intact distal pulses.    Pulmonary/Chest: Effort normal. No respiratory distress. She has no wheezes.   Abdominal: Soft. She exhibits no distension. There is no tenderness.   Musculoskeletal: She exhibits no edema or tenderness.        Right ankle: She exhibits decreased range of motion (foot drop).   TLSO brace intact   Neurological: She is alert and oriented to person, place, and time. She exhibits normal muscle tone.   -  Mental Status:  AAOx3.  Follows commands.  Answers correct age and .  Recent and remote memory intact.  -  Speech and language:  no aphasia or dysarthria.    -  Vision:  no hemianopsia or ptosis.    -  Facial movement (CN VII): symmetrical.    -  Motor:  RUE: 4/5.  LUE: 4/5.  RLE:  Hip Flex 2/5, Knee Ext/Flex 3-/5,  DF 1/5, PF 4-/5.  LLE:  Hip Flex 2+/5, Knee Ext/Flex 4-/5,  DF 4/5, PF 4/5.  -  Sensory:  Intact to light touch and pin prick.   Skin: Skin is warm and dry. No rash noted.   Psychiatric: She has a normal mood and affect. Her behavior is normal. Thought content normal.   Vitals reviewed.    NEUROLOGICAL EXAMINATION:     MENTAL STATUS   Oriented to person, place, and time.       Assessment/Plan:      Mary Carrillo is a 63 y.o. female admitted to inpatient rehabilitation on 3/27/2018 for Osteoarthritis of spine with myelopathy, thoracolumbar region with impaired mobility and ADLs. Patient remains appropriate for PT, OT, and as required Speech therapy. Patient continues to require 24 hour nursing care as well as daily Physician assessment.    * Osteoarthritis of spine with myelopathy, thoracolumbar region    -  MRI T-spine revealed vertebral osteomyelitis/discitis T12-L1 with severe compression and effacement of the ventral and dorsal subarachnoid spaces  -  S/p T12-L1 corpectomy with T10-L3 fusion on 3/18/18  -  PT/OT evaluate and treat  -  Pain management  -  Monitor for bowel and bladder dysfunction  -  Monitor for spasticity  -  Monitor for and prevent skin breakdown and pressure ulcers  Early mobility, repositioning/weight shifting every 20-30 minutes when sitting, turn patient every 2 hours, proper mattress/overlay and chair cushioning, pressure relief/heel protector boots  -  DVT prophylaxis:  on SQH        Dehiscence of operative wound    Noted on 3/31. No signs of infection. Culture sent and Bacitracin ointment started. Continue to monitor    4/1 - CBC sent to monitor WBC. Will consult wound care  4/2 WBC 11, stable  4/3 remove staples per WC recs, apply steri-strips, paint with betadine daily, cover with telfa island dressing.       Acute blood loss as cause of postoperative anemia    Continue to monitor H/H. Previous transfusions  performed    4/2 H/H improved, 8.5/28.4          HTN (hypertension), benign    Continue Coreg and Amlodipine    3/29 - Lisinopril 10mg daily  3/30 - improved  4/2 - sbp 130s-160. Lisinopril increased to 20  4/3  this morning, improved        Sarcoidosis    Currently holding Methotrexate due to TB            DISCHARGE PLANNING:  Tentative Discharge Date:     Future Appointments  Date Time Provider Department Center   4/9/2018 10:40 AM Lakeisha Bledsoe PA-C [facility] NEURO [place]   5/7/2018 2:00 PM [facility] XR1 300 LB LIMIT [facility] XRAY [place]   5/7/2018 2:40 PM Andi Swain DO [facility] NEURO [place]       Edith Quiles MD  Department of Physical Medicine & Rehab   [facility]

## 2018-04-03 NOTE — PROGRESS NOTES
Physical Therapy   PT  Group    Mary Carrillo   MRN: [id number]        04/03/18 1115   PT Time Calculation   PT Start Time 1115   PT Stop Time 1200   PT Total Time (min) 45 min   Treatment   Treatment Type Treatment   PT/PTA PT   General   PT Received On 04/03/18   Family/Caregiver Present No   Patient Found (position) Seated in wheelchair   Pt found with TLSO   Precautions   General Precautions fall   Orthopedic Yes   Required Braces or Orthoses Yes   Spinal Brace TLSO   Visual/Auditory Vision impaired   Subjective   Patient states Pt is agreeable to participate in PT group   Pain/Comfort   Pain Rating 1 no pain   Pain Rating Post-Intervention 1 no pain   Other Activities   Comments Patient participated in a wheelchair community mobility 45 min group session.  The patients were involved in group activities with emphasis on education, patients propulsion of wheelchair, community obstacle negotiation, and endurance building. Patient self propelled >500 feet with mod A due to fatigue over level tile, carpet, cement, and asphalt. Patient required SBA for uneven surfaces for safety. Patient practiced going up/down ADA certified ramp to a  accessible parking spot with mod A. PT educated patient on pressure relief using lateral weight shifts every 15 minutes in order to reduce the risk of skin breakdown. Pt performed x2 lateral weight shift to demonstrate understanding. Patient was also educated on scanning environment for uneven surfaces. Patient also practiced entering/exiting an elevator with min A.      · Locomotion: Wheelchair (FIM): 3  6: Modified David- Patient operates a manual or motorized wheelchair independently for a minimum of 150 feet     5: Supervision: The patient requires standby supervision, curing or coaxing to go a minimum of 150 feet (50 meters) in a wheelchair     4: Minimal Contact Guard Assistance- The patient performed 75% or more of locomotion effort to go a minimum of 150 feet     3:  Moderate Assistance- The patient performs 50-74% locomotion effort to go a minimum of 150 feet     2: Maximum Assistance: The patient performed 25-49% of locomotion effort to go a minimum of 50 feet and requires the assistance of only 1 person     1: Total Assistance: The patient performs less than 25% of effort, or requires the assistance of two people, or wheels less than 50 feet   Activity Tolerance   Activity Tolerance Patient tolerated treatment well;Patient limited by fatigue   After Treatment   Patient Position After Treatment Seated in wheelchair  (seat belt donned; left with nurse)   Plan   Planned Therapy Intervention Continue with current plan   Physical Therapy Follow-up   PT Follow-up? Yes   Treatment/Billable Minutes   Therapeutic Activity Group 45   Total Time 45       Daisy Baldwin, PT, DPT   4/3/2018

## 2018-04-03 NOTE — PROGRESS NOTES
"Occupational Therapy  AM & PM Treatments  Mary Carrillo   MRN: [id number]   Room/Bed: E257/E257 A     04/03/18 1015 04/03/18 1445   OT Time Calculation   OT Start Time 1015 1445   OT Stop Time 1101 1530   OT Total Time (min) 46 min 45 min   General   OT Date of Treatment 04/03/18 04/03/18   Family/Caregiver Present No Yes  (family present in room)   Patient Found (position) Seated in wheelchair Seated in wheelchair   Pt found with TLSO TLSO   Precautions   General Precautions fall fall   Orthopedic Yes Yes   Required Braces or Orthoses Yes Yes   Spinal Brace TLSO TLSO   Visual/Auditory Vision impaired  (glasses) Vision impaired   Subjective   Patient states "I think it's pressure on my back when I'm wearing the brace in the wheelchair." "My balance is much better than when I was at home. They had to keep me upright with big pillows on each side."    Pain/Comfort   Pain Rating 6/10 6/10   Location - Side Bilateral Bilateral   Location - Orientation lower lower   Location back back   Pain Addressed Reposition;Distraction Reposition;Distraction   Bed Mobility   Bed Mobility yes yes   Scooting/Bridging --  Stand by Assistance   Scooting/Bridging Comments --  to EOM   Supine to Sit Moderate Assistance Moderate Assistance   Supine to Sit Comments Mod (A) for slight trunk elevation on mat Mod (A) for slight trunk elevation on mat   Sit to Supine Minimum Assistance Minimum Assistance   Sit to Supine Comments Min (A) for RLE over EOM Min (A) for RLE over EOM   Transfers   Transfer yes yes   Chair to Mat   Chair <>Mat Technique Scoot Pivot Scoot Pivot   Chair <> Mat Assistance Moderate Assistance Moderate Assistance   Chair<> Mat Assistive Device No Assistive Device No Assistive Device   Trials/Comments Mod (A) from w/c > EOM towards R side Mod (A) from w/c > EOM   Mat to Chair   Technique Scoot Pivot Scoot Pivot   Assistance Maximum Assistance Moderate Assistance   Assistive Device No Assistive Device No Assistive Device "   Trials/Comments Max (A) from EOM > w/c towards L side Mod (A) from EOM > w/c   Exercise Tools   Exercise Tools --  Yes   Bilateral Moy --  3x20 reps 4# flat surface to increase UB strength for IADL prep   Additional Activities   Additional Activities Other (Comment) Other (Comment)   Additional Activities Comments Pt performed t/f training with scoot pivot to EOM from w/c with emphasis on BLE positioning and technique. Pt also completed visual spatial board while seated at EOM to increase dynamic sitting balance, endurance, weight-shifting.  Pt performed BITS ther ax sitting EOM to increase dynamic sitting balance, weight-shifting, and postural control with the following results: reverse number sequence 90.91% accuracy, 4:53 minutes, 7.33 sec reaction time, 40 trials; single target 4:00 mins, 89.31% accuracy, 2.05 sec reaction time, 117 trials. Pt also performed BLE legs raises 2x10 reps each in supine with AAROM to increase (I) with bed mobility.    Activity Tolerance   Activity Tolerance Patient tolerated treatment well Patient tolerated treatment well   After Treatment   Patient Position After Treatment Seated in wheelchair --    Patient after treatment left (safety belt intact; PT notified) --    Assessment   Prognosis Good --    Problem List Decreased Self Care skills;Decreased upper extremity strength;Decreased safe judgment during ADL;Decreased cognition;Decreased endurance;Decreased functional mobility;Decreased gross motor control;Decreased IADLs;Decreased trunk control for functional activities --    Assessment Pt tolerated tx session well today. Pt demonstrated improved dynamic sitting balance, with no LOB while seated at EOM today. Pt fluctuated with w/c <> EOM t/fs, requiring VCs BUE placement. Pt continues with decreased BLE strength and endurance and would continue to benefit from continued skilled OT services.  --    Level of Motivation/Participation good --    Discharge Recommendations    Equipment Needed After Discharge 3-in-1 commode;bath bench --    Discharge Facility/Level Of Care Needs home health OT --    Plan   Plan Continue with current plan --    Therapy Frequency 2 times/day --    Occupational Therapy Follow-up   OT Follow-up? Yes Yes   Treatment/Billable Minutes   Self Care/Home Management 15 --    Therapeutic Activity 31 25   Therapeutic Exercise --  20   Total Time 46 45       Deb Kim, OT  4/3/2018     Patient with wheelchair safety belt fastened and able to self release wheelchair safety belt. Pt left seated in w/c in pt's room (location) with call light and all necessities in reach, nursing notified, family present.     LEGEND:   CGA: Contact Guard Assist   EOB: Edgeof Bed   HHA: Hand Held Assist   HOB: Head of Bed   (I): Independent-patient performs task in a timely manner   Max (A): Maximal Assist-patient performs 25-49% of task   Min (A): Minimal Assist- patient performs 75% or more of task   Mod (A): Moderate Assist- patient performs 50-74% of task   NA: Not applicable   NT: Not tested   OOB: Out of Bed   PTA: Prior to admit   QC: Quad Cane   RW: Rolling Walker   (S): Supervision- patient requires cues, coaxing, prompting   SBA: Stand By Assist   SC: Straight Cane   SW: Standard Walker   TBA: To be assessed   Total (A): Total Assist- patient performs less than 25% of task   WC: Wheelchair   WFL: Within Functional Limits   WNL: Within Normal Limits

## 2018-04-03 NOTE — SUBJECTIVE & OBJECTIVE
Interval History 4/3/2018:  Patient is seen for follow-up rehab evaluation and recommendations: Pt without new c/o's. Had a good weekend. Right hip pain feels better today.  I have reviewed the HPI and PMFSH and there are no changes from admission.       Scheduled Medications:    amLODIPine  10 mg Oral Daily    bacitracin   Topical (Top) Daily    carvedilol  25 mg Oral BID    gabapentin  300 mg Oral TID    heparin (porcine)  5,000 Units Subcutaneous Q8H    isoniazid  900 mg Oral Every Tues    isoniazid  900 mg Oral Every Fri    lisinopril  20 mg Oral Daily    polyethylene glycol  17 g Oral Daily    pyridoxine (vitamin B6)  50 mg Oral Daily    rifAMpin  600 mg Oral Every Tues    rifAMpin  600 mg Oral Every Fri    senna-docusate 8.6-50 mg  2 tablet Oral Daily    traZODone  100 mg Oral QHS       PRN Medications: acetaminophen, albuterol sulfate, bisacodyl, calcium carbonate, hydrOXYzine pamoate, magnesium hydroxide 400 mg/5 ml, methocarbamol, ondansetron, oxyCODONE-acetaminophen, ramelteon, senna, simethicone    Review of Systems   Constitutional: Positive for activity change. Negative for chills, fatigue and fever.   HENT: Negative for drooling, hearing loss, trouble swallowing and voice change.    Eyes: Negative for pain and visual disturbance.   Respiratory: Negative for cough, shortness of breath and wheezing.    Cardiovascular: Negative for chest pain and palpitations.   Gastrointestinal: Negative for abdominal distention, nausea and vomiting.   Genitourinary: Negative for difficulty urinating and flank pain.   Musculoskeletal: Positive for back pain, gait problem and myalgias. Negative for arthralgias and neck pain.   Skin: Positive for wound. Negative for rash.   Neurological: Positive for weakness. Negative for dizziness, numbness and headaches.   Psychiatric/Behavioral: Negative for agitation and hallucinations. The patient is not nervous/anxious.      Objective:     Vital Signs (Most  Recent):  Temp: 99 °F (37.2 °C) (18)  Pulse: 74 (18)  Resp: 16 (18)  BP: 125/61 (18)  SpO2: (!) 93 % (18)    Vital Signs (24h Range):  Temp:  [98.3 °F (36.8 °C)-99 °F (37.2 °C)] 99 °F (37.2 °C)  Pulse:  [72-74] 74  Resp:  [16-18] 16  SpO2:  [93 %] 93 %  BP: (125-136)/(61-76) 125/61     Physical Exam   Constitutional: She is oriented to person, place, and time. She appears well-developed and well-nourished. No distress.   Sitting up in medichair   HENT:   Head: Normocephalic and atraumatic.   Right Ear: External ear normal.   Left Ear: External ear normal.   Nose: Nose normal.   Eyes: Right eye exhibits no discharge. Left eye exhibits no discharge. No scleral icterus.   Neck: Normal range of motion.   Cardiovascular: Normal rate, regular rhythm and intact distal pulses.    Pulmonary/Chest: Effort normal. No respiratory distress. She has no wheezes.   Abdominal: Soft. She exhibits no distension. There is no tenderness.   Musculoskeletal: She exhibits no edema or tenderness.        Right ankle: She exhibits decreased range of motion (foot drop).   TLSO brace intact   Neurological: She is alert and oriented to person, place, and time. She exhibits normal muscle tone.   -  Mental Status:  AAOx3.  Follows commands.  Answers correct age and .  Recent and remote memory intact.  -  Speech and language:  no aphasia or dysarthria.    -  Vision:  no hemianopsia or ptosis.    -  Facial movement (CN VII): symmetrical.   -  Motor:  RUE: 4/5.  LUE: 4/5.  RLE:  Hip Flex 2/5, Knee Ext/Flex 3-/5,  DF 1/5, PF 4-/5.  LLE:  Hip Flex 2+/5, Knee Ext/Flex 4-/5,  DF 4/5, PF 4/5.  -  Sensory:  Intact to light touch and pin prick.   Skin: Skin is warm and dry. No rash noted.   Psychiatric: She has a normal mood and affect. Her behavior is normal. Thought content normal.   Vitals reviewed.    NEUROLOGICAL EXAMINATION:     MENTAL STATUS   Oriented to person, place, and time.

## 2018-04-03 NOTE — PROGRESS NOTES
Wound care consulted for mid-line back incision with dehiscence. The back incision has staples intact and buried into the tissue at several areas.  The wound is dehiscencing at the proximal and distal ends, staples are noted in wound bed.  Spoke to GAYATHRI Garcia, regarding recommendations.   Plan of care discussed with patient who verbalized understanding.  Recommendations:  Remove staples and apply steri-strips  Paint incision with betadine daily to dry the wound, provide antimicrobial protection  Cover with a telfa island dressing daily.     04/03/18 1045       Incision/Site 03/18/18 1634 Back   Date First Assessed/Time First Assessed: 03/18/18 1634   Location: Back   Wound Image    Incision WDL ex   Dressing Appearance Intact;Clean;Moist drainage   Drainage Amount Small   Drainage Characteristics/Odor Serosanguineous   Appearance Pink;Red;Moist;Staples intact   Periwound Area Intact;Dry   Wound Edges Undefined   Care Cleansed with:;Sterile normal saline   Dressing Applied;Island/border   Dressing Change Due 04/04/18

## 2018-04-04 LAB
BASOPHILS # BLD AUTO: 0.05 K/UL
BASOPHILS NFR BLD: 0.5 %
CRP SERPL-MCNC: 79.17 MG/L
DIFFERENTIAL METHOD: ABNORMAL
EOSINOPHIL # BLD AUTO: 0.2 K/UL
EOSINOPHIL NFR BLD: 2 %
ERYTHROCYTE [DISTWIDTH] IN BLOOD BY AUTOMATED COUNT: 19.5 %
ERYTHROCYTE [SEDIMENTATION RATE] IN BLOOD BY WESTERGREN METHOD: 130 MM/HR
HCT VFR BLD AUTO: 28.8 %
HGB BLD-MCNC: 8.7 G/DL
IMM GRANULOCYTES # BLD AUTO: 0.12 K/UL
IMM GRANULOCYTES NFR BLD AUTO: 1.1 %
LYMPHOCYTES # BLD AUTO: 2 K/UL
LYMPHOCYTES NFR BLD: 19 %
MCH RBC QN AUTO: 27.8 PG
MCHC RBC AUTO-ENTMCNC: 30.2 G/DL
MCV RBC AUTO: 92 FL
MONOCYTES # BLD AUTO: 1.2 K/UL
MONOCYTES NFR BLD: 11.5 %
NEUTROPHILS # BLD AUTO: 6.9 K/UL
NEUTROPHILS NFR BLD: 65.9 %
NRBC BLD-RTO: 0 /100 WBC
PLATELET # BLD AUTO: 375 K/UL
PMV BLD AUTO: 9.7 FL
RBC # BLD AUTO: 3.13 M/UL
WBC # BLD AUTO: 10.52 K/UL

## 2018-04-04 PROCEDURE — 97530 THERAPEUTIC ACTIVITIES: CPT

## 2018-04-04 PROCEDURE — 97535 SELF CARE MNGMENT TRAINING: CPT

## 2018-04-04 PROCEDURE — 63600175 PHARM REV CODE 636 W HCPCS: Performed by: HOSPITALIST

## 2018-04-04 PROCEDURE — 99232 SBSQ HOSP IP/OBS MODERATE 35: CPT | Mod: ,,, | Performed by: PHYSICAL MEDICINE & REHABILITATION

## 2018-04-04 PROCEDURE — 97803 MED NUTRITION INDIV SUBSEQ: CPT

## 2018-04-04 PROCEDURE — 97110 THERAPEUTIC EXERCISES: CPT

## 2018-04-04 PROCEDURE — 25000003 PHARM REV CODE 250: Performed by: PHYSICAL MEDICINE & REHABILITATION

## 2018-04-04 PROCEDURE — 85025 COMPLETE CBC W/AUTO DIFF WBC: CPT

## 2018-04-04 PROCEDURE — 97542 WHEELCHAIR MNGMENT TRAINING: CPT

## 2018-04-04 PROCEDURE — 86141 C-REACTIVE PROTEIN HS: CPT

## 2018-04-04 PROCEDURE — 85651 RBC SED RATE NONAUTOMATED: CPT

## 2018-04-04 PROCEDURE — 97150 GROUP THERAPEUTIC PROCEDURES: CPT

## 2018-04-04 PROCEDURE — 25000003 PHARM REV CODE 250: Performed by: HOSPITALIST

## 2018-04-04 PROCEDURE — 11800000 HC REHAB PRIVATE ROOM

## 2018-04-04 PROCEDURE — 36415 COLL VENOUS BLD VENIPUNCTURE: CPT

## 2018-04-04 RX ORDER — AMOXICILLIN AND CLAVULANATE POTASSIUM 875; 125 MG/1; MG/1
1 TABLET, FILM COATED ORAL EVERY 12 HOURS
Status: DISCONTINUED | OUTPATIENT
Start: 2018-04-04 | End: 2018-04-09

## 2018-04-04 RX ORDER — LISINOPRIL 10 MG/1
10 TABLET ORAL DAILY
Status: DISCONTINUED | OUTPATIENT
Start: 2018-04-05 | End: 2018-04-06

## 2018-04-04 RX ADMIN — TRAZODONE HYDROCHLORIDE 100 MG: 50 TABLET ORAL at 09:04

## 2018-04-04 RX ADMIN — HEPARIN SODIUM 5000 UNITS: 5000 INJECTION, SOLUTION INTRAVENOUS; SUBCUTANEOUS at 09:04

## 2018-04-04 RX ADMIN — LISINOPRIL 20 MG: 20 TABLET ORAL at 08:04

## 2018-04-04 RX ADMIN — POLYETHYLENE GLYCOL 3350 17 G: 17 POWDER, FOR SOLUTION ORAL at 08:04

## 2018-04-04 RX ADMIN — HEPARIN SODIUM 5000 UNITS: 5000 INJECTION, SOLUTION INTRAVENOUS; SUBCUTANEOUS at 02:04

## 2018-04-04 RX ADMIN — GABAPENTIN 300 MG: 300 CAPSULE ORAL at 02:04

## 2018-04-04 RX ADMIN — GABAPENTIN 300 MG: 300 CAPSULE ORAL at 09:04

## 2018-04-04 RX ADMIN — AMLODIPINE BESYLATE 10 MG: 10 TABLET ORAL at 08:04

## 2018-04-04 RX ADMIN — CARVEDILOL 25 MG: 25 TABLET, FILM COATED ORAL at 09:04

## 2018-04-04 RX ADMIN — AMOXICILLIN AND CLAVULANATE POTASSIUM 1 TABLET: 875; 125 TABLET, FILM COATED ORAL at 09:04

## 2018-04-04 RX ADMIN — Medication 50 MG: at 08:04

## 2018-04-04 RX ADMIN — OXYCODONE AND ACETAMINOPHEN 1 TABLET: 10; 325 TABLET ORAL at 09:04

## 2018-04-04 RX ADMIN — OXYCODONE AND ACETAMINOPHEN 1 TABLET: 10; 325 TABLET ORAL at 12:04

## 2018-04-04 RX ADMIN — CARVEDILOL 25 MG: 25 TABLET, FILM COATED ORAL at 08:04

## 2018-04-04 RX ADMIN — GABAPENTIN 300 MG: 300 CAPSULE ORAL at 08:04

## 2018-04-04 RX ADMIN — STANDARDIZED SENNA CONCENTRATE AND DOCUSATE SODIUM 2 TABLET: 8.6; 5 TABLET, FILM COATED ORAL at 08:04

## 2018-04-04 RX ADMIN — HEPARIN SODIUM 5000 UNITS: 5000 INJECTION, SOLUTION INTRAVENOUS; SUBCUTANEOUS at 05:04

## 2018-04-04 NOTE — PLAN OF CARE
Problem: Patient Care Overview  Goal: Plan of Care Review  Outcome: Ongoing (interventions implemented as appropriate)  Assessment and Plan  Nutrition Problem  Increased protein needs    Related to (etiology):   Non-healing wounds in back area    Signs and Symptoms (as evidenced by):    Protein PO intake <85%.     Interventions/Recommendations (treatment strategy):  Boost Plus supplement for wound healing.     Nutrition Diagnosis Status:   New    Recommendation/Intervention:   1. Continue Cardiac diet.   2. Recommend ONS for wound healing.   3. RD to follow.     Goals: Meet >85% of EEN/EPN  Nutrition Goal Status: progressing towards goal

## 2018-04-04 NOTE — ASSESSMENT & PLAN NOTE
Continue Coreg and Amlodipine    3/29 - Lisinopril 10mg daily  3/30 - improved  4/2 - sbp 130s-160. Lisinopril increased to 20  4/3  this morning, improved  4/4 dec lisinopril back to 10 for low DBP

## 2018-04-04 NOTE — PROGRESS NOTES
"Occupational Therapy  AM Treatment  Mary Carrillo   MRN: [id number]   Room/Bed: E257/E257 A       04/04/18 0915   OT Time Calculation   OT Start Time 0915   OT Stop Time 1001   OT Total Time (min) 46 min   General   OT Date of Treatment 04/04/18   Family/Caregiver Present No   Patient Found (position) Seated in wheelchair   Pt found with TLSO   Precautions   General Precautions fall   Orthopedic Yes   Required Braces or Orthoses Yes   Spinal Brace TLSO   Visual/Auditory Vision impaired   Subjective   Patient states "Yeah, that was a lot of work."    Pain/Comfort   Pain Rating 7/10   Location back   Pain Addressed Reposition;Distraction   Pain Comment Pt stated "soreness" around incision area   Transfers   Transfer yes   Chair to Mat   Chair <>Mat Technique Scoot Pivot   Chair <> Mat Assistance Moderate Assistance   Chair<> Mat Assistive Device No Assistive Device   Trials/Comments Mod (A) from w/c to R side; pt needs initial to EOM   Mat to Chair   Technique Scoot Pivot   Assistance Moderate Assistance   Assistive Device No Assistive Device   Trials/Comments same   Exercise Tools   Exercise Tools Yes   Bilateral Moy 5x20 reps 4# flat surface to increase UB strength for IADL prep   Other Exercise Tool 1 4x15 reps 3# dowel shld flexion seated EOM with tactile cues to avoid posterior lean; to increase dynamic sitting balance for ADL prep   Additional Activities   Additional Activities Other (Comment)   Additional Activities Comments Pt performed t/f training at EOM, requiring mod - max (A) for lifting into squat with maintained squat for <30 seconds to increase BLE strength for t/fs; pt performed x5 scoots along EOM with SBA with VCs for BLE positioning. Pt also performed hand manipulation with MRMT board while seated at EOM for increased BUE endurance and dynamic sitting balance.    Activity Tolerance   Activity Tolerance Patient tolerated treatment well   After Treatment   Patient Position After Treatment Seated in " wheelchair   Patient after treatment left call button in reach  (safety belt intact)   Assessment   Prognosis Good   Problem List Decreased Self Care skills;Decreased upper extremity strength;Decreased safe judgment during ADL;Decreased endurance;Decreased sensation;Decreased functional mobility;Decreased gross motor control;Decreased IADLs;Decreased trunk control for functional activities   Assessment Pt tolerated tx session well today. Pt with good effort throughout tx session, but requires moderate rest breaks secondary to decreased endurance. Pt progressing with scooting along EOM for increased (I) with bed mobility. Pt would continue to benefit from skilled OT services.    Level of Motivation/Participation good   Discharge Recommendations   Equipment Needed After Discharge 3-in-1 commode;bath bench   Discharge Facility/Level Of Care Needs home health OT   Plan   Plan Continue with current plan   Therapy Frequency 2 times/day   Occupational Therapy Follow-up   OT Follow-up? Yes   Treatment/Billable Minutes   Self Care/Home Management 20   Therapeutic Exercise 26   Total Time 46       Deb Kim OT  4/4/2018     Patient with wheelchair safety belt fastened and able to self release wheelchair safety belt. Pt left seated in w/c next to bedside (location) with call light and all necessities in reach, nursing notified.     LEGEND:   CGA: Contact Guard Assist   EOB: Edgeof Bed   HHA: Hand Held Assist   HOB: Head of Bed   (I): Independent-patient performs task in a timely manner   Max (A): Maximal Assist-patient performs 25-49% of task   Min (A): Minimal Assist- patient performs 75% or more of task   Mod (A): Moderate Assist- patient performs 50-74% of task   NA: Not applicable   NT: Not tested   OOB: Out of Bed   PTA: Prior to admit   QC: Quad Cane   RW: Rolling Walker   (S): Supervision- patient requires cues, coaxing, prompting   SBA: Stand By Assist   SC: Straight Cane   SW: Standard Walker   TBA: To be  assessed   Total (A): Total Assist- patient performs less than 25% of task   WC: Wheelchair   WFL: Within Functional Limits   WNL: Within Normal Limits

## 2018-04-04 NOTE — ASSESSMENT & PLAN NOTE
Noted on 3/31. No signs of infection. Culture sent and Bacitracin ointment started. Continue to monitor    4/1 - CBC sent to monitor WBC. Will consult wound care  4/2 WBC 11, stable  4/3 Staples removed  4/4 no drainage early morning, some reported late morning so ESR, CRP, CBC ordered. Cont wound care.  4/5 ESR, CRP elevated, but WBC within normal limits.

## 2018-04-04 NOTE — PROGRESS NOTES
" [facility]  Adult Nutrition  Progress Note    SUMMARY       Recommendations    Recommendation/Intervention:   1. Continue Cardiac diet.   2. Recommend ONS for wound healing.   3. RD to follow.     Goals: Meet >85% of EEN/EPN  Nutrition Goal Status: progressing towards goal  Communication of RD Recs:  (POC)    Reason for Assessment    Reason for Assessment: RD follow-up  Diagnosis:  (Discitis of thoracolumbar region)  Relevant Medical History: HTN, sarcoidosis, Pott's disease, HLD  Interdisciplinary Rounds: did not attend    General Information Comments: Pt reports a good appetite with a 100% intake.Pt c/o wound pain. Pt agreed to try Boost Plus for extra protein supplementation.     Nutrition Discharge Planning: Adequate nutrition on Cardiac diet.    Nutrition Risk Screen    Nutrition Risk Screen: no indicators present    Nutrition/Diet History    Patient Reported Diet/Restrictions/Preferences: general  Food Preferences: Fresh fruit  Do you have any cultural, spiritual, Faith conflicts, given your current situation?: none    Anthropometrics    Temp: 99.1 °F (37.3 °C)  Height: 5' 5" (165.1 cm)  Height (inches): 65 in  Weight Method: Standard Scale  Weight: 91.6 kg (202 lb)  Weight (lb): 202 lb  Ideal Body Weight (IBW), Female: 125 lb  % Ideal Body Weight, Female (lb): 167.2 lb  BMI (Calculated): 34.9  BMI Grade: 30 - 34.9- obesity - grade I     Lab/Procedures/Meds    Pertinent Labs Reviewed: reviewed  Pertinent Labs Comments: Alb 2.0  Pertinent Medications Reviewed: reviewed  Pertinent Medications Comments: Carvedilol, heparin, ondansetron, senna, Ca     Physical Findings/Assessment    Overall Physical Appearance: nourished  Oral/Mouth Cavity: WDL  Skin: non-healing wound(s), incision(s)    Estimated/Assessed Needs    Weight Used For Calorie Calculations: 91.6 kg (201 lb 15.1 oz)  Energy Calorie Requirements (kcal): 1827  Energy Need Method: Brinkley-St Shankaror (PAL x 1.25)  Protein Requirements: " 110-129g/day (1.2-1.4g/kg)  Weight Used For Protein Calculations: 91.6 kg (201 lb 15.1 oz)  Fluid Requirements (mL): 1mL/kg or per MD     RDA Method (mL): 1827         Nutrition Prescription Ordered    Current Diet Order: Cardiac    Evaluation of Received Nutrient/Fluid Intake    Comments: LBM: 4/2/18  % Intake of Estimated Energy Needs: 75 - 100 %  % Meal Intake: 75 - 100 %    Nutrition Risk    Level of Risk/Frequency of Follow-up: low     Assessment and Plan  Nutrition Problem  Increased protein needs    Related to (etiology):   Non-healing wounds in back area    Signs and Symptoms (as evidenced by):    Protein PO intake <85%.     Interventions/Recommendations (treatment strategy):  Boost Plus supplement for wound healing.     Nutrition Diagnosis Status:   New       Monitor and Evaluation    Food and Nutrient Intake: energy intake, food and beverage intake  Food and Nutrient Adminstration: diet order  Knowledge/Beliefs/Attitudes: food and nutrition knowledge/skill, beliefs and attitudes  Physical Activity and Function: nutrition-related ADLs and IADLs  Anthropometric Measurements: weight, body mass index, weight change  Biochemical Data, Medical Tests and Procedures: electrolyte and renal panel, gastrointestinal profile, glucose/endocrine profile, inflammatory profile, lipid profile  Nutrition-Focused Physical Findings: overall appearance     Nutrition Follow-Up    RD Follow-up?: Yes

## 2018-04-04 NOTE — PROGRESS NOTES
[facility]  Physical Medicine & Rehab  Progress Note    Patient Name: Mary Carrillo  MRN: [id number]  Patient Class: IP- Rehab   Admission Date: 3/27/2018  Length of Stay: 8 days  Attending Physician: Jack Squires MD  Primary Care Provider: Jose Khan MD    Subjective:     Principal Problem:Osteoarthritis of spine with myelopathy, thoracolumbar region    Interval History 4/4/2018:  Patient is seen for follow-up rehab evaluation and recommendations: Pt without new c/o's. Some pain over top of incision site. Staples were removed yesterday and proximal and distal ends had dehisced.  I have reviewed the HPI and PMFSH and there are no changes from admission.       Scheduled Medications:    amLODIPine  10 mg Oral Daily    carvedilol  25 mg Oral BID    gabapentin  300 mg Oral TID    heparin (porcine)  5,000 Units Subcutaneous Q8H    isoniazid  900 mg Oral Every Tues    isoniazid  900 mg Oral Every Fri    lisinopril  20 mg Oral Daily    polyethylene glycol  17 g Oral Daily    pyridoxine (vitamin B6)  50 mg Oral Daily    rifAMpin  600 mg Oral Every Tues    rifAMpin  600 mg Oral Every Fri    senna-docusate 8.6-50 mg  2 tablet Oral Daily    traZODone  100 mg Oral QHS       PRN Medications: acetaminophen, albuterol sulfate, bisacodyl, calcium carbonate, hydrOXYzine pamoate, magnesium hydroxide 400 mg/5 ml, methocarbamol, ondansetron, oxyCODONE-acetaminophen, ramelteon, senna, simethicone    Review of Systems   Constitutional: Positive for activity change. Negative for chills, fatigue and fever.   HENT: Negative for drooling, hearing loss, trouble swallowing and voice change.    Eyes: Negative for pain and visual disturbance.   Respiratory: Negative for cough, shortness of breath and wheezing.    Cardiovascular: Negative for chest pain and palpitations.   Gastrointestinal: Negative for abdominal distention, nausea and vomiting.   Genitourinary: Negative for difficulty urinating and flank  pain.   Musculoskeletal: Positive for back pain, gait problem and myalgias. Negative for arthralgias and neck pain.   Skin: Positive for wound. Negative for rash.   Neurological: Positive for weakness. Negative for dizziness, numbness and headaches.   Psychiatric/Behavioral: Negative for agitation and hallucinations. The patient is not nervous/anxious.      Objective:     Vital Signs (Most Recent):  Temp: 98.6 °F (37 °C) (18 07)  Pulse: 83 (18 07)  Resp: 18 (18)  BP: 114/60 (18)  SpO2: 96 % (18)    Vital Signs (24h Range):  Temp:  [98.2 °F (36.8 °C)-98.6 °F (37 °C)] 98.6 °F (37 °C)  Pulse:  [73-83] 83  Resp:  [12-20] 18  SpO2:  [94 %-96 %] 96 %  BP: (102-117)/(56-60) 114/60     Physical Exam   Constitutional: She is oriented to person, place, and time. She appears well-developed and well-nourished. No distress.   Sitting up in medichair   HENT:   Head: Normocephalic and atraumatic.   Right Ear: External ear normal.   Left Ear: External ear normal.   Nose: Nose normal.   Eyes: Right eye exhibits no discharge. Left eye exhibits no discharge. No scleral icterus.   Neck: Normal range of motion.   Cardiovascular: Normal rate, regular rhythm and intact distal pulses.    Pulmonary/Chest: Effort normal. No respiratory distress. She has no wheezes.   Abdominal: Soft. She exhibits no distension. There is no tenderness.   Musculoskeletal: She exhibits no edema or tenderness.        Right ankle: She exhibits decreased range of motion (foot drop).   TLSO brace intact   Neurological: She is alert and oriented to person, place, and time. She exhibits normal muscle tone.   -  Mental Status:  AAOx3.  Follows commands.  Answers correct age and .  Recent and remote memory intact.  -  Speech and language:  no aphasia or dysarthria.    -  Vision:  no hemianopsia or ptosis.    -  Facial movement (CN VII): symmetrical.   -  Motor:  RUE: 4/5.  LUE: 4/5.  RLE:  Hip Flex 2/5, Knee Ext/Flex  3-/5,  DF 1/5, PF 4-/5.  LLE:  Hip Flex 2+/5, Knee Ext/Flex 4-/5,  DF 4/5, PF 4/5.  -  Sensory:  Intact to light touch and pin prick.   Skin: Skin is warm and dry. No rash noted.   Proximal incision site dehisced. No drainage.    Psychiatric: She has a normal mood and affect. Her behavior is normal. Thought content normal.   Vitals reviewed.    NEUROLOGICAL EXAMINATION:     MENTAL STATUS   Oriented to person, place, and time.       Assessment/Plan:      Mary Carrillo is a 63 y.o. female admitted to inpatient rehabilitation on 3/27/2018 for Osteoarthritis of spine with myelopathy, thoracolumbar region with impaired mobility and ADLs. Patient remains appropriate for PT, OT, and as required Speech therapy. Patient continues to require 24 hour nursing care as well as daily Physician assessment.    * Osteoarthritis of spine with myelopathy, thoracolumbar region    -  MRI T-spine revealed vertebral osteomyelitis/discitis T12-L1 with severe compression and effacement of the ventral and dorsal subarachnoid spaces  -  S/p T12-L1 corpectomy with T10-L3 fusion on 3/18/18  -  PT/OT evaluate and treat  -  Pain management  -  Monitor for bowel and bladder dysfunction  -  Monitor for spasticity  -  Monitor for and prevent skin breakdown and pressure ulcers  Early mobility, repositioning/weight shifting every 20-30 minutes when sitting, turn patient every 2 hours, proper mattress/overlay and chair cushioning, pressure relief/heel protector boots  -  DVT prophylaxis:  on [facility]        Dehiscence of operative wound    Noted on 3/31. No signs of infection. Culture sent and Bacitracin ointment started. Continue to monitor    4/1 - CBC sent to monitor WBC. Will consult wound care  4/2 WBC 11, stable  4/3 Staples removed  4/4 no drainage this morning, but some reported late morning. Cont wound care. Stat ESR, CRP, CBC.        Acute blood loss as cause of postoperative anemia    Continue to monitor H/H. Previous transfusions performed    4/2  H/H improved, 8.5/28.4          HTN (hypertension), benign    Continue Coreg and Amlodipine    3/29 - Lisinopril 10mg daily  3/30 - improved  4/2 - sbp 130s-160. Lisinopril increased to 20  4/4 /60. Dec lisinopril back to 10        Sarcoidosis    Currently holding Methotrexate due to TB            DISCHARGE PLANNING:  Tentative Discharge Date:     Future Appointments  Date Time Provider Department Center   4/9/2018 10:40 AM Lakeisha Bledsoe PA-C [facility] NEURO [place]   5/7/2018 2:00 PM [facility] XR1 300 LB LIMIT [facility] XRAY [place]   5/7/2018 2:40 PM Andi Swain DO [facility] NEURO [place]       Edith Quiles MD  Department of Physical Medicine & Rehab   [facility]

## 2018-04-04 NOTE — PROGRESS NOTES
Physical Therapy   Seated Endurance Group    Mary Carrillo   MRN: [id number]        04/04/18 1447   PT Time Calculation   PT Start Time 1447   PT Stop Time 1532   PT Total Time (min) 45 min   Treatment   Treatment Type Treatment  (Seated Endurance Group)   PT/PTA PT   General   PT Received On 04/04/18   Family/Caregiver Present No   Patient Found (position) Seated in wheelchair   Pt found with TLSO  (seat belt donned)   Precautions   General Precautions fall   Orthopedic Yes   Required Braces or Orthoses Yes   Spinal Brace TLSO   Visual/Auditory Vision impaired   Subjective   Patient states Pt agreeable to participate in group treatment session   Pain/Comfort   Pain Rating 1 6/10   Location - Side 1 Bilateral   Location - Orientation 1 lower   Location 1 back   Pain Addressed 1 Reposition;Distraction   Pain Rating Post-Intervention 1 6/10   Other Activities   Comments   Objective:    Patient participated in a 45 minute seated therapeutic exercises program with stand by assist. This group activity challenged the patient's upper and lower extremity strength, seated balance, and endurance to improve functional mobility, decrease risk of falls, and increase activity tolerance. Patient required 2 rest breaks during this activity. Patient performed the following exercises: hip flexion, LAQs, ankle pumps, glute sets, bicep curls, tricep extension, and shoulder extension/flexion/abduction, upper cut, diagonal punches. Patient performed      -Endurance 14 on the RPE scale. No pain complaints voiced or observed.      -Social Interaction:    Interacts appropriately with others - No medications needed. Patient asleep all shift (7)    Patient participated in a 45 minute seated endurance activity.  The group activity challenged dynamic sitting balance, core strengthening, bilateral upper and lower extremity strengthening, cardiovascular and respiratory capacity, and social affect/mood. Patient will improve activity tolerance by  requiring no rest breaks and maintain a RPE of 14 throughout the session.      Activity Tolerance   Activity Tolerance Patient tolerated treatment well   After Treatment   Patient Position After Treatment Seated in wheelchair   Patient after treatment left call button in reach   Treatment/Billable Minutes   Therapeutic Activity Group 45   Total Time 45     Farhana Owusu, PT  4/4/2018

## 2018-04-04 NOTE — ASSESSMENT & PLAN NOTE
Noted on 3/31. No signs of infection. Culture sent and Bacitracin ointment started. Continue to monitor    4/1 - CBC sent to monitor WBC. Will consult wound care  4/2 WBC 11, stable  4/3 Staples removed  4/4 no drainage. Cont wound care.

## 2018-04-04 NOTE — PROGRESS NOTES
"Physical Therapy   Treatment    Mary Carrillo   MRN: [id number]   PTA visit      04/04/18 1300   PT Time Calculation   PT Start Time 1300   PT Stop Time 1430   PT Total Time (min) 90 min   Treatment   Treatment Type Treatment   PT/PTA PTA   PTA Visit Number 1   General   PT Received On 04/04/18   Patient Found (position) Seated in wheelchair   Pt found with TLSO   Precautions   General Precautions fall   Orthopedic Yes   Required Braces or Orthoses Yes   Spinal Brace TLSO   Visual/Auditory Vision impaired   Subjective   Patient states "They took the staples from my back last night, so I'm hurting today."  Pt stated receiving pain meds prior to therapy session.   Pain/Comfort   Pain Rating 1 8/10   Location - Side 1 Bilateral   Location - Orientation 1 lower   Location 1 back   Pain Addressed 1 Pre-medicate for activity;Reposition;Distraction;Cessation of Activity;Nurse notified   Pain Rating Post-Intervention 1 7/10   Bed Mobility   Bed Mobility yes   Supine to Sit Maximum Assistance   Supine to Sit Comments rising from R side of mat    Sit to Supine Moderate Assistance  (to L)   Transfers   Transfer yes   Sit to Stand   Sit <> Stand Assistance Moderate Assistance   Sit <> Stand Assistive Device (// bars)   Trials/Comments three trials from wc   Stand to Sit   Assistance Maximum Assistance   Assistive Device (// bars)   Trials/Comments three trials to wc and c/o increased pain   Chair to Mat   Chair<> Mat Technique Squat Pivot   Chair<>Mat Assistance Minimum Assistance   Chair <> Mat Assistive Device No Assistive Device   Mat to Chair   Technique Squat Pivot   Assistance Minimum Assistance   Assistive Device No Assistive Device   Wheelchair Activities   Propulsion Yes   Propulsion Type 1 Manual   Level 1 Level tile   Method 1 Right upper extremity;Left upper extremity   Level of Assistance 1 Supervision   Description/ Details 1 200 feet   Gait   Gait No   Stairs   Stairs No   Balance   Balance Yes   Static Sitting " Balance   Static Sitting-Balance Support Right upper extremity supported;Left upper extremity supported;Feet supported   Static Sitting-Level of Assistance Supervision   Static Sitting-Comment/# of Minutes sitting edge of mat x 20 min   Dynamic Sitting Balance   Dynamic Sitting-Balance Support Feet supported   Dynamic Sitting-Balance Ball toss   Dynamic Sitting-Comments sitting edge of mat x five min   Static Standing Balance   Static Standing-Balance Support Right upper extremity supported;Left upper extremity supported   Static Standing-Level of Assistance Moderate assistance   Static Standing-Comment/# of Minutes three trials: 1 min; 25 sec; 20 sec, w/ c/o increased thoracic and lumbar  pain   Supine   Supine-Exercises Lower extremity   Supine-Exercise Type Ankle pumps;Glut sets;Short arc quads;Heel slides   Supine-Exercise Comments 2 x 20 reps   Seated   Seated-Exercises Lower extremity   Seated-Exercise Type Ankle pumps;Hip flexion;Long arc quads;ABduction   Seated-Exercise Comments 2 x 20 reps   Activity Tolerance   Activity Tolerance Patient limited by pain   After Treatment   Patient Position After Treatment Seated in wheelchair  (pt seen in P.T. group activity after P.T. session)   Assessment   Prognosis Fair   Problem List Decreased strength;Decreased endurance;Impaired balance;Decreased mobility;Obesity;Pain   Session Assessment increased pain   Assessment Pt w/ fair endurance and good participation in therapy session; pt reported increased back pain today, limiting pt's tolerance for standing activities.   Level of Motivation/Participation good   Plan   Planned Therapy Intervention Continue with current plan   Therapy Frequency 2 times/day;Monday-Friday;Saturday or Sunday   Treatment/Billable Minutes   Therapeutic Activity 35   Therapeutic Exercise 45   Train/Wheelchair Management 10   Total Time 90         Ricky Capellan, PTA  4/4/2018

## 2018-04-04 NOTE — SUBJECTIVE & OBJECTIVE
Interval History 4/4/2018:  Patient is seen for follow-up rehab evaluation and recommendations: Pt without new c/o's. Some pain over top of incision site. Staples were removed yesterday and proximal and distal ends had dehisced.  [facility] and [facility] are no changes from [facility].       Scheduled Medications:    amLODIPine  10 mg Oral Daily    carvedilol  25 mg Oral BID    gabapentin  300 mg Oral TID    heparin (porcine)  5,000 Units Subcutaneous Q8H    isoniazid  900 mg Oral Every Tues    isoniazid  900 mg Oral Every Fri    lisinopril  20 mg Oral Daily    polyethylene glycol  17 g Oral Daily    pyridoxine (vitamin B6)  50 mg Oral Daily    rifAMpin  600 mg Oral Every Tues    rifAMpin  600 mg Oral Every Fri    senna-docusate 8.6-50 mg  2 tablet Oral Daily    traZODone  100 mg Oral QHS       PRN Medications: acetaminophen, albuterol sulfate, bisacodyl, calcium carbonate, hydrOXYzine pamoate, magnesium hydroxide 400 mg/5 ml, methocarbamol, ondansetron, oxyCODONE-acetaminophen, ramelteon, senna, simethicone    Review of Systems   Constitutional: Positive for activity change. Negative for chills, fatigue and fever.   HENT: Negative for drooling, hearing loss, trouble swallowing and voice change.    Eyes: Negative for pain and visual disturbance.   Respiratory: Negative for cough, shortness of breath and wheezing.    Cardiovascular: Negative for chest pain and palpitations.   Gastrointestinal: Negative for abdominal distention, nausea and vomiting.   Genitourinary: Negative for difficulty urinating and flank pain.   Musculoskeletal: Positive for back pain, gait problem and myalgias. Negative for arthralgias and neck pain.   Skin: Positive for wound. Negative for rash.   Neurological: Positive for weakness. Negative for dizziness, numbness and headaches.   Psychiatric/Behavioral: Negative for agitation and hallucinations. The patient is not nervous/anxious.      Objective:     Vital  Signs (Most Recent):  Temp: 98.6 °F (37 °C) (18 07)  Pulse: 83 (18 0700)  Resp: 18 (18 07)  BP: 114/60 (18 07)  SpO2: 96 % (18)    Vital Signs (24h Range):  Temp:  [98.2 °F (36.8 °C)-98.6 °F (37 °C)] 98.6 °F (37 °C)  Pulse:  [73-83] 83  Resp:  [12-20] 18  SpO2:  [94 %-96 %] 96 %  BP: (102-117)/(56-60) 114/60     Physical Exam   Constitutional: She is oriented to person, place, and time. She appears well-developed and well-nourished. No distress.   Sitting up in medichair   HENT:   Head: Normocephalic and atraumatic.   Right Ear: External ear normal.   Left Ear: External ear normal.   Nose: Nose normal.   Eyes: Right eye exhibits no discharge. Left eye exhibits no discharge. No scleral icterus.   Neck: Normal range of motion.   Cardiovascular: Normal rate, regular rhythm and intact distal pulses.    Pulmonary/Chest: Effort normal. No respiratory distress. She has no wheezes.   Abdominal: Soft. She exhibits no distension. There is no tenderness.   Musculoskeletal: She exhibits no edema or tenderness.        Right ankle: She exhibits decreased range of motion (foot drop).   TLSO brace intact   Neurological: She is alert and oriented to person, place, and time. She exhibits normal muscle tone.   -  Mental Status:  AAOx3.  Follows commands.  Answers correct age and .  Recent and remote memory intact.  -  Speech and language:  no aphasia or dysarthria.    -  Vision:  no hemianopsia or ptosis.    -  Facial movement (CN VII): symmetrical.   -  Motor:  RUE: 4/5.  LUE: 4/5.  RLE:  Hip Flex 2/5, Knee Ext/Flex 3-/5,  DF 1/5, PF 4-/5.  LLE:  Hip Flex 2+/5, Knee Ext/Flex 4-/5,  DF 4/5, PF 4/5.  -  Sensory:  Intact to light touch and pin prick.   Skin: Skin is warm and dry. No rash noted.   Proximal incision site dehisced. No drainage.    Psychiatric: She has a normal mood and affect. Her behavior is normal. Thought content normal.   Vitals reviewed.    NEUROLOGICAL EXAMINATION:      MENTAL STATUS   Oriented to person, place, and time.

## 2018-04-05 LAB
ANION GAP SERPL CALC-SCNC: 8 MMOL/L
BASOPHILS # BLD AUTO: 0.04 K/UL
BASOPHILS NFR BLD: 0.4 %
BUN SERPL-MCNC: 20 MG/DL
CALCIUM SERPL-MCNC: 8.9 MG/DL
CHLORIDE SERPL-SCNC: 109 MMOL/L
CO2 SERPL-SCNC: 25 MMOL/L
CREAT SERPL-MCNC: 0.8 MG/DL
DIFFERENTIAL METHOD: ABNORMAL
EOSINOPHIL # BLD AUTO: 0.2 K/UL
EOSINOPHIL NFR BLD: 2.2 %
ERYTHROCYTE [DISTWIDTH] IN BLOOD BY AUTOMATED COUNT: 19.6 %
EST. GFR  (AFRICAN AMERICAN): >60 ML/MIN/1.73 M^2
EST. GFR  (NON AFRICAN AMERICAN): >60 ML/MIN/1.73 M^2
GLUCOSE SERPL-MCNC: 82 MG/DL
HCT VFR BLD AUTO: 28.2 %
HGB BLD-MCNC: 8.6 G/DL
IMM GRANULOCYTES # BLD AUTO: 0.1 K/UL
IMM GRANULOCYTES NFR BLD AUTO: 1 %
LYMPHOCYTES # BLD AUTO: 2.1 K/UL
LYMPHOCYTES NFR BLD: 19.8 %
MAGNESIUM SERPL-MCNC: 1.8 MG/DL
MCH RBC QN AUTO: 28 PG
MCHC RBC AUTO-ENTMCNC: 30.5 G/DL
MCV RBC AUTO: 92 FL
MONOCYTES # BLD AUTO: 1.2 K/UL
MONOCYTES NFR BLD: 11.5 %
NEUTROPHILS # BLD AUTO: 6.8 K/UL
NEUTROPHILS NFR BLD: 65.1 %
NRBC BLD-RTO: 0 /100 WBC
PHOSPHATE SERPL-MCNC: 3.4 MG/DL
PLATELET # BLD AUTO: 318 K/UL
PMV BLD AUTO: 9.9 FL
POTASSIUM SERPL-SCNC: 4.3 MMOL/L
RBC # BLD AUTO: 3.07 M/UL
SODIUM SERPL-SCNC: 142 MMOL/L
WBC # BLD AUTO: 10.39 K/UL

## 2018-04-05 PROCEDURE — 97530 THERAPEUTIC ACTIVITIES: CPT

## 2018-04-05 PROCEDURE — 63600175 PHARM REV CODE 636 W HCPCS: Performed by: HOSPITALIST

## 2018-04-05 PROCEDURE — 97110 THERAPEUTIC EXERCISES: CPT

## 2018-04-05 PROCEDURE — 85025 COMPLETE CBC W/AUTO DIFF WBC: CPT

## 2018-04-05 PROCEDURE — 97150 GROUP THERAPEUTIC PROCEDURES: CPT

## 2018-04-05 PROCEDURE — 11800000 HC REHAB PRIVATE ROOM

## 2018-04-05 PROCEDURE — 25000003 PHARM REV CODE 250: Performed by: PHYSICAL MEDICINE & REHABILITATION

## 2018-04-05 PROCEDURE — 25000003 PHARM REV CODE 250: Performed by: HOSPITALIST

## 2018-04-05 PROCEDURE — 80048 BASIC METABOLIC PNL TOTAL CA: CPT

## 2018-04-05 PROCEDURE — 99232 SBSQ HOSP IP/OBS MODERATE 35: CPT | Mod: ,,, | Performed by: PHYSICAL MEDICINE & REHABILITATION

## 2018-04-05 PROCEDURE — 84100 ASSAY OF PHOSPHORUS: CPT

## 2018-04-05 PROCEDURE — 36415 COLL VENOUS BLD VENIPUNCTURE: CPT

## 2018-04-05 PROCEDURE — 83735 ASSAY OF MAGNESIUM: CPT

## 2018-04-05 RX ADMIN — Medication 50 MG: at 07:04

## 2018-04-05 RX ADMIN — AMOXICILLIN AND CLAVULANATE POTASSIUM 1 TABLET: 875; 125 TABLET, FILM COATED ORAL at 07:04

## 2018-04-05 RX ADMIN — LISINOPRIL 10 MG: 10 TABLET ORAL at 07:04

## 2018-04-05 RX ADMIN — GABAPENTIN 300 MG: 300 CAPSULE ORAL at 03:04

## 2018-04-05 RX ADMIN — HEPARIN SODIUM 5000 UNITS: 5000 INJECTION, SOLUTION INTRAVENOUS; SUBCUTANEOUS at 05:04

## 2018-04-05 RX ADMIN — STANDARDIZED SENNA CONCENTRATE AND DOCUSATE SODIUM 2 TABLET: 8.6; 5 TABLET, FILM COATED ORAL at 07:04

## 2018-04-05 RX ADMIN — TRAZODONE HYDROCHLORIDE 100 MG: 50 TABLET ORAL at 08:04

## 2018-04-05 RX ADMIN — POLYETHYLENE GLYCOL 3350 17 G: 17 POWDER, FOR SOLUTION ORAL at 07:04

## 2018-04-05 RX ADMIN — CARVEDILOL 25 MG: 25 TABLET, FILM COATED ORAL at 07:04

## 2018-04-05 RX ADMIN — HEPARIN SODIUM 5000 UNITS: 5000 INJECTION, SOLUTION INTRAVENOUS; SUBCUTANEOUS at 02:04

## 2018-04-05 RX ADMIN — OXYCODONE AND ACETAMINOPHEN 1 TABLET: 10; 325 TABLET ORAL at 07:04

## 2018-04-05 RX ADMIN — GABAPENTIN 300 MG: 300 CAPSULE ORAL at 08:04

## 2018-04-05 RX ADMIN — OXYCODONE AND ACETAMINOPHEN 1 TABLET: 10; 325 TABLET ORAL at 10:04

## 2018-04-05 RX ADMIN — AMLODIPINE BESYLATE 10 MG: 10 TABLET ORAL at 07:04

## 2018-04-05 RX ADMIN — HEPARIN SODIUM 5000 UNITS: 5000 INJECTION, SOLUTION INTRAVENOUS; SUBCUTANEOUS at 08:04

## 2018-04-05 NOTE — PROGRESS NOTES
"Occupational Therapy  PM Treatment  Mary Carrillo   MRN: [id number]   Room/Bed: E257/E257 A       04/05/18 1259   OT Time Calculation   OT Start Time 1259   OT Stop Time 1429   OT Total Time (min) 90 min   General   OT Date of Treatment 04/05/18   Family/Caregiver Present Yes  (spouse, grandson)   Patient Found (position) Seated in wheelchair   Pt found with TLSO   Precautions   General Precautions fall   Orthopedic Yes   Required Braces or Orthoses Yes   Spinal Brace TLSO   Visual/Auditory Vision impaired   Subjective   Patient states "I would like to get more time here."    Pain/Comfort   Pain Rating 4/10   Location - Side Right   Location hip   Pain Addressed Pre-medicate for activity;Reposition;Distraction   Pain Comment Pt reported she received pain meds prior to session   Bed Mobility   Bed Mobility yes   Rolling/Turning Right Stand by assistance   Rolling/Turning Right Comments SBA for safety on mat   Scooting/Bridging Stand by Assistance   Scooting/Bridging Comments to EOM   Supine to Sit Moderate Assistance   Supine to Sit Comments Slight lift for trunk elevation on mat   Sit to Supine Minimum Assistance   Sit to Supine Comments Min (A) for RLE over EOM   Transfers   Transfer yes   Chair to Mat   Chair <>Mat Technique Squat Pivot   Chair <> Mat Assistance Moderate Assistance   Chair<> Mat Assistive Device No Assistive Device   Trials/Comments Mod (A) from w/c > EOM towards R side   Mat to Chair   Technique Squat Pivot   Assistance Moderate Assistance   Assistive Device No Assistive Device   Trials/Comments Mod (A) from EOM > w/c   Exercise Tools   Exercise Tools Yes   Rickshaw 3x20 10# to increase B triceps strength for functional t/fs   Bilateral Moy 5x20 5# flat surface to increase UB strength for IADL prep   Additional Activities   Additional Activities Other (Comment)   Additional Activities Comments Pt completed puzzle seated at EOM with close SBA for increased problem solving, dynamic sitting balance. " "Pt also performed 4x20 reps of chest presses with 3# dowel while hitting beach volleyball with trunk stabilized and only using BUE AROM elbow flexion/extension and shld flexion to increase trunk control and postural control in sitting. Pt performed 2x20 reps each of leg raises with emphasis on B knee extension in supine for increased (I) with raising BLE over EOB for bed mobility. Pt performed x5 squats seated EOM with mod (A) for lifting for functional t/f training and BLE strength.    Activity Tolerance   Activity Tolerance Patient tolerated treatment well   After Treatment   Patient Position After Treatment Seated in wheelchair   Patient after treatment left call button in reach  (family and RN Delores present; safety belt intact)   Assessment   Prognosis Good   Problem List Decreased Self Care skills;Decreased upper extremity range of motion;Decreased upper extremity strength;Decreased safe judgment during ADL;Decreased cognition;Decreased endurance;Decreased sensation;Decreased functional mobility;Decreased gross motor control;Decreased IADLs;Decreased trunk control for functional activities   Assessment Pt tolerated tx session well today with a report of "manageable pain" in R hip. Pt improving with t/fs from/to w/c; pt performed scoots with SBA while seated on EOM, then performed squat pivot with mod (A) to w/c. Pt improving with functional endurance and dynamic sitting balance with good trunk stabilization while seated EOM. Pt continues with decreased BLE strength  and functional mobility and would continue to benefit from skilled OT services to increase (I) with ADLs/IADLs.    Level of Motivation/Participation good   Discharge Recommendations   Equipment Needed After Discharge 3-in-1 commode;bath bench   Discharge Facility/Level Of Care Needs home health OT   Plan   Plan Continue with current plan   Therapy Frequency 2 times/day   Occupational Therapy Follow-up   OT Follow-up? Yes   Treatment/Billable " Minutes   Therapeutic Activity 45   Therapeutic Exercise 45   Total Time 90       Deb Kim OT  4/5/2018    Patient with wheelchair safety belt fastened and able to self release wheelchair safety belt. Pt left seated in w/c next to bedside with family present (location) with call light and all necessities in reach, nursing notified.     LEGEND:   CGA: Contact Guard Assist   EOB: Edgeof Bed   HHA: Hand Held Assist   HOB: Head of Bed   (I): Independent-patient performs task in a timely manner   Max (A): Maximal Assist-patient performs 25-49% of task   Min (A): Minimal Assist- patient performs 75% or more of task   Mod (A): Moderate Assist- patient performs 50-74% of task   NA: Not applicable   NT: Not tested   OOB: Out of Bed   PTA: Prior to admit   QC: Quad Cane   RW: Rolling Walker   (S): Supervision- patient requires cues, coaxing, prompting   SBA: Stand By Assist   SC: Straight Cane   SW: Standard Walker   TBA: To be assessed   Total (A): Total Assist- patient performs less than 25% of task   WC: Wheelchair   WFL: Within Functional Limits   WNL: Within Normal Limits

## 2018-04-05 NOTE — PROGRESS NOTES
"Physical Therapy   Treatment    Mary Carrillo   MRN: [id number]      04/05/18 1030   PT Time Calculation   PT Start Time 1030   PT Stop Time 1115   PT Total Time (min) 45 min   Treatment   Treatment Type Treatment   PT/PTA PT   General   PT Received On 04/05/18   Family/Caregiver Present No   Patient Found (position) Seated in wheelchair   Pt found with TLSO   Precautions   General Precautions fall   Orthopedic Yes   Required Braces or Orthoses Yes   Spinal Brace TLSO.  TLSO was adjusted for proper fit.    Visual/Auditory Vision impaired   Subjective   Patient states "I just have this right hip pain that is bothering me, it's all the time."    Pain/Comfort   Pain Rating 1 3/10. Pt describes pain as throbbing.    Location - Side 1 Right   Location 1 hip   Pain Addressed 1 Reposition;Distraction   Pain Rating Post-Intervention 1 3/10   Transfers   Transfer yes   Sit to Stand   Sit <> Stand Assistance Moderate Assistance   Sit <> Stand Assistive Device Other (see comments)  (// bars)   Trials/Comments X4 trials   Stand to Sit   Assistance Moderate Assistance   Assistive Device (//Bars)   Trials/Comments X4 trials   Chair to Mat   Chair<> Mat Technique Squat Pivot   Chair<>Mat Assistance Moderate Assistance   Chair <> Mat Assistive Device No Assistive Device   Trials/Comments X2 Trials Assistance with lifting and guiding pelvis.    Mat to Chair   Technique Squat Pivot   Assistance Minimal Assist, Moderate Assist.   First trial: Min A, Second Trial: Mod A.   Assistive Device No Assistive Device   Trials/Comments X2 Trials Assistance with lifting and guiding pelvis into chair.    Wheelchair Activities   Propulsion Yes   Propulsion Type 1 Manual   Level 1 Level tile   Method 1 Right upper extremity;Left upper extremity   Level of Assistance 1 Supervision   Description/ Details 1 X200 feet, pt made 4 180* turns.    Balance   Balance Yes   Dynamic Sitting Balance   Dynamic Sitting-Balance Support Feet supported   Dynamic " Sitting-Balance Ball toss   Dynamic Sitting-Comments Sitting edge of mat for 8 minutes total for therex and ball toss   Static Standing Balance   Static Standing-Balance Support Right upper extremity supported;Left upper extremity supported   Static Standing-Level of Assistance Moderate assistance   Static Standing-Comment/# of Minutes X3 trials: 1:29, 1:06,:48, 1:28. Pt required frequent verbal/tactile cueing to achieve an  upright posture and proper hand/feet placement.    Seated   Seated-Exercises Lower extremity   Seated-Exercise Type Long arc quads;ABduction;ADduction   Seated-Exercise Comments Hip abduction and adduction 2 second holds each.    Activity Tolerance   Activity Tolerance Patient tolerated treatment well   After Treatment   Patient Position After Treatment Seated in wheelchair   Patient after treatment left (seat belt donned, pt escorted to room for lunch, call light )   Assessment   Prognosis Fair   Problem List Decreased strength;Decreased range of motion;Decreased endurance;Impaired balance;Decreased mobility;Impaired sensation   Session Assessment progressing toward goals   Assessment Mrs. Carrillo demonstrates good participation and fair endurance during therapy session. Pt with increased forward trunk lean during standing. With verbal and tactile cueing she can briefly maintain an upright position. She would continued to benefit from continued skilled IPR PT services to improve on current impairments.   Level of Motivation/Participation Good   Barriers to Discharge Decreased caregiver support   Barriers to Discharge Comments Pt will need physical assistance after discarge.    Plan   Planned Therapy Intervention Continue with current plan;Bed mobility training;Transfer training;Gait training;Balance Training;Strengthening;Neuromuscular Re-education;Wheelchair Management/Propulsion;Postural Re-education;Motor Coordination Training   Therapy Frequency 2 times/day;Monday-Friday;Saturday or Sunday    Plan of Care Expires on 04/21/18   Physical Therapy Follow-up   PT Follow-up? Yes   PT - Next Visit Date 04/06/18   Treatment/Billable Minutes   Therapeutic Activity 30   Therapeutic Exercise 15   Total Time 45         Equipment needed after discharge: Bath bench and 3 in 1 commode.  Therapy Recommendation: Home Health Physical Therapy.     Freedom Gillis, PT, DPT  4/5/2018

## 2018-04-05 NOTE — PROGRESS NOTES
Wound care follow-up;  Staples noted removed from mid back incision with steri-strips noted. The proximal and distal aspects of the incision are dehisced with slough noted over 50% of the wounds.  The wounds are malodorous before cleaning, not after cleaning.  No erythema, no purulent drainage noted.  Povidone-iodine solution applied to incision after cleaning, hydrofiber dressing applied (aquacel) to wick away  Moisture.  Plan of care discussed with patient/family who verbalized understanding.Plan of care discussed with patient who verbalized understanding. Discussed with Dr. Squires and approved current treatment plan.   Recommendations --continue current care.      04/05/18 0945       Incision/Site 03/18/18 1634 Back   Date First Assessed/Time First Assessed: 03/18/18 1634   Location: Back   Wound Image    Incision WDL ex   Dressing Appearance Clean;Dried drainage  (possible betadine)   Drainage Amount Scant   Drainage Characteristics/Odor Clear;Brown   Appearance Slough;Red;Steri-strips intact   Red (%), Wound Tissue Color 50 %   Yellow (%), Wound Tissue Color 50 %  (distal and proximal edges)   Periwound Area Intact;Dry   Wound Edges Dehisced   Care Cleansed with:;Wound cleanser;Applied:;Povidone iodine   Dressing Hydrofiber   Dressing Change Due 04/06/18

## 2018-04-05 NOTE — PROGRESS NOTES
[facility]  Physical Medicine & Rehab  Progress Note    Patient Name: Mary Carrillo  MRN: [id number]  Patient Class: IP- Rehab   Admission Date: 3/27/2018  Length of Stay: 9 days  Attending Physician: Jack Squires MD  Primary Care Provider: Jose Khan MD    Subjective:     Principal Problem:Osteoarthritis of spine with myelopathy, thoracolumbar region    Interval History 4/5/2018:  Patient is seen for follow-up rehab evaluation and recommendations: Pt without new c/o's. Pain over top of incision site feels better today. Nutrition recommended adding boost supplements to promote wound healing. I have reviewed the HPI and PMFSH and there are no changes from admission.       Scheduled Medications:    amLODIPine  10 mg Oral Daily    amoxicillin-clavulanate 875-125mg  1 tablet Oral Q12H    carvedilol  25 mg Oral BID    gabapentin  300 mg Oral TID    heparin (porcine)  5,000 Units Subcutaneous Q8H    isoniazid  900 mg Oral Every Tues    isoniazid  900 mg Oral Every Fri    lisinopril  10 mg Oral Daily    polyethylene glycol  17 g Oral Daily    pyridoxine (vitamin B6)  50 mg Oral Daily    rifAMpin  600 mg Oral Every Tues    rifAMpin  600 mg Oral Every Fri    senna-docusate 8.6-50 mg  2 tablet Oral Daily    traZODone  100 mg Oral QHS       PRN Medications: acetaminophen, albuterol sulfate, bisacodyl, calcium carbonate, hydrOXYzine pamoate, magnesium hydroxide 400 mg/5 ml, methocarbamol, ondansetron, oxyCODONE-acetaminophen, ramelteon, senna, simethicone    Review of Systems   Constitutional: Positive for activity change. Negative for chills, fatigue and fever.   HENT: Negative for drooling, hearing loss, trouble swallowing and voice change.    Eyes: Negative for pain and visual disturbance.   Respiratory: Negative for cough, shortness of breath and wheezing.    Cardiovascular: Negative for chest pain and palpitations.   Gastrointestinal: Negative for abdominal distention, nausea and  vomiting.   Genitourinary: Negative for difficulty urinating and flank pain.   Musculoskeletal: Positive for back pain, gait problem and myalgias. Negative for arthralgias and neck pain.   Skin: Positive for wound. Negative for rash.   Neurological: Positive for weakness. Negative for dizziness, numbness and headaches.   Psychiatric/Behavioral: Negative for agitation and hallucinations. The patient is not nervous/anxious.      Objective:     Vital Signs (Most Recent):  Temp: 98.9 °F (37.2 °C) (18 0700)  Pulse: 78 (18 0825)  Resp: 18 (18 0700)  BP: 122/60 (18 0825)  SpO2: 96 % (18 0700)    Vital Signs (24h Range):  Temp:  [98.9 °F (37.2 °C)-99.1 °F (37.3 °C)] 98.9 °F (37.2 °C)  Pulse:  [72-85] 78  Resp:  [16-18] 18  SpO2:  [94 %-96 %] 96 %  BP: (122-168)/(57-73) 122/60     Physical Exam   Constitutional: She is oriented to person, place, and time. She appears well-developed and well-nourished. No distress.   Sitting up in medichair   HENT:   Head: Normocephalic and atraumatic.   Right Ear: External ear normal.   Left Ear: External ear normal.   Nose: Nose normal.   Eyes: Right eye exhibits no discharge. Left eye exhibits no discharge. No scleral icterus.   Neck: Normal range of motion.   Cardiovascular: Normal rate, regular rhythm and intact distal pulses.    Pulmonary/Chest: Effort normal. No respiratory distress. She has no wheezes.   Abdominal: Soft. She exhibits no distension. There is no tenderness.   Musculoskeletal: She exhibits no edema or tenderness.        Right ankle: She exhibits decreased range of motion (foot drop).   TLSO brace intact   Neurological: She is alert and oriented to person, place, and time. She exhibits normal muscle tone.   -  Mental Status:  AAOx3.  Follows commands.  Answers correct age and .  Recent and remote memory intact.  -  Speech and language:  no aphasia or dysarthria.    -  Vision:  no hemianopsia or ptosis.    -  Facial movement (CN VII):  symmetrical.   -  Motor:  RUE: 4/5.  LUE: 4/5.  RLE:  Hip Flex 2/5, Knee Ext/Flex 3-/5,  DF 1/5, PF 4-/5.  LLE:  Hip Flex 2+/5, Knee Ext/Flex 4-/5,  DF 4/5, PF 4/5.  -  Sensory:  Intact to light touch and pin prick.   Skin: Skin is warm and dry. No rash noted.   Proximal incision site dehisced. No drainage.    Psychiatric: She has a normal mood and affect. Her behavior is normal. Thought content normal.   Vitals reviewed.    NEUROLOGICAL EXAMINATION:     MENTAL STATUS   Oriented to person, place, and time.       Assessment/Plan:      Mary Carrillo is a 63 y.o. female admitted to inpatient rehabilitation on 3/27/2018 for Osteoarthritis of spine with myelopathy, thoracolumbar region with impaired mobility and ADLs. Patient remains appropriate for PT, OT, and as required Speech therapy. Patient continues to require 24 hour nursing care as well as daily Physician assessment.    * Osteoarthritis of spine with myelopathy, thoracolumbar region    -  MRI T-spine revealed vertebral osteomyelitis/discitis T12-L1 with severe compression and effacement of the ventral and dorsal subarachnoid spaces  -  S/p T12-L1 corpectomy with T10-L3 fusion on 3/18/18  -  PT/OT evaluate and treat  -  Pain management  -  Monitor for bowel and bladder dysfunction  -  Monitor for spasticity  -  Monitor for and prevent skin breakdown and pressure ulcers  Early mobility, repositioning/weight shifting every 20-30 minutes when sitting, turn patient every 2 hours, proper mattress/overlay and chair cushioning, pressure relief/heel protector boots  -  DVT prophylaxis:  on [facility]        Dehiscence of operative wound    Noted on 3/31. No signs of infection. Culture sent and Bacitracin ointment started. Continue to monitor    4/1 - CBC sent to monitor WBC. Will consult wound care  4/2 WBC 11, stable  4/3 Staples removed  4/4 no drainage early morning, some reported late morning so ESR, CRP, CBC ordered. Cont wound care.  4/5 ESR, CRP elevated, but WBC within  normal limits. Boost Plus added to promote nutrition for wound healing.       Acute blood loss as cause of postoperative anemia    Continue to monitor H/H. Previous transfusions performed    4/2 H/H improved, 8.5/28.4          HTN (hypertension), benign    Continue Coreg and Amlodipine    3/29 - Lisinopril 10mg daily  3/30 - improved  4/2 - sbp 130s-160. Lisinopril increased to 20  4/3  this morning, improved  4/4 dec lisinopril back to 10 for low DBP        Sarcoidosis    Currently holding Methotrexate due to TB     Right hip pain  4/5 xray       DISCHARGE PLANNING:  Tentative Discharge Date:     Future Appointments  Date Time Provider Department Center   4/9/2018 10:40 AM [facility] Bled[facility] [place]   5/7/2018 2:00 PM [facility] [place]   5/7/2018 2:40 PM Andi Swain DO [facility] [place]       Edith Quiles MD  Department of Physical Medicine & Rehab   [facility]

## 2018-04-05 NOTE — PROGRESS NOTES
Occupational Therapy   Treatment    Mary Carrillo  MRN: [id number]  Room/Bed: E257/E257 A       04/05/18 1500   OT Time Calculation   OT Start Time 1500   OT Stop Time 1545   OT Total Time (min) 45 min   General   OT Date of Treatment 04/05/18   Family/Caregiver Present Yes   Patient Found (position) Seated in wheelchair   Pt found with TLSO   Precautions   General Precautions fall   Orthopedic Yes   Required Braces or Orthoses Yes   Spinal Brace TLSO   Visual/Auditory Vision impaired   Subjective   Patient states I have pain in this R hip   Pain/Comfort   Pain Rating 3/10   Location - Side Right   Location hip   Pain Addressed Distraction   OT Therapeutic Groups   OT Therapeutic Yes   Other S Objective:    Patient participated in a 45 minute seated therapeutic exercises program with SBA  assist. This group activity challenged the patient's upper and lower extremity strength, seated balance, and endurance to improve functional mobility, decrease risk of falls, and increase activity tolerance. Patient required  occasional rest breaks during this activity. Patient performed the following exercises: hip flexion, LAQs, ankle pumps, glute sets, bicep curls, tricep extension, and shoulder extension/flexion/abduction.      -Endurance 13  on the RPE scale. No pain complaints voiced or observed.      -Social Interaction:        Interacts appropriately 90% of time - needs monitoring or encouragement for participation or interaction  (5)     Assessment:  Patient participated in a 45 minute seated endurance activity.  The group activity challenged dynamic sitting balance, core strengthening, bilateral upper and lower extremity strengthening, cardiovascular and respiratory capacity, and social affect/mood. Patient will improve activity tolerance by requiring no rest breaks and maintain a RPE of 15 throughout the session.    Activity Tolerance   Activity Tolerance Patient tolerated treatment well   After Treatment   Patient Position  After Treatment Seated in wheelchair   Plan   Plan Continue with current plan   Therapy Frequency 2 times/day;Monday-Friday   Occupational Therapy Follow-up   OT Follow-up? Yes   Treatment/Billable Minutes   Therapeutic Group 45   Total Time 45       DK Mckeon  4/5/2018     Patient with wheelchair safety belt fastened and able to self release wheelchair safety belt. Pt left seated in wheelchair with nursing.

## 2018-04-05 NOTE — SUBJECTIVE & OBJECTIVE
Interval History 4/5/2018:  Patient is seen for follow-up rehab evaluation and recommendations: Pt without new c/o's. Pain over top of incision site feels better today. Nutrition recommended adding boost supplements to promote wound healing. I have reviewed the HPI and PMFSH and there are no changes from admission.       Scheduled Medications:    amLODIPine  10 mg Oral Daily    amoxicillin-clavulanate 875-125mg  1 tablet Oral Q12H    carvedilol  25 mg Oral BID    gabapentin  300 mg Oral TID    heparin (porcine)  5,000 Units Subcutaneous Q8H    isoniazid  900 mg Oral Every Tues    isoniazid  900 mg Oral Every Fri    lisinopril  10 mg Oral Daily    polyethylene glycol  17 g Oral Daily    pyridoxine (vitamin B6)  50 mg Oral Daily    rifAMpin  600 mg Oral Every Tues    rifAMpin  600 mg Oral Every Fri    senna-docusate 8.6-50 mg  2 tablet Oral Daily    traZODone  100 mg Oral QHS       PRN Medications: acetaminophen, albuterol sulfate, bisacodyl, calcium carbonate, hydrOXYzine pamoate, magnesium hydroxide 400 mg/5 ml, methocarbamol, ondansetron, oxyCODONE-acetaminophen, ramelteon, senna, simethicone    Review of Systems   Constitutional: Positive for activity change. Negative for chills, fatigue and fever.   HENT: Negative for drooling, hearing loss, trouble swallowing and voice change.    Eyes: Negative for pain and visual disturbance.   Respiratory: Negative for cough, shortness of breath and wheezing.    Cardiovascular: Negative for chest pain and palpitations.   Gastrointestinal: Negative for abdominal distention, nausea and vomiting.   Genitourinary: Negative for difficulty urinating and flank pain.   Musculoskeletal: Positive for back pain, gait problem and myalgias. Negative for arthralgias and neck pain.   Skin: Positive for wound. Negative for rash.   Neurological: Positive for weakness. Negative for dizziness, numbness and headaches.   Psychiatric/Behavioral: Negative for agitation and  hallucinations. The patient is not nervous/anxious.      Objective:     Vital Signs (Most Recent):  Temp: 98.9 °F (37.2 °C) (18 0700)  Pulse: 78 (18 0825)  Resp: 18 (18 0700)  BP: 122/60 (18 0825)  SpO2: 96 % (18 0700)    Vital Signs (24h Range):  Temp:  [98.9 °F (37.2 °C)-99.1 °F (37.3 °C)] 98.9 °F (37.2 °C)  Pulse:  [72-85] 78  Resp:  [16-18] 18  SpO2:  [94 %-96 %] 96 %  BP: (122-168)/(57-73) 122/60     Physical Exam   Constitutional: She is oriented to person, place, and time. She appears well-developed and well-nourished. No distress.   Sitting up in medichair   HENT:   Head: Normocephalic and atraumatic.   Right Ear: External ear normal.   Left Ear: External ear normal.   Nose: Nose normal.   Eyes: Right eye exhibits no discharge. Left eye exhibits no discharge. No scleral icterus.   Neck: Normal range of motion.   Cardiovascular: Normal rate, regular rhythm and intact distal pulses.    Pulmonary/Chest: Effort normal. No respiratory distress. She has no wheezes.   Abdominal: Soft. She exhibits no distension. There is no tenderness.   Musculoskeletal: She exhibits no edema or tenderness.        Right ankle: She exhibits decreased range of motion (foot drop).   TLSO brace intact   Neurological: She is alert and oriented to person, place, and time. She exhibits normal muscle tone.   -  Mental Status:  AAOx3.  Follows commands.  Answers correct age and .  Recent and remote memory intact.  -  Speech and language:  no aphasia or dysarthria.    -  Vision:  no hemianopsia or ptosis.    -  Facial movement (CN VII): symmetrical.   -  Motor:  RUE: 4/5.  LUE: 4/5.  RLE:  Hip Flex 2/5, Knee Ext/Flex 3-/5,  DF 1/5, PF 4-/5.  LLE:  Hip Flex 2+/5, Knee Ext/Flex 4-/5,  DF 4/5, PF 4/5.  -  Sensory:  Intact to light touch and pin prick.   Skin: Skin is warm and dry. No rash noted.   Proximal incision site dehisced. No drainage.    Psychiatric: She has a normal mood and affect. Her behavior is  normal. Thought content normal.   Vitals reviewed.    NEUROLOGICAL EXAMINATION:     MENTAL STATUS   Oriented to person, place, and time.

## 2018-04-06 PROCEDURE — 97110 THERAPEUTIC EXERCISES: CPT

## 2018-04-06 PROCEDURE — 99233 SBSQ HOSP IP/OBS HIGH 50: CPT | Mod: ,,, | Performed by: PHYSICAL MEDICINE & REHABILITATION

## 2018-04-06 PROCEDURE — 11800000 HC REHAB PRIVATE ROOM

## 2018-04-06 PROCEDURE — 25000003 PHARM REV CODE 250: Performed by: PHYSICAL MEDICINE & REHABILITATION

## 2018-04-06 PROCEDURE — 25000003 PHARM REV CODE 250: Performed by: HOSPITALIST

## 2018-04-06 PROCEDURE — 97530 THERAPEUTIC ACTIVITIES: CPT

## 2018-04-06 PROCEDURE — 63600175 PHARM REV CODE 636 W HCPCS: Performed by: HOSPITALIST

## 2018-04-06 PROCEDURE — 97150 GROUP THERAPEUTIC PROCEDURES: CPT

## 2018-04-06 PROCEDURE — 97535 SELF CARE MNGMENT TRAINING: CPT

## 2018-04-06 RX ORDER — LISINOPRIL 20 MG/1
20 TABLET ORAL DAILY
Status: DISCONTINUED | OUTPATIENT
Start: 2018-04-07 | End: 2018-04-06

## 2018-04-06 RX ADMIN — OXYCODONE AND ACETAMINOPHEN 1 TABLET: 10; 325 TABLET ORAL at 12:04

## 2018-04-06 RX ADMIN — HEPARIN SODIUM 5000 UNITS: 5000 INJECTION, SOLUTION INTRAVENOUS; SUBCUTANEOUS at 09:04

## 2018-04-06 RX ADMIN — HEPARIN SODIUM 5000 UNITS: 5000 INJECTION, SOLUTION INTRAVENOUS; SUBCUTANEOUS at 05:04

## 2018-04-06 RX ADMIN — GABAPENTIN 300 MG: 300 CAPSULE ORAL at 03:04

## 2018-04-06 RX ADMIN — LISINOPRIL 10 MG: 10 TABLET ORAL at 07:04

## 2018-04-06 RX ADMIN — GABAPENTIN 300 MG: 300 CAPSULE ORAL at 09:04

## 2018-04-06 RX ADMIN — TRAZODONE HYDROCHLORIDE 100 MG: 50 TABLET ORAL at 09:04

## 2018-04-06 RX ADMIN — AMLODIPINE BESYLATE 10 MG: 10 TABLET ORAL at 07:04

## 2018-04-06 RX ADMIN — OXYCODONE AND ACETAMINOPHEN 1 TABLET: 10; 325 TABLET ORAL at 06:04

## 2018-04-06 RX ADMIN — HEPARIN SODIUM 5000 UNITS: 5000 INJECTION, SOLUTION INTRAVENOUS; SUBCUTANEOUS at 03:04

## 2018-04-06 RX ADMIN — Medication 50 MG: at 07:04

## 2018-04-06 RX ADMIN — CARVEDILOL 25 MG: 25 TABLET, FILM COATED ORAL at 07:04

## 2018-04-06 RX ADMIN — RIFAMPIN 600 MG: 300 CAPSULE ORAL at 09:04

## 2018-04-06 RX ADMIN — ISONIAZID 900 MG: 300 TABLET ORAL at 09:04

## 2018-04-06 RX ADMIN — AMOXICILLIN AND CLAVULANATE POTASSIUM 1 TABLET: 875; 125 TABLET, FILM COATED ORAL at 09:04

## 2018-04-06 RX ADMIN — OXYCODONE AND ACETAMINOPHEN 1 TABLET: 10; 325 TABLET ORAL at 07:04

## 2018-04-06 RX ADMIN — AMOXICILLIN AND CLAVULANATE POTASSIUM 1 TABLET: 875; 125 TABLET, FILM COATED ORAL at 07:04

## 2018-04-06 RX ADMIN — CARVEDILOL 25 MG: 25 TABLET, FILM COATED ORAL at 08:04

## 2018-04-06 NOTE — PROGRESS NOTES
"Occupational Therapy  PM Treatment session  Mary Carrillo   MRN: [id number]     A client care conference was performed between the LOTR and CASTELLON, prior to treatment by CASTELLON, to discuss the patient's status, treatment plan and established goals.      04/06/18 1415   OT Time Calculation   OT Start Time 1415   OT Stop Time 1500   OT Total Time (min) 45 min   General   OT Date of Treatment 04/06/18   Family/Caregiver Present No   Patient Found (position) Seated in wheelchair   Pt found with TLSO   Precautions   General Precautions fall   Orthopedic Yes   Required Braces or Orthoses Yes   Spinal Brace TLSO   Visual/Auditory Vision impaired   Subjective   Patient states "they gave me something before" re: for pain in back   Family states    Pain/Comfort   Pain Rating 7/10   Location - Orientation medial   Location back   Pain Addressed Pre-medicate for activity;Distraction   Bed Mobility   Scooting/Bridging Stand by Assistance   Scooting/Bridging Comments to EOM   Supine to Sit Moderate Assistance   Sit to Supine Minimum Assistance   Chair to Mat   Chair <>Mat Technique Squat Pivot   Chair <> Mat Assistance Moderate Assistance   Chair<> Mat Assistive Device No Assistive Device   Trials/Comments for lift and lower during pivot; WC <> EOM with cues for hand placement   LE Dressing   LE Dressing Adaptive Equipment Reacher;Sock aide;Dressing stick   LE Dressing Level of Assistance Minimum assistance   Sock Level of Assistance Stand by assistance   LE Dressing Where Assessed Other (Comment)   LE Dressing Comments using AE while EOM for shoes/socks; supine @ Mat for clothing management   Exercise Tools   Adrienne 15# 3/20x reps for UE strengthening   Additional Activities   Additional Activities Other (Comment)   Additional Activities Comments Pt seated @ EOM using p/u blocks BUE AROM 3/15x reps   Activity Tolerance   Activity Tolerance Patient tolerated treatment well   After Treatment   Patient Position After Treatment Seated in " wheelchair   Patient after treatment left call button in reach   Assessment   Prognosis Good   Problem List Decreased Self Care skills;Decreased upper extremity range of motion;Decreased upper extremity strength;Decreased safe judgment during ADL;Decreased cognition;Decreased endurance;Decreased sensation;Decreased functional mobility;Decreased gross motor control;Decreased IADLs;Decreased trunk control for functional activities   Assessment Pt pleasant and using good effort during therapy. Pt with good carryover using AE for Lower body dress strategy. Pt would continue to benefit from OT services to increase functional mobility.   Level of Motivation/Participation Good   Discharge Recommendations   Equipment Needed After Discharge 3-in-1 commode;bath bench;slide board;wheelchair   Discharge Facility/Level Of Care Needs home with home health   Plan   Plan Continue with current plan   Therapy Frequency 2 times/day;Monday-Friday;Saturday or Sunday   Treatment/Billable Minutes   Self Care/Home Management 20   Therapeutic Exercise 25   Total Time 45         DANNA Modi 4/6/2018

## 2018-04-06 NOTE — ASSESSMENT & PLAN NOTE
Noted on 3/31. No signs of infection. Culture sent and Bacitracin ointment started. Continue to monitor    4/1 - CBC sent to monitor WBC. Will consult wound care  4/2 WBC 11, stable  4/3 Staples removed  4/4 no drainage early morning, some reported late morning so ESR, CRP, CBC ordered. Cont wound care.  4/5 ESR, CRP elevated, but WBC within normal limits. Augmentin started. Incision improving.

## 2018-04-06 NOTE — PROGRESS NOTES
Occupational Therapy  Patient Encounter Note  Mary Carrillo   MRN: [id number]     A client care conference was performed between the LOTR and CASTELLON, prior to treatment by CASTELLON, to discuss the patient's status, treatment plan and established goals.      04/06/18 1115   OT Time Calculation   OT Start Time 1115   OT Stop Time 1200   OT Total Time (min) 45 min   General   OT Date of Treatment 04/06/18   Treatment/Billable Minutes   Therapeutic Group 45   Total Time 45     Subjective: Ask pt how they responding to their therapy treatment sessions.good    Pain level:                            0/10           How was pain addressed:na    Precautions:fall    Objective:   -The patient performed volleyball group at w/c level with _SBA__assist .  Patient tolerated sitting activity for ___45__ minutes with_(S) assist. _The patient required _short_ rest breaks during this activity.  Patient performed activity using bilateral upper extremities to volley the ball, lateral arm movement noted throughout the activity.  Endurance _moderate___on the RPE scale. no pain complaints voiced or observed.  -Social Interaction: (choose FIM score rating below) 5    - Interacts appropriately with others - No medications needed. Patient asleep all shift (7)  - Interacts appropriately with others with medication or extra time(anti-anxiety, antidepressant)  (6)  - Interacts appropriately 90% of time - needs monitoring or encouragement for participation or interaction  (5)  - Interacts appropriately 75-89% of time - needs redirection for appropriate language or to initiate interaction  (4)  - Interacts appropriately 50-74% of time - May be physically or verbally inappropriate   (3)  - Interacts appropriately 25-49% of time - Needs frequent redirection  (2)  - Interacts appropriately less than 25% of time - May be withdrawn or combative  (1)    Goal:  pt. will improve social interaction to a FIM of __6_OR pts endurance will improve to a RPE of __somewhat  hard___ (choose one)      Assessment:  Patient participated in a 45 minute volleyball group activity.  The group activity challenged dynamic standing/sitting balance, core strengthening, bilateral upper extremity strengthening, cardiovascular and respiratory capacity, hand -eye coordination, visual scanning and social affect/mood.             Bridger Ramey, [facility] 4/6/2018

## 2018-04-06 NOTE — PROGRESS NOTES
"Physical Therapy   Treatment/Reassessment    Mary Carrillo   MRN: [id number]                04/06/18 0857   PT Time Calculation   PT Start Time 0857   PT Stop Time 1027   PT Total Time (min) 90 min   Treatment   Treatment Type Treatment;Other (see comments)  (Reassessment)   PT/PTA PT   General   PT Received On 04/06/18   Family/Caregiver Present Yes  (PCT in room)   Patient Found (position) Seated in wheelchair;Other (comment)  (seat belt in place)   Pt found with TLSO   Precautions   General Precautions fall   Spinal Brace TLSO   Visual/Auditory Vision impaired;Other (see comments)  (glasses)   Subjective   Patient states " I've been up all night."  Pt reporting she didn't sleep well due to pain.   Pain/Comfort   Pain Rating 1 8/10   Location - Side 1 Bilateral   Location - Orientation 1 lower  (midline)   Location 1 back  (pt also c/o pain to R hip)   Pain Addressed 1 Reposition;Distraction;Cessation of Activity   Pain Rating Post-Intervention 1 6/10   Bed Mobility   Bed Mobility yes   Rolling/Turning to Left Modified independent;Other (see comments)  (x 2 trials on mat)   Rolling/Turning Right Modified independent;Other (see comments)  (x 2 trials on mat)   Supine to Sit Contact Guard Assistance   Supine to Sit Comments rising from L side of mat   Sit to Supine Moderate Assistance;Other (see comments)  (lowering to L side of mat~ pt needs help to manage LEs)   Transfers   Transfer yes   Sit to Stand   Sit <> Stand Assistance Moderate Assistance   Sit <> Stand Assistive Device Other (see comments)  (parallel bars)   Trials/Comments 4 trials   Stand to Sit   Assistance Moderate Assistance   Assistive Device Other (see comments)  (parallel bars)   Trials/Comments 4 trials   Chair to Mat   Chair<> Mat Technique Squat Pivot   Chair<>Mat Assistance Minimum Assistance;Moderate Assistance   Chair <> Mat Assistive Device No Assistive Device   Trials/Comments 1 trial~ cues for hand placement and technique   Mat to Chair "   Technique Squat Pivot   Assistance Minimum Assistance;Moderate Assistance   Assistive Device No Assistive Device   Trials/Comments 1 trial~ cues for hand placement and technique   Tub Bench Transfer   Technique Squat Pivot   Assistance Maximum Assistance   Assistive Device No Assistive Device   Trials/Comments 1 trial~ pt needs assist to move hips on/off bench and for lifting LEs into and out of tub   Wheelchair Activities   Propulsion Yes   Propulsion Type 1 Manual   Level 1 Level tile   Method 1 Right upper extremity;Left upper extremity   Level of Assistance 1 Supervision   Description/ Details 1 pt propels w/c x 215 feet   Gait   Gait No   Weight Bearing Status full   Stairs   Stairs No   Balance   Balance Yes   Static Sitting Balance   Static Sitting-Balance Support Right upper extremity supported;Left upper extremity supported;Feet supported   Static Sitting-Level of Assistance Supervision   Static Sitting-Comment/# of Minutes pt sits edge of mat x 3 minutes   Static Standing Balance   Static Standing-Balance Support Right upper extremity supported;Left upper extremity supported   Static Standing-Level of Assistance Minimum assistance;Moderate assistance   Static Standing-Comment/# of Minutes pt stands in parallel bars x 4 trials~ 1: 32, 1: 32, 1: 44 and 1: 39 ~ seated rest breaks between trials~ pt needs cues and encouragement to remain standing during each trial   Supine   Supine-Exercises Lower extremity   Supine-Exercise Type Ankle pumps;Short arc quads;Heel slides;ABD/ADD;Other (Comment);Glut sets  (hip and knee flex/ext with resistance during ext.)   Supine-Exercise Comments pt performs 20 reps of each exercise, A/AAROM   Seated   Seated-Exercises Upper extremity   Seated-Exercise Type (universal gym)   Seated-Exercise Comments pt performs 3 x 10 reps of chest press and lat pulls with 5 # weight  (AROM)   Equipment Use   Recumbent Bike Comments pt pedals LBE x 5 minutes with max A from PT for  creating/maintaining momentum and prevention of R ankle clonus  (above activity for improved LE motor control and endurance)   Other Activities   Other Activities Other (Comment)  (squat pivot transfers w/c<> 3 in 1 drop arm commode)   Comments pt performs above transfer with mod/max A   Activity Tolerance   Activity Tolerance Patient tolerated treatment well;Patient limited by pain   Other Comments   Comments pt also bounces and tosses basketball from w/c level x 3 minutes for UE strengthening and muscular endurance   After Treatment   Patient Position After Treatment Seated in wheelchair;Other (comment)  (seat belt in place)   Patient after treatment left call button in reach;nursing notified  (pt checked with nurse regarding next scheduled pain med)   Assessment   Prognosis Fair   Problem List Decreased strength;Decreased range of motion;Decreased endurance;Impaired balance;Decreased mobility;Impaired vision;Obesity;Pain   Session Assessment progressing toward goals   Assessment Pt is making progress with transfers, standing balance, w/c skills and bed mobility.  Unfortunately, pt remains limited with standing endurance, ability to lift hips during squat pivot transfers and pain is also an issue.  Pt may benefit from use of slideboard to progress with transfers; pt may also do well with leg loops for some transitions( e.g., tub bench).  Even with additional progress in PT, pt will need assistance at discharge.   Level of Motivation/Participation good   Barriers to Discharge Decreased caregiver support   Barriers to Discharge Comments pt will need assistance at discharge   Short Term Goals   Supine to Sit-Met/Not Met Met   Sit to Supine - Met/Not Met Not Met   Logroll - Met/Not Met Met   Transfer Sit to stand - Met/Not Met Met   Transfer Bed/Chair - Met/Not Met Met   Sit Edge Of Bed - Met Met   Stand - Met/Not Met Met   Tolerate Exercise - Met/Not Met Met   Propel Wheelchair - Met/Not Met Met   Other Goal Met    Other Goal 2 Not Met   Long Term Goals   Pt Will Perform Supine To Sit Revised goal;With stand by assist   Pt Will Perform Sit to Supine Revised goal;With minimal assist   Pt Will Logroll Revised goal;Independently   Pt Will Transfer Bed/Chair Revised goal;Slide Board;With minimal assist   Other Goal Revised goal~ slideboard transfers w/c<> 3 in 1 drop arm commode with min A   Other Goal 2 Revised goal~ slideboard transfers w/c<> tub bench with mod A   Discharge Recommendations   Equipment Needed After Discharge 3-in-1 commode;bath bench;wheelchair;slide board   Discharge Facility/Level Of Care Needs home health PT   Plan   Planned Therapy Intervention Continue with current plan;Bed mobility training;Transfer training;Balance Training;ROM;Strengthening;Neuromuscular Re-education;Motor Coordination Training;Postural Re-education;Wheelchair Management/Propulsion   Therapy Frequency 2 times/day;daily;Monday-Friday;Saturday or Sunday   Physical Therapy Follow-up   PT Follow-up? Yes   PT - Next Visit Date 04/09/18   Treatment/Billable Minutes   Therapeutic Activity 60   Therapeutic Exercise 30   Total Time 90             Huey Caicedo, PT 4/6/2018

## 2018-04-06 NOTE — ASSESSMENT & PLAN NOTE
Continue Coreg and Amlodipine    3/29 - Lisinopril 10mg daily  3/30 - improved  4/2 - sbp 130s-160. Lisinopril increased to 20  4/3  this morning, improved  4/4 dec lisinopril back to 10 for low DBP  4/6 Lisinopril increased to 15mg

## 2018-04-06 NOTE — PLAN OF CARE
Problem: Patient Care Overview  Goal: Plan of Care Review  Outcome: Ongoing (interventions implemented as appropriate)  Plan of care reviewed:  Ms. Carrillo will have an acceptable pain level; will continue with PRN and schedule medication as ordered, assess and manager pain, positioning, frequent rounds.   Wound healing will be maintained with keeping skin clean and dry, aseptic technique with wound care, maintained diet, clean wound sterile normal saline, wound painted with betadine and aquacel dressing applied.

## 2018-04-06 NOTE — PROGRESS NOTES
[facility]  Physical Medicine & Rehab  Progress Note    Patient Name: Mary Carrillo  MRN: [id number]  Patient Class: IP- Rehab   Admission Date: 3/27/2018  Length of Stay: 10 days  Attending Physician: Jack Squires MD  Primary Care Provider: Jose Khan MD    Subjective:     Principal Problem:Osteoarthritis of spine with myelopathy, thoracolumbar region    Interval History 4/6/2018:  Patient is seen for follow-up rehab evaluation and recommendations: Pt with complaint of some increased back pain today. Right hip pain slightly improved. Discussed with patient x-ray was normal.  I have reviewed the HPI and PMFSH and there are no changes from admission.       Scheduled Medications:    amLODIPine  10 mg Oral Daily    amoxicillin-clavulanate 875-125mg  1 tablet Oral Q12H    carvedilol  25 mg Oral BID    gabapentin  300 mg Oral TID    heparin (porcine)  5,000 Units Subcutaneous Q8H    isoniazid  900 mg Oral Every Tues    isoniazid  900 mg Oral Every Fri    [START ON 4/7/2018] lisinopril  20 mg Oral Daily    polyethylene glycol  17 g Oral Daily    pyridoxine (vitamin B6)  50 mg Oral Daily    rifAMpin  600 mg Oral Every Tues    rifAMpin  600 mg Oral Every Fri    senna-docusate 8.6-50 mg  2 tablet Oral Daily    traZODone  100 mg Oral QHS       Diagnostic Results: Labs: Reviewed  X-Ray: Reviewed    PRN Medications: acetaminophen, albuterol sulfate, bisacodyl, calcium carbonate, hydrOXYzine pamoate, magnesium hydroxide 400 mg/5 ml, methocarbamol, ondansetron, oxyCODONE-acetaminophen, ramelteon, senna, simethicone    Review of Systems   Constitutional: Positive for activity change. Negative for chills, fatigue and fever.   HENT: Negative for drooling, hearing loss, trouble swallowing and voice change.    Eyes: Negative for pain and visual disturbance.   Respiratory: Negative for cough, shortness of breath and wheezing.    Cardiovascular: Negative for chest pain and palpitations.    Gastrointestinal: Negative for abdominal distention, nausea and vomiting.   Genitourinary: Negative for difficulty urinating and flank pain.   Musculoskeletal: Positive for back pain, gait problem and myalgias. Negative for arthralgias and neck pain.   Skin: Positive for wound. Negative for rash.   Neurological: Positive for weakness. Negative for dizziness, numbness and headaches.   Psychiatric/Behavioral: Negative for agitation and hallucinations. The patient is not nervous/anxious.      Objective:     Vital Signs (Most Recent):  Temp: 98.5 °F (36.9 °C) (18)  Pulse: 96 (18)  Resp: 20 (18)  BP: (!) 158/68 (18)  SpO2: 96 % (18)    Vital Signs (24h Range):  Temp:  [98.3 °F (36.8 °C)-98.5 °F (36.9 °C)] 98.5 °F (36.9 °C)  Pulse:  [82-96] 96  Resp:  [18-20] 20  SpO2:  [96 %-98 %] 96 %  BP: (143-158)/(68-75) 158/68     Physical Exam   Constitutional: She is oriented to person, place, and time. She appears well-developed and well-nourished. No distress.   Sitting up in medichair   HENT:   Head: Normocephalic and atraumatic.   Right Ear: External ear normal.   Left Ear: External ear normal.   Nose: Nose normal.   Eyes: Right eye exhibits no discharge. Left eye exhibits no discharge. No scleral icterus.   Neck: Normal range of motion.   Cardiovascular: Normal rate, regular rhythm and intact distal pulses.    Pulmonary/Chest: Effort normal. No respiratory distress. She has no wheezes.   Abdominal: Soft. She exhibits no distension. There is no tenderness.   Musculoskeletal: She exhibits no edema or tenderness.        Right ankle: She exhibits decreased range of motion (foot drop).   TLSO brace intact   Neurological: She is alert and oriented to person, place, and time. She exhibits normal muscle tone.   -  Mental Status:  AAOx3.  Follows commands.  Answers correct age and .  Recent and remote memory intact.  -  Speech and language:  no aphasia or dysarthria.    -   Vision:  no hemianopsia or ptosis.    -  Facial movement (CN VII): symmetrical.   -  Motor:  RUE: 4/5.  LUE: 4/5.  RLE:  Hip Flex 2/5, Knee Ext/Flex 3-/5,  DF 1/5, PF 4-/5.  LLE:  Hip Flex 2+/5, Knee Ext/Flex 4-/5,  DF 4/5, PF 4/5.  -  Sensory:  Intact to light touch and pin prick.   Skin: Skin is warm and dry. No rash noted.   Proximal incision site dehisced. No drainage.    Psychiatric: She has a normal mood and affect. Her behavior is normal. Thought content normal.   Vitals reviewed.    NEUROLOGICAL EXAMINATION:     MENTAL STATUS   Oriented to person, place, and time.       Assessment/Plan:      Mary Carrillo is a 63 y.o. female admitted to inpatient rehabilitation on 3/27/2018 for Osteoarthritis of spine with myelopathy, thoracolumbar region with impaired mobility and ADLs. Patient remains appropriate for PT, OT, and as required Speech therapy. Patient continues to require 24 hour nursing care as well as daily Physician assessment.    * Osteoarthritis of spine with myelopathy, thoracolumbar region    -  MRI T-spine revealed vertebral osteomyelitis/discitis T12-L1 with severe compression and effacement of the ventral and dorsal subarachnoid spaces  -  S/p T12-L1 corpectomy with T10-L3 fusion on 3/18/18  -  PT/OT evaluate and treat  -  Pain management  -  Monitor for bowel and bladder dysfunction  -  Monitor for spasticity  -  Monitor for and prevent skin breakdown and pressure ulcers  Early mobility, repositioning/weight shifting every 20-30 minutes when sitting, turn patient every 2 hours, proper mattress/overlay and chair cushioning, pressure relief/heel protector boots  -  DVT prophylaxis:  on [facility]        Dehiscence of operative wound    Noted on 3/31. No signs of infection. Culture sent and Bacitracin ointment started. Continue to monitor    4/1 - CBC sent to monitor WBC. Will consult wound care  4/2 WBC 11, stable  4/3 Staples removed  4/4 no drainage early morning, some reported late morning so ESR, CRP,  CBC ordered. Cont wound care.  4/5 ESR, CRP elevated, but WBC within normal limits. Augmentin started. Incision improving.        Acute blood loss as cause of postoperative anemia    Continue to monitor H/H. Previous transfusions performed    4/2 H/H improved, 8.5/28.4          HTN (hypertension), benign    Continue Coreg and Amlodipine    3/29 - Lisinopril 10mg daily  3/30 - improved  4/2 - sbp 130s-160. Lisinopril increased to 20  4/3  this morning, improved  4/4 dec lisinopril back to 10 for low DBP  4/6 Lisinopril increased to 15mg         Sarcoidosis    Currently holding Methotrexate due to TB            DISCHARGE PLANNING:  Tentative Discharge Date:     Future Appointments  Date Time Provider Department Center   4/9/2018 10:40 AM Lakeisha Bledsoe PA-C [facility] NEURO [place]   5/7/2018 2:00 PM [facility] XR1 300 LB LIMIT [facility] XRAY [place]   5/7/2018 2:40 PM Andi Swain DO [facility] NEURO [place]       Jack Squires MD  Department of Physical Medicine & Rehab   [facility]

## 2018-04-06 NOTE — SUBJECTIVE & OBJECTIVE
Interval History 4/6/2018:  Patient is seen for follow-up rehab evaluation and recommendations: Pt with complaint of some increased back pain today. Right hip pain slightly improved. Discussed with patient x-ray was normal.  I have reviewed the HPI and PMFSH and there are no changes from admission.       Scheduled Medications:    amLODIPine  10 mg Oral Daily    amoxicillin-clavulanate 875-125mg  1 tablet Oral Q12H    carvedilol  25 mg Oral BID    gabapentin  300 mg Oral TID    heparin (porcine)  5,000 Units Subcutaneous Q8H    isoniazid  900 mg Oral Every Tues    isoniazid  900 mg Oral Every Fri    [START ON 4/7/2018] lisinopril  20 mg Oral Daily    polyethylene glycol  17 g Oral Daily    pyridoxine (vitamin B6)  50 mg Oral Daily    rifAMpin  600 mg Oral Every Tues    rifAMpin  600 mg Oral Every Fri    senna-docusate 8.6-50 mg  2 tablet Oral Daily    traZODone  100 mg Oral QHS       Diagnostic Results: Labs: Reviewed  X-Ray: Reviewed    PRN Medications: acetaminophen, albuterol sulfate, bisacodyl, calcium carbonate, hydrOXYzine pamoate, magnesium hydroxide 400 mg/5 ml, methocarbamol, ondansetron, oxyCODONE-acetaminophen, ramelteon, senna, simethicone    Review of Systems   Constitutional: Positive for activity change. Negative for chills, fatigue and fever.   HENT: Negative for drooling, hearing loss, trouble swallowing and voice change.    Eyes: Negative for pain and visual disturbance.   Respiratory: Negative for cough, shortness of breath and wheezing.    Cardiovascular: Negative for chest pain and palpitations.   Gastrointestinal: Negative for abdominal distention, nausea and vomiting.   Genitourinary: Negative for difficulty urinating and flank pain.   Musculoskeletal: Positive for back pain, gait problem and myalgias. Negative for arthralgias and neck pain.   Skin: Positive for wound. Negative for rash.   Neurological: Positive for weakness. Negative for dizziness, numbness and headaches.    Psychiatric/Behavioral: Negative for agitation and hallucinations. The patient is not nervous/anxious.      Objective:     Vital Signs (Most Recent):  Temp: 98.5 °F (36.9 °C) (18)  Pulse: 96 (18)  Resp: 20 (18)  BP: (!) 158/68 (18)  SpO2: 96 % (18)    Vital Signs (24h Range):  Temp:  [98.3 °F (36.8 °C)-98.5 °F (36.9 °C)] 98.5 °F (36.9 °C)  Pulse:  [82-96] 96  Resp:  [18-20] 20  SpO2:  [96 %-98 %] 96 %  BP: (143-158)/(68-75) 158/68     Physical Exam   Constitutional: She is oriented to person, place, and time. She appears well-developed and well-nourished. No distress.   Sitting up in medichair   HENT:   Head: Normocephalic and atraumatic.   Right Ear: External ear normal.   Left Ear: External ear normal.   Nose: Nose normal.   Eyes: Right eye exhibits no discharge. Left eye exhibits no discharge. No scleral icterus.   Neck: Normal range of motion.   Cardiovascular: Normal rate, regular rhythm and intact distal pulses.    Pulmonary/Chest: Effort normal. No respiratory distress. She has no wheezes.   Abdominal: Soft. She exhibits no distension. There is no tenderness.   Musculoskeletal: She exhibits no edema or tenderness.        Right ankle: She exhibits decreased range of motion (foot drop).   TLSO brace intact   Neurological: She is alert and oriented to person, place, and time. She exhibits normal muscle tone.   -  Mental Status:  AAOx3.  Follows commands.  Answers correct age and .  Recent and remote memory intact.  -  Speech and language:  no aphasia or dysarthria.    -  Vision:  no hemianopsia or ptosis.    -  Facial movement (CN VII): symmetrical.   -  Motor:  RUE: 4/5.  LUE: 4/5.  RLE:  Hip Flex 2/5, Knee Ext/Flex 3-/5,  DF 1/5, PF 4-/5.  LLE:  Hip Flex 2+/5, Knee Ext/Flex 4-/5,  DF 4/5, PF 4/5.  -  Sensory:  Intact to light touch and pin prick.   Skin: Skin is warm and dry. No rash noted.   Proximal incision site dehisced. No drainage.     Psychiatric: She has a normal mood and affect. Her behavior is normal. Thought content normal.   Vitals reviewed.    NEUROLOGICAL EXAMINATION:     MENTAL STATUS   Oriented to person, place, and time.

## 2018-04-07 PROCEDURE — 25000003 PHARM REV CODE 250: Performed by: HOSPITALIST

## 2018-04-07 PROCEDURE — 25000003 PHARM REV CODE 250: Performed by: PHYSICAL MEDICINE & REHABILITATION

## 2018-04-07 PROCEDURE — 99232 SBSQ HOSP IP/OBS MODERATE 35: CPT | Mod: ,,, | Performed by: PHYSICAL MEDICINE & REHABILITATION

## 2018-04-07 PROCEDURE — 11800000 HC REHAB PRIVATE ROOM

## 2018-04-07 PROCEDURE — 63600175 PHARM REV CODE 636 W HCPCS: Performed by: HOSPITALIST

## 2018-04-07 RX ADMIN — HEPARIN SODIUM 5000 UNITS: 5000 INJECTION, SOLUTION INTRAVENOUS; SUBCUTANEOUS at 02:04

## 2018-04-07 RX ADMIN — OXYCODONE AND ACETAMINOPHEN 1 TABLET: 10; 325 TABLET ORAL at 07:04

## 2018-04-07 RX ADMIN — OXYCODONE AND ACETAMINOPHEN 1 TABLET: 10; 325 TABLET ORAL at 09:04

## 2018-04-07 RX ADMIN — GABAPENTIN 300 MG: 300 CAPSULE ORAL at 09:04

## 2018-04-07 RX ADMIN — AMOXICILLIN AND CLAVULANATE POTASSIUM 1 TABLET: 875; 125 TABLET, FILM COATED ORAL at 08:04

## 2018-04-07 RX ADMIN — Medication 50 MG: at 08:04

## 2018-04-07 RX ADMIN — OXYCODONE AND ACETAMINOPHEN 1 TABLET: 10; 325 TABLET ORAL at 04:04

## 2018-04-07 RX ADMIN — HEPARIN SODIUM 5000 UNITS: 5000 INJECTION, SOLUTION INTRAVENOUS; SUBCUTANEOUS at 05:04

## 2018-04-07 RX ADMIN — CARVEDILOL 25 MG: 25 TABLET, FILM COATED ORAL at 08:04

## 2018-04-07 RX ADMIN — AMLODIPINE BESYLATE 10 MG: 10 TABLET ORAL at 08:04

## 2018-04-07 RX ADMIN — LISINOPRIL 15 MG: 10 TABLET ORAL at 08:04

## 2018-04-07 RX ADMIN — TRAZODONE HYDROCHLORIDE 100 MG: 50 TABLET ORAL at 09:04

## 2018-04-07 RX ADMIN — OXYCODONE AND ACETAMINOPHEN 1 TABLET: 10; 325 TABLET ORAL at 02:04

## 2018-04-07 RX ADMIN — HEPARIN SODIUM 5000 UNITS: 5000 INJECTION, SOLUTION INTRAVENOUS; SUBCUTANEOUS at 09:04

## 2018-04-07 RX ADMIN — GABAPENTIN 300 MG: 300 CAPSULE ORAL at 08:04

## 2018-04-07 RX ADMIN — GABAPENTIN 300 MG: 300 CAPSULE ORAL at 02:04

## 2018-04-07 NOTE — ASSESSMENT & PLAN NOTE
Continue to monitor H/H. Previous transfusions performed    4/2 H/H improved, 8.5/28.4    Lab Results   Component Value Date    WBC 10.39 04/05/2018    HGB 8.6 (L) 04/05/2018    HCT 28.2 (L) 04/05/2018    MCV 92 04/05/2018     04/05/2018

## 2018-04-07 NOTE — SUBJECTIVE & OBJECTIVE
Interval History 4/7/2018:  Patient is seen for follow-up rehab evaluation and recommendations: No acute events over night. Patient is tolerating therapy. Pain controlled today.        I have reviewed the HPI and PMFSH and there are no changes from admission.       Scheduled Medications:    amLODIPine  10 mg Oral Daily    amoxicillin-clavulanate 875-125mg  1 tablet Oral Q12H    carvedilol  25 mg Oral BID    gabapentin  300 mg Oral TID    heparin (porcine)  5,000 Units Subcutaneous Q8H    isoniazid  900 mg Oral Every Tues    isoniazid  900 mg Oral Every Fri    lisinopril  15 mg Oral Daily    polyethylene glycol  17 g Oral Daily    pyridoxine (vitamin B6)  50 mg Oral Daily    rifAMpin  600 mg Oral Every Tues    rifAMpin  600 mg Oral Every Fri    senna-docusate 8.6-50 mg  2 tablet Oral Daily    traZODone  100 mg Oral QHS       Diagnostic Results: Labs: Reviewed  X-Ray: Reviewed    PRN Medications: acetaminophen, albuterol sulfate, bisacodyl, calcium carbonate, hydrOXYzine pamoate, magnesium hydroxide 400 mg/5 ml, methocarbamol, ondansetron, oxyCODONE-acetaminophen, ramelteon, senna, simethicone    Review of Systems   Constitutional: Positive for activity change. Negative for chills, fatigue and fever.   HENT: Negative for drooling, hearing loss, trouble swallowing and voice change.    Eyes: Negative for pain and visual disturbance.   Respiratory: Negative for cough, shortness of breath and wheezing.    Cardiovascular: Negative for chest pain and palpitations.   Gastrointestinal: Negative for abdominal distention, nausea and vomiting.   Genitourinary: Negative for difficulty urinating and flank pain.   Musculoskeletal: Positive for back pain, gait problem and myalgias. Negative for arthralgias and neck pain.   Skin: Positive for wound. Negative for rash.   Neurological: Positive for weakness. Negative for dizziness, numbness and headaches.   Psychiatric/Behavioral: Negative for agitation and  hallucinations. The patient is not nervous/anxious.      Objective:     Vital Signs (Most Recent):  Temp: 98.8 °F (37.1 °C) (18)  Pulse: 69 (18)  Resp: 16 (18)  BP: (!) 184/84 (18)  SpO2: (!) 94 % (18)    Vital Signs (24h Range):  Temp:  [98.2 °F (36.8 °C)-98.8 °F (37.1 °C)] 98.8 °F (37.1 °C)  Pulse:  [69-80] 69  Resp:  [16-20] 16  SpO2:  [94 %] 94 %  BP: (148-184)/(65-84) 184/84     Physical Exam   Constitutional: She is oriented to person, place, and time. She appears well-developed and well-nourished. No distress.   Sitting up in medichair   HENT:   Head: Normocephalic and atraumatic.   Right Ear: External ear normal.   Left Ear: External ear normal.   Nose: Nose normal.   Eyes: Right eye exhibits no discharge. Left eye exhibits no discharge. No scleral icterus.   Neck: Normal range of motion.   Cardiovascular: Normal rate, regular rhythm and intact distal pulses.    Pulmonary/Chest: Effort normal. No respiratory distress. She has no wheezes.   Abdominal: Soft. She exhibits no distension. There is no tenderness.   Musculoskeletal: She exhibits no edema or tenderness.        Right ankle: She exhibits decreased range of motion (foot drop).   TLSO brace intact   Neurological: She is alert and oriented to person, place, and time. She exhibits normal muscle tone.   -  Mental Status:  AAOx3.  Follows commands.  Answers correct age and .  Recent and remote memory intact.  -  Speech and language:  no aphasia or dysarthria.    -  Vision:  no hemianopsia or ptosis.    -  Facial movement (CN VII): symmetrical.   -  Motor:  RUE: 4/5.  LUE: 4/5.  RLE:  Hip Flex 2/5, Knee Ext/Flex 3-/5,  DF 1/5, PF 4-/5.  LLE:  Hip Flex 2+/5, Knee Ext/Flex 4-/5,  DF 4/5, PF 4/5.  -  Sensory:  Intact to light touch and pin prick.   Skin: Skin is warm and dry. No rash noted.   Proximal incision site dehisced. No drainage.    Psychiatric: She has a normal mood and affect. Her behavior is  normal. Thought content normal.   Vitals reviewed.    NEUROLOGICAL EXAMINATION:     MENTAL STATUS   Oriented to person, place, and time.

## 2018-04-07 NOTE — ASSESSMENT & PLAN NOTE
Continue Coreg and Amlodipine    3/29 - Lisinopril 10mg daily  3/30 - improved  4/2 - sbp 130s-160. Lisinopril increased to 20  4/3  this morning, improved  4/4 dec lisinopril back to 10 for low DBP  4/6 Lisinopril increased to 15mg     4/7 AM BP elevated, requested repeat which is pending. WIll cont to f/u.    Vitals:    04/06/18 0700 04/06/18 0808 04/06/18 1907 04/07/18 0742   BP: (!) 158/68  (!) 148/65 (!) 184/84   BP Location: Left arm  Left arm Left arm   Patient Position: Lying  Lying Lying   Pulse: 96  80 69   Resp: 20  20 16   Temp: 98.5 °F (36.9 °C)  98.2 °F (36.8 °C) 98.8 °F (37.1 °C)   TempSrc: Oral  Oral Oral   SpO2: 96%   (!) 94%   Weight:  95.9 kg (211 lb 6.4 oz)     Height:

## 2018-04-07 NOTE — PROGRESS NOTES
[facility]  Physical Medicine & Rehab  Progress Note    Patient Name: Mary Carrillo  MRN: [id number]  Patient Class: IP- Rehab   Admission Date: 3/27/2018  Length of Stay: 11 days  Attending Physician: Jack Squires MD  Primary Care Provider: Jose Khan MD    Subjective:     Principal Problem:Osteoarthritis of spine with myelopathy, thoracolumbar region    Interval History 4/7/2018:  Patient is seen for follow-up rehab evaluation and recommendations: No acute events over night. Patient is tolerating therapy. Pain controlled today.        I have reviewed the HPI and PMFSH and there are no changes from admission.       Scheduled Medications:    amLODIPine  10 mg Oral Daily    amoxicillin-clavulanate 875-125mg  1 tablet Oral Q12H    carvedilol  25 mg Oral BID    gabapentin  300 mg Oral TID    heparin (porcine)  5,000 Units Subcutaneous Q8H    isoniazid  900 mg Oral Every Tues    isoniazid  900 mg Oral Every Fri    lisinopril  15 mg Oral Daily    polyethylene glycol  17 g Oral Daily    pyridoxine (vitamin B6)  50 mg Oral Daily    rifAMpin  600 mg Oral Every Tues    rifAMpin  600 mg Oral Every Fri    senna-docusate 8.6-50 mg  2 tablet Oral Daily    traZODone  100 mg Oral QHS       Diagnostic Results: Labs: Reviewed  X-Ray: Reviewed    PRN Medications: acetaminophen, albuterol sulfate, bisacodyl, calcium carbonate, hydrOXYzine pamoate, magnesium hydroxide 400 mg/5 ml, methocarbamol, ondansetron, oxyCODONE-acetaminophen, ramelteon, senna, simethicone    Review of Systems   Constitutional: Positive for activity change. Negative for chills, fatigue and fever.   HENT: Negative for drooling, hearing loss, trouble swallowing and voice change.    Eyes: Negative for pain and visual disturbance.   Respiratory: Negative for cough, shortness of breath and wheezing.    Cardiovascular: Negative for chest pain and palpitations.   Gastrointestinal: Negative for abdominal distention, nausea and  vomiting.   Genitourinary: Negative for difficulty urinating and flank pain.   Musculoskeletal: Positive for back pain, gait problem and myalgias. Negative for arthralgias and neck pain.   Skin: Positive for wound. Negative for rash.   Neurological: Positive for weakness. Negative for dizziness, numbness and headaches.   Psychiatric/Behavioral: Negative for agitation and hallucinations. The patient is not nervous/anxious.      Objective:     Vital Signs (Most Recent):  Temp: 98.8 °F (37.1 °C) (18)  Pulse: 69 (18)  Resp: 16 (18)  BP: (!) 184/84 (18)  SpO2: (!) 94 % (18)    Vital Signs (24h Range):  Temp:  [98.2 °F (36.8 °C)-98.8 °F (37.1 °C)] 98.8 °F (37.1 °C)  Pulse:  [69-80] 69  Resp:  [16-20] 16  SpO2:  [94 %] 94 %  BP: (148-184)/(65-84) 184/84     Physical Exam   Constitutional: She is oriented to person, place, and time. She appears well-developed and well-nourished. No distress.   Sitting up in medichair   HENT:   Head: Normocephalic and atraumatic.   Right Ear: External ear normal.   Left Ear: External ear normal.   Nose: Nose normal.   Eyes: Right eye exhibits no discharge. Left eye exhibits no discharge. No scleral icterus.   Neck: Normal range of motion.   Cardiovascular: Normal rate, regular rhythm and intact distal pulses.    Pulmonary/Chest: Effort normal. No respiratory distress. She has no wheezes.   Abdominal: Soft. She exhibits no distension. There is no tenderness.   Musculoskeletal: She exhibits no edema or tenderness.        Right ankle: She exhibits decreased range of motion (foot drop).   TLSO brace intact   Neurological: She is alert and oriented to person, place, and time. She exhibits normal muscle tone.   -  Mental Status:  AAOx3.  Follows commands.  Answers correct age and .  Recent and remote memory intact.  -  Speech and language:  no aphasia or dysarthria.    -  Vision:  no hemianopsia or ptosis.    -  Facial movement (CN VII):  symmetrical.   -  Motor:  RUE: 4/5.  LUE: 4/5.  RLE:  Hip Flex 2/5, Knee Ext/Flex 3-/5,  DF 1/5, PF 4-/5.  LLE:  Hip Flex 2+/5, Knee Ext/Flex 4-/5,  DF 4/5, PF 4/5.  -  Sensory:  Intact to light touch and pin prick.   Skin: Skin is warm and dry. No rash noted.   Proximal incision site dehisced. No drainage.    Psychiatric: She has a normal mood and affect. Her behavior is normal. Thought content normal.   Vitals reviewed.    NEUROLOGICAL EXAMINATION:     MENTAL STATUS   Oriented to person, place, and time.       Assessment/Plan:      Mary Carrillo is a 63 y.o. female admitted to inpatient rehabilitation on 3/27/2018 for Osteoarthritis of spine with myelopathy, thoracolumbar region with impaired mobility and ADLs. Patient remains appropriate for PT, OT, and as required Speech therapy. Patient continues to require 24 hour nursing care as well as daily Physician assessment.    * Osteoarthritis of spine with myelopathy, thoracolumbar region    -  MRI T-spine revealed vertebral osteomyelitis/discitis T12-L1 with severe compression and effacement of the ventral and dorsal subarachnoid spaces  -  S/p T12-L1 corpectomy with T10-L3 fusion on 3/18/18  -  PT/OT evaluate and treat  -  Pain management  -  Monitor for bowel and bladder dysfunction  -  Monitor for spasticity  -  Monitor for and prevent skin breakdown and pressure ulcers  Early mobility, repositioning/weight shifting every 20-30 minutes when sitting, turn patient every 2 hours, proper mattress/overlay and chair cushioning, pressure relief/heel protector boots  -  DVT prophylaxis:  on [facility] [facility]        Dehiscence of operative wound    Noted on 3/31. No signs of infection. Culture sent and Bacitracin ointment started. Continue to monitor    4/1 - CBC sent to monitor WBC. Will consult wound care  4/2 WBC 11, stable  4/3 Staples removed  4/4 no drainage early morning, some reported late morning so ESR, CRP, CBC ordered. Cont wound care.  4/5 ESR, CRP elevated, but WBC within  normal limits. Augmentin started. Incision improving.    4/7 covered w mepilex, c/d         Acute blood loss as cause of postoperative anemia    Continue to monitor H/H. Previous transfusions performed    4/2 H/H improved, 8.5/28.4    Lab Results   Component Value Date    WBC 10.39 04/05/2018    HGB 8.6 (L) 04/05/2018    HCT 28.2 (L) 04/05/2018    MCV 92 04/05/2018     04/05/2018               HTN (hypertension), benign    Continue Coreg and Amlodipine    3/29 - Lisinopril 10mg daily  3/30 - improved  4/2 - sbp 130s-160. Lisinopril increased to 20  4/3  this morning, improved  4/4 dec lisinopril back to 10 for low DBP  4/6 Lisinopril increased to 15mg     4/7 AM BP elevated, requested repeat which is pending. WIll cont to f/u.    Vitals:    04/06/18 0700 04/06/18 0808 04/06/18 1907 04/07/18 0742   BP: (!) 158/68  (!) 148/65 (!) 184/84   BP Location: Left arm  Left arm Left arm   Patient Position: Lying  Lying Lying   Pulse: 96  80 69   Resp: 20  20 16   Temp: 98.5 °F (36.9 °C)  98.2 °F (36.8 °C) 98.8 °F (37.1 °C)   TempSrc: Oral  Oral Oral   SpO2: 96%   (!) 94%   Weight:  95.9 kg (211 lb 6.4 oz)     Height:                 Sarcoidosis    Currently holding Methotrexate due to TB            DISCHARGE PLANNING:  Tentative Discharge Date:     Future Appointments  Date Time Provider Department Center   4/9/2018 10:40 AM Lakeisha Bledsoe PA-C [facility] NEURO [place]   5/7/2018 2:00 PM [facility] XR1 300 LB LIMIT [facility] XRAY [place]   5/7/2018 2:40 PM Andi Swain DO [facility] NEURO [place] Rodrigues MD  Department of Physical Medicine & Rehab   [facility]

## 2018-04-07 NOTE — PLAN OF CARE
Problem: Patient Care Overview  Goal: Plan of Care Review  Ms. Carrillo received pain medication twice during this shift.  Wound care was done per MD order.  She has not had a fall during this shift, and she has no pressure injuries.  She is able to turn independently.

## 2018-04-07 NOTE — ASSESSMENT & PLAN NOTE
Noted on 3/31. No signs of infection. Culture sent and Bacitracin ointment started. Continue to monitor    4/1 - CBC sent to monitor WBC. Will consult wound care  4/2 WBC 11, stable  4/3 Staples removed  4/4 no drainage early morning, some reported late morning so ESR, CRP, CBC ordered. Cont wound care.  4/5 ESR, CRP elevated, but WBC within normal limits. Augmentin started. Incision improving.    4/7 covered w mepilex, c/d

## 2018-04-08 PROCEDURE — 25000003 PHARM REV CODE 250: Performed by: PHYSICAL MEDICINE & REHABILITATION

## 2018-04-08 PROCEDURE — 97530 THERAPEUTIC ACTIVITIES: CPT

## 2018-04-08 PROCEDURE — 63600175 PHARM REV CODE 636 W HCPCS: Performed by: HOSPITALIST

## 2018-04-08 PROCEDURE — 99232 SBSQ HOSP IP/OBS MODERATE 35: CPT | Mod: ,,, | Performed by: PHYSICAL MEDICINE & REHABILITATION

## 2018-04-08 PROCEDURE — 25000003 PHARM REV CODE 250: Performed by: HOSPITALIST

## 2018-04-08 PROCEDURE — 11800000 HC REHAB PRIVATE ROOM

## 2018-04-08 PROCEDURE — 97110 THERAPEUTIC EXERCISES: CPT

## 2018-04-08 RX ADMIN — AMLODIPINE BESYLATE 10 MG: 10 TABLET ORAL at 08:04

## 2018-04-08 RX ADMIN — GABAPENTIN 300 MG: 300 CAPSULE ORAL at 03:04

## 2018-04-08 RX ADMIN — OXYCODONE AND ACETAMINOPHEN 1 TABLET: 10; 325 TABLET ORAL at 09:04

## 2018-04-08 RX ADMIN — METHOCARBAMOL 750 MG: 750 TABLET ORAL at 10:04

## 2018-04-08 RX ADMIN — OXYCODONE AND ACETAMINOPHEN 1 TABLET: 10; 325 TABLET ORAL at 03:04

## 2018-04-08 RX ADMIN — GABAPENTIN 300 MG: 300 CAPSULE ORAL at 08:04

## 2018-04-08 RX ADMIN — CARVEDILOL 25 MG: 25 TABLET, FILM COATED ORAL at 08:04

## 2018-04-08 RX ADMIN — HEPARIN SODIUM 5000 UNITS: 5000 INJECTION, SOLUTION INTRAVENOUS; SUBCUTANEOUS at 06:04

## 2018-04-08 RX ADMIN — HEPARIN SODIUM 5000 UNITS: 5000 INJECTION, SOLUTION INTRAVENOUS; SUBCUTANEOUS at 09:04

## 2018-04-08 RX ADMIN — TRAZODONE HYDROCHLORIDE 100 MG: 50 TABLET ORAL at 09:04

## 2018-04-08 RX ADMIN — Medication 50 MG: at 08:04

## 2018-04-08 RX ADMIN — OXYCODONE AND ACETAMINOPHEN 1 TABLET: 10; 325 TABLET ORAL at 02:04

## 2018-04-08 RX ADMIN — HEPARIN SODIUM 5000 UNITS: 5000 INJECTION, SOLUTION INTRAVENOUS; SUBCUTANEOUS at 03:04

## 2018-04-08 RX ADMIN — GABAPENTIN 300 MG: 300 CAPSULE ORAL at 09:04

## 2018-04-08 RX ADMIN — LISINOPRIL 15 MG: 10 TABLET ORAL at 08:04

## 2018-04-08 RX ADMIN — AMOXICILLIN AND CLAVULANATE POTASSIUM 1 TABLET: 875; 125 TABLET, FILM COATED ORAL at 09:04

## 2018-04-08 RX ADMIN — OXYCODONE AND ACETAMINOPHEN 1 TABLET: 10; 325 TABLET ORAL at 07:04

## 2018-04-08 RX ADMIN — AMOXICILLIN AND CLAVULANATE POTASSIUM 1 TABLET: 875; 125 TABLET, FILM COATED ORAL at 08:04

## 2018-04-08 NOTE — PROGRESS NOTES
Physical Therapy   Treatment      04/08/18 0810   PT Time Calculation   PT Start Time 0810   PT Stop Time 0857   PT Total Time (min) 47 min   Treatment   Treatment Type Treatment   PT/PTA PT   General   PT Received On 04/08/18   Family/Caregiver Present No   Patient Found (position) Seated in wheelchair   Pt found with TLSO  (seat belt)   Precautions   General Precautions fall   Orthopedic Yes   Required Braces or Orthoses Yes   Spinal Brace TLSO   Visual/Auditory Vision impaired  (glasses)   Subjective   Patient states Pt agreeable to PT, denies any c/o except constant pain in right hip/back area. Agreeable to practice slideboard transfer.   Pain/Comfort   Pain Rating 1 7/10   Location - Side 1 Right   Location - Orientation 1 posterior   Location 1 hip   Pain Addressed 1 Pre-medicate for activity;Reposition   Pain Rating Post-Intervention 1 7/10   Bed Mobility   Bed Mobility yes   Rolling/Turning to Left Modified independent   Rolling/Turning Right Modified independent   Supine to Sit Stand by Assistance   Supine to Sit Comments rising from right sidlying   Sit to Supine Stand by Assistance   Transfers   Transfer yes   Chair to Bed   Technique Slide Board   Assistance Contact Guard Assistance   Assistive Device Slide Board   Trials/Comments 1 trial. back to bed, verbal instructions.    Chair to Mat   Chair<> Mat Technique Slide Board   Chair<>Mat Assistance Contact Guard Assistance   Chair <> Mat Assistive Device Slide Board   Trials/Comments Multiple trials, practicing. Trial 1 min assist w/cues for fowward lean. Trial 2&3 contact guard self set up.    Mat to Chair   Technique Slide Board   Assistance Contact Guard Assistance   Assistive Device Slide Board   Trials/Comments see chair to mat   Wheelchair Activities   Wheelchair Parts Management Yes  (Instructed on parking for transfers, brakes and leg rest man)   Propulsion Yes   Propulsion Type 1 Manual   Level 1 Level tile   Method 1 Right upper extremity;Left  upper extremity   Level of Assistance 1 Supervision   Description/ Details 1 150ft x 1 able to manuever around obstacles.    Gait   Gait No   Stairs   Stairs No   Side Lying   Side Lying-Exercises Lower extremity   Side Lying-Exercise Comments sidelying clams, LLE w/red band; RLE AAROM initially to AROM. 15 x 3 ea side.    Activity Tolerance   Activity Tolerance Patient tolerated treatment well   After Treatment   Patient Position After Treatment Supine in bed   Patient after treatment left call button in reach  (tray table in reach. )   Assessment   Prognosis Fair   Problem List Decreased strength;Decreased endurance;Impaired balance;Decreased mobility;Obesity;Orthopedic restrictions;Pain   Session Assessment progressing toward goals;required decreased assistance for functional mobility   Assessment Pt is making good progress today on transfers. Focused on educating use of slideboard and preping for safe transfer with wheelchair. Initially required min assist, able to self set up slideboard and transfer with CGA requiring only verbal cues for safety and securing slideboard. Pt to continue with skilled PT to continue to progress towards goals and functional independence.    Level of Motivation/Participation good   Barriers to Discharge Decreased caregiver support;Inaccessible home environment   Plan   Planned Therapy Intervention Continue with current plan   Therapy Frequency 2 times/day;Monday-Friday;Saturday or Sunday;daily   Physical Therapy Follow-up   PT Follow-up? Yes   PT - Next Visit Date 04/09/18   Treatment/Billable Minutes   Therapeutic Activity 45   Total Time 45       Mary Carrillo   MRN: [id number]     Farhana Owusu, PT  4/8/2018

## 2018-04-08 NOTE — PROGRESS NOTES
"Occupational Therapy  PM Treatment  Mary Carrillo   MRN: [id number]   Room/Bed: E257/E257 A       04/08/18 1331   OT Time Calculation   OT Start Time 1331   OT Stop Time 1416   OT Total Time (min) 45 min   General   OT Date of Treatment 04/08/18   Family/Caregiver Present Yes  (daughter, gisela present in room)   Patient Found (position) Seated in wheelchair   Pt found with TLSO   Precautions   General Precautions fall   Orthopedic Yes   Required Braces or Orthoses Yes   Spinal Brace TLSO   Visual/Auditory Vision impaired  (glasses)   Subjective   Patient states "I'm not feeling too hot today, but I'll work with you."    Pain/Comfort   Pain Rating 7/10   Location - Orientation lower   Location back   Pain Addressed Reposition;Distraction   Transfers   Transfer yes   Chair to Mat   Chair <>Mat Technique Slide Board   Chair <> Mat Assistance Minimum Assistance   Chair<> Mat Assistive Device Slide Board   Trials/Comments Min (A) for SB placement with VCs for BLE placement   Mat to Chair   Technique Slide Board   Assistance Contact Guard Assistance   Assistive Device Slide Board   Trials/Comments Pt placed SB; CGA for steadying assist from EOM > w/c   Exercise Tools   Exercise Tools Yes   Bilateral Moy 5x20 reps each of 5# flat surface scap protraction/retraction and HA with trunk stabilized in w/c to increase UB strength for IADL prep   Other Exercise Tool 1 3x10 reps 4# dowel bicep curls in sitting to increase UB strength for ADLs   Additional Activities   Additional Activities Other (Comment)   Additional Activities Comments Pt with education, demonstraction, and t/f training with SB from w/c <> EOM. Pt performed bean bag toss x3 trials with x10 bean bag tosses while scooting along EOM with SBA to increase dynamic sitting balance, functional endurance, and weight shifting.    Activity Tolerance   Activity Tolerance Patient tolerated treatment well   After Treatment   Patient Position After Treatment Seated in " wheelchair   Patient after treatment left (safety belt intact; family present)   Assessment   Prognosis Good   Problem List Decreased Self Care skills;Decreased upper extremity strength;Decreased safe judgment during ADL;Decreased endurance;Decreased functional mobility;Decreased gross motor control;Decreased IADLs;Decreased trunk control for functional activities   Assessment Pt tolerated tx session well today. Pt improving with scooting along EOM and demonstrated increased (I) with t/fs while using SB. Pt continues with decreased functional mobility and BLE strength during ADLs/IADLs and would continue to benefit from skilled OT services.   Level of Motivation/Participation good   Discharge Recommendations   Equipment Needed After Discharge 3-in-1 commode;bath bench;slide board;wheelchair   Discharge Facility/Level Of Care Needs home health OT   Plan   Plan Continue with current plan   Therapy Frequency 2 times/day   Occupational Therapy Follow-up   OT Follow-up? Yes   Treatment/Billable Minutes   Therapeutic Activity 18   Therapeutic Exercise 27   Total Time 45       Deb Kim OT  4/8/2018    Patient with wheelchair safety belt fastened and able to self release wheelchair safety belt. Pt left seated in w/c with family present (location) with call light and all necessities in reach, nursing notified.     LEGEND:   CGA: Contact Guard Assist   EOB: Edgeof Bed   HHA: Hand Held Assist   HOB: Head of Bed   (I): Independent-patient performs task in a timely manner   Max (A): Maximal Assist-patient performs 25-49% of task   Min (A): Minimal Assist- patient performs 75% or more of task   Mod (A): Moderate Assist- patient performs 50-74% of task   NA: Not applicable   NT: Not tested   OOB: Out of Bed   PTA: Prior to admit   QC: Quad Cane   RW: Rolling Walker   (S): Supervision- patient requires cues, coaxing, prompting   SBA: Stand By Assist   SC: Straight Cane   SW: Standard Walker   TBA: To be assessed   Total  (A): Total Assist- patient performs less than 25% of task   WC: Wheelchair   WFL: Within Functional Limits   WNL: Within Normal Limits

## 2018-04-08 NOTE — PROGRESS NOTES
[facility]  Physical Medicine & Rehab  Progress Note    Patient Name: Mary Carrillo  MRN: [id number]  Patient Class: IP- Rehab   Admission Date: 3/27/2018  Length of Stay: 12 days  Attending Physician: Jack Squires MD  Primary Care Provider: Jose Khan MD    Subjective:     Principal Problem:Osteoarthritis of spine with myelopathy, thoracolumbar region    Interval History 4/8/2018:  Patient is seen for follow-up rehab evaluation and recommendations: No acute events over night. Not sleeping well - on trazadone, w Alrene PRN, and Ramelteon PRN. Educated on asking for meds early.        I have reviewed the HPI and PMFSH and there are no changes from admission.       Scheduled Medications:    amLODIPine  10 mg Oral Daily    amoxicillin-clavulanate 875-125mg  1 tablet Oral Q12H    carvedilol  25 mg Oral BID    gabapentin  300 mg Oral TID    heparin (porcine)  5,000 Units Subcutaneous Q8H    isoniazid  900 mg Oral Every Tues    isoniazid  900 mg Oral Every Fri    lisinopril  15 mg Oral Daily    polyethylene glycol  17 g Oral Daily    pyridoxine (vitamin B6)  50 mg Oral Daily    rifAMpin  600 mg Oral Every Tues    rifAMpin  600 mg Oral Every Fri    senna-docusate 8.6-50 mg  2 tablet Oral Daily    traZODone  100 mg Oral QHS       Diagnostic Results: Labs: Reviewed  X-Ray: Reviewed    PRN Medications: acetaminophen, albuterol sulfate, bisacodyl, calcium carbonate, hydrOXYzine pamoate, magnesium hydroxide 400 mg/5 ml, methocarbamol, ondansetron, oxyCODONE-acetaminophen, ramelteon, senna, simethicone    Review of Systems   Constitutional: Positive for activity change. Negative for chills, fatigue and fever.   HENT: Negative for drooling, hearing loss, trouble swallowing and voice change.    Eyes: Negative for pain and visual disturbance.   Respiratory: Negative for cough, shortness of breath and wheezing.    Cardiovascular: Negative for chest pain and palpitations.   Gastrointestinal:  Negative for abdominal distention, nausea and vomiting.   Genitourinary: Negative for difficulty urinating and flank pain.   Musculoskeletal: Positive for back pain, gait problem and myalgias. Negative for arthralgias and neck pain.   Skin: Positive for wound. Negative for rash.   Neurological: Positive for weakness. Negative for dizziness, numbness and headaches.   Psychiatric/Behavioral: Negative for agitation and hallucinations. The patient is not nervous/anxious.      Objective:     Vital Signs (Most Recent):  Temp: 98.8 °F (37.1 °C) (18 0700)  Pulse: 72 (18 0700)  Resp: 14 (18 07)  BP: (!) 177/81 (18 0830)  SpO2: (!) 93 % (18 07)    Vital Signs (24h Range):  Temp:  [98.4 °F (36.9 °C)-98.8 °F (37.1 °C)] 98.8 °F (37.1 °C)  Pulse:  [72-80] 72  Resp:  [14-18] 14  SpO2:  [93 %] 93 %  BP: (151-177)/(81) 177/81     Physical Exam   Constitutional: She is oriented to person, place, and time. She appears well-developed and well-nourished. No distress.   Sitting up in medichair   HENT:   Head: Normocephalic and atraumatic.   Right Ear: External ear normal.   Left Ear: External ear normal.   Nose: Nose normal.   Eyes: Right eye exhibits no discharge. Left eye exhibits no discharge. No scleral icterus.   Neck: Normal range of motion.   Cardiovascular: Normal rate, regular rhythm and intact distal pulses.    Pulmonary/Chest: Effort normal. No respiratory distress. She has no wheezes.   Abdominal: Soft. She exhibits no distension. There is no tenderness.   Musculoskeletal: She exhibits no edema or tenderness.        Right ankle: She exhibits decreased range of motion (foot drop).   TLSO brace intact   Neurological: She is alert and oriented to person, place, and time. She exhibits normal muscle tone.   -  Mental Status:  AAOx3.  Follows commands.  Answers correct age and .  Recent and remote memory intact.  -  Speech and language:  no aphasia or dysarthria.    -  Vision:  no hemianopsia or  ptosis.    -  Facial movement (CN VII): symmetrical.   -  Motor:  RUE: 4/5.  LUE: 4/5.  RLE:  Hip Flex 2/5, Knee Ext/Flex 3-/5,  DF 1/5, PF 4-/5.  LLE:  Hip Flex 2+/5, Knee Ext/Flex 4-/5,  DF 4/5, PF 4/5.  -  Sensory:  Intact to light touch and pin prick.   Skin: Skin is warm and dry. No rash noted.   Proximal incision site dehisced. No drainage.    Psychiatric: She has a normal mood and affect. Her behavior is normal. Thought content normal.   Vitals reviewed.    NEUROLOGICAL EXAMINATION:     MENTAL STATUS   Oriented to person, place, and time.       Assessment/Plan:      Mary Carrillo is a 63 y.o. female admitted to inpatient rehabilitation on 3/27/2018 for Osteoarthritis of spine with myelopathy, thoracolumbar region with impaired mobility and ADLs. Patient remains appropriate for PT, OT, and as required Speech therapy. Patient continues to require 24 hour nursing care as well as daily Physician assessment.    * Osteoarthritis of spine with myelopathy, thoracolumbar region    -  MRI T-spine revealed vertebral osteomyelitis/discitis T12-L1 with severe compression and effacement of the ventral and dorsal subarachnoid spaces  -  S/p T12-L1 corpectomy with T10-L3 fusion on 3/18/18  -  PT/OT evaluate and treat  -  Pain management  -  Monitor for bowel and bladder dysfunction  -  Monitor for spasticity  -  Monitor for and prevent skin breakdown and pressure ulcers  Early mobility, repositioning/weight shifting every 20-30 minutes when sitting, turn patient every 2 hours, proper mattress/overlay and chair cushioning, pressure relief/heel protector boots  -  DVT prophylaxis:  on [facility]        Dehiscence of operative wound    Noted on 3/31. No signs of infection. Culture sent and Bacitracin ointment started. Continue to monitor    4/1 - CBC sent to monitor WBC. Will consult wound care  4/2 WBC 11, stable  4/3 Staples removed  4/4 no drainage early morning, some reported late morning so ESR, CRP, CBC ordered. Cont wound  care.  4/5 ESR, CRP elevated, but WBC within normal limits. Augmentin started. Incision improving.    4/7 covered w mepilex, c/d         Acute blood loss as cause of postoperative anemia    Continue to monitor H/H. Previous transfusions performed    4/2 H/H improved, 8.5/28.4    Lab Results   Component Value Date    WBC 10.39 04/05/2018    HGB 8.6 (L) 04/05/2018    HCT 28.2 (L) 04/05/2018    MCV 92 04/05/2018     04/05/2018               HTN (hypertension), benign    Continue Coreg and Amlodipine    3/29 - Lisinopril 10mg daily  3/30 - improved  4/2 - sbp 130s-160. Lisinopril increased to 20  4/3  this morning, improved  4/4 dec lisinopril back to 10 for low DBP  4/6 Lisinopril increased to 15mg     4/7 AM BP elevated, requested repeat which is pending. WIll cont to f/u.  4/8 Repeat yesterday afternoon was improved. Will cont w current regimen - may benefit from spacing out meds for smoother ocntrol    Vitals:    04/07/18 1728 04/07/18 1904 04/08/18 0700 04/08/18 0830   BP: (!) 147/67 (!) 151/81 (!) 177/81 (!) 177/81   BP Location: Left arm Left arm Left arm    Patient Position: Lying Lying Lying    Pulse: 89 80 72    Resp:  18 14    Temp:  98.4 °F (36.9 °C) 98.8 °F (37.1 °C)    TempSrc:  Oral Oral    SpO2:   (!) 93%    Weight:       Height:                 Sarcoidosis    Currently holding Methotrexate due to TB            DISCHARGE PLANNING:  Tentative Discharge Date:     Future Appointments  Date Time Provider Department Center   4/9/2018 10:40 AM Lakeisha Bledsoe PA-C [facility] NEURO [place]   5/7/2018 2:00 PM [facility] LB [facility] [place]   5/7/2018 2:40 PM Andi Swain DO [facility] NEURO [place]       Lexi Rodrigues MD  Department of Physical Medicine & Rehab   [facility]

## 2018-04-08 NOTE — PLAN OF CARE
"Problem: Patient Care Overview  Goal: Plan of Care Review  Outcome: Ongoing (interventions implemented as appropriate)  Ms. Carrillo required pain medication twice during this shift with minimal improvement.  She also requested Methacarbamol and stated that it "didn't do much."  She has had no falls and no pressure injuries.      "

## 2018-04-08 NOTE — NURSING
Wound care done to back incision.  Painted with betadine and sealed dressing again.  Ordered new aquacel dressings this morning but not here yet.

## 2018-04-08 NOTE — SUBJECTIVE & OBJECTIVE
Interval History 4/8/2018:  Patient is seen for follow-up rehab evaluation and recommendations: No acute events over night. Not sleeping well - on trazadone, w Arlene PRN, and Ramelteon PRN. Educated on asking for meds early.        I have reviewed the HPI and PMFSH and there are no changes from admission.       Scheduled Medications:    amLODIPine  10 mg Oral Daily    amoxicillin-clavulanate 875-125mg  1 tablet Oral Q12H    carvedilol  25 mg Oral BID    gabapentin  300 mg Oral TID    heparin (porcine)  5,000 Units Subcutaneous Q8H    isoniazid  900 mg Oral Every Tues    isoniazid  900 mg Oral Every Fri    lisinopril  15 mg Oral Daily    polyethylene glycol  17 g Oral Daily    pyridoxine (vitamin B6)  50 mg Oral Daily    rifAMpin  600 mg Oral Every Tues    rifAMpin  600 mg Oral Every Fri    senna-docusate 8.6-50 mg  2 tablet Oral Daily    traZODone  100 mg Oral QHS       Diagnostic Results: Labs: Reviewed  X-Ray: Reviewed    PRN Medications: acetaminophen, albuterol sulfate, bisacodyl, calcium carbonate, hydrOXYzine pamoate, magnesium hydroxide 400 mg/5 ml, methocarbamol, ondansetron, oxyCODONE-acetaminophen, ramelteon, senna, simethicone    Review of Systems   Constitutional: Positive for activity change. Negative for chills, fatigue and fever.   HENT: Negative for drooling, hearing loss, trouble swallowing and voice change.    Eyes: Negative for pain and visual disturbance.   Respiratory: Negative for cough, shortness of breath and wheezing.    Cardiovascular: Negative for chest pain and palpitations.   Gastrointestinal: Negative for abdominal distention, nausea and vomiting.   Genitourinary: Negative for difficulty urinating and flank pain.   Musculoskeletal: Positive for back pain, gait problem and myalgias. Negative for arthralgias and neck pain.   Skin: Positive for wound. Negative for rash.   Neurological: Positive for weakness. Negative for dizziness, numbness and headaches.    Psychiatric/Behavioral: Negative for agitation and hallucinations. The patient is not nervous/anxious.      Objective:     Vital Signs (Most Recent):  Temp: 98.8 °F (37.1 °C) (18 0700)  Pulse: 72 (18 0700)  Resp: 14 (18 0700)  BP: (!) 177/81 (18 0830)  SpO2: (!) 93 % (18 0700)    Vital Signs (24h Range):  Temp:  [98.4 °F (36.9 °C)-98.8 °F (37.1 °C)] 98.8 °F (37.1 °C)  Pulse:  [72-80] 72  Resp:  [14-18] 14  SpO2:  [93 %] 93 %  BP: (151-177)/(81) 177/81     Physical Exam   Constitutional: She is oriented to person, place, and time. She appears well-developed and well-nourished. No distress.   Sitting up in medichair   HENT:   Head: Normocephalic and atraumatic.   Right Ear: External ear normal.   Left Ear: External ear normal.   Nose: Nose normal.   Eyes: Right eye exhibits no discharge. Left eye exhibits no discharge. No scleral icterus.   Neck: Normal range of motion.   Cardiovascular: Normal rate, regular rhythm and intact distal pulses.    Pulmonary/Chest: Effort normal. No respiratory distress. She has no wheezes.   Abdominal: Soft. She exhibits no distension. There is no tenderness.   Musculoskeletal: She exhibits no edema or tenderness.        Right ankle: She exhibits decreased range of motion (foot drop).   TLSO brace intact   Neurological: She is alert and oriented to person, place, and time. She exhibits normal muscle tone.   -  Mental Status:  AAOx3.  Follows commands.  Answers correct age and .  Recent and remote memory intact.  -  Speech and language:  no aphasia or dysarthria.    -  Vision:  no hemianopsia or ptosis.    -  Facial movement (CN VII): symmetrical.   -  Motor:  RUE: 4/5.  LUE: 4/5.  RLE:  Hip Flex 2/5, Knee Ext/Flex 3-/5,  DF 1/5, PF 4-/5.  LLE:  Hip Flex 2+/5, Knee Ext/Flex 4-/5,  DF 4/5, PF 4/5.  -  Sensory:  Intact to light touch and pin prick.   Skin: Skin is warm and dry. No rash noted.   Proximal incision site dehisced. No drainage.    Psychiatric:  She has a normal mood and affect. Her behavior is normal. Thought content normal.   Vitals reviewed.    NEUROLOGICAL EXAMINATION:     MENTAL STATUS   Oriented to person, place, and time.

## 2018-04-08 NOTE — ASSESSMENT & PLAN NOTE
Continue Coreg and Amlodipine    3/29 - Lisinopril 10mg daily  3/30 - improved  4/2 - sbp 130s-160. Lisinopril increased to 20  4/3  this morning, improved  4/4 dec lisinopril back to 10 for low DBP  4/6 Lisinopril increased to 15mg     4/7 AM BP elevated, requested repeat which is pending. WIll cont to f/u.  4/8 Repeat yesterday afternoon was improved. Will cont w current regimen - may benefit from spacing out meds for smoother ocntrol    Vitals:    04/07/18 1728 04/07/18 1904 04/08/18 0700 04/08/18 0830   BP: (!) 147/67 (!) 151/81 (!) 177/81 (!) 177/81   BP Location: Left arm Left arm Left arm    Patient Position: Lying Lying Lying    Pulse: 89 80 72    Resp:  18 14    Temp:  98.4 °F (36.9 °C) 98.8 °F (37.1 °C)    TempSrc:  Oral Oral    SpO2:   (!) 93%    Weight:       Height:

## 2018-04-09 ENCOUNTER — INITIAL CONSULT (OUTPATIENT)
Dept: NEUROSURGERY | Facility: CLINIC | Age: 63
DRG: 949 | End: 2018-04-09
Attending: PHYSICAL MEDICINE & REHABILITATION
Payer: MEDICARE

## 2018-04-09 VITALS — HEART RATE: 69 BPM | TEMPERATURE: 99 F | DIASTOLIC BLOOD PRESSURE: 62 MMHG | SYSTOLIC BLOOD PRESSURE: 120 MMHG

## 2018-04-09 DIAGNOSIS — Z98.1 STATUS POST THORACIC SPINAL FUSION: ICD-10-CM

## 2018-04-09 DIAGNOSIS — M46.45 DISCITIS OF THORACOLUMBAR REGION: ICD-10-CM

## 2018-04-09 DIAGNOSIS — A18.01 POTT'S DISEASE (SPINAL TUBERCULOSIS): ICD-10-CM

## 2018-04-09 DIAGNOSIS — R23.9 ALTERATION IN SKIN INTEGRITY RELATED TO SURGICAL INCISION: Primary | ICD-10-CM

## 2018-04-09 PROBLEM — G47.9 DIFFICULTY SLEEPING: Status: ACTIVE | Noted: 2018-04-09

## 2018-04-09 PROBLEM — G89.29 ACUTE EXACERBATION OF CHRONIC LOW BACK PAIN: Status: RESOLVED | Noted: 2018-03-16 | Resolved: 2018-04-09

## 2018-04-09 PROBLEM — M54.50 ACUTE EXACERBATION OF CHRONIC LOW BACK PAIN: Status: RESOLVED | Noted: 2018-03-16 | Resolved: 2018-04-09

## 2018-04-09 PROBLEM — M25.551 RIGHT HIP PAIN: Status: ACTIVE | Noted: 2018-04-09

## 2018-04-09 LAB
ANION GAP SERPL CALC-SCNC: 7 MMOL/L
BASOPHILS # BLD AUTO: 0.06 K/UL
BASOPHILS NFR BLD: 0.6 %
BUN SERPL-MCNC: 15 MG/DL
CALCIUM SERPL-MCNC: 9.2 MG/DL
CHLORIDE SERPL-SCNC: 104 MMOL/L
CO2 SERPL-SCNC: 26 MMOL/L
CREAT SERPL-MCNC: 0.9 MG/DL
DIFFERENTIAL METHOD: ABNORMAL
EOSINOPHIL # BLD AUTO: 0.3 K/UL
EOSINOPHIL NFR BLD: 2.7 %
ERYTHROCYTE [DISTWIDTH] IN BLOOD BY AUTOMATED COUNT: 19.8 %
EST. GFR  (AFRICAN AMERICAN): >60 ML/MIN/1.73 M^2
EST. GFR  (NON AFRICAN AMERICAN): >60 ML/MIN/1.73 M^2
GLUCOSE SERPL-MCNC: 83 MG/DL
HCT VFR BLD AUTO: 29.5 %
HGB BLD-MCNC: 9.2 G/DL
IMM GRANULOCYTES # BLD AUTO: 0.11 K/UL
IMM GRANULOCYTES NFR BLD AUTO: 1.2 %
LYMPHOCYTES # BLD AUTO: 2.3 K/UL
LYMPHOCYTES NFR BLD: 24.6 %
MAGNESIUM SERPL-MCNC: 1.8 MG/DL
MCH RBC QN AUTO: 28.6 PG
MCHC RBC AUTO-ENTMCNC: 31.2 G/DL
MCV RBC AUTO: 92 FL
MONOCYTES # BLD AUTO: 1.4 K/UL
MONOCYTES NFR BLD: 14.3 %
NEUTROPHILS # BLD AUTO: 5.4 K/UL
NEUTROPHILS NFR BLD: 56.6 %
NRBC BLD-RTO: 0 /100 WBC
PHOSPHATE SERPL-MCNC: 3.9 MG/DL
PLATELET # BLD AUTO: 298 K/UL
PMV BLD AUTO: 9.9 FL
POTASSIUM SERPL-SCNC: 4.3 MMOL/L
RBC # BLD AUTO: 3.22 M/UL
SODIUM SERPL-SCNC: 137 MMOL/L
WBC # BLD AUTO: 9.51 K/UL

## 2018-04-09 PROCEDURE — 99213 OFFICE O/P EST LOW 20 MIN: CPT | Mod: PBBFAC | Performed by: NEUROLOGICAL SURGERY

## 2018-04-09 PROCEDURE — 97530 THERAPEUTIC ACTIVITIES: CPT

## 2018-04-09 PROCEDURE — 25000003 PHARM REV CODE 250: Performed by: PHYSICAL MEDICINE & REHABILITATION

## 2018-04-09 PROCEDURE — 99999 PR PBB SHADOW E&M-EST. PATIENT-LVL III: CPT | Mod: PBBFAC,,, | Performed by: NEUROLOGICAL SURGERY

## 2018-04-09 PROCEDURE — 36415 COLL VENOUS BLD VENIPUNCTURE: CPT

## 2018-04-09 PROCEDURE — 63600175 PHARM REV CODE 636 W HCPCS: Performed by: HOSPITALIST

## 2018-04-09 PROCEDURE — 80048 BASIC METABOLIC PNL TOTAL CA: CPT

## 2018-04-09 PROCEDURE — 84100 ASSAY OF PHOSPHORUS: CPT

## 2018-04-09 PROCEDURE — 83735 ASSAY OF MAGNESIUM: CPT

## 2018-04-09 PROCEDURE — 11800000 HC REHAB PRIVATE ROOM

## 2018-04-09 PROCEDURE — 85025 COMPLETE CBC W/AUTO DIFF WBC: CPT

## 2018-04-09 PROCEDURE — 99024 POSTOP FOLLOW-UP VISIT: CPT | Mod: POP,,, | Performed by: NEUROLOGICAL SURGERY

## 2018-04-09 PROCEDURE — 97110 THERAPEUTIC EXERCISES: CPT

## 2018-04-09 PROCEDURE — 99232 SBSQ HOSP IP/OBS MODERATE 35: CPT | Mod: ,,, | Performed by: PHYSICAL MEDICINE & REHABILITATION

## 2018-04-09 PROCEDURE — 97150 GROUP THERAPEUTIC PROCEDURES: CPT

## 2018-04-09 PROCEDURE — 97535 SELF CARE MNGMENT TRAINING: CPT

## 2018-04-09 PROCEDURE — 25000003 PHARM REV CODE 250: Performed by: HOSPITALIST

## 2018-04-09 RX ORDER — AMOXICILLIN AND CLAVULANATE POTASSIUM 875; 125 MG/1; MG/1
1 TABLET, FILM COATED ORAL 2 TIMES DAILY
Status: ON HOLD | COMMUNITY
End: 2018-04-17 | Stop reason: HOSPADM

## 2018-04-09 RX ORDER — RAMELTEON 8 MG/1
8 TABLET ORAL NIGHTLY
Status: DISCONTINUED | OUTPATIENT
Start: 2018-04-09 | End: 2018-04-18 | Stop reason: HOSPADM

## 2018-04-09 RX ORDER — GABAPENTIN 300 MG/1
300 CAPSULE ORAL 3 TIMES DAILY
COMMUNITY
End: 2018-06-21

## 2018-04-09 RX ORDER — SULFAMETHOXAZOLE AND TRIMETHOPRIM 800; 160 MG/1; MG/1
1 TABLET ORAL 2 TIMES DAILY
Status: DISCONTINUED | OUTPATIENT
Start: 2018-04-09 | End: 2018-04-18 | Stop reason: HOSPADM

## 2018-04-09 RX ORDER — AMOXICILLIN 250 MG
1 CAPSULE ORAL DAILY
Status: ON HOLD | COMMUNITY
End: 2018-04-17 | Stop reason: HOSPADM

## 2018-04-09 RX ORDER — POLYETHYLENE GLYCOL 3350 17 G/17G
POWDER, FOR SOLUTION ORAL
COMMUNITY
End: 2018-06-21

## 2018-04-09 RX ORDER — HEPARIN SODIUM 5000 [USP'U]/ML
INJECTION, SOLUTION INTRAVENOUS; SUBCUTANEOUS EVERY 8 HOURS
Status: ON HOLD | COMMUNITY
End: 2018-04-17 | Stop reason: HOSPADM

## 2018-04-09 RX ADMIN — STANDARDIZED SENNA CONCENTRATE AND DOCUSATE SODIUM 2 TABLET: 8.6; 5 TABLET, FILM COATED ORAL at 07:04

## 2018-04-09 RX ADMIN — RAMELTEON 8 MG: 8 TABLET, FILM COATED ORAL at 08:04

## 2018-04-09 RX ADMIN — LISINOPRIL 15 MG: 10 TABLET ORAL at 07:04

## 2018-04-09 RX ADMIN — AMOXICILLIN AND CLAVULANATE POTASSIUM 1 TABLET: 875; 125 TABLET, FILM COATED ORAL at 07:04

## 2018-04-09 RX ADMIN — HEPARIN SODIUM 5000 UNITS: 5000 INJECTION, SOLUTION INTRAVENOUS; SUBCUTANEOUS at 03:04

## 2018-04-09 RX ADMIN — TRAZODONE HYDROCHLORIDE 100 MG: 50 TABLET ORAL at 08:04

## 2018-04-09 RX ADMIN — CARVEDILOL 25 MG: 25 TABLET, FILM COATED ORAL at 07:04

## 2018-04-09 RX ADMIN — GABAPENTIN 300 MG: 300 CAPSULE ORAL at 09:04

## 2018-04-09 RX ADMIN — SULFAMETHOXAZOLE AND TRIMETHOPRIM 1 TABLET: 800; 160 TABLET ORAL at 08:04

## 2018-04-09 RX ADMIN — OXYCODONE AND ACETAMINOPHEN 1 TABLET: 10; 325 TABLET ORAL at 05:04

## 2018-04-09 RX ADMIN — OXYCODONE AND ACETAMINOPHEN 1 TABLET: 10; 325 TABLET ORAL at 12:04

## 2018-04-09 RX ADMIN — CARVEDILOL 25 MG: 25 TABLET, FILM COATED ORAL at 08:04

## 2018-04-09 RX ADMIN — HEPARIN SODIUM 5000 UNITS: 5000 INJECTION, SOLUTION INTRAVENOUS; SUBCUTANEOUS at 05:04

## 2018-04-09 RX ADMIN — GABAPENTIN 300 MG: 300 CAPSULE ORAL at 08:04

## 2018-04-09 RX ADMIN — AMLODIPINE BESYLATE 10 MG: 10 TABLET ORAL at 07:04

## 2018-04-09 RX ADMIN — HEPARIN SODIUM 5000 UNITS: 5000 INJECTION, SOLUTION INTRAVENOUS; SUBCUTANEOUS at 09:04

## 2018-04-09 RX ADMIN — OXYCODONE AND ACETAMINOPHEN 1 TABLET: 10; 325 TABLET ORAL at 08:04

## 2018-04-09 RX ADMIN — Medication 50 MG: at 07:04

## 2018-04-09 NOTE — PROGRESS NOTES
Physical Therapy   Treatment    Mary Carrillo   MRN: [id number]    04/09/18 1431   PT Time Calculation   PT Start Time 1431   PT Stop Time 1516   PT Total Time (min) 45 min   Treatment   Treatment Type Treatment   PT/PTA PT   PTA Visit Number 0   General   PT Received On 04/09/18   Family/Caregiver Present No   Patient Found (position) Seated in wheelchair   Pt found with TLSO   Precautions   General Precautions fall   Orthopedic Yes   Required Braces or Orthoses Yes   Spinal Brace TLSO   Visual/Auditory Vision impaired   Subjective   Patient states Pt agreeable to participate in PT session.    Pain/Comfort   Pain Rating 1 7/10   Location - Side 1 Right   Location - Orientation 1 posterior   Location 1 hip   Pain Addressed 1 Pre-medicate for activity;Reposition;Distraction   Pain Rating Post-Intervention 1 7/10   Bed Mobility   Bed Mobility yes   Supine to Sit Stand by Assistance   Sit to Supine Minimum Assistance   Transfers   Transfer yes   Sit to Stand   Sit <> Stand Assistance Moderate Assistance   Sit <> Stand Assistive Device Other (see comments)  (parallel bars)   Trials/Comments assistance with lifting weight from chair   Stand to Sit   Assistance Moderate Assistance   Assistive Device Other (see comments)  (parallel bars)   Trials/Comments assistance with lowering weight to chair   Chair to Mat   Chair<> Mat Technique Slide Board   Chair<>Mat Assistance Contact Guard Assistance   Chair <> Mat Assistive Device Slide Board   Trials/Comments x3 trials   Mat to Chair   Technique Slide Board   Assistance Contact Guard Assistance   Assistive Device Slide Board   Trials/Comments x3 trials   Tub Bench Transfer   Technique Slide Board   Assistance Contact Guard Assistance   Assistive Device Slide Board   Trials/Comments x1 trial   Wheelchair Activities   Propulsion Yes   Propulsion Type 1 Manual   Level 1 Level tile   Method 1 Right upper extremity;Left upper extremity   Level of Assistance 1 Supervision   Description/  Details 1 200' x1 trial   Gait   Gait No   Weight Bearing Status full   Stairs   Stairs No   Balance   Balance Yes   Static Sitting Balance   Static Sitting-Balance Support Right upper extremity supported;Left upper extremity supported;Feet supported   Static Sitting-Level of Assistance Supervision   Static Sitting-Comment/# of Minutes Pt sat EOM for 8 minutes with no LOB    Other Activities   Comments Pt performed slide board transfer to drop arm commode at [facility].   Activity Tolerance   Activity Tolerance Patient tolerated treatment well   After Treatment   Patient Position After Treatment Seated in wheelchair  (direct handoff to OT)   Assessment   Prognosis Fair   Problem List Decreased strength;Decreased range of motion;Decreased endurance;Impaired balance;Decreased mobility;Obesity;Pain;Impaired sensation   Session Assessment progressing toward goals   Assessment Pt continues to make good progress in skilled PT as evidenced by completion of 7/12 LTGs. She continues to struggle with decreased balance, decreaed mobiliy, and decreased strength. She will continue to benefit from skilled PT services for these reasons.   Level of Motivation/Participation good   Barriers to Discharge Decreased caregiver support   Long Term Goals   Supine to Sit-Met/Not Met Met   Sit to Supine - Met/Not Met Met   Logroll - Met/Not Met Not Met   Transfer Sit to stand - Met/Not Met Met   Transfer Bed/Chair - Met/Not Met Met   Sit Edge Of Bed - Met Met   Stand - Met/Not Met Not Met   Tolerate Exercise - Met/Not Met Not met   Propel Wheelchair - Met/Not Met Not met   Other Goal Met   Other Goal 2 Met   Other Goal 3 Not Met   Discharge Recommendations   Equipment Needed After Discharge 3-in-1 commode;bath bench;slide board;wheelchair   Discharge Facility/Level Of Care Needs home health PT   Plan   Planned Therapy Intervention Continue with current plan   Therapy Frequency 2 times/day;daily;Monday-Friday;Saturday or Sunday   Physical Therapy  Follow-up   PT Follow-up? Yes   PT - Next Visit Date 04/10/18   Treatment/Billable Minutes   Therapeutic Activity 45   Total Time 45   aVni Vogt, PT   4/9/2018

## 2018-04-09 NOTE — PLAN OF CARE
Problem: Skin Integrity Impairment, Risk/Actual (Adult)  Goal: Skin Integrity/Wound Healing  Patient will demonstrate the desired outcomes by discharge/transition of care.   Outcome: Ongoing (interventions implemented as appropriate)  Will continue with aseptic technique during wound care, maintain diet, keep wound clean and dry, wash hands before and after wound care, monitor WBC, assess wound,and administer antibiotics

## 2018-04-09 NOTE — PROGRESS NOTES
Physical Therapy   Treatment/Seated endurance group    Mary Carrillo   MRN: [id number]                  04/09/18 1327   PT Time Calculation   PT Start Time 1327   PT Stop Time 1412   PT Total Time (min) 45 min   Treatment   Treatment Type Treatment;Other (see comments)  (Seated endurance group)   PT/PTA PT   General   PT Received On 04/09/18   Family/Caregiver Present No   Patient Found (position) Seated in wheelchair;Other (comment)  (in room)   Pt found with (seat belt)   Precautions   General Precautions fall   Orthopedic Yes   Required Braces or Orthoses Yes   Spinal Brace TLSO;Other (Comment)  (pt reports she only needs during standing)   Visual/Auditory Vision impaired;Other (see comments)  (glasses)   Subjective   Patient states the doctor told her she only needs to wear her TLSO when she is attempting to stand.   Pain/Comfort   Pain Rating 1 8/10   Location - Side 1 Bilateral   Location - Orientation 1 generalized  (mid)   Location 1 back  (and R hip)   Pain Addressed 1 Pre-medicate for activity;Distraction   Pain Rating Post-Intervention 1 8/10   Activity Tolerance   Activity Tolerance Patient tolerated treatment well   After Treatment   Patient Position After Treatment Seated in wheelchair;Other (comment)  (seat belt in place)   Patient after treatment left (pt remains in gym to await for PT individual session)   Assessment   Prognosis Fair   Plan   Planned Therapy Intervention Continue with current plan   Therapy Frequency 2 times/day;daily;Monday-Friday;Saturday or Sunday   Physical Therapy Follow-up   PT Follow-up? Yes   PT - Next Visit Date 04/10/18   Treatment/Billable Minutes   Therapeutic Activity Group 45   Total Time 45       Objective:    Patient participated in a 45 minute seated therapeutic exercises program with minimal assistance for some LE movements. This group activity challenged the patient's upper and lower extremity strength, seated balance, and endurance to improve functional mobility,  decrease risk of falls, and increase activity tolerance. Patient required  3-4 rest breaks during this activity. Patient performed the following exercises: cervical ROM( flex/ext, rotation), shoulder rolls, shoulder shrugs, forward punches, hip flexion, LAQs, ankle pumps, bicep curls, shoulder flexion, ball bounce and toss with basketball, ball tap with beach ball     -Endurance 11  on the RPE scale. No pain complaints voiced or observed.      -Social Interaction:  FIM=7   Interacts appropriately with others - No medications needed. Patient asleep all shift (7)  Interacts appropriately with others with medication or extra time(anti-anxiety, antidepressant)  (6)  Interacts appropriately 90% of time - needs monitoring or encouragement for participation or interaction  (5)  Interacts appropriately 75-89% of time - needs redirection for appropriate language or to initiate interaction  (4)  Interacts appropriately 50-74% of time - May be physically or verbally inappropriate   (3)  Interacts appropriately 25-49% of time - Needs frequent redirection  (2)  Interacts appropriately less than 25% of time - May be withdrawn or combative  (1)   Assessment:  Patient participated in a 45 minute seated endurance activity.  The group activity challenged dynamic sitting balance, core strengthening, bilateral upper and lower extremity strengthening, cardiovascular and respiratory capacity, and social affect/mood. Patient will improve activity tolerance by requiring no rest breaks and maintain a RPE of 9 throughout the session.         Huey Caicedo, PT 4/9/2018

## 2018-04-09 NOTE — PROGRESS NOTES
"Occupational Therapy  AM Treatment/Reassessment  &  PM Treatment  Mary Carrillo   MRN: [id number]   Room/Bed: E257/E257 A     04/09/18 0827 04/09/18 1516   OT Time Calculation   OT Start Time 0827 1516   OT Stop Time 0915 1602   OT Total Time (min) 48 min 46 min   General   OT Date of Treatment 04/09/18 04/09/18   Family/Caregiver Present No No   Patient Found (position) Supine in bed Seated in wheelchair   Pt found with --  TLSO   Precautions   General Precautions fall --    Orthopedic Yes --    Required Braces or Orthoses Yes --    Spinal Brace TLSO --    Visual/Auditory Vision impaired  (glasses) --    Subjective   Patient states "What is my appointment for today?"  "They said I could take my brace because it's rubbing on my back."    Pain/Comfort   Pain Rating no pain other (see comments)   Location - Orientation --  lower   Location --  back   Pain Addressed --  Reposition;Distraction   Pain Comment --  Pt did not quantify, but c/o soreness   Bed Mobility   Bed Mobility yes --    Rolling/Turning to Left Supervision --    Rolling/Turning Left Comments with bed rail --    Rolling/Turning Right Supervision --    Rolling/Turning Right Comments with bed rail --    Scooting/Bridging Supervision --    Scooting/Bridging Comments to EOB --    Supine to Sit Stand by Assistance --    Supine to Sit Comments SBA for safety to EOB --    Sit to Supine Stand by Assistance --    Sit to Supine Comments SBA for safety from EOB --    Transfers   Transfer yes yes   Sit to Stand   Sit <> Stand Assistance --  Moderate Assistance   Sit <> Stand Assistive Device --  No Assistive Device   Trials/Comments --  Mod (A) for trunk elevation from EOM   Stand to Sit   Assistance --  Moderate Assistance   Assistive Device --  No Assistive Device   Trials/Comments --  Mod (A) for lowering to EOM   Bed to Chair   Bed <> Chair Technique Slide Board --    Bed <> Chair Transfer Assistance Minimum Assistance --    Bed <> Chair Assistive Device Slide " Board --    Trials/Comments Min (A) for placement from EOB > w/c  --    Chair to Mat   Chair <>Mat Technique --  Slide Board   Chair <> Mat Assistance --  Minimum Assistance   Chair<> Mat Assistive Device --  Slide Board   Trials/Comments --  Min (A) for SB placement   Mat to Chair   Technique --  Slide Board   Assistance --  Minimum Assistance   Assistive Device --  Slide Board   Trials/Comments --  Min (A) for SB placement while blocking B feet for stabilization   Grooming   Additional Grooming yes --    Grooming Level of Assistance Independent --    Hand Washing Level of Assistance Independent --    Face Washing Level of Assistance Independent --    Oral Hygeine Level of Assistance Independent --    Grooming Where Assessed Wheelchair --    Grooming Comments (I) with 3/3 G/H tasks sitting sinkside --    Bathing   Bathing Level of Assistance Minimum assistance --    Bathing Where Assessed Bed;Edge of bed --    Bathing Comments Min (A)  --    UE Dressing   UE Dressing Level of Assistance Set-up Assistance --    UE Dressing Where Assessed Edge of bed --    UE Dressing Comments Set-up (A) for TLSO --    LE Dressing   LE Dressing Yes --    LE Dressing Adaptive Equipment Reacher;Sock aide;Dressing stick --    LE Dressing  Level of Assistance Moderate assistance  (pt completed 6/11 steps overall) --    LE Dressing Level of Assistance Stand by assistance --    Sock Level of Assistance Stand by assistance --    Shoe Level of Assistance Total assistance --    Adult Briefs Level of Assistance Total assistance --    LE Dressing Where Assessed Edge of bed;Bed level --    LE Dressing Comments SBA for donning pants EOB with reacher, pt then lies supine and bridges to don pants over hips --    Exercise Tools   Exercise Tools --  Yes   Bilateral Moy --  5x20 reps 5# flat surface to increase UB strength for ADL prep   Additional Activities   Additional Activities --  Other (Comment)   Additional Activities Comments --  Pt with  functional t/f training with SB (requires VCs for BLE placement and min (A) for SB placement). Pt performed word searches and 'spot the difference' activities while seated at EOB for lesiure participation engagement, dynamic sitting balance, and weight shifting while reaching at EOM. Pt also performed 2x5 reps of sit > stand t/fs with mod (A) for lifting from EOM to increase BLE and UB strength for functional t/fs. Pt with TLSO donned throughout session.    Activity Tolerance   Activity Tolerance Patient tolerated treatment well Patient tolerated treatment well   After Treatment   Patient Position After Treatment Seated in wheelchair Seated in wheelchair   Patient after treatment left call button in reach  (safety belt intact) call button in reach  (safety belt intact)   Assessment   Prognosis Good --    Problem List Decreased Self Care skills;Decreased upper extremity strength;Decreased safe judgment during ADL;Decreased cognition;Decreased endurance;Decreased functional mobility;Decreased gross motor control;Decreased IADLs;Decreased trunk control for functional activities --    Assessment Pt has met all STG and 6/9 LTG upon reassessment today with 3 LTG revised for pt progression. Pt is demonstrating progression with bed mobility, LB dressing with AE and at bed level while bridging. Pt continues to require VCs for body positioning with log rolls and adhering to spinal precautions during ADLs. Pt would continue to benefit from skilled OT services.  --    Level of Motivation/Participation good --    Short Term Goals   Additional Documentation yes --    Pt Will Perform Supine To Sit With moderate assist --    Supine to Sit - Met/Not Met Met --    Pt Will Perform Sit to Supine With minimal assist --    Supine to Sit - Met/Not Met Met --    Pt Will Transfer Bed/Chair With moderate assist --    Transfer Bed/Chair - Met/Not Met Met --    Pt Will Perform LE Dressing With maximum assistance --    LE Dressing - Met/Not Met  Met --    Long Term Goals   Additional Documentation yes --    Pt Will Transfer Bed/Chair Revised goal;With contact guard assistance --    Transfer Bed/Chair - Met/Not Met Not Met --    Pt Will Transfer To Bedside Commode Revised goal;With contact guard assist --    Transfer Bedside Commode -Met/Not Met Not Met --    Pt Will Transfer To Shower With minimal assist --    Transfer to Shower-Met/Not Met Not Met --    Pt Will Perform Eating With modified independence --    Eating - Met/Not Met Met --    Pt Will Perform Grooming Independently --    Grooming - Met/Not Met Met --    Pt Will Perform Bathing With minimal assistance --    Bathing - Met/Not Met Met --    Pt Will Perform UE Dressing With modified independence --    UE Dressing - Met/Not Met Not Met --    Pt Will Perform LE Dressing With minimal assistance --    LE Dressing - Met/Not Met Not Met --    Pt Will Perform Toileting Revised goal;With minimal assistance --    Toileting-Met/Not Met Not Met --    Discharge Recommendations   Equipment Needed After Discharge 3-in-1 commode;bath bench;slide board;wheelchair --    Discharge Facility/Level Of Care Needs home health OT --    Plan   Plan Continue with current plan --    Therapy Frequency 2 times/day --    Occupational Therapy Follow-up   OT Follow-up? Yes Yes   Treatment/Billable Minutes   Self Care/Home Management 48 --    Therapeutic Activity --  20   Therapeutic Exercise --  26   Total Time 48 46       Deb Kim OT  4/9/2018     Patient with wheelchair safety belt fastened and able to self release wheelchair safety belt. Pt left seated in w/c next to bedside in pt's room (location) with call light and all necessities in reach, nursing notified.     LEGEND:   CGA: Contact Guard Assist   EOB: Edgeof Bed   HHA: Hand Held Assist   HOB: Head of Bed   (I): Independent-patient performs task in a timely manner   Max (A): Maximal Assist-patient performs 25-49% of task   Min (A): Minimal Assist- patient performs  75% or more of task   Mod (A): Moderate Assist- patient performs 50-74% of task   NA: Not applicable   NT: Not tested   OOB: Out of Bed   PTA: Prior to admit   QC: Quad Cane   RW: Rolling Walker   (S): Supervision- patient requires cues, coaxing, prompting   SBA: Stand By Assist   SC: Straight Cane   SW: Standard Walker   TBA: To be assessed   Total (A): Total Assist- patient performs less than 25% of task   WC: Wheelchair   WFL: Within Functional Limits   WNL: Within Normal Limits

## 2018-04-09 NOTE — ASSESSMENT & PLAN NOTE
Continue Coreg and Amlodipine    3/29 - Lisinopril 10mg daily  3/30 - improved  4/2 - sbp 130s-160. Lisinopril increased to 20  4/3  this morning, improved  4/4 dec lisinopril back to 10 for low DBP  4/6 Lisinopril increased to 15mg     4/7 AM BP elevated, requested repeat which is pending. WIll cont to f/u.  4/8 Repeat yesterday afternoon was improved. Will cont w current regimen - may benefit from spacing out meds for smoother ocntrol  4/9 Inc lisinopril to 20    Vitals:    04/08/18 0700 04/08/18 0830 04/08/18 1900 04/09/18 0700   BP: (!) 177/81 (!) 177/81 (!) 147/65 (!) 175/83   BP Location: Left arm   Left arm   Patient Position: Lying   Lying   Pulse: 72  75 67   Resp: 14  16 20   Temp: 98.8 °F (37.1 °C)  98.5 °F (36.9 °C) 98.3 °F (36.8 °C)   TempSrc: Oral  Oral Oral   SpO2: (!) 93%  95% (!) 93%   Weight:       Height:

## 2018-04-09 NOTE — PROGRESS NOTES
[facility]  Physical Medicine & Rehab  Progress Note    Patient Name: Mary Carrillo  MRN: [id number]  Patient Class: IP- Rehab   Admission Date: 3/27/2018  Length of Stay: 13 days  Attending Physician: Jack Squires MD  Primary Care Provider: Jose Khan MD    Subjective:     Principal Problem:Osteoarthritis of spine with myelopathy, thoracolumbar region    Interval History 4/9/2018:  Patient is seen for follow-up rehab evaluation and recommendations: No acute events over night.Still has difficulty sleeping despite scheduled trazodone and prn atarx, ramelteon. Some increased drainage reported from incision site.     I have reviewed the HPI and PMFSH and there are no changes from admission.       Scheduled Medications:    amLODIPine  10 mg Oral Daily    amoxicillin-clavulanate 875-125mg  1 tablet Oral Q12H    carvedilol  25 mg Oral BID    gabapentin  300 mg Oral TID    heparin (porcine)  5,000 Units Subcutaneous Q8H    isoniazid  900 mg Oral Every Tues    isoniazid  900 mg Oral Every Fri    lisinopril  15 mg Oral Daily    polyethylene glycol  17 g Oral Daily    pyridoxine (vitamin B6)  50 mg Oral Daily    rifAMpin  600 mg Oral Every Tues    rifAMpin  600 mg Oral Every Fri    senna-docusate 8.6-50 mg  2 tablet Oral Daily    traZODone  100 mg Oral QHS       Diagnostic Results: Labs: Reviewed  X-Ray: Reviewed    PRN Medications: acetaminophen, albuterol sulfate, bisacodyl, calcium carbonate, hydrOXYzine pamoate, magnesium hydroxide 400 mg/5 ml, methocarbamol, ondansetron, oxyCODONE-acetaminophen, ramelteon, senna, simethicone    Review of Systems   Constitutional: Positive for activity change. Negative for chills, fatigue and fever.   HENT: Negative for drooling, hearing loss, trouble swallowing and voice change.    Eyes: Negative for pain and visual disturbance.   Respiratory: Negative for cough, shortness of breath and wheezing.    Cardiovascular: Negative for chest pain and  palpitations.   Gastrointestinal: Negative for abdominal distention, nausea and vomiting.   Genitourinary: Negative for difficulty urinating and flank pain.   Musculoskeletal: Positive for back pain, gait problem and myalgias. Negative for arthralgias and neck pain.   Skin: Positive for wound. Negative for rash.   Neurological: Positive for weakness. Negative for dizziness, numbness and headaches.   Psychiatric/Behavioral: Negative for agitation and hallucinations. The patient is not nervous/anxious.      Objective:     Vital Signs (Most Recent):  Temp: 98.3 °F (36.8 °C) (18 0700)  Pulse: 67 (18 07)  Resp: 20 (18)  BP: (!) 175/83 (18 07)  SpO2: (!) 93 % (18)    Vital Signs (24h Range):  Temp:  [98.3 °F (36.8 °C)-98.7 °F (37.1 °C)] 98.7 °F (37.1 °C)  Pulse:  [67-75] 69  Resp:  [16-20] 20  SpO2:  [93 %-95 %] 93 %  BP: (120-175)/(62-83) 120/62     Physical Exam   Constitutional: She is oriented to person, place, and time. She appears well-developed and well-nourished. No distress.   Sitting up in medichair   HENT:   Head: Normocephalic and atraumatic.   Right Ear: External ear normal.   Left Ear: External ear normal.   Nose: Nose normal.   Eyes: Right eye exhibits no discharge. Left eye exhibits no discharge. No scleral icterus.   Neck: Normal range of motion.   Cardiovascular: Normal rate, regular rhythm and intact distal pulses.    Pulmonary/Chest: Effort normal. No respiratory distress. She has no wheezes.   Abdominal: Soft. She exhibits no distension. There is no tenderness.   Musculoskeletal: She exhibits no edema or tenderness.        Right ankle: She exhibits decreased range of motion (foot drop).   TLSO brace intact   Neurological: She is alert and oriented to person, place, and time. She exhibits normal muscle tone.   -  Mental Status:  AAOx3.  Follows commands.  Answers correct age and .  Recent and remote memory intact.  -  Speech and language:  no aphasia or  dysarthria.    -  Vision:  no hemianopsia or ptosis.    -  Facial movement (CN VII): symmetrical.   -  Motor:  RUE: 4/5.  LUE: 4/5.  RLE:  Hip Flex 2/5, Knee Ext/Flex 3-/5,  DF 1/5, PF 4-/5.  LLE:  Hip Flex 2+/5, Knee Ext/Flex 4-/5,  DF 4/5, PF 4/5.  -  Sensory:  Intact to light touch and pin prick.   Skin: Skin is warm and dry. No rash noted.   Proximal incision site dehisced. No drainage.    Psychiatric: She has a normal mood and affect. Her behavior is normal. Thought content normal.   Vitals reviewed.    NEUROLOGICAL EXAMINATION:     MENTAL STATUS   Oriented to person, place, and time.       Assessment/Plan:      Mary Carrillo is a 63 y.o. female admitted to inpatient rehabilitation on 3/27/2018 for Osteoarthritis of spine with myelopathy, thoracolumbar region with impaired mobility and ADLs. Patient remains appropriate for PT, OT, and as required Speech therapy. Patient continues to require 24 hour nursing care as well as daily Physician assessment.    * Osteoarthritis of spine with myelopathy, thoracolumbar region    -  MRI T-spine revealed vertebral osteomyelitis/discitis T12-L1 with severe compression and effacement of the ventral and dorsal subarachnoid spaces  -  S/p T12-L1 corpectomy with T10-L3 fusion on 3/18/18  -  PT/OT evaluate and treat  -  Pain management  -  Monitor for bowel and bladder dysfunction  -  Monitor for spasticity  -  Monitor for and prevent skin breakdown and pressure ulcers  Early mobility, repositioning/weight shifting every 20-30 minutes when sitting, turn patient every 2 hours, proper mattress/overlay and chair cushioning, pressure relief/heel protector boots  -  DVT prophylaxis:  on H        Right hip pain    Xray reviewed. Pain stable.         Difficulty sleeping    4/9 trazodone at 100, change prn ramelteon to scheduled nightly. Cont prn atarax        Alteration in skin integrity related to surgical incision    4/9 increased drainage. Will alert WC. Cont wound care to site.         Dehiscence of operative wound    Noted on 3/31. No signs of infection. Culture sent and Bacitracin ointment started. Continue to monitor    4/1 - CBC sent to monitor WBC. Will consult wound care  4/2 WBC 11, stable  4/3 Staples removed  4/4 no drainage early morning, some reported late morning so ESR, CRP, CBC ordered. Cont wound care.  4/5 ESR, CRP elevated, but WBC within normal limits. Augmentin started. Incision improving.    4/7 covered w mepilex, c/d         Acute blood loss as cause of postoperative anemia    Continue to monitor H/H. Previous transfusions performed    4/2 H/H improved, 8.5/28.4    Lab Results   Component Value Date    WBC 10.39 04/05/2018    HGB 8.6 (L) 04/05/2018    HCT 28.2 (L) 04/05/2018    MCV 92 04/05/2018     04/05/2018               HTN (hypertension), benign    Continue Coreg and Amlodipine    3/29 - Lisinopril 10mg daily  3/30 - improved  4/2 - sbp 130s-160. Lisinopril increased to 20  4/3  this morning, improved  4/4 dec lisinopril back to 10 for low DBP  4/6 Lisinopril increased to 15mg     4/7 AM BP elevated, requested repeat which is pending. WIll cont to f/u.  4/8 Repeat yesterday afternoon was improved. Will cont w current regimen - may benefit from spacing out meds for smoother ocntrol  4/9 Inc lisinopril to 20    Vitals:    04/08/18 0700 04/08/18 0830 04/08/18 1900 04/09/18 0700   BP: (!) 177/81 (!) 177/81 (!) 147/65 (!) 175/83   BP Location: Left arm   Left arm   Patient Position: Lying   Lying   Pulse: 72  75 67   Resp: 14  16 20   Temp: 98.8 °F (37.1 °C)  98.5 °F (36.9 °C) 98.3 °F (36.8 °C)   TempSrc: Oral  Oral Oral   SpO2: (!) 93%  95% (!) 93%   Weight:       Height:                 Sarcoidosis    Currently holding Methotrexate due to TB            DISCHARGE PLANNING:  Tentative Discharge Date:     Future Appointments  Date Time Provider Department Center   4/11/2018 2:30 PM [facility]-CT2 500 LB LIMIT [facility] IC [address]   4/11/2018 2:45 PM [facility]  OI-CT2 500 LB LIMIT [facility] CTS [address]   4/25/2018 10:00 AM LAB, [facility] STA LAB [facility]   5/4/2018 9:00 AM Andi Swain DO [facility] NEUROS7 [address] MD  Department of Physical Medicine & Rehab   [facility]

## 2018-04-09 NOTE — PATIENT INSTRUCTIONS
1. Cont rehab at [facility].  Ok to advance activity.  2. Ordered ESR, CRP, procalcitonin, CBC to be done prior to next visit  3. Ordered T+L spine CT to be done ASAP to evaluate surgical site infection  4. Cont TB abx as prescribed and amox-clav for another 2 weeks  5. F/u in clinic on 5/4/18 at [facility]  6. Wound care consult while in rehab  7. Cont TLSO brace as tolerated only when out of bed.  DO NOT wear while in bed.  8. Contact clinic is wound continues to breakdown or drain pus

## 2018-04-09 NOTE — SUBJECTIVE & OBJECTIVE
Interval History 4/9/2018:  Patient is seen for follow-up rehab evaluation and recommendations: No acute events over night.Still has difficulty sleeping despite scheduled trazodone and prn atarx, ramelteon. Some increased drainage reported from incision site.     I have reviewed the HPI and PMFSH and there are no changes from admission.       Scheduled Medications:    amLODIPine  10 mg Oral Daily    amoxicillin-clavulanate 875-125mg  1 tablet Oral Q12H    carvedilol  25 mg Oral BID    gabapentin  300 mg Oral TID    heparin (porcine)  5,000 Units Subcutaneous Q8H    isoniazid  900 mg Oral Every Tues    isoniazid  900 mg Oral Every Fri    lisinopril  15 mg Oral Daily    polyethylene glycol  17 g Oral Daily    pyridoxine (vitamin B6)  50 mg Oral Daily    rifAMpin  600 mg Oral Every Tues    rifAMpin  600 mg Oral Every Fri    senna-docusate 8.6-50 mg  2 tablet Oral Daily    traZODone  100 mg Oral QHS       Diagnostic Results: Labs: Reviewed  X-Ray: Reviewed    PRN Medications: acetaminophen, albuterol sulfate, bisacodyl, calcium carbonate, hydrOXYzine pamoate, magnesium hydroxide 400 mg/5 ml, methocarbamol, ondansetron, oxyCODONE-acetaminophen, ramelteon, senna, simethicone    Review of Systems   Constitutional: Positive for activity change. Negative for chills, fatigue and fever.   HENT: Negative for drooling, hearing loss, trouble swallowing and voice change.    Eyes: Negative for pain and visual disturbance.   Respiratory: Negative for cough, shortness of breath and wheezing.    Cardiovascular: Negative for chest pain and palpitations.   Gastrointestinal: Negative for abdominal distention, nausea and vomiting.   Genitourinary: Negative for difficulty urinating and flank pain.   Musculoskeletal: Positive for back pain, gait problem and myalgias. Negative for arthralgias and neck pain.   Skin: Positive for wound. Negative for rash.   Neurological: Positive for weakness. Negative for dizziness, numbness and  headaches.   Psychiatric/Behavioral: Negative for agitation and hallucinations. The patient is not nervous/anxious.      Objective:     Vital Signs (Most Recent):  Temp: 98.3 °F (36.8 °C) (18 07)  Pulse: 67 (18 07)  Resp: 20 (18)  BP: (!) 175/83 (18)  SpO2: (!) 93 % (18)    Vital Signs (24h Range):  Temp:  [98.3 °F (36.8 °C)-98.7 °F (37.1 °C)] 98.7 °F (37.1 °C)  Pulse:  [67-75] 69  Resp:  [16-20] 20  SpO2:  [93 %-95 %] 93 %  BP: (120-175)/(62-83) 120/62     Physical Exam   Constitutional: She is oriented to person, place, and time. She appears well-developed and well-nourished. No distress.   Sitting up in medichair   HENT:   Head: Normocephalic and atraumatic.   Right Ear: External ear normal.   Left Ear: External ear normal.   Nose: Nose normal.   Eyes: Right eye exhibits no discharge. Left eye exhibits no discharge. No scleral icterus.   Neck: Normal range of motion.   Cardiovascular: Normal rate, regular rhythm and intact distal pulses.    Pulmonary/Chest: Effort normal. No respiratory distress. She has no wheezes.   Abdominal: Soft. She exhibits no distension. There is no tenderness.   Musculoskeletal: She exhibits no edema or tenderness.        Right ankle: She exhibits decreased range of motion (foot drop).   TLSO brace intact   Neurological: She is alert and oriented to person, place, and time. She exhibits normal muscle tone.   -  Mental Status:  AAOx3.  Follows commands.  Answers correct age and .  Recent and remote memory intact.  -  Speech and language:  no aphasia or dysarthria.    -  Vision:  no hemianopsia or ptosis.    -  Facial movement (CN VII): symmetrical.   -  Motor:  RUE: 4/5.  LUE: 4/5.  RLE:  Hip Flex 2/5, Knee Ext/Flex 3-/5,  DF 1/5, PF 4-/5.  LLE:  Hip Flex 2+/5, Knee Ext/Flex 4-/5,  DF 4/5, PF 4/5.  -  Sensory:  Intact to light touch and pin prick.   Skin: Skin is warm and dry. No rash noted.   Proximal incision site dehisced. No  drainage.    Psychiatric: She has a normal mood and affect. Her behavior is normal. Thought content normal.   Vitals reviewed.    NEUROLOGICAL EXAMINATION:     MENTAL STATUS   Oriented to person, place, and time.

## 2018-04-09 NOTE — LETTER
April 9, 2018      [facility]  [address]  [place] [address]           [facility]  [address]  [place] [address]  Phone: [phone]  Fax: [phone]          Patient: Mary Carrillo   MR Number: [id number]   YOB: 1955   Date of Visit: 4/9/2018       Dear Megan Emery:    Thank you for referring Mary Carrillo to me for evaluation. Attached you will find relevant portions of my assessment and plan of care.    If you have questions, please do not hesitate to call me. I look forward to following Mary Carrillo along with you.    Sincerely,    Andi Swain, DO    Enclosure  CC:  No Recipients    If you would like to receive this communication electronically, please contact [email or web address] or [phone] to request more information on [employer] access.    For providers and/or their staff who would like to refer a patient to [facility], please contact us through our one-stop-shop provider referral line, Brigid Thompson, at [phone].    If you feel you have received this communication in error or would no longer like to receive these types of communications, please e-mail [email or web address]

## 2018-04-09 NOTE — PROGRESS NOTES
CHIEF COMPLAINT:  Chief Complaint   Patient presents with    Post-op Evaluation       HPI:  Mary Carrillo is a 63 y.o.  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  She was found to have spinal cord compression due to pathologic fracture and collapse of T12 and L1 vertebral bodies due to osteo-discitis.  Prior to surgery she was bedbound for many weeks and in severe, debilitating pain.  She was taken for urgent decompression of the spinal cord as well as a right sided costotransversectomy for T 12 and L1 corpectomy followed by T10-L3 fusion.  Postoperatively she recovered well with significant improvement in her preoperative pain and strength.  She was placed on Noman spectrum empiric antibiotics to cover for TB and osteo-discitis, and was discharged to inpatient rehabilitation at [facility].  She reports that she is working diligently with rehabilitation and is able to stand but has not regained the leg strength enough to begin walking.  Overall she feels the surgery was extremely helpful.  She complains that the TLSO brace is cumbersome and causes her additional pain, and often does not remained in the proper place.  She also reports that her wound has had some breakdown recently and it is being managed by wound care at the rehabilitation.  She is currently on rifampin and isoniazid for TB coverage and amoxicillin clavulanic acid for presumed superficial skin infection/breakdown.  Recent lab work revealed an elevated ESR and CRP.  She has not had any pus or drainage from the wound outside of the superficial breakdown.  Although she endorses that her legs are much stronger compared to preop she still has weakness in her hip flexion and a chronic mild right foot drop.      Review of patient's allergies indicates:   Allergen Reactions     Prednisone Hives and Itching     Pills only, can take if she needs to    Naproxen Rash       Past Medical History:   Diagnosis Date    Arthritis     Asthma     Back pain     Encounter for blood transfusion     Glaucoma     Gout     Hyperlipidemia     Hypertension     Sarcoidosis     Vitritis     Vitritis      Past Surgical History:   Procedure Laterality Date    BACK SURGERY  2018    Laminectomy.     CATARACT EXTRACTION Bilateral      SECTION      HYSTERECTOMY      uterus/cervix d/t uterine fibroids.     Family History   Problem Relation Age of Onset    Diabetes Mother     Kidney disease Mother     Diabetes Father     Kidney disease Father      Social History   Substance Use Topics    Smoking status: Former Smoker     Packs/day: 1.00     Years: 35.00     Types: Cigarettes     Start date: 1967     Quit date: 1994    Smokeless tobacco: Never Used    Alcohol use No        Review of Systems   Constitutional: Negative.    Respiratory: Negative for cough and shortness of breath.    Cardiovascular: Negative for chest pain, palpitations, claudication and leg swelling.   Gastrointestinal: Negative for abdominal pain, constipation and diarrhea.   Genitourinary: Negative for flank pain, frequency and urgency.   Musculoskeletal: Positive for back pain, joint pain (R hip) and myalgias. Negative for falls and neck pain.   Skin: Negative.    Neurological: Positive for focal weakness. Negative for dizziness, tingling, tremors, sensory change, speech change, seizures, loss of consciousness and headaches.   Psychiatric/Behavioral: Negative.        OBJECTIVE:   Vital Signs:  Temp: 98.7 °F (37.1 °C) (18 1052)  Pulse: 69 (18 1052)  BP: 120/62 (18 1052)    Physical Exam:  Constitutional: Patient sitting comfortably in wheel chair. Obese.  Skin: Exposed areas are intact without abnormal markings, rashes or other lesions.  HEENT: Normocephalic. Normal  conjunctivae.  Cardiovascular: Normal rate and regular rhythm.  Respiratory: Chest wall rises and falls symmetrically, without signs of respiratory distress.  Abdomen: Soft and non-tender.  Extremities: Warm and without edema. Calves supple, non-tender.  Psych/Behavior: Normal affect.    Neurological:    Mental status: Alert and oriented. Conversational and appropriate.       Cranial Nerves: VFF to confrontation. PERRL. EOMI without nystagmus. Facial STLT normal and symmetric. Strong, symmetric muscles of mastication. Facial strength full and symmetric. Hearing equal bilaterally to finger rub. Palate and uvula rise and fall normally in midline. Shoulder shrug 5/5 strength. Tongue midline.     Motor:    Upper:  Deltoids Triceps Biceps WE WF  FA    R 5/5 5/5 5/5 5/5 5/5 5/5 5/5    L 5/5 5/5 5/5 5/5 5/5 5/5 5/5      Lower:  HF KE KF DF PF EHL    R 3+/5 4+/5 - 4-/5 5/5 4/5    L 4-/5 4+/5 - 5/5 5/5 5/5     Sensory: Intact sensation to light touch and pinprick in all extremities.     Reflexes:      DTR: 1+ knees and biceps symmetrically.     Vasquez's: Negative.     Babinski's: Negative.     Clonus: 2-3 beats bilaterally    Cerebellar: Finger-to-nose and rapid alternating movements normal.     Gait:  Wheel chair dependent at this time    Wound:  Superficial breakdown, no deep dehiscence. Granulation tissue throughout.  No drainage or pus.  No erythema.  No tenderness.    Diagnostic Results:  No new imaging at this time      ASSESSMENT/PLAN:     Problem List Items Addressed This Visit        Derm    Alteration in skin integrity related to surgical incision - Primary    Relevant Orders    CT Thoracic Spine W Wo Contrast    Sedimentation rate, manual    C-reactive protein    Procalcitonin    CT Lumbar Spine W Wo Contrast       ID    Discitis of thoracolumbar region    Relevant Orders    CT Thoracic Spine W Wo Contrast    Sedimentation rate, manual    C-reactive protein    Procalcitonin    CT Lumbar Spine W Wo Contrast     Pott's disease (spinal tuberculosis)    Relevant Orders    CT Thoracic Spine W Wo Contrast    Sedimentation rate, manual    C-reactive protein    Procalcitonin    CT Lumbar Spine W Wo Contrast       Orthopedic    Status post thoracic spinal fusion    Relevant Orders    CT Thoracic Spine W Wo Contrast    Sedimentation rate, manual    C-reactive protein    Procalcitonin    CT Lumbar Spine W Wo Contrast          VISIT SUMMARY: Patient is status post T12 and L1 corpectomies and T10-L3 fusion for T12 and L1 posterior discitis/TB infection causing spinal cord compression.  Although she still has back pain, she reports significant improvements in her pain and lower extremity strength.  She has a chronic mild right foot drop at baseline and still has bilateral proximal lower extremity weakness.  She is currently able to stand but has not been able to walk yet with rehabilitation.  She is currently on broad-spectrum antibiotics for and superficial wound breakdown.  Although the wound demonstrates breakdown of the superficial dermis there is diffuse granulation tissue without any drainage or pus.  Clinically it does not appear to be a deep wound infection but I will order repeat infectious lab markers to be done prior to the next visit in 2 weeks.  I am also ordering a CT scan with and without contrast to evaluate the surgical site.    The patient understands and agrees with the following plan of care.    1. Cont rehab at [facility].    2. Ordered ESR, CRP, procalcitonin, CBC to be done prior to next visit  3. Ordered T+L spine CT +/- contrast to be done ASAP to evaluate surgical site infection  4. Cont TB abx as prescribed and amox-clav for another 2 weeks (may consider switching to bactrim if MRSA suspected)  5. F/u in clinic on 5/4/18 at [facility]  6. Wound care while in rehab  7. Cont TLSO brace as tolerated only when out of bed.  DO NOT wear while in bed.  8. Contact clinic is wound continues to breakdown or drain  pus            .

## 2018-04-09 NOTE — PROGRESS NOTES
Spoke with patient this morning about discharge next Wednesday, pt in agreement. Spoke with daughter Chantal who is also in agreement. Daughter would like to come for family training on Wednesday the 18th and then take her mother home. SW will set up. CM will continue to follow.

## 2018-04-10 ENCOUNTER — TELEPHONE (OUTPATIENT)
Dept: NEUROSURGERY | Facility: CLINIC | Age: 63
End: 2018-04-10

## 2018-04-10 PROCEDURE — 97150 GROUP THERAPEUTIC PROCEDURES: CPT

## 2018-04-10 PROCEDURE — 63600175 PHARM REV CODE 636 W HCPCS: Performed by: HOSPITALIST

## 2018-04-10 PROCEDURE — 97110 THERAPEUTIC EXERCISES: CPT

## 2018-04-10 PROCEDURE — 11800000 HC REHAB PRIVATE ROOM

## 2018-04-10 PROCEDURE — 25000003 PHARM REV CODE 250: Performed by: HOSPITALIST

## 2018-04-10 PROCEDURE — 97530 THERAPEUTIC ACTIVITIES: CPT

## 2018-04-10 PROCEDURE — 99232 SBSQ HOSP IP/OBS MODERATE 35: CPT | Mod: ,,, | Performed by: PHYSICAL MEDICINE & REHABILITATION

## 2018-04-10 PROCEDURE — 97116 GAIT TRAINING THERAPY: CPT

## 2018-04-10 PROCEDURE — 25000003 PHARM REV CODE 250: Performed by: PHYSICAL MEDICINE & REHABILITATION

## 2018-04-10 RX ADMIN — ISONIAZID 900 MG: 300 TABLET ORAL at 09:04

## 2018-04-10 RX ADMIN — HEPARIN SODIUM 5000 UNITS: 5000 INJECTION, SOLUTION INTRAVENOUS; SUBCUTANEOUS at 05:04

## 2018-04-10 RX ADMIN — CARVEDILOL 25 MG: 25 TABLET, FILM COATED ORAL at 09:04

## 2018-04-10 RX ADMIN — OXYCODONE AND ACETAMINOPHEN 1 TABLET: 10; 325 TABLET ORAL at 02:04

## 2018-04-10 RX ADMIN — Medication 50 MG: at 07:04

## 2018-04-10 RX ADMIN — OXYCODONE AND ACETAMINOPHEN 1 TABLET: 10; 325 TABLET ORAL at 09:04

## 2018-04-10 RX ADMIN — GABAPENTIN 300 MG: 300 CAPSULE ORAL at 02:04

## 2018-04-10 RX ADMIN — STANDARDIZED SENNA CONCENTRATE AND DOCUSATE SODIUM 2 TABLET: 8.6; 5 TABLET, FILM COATED ORAL at 07:04

## 2018-04-10 RX ADMIN — HEPARIN SODIUM 5000 UNITS: 5000 INJECTION, SOLUTION INTRAVENOUS; SUBCUTANEOUS at 02:04

## 2018-04-10 RX ADMIN — LISINOPRIL 15 MG: 10 TABLET ORAL at 07:04

## 2018-04-10 RX ADMIN — SULFAMETHOXAZOLE AND TRIMETHOPRIM 1 TABLET: 800; 160 TABLET ORAL at 09:04

## 2018-04-10 RX ADMIN — AMLODIPINE BESYLATE 10 MG: 10 TABLET ORAL at 07:04

## 2018-04-10 RX ADMIN — CARVEDILOL 25 MG: 25 TABLET, FILM COATED ORAL at 07:04

## 2018-04-10 RX ADMIN — SULFAMETHOXAZOLE AND TRIMETHOPRIM 1 TABLET: 800; 160 TABLET ORAL at 07:04

## 2018-04-10 RX ADMIN — RIFAMPIN 600 MG: 300 CAPSULE ORAL at 09:04

## 2018-04-10 RX ADMIN — TRAZODONE HYDROCHLORIDE 100 MG: 50 TABLET ORAL at 09:04

## 2018-04-10 RX ADMIN — OXYCODONE AND ACETAMINOPHEN 1 TABLET: 10; 325 TABLET ORAL at 05:04

## 2018-04-10 RX ADMIN — HEPARIN SODIUM 5000 UNITS: 5000 INJECTION, SOLUTION INTRAVENOUS; SUBCUTANEOUS at 09:04

## 2018-04-10 NOTE — SUBJECTIVE & OBJECTIVE
Interval History 4/10/2018:  Patient is seen for follow-up rehab evaluation and recommendations: No acute events over night. Saw neurosurgery yesterday and CT spine was ordered to evaluate surgical site infection (scheduled for tomorrow). No complaints this morning.  I have reviewed the HPI and PMFSH and there are no changes from admission.       Scheduled Medications:    amLODIPine  10 mg Oral Daily    carvedilol  25 mg Oral BID    gabapentin  300 mg Oral TID    heparin (porcine)  5,000 Units Subcutaneous Q8H    isoniazid  900 mg Oral Every Tues    isoniazid  900 mg Oral Every Fri    lisinopril  15 mg Oral Daily    polyethylene glycol  17 g Oral Daily    pyridoxine (vitamin B6)  50 mg Oral Daily    ramelteon  8 mg Oral QHS    rifAMpin  600 mg Oral Every Tues    rifAMpin  600 mg Oral Every Fri    senna-docusate 8.6-50 mg  2 tablet Oral Daily    sulfamethoxazole-trimethoprim 800-160mg  1 tablet Oral BID    traZODone  100 mg Oral QHS       Diagnostic Results: Labs: Reviewed  X-Ray: Reviewed    PRN Medications: acetaminophen, albuterol sulfate, bisacodyl, calcium carbonate, hydrOXYzine pamoate, magnesium hydroxide 400 mg/5 ml, methocarbamol, ondansetron, oxyCODONE-acetaminophen, senna, simethicone    Review of Systems   Constitutional: Positive for activity change. Negative for chills, fatigue and fever.   HENT: Negative for drooling, hearing loss, trouble swallowing and voice change.    Eyes: Negative for pain and visual disturbance.   Respiratory: Negative for cough, shortness of breath and wheezing.    Cardiovascular: Negative for chest pain and palpitations.   Gastrointestinal: Negative for abdominal distention, nausea and vomiting.   Genitourinary: Negative for difficulty urinating and flank pain.   Musculoskeletal: Positive for back pain, gait problem and myalgias. Negative for arthralgias and neck pain.   Skin: Positive for wound. Negative for rash.   Neurological: Positive for weakness. Negative  for dizziness, numbness and headaches.   Psychiatric/Behavioral: Negative for agitation and hallucinations. The patient is not nervous/anxious.      Objective:     Vital Signs (Most Recent):  Temp: 98.4 °F (36.9 °C) (18)  Pulse: 72 (04/10/18 0700)  Resp: 18 (18)  BP: (!) 145/69 (04/10/18 0747)  SpO2: (!) 94 % (18)    Vital Signs (24h Range):  Temp:  [98.4 °F (36.9 °C)-98.7 °F (37.1 °C)] 98.4 °F (36.9 °C)  Pulse:  [69-79] 72  Resp:  [18] 18  SpO2:  [94 %] 94 %  BP: (120-160)/(62-72) 145/69     Physical Exam   Constitutional: She is oriented to person, place, and time. She appears well-developed and well-nourished. No distress.   Sitting up in medichair   HENT:   Head: Normocephalic and atraumatic.   Right Ear: External ear normal.   Left Ear: External ear normal.   Nose: Nose normal.   Eyes: Right eye exhibits no discharge. Left eye exhibits no discharge. No scleral icterus.   Neck: Normal range of motion.   Cardiovascular: Normal rate, regular rhythm and intact distal pulses.    Pulmonary/Chest: Effort normal. No respiratory distress. She has no wheezes.   Abdominal: Soft. She exhibits no distension. There is no tenderness.   Musculoskeletal: She exhibits no edema or tenderness.        Right ankle: She exhibits decreased range of motion (foot drop).   TLSO brace intact   Neurological: She is alert and oriented to person, place, and time. She exhibits normal muscle tone.   -  Mental Status:  AAOx3.  Follows commands.  Answers correct age and .  Recent and remote memory intact.  -  Speech and language:  no aphasia or dysarthria.    -  Vision:  no hemianopsia or ptosis.    -  Facial movement (CN VII): symmetrical.   -  Motor:  RUE: 4/5.  LUE: 4/5.  RLE:  Hip Flex 2/5, Knee Ext/Flex 3-/5,  DF 1/5, PF 4-/5.  LLE:  Hip Flex 2+/5, Knee Ext/Flex 4-/5,  DF 4/5, PF 4/5.  -  Sensory:  Intact to light touch and pin prick.   Skin: Skin is warm and dry. No rash noted.   Proximal incision site  dehisced. No drainage.    Psychiatric: She has a normal mood and affect. Her behavior is normal. Thought content normal.   Vitals reviewed.    NEUROLOGICAL EXAMINATION:     MENTAL STATUS   Oriented to person, place, and time.

## 2018-04-10 NOTE — PROGRESS NOTES
"Occupational Therapy   Dressing Group    Mary Carrillo   MRN: [id number]     Subjective: "I want to get up out this chair"     Pain level:   7/10 lower back        How was pain addressed: NSG administered pain meds prior to start of session in gym     Objective:   Pt participated in 45 min functional dressing group. The group activity reviewed safety considerations, energy conservation, and adaptive strategies for upper body and lower body dressing. Patient educated on adaptive equipment available to assist with dressing (button hook, long handled shoe horn, reacher, dressing stick, elastic shoe laces). Patient also educated on dressing tips that promote increased (I) with dressing such as wearing loose fit clothing, using footstool, and adapted clothing available.      UB dressing (pullover shirt, TLSO brace):  St up Assist (assist for doffing/donning TLSO brace)     LB dressing (socks)  Stand by Assist (utilizing dressing stick to doff B socks, sock aide to don B socks)                      (pants)   Stand by Assist (utilizing reacher to don pants over knees while seated in wc; rolled side to side on mat to manage over hips; doffed pants down hips by rolling side to side, doffed pants over knees and off feet while sitting EOM)    These activities were performed in a group setting to encourage participation with peers and social interaction skills.Social Interaction: (choose FIM score rating below)     §     Interacts appropriately with others - No medications needed. Patient asleep all shift (7)     Assessment:  Patient participated in a 45 minute dressing group activity.  The group activity challenged dynamic standing/sitting balance, core strengthening, bilateral upper extremity/ lower extremity strengthening, hand -eye coordination, and social affect/mood.  The patient verbalized understanding, demonstrated comprehension of use of AE and adaptive techniques for dressing.          04/10/18 0930   OT Time " Calculation   OT Start Time 0930   OT Stop Time 1015   OT Total Time (min) 45 min   General   OT Date of Treatment 04/10/18   Family/Caregiver Present No   Patient Found (position) Seated in wheelchair   Pt found with (Pt initially not wearing TLSO, but donned prior to group ses)   Precautions   General Precautions fall   Orthopedic Yes   Required Braces or Orthoses Yes   Spinal Brace TLSO   Treatment/Billable Minutes   Therapeutic Group 45   Total Time 45     DK Sullivan  4/10/2018

## 2018-04-10 NOTE — TELEPHONE ENCOUNTER
"Received call from Dr Squires from [facility] requesting clarification on TLSO orders. Informed Dr Tavia Swain was in surgery I would text him and return call as soon as he replies. Dr Squires verbalized understanding.  Returned Dr Squires call informing him Dr Swain states "TLSO should be on when out of bed and ambulating. Does not need when in chair." Dr Squires verbalized understanding.  "

## 2018-04-10 NOTE — ASSESSMENT & PLAN NOTE
4/9 increased drainage. Will alert WC. Cont wound care to site.   4/10 Neurosurgery ordered CT T, L spine to evaluate infection. Imaging scheduled for 4/11.

## 2018-04-10 NOTE — PROGRESS NOTES
"Occupational Therapy  PM Treatment  Mary Carrillo   MRN: [id number]   Room/Bed: E257/E257 A       04/10/18 1432   OT Time Calculation   OT Start Time 1432   OT Stop Time 1517   OT Total Time (min) 45 min   General   OT Date of Treatment 04/10/18   Family/Caregiver Present No   Patient Found (position) Seated in wheelchair   Precautions   General Precautions fall   Spinal Brace TLSO   Visual/Auditory Vision impaired   Subjective   Patient states "I know that I have to wear my brace when I'm out of bed." Pt found sitting in wc with brace on bed. OT instructed pt on the importance of wearing the TLSO   Pain/Comfort   Pain Rating no pain   Chair to Mat   Chair <>Mat Technique Slide Board   Chair <> Mat Assistance Minimum Assistance   Trials/Comments from wc>EOM   Mat to Chair   Technique Slide Board   Assistance Minimum Assistance   Trials/Comments from EOM>wc   Exercise Tools   Bilateral Moy 3# x 100 reps on incline to increase B UE strength in prep for ADLs and t/fs.   Dynamic Sitting Balance   Dynamic Sitting-Balance Support No upper extremity supported   Dynamic Sitting-Balance Reaching for objects;Reaching across midline   Dynamic Sitting-Comments SBA to complete dynamic sitting activity with focus on balance, endurance.  Pt completed Bioness BITS as follows:   Complex Array/Verbal:  Accuracy: 95.74%; TTC: 6:11; Reaction time: 8.24sec; 45 Hits    Rotator/Sequence:  Accuracy: 87.1%%; TTC: 15:08; Reaction time: 16.82sec; 54 Hits   Activity Tolerance   Activity Tolerance Patient tolerated treatment well   After Treatment   Patient Position After Treatment Seated in wheelchair   Patient after treatment left call button in reach   Assessment   Prognosis Good   Problem List Decreased Self Care skills;Decreased upper extremity strength;Decreased safe judgment during ADL;Decreased cognition;Decreased endurance;Decreased functional mobility;Decreased gross motor control;Decreased IADLs;Decreased trunk control for functional " activities   Assessment Pt participated well in tx session but remains with decreased (I) with ADLs and functional mobility. Pt would continue to benefit from skilled OT services.   Level of Motivation/Participation Good   Discharge Recommendations   Equipment Needed After Discharge 3-in-1 commode;bath bench;wheelchair;slide board   Discharge Facility/Level Of Care Needs home with home health   Plan   Plan Continue with current plan   Therapy Frequency 2 times/day   Occupational Therapy Follow-up   OT Follow-up? Yes   Treatment/Billable Minutes   Therapeutic Activity 30   Therapeutic Exercise 15   Total Time 45       Lior DK Brown  4/10/2018    Patient with wheelchair safety belt fastened and able to self release wheelchair safety belt. Pt left in room in wc with call light and all necessities in reach, RN notified.     LEGEND:   CGA: Contact Guard Assist   EOB: Edgeof Bed   HHA: Hand Held Assist   HOB: Head of Bed   (I): Independent-patient performs task in a timely manner   Max (A): Maximal Assist-patient performs 25-49% of task   Min (A): Minimal Assist- patient performs 75% or more of task   Mod (A): Moderate Assist- patient performs 50-74% of task   NA: Not applicable   NT: Not tested   OOB: Out of Bed   PTA: Prior to admit   QC: Quad Cane   RW: Rolling Walker   (S): Supervision- patient requires cues, coaxing, prompting   SBA: Stand By Assist   SC: Straight Cane   SW: Standard Walker   TBA: To be assessed   Total (A): Total Assist- patient performs less than 25% of task   WC: Wheelchair   WFL: Within Functional Limits   WNL: Within Normal Limits

## 2018-04-10 NOTE — PROGRESS NOTES
[facility]  Physical Medicine & Rehab  Progress Note    Patient Name: Mary Carrillo  MRN: [id number]  Patient Class: IP- Rehab   Admission Date: 3/27/2018  Length of Stay: 14 days  Attending Physician: Jack Squires MD  Primary Care Provider: Jose Khan MD    Subjective:     Principal Problem:Osteoarthritis of spine with myelopathy, thoracolumbar region    Interval History 4/10/2018:  Patient is seen for follow-up rehab evaluation and recommendations: No acute events over night. Saw neurosurgery yesterday and CT spine was ordered to evaluate surgical site infection (scheduled for tomorrow). No complaints this morning.  I have reviewed the HPI and PMFSH and there are no changes from admission.       Scheduled Medications:    amLODIPine  10 mg Oral Daily    carvedilol  25 mg Oral BID    gabapentin  300 mg Oral TID    heparin (porcine)  5,000 Units Subcutaneous Q8H    isoniazid  900 mg Oral Every Tues    isoniazid  900 mg Oral Every Fri    lisinopril  15 mg Oral Daily    polyethylene glycol  17 g Oral Daily    pyridoxine (vitamin B6)  50 mg Oral Daily    ramelteon  8 mg Oral QHS    rifAMpin  600 mg Oral Every Tues    rifAMpin  600 mg Oral Every Fri    senna-docusate 8.6-50 mg  2 tablet Oral Daily    sulfamethoxazole-trimethoprim 800-160mg  1 tablet Oral BID    traZODone  100 mg Oral QHS       Diagnostic Results: Labs: Reviewed  X-Ray: Reviewed    PRN Medications: acetaminophen, albuterol sulfate, bisacodyl, calcium carbonate, hydrOXYzine pamoate, magnesium hydroxide 400 mg/5 ml, methocarbamol, ondansetron, oxyCODONE-acetaminophen, senna, simethicone    Review of Systems   Constitutional: Positive for activity change. Negative for chills, fatigue and fever.   HENT: Negative for drooling, hearing loss, trouble swallowing and voice change.    Eyes: Negative for pain and visual disturbance.   Respiratory: Negative for cough, shortness of breath and wheezing.    Cardiovascular:  Negative for chest pain and palpitations.   Gastrointestinal: Negative for abdominal distention, nausea and vomiting.   Genitourinary: Negative for difficulty urinating and flank pain.   Musculoskeletal: Positive for back pain, gait problem and myalgias. Negative for arthralgias and neck pain.   Skin: Positive for wound. Negative for rash.   Neurological: Positive for weakness. Negative for dizziness, numbness and headaches.   Psychiatric/Behavioral: Negative for agitation and hallucinations. The patient is not nervous/anxious.      Objective:     Vital Signs (Most Recent):  Temp: 98.4 °F (36.9 °C) (18)  Pulse: 72 (04/10/18 0700)  Resp: 18 (18)  BP: (!) 145/69 (04/10/18 0747)  SpO2: (!) 94 % (18)    Vital Signs (24h Range):  Temp:  [98.4 °F (36.9 °C)-98.7 °F (37.1 °C)] 98.4 °F (36.9 °C)  Pulse:  [69-79] 72  Resp:  [18] 18  SpO2:  [94 %] 94 %  BP: (120-160)/(62-72) 145/69     Physical Exam   Constitutional: She is oriented to person, place, and time. She appears well-developed and well-nourished. No distress.   Sitting up in medichair   HENT:   Head: Normocephalic and atraumatic.   Right Ear: External ear normal.   Left Ear: External ear normal.   Nose: Nose normal.   Eyes: Right eye exhibits no discharge. Left eye exhibits no discharge. No scleral icterus.   Neck: Normal range of motion.   Cardiovascular: Normal rate, regular rhythm and intact distal pulses.    Pulmonary/Chest: Effort normal. No respiratory distress. She has no wheezes.   Abdominal: Soft. She exhibits no distension. There is no tenderness.   Musculoskeletal: She exhibits no edema or tenderness.        Right ankle: She exhibits decreased range of motion (foot drop).   TLSO brace intact   Neurological: She is alert and oriented to person, place, and time. She exhibits normal muscle tone.   -  Mental Status:  AAOx3.  Follows commands.  Answers correct age and .  Recent and remote memory intact.  -  Speech and  language:  no aphasia or dysarthria.    -  Vision:  no hemianopsia or ptosis.    -  Facial movement (CN VII): symmetrical.   -  Motor:  RUE: 4/5.  LUE: 4/5.  RLE:  Hip Flex 2/5, Knee Ext/Flex 3-/5,  DF 1/5, PF 4-/5.  LLE:  Hip Flex 2+/5, Knee Ext/Flex 4-/5,  DF 4/5, PF 4/5.  -  Sensory:  Intact to light touch and pin prick.   Skin: Skin is warm and dry. No rash noted.   Proximal incision site dehisced. No drainage.    Psychiatric: She has a normal mood and affect. Her behavior is normal. Thought content normal.   Vitals reviewed.    NEUROLOGICAL EXAMINATION:     MENTAL STATUS   Oriented to person, place, and time.       Assessment/Plan:      Mary Carrillo is a 63 y.o. female admitted to inpatient rehabilitation on 3/27/2018 for Osteoarthritis of spine with myelopathy, thoracolumbar region with impaired mobility and ADLs. Patient remains appropriate for PT, OT, and as required Speech therapy. Patient continues to require 24 hour nursing care as well as daily Physician assessment.    * Osteoarthritis of spine with myelopathy, thoracolumbar region    -  MRI T-spine revealed vertebral osteomyelitis/discitis T12-L1 with severe compression and effacement of the ventral and dorsal subarachnoid spaces  -  S/p T12-L1 corpectomy with T10-L3 fusion on 3/18/18  -  PT/OT evaluate and treat  -  Pain management  -  Monitor for bowel and bladder dysfunction  -  Monitor for spasticity  -  Monitor for and prevent skin breakdown and pressure ulcers  Early mobility, repositioning/weight shifting every 20-30 minutes when sitting, turn patient every 2 hours, proper mattress/overlay and chair cushioning, pressure relief/heel protector boots  -  DVT prophylaxis:  on [facility]        Right hip pain    Xray reviewed. Pain stable.         Difficulty sleeping    4/9 trazodone at 100. Change ramelteon from prn to scheduled. Cont prn atarax        Alteration in skin integrity related to surgical incision    4/9 increased drainage. Will alert WC. Cont  wound care to site. Start bactrim per neurosurgery recs.  4/10 Neurosurgery ordered CT T, L spine to evaluate infection. Imaging scheduled for 4/11.         Dehiscence of operative wound    Noted on 3/31. No signs of infection. Culture sent and Bacitracin ointment started. Continue to monitor    4/1 - CBC sent to monitor WBC. Will consult wound care  4/2 WBC 11, stable  4/3 Staples removed  4/4 no drainage early morning, some reported late morning so ESR, CRP, CBC ordered. Cont wound care.  4/5 ESR, CRP elevated, but WBC within normal limits. Augmentin started. Incision improving.    4/7 covered w mepilex, c/d         Acute blood loss as cause of postoperative anemia    Continue to monitor H/H. Previous transfusions performed    4/2 H/H improved, 8.5/28.4    Lab Results   Component Value Date    WBC 10.39 04/05/2018    HGB 8.6 (L) 04/05/2018    HCT 28.2 (L) 04/05/2018    MCV 92 04/05/2018     04/05/2018               HTN (hypertension), benign    Continue Coreg and Amlodipine    3/29 - Lisinopril 10mg daily  3/30 - improved  4/2 - sbp 130s-160. Lisinopril increased to 20  4/3  this morning, improved  4/4 dec lisinopril back to 10 for low DBP  4/6 Lisinopril increased to 15mg   4/7 AM BP elevated, requested repeat which is pending. WIll cont to f/u.  4/8 Repeat yesterday afternoon was improved. Will cont w current regimen - may benefit from spacing out meds for smoother ocntrol  4/9 Inc lisinopril to 20  4/10 Cont to monitor.   Vitals:    04/09/18 0700 04/09/18 1935 04/10/18 0700 04/10/18 0747   BP: (!) 175/83 (!) 160/72  (!) 145/69   BP Location: Left arm Right arm     Patient Position: Lying Lying     Pulse: 67 79 72    Resp: 20 18     Temp: 98.3 °F (36.8 °C) 98.4 °F (36.9 °C)     TempSrc: Oral Oral     SpO2: (!) 93% (!) 94%     Weight:       Height:                 Sarcoidosis    Currently holding Methotrexate due to TB            DISCHARGE PLANNING:  Tentative Discharge Date:     Future  Appointments  Date Time Provider Department Center   4/11/2018 2:30 PM [facility] OIC-CT2 500 LB LIMIT [facility] IC Oli Colin   4/11/2018 2:45 PM [facility] OIC-CT2 500 LB LIMIT [facility] IC Oli Colin   4/25/2018 10:00 AM LAB, [facility] STA LAB [facility]   5/4/2018 9:00 AM Andi Swain DO [facility] NEUROS7 Oli Quiles MD  Department of Physical Medicine & Rehab   [facility]

## 2018-04-10 NOTE — ASSESSMENT & PLAN NOTE
Continue Coreg and Amlodipine    3/29 - Lisinopril 10mg daily  3/30 - improved  4/2 - sbp 130s-160. Lisinopril increased to 20  4/3  this morning, improved  4/4 dec lisinopril back to 10 for low DBP  4/6 Lisinopril increased to 15mg   4/7 AM BP elevated, requested repeat which is pending. WIll cont to f/u.  4/8 Repeat yesterday afternoon was improved. Will cont w current regimen - may benefit from spacing out meds for smoother ocntrol  4/9 Inc lisinopril to 20  4/10 Cont to monitor.   Vitals:    04/09/18 0700 04/09/18 1935 04/10/18 0700 04/10/18 0747   BP: (!) 175/83 (!) 160/72  (!) 145/69   BP Location: Left arm Right arm     Patient Position: Lying Lying     Pulse: 67 79 72    Resp: 20 18     Temp: 98.3 °F (36.8 °C) 98.4 °F (36.9 °C)     TempSrc: Oral Oral     SpO2: (!) 93% (!) 94%     Weight:       Height:

## 2018-04-10 NOTE — PROGRESS NOTES
Physical Therapy   Treatment    Mary Carrillo   MRN: [id number]              04/10/18 1031   PT Time Calculation   PT Start Time 1031   PT Stop Time 1201   PT Total Time (min) 90 min   Treatment   Treatment Type Treatment   PT/PTA PT   General   PT Received On 04/10/18   Family/Caregiver Present No   Patient Found (position) Seated in wheelchair;Other (comment)  (in gym)   Pt found with TLSO  (seat belt)   Precautions   General Precautions fall   Orthopedic Yes   Required Braces or Orthoses Yes   Spinal Brace TLSO   Visual/Auditory Vision impaired;Other (see comments)  (glasses)   Subjective   Patient states she is more worried about her R hip than her back.   Pain/Comfort   Pain Rating 1 7/10   Location - Side 1 Bilateral   Location - Orientation 1 generalized  (mid)   Location 1 back  (and R hip)   Pain Addressed 1 Pre-medicate for activity;Reposition;Distraction   Pain Rating Post-Intervention 1 5/10   Bed Mobility   Bed Mobility yes   Rolling/Turning to Left Modified independent   Supine to Sit Stand by Assistance   Supine to Sit Comments rising from L side of mat   Sit to Supine Stand by Assistance;Other (see comments)  ( lowering to L side of mat)   Transfers   Transfer yes   Sit to Stand   Sit <> Stand Assistance Minimum Assistance;Moderate Assistance   Sit <> Stand Assistive Device Other (see comments)  (parallel bars)   Trials/Comments 5 trials   Stand to Sit   Assistance Moderate Assistance   Assistive Device Other (see comments)  (parallel bars)   Trials/Comments 5 trials   Chair to Mat   Chair<> Mat Technique Slide Board   Chair<>Mat Assistance Minimum Assistance   Chair <> Mat Assistive Device Slide Board   Trials/Comments 1 trial~ PT sets and steadies board   Mat to Chair   Technique Slide Board   Assistance Minimum Assistance   Assistive Device Slide Board   Trials/Comments 1 trial~ PT sets and steadies board   Wheelchair Activities   Propulsion Yes   Propulsion Type 1 Manual   Level 1 Level tile    Method 1 Right upper extremity;Left upper extremity   Level of Assistance 1 Supervision   Description/ Details 1 200 feet x 2 trials   Gait   Gait Yes   Weight Bearing Status full   Gait 1   Gait Assistive Device Parallel bars   Other Apparatus 1 Wheelchair follow;Other (Comment)  (by PT tech)   Assistance 1 Moderate assistance;Total assistance;With assist of two  (2nd helper follows with w/c)   Gait Distance pt ambulates length of bars x 4 trials~ seated rest break between trials   Gait Pattern swing-to gait   Gait Deviation(s) decreased devon;increased time in double stance;decreased velocity of limb motion;decreased step length;decreased stride length;decreased swing-to-stance ratio;decreased toe-to-floor clearance;decreased weight-shifting ability;knee hyperextension;backward lean;other (see comments)  (narrow SIMONA)   Impairments Contributing to Gait Deviations impaired balance;impaired motor control;impaired postural control;decreased strength;decreased sensation;impaired sensory feedback   Stairs   Stairs No   Balance   Balance Yes   Static Sitting Balance   Static Sitting-Balance Support Feet supported;Right upper extremity supported;Left upper extremity supported   Static Sitting-Level of Assistance Modified Independent   Static Sitting-Comment/# of Minutes pt sits edge of mat approx. 7 minutes while talking with medical student   Static Standing Balance   Static Standing-Balance Support Right upper extremity supported;Left upper extremity supported   Static Standing-Level of Assistance Minimum assistance   Static Standing-Comment/# of Minutes pt stands in parallel bars x 2: 20   Supine   Supine-Exercises Lower extremity   Supine-Exercise Type Ankle pumps;Quad sets;Glut sets;Short arc quads;ABD/ADD;Heel slides;Other (Comment)  (hip and knee flex/ext with resistance during ext.)   Supine-Exercise Comments pt performs all the above with A/AAROM x 20 reps each   Seated   Seated-Exercises Lower extremity    Seated-Exercise Type Long arc quads;Hip flexion   Seated-Exercise Comments pt performs the above x 20 reps each, AAROM   Other Activities   Other Activities Other (Comment)  (seated basketball bounce and toss from w/c x 6: 30)   Comments above for UE strengthening and endurance; pt also performs UE strengthening on universal gym~ chest press and lat pulls, 3 x 10 reps with 5 # weight   Activity Tolerance   Activity Tolerance Patient tolerated treatment well   Other Comments   Comments PT removed TLSO for supine exercises and assisted pt to don again in sitting   After Treatment   Patient Position After Treatment Seated in wheelchair;Other (comment)  (seat belt in place; TLSO in place)   Patient after treatment left call button in reach  (pt set up for lunch in room)   Assessment   Prognosis Fair   Problem List Decreased strength;Decreased endurance;Impaired balance;Decreased mobility;Impaired vision;Impaired sensation;Obesity;Pain;Decreased skin integrity;Decreased range of motion   Session Assessment progressing toward goals   Assessment Pt is making very nice progress in PT sessions and was able to progress to limited ambulation for the first time today!  Pt is improving with sit<> stand transfers, static standing balance and slideboard has been a helpful addition for performance of transfers.  Pt has potential for further improvement with additional PT intervention.   Level of Motivation/Participation good   Barriers to Discharge Decreased caregiver support   Barriers to Discharge Comments pt will need some assistance at time of discharge   Discharge Recommendations   Equipment Needed After Discharge 3-in-1 commode;bath bench;wheelchair;slide board   Discharge Facility/Level Of Care Needs home health PT   Plan   Planned Therapy Intervention Continue with current plan;Bed mobility training;Transfer training;Gait training;Balance Training;ROM;Strengthening;Neuromuscular Re-education;Motor Coordination  Training;Postural Re-education;Wheelchair Management/Propulsion   Therapy Frequency 2 times/day;daily;Monday-Friday;Saturday or Sunday   Physical Therapy Follow-up   PT Follow-up? Yes   PT - Next Visit Date 04/11/18   Treatment/Billable Minutes   Gait training 15   Therapeutic Activity 39   Therapeutic Exercise 36   Total Time 90           Huey Caicedo, PT 4/10/2018

## 2018-04-10 NOTE — ASSESSMENT & PLAN NOTE
4/9 increased drainage. Will alert WC. Cont wound care to site. Start bactrim per neurosurgery recs.  4/10 Neurosurgery ordered CT T, L spine to evaluate infection. Imaging scheduled for 4/11.

## 2018-04-10 NOTE — PLAN OF CARE
Problem: Patient Care Overview  Goal: Plan of Care Review  Outcome: Ongoing (interventions implemented as appropriate)  Frequent rounds to assess pain & safety.    Problem: Pain, Acute (Adult)  Goal: Acceptable Pain Control/Comfort Level  Patient will demonstrate the desired outcomes by discharge/transition of care.   Outcome: Ongoing (interventions implemented as appropriate)  Pain is being managed by the ordered meds    Problem: Pressure Ulcer Risk (Bryant Scale) (Adult,Obstetrics,Pediatric)  Goal: Skin Integrity  Patient will demonstrate the desired outcomes by discharge/transition of care.   Outcome: Ongoing (interventions implemented as appropriate)  No new skin breakdown noted this, assistance is provided to turn & reposition often.

## 2018-04-11 ENCOUNTER — HOSPITAL ENCOUNTER (OUTPATIENT)
Dept: RADIOLOGY | Facility: HOSPITAL | Age: 63
Discharge: HOME | End: 2018-04-11
Attending: NEUROLOGICAL SURGERY
Payer: MEDICARE

## 2018-04-11 DIAGNOSIS — R23.9 ALTERATION IN SKIN INTEGRITY RELATED TO SURGICAL INCISION: ICD-10-CM

## 2018-04-11 DIAGNOSIS — A18.01 POTT'S DISEASE (SPINAL TUBERCULOSIS): ICD-10-CM

## 2018-04-11 DIAGNOSIS — M46.45 DISCITIS OF THORACOLUMBAR REGION: ICD-10-CM

## 2018-04-11 DIAGNOSIS — Z98.1 STATUS POST THORACIC SPINAL FUSION: ICD-10-CM

## 2018-04-11 PROCEDURE — 72129 CT CHEST SPINE W/DYE: CPT | Mod: 26,,, | Performed by: RADIOLOGY

## 2018-04-11 PROCEDURE — 72132 CT LUMBAR SPINE W/DYE: CPT | Mod: TC

## 2018-04-11 PROCEDURE — 97535 SELF CARE MNGMENT TRAINING: CPT

## 2018-04-11 PROCEDURE — 25000003 PHARM REV CODE 250: Performed by: PHYSICAL MEDICINE & REHABILITATION

## 2018-04-11 PROCEDURE — 25500020 PHARM REV CODE 255: Performed by: NEUROLOGICAL SURGERY

## 2018-04-11 PROCEDURE — 97530 THERAPEUTIC ACTIVITIES: CPT

## 2018-04-11 PROCEDURE — 72129 CT CHEST SPINE W/DYE: CPT | Mod: TC

## 2018-04-11 PROCEDURE — 11800000 HC REHAB PRIVATE ROOM

## 2018-04-11 PROCEDURE — 63600175 PHARM REV CODE 636 W HCPCS: Performed by: HOSPITALIST

## 2018-04-11 PROCEDURE — 25000003 PHARM REV CODE 250: Performed by: HOSPITALIST

## 2018-04-11 PROCEDURE — 97110 THERAPEUTIC EXERCISES: CPT

## 2018-04-11 PROCEDURE — 97116 GAIT TRAINING THERAPY: CPT

## 2018-04-11 PROCEDURE — 97150 GROUP THERAPEUTIC PROCEDURES: CPT

## 2018-04-11 PROCEDURE — 72132 CT LUMBAR SPINE W/DYE: CPT | Mod: 26,,, | Performed by: RADIOLOGY

## 2018-04-11 PROCEDURE — 99232 SBSQ HOSP IP/OBS MODERATE 35: CPT | Mod: ,,, | Performed by: PHYSICAL MEDICINE & REHABILITATION

## 2018-04-11 PROCEDURE — 97803 MED NUTRITION INDIV SUBSEQ: CPT

## 2018-04-11 RX ORDER — LIDOCAINE 50 MG/G
1 PATCH TOPICAL
Status: DISCONTINUED | OUTPATIENT
Start: 2018-04-11 | End: 2018-04-18 | Stop reason: HOSPADM

## 2018-04-11 RX ORDER — IBUPROFEN 600 MG/1
600 TABLET ORAL 3 TIMES DAILY
Status: DISCONTINUED | OUTPATIENT
Start: 2018-04-11 | End: 2018-04-11

## 2018-04-11 RX ADMIN — IOHEXOL 75 ML: 350 INJECTION, SOLUTION INTRAVENOUS at 04:04

## 2018-04-11 RX ADMIN — AMLODIPINE BESYLATE 10 MG: 10 TABLET ORAL at 07:04

## 2018-04-11 RX ADMIN — CARVEDILOL 25 MG: 25 TABLET, FILM COATED ORAL at 08:04

## 2018-04-11 RX ADMIN — SULFAMETHOXAZOLE AND TRIMETHOPRIM 1 TABLET: 800; 160 TABLET ORAL at 08:04

## 2018-04-11 RX ADMIN — HEPARIN SODIUM 5000 UNITS: 5000 INJECTION, SOLUTION INTRAVENOUS; SUBCUTANEOUS at 01:04

## 2018-04-11 RX ADMIN — Medication 50 MG: at 07:04

## 2018-04-11 RX ADMIN — LIDOCAINE 1 PATCH: 50 PATCH TOPICAL at 12:04

## 2018-04-11 RX ADMIN — LISINOPRIL 15 MG: 10 TABLET ORAL at 07:04

## 2018-04-11 RX ADMIN — TRAZODONE HYDROCHLORIDE 100 MG: 50 TABLET ORAL at 08:04

## 2018-04-11 RX ADMIN — OXYCODONE AND ACETAMINOPHEN 1 TABLET: 10; 325 TABLET ORAL at 08:04

## 2018-04-11 RX ADMIN — HEPARIN SODIUM 5000 UNITS: 5000 INJECTION, SOLUTION INTRAVENOUS; SUBCUTANEOUS at 05:04

## 2018-04-11 RX ADMIN — OXYCODONE AND ACETAMINOPHEN 1 TABLET: 10; 325 TABLET ORAL at 01:04

## 2018-04-11 RX ADMIN — SULFAMETHOXAZOLE AND TRIMETHOPRIM 1 TABLET: 800; 160 TABLET ORAL at 07:04

## 2018-04-11 RX ADMIN — CARVEDILOL 25 MG: 25 TABLET, FILM COATED ORAL at 07:04

## 2018-04-11 RX ADMIN — HEPARIN SODIUM 5000 UNITS: 5000 INJECTION, SOLUTION INTRAVENOUS; SUBCUTANEOUS at 08:04

## 2018-04-11 RX ADMIN — STANDARDIZED SENNA CONCENTRATE AND DOCUSATE SODIUM 2 TABLET: 8.6; 5 TABLET, FILM COATED ORAL at 07:04

## 2018-04-11 NOTE — PROGRESS NOTES
[facility]  Physical Medicine & Rehab  Progress Note    Patient Name: Mary Carrillo  MRN: [id number]  Patient Class: IP- Rehab   Admission Date: 3/27/2018  Length of Stay: 15 days  Attending Physician: Jack Squires MD  Primary Care Provider: Jose Khan MD    Subjective:     Principal Problem:Osteoarthritis of spine with myelopathy, thoracolumbar region    Interval History 4/11/2018:  Patient is seen for follow-up rehab evaluation and recommendations: No acute events overnight. CT T,L spine scheduled for today. TLSO clarified to be needed out of bed and ambulating, not in chair. I have reviewed the HPI and PMFSH and there are no changes from admission.       Scheduled Medications:    amLODIPine  10 mg Oral Daily    carvedilol  25 mg Oral BID    gabapentin  300 mg Oral TID    heparin (porcine)  5,000 Units Subcutaneous Q8H    isoniazid  900 mg Oral Every Tues    isoniazid  900 mg Oral Every Fri    lisinopril  15 mg Oral Daily    polyethylene glycol  17 g Oral Daily    pyridoxine (vitamin B6)  50 mg Oral Daily    ramelteon  8 mg Oral QHS    rifAMpin  600 mg Oral Every Tues    rifAMpin  600 mg Oral Every Fri    senna-docusate 8.6-50 mg  2 tablet Oral Daily    sulfamethoxazole-trimethoprim 800-160mg  1 tablet Oral BID    traZODone  100 mg Oral QHS       Diagnostic Results: Labs: Reviewed  X-Ray: Reviewed    PRN Medications: acetaminophen, albuterol sulfate, bisacodyl, calcium carbonate, hydrOXYzine pamoate, magnesium hydroxide 400 mg/5 ml, methocarbamol, ondansetron, oxyCODONE-acetaminophen, senna, simethicone    Review of Systems   Constitutional: Positive for activity change. Negative for chills, fatigue and fever.   HENT: Negative for drooling, hearing loss, trouble swallowing and voice change.    Eyes: Negative for pain and visual disturbance.   Respiratory: Negative for cough, shortness of breath and wheezing.    Cardiovascular: Negative for chest pain and palpitations.    Gastrointestinal: Negative for abdominal distention, nausea and vomiting.   Genitourinary: Negative for difficulty urinating and flank pain.   Musculoskeletal: Positive for back pain, gait problem and myalgias. Negative for arthralgias and neck pain.   Skin: Positive for wound. Negative for rash.   Neurological: Positive for weakness. Negative for dizziness, numbness and headaches.   Psychiatric/Behavioral: Negative for agitation and hallucinations. The patient is not nervous/anxious.      Objective:     Vital Signs (Most Recent):  Temp: 98.9 °F (37.2 °C) (18 0700)  Pulse: 71 (18 0700)  Resp: 16 (18 07)  BP: (!) 119/56 (18 07)  SpO2: 95 % (18)    Vital Signs (24h Range):  Temp:  [98.4 °F (36.9 °C)-98.9 °F (37.2 °C)] 98.9 °F (37.2 °C)  Pulse:  [68-71] 71  Resp:  [16-18] 16  SpO2:  [94 %-99 %] 95 %  BP: (112-120)/(56-57) 119/56     Physical Exam   Constitutional: She is oriented to person, place, and time. She appears well-developed and well-nourished. No distress.       HENT:   Head: Normocephalic and atraumatic.   Nose: Nose normal.   Eyes: Right eye exhibits no discharge. Left eye exhibits no discharge. No scleral icterus.   Neck: Normal range of motion.   Cardiovascular: Normal rate, regular rhythm and intact distal pulses.    Pulmonary/Chest: Effort normal. No respiratory distress. She has no wheezes.   Abdominal: Soft. She exhibits no distension. There is no tenderness.   Musculoskeletal: She exhibits no edema or tenderness.        Right ankle: She exhibits decreased range of motion (foot drop).   TLSO brace intact   Neurological: She is alert and oriented to person, place, and time. She exhibits normal muscle tone.   -  Mental Status:  AAOx3.  Follows commands.  Answers correct age and .  Recent and remote memory intact.  -  Speech and language:  no aphasia or dysarthria.    -  Vision:  no hemianopsia or ptosis.    -  Facial movement (CN VII): symmetrical.   -  Motor:   RUE: 4/5.  LUE: 4/5.  RLE:  Hip Flex 2/5, Knee Ext/Flex 3-/5,  DF 1/5, PF 4-/5.  LLE:  Hip Flex 2+/5, Knee Ext/Flex 4-/5,  DF 4/5, PF 4/5.  -  Sensory:  Intact to light touch and pin prick.   Skin: Skin is warm and dry. No rash noted.   Proximal incision site dehisced. No drainage.    Psychiatric: She has a normal mood and affect. Her behavior is normal.   Vitals reviewed.    NEUROLOGICAL EXAMINATION:     MENTAL STATUS   Oriented to person, place, and time.       Assessment/Plan:      Mary Carrillo is a 63 y.o. female admitted to inpatient rehabilitation on 3/27/2018 for Osteoarthritis of spine with myelopathy, thoracolumbar region with impaired mobility and ADLs. Patient remains appropriate for PT, OT, and as required Speech therapy. Patient continues to require 24 hour nursing care as well as daily Physician assessment.    * Osteoarthritis of spine with myelopathy, thoracolumbar region    -  MRI T-spine revealed vertebral osteomyelitis/discitis T12-L1 with severe compression and effacement of the ventral and dorsal subarachnoid spaces  -  S/p T12-L1 corpectomy with T10-L3 fusion on 3/18/18  -  PT/OT evaluate and treat  -  Pain management  -  Monitor for bowel and bladder dysfunction  -  Monitor for spasticity  -  Monitor for and prevent skin breakdown and pressure ulcers  Early mobility, repositioning/weight shifting every 20-30 minutes when sitting, turn patient every 2 hours, proper mattress/overlay and chair cushioning, pressure relief/heel protector boots  -  DVT prophylaxis:  on [facility]        Right hip pain    Xray reviewed. Pain stable.   4/11 lidocaine patch ordered      Difficulty sleeping    4/9 trazodone at 100. Change ramelteon from prn to scheduled. Cont prn atarax        Alteration in skin integrity related to surgical incision    4/9 increased drainage. Will alert WC. Cont wound care to site. Start bactrim per neurosurgery recs.  4/10 Neurosurgery ordered CT T, L spine to evaluate infection. Imaging  scheduled for 4/11.        Dehiscence of operative wound    Noted on 3/31. No signs of infection. Culture sent and Bacitracin ointment started. Continue to monitor    4/1 - CBC sent to monitor WBC. Will consult wound care  4/2 WBC 11, stable  4/3 Staples removed  4/4 no drainage early morning, some reported late morning so ESR, CRP, CBC ordered. Cont wound care.  4/5 ESR, CRP elevated, but WBC within normal limits. Augmentin started. Incision improving.    4/7 covered w mepilex, c/d         Acute blood loss as cause of postoperative anemia    Continue to monitor H/H. Previous transfusions performed    4/2 H/H improved, 8.5/28.4    Lab Results   Component Value Date    WBC 10.39 04/05/2018    HGB 8.6 (L) 04/05/2018    HCT 28.2 (L) 04/05/2018    MCV 92 04/05/2018     04/05/2018               HTN (hypertension), benign    Continue Coreg and Amlodipine    3/29 - Lisinopril 10mg daily  3/30 - improved  4/2 - sbp 130s-160. Lisinopril increased to 20  4/3  this morning, improved  4/4 dec lisinopril back to 10 for low DBP  4/6 Lisinopril increased to 15mg   4/7 AM BP elevated, requested repeat which is pending. WIll cont to f/u.  4/8 Repeat yesterday afternoon was improved. Will cont w current regimen - may benefit from spacing out meds for smoother ocntrol  4/9 Inc lisinopril to 20  4/10 Cont to monitor.   Vitals:    04/09/18 0700 04/09/18 1935 04/10/18 0700 04/10/18 0747   BP: (!) 175/83 (!) 160/72  (!) 145/69   BP Location: Left arm Right arm     Patient Position: Lying Lying     Pulse: 67 79 72    Resp: 20 18     Temp: 98.3 °F (36.8 °C) 98.4 °F (36.9 °C)     TempSrc: Oral Oral     SpO2: (!) 93% (!) 94%     Weight:       Height:                 Sarcoidosis    Currently holding Methotrexate due to TB            DISCHARGE PLANNING:  Tentative Discharge Date: 4/18/2018    Future Appointments  Date Time Provider Department Center   4/11/2018 2:30 PM RUST-CT2 500 LB LIMIT [facility] [address]   4/11/2018  2:45 PM [facility] OIC-CT2 500 LB LIMIT [facility] CTS [address]   4/25/2018 10:00 AM LAB, [facility] STA LAB [facility]   5/4/2018 9:00 AM Andi Swain DO [facility] NEUROS7 [address] MD  Department of Physical Medicine & Rehab   [facility]

## 2018-04-11 NOTE — SUBJECTIVE & OBJECTIVE
Interval History 4/11/2018:  Patient is seen for follow-up rehab evaluation and recommendations: No acute events overnight. CT T,L spine scheduled for today. TLSO clarified to be needed out of bed and ambulating, not in chair. I have reviewed the HPI and PMFSH and there are no changes from admission.       Scheduled Medications:    amLODIPine  10 mg Oral Daily    carvedilol  25 mg Oral BID    gabapentin  300 mg Oral TID    heparin (porcine)  5,000 Units Subcutaneous Q8H    isoniazid  900 mg Oral Every Tues    isoniazid  900 mg Oral Every Fri    lisinopril  15 mg Oral Daily    polyethylene glycol  17 g Oral Daily    pyridoxine (vitamin B6)  50 mg Oral Daily    ramelteon  8 mg Oral QHS    rifAMpin  600 mg Oral Every Tues    rifAMpin  600 mg Oral Every Fri    senna-docusate 8.6-50 mg  2 tablet Oral Daily    sulfamethoxazole-trimethoprim 800-160mg  1 tablet Oral BID    traZODone  100 mg Oral QHS       Diagnostic Results: Labs: Reviewed  X-Ray: Reviewed    PRN Medications: acetaminophen, albuterol sulfate, bisacodyl, calcium carbonate, hydrOXYzine pamoate, magnesium hydroxide 400 mg/5 ml, methocarbamol, ondansetron, oxyCODONE-acetaminophen, senna, simethicone    Review of Systems   Constitutional: Positive for activity change. Negative for chills, fatigue and fever.   HENT: Negative for drooling, hearing loss, trouble swallowing and voice change.    Eyes: Negative for pain and visual disturbance.   Respiratory: Negative for cough, shortness of breath and wheezing.    Cardiovascular: Negative for chest pain and palpitations.   Gastrointestinal: Negative for abdominal distention, nausea and vomiting.   Genitourinary: Negative for difficulty urinating and flank pain.   Musculoskeletal: Positive for back pain, gait problem and myalgias. Negative for arthralgias and neck pain.   Skin: Positive for wound. Negative for rash.   Neurological: Positive for weakness. Negative for dizziness, numbness and headaches.    Psychiatric/Behavioral: Negative for agitation and hallucinations. The patient is not nervous/anxious.      Objective:     Vital Signs (Most Recent):  Temp: 98.9 °F (37.2 °C) (18 0700)  Pulse: 71 (18 0700)  Resp: 16 (18 0700)  BP: (!) 119/56 (18 0700)  SpO2: 95 % (18 07)    Vital Signs (24h Range):  Temp:  [98.4 °F (36.9 °C)-98.9 °F (37.2 °C)] 98.9 °F (37.2 °C)  Pulse:  [68-71] 71  Resp:  [16-18] 16  SpO2:  [94 %-99 %] 95 %  BP: (112-120)/(56-57) 119/56     Physical Exam   Constitutional: She is oriented to person, place, and time. She appears well-developed and well-nourished. No distress.       HENT:   Head: Normocephalic and atraumatic.   Nose: Nose normal.   Eyes: Right eye exhibits no discharge. Left eye exhibits no discharge. No scleral icterus.   Neck: Normal range of motion.   Cardiovascular: Normal rate, regular rhythm and intact distal pulses.    Pulmonary/Chest: Effort normal. No respiratory distress. She has no wheezes.   Abdominal: Soft. She exhibits no distension. There is no tenderness.   Musculoskeletal: She exhibits no edema or tenderness.        Right ankle: She exhibits decreased range of motion (foot drop).   TLSO brace intact   Neurological: She is alert and oriented to person, place, and time. She exhibits normal muscle tone.   -  Mental Status:  AAOx3.  Follows commands.  Answers correct age and .  Recent and remote memory intact.  -  Speech and language:  no aphasia or dysarthria.    -  Vision:  no hemianopsia or ptosis.    -  Facial movement (CN VII): symmetrical.   -  Motor:  RUE: 4/5.  LUE: 4/5.  RLE:  Hip Flex 2/5, Knee Ext/Flex 3-/5,  DF 1/5, PF 4-/5.  LLE:  Hip Flex 2+/5, Knee Ext/Flex 4-/5,  DF 4/5, PF 4/5.  -  Sensory:  Intact to light touch and pin prick.   Skin: Skin is warm and dry. No rash noted.   Proximal incision site dehisced. No drainage.    Psychiatric: She has a normal mood and affect. Her behavior is normal.   Vitals  reviewed.    NEUROLOGICAL EXAMINATION:     MENTAL STATUS   Oriented to person, place, and time.

## 2018-04-11 NOTE — PLAN OF CARE
Problem: Patient Care Overview  Goal: Plan of Care Review  Outcome: Ongoing (interventions implemented as appropriate)   Nutrition Problem  Increased protein needs     Related to (etiology):   Non-healing wounds in back area     Signs and Symptoms (as evidenced by):    Protein PO intake <85%.      Interventions/Recommendations (treatment strategy):  Boost Plus supplement  And Arginaid for wound healing      Nutrition Diagnosis Status:   Continues     Recommendation/Intervention:   1. Continue Cardiac diet.   2. Recommend Arginaid for wound healing.   3. RD to follow.    Goals: Meet >85% of EEN/EPN  Nutrition Goal Status: goal met

## 2018-04-11 NOTE — PROGRESS NOTES
Occupational Therapy   Dressing group    Mary Carrillo  MRN: [id number]  Room/Bed: E257/E257        04/11/18 1115   OT Time Calculation   OT Start Time 1115   OT Stop Time 1200   OT Total Time (min) 45 min   General   OT Date of Treatment 04/11/18   Family/Caregiver Present No   Precautions   General Precautions fall   Orthopedic Yes   Required Braces or Orthoses Yes   Spinal Brace TLSO   Visual/Auditory Vision impaired   Pain/Comfort   Pain Rating 7/10   Location - Orientation lower   Location back   Pain Addressed Reposition;Distraction   Sit to Stand   Sit <> Stand Assistance Moderate Assistance   Sit <> Stand Assistive Device Rolling Walker   Trials/Comments Pt unable to maintain stance to pull pants over hips   Stand to Sit   Assistance Moderate Assistance   Assistive Device Rolling Walker   UE Dressing   UE Dressing Level of Assistance Set-up Assistance   UE Dressing Where Assessed Wheelchair   UE Dressing Comments assistance for TLSO; pt able to don/doff pullover shirt   LE Dressing   LE Dressing Adaptive Equipment Sock aide   LE Dressing  Level of Assistance Moderate assistance- assist to pull up over hips   LE Dressing Level of Assistance Total assistance   Sock Level of Assistance Stand by assistance   LE Dressing Where Assessed Wheelchair   LE Dressing Comments Pt refused to attempt to use reacher this date; pt attempting to drop pants down & guide lower extremeties in; pt required assist to perform; pt performed push-up at chair to pull pants over hips; pt unable to unweight hand to pull over hips. Pt. able to perform 3/6 parts.    OT Therapeutic Groups   OT Therapeutic Yes   Other   Objective:   Pt participated in 45 min functional dressing group. The group activity reviewed safety considerations, energy conservation, and adaptive strategies for upper body and lower body dressing. Patient educated on adaptive equipment available to assist with dressing (button hook, long handled shoe horn, reacher,  dressing stick, elastic shoe laces). Patient also educated on dressing tips that promote increased (I) with dressing such as wearing loose fit clothing, using footstool, and adapted clothing available.        These activities were performed in a group setting to encourage participation with peers and social interaction skills.Social Interaction: (choose FIM score rating below)       §     Interacts appropriately 90% of time - needs monitoring or encouragement for participation or interaction  (5)    Assessment:  Patient participated in a 45 minute dressing group activity.  The group activity challenged dynamic standing/sitting balance, core strengthening, bilateral upper extremity/ lower extremity strengthening, hand -eye coordination, and social affect/mood.  The patient verbalized understanding, demonstrated comprehension of use of AE and adaptive techniques for dressing.    Activity Tolerance   Activity Tolerance Patient tolerated treatment well   After Treatment   Patient Position After Treatment Seated in wheelchair   Patient after treatment left call button in reach   Occupational Therapy Follow-up   OT Follow-up? Yes   Treatment/Billable Minutes   Therapeutic Group 45   Total Time 45       DK Mckeon  4/11/2018     Patient with wheelchair safety belt fastened and able to self release wheelchair safety belt. Pt left seated in wheelchair.

## 2018-04-11 NOTE — PROGRESS NOTES
Physical Therapy   Treatment    Mary Carrillo   MRN: [id number]              04/11/18 0858   PT Time Calculation   PT Start Time 0858   PT Stop Time 1028   PT Total Time (min) 90 min   Treatment   Treatment Type Treatment   PT/PTA PT   General   PT Received On 04/11/18   Family/Caregiver Present No   Patient Found (position) Seated in wheelchair;Other (comment)  (in room)   Pt found with (seat belt)   Precautions   General Precautions fall   Orthopedic Yes   Required Braces or Orthoses Yes   Spinal Brace TLSO;Other (Comment)  (when standing or performing ambulation)   Visual/Auditory Vision impaired;Other (see comments)  (glasses)   Subjective   Patient states she has pain in her R hip.   Pain/Comfort   Pain Rating 1 5/10   Location - Side 1 Right   Location - Orientation 1 generalized   Location 1 hip   Pain Addressed 1 Pre-medicate for activity;Reposition;Distraction   Pain Rating Post-Intervention 1 3/10   Bed Mobility   Bed Mobility yes   Supine to Sit Stand by Assistance   Supine to Sit Comments rising from R side of mat   Sit to Supine Stand by Assistance;Other (see comments)  (lowering to R side of mat)   Transfers   Transfer yes   Sit to Stand   Sit <> Stand Assistance Minimum Assistance;Moderate Assistance   Sit <> Stand Assistive Device Other (see comments)  (parallel bars)   Trials/Comments 7 trials   Stand to Sit   Assistance Minimum Assistance;Moderate Assistance   Assistive Device Other (see comments)  (parallel bars)   Trials/Comments 7 trials   Chair to Mat   Chair<> Mat Technique Slide Board   Chair<>Mat Assistance Minimum Assistance   Chair <> Mat Assistive Device Slide Board   Trials/Comments 1 trial~ PT sets and steadies slideboard   Mat to Chair   Technique Slide Board   Assistance Stand By Assistance   Assistive Device Slide Board   Trials/Comments 1 trial~ PT sets slideboard only   Wheelchair Activities   Propulsion Yes   Propulsion Type 1 Manual   Level 1 Level tile   Method 1 Right upper  extremity;Left upper extremity   Level of Assistance 1 Supervision   Description/ Details 1 200 feet x 2 trials   Gait   Gait Yes   Weight Bearing Status full   Gait 1   Gait Assistive Device Parallel bars   Other Apparatus 1 Wheelchair follow;Other (Comment)  (by PT tech)   Assistance 1 Minimum assistance;Total assistance;With assist of two  (2nd helper follows with w/c)   Gait Distance pt ambulates length of bars x 6 trials~ seated rest break between trials   Gait Pattern swing-to gait   Gait Deviation(s) decreased devon;increased time in double stance;decreased velocity of limb motion;decreased step length;decreased stride length;decreased swing-to-stance ratio;decreased toe-to-floor clearance;decreased weight-shifting ability;knee hyperextension;forward lean;other (see comments)  (narrow SIMONA)   Impairments Contributing to Gait Deviations impaired balance;impaired motor control;impaired postural control;decreased strength;decreased sensation;impaired sensory feedback;other (see comments)  (fatigue)   Stairs   Stairs No   Balance   Balance Yes   Static Standing Balance   Static Standing-Balance Support Right upper extremity supported;Left upper extremity supported   Static Standing-Level of Assistance Minimum assistance;Contact guard   Static Standing-Comment/# of Minutes pt stands in parallel bars x 1 trial of 2: 04   Supine   Supine-Exercises Lower extremity   Supine-Exercise Type Ankle pumps;Quad sets;Glut sets;Short arc quads;ABD/ADD;Heel slides;Other (Comment)  (hip and knee flex/ext with resistance during ext.)   Supine-Exercise Comments pt performs the above ex with 1 # ankle weight, A/AAROM x 20 reps   Seated   Seated-Exercises Lower extremity   Seated-Exercise Type Ankle pumps;Hip flexion;Long arc quads   Seated-Exercise Comments pt performs the above exercises with A/AAROM x 20 reps and use of 1 # for LAQ and hip flex   Other Activities   Other Activities Other (Comment)  (seated basketball bounce and  toss from w/c x 6 minutes)   Comments pt performs above activity for improved UE strength and endurance; pt also performs UE ex on universal gym as on 4/10/18~ chest press and lat pulls, 3 x 10 reps with 5 #   Other Comments   Comments pt also performs beach ball tap from w/c x 1-2 minutes for UE strength and endurance; Dr. Squires confirmed pt only needs to wear TLSO when standing or performing ambulation.   After Treatment   Patient Position After Treatment Seated in wheelchair;Other (comment)  (TLSO removed at end of session)   Patient after treatment left call button in reach  (pt escorted back to room by PT tech)   Assessment   Prognosis Fair   Problem List Decreased strength;Decreased range of motion;Decreased endurance;Impaired balance;Decreased mobility;Impaired vision;Impaired sensation;Decreased skin integrity;Pain   Session Assessment progressing toward goals   Assessment Pt continues to do well in PT and needed less assistance for standing and gait training in parallel bars today.  Pt does need coaxing to perform at her best level of function, but she remains very agreeable to PT suggestions.  Pt has good potential to improve further with additional PT intervention.   Level of Motivation/Participation good   Barriers to Discharge Decreased caregiver support   Barriers to Discharge Comments pt will need some assistance at time of discharge   Discharge Recommendations   Equipment Needed After Discharge 3-in-1 commode;bath bench;wheelchair;slide board   Discharge Facility/Level Of Care Needs home health PT   Plan   Planned Therapy Intervention Continue with current plan;Bed mobility training;Transfer training;Gait training;Balance Training;ROM;Stretching;Strengthening;Neuromuscular Re-education;Motor Coordination Training;Postural Re-education;Wheelchair Management/Propulsion   Therapy Frequency 2 times/day;daily;Monday-Friday;Saturday or Sunday   Physical Therapy Follow-up   PT Follow-up? Yes   PT - Next  Visit Date 04/12/18   Treatment/Billable Minutes   Gait training 17   Therapeutic Activity 43   Therapeutic Exercise 30   Total Time 90                 Huey Caicedo, PT 4/11/2018

## 2018-04-11 NOTE — PROGRESS NOTES
" [facility]  Adult Nutrition  Progress Note    SUMMARY       Recommendations    Recommendation/Intervention:   1. Continue Cardiac diet.   2. Recommend Arginaid for wound healing.   3. RD to follow.    Goals: Meet >85% of EEN/EPN  Nutrition Goal Status: goal met  Communication of RD Recs:  (POC)    Reason for Assessment    Reason for Assessment: RD follow-up  Diagnosis:  (Discitis of thoracolumbar region)  Relevant Medical History: HTN, sarcoidosis, Pott's disease, HLD  Interdisciplinary Rounds: did not attend    General Information Comments: Pt reports a good appetite but states that she is experiencing diarrhea from ONS chocolate flavor. Pt agreed to try strawberry flavor instead.     Nutrition Discharge Planning: Adequate nutrition on Cardiac diet.    Nutrition Risk Screen    Nutrition Risk Screen: no indicators present    Nutrition/Diet History    Patient Reported Diet/Restrictions/Preferences: general  Food Preferences: Fresh fruit  Do you have any cultural, spiritual, Yazidism conflicts, given your current situation?: none    Anthropometrics    Temp: 98.9 °F (37.2 °C)  Height: 5' 5" (165.1 cm)  Height (inches): 65 in  Weight Method: Standard Scale  Weight: 95.9 kg (211 lb 6.4 oz)  Weight (lb): 211.4 lb  Ideal Body Weight (IBW), Female: 125 lb  % Ideal Body Weight, Female (lb): 167.2 lb  BMI (Calculated): 34.9  BMI Grade: 30 - 34.9- obesity - grade I       Lab/Procedures/Meds    Pertinent Labs Reviewed: reviewed  Pertinent Labs Comments: -  Pertinent Medications Reviewed: reviewed  Pertinent Medications Comments: Carvedilol, heparin, ondansetron, senna, Ca     Physical Findings/Assessment    Overall Physical Appearance: nourished  Oral/Mouth Cavity: WDL  Skin: non-healing wound(s), incision(s)    Estimated/Assessed Needs    Weight Used For Calorie Calculations: 95.9 kg (211 lb 6.7 oz)  Energy Calorie Requirements (kcal): 1806  Energy Need Method: [place] (PAL x 1.2)  Protein " Requirements: 115-134g/emili (1.2-1.4g/kg)  Weight Used For Protein Calculations: 95.9 kg (211 lb 6.7 oz)  Fluid Requirements (mL): 1mL/kg or per MD     RDA Method (mL): 1806       Nutrition Prescription Ordered    Current Diet Order: Cardiac    Evaluation of Received Nutrient/Fluid Intake    Comments: LBM: 4/10/18  % Intake of Estimated Energy Needs: 75 - 100 %  % Meal Intake: 75 - 100 %    Nutrition Risk    Level of Risk/Frequency of Follow-up: low     Assessment and Plan     Nutrition Problem  Increased protein needs     Related to (etiology):   Non-healing wounds in back area     Signs and Symptoms (as evidenced by):    Protein PO intake <85%.      Interventions/Recommendations (treatment strategy):  Boost Plus supplement  And Arginaid for wound healing      Nutrition Diagnosis Status:   Continues     Monitor and Evaluation    Food and Nutrient Intake: energy intake, food and beverage intake  Food and Nutrient Adminstration: diet order  Knowledge/Beliefs/Attitudes: food and nutrition knowledge/skill, beliefs and attitudes  Physical Activity and Function: nutrition-related ADLs and IADLs  Anthropometric Measurements: weight, body mass index, weight change  Biochemical Data, Medical Tests and Procedures: electrolyte and renal panel, gastrointestinal profile, glucose/endocrine profile, inflammatory profile, lipid profile  Nutrition-Focused Physical Findings: overall appearance     Nutrition Follow-Up    RD Follow-up?: Yes

## 2018-04-11 NOTE — PLAN OF CARE
Problem: Skin Integrity Impairment, Risk/Actual (Adult)  Goal: Skin Integrity/Wound Healing  Patient will demonstrate the desired outcomes by discharge/transition of care.   Outcome: Ongoing (interventions implemented as appropriate)  Keep skin clean and dry, maintain asepetic technique, wash hands before and after wound care, dressing changes on Tues and Fri., assess wound, monitor vital signs and WBC, administer sulfamethoxazole-trimethoprim as ordered, and maintain diet and fluid intake.

## 2018-04-11 NOTE — PROGRESS NOTES
"Occupational Therapy  AM Treatment  Mary Carrillo   MRN: [id number]   Room/Bed: E257/E257 A       04/11/18 0730   OT Time Calculation   OT Start Time 0730   OT Stop Time 0816   OT Total Time (min) 46 min   General   OT Date of Treatment 04/11/18   Family/Caregiver Present No   Patient Found (position) Supine in bed   Precautions   General Precautions fall   Spinal Brace TLSO  (when standing or perfroming gait)   Subjective   Patient states "I need to use the toilet."   Pain/Comfort   Pain Rating no pain   Sit to Stand   Sit <> Stand Assistance Moderate Assistance   Sit <> Stand Assistive Device No Assistive Device   Trials/Comments from EOB   Stand to Sit   Assistance Moderate Assistance   Assistive Device No Assistive Device   Trials/Comments to EOB   Bed to Chair   Bed <> Chair Technique Slide Board   Bed <> Chair Transfer Assistance Minimum Assistance   Trials/Comments from EOB>wc   Chair to Mat   Chair <>Mat Technique Slide Board   Chair <> Mat Assistance Minimum Assistance   Trials/Comments from wc>EOB   Toilet Transfer   Toilet Transfer Technique Scoot Pivot   Toilet Transfer Assistance Minimum Assistance   Toilet Transfer Assistive Device drop arm commode   Trials/Comments from EOB<>BSC   Feeding   Feeding Level of Assistance Independent   Feeding Where Assessed Wheelchair   Grooming   Grooming Level of Assistance Independent   Grooming Where Assessed Wheelchair   Bathing   Bathing Level of Assistance Minimum assistance   Bathing Where Assessed Bed;Edge of bed   Bathing Comments assist with buttocks and B LE below knees   UE Dressing   UE Dressing Level of Assistance Set-up Assistance   UE Dressing Where Assessed Edge of bed   UE Dressing Comments for TLSO   LE Dressing   LE Dressing  Level of Assistance Moderate assistance   LE Dressing Where Assessed Edge of bed   LE Dressing Comments assist with socks, shoes and threading pants   Toileting   Toileting Level of Assistance Moderate assistance   Toileting Where " Assessed Bed level   Toileting Comments assist with clothing management over hips and buttocks for thoroghness   Dynamic Sitting Balance   Dynamic Sitting-Balance Support No upper extremity supported   Dynamic Sitting-Balance Reaching for objects;Reaching across midline   Dynamic Sitting-Comments SBA during functional activities   Activity Tolerance   Activity Tolerance Patient tolerated treatment well   After Treatment   Patient Position After Treatment Seated in wheelchair   Patient after treatment left call button in reach   Assessment   Prognosis Good   Problem List Decreased Self Care skills;Decreased upper extremity strength;Decreased safe judgment during ADL;Decreased cognition;Decreased endurance;Decreased functional mobility;Decreased gross motor control;Decreased IADLs;Decreased trunk control for functional activities   Assessment Pt participated well in tx session but remains with decresaed (I) with ADLs and functional mobilty. Pt also remains very limited wtih t/fs and standing at this time. Pt would continue to benefit from skilled OT services to increase (I) with ADLs and functional mobility.   Level of Motivation/Participation Good   Discharge Recommendations   Equipment Needed After Discharge 3-in-1 commode;bath bench;wheelchair;slide board   Discharge Facility/Level Of Care Needs home with home health   Plan   Plan Continue with current plan   Therapy Frequency 2 times/day   Occupational Therapy Follow-up   OT Follow-up? Yes   Treatment/Billable Minutes   Self Care/Home Management 46   Total Time 46       DK Crocker  4/11/2018     Patient with wheelchair safety belt fastened and able to self release wheelchair safety belt. Pt left in wc in room with call light and all necessities in reach, RN notified.     LEGEND:   CGA: Contact Guard Assist   EOB: Edgeof Bed   HHA: Hand Held Assist   HOB: Head of Bed   (I): Independent-patient performs task in a timely manner   Max (A): Maximal  Assist-patient performs 25-49% of task   Min (A): Minimal Assist- patient performs 75% or more of task   Mod (A): Moderate Assist- patient performs 50-74% of task   NA: Not applicable   NT: Not tested   OOB: Out of Bed   PTA: Prior to admit   QC: Quad Cane   RW: Rolling Walker   (S): Supervision- patient requires cues, coaxing, prompting   SBA: Stand By Assist   SC: Straight Cane   SW: Standard Walker   TBA: To be assessed   Total (A): Total Assist- patient performs less than 25% of task   WC: Wheelchair   WFL: Within Functional Limits   WNL: Within Normal Limits

## 2018-04-12 PROBLEM — R94.4 DECREASED CALCULATED GFR: Status: ACTIVE | Noted: 2018-04-12

## 2018-04-12 LAB
ANION GAP SERPL CALC-SCNC: 5 MMOL/L
BASOPHILS # BLD AUTO: 0.03 K/UL
BASOPHILS NFR BLD: 0.4 %
BUN SERPL-MCNC: 20 MG/DL
CALCIUM SERPL-MCNC: 9.2 MG/DL
CHLORIDE SERPL-SCNC: 107 MMOL/L
CO2 SERPL-SCNC: 26 MMOL/L
CREAT SERPL-MCNC: 1.2 MG/DL
DIFFERENTIAL METHOD: ABNORMAL
EOSINOPHIL # BLD AUTO: 0.2 K/UL
EOSINOPHIL NFR BLD: 2.3 %
ERYTHROCYTE [DISTWIDTH] IN BLOOD BY AUTOMATED COUNT: 19.9 %
EST. GFR  (AFRICAN AMERICAN): 54.8 ML/MIN/1.73 M^2
EST. GFR  (NON AFRICAN AMERICAN): 47.5 ML/MIN/1.73 M^2
GLUCOSE SERPL-MCNC: 80 MG/DL
HCT VFR BLD AUTO: 26.8 %
HGB BLD-MCNC: 8.3 G/DL
IMM GRANULOCYTES # BLD AUTO: 0.07 K/UL
IMM GRANULOCYTES NFR BLD AUTO: 0.9 %
LYMPHOCYTES # BLD AUTO: 2.2 K/UL
LYMPHOCYTES NFR BLD: 28.2 %
MAGNESIUM SERPL-MCNC: 1.9 MG/DL
MCH RBC QN AUTO: 28.9 PG
MCHC RBC AUTO-ENTMCNC: 31 G/DL
MCV RBC AUTO: 93 FL
MONOCYTES # BLD AUTO: 1.4 K/UL
MONOCYTES NFR BLD: 17.9 %
NEUTROPHILS # BLD AUTO: 4 K/UL
NEUTROPHILS NFR BLD: 50.3 %
NRBC BLD-RTO: 0 /100 WBC
PHOSPHATE SERPL-MCNC: 3.8 MG/DL
PLATELET # BLD AUTO: 270 K/UL
PMV BLD AUTO: 10.1 FL
POTASSIUM SERPL-SCNC: 4.8 MMOL/L
RBC # BLD AUTO: 2.87 M/UL
SODIUM SERPL-SCNC: 138 MMOL/L
WBC # BLD AUTO: 7.94 K/UL

## 2018-04-12 PROCEDURE — 25000003 PHARM REV CODE 250: Performed by: PHYSICAL MEDICINE & REHABILITATION

## 2018-04-12 PROCEDURE — 97116 GAIT TRAINING THERAPY: CPT

## 2018-04-12 PROCEDURE — 25000003 PHARM REV CODE 250: Performed by: HOSPITALIST

## 2018-04-12 PROCEDURE — 80048 BASIC METABOLIC PNL TOTAL CA: CPT

## 2018-04-12 PROCEDURE — 63600175 PHARM REV CODE 636 W HCPCS: Performed by: HOSPITALIST

## 2018-04-12 PROCEDURE — 11800000 HC REHAB PRIVATE ROOM

## 2018-04-12 PROCEDURE — 97110 THERAPEUTIC EXERCISES: CPT

## 2018-04-12 PROCEDURE — 84100 ASSAY OF PHOSPHORUS: CPT

## 2018-04-12 PROCEDURE — 97530 THERAPEUTIC ACTIVITIES: CPT

## 2018-04-12 PROCEDURE — 83735 ASSAY OF MAGNESIUM: CPT

## 2018-04-12 PROCEDURE — 85025 COMPLETE CBC W/AUTO DIFF WBC: CPT

## 2018-04-12 PROCEDURE — 36415 COLL VENOUS BLD VENIPUNCTURE: CPT

## 2018-04-12 PROCEDURE — 97542 WHEELCHAIR MNGMENT TRAINING: CPT

## 2018-04-12 PROCEDURE — 99232 SBSQ HOSP IP/OBS MODERATE 35: CPT | Mod: ,,, | Performed by: PHYSICAL MEDICINE & REHABILITATION

## 2018-04-12 PROCEDURE — 97150 GROUP THERAPEUTIC PROCEDURES: CPT

## 2018-04-12 RX ADMIN — GABAPENTIN 300 MG: 300 CAPSULE ORAL at 08:04

## 2018-04-12 RX ADMIN — SULFAMETHOXAZOLE AND TRIMETHOPRIM 1 TABLET: 800; 160 TABLET ORAL at 08:04

## 2018-04-12 RX ADMIN — RAMELTEON 8 MG: 8 TABLET, FILM COATED ORAL at 08:04

## 2018-04-12 RX ADMIN — TRAZODONE HYDROCHLORIDE 100 MG: 50 TABLET ORAL at 08:04

## 2018-04-12 RX ADMIN — CARVEDILOL 25 MG: 25 TABLET, FILM COATED ORAL at 07:04

## 2018-04-12 RX ADMIN — LIDOCAINE 1 PATCH: 50 PATCH TOPICAL at 11:04

## 2018-04-12 RX ADMIN — AMLODIPINE BESYLATE 10 MG: 10 TABLET ORAL at 07:04

## 2018-04-12 RX ADMIN — HEPARIN SODIUM 5000 UNITS: 5000 INJECTION, SOLUTION INTRAVENOUS; SUBCUTANEOUS at 10:04

## 2018-04-12 RX ADMIN — LISINOPRIL 15 MG: 10 TABLET ORAL at 07:04

## 2018-04-12 RX ADMIN — OXYCODONE AND ACETAMINOPHEN 1 TABLET: 10; 325 TABLET ORAL at 01:04

## 2018-04-12 RX ADMIN — CARVEDILOL 25 MG: 25 TABLET, FILM COATED ORAL at 08:04

## 2018-04-12 RX ADMIN — OXYCODONE AND ACETAMINOPHEN 1 TABLET: 10; 325 TABLET ORAL at 02:04

## 2018-04-12 RX ADMIN — Medication 50 MG: at 07:04

## 2018-04-12 RX ADMIN — SULFAMETHOXAZOLE AND TRIMETHOPRIM 1 TABLET: 800; 160 TABLET ORAL at 07:04

## 2018-04-12 RX ADMIN — STANDARDIZED SENNA CONCENTRATE AND DOCUSATE SODIUM 2 TABLET: 8.6; 5 TABLET, FILM COATED ORAL at 07:04

## 2018-04-12 RX ADMIN — OXYCODONE AND ACETAMINOPHEN 1 TABLET: 10; 325 TABLET ORAL at 08:04

## 2018-04-12 RX ADMIN — OXYCODONE AND ACETAMINOPHEN 1 TABLET: 10; 325 TABLET ORAL at 07:04

## 2018-04-12 RX ADMIN — HEPARIN SODIUM 5000 UNITS: 5000 INJECTION, SOLUTION INTRAVENOUS; SUBCUTANEOUS at 01:04

## 2018-04-12 RX ADMIN — HEPARIN SODIUM 5000 UNITS: 5000 INJECTION, SOLUTION INTRAVENOUS; SUBCUTANEOUS at 05:04

## 2018-04-12 NOTE — PROGRESS NOTES
Physical Therapy   Treatment    Mary Carrillo   MRN: [id number]   PTA visit #: 1   04/12/18 1018   PT Time Calculation   PT Start Time 1018   PT Stop Time 1148   PT Total Time (min) 90 min   Treatment   Treatment Type Treatment   PT/PTA PTA   PTA Visit Number 1   General   PT Received On 04/12/18   Family/Caregiver Present No   Patient Found (position) Seated in wheelchair   Pt found with (seat belt, w/o TLSO)   Precautions   General Precautions fall   Orthopedic Yes   Required Braces or Orthoses Yes   Spinal Brace TLSO   Visual/Auditory Vision impaired   Subjective   Patient states Pt agreeable to tx with c/o R hip pain   Pain/Comfort   Pain Rating 1 6/10   Location - Side 1 Right   Location - Orientation 1 generalized   Location 1 hip   Pain Addressed 1 Distraction   Bed Mobility   Bed Mobility yes   Supine to Sit Stand by Assistance   Supine to Sit Comments mat   Sit to Supine Minimum Assistance   Transfers   Transfer yes   Sit to Stand   Sit <> Stand Assistance Minimum Assistance   Sit <> Stand Assistive Device (// bars)   Trials/Comments multiple trials   Stand to Sit   Assistance Minimum Assistance   Assistive Device (// bars)   Chair to Mat   Chair<> Mat Technique Scoot Pivot   Chair<>Mat Assistance Minimum Assistance   Chair <> Mat Assistive Device No Assistive Device   Mat to Chair   Technique Scoot Pivot   Assistance Stand By Assistance   Assistive Device No Assistive Device   Wheelchair Activities   Propulsion Yes   Propulsion Type 1 Manual   Level 1 Level tile   Method 1 Right upper extremity;Left upper extremity   Level of Assistance 1 Supervision   Description/ Details 1 100' x 2   Gait   Gait Yes   Weight Bearing Status FWB   Gait 1   Gait Assistive Device Parallel bars   Other Apparatus 1 Wheelchair follow   Assistance 1 Contact Guard Assistance;Total assistance;With assist of two  (2nd person to push wc)   Gait Distance length of bars x 4 trials   Gait Pattern swing-to gait   Gait Deviation(s)  decreased devon;increased time in double stance;decreased velocity of limb motion;decreased step length;decreased stride length;decreased toe-to-floor clearance;decreased weight-shifting ability;forward lean   Impairments Contributing to Gait Deviations impaired balance;decreased flexibility;pain;impaired postural control;decreased strength   Stairs   Stairs No   Balance   Balance Yes   Static Sitting Balance   Static Sitting-Balance Support Right upper extremity supported;Left upper extremity supported;Feet supported   Static Sitting-Level of Assistance Modified Independent   Static Sitting-Comment/# of Minutes EOM   Static Standing Balance   Static Standing-Balance Support Right upper extremity supported;Left upper extremity supported   Static Standing-Level of Assistance Contact guard   Static Standing-Comment/# of Minutes x 3 trials of 1 min x 2 and 1 min 20 sec x 1   Supine   Supine-Exercises Lower extremity;Specific exercises   Supine-Exercise Type Ankle pumps;Glut sets;Short arc quads;ABD/ADD;Heel slides   Supine-Exercise Comments B LE 2 x 20 reps with 1/2 # ankle wts   Seated   Seated-Exercises Lower extremity;Specific exercises   Seated-Exercise Type Ankle pumps;Hip flexion;Long arc quads;ABduction;ADduction   Seated-Exercise Comments B LE x 15   Activity Tolerance   Activity Tolerance Patient tolerated treatment well   After Treatment   Patient Position After Treatment Seated in wheelchair   Assessment   Prognosis Fair   Problem List Decreased strength;Decreased endurance;Impaired balance;Decreased mobility;Impaired sensation;Obesity;Decreased skin integrity;Pain   Session Assessment progressing toward goals   Assessment Pt damon tx well, cont POC   Level of Motivation/Participation good   Barriers to Discharge Decreased caregiver support   Discharge Recommendations   Equipment Needed After Discharge 3-in-1 commode;bath bench;slide board;wheelchair   Discharge Facility/Level Of Care Needs home health PT    Plan   Planned Therapy Intervention Continue with current plan   Therapy Frequency 2 times/day;Monday-Friday;Saturday or Sunday   Physical Therapy Follow-up   PT Follow-up? Yes   Treatment/Billable Minutes   Gait training 15   Therapeutic Activity 15   Therapeutic Exercise 45   Train/Wheelchair Management 15   Total Time 90       Ginny Salas PTA  4/12/2018

## 2018-04-12 NOTE — PLAN OF CARE
Problem: Fall Risk (Adult)  Goal: Absence of Falls  Patient will demonstrate the desired outcomes by discharge/transition of care.   Outcome: Ongoing (interventions implemented as appropriate)  Maintain safe environment, keep bed In low position, assist with safe transfer, bed and chair alarm on, reinforce the importance of calling for assistance, personal items and call light within reach, and frequent rounds.

## 2018-04-12 NOTE — PROGRESS NOTES
Physical Therapy   Wheelchair Mobility Group    Mary Carrillo   MRN: [id number]        04/12/18 1505   PT Time Calculation   PT Start Time 1505   PT Stop Time 1550   PT Total Time (min) 45 min   Treatment   Treatment Type Treatment  (Wheelchair Mobility Group)   PT/PTA PT   General   PT Received On 04/12/18   Family/Caregiver Present No   Patient Found (position) Seated in wheelchair   Pt found with (seat belt donned)   Precautions   General Precautions fall   Orthopedic Yes   Required Braces or Orthoses Yes   Spinal Brace TLSO   Subjective   Patient states Pt agreeable to participate in PT treatment session   Pain/Comfort   Pain Rating 1 7/10   Location - Side 1 Bilateral   Location - Orientation 1 generalized   Location 1 back   Pain Addressed 1 Reposition;Distraction   Activity Tolerance   Activity Tolerance Patient tolerated treatment well   Other Comments   Comments Objective:  Patient participated in a wheelchair management and mobility 45 min group session.  The patients were involved in group activities with emphasis on education, patients propulsion of wheelchair, management of all wheelchair parts and endurance building. The patient performed six minutes of a wheelchair endurance activity with a distance of 300 feet with stand by assist(300 feet total for 2 trials) . Patient performed donning and doffing of leg rests with moderate assist and arm rests with moderate assist. Patient performed 2 x 5 wheelchair pushups to increase bilateral upper extremity strength to improve transfers and bed mobility. Patient practiced up and down 3 % grade incline with moderate assist.  Patient also practiced parallel parking while in the wheelchair with minimal assist. These activities were performed to improve pts functional independence with wheelchair mobility in both household and community distances.  Endurance 10 (6-20) on RPE scale  - Locomotion: Wheelchair (FIM)      5: Supervision: The patient requires standby  supervision, curing or coaxing to go a minimum of 150 feet (50 meters) in a wheelchair           After Treatment   Patient Position After Treatment Seated in wheelchair   Patient after treatment left call button in reach   Plan   Planned Therapy Intervention Continue with current plan   Therapy Frequency 2 times/day;daily;Monday-Friday;Saturday or Sunday   Physical Therapy Follow-up   PT Follow-up? Yes   PT - Next Visit Date 04/13/18   Treatment/Billable Minutes   Therapeutic Activity Group 45   Total Time 45     Farhana Owusu, PT  4/12/2018

## 2018-04-12 NOTE — PROGRESS NOTES
[facility]  Physical Medicine & Rehab  Progress Note    Patient Name: Mary Carrillo  MRN: [id number]  Patient Class: IP- Rehab   Admission Date: 3/27/2018  Length of Stay: 16 days  Attending Physician: Jack Squires MD  Primary Care Provider: Jose Khan MD    Subjective:     Principal Problem:Osteoarthritis of spine with myelopathy, thoracolumbar region    Interval History 4/12/2018:  Patient is seen for follow-up rehab evaluation and recommendations: No acute events overnight. Right hip pain feels better with lidocaine patch. I have reviewed the HPI and PMFSH and there are no changes from admission.       Scheduled Medications:    amLODIPine  10 mg Oral Daily    carvedilol  25 mg Oral BID    gabapentin  300 mg Oral TID    heparin (porcine)  5,000 Units Subcutaneous Q8H    isoniazid  900 mg Oral Every Tues    isoniazid  900 mg Oral Every Fri    lidocaine  1 patch Transdermal Q24H    lisinopril  15 mg Oral Daily    polyethylene glycol  17 g Oral Daily    pyridoxine (vitamin B6)  50 mg Oral Daily    ramelteon  8 mg Oral QHS    rifAMpin  600 mg Oral Every Tues    rifAMpin  600 mg Oral Every Fri    senna-docusate 8.6-50 mg  2 tablet Oral Daily    sulfamethoxazole-trimethoprim 800-160mg  1 tablet Oral BID    traZODone  100 mg Oral QHS       Diagnostic Results: Labs: Reviewed  X-Ray: Reviewed    PRN Medications: acetaminophen, albuterol sulfate, bisacodyl, calcium carbonate, hydrOXYzine pamoate, magnesium hydroxide 400 mg/5 ml, methocarbamol, ondansetron, oxyCODONE-acetaminophen, senna, simethicone    Review of Systems   Constitutional: Positive for activity change. Negative for chills, fatigue and fever.   HENT: Negative for drooling, hearing loss, trouble swallowing and voice change.    Eyes: Negative for pain and visual disturbance.   Respiratory: Negative for cough, shortness of breath and wheezing.    Cardiovascular: Negative for chest pain and palpitations.    Gastrointestinal: Negative for abdominal distention, nausea and vomiting.   Genitourinary: Negative for difficulty urinating and flank pain.   Musculoskeletal: Positive for arthralgias, back pain and gait problem. Negative for neck pain.   Skin: Positive for wound. Negative for rash.   Neurological: Positive for weakness. Negative for dizziness, numbness and headaches.   Psychiatric/Behavioral: Negative for agitation and hallucinations. The patient is not nervous/anxious.      Objective:     Vital Signs (Most Recent):  Temp: 98.6 °F (37 °C) (18 0700)  Pulse: 63 (18 0700)  Resp: 16 (18 0700)  BP: 139/66 (18 0700)  SpO2: 96 % (18 0700)    Vital Signs (24h Range):  Temp:  [98.5 °F (36.9 °C)-98.6 °F (37 °C)] 98.6 °F (37 °C)  Pulse:  [63-78] 63  Resp:  [16-18] 16  SpO2:  [93 %-96 %] 96 %  BP: (134-139)/(63-66) 139/66     Physical Exam   Constitutional: She is oriented to person, place, and time. She appears well-developed and well-nourished. No distress.       HENT:   Head: Normocephalic and atraumatic.   Nose: Nose normal.   Eyes: Right eye exhibits no discharge. Left eye exhibits no discharge. No scleral icterus.   Neck: Normal range of motion.   Cardiovascular: Normal rate, regular rhythm and intact distal pulses.    Pulmonary/Chest: Effort normal. No respiratory distress. She has no wheezes.   Abdominal: Soft. She exhibits no distension. There is no tenderness.   Musculoskeletal: She exhibits no edema or tenderness.        Right ankle: She exhibits decreased range of motion (foot drop).   TLSO brace intact  No pain with internal or external rotation of right hip  +TTP GTB, reproduces hip pain   Neurological: She is alert and oriented to person, place, and time. She exhibits normal muscle tone.   -  Mental Status:  AAOx3.  Follows commands.  Answers correct age and .  Recent and remote memory intact.  -  Speech and language:  no aphasia or dysarthria.    -  Vision:  no hemianopsia or  ptosis.    -  Facial movement (CN VII): symmetrical.   -  Motor:  RUE: 4/5.  LUE: 4/5.  RLE:  Hip Flex 2/5, Knee Ext/Flex 3-/5,  DF 1/5, PF 4-/5.  LLE:  Hip Flex 2+/5, Knee Ext/Flex 4-/5,  DF 4/5, PF 4/5.  -  Sensory:  Intact to light touch and pin prick.   Skin: Skin is warm and dry. No rash noted.   Proximal incision site dehisced. No drainage.    Psychiatric: She has a normal mood and affect. Her behavior is normal.   Vitals reviewed.    NEUROLOGICAL EXAMINATION:     MENTAL STATUS   Oriented to person, place, and time.       Assessment/Plan:      Mary Carrillo is a 63 y.o. female admitted to inpatient rehabilitation on 3/27/2018 for Osteoarthritis of spine with myelopathy, thoracolumbar region with impaired mobility and ADLs. Patient remains appropriate for PT, OT, and as required Speech therapy. Patient continues to require 24 hour nursing care as well as daily Physician assessment.    * Osteoarthritis of spine with myelopathy, thoracolumbar region    -  MRI T-spine revealed vertebral osteomyelitis/discitis T12-L1 with severe compression and effacement of the ventral and dorsal subarachnoid spaces  -  S/p T12-L1 corpectomy with T10-L3 fusion on 3/18/18  -  PT/OT evaluate and treat  -  Pain management  -  Monitor for bowel and bladder dysfunction  -  Monitor for spasticity  -  Monitor for and prevent skin breakdown and pressure ulcers  Early mobility, repositioning/weight shifting every 20-30 minutes when sitting, turn patient every 2 hours, proper mattress/overlay and chair cushioning, pressure relief/heel protector boots  -  DVT prophylaxis:  on SQH        Decreased calculated GFR    4/12 gfr <60, repeat BMP tomorrow and encourage po fluids          Right hip pain    Xray reviewed. Pain stable.   4/11 lidocaine patch for right hip  4/12 improvement. Tenderness to palpation of Right GTB, consistent with greater trochanteric bursitis.        Difficulty sleeping    4/9 trazodone at 100. Change ramelteon from prn  to scheduled. Cont prn atarax        Alteration in skin integrity related to surgical incision    4/9 increased drainage. Will alert WC. Cont wound care to site. Start bactrim per neurosurgery recs.  4/10 Neurosurgery ordered CT T, L spine to evaluate infection. Imaging scheduled for 4/11.  4/11 no signs of infection of fluid collection        Dehiscence of operative wound    Noted on 3/31. No signs of infection. Culture sent and Bacitracin ointment started. Continue to monitor    4/1 - CBC sent to monitor WBC. Will consult wound care  4/2 WBC 11, stable  4/3 Staples removed  4/4 no drainage early morning, some reported late morning so ESR, CRP, CBC ordered. Cont wound care.  4/5 ESR, CRP elevated, but WBC within normal limits. Augmentin started. Incision improving.    4/7 covered w mepilex, c/d         Acute blood loss as cause of postoperative anemia    Continue to monitor H/H. Previous transfusions performed    4/2 H/H improved, 8.5/28.4    Lab Results   Component Value Date    WBC 10.39 04/05/2018    HGB 8.6 (L) 04/05/2018    HCT 28.2 (L) 04/05/2018    MCV 92 04/05/2018     04/05/2018               HTN (hypertension), benign    Continue Coreg and Amlodipine    3/29 - Lisinopril 10mg daily  3/30 - improved  4/2 - sbp 130s-160. Lisinopril increased to 20  4/3  this morning, improved  4/4 dec lisinopril back to 10 for low DBP  4/6 Lisinopril increased to 15mg   4/7 AM BP elevated, requested repeat which is pending. WIll cont to f/u.  4/8 Repeat yesterday afternoon was improved. Will cont w current regimen - may benefit from spacing out meds for smoother ocntrol  4/9 Inc lisinopril to 20  4/10 Cont to monitor.   Vitals:    04/09/18 0700 04/09/18 1935 04/10/18 0700 04/10/18 0747   BP: (!) 175/83 (!) 160/72  (!) 145/69   BP Location: Left arm Right arm     Patient Position: Lying Lying     Pulse: 67 79 72    Resp: 20 18     Temp: 98.3 °F (36.8 °C) 98.4 °F (36.9 °C)     TempSrc: Oral Oral     SpO2: (!)  93% (!) 94%     Weight:       Height:                 Sarcoidosis    Currently holding Methotrexate due to TB            DISCHARGE PLANNING:  Tentative Discharge Date: 4/18/2018    Future Appointments  Date Time Provider Department Center   4/25/2018 10:00 AM LAB, [facility]   5/4/2018 9:00 AM [facility] Oli Cristi Quiles MD  Department of Physical Medicine & Rehab   [facility]

## 2018-04-12 NOTE — NURSING
Spoke with Dr Rodrigues regarding rt hand peripheral IV. Patient requested IV to be discontinued. Dr. Rodrigues wants IV continued until patient's next labs are drawn 4/12/2018.

## 2018-04-12 NOTE — PROGRESS NOTES
"Occupational Therapy   Treatment    Mary Carrillo   MRN: [id number]      04/12/18 1300   OT Time Calculation   OT Start Time 1300   OT Stop Time 1345   OT Total Time (min) 45 min   General   OT Date of Treatment 04/12/18   Family/Caregiver Present No   Precautions   General Precautions fall   Orthopedic Yes   Required Braces or Orthoses Yes   Spinal Brace TLSO   Visual/Auditory Vision impaired   Subjective   Patient states "I don't need my brace."   Pain/Comfort   Pain Rating 5/10   Location - Side Bilateral   Location - Orientation lower   Location back   Pain Addressed Reposition;Distraction;Cessation of Activity   Pain Comment Pt received pain medication prior to session.   Sit to Stand   Sit <> Stand Assistance Maximum Assistance  (pt donned brace prior to transfers and standing)   Sit <> Stand Assistive Device Rolling Walker   Trials/Comments from EOM   Stand to Sit   Assistance Moderate Assistance   Assistive Device Rolling Walker   Trials/Comments to EOM   Chair to Mat   Chair <>Mat Technique Scoot Pivot   Chair <> Mat Assistance Maximum Assistance   Chair<> Mat Assistive Device No Assistive Device   Trials/Comments assist to lift and lower   Mat to Chair   Technique Scoot Pivot   Assistance Maximum Assistance   Assistive Device No Assistive Device   Trials/Comments assist to lift and lower   UE Dressing   UE Dressing Level of Assistance Set-up Assistance   UE Dressing Where Assessed Wheelchair   UE Dressing Comments assist to don TLSO brace prior to transfers and standing in gym   Therapeutic Exercise - Strength   Strength Yes   Strength Exercise Pt instructed in and completed 3x20 BUE AROM therex while seated in wc at tabletop B prashant on flat surface with 5lb weight in shoulder flex/ext, horizontal ab/adduction, circumduction (clockwise and counterclockwise) to increase upper body strength needed for improved performance with transfers.   Additional Activities   Additional Activities Other (Comment) "   Additional Activities Comments Facilitated therapeutic activity to increase trunk control, dynamic sitting balance and overall endurance with pt completing 20 reps swatting beachball with 2lb dowel in various planes of motion; pt also completed volleyball task hitting beach ball back and forth with therapist over tabletop to encourage forward weight shift in prep for functional transfers. Pt also completed 5 trials of 1/2 sit<>stand using yoga blocks on both sides of patient while seated EOM, pt requiring min A for blocking B knees to increase functional performance in transfers, ADLs and overall functional mobility.   Activity Tolerance   Activity Tolerance Patient tolerated treatment well;Patient limited by pain   Medical Staff Made Aware NSG   After Treatment   Patient Position After Treatment Seated in wheelchair   Patient after treatment left call button in reach   Assessment   Prognosis Good   Problem List Decreased Self Care skills;Decreased upper extremity strength;Decreased safe judgment during ADL;Decreased endurance;Decreased functional mobility;Decreased gross motor control;Decreased IADLs;Decreased Function of right upper extremity;Decreased Function of left upper extremity;Decreased trunk control for functional activities   Assessment Pt with good participation today however was limited by back pain which hindered her performance with transfers this session. Pt still a good candidate for IPR as she shows good potential for improvement.   Level of Motivation/Participation Good   Barriers to Discharge Decreased caregiver support   Discharge Recommendations   Equipment Needed After Discharge 3-in-1 commode;bath bench;wheelchair;slide board   Discharge Facility/Level Of Care Needs home health OT   Plan   Plan Continue with current plan   Therapy Frequency 2 times/day   Occupational Therapy Follow-up   OT Follow-up? Yes   Treatment/Billable Minutes   Therapeutic Activity 25   Therapeutic Exercise 20    Total Time 45     Keke Aaron, LOTR  4/12/2018

## 2018-04-12 NOTE — PROGRESS NOTES
Wound care follow-up:  The mid-back incision has dehisced areas at proximal/mid/distal aspects with slough/fibrous tissue noted.  Medi-honey applied to wounds/covered with dressing.  Plan of care discussed with patient/family who verbalized understanding.  Recommendations:  Medi-honey to back incision     04/12/18 0900       Incision/Site 03/18/18 1634 Back   Date First Assessed/Time First Assessed: 03/18/18 1634   Location: Back   Wound Image    Incision WDL ex   Dressing Appearance Intact;Dried drainage   Drainage Amount Small   Drainage Characteristics/Odor Clear;Brown   Appearance Slough;Moist  (fibrous tissue)   Yellow (%), Wound Tissue Color 100 %   Periwound Area Intact;Dry   Wound Edges Approximated;Dehisced   Care Cleansed with:;Sterile normal saline;Applied:;Other (see comments)  (medi-honey)   Dressing Changed;Hydrofiber   Dressing Change Due 04/13/18

## 2018-04-12 NOTE — ASSESSMENT & PLAN NOTE
4/9 increased drainage. Will alert WC. Cont wound care to site. Start bactrim per neurosurgery recs.  4/10 Neurosurgery ordered CT T, L spine to evaluate infection. Imaging scheduled for 4/11.  4/11 no signs of infection of fluid collection

## 2018-04-12 NOTE — ASSESSMENT & PLAN NOTE
Xray reviewed. Pain stable.   4/11 lidocaine patch for right hip  4/12 improvement. Tenderness to palpation of Right GTB, consistent with greater trochanteric bursitis.

## 2018-04-13 LAB
ANION GAP SERPL CALC-SCNC: 5 MMOL/L
BUN SERPL-MCNC: 19 MG/DL
CALCIUM SERPL-MCNC: 9.5 MG/DL
CHLORIDE SERPL-SCNC: 108 MMOL/L
CO2 SERPL-SCNC: 26 MMOL/L
CREAT SERPL-MCNC: 1.2 MG/DL
EST. GFR  (AFRICAN AMERICAN): 54.8 ML/MIN/1.73 M^2
EST. GFR  (NON AFRICAN AMERICAN): 47.5 ML/MIN/1.73 M^2
GLUCOSE SERPL-MCNC: 81 MG/DL
POTASSIUM SERPL-SCNC: 5.2 MMOL/L
SODIUM SERPL-SCNC: 139 MMOL/L

## 2018-04-13 PROCEDURE — 97110 THERAPEUTIC EXERCISES: CPT

## 2018-04-13 PROCEDURE — 25000003 PHARM REV CODE 250: Performed by: PHYSICAL MEDICINE & REHABILITATION

## 2018-04-13 PROCEDURE — 99232 SBSQ HOSP IP/OBS MODERATE 35: CPT | Mod: ,,, | Performed by: PHYSICAL MEDICINE & REHABILITATION

## 2018-04-13 PROCEDURE — 80048 BASIC METABOLIC PNL TOTAL CA: CPT

## 2018-04-13 PROCEDURE — 25000003 PHARM REV CODE 250: Performed by: HOSPITALIST

## 2018-04-13 PROCEDURE — 97150 GROUP THERAPEUTIC PROCEDURES: CPT

## 2018-04-13 PROCEDURE — 11800000 HC REHAB PRIVATE ROOM

## 2018-04-13 PROCEDURE — 63600175 PHARM REV CODE 636 W HCPCS: Performed by: HOSPITALIST

## 2018-04-13 PROCEDURE — 97530 THERAPEUTIC ACTIVITIES: CPT

## 2018-04-13 PROCEDURE — 97116 GAIT TRAINING THERAPY: CPT

## 2018-04-13 PROCEDURE — 36415 COLL VENOUS BLD VENIPUNCTURE: CPT

## 2018-04-13 RX ADMIN — TRAZODONE HYDROCHLORIDE 100 MG: 50 TABLET ORAL at 08:04

## 2018-04-13 RX ADMIN — LISINOPRIL 15 MG: 10 TABLET ORAL at 08:04

## 2018-04-13 RX ADMIN — HEPARIN SODIUM 5000 UNITS: 5000 INJECTION, SOLUTION INTRAVENOUS; SUBCUTANEOUS at 05:04

## 2018-04-13 RX ADMIN — HEPARIN SODIUM 5000 UNITS: 5000 INJECTION, SOLUTION INTRAVENOUS; SUBCUTANEOUS at 03:04

## 2018-04-13 RX ADMIN — OXYCODONE AND ACETAMINOPHEN 1 TABLET: 10; 325 TABLET ORAL at 08:04

## 2018-04-13 RX ADMIN — CARVEDILOL 25 MG: 25 TABLET, FILM COATED ORAL at 08:04

## 2018-04-13 RX ADMIN — Medication 50 MG: at 08:04

## 2018-04-13 RX ADMIN — ISONIAZID 900 MG: 300 TABLET ORAL at 08:04

## 2018-04-13 RX ADMIN — LIDOCAINE 1 PATCH: 50 PATCH TOPICAL at 12:04

## 2018-04-13 RX ADMIN — SULFAMETHOXAZOLE AND TRIMETHOPRIM 1 TABLET: 800; 160 TABLET ORAL at 08:04

## 2018-04-13 RX ADMIN — RIFAMPIN 600 MG: 300 CAPSULE ORAL at 08:04

## 2018-04-13 RX ADMIN — AMLODIPINE BESYLATE 10 MG: 10 TABLET ORAL at 08:04

## 2018-04-13 RX ADMIN — RAMELTEON 8 MG: 8 TABLET, FILM COATED ORAL at 08:04

## 2018-04-13 RX ADMIN — HEPARIN SODIUM 5000 UNITS: 5000 INJECTION, SOLUTION INTRAVENOUS; SUBCUTANEOUS at 09:04

## 2018-04-13 RX ADMIN — GABAPENTIN 300 MG: 300 CAPSULE ORAL at 08:04

## 2018-04-13 NOTE — PROGRESS NOTES
Physical Therapy   Treatment/Reassessment    Mary Carrillo   MRN: [id number]              04/13/18 1300   PT Time Calculation   PT Start Time 1300   PT Stop Time 1430   PT Total Time (min) 90 min   Treatment   Treatment Type Treatment;Other (see comments)  (Reassessment)   PT/PTA PT   General   PT Received On 04/13/18   Family/Caregiver Present No   Patient Found (position) Seated in wheelchair;Other (comment)  (in room)   Pt found with (seat belt)   Precautions   General Precautions fall   Orthopedic Yes   Required Braces or Orthoses Yes   Spinal Brace TLSO;Other (Comment)  (when standing or ambulating)   Visual/Auditory Vision impaired;Other (see comments)  (glasses)   Subjective   Patient states she just got a pain patch placed to her R hip.   Pain/Comfort   Pain Rating 1 3/10   Location - Side 1 Right   Location - Orientation 1 generalized   Location 1 hip   Pain Addressed 1 Pre-medicate for activity;Distraction   Pain Rating Post-Intervention 1 no pain   Bed Mobility   Bed Mobility yes   Rolling/Turning to Left Independent  (mat)   Rolling/Turning Right Independent  (mat)   Supine to Sit Modified Independent   Supine to Sit Comments rising from L side of mat   Sit to Supine Supervision;Other (see comments)  (lowering to L side of mat)   Transfers   Transfer yes   Sit to Stand   Sit <> Stand Assistance Minimum Assistance   Sit <> Stand Assistive Device Other (see comments)  (parallel bars)   Trials/Comments 7 trials   Stand to Sit   Assistance Minimum Assistance   Assistive Device Other (see comments)  (parallel bars)   Trials/Comments 7 trials   Chair to Mat   Chair<> Mat Technique Scoot Pivot;Slide Board   Chair<>Mat Assistance Minimum Assistance;Stand By Assistance   Chair <> Mat Assistive Device No Assistive Device;Slide Board   Trials/Comments 1 trial via scoot with min A and 1 trial with slideboard at SBA   Mat to Chair   Technique Scoot Pivot;Slide Board   Assistance Minimum Assistance;Stand By Assistance    Assistive Device No Assistive Device;Slide Board   Trials/Comments 1 trial via scoot with min A and 1 with slideboard and SBA   Tub Bench Transfer   Technique Slide Board   Assistance Minimum Assistance   Assistive Device Slide Board   Trials/Comments 1 trial~ pt needs some assistance to manage L LE over tub wall   Wheelchair Activities   Propulsion Yes   Propulsion Type 1 Manual   Level 1 Level tile   Method 1 Right upper extremity;Left upper extremity   Level of Assistance 1 Modified Independent   Description/ Details 1 200 feet   Gait   Gait Yes   Weight Bearing Status full   Gait 1   Gait Assistive Device Parallel bars   Other Apparatus 1 Wheelchair follow;Other (Comment)  (by PT volunteer)   Assistance 1 Contact Guard Assistance;Total assistance;With assist of two  (2nd helper manages w/c)   Gait Distance pt ambulates length of bars x 6 trials~ seated rest break between trials   Gait Pattern reciprocal   Gait Deviation(s) decreased devon;increased time in double stance;decreased velocity of limb motion;decreased step length;decreased stride length;decreased swing-to-stance ratio;decreased toe-to-floor clearance;decreased weight-shifting ability;knee hyperextension;forward lean   Impairments Contributing to Gait Deviations impaired balance;impaired motor control;impaired postural control;decreased strength;other (see comments)  (decreased endurance)   Stairs   Stairs No   Balance   Balance Yes   Static Standing Balance   Static Standing-Balance Support Right upper extremity supported;Left upper extremity supported   Static Standing-Level of Assistance Contact guard   Static Standing-Comment/# of Minutes pt stands 1 trial in parallel bars x 2: 43  (PT attempts to have pt stand 3 minutes but pt declined)   Supine   Supine-Exercises Lower extremity   Supine-Exercise Type Quad sets;Glut sets;Short arc quads;ABD/ADD;Heel slides   Supine-Exercise Comments pt performs above with 2 # ankle weight , 15 reps each,  A/AAROM   Seated   Seated-Exercises Lower extremity   Seated-Exercise Type Long arc quads;Hip flexion   Seated-Exercise Comments pt performs LAQ x 15 reps with 2 # ankle weight, A/AAROM and hip flex x 15 reps with no weight, AAROM   Equipment Use   Recumbent Bike Comments pt pedals LBE x 5 minutes with mod A for maintaining momentum of movement   Other Activities   Other Activities Other (Comment)  (slideboard transfers w/c<> 3 in 1 drop arm commode; min A)   Comments PT assists with board steadying during above transfer   Activity Tolerance   Activity Tolerance Patient tolerated treatment well   Other Comments   Comments PT donned pt's TLSO in sitting edge of mat to prepare for standing and ambulation.  Pt also performs the following exercises/activities: universal gym chest press and lat pulls, 3 x 10 reps each with 5 # weight; pt also bounces and tosses basketball from w/c x 5 minutes for additional UE strengthening/endurance   After Treatment   Patient Position After Treatment Seated in wheelchair;Other (comment)  (seat belt and TLSO donned)   Patient after treatment left call button in reach  (PT volunteer escorted pt back to room)   Assessment   Prognosis Fair   Problem List Decreased strength;Decreased endurance;Impaired balance;Decreased mobility;Impaired vision;Obesity;Decreased range of motion;Pain   Session Assessment progressing toward goals   Assessment Pt continues to make very good progress in PT and appears to maybe be ready to attempt short distances with RW for ambulation trials.  This will have to be managed carefully, as pt is still fairly weak in the areas of her hips and knees.  Overall, PT is quite pleased with pt's current functional status, though she has room for improvement.  See below for achievement of goals and revisions.  PT was hoping to do some family training before pt's last day of therapy next week, but this would appear to be when her daughter is available to come.   Level of  Motivation/Participation good   Barriers to Discharge Decreased caregiver support   Barriers to Discharge Comments pt will need some assistance at discharge   Long Term Goals   Pt Will Perform Supine To Sit Revised goal;Modified Independently   Supine to Sit-Met/Not Met Met   Pt Will Perform Sit to Supine Revised goal;Modified Independently   Sit to Supine - Met/Not Met Not Met   Logroll - Met/Not Met Met   Transfer Sit to stand - Met/Not Met Met   Pt Will Transfer Bed/Chair Revised goal;Slide Board;With stand by assist   Transfer Bed/Chair - Met/Not Met Met   Pt Will Ambulate New goal;1-10 feet;11-30 feet;With RW;With minimal assist   Ambulate - Met/Not Met Not met   Sit Edge Of Bed - Met Met   Stand - Met/Not Met Not Met   Tolerate Exercise - Met/Not Met Met   Propel Wheelchair - Met/Not Met Met   Other Goal Revised goal~ CGA for slideboard transfers w/c<> 3 in 1 drop arm commode   Other Goal 2 Met   Other Goal 3 Not Met   Discharge Recommendations   Equipment Needed After Discharge 3-in-1 commode;bath bench;wheelchair;slide board   Discharge Facility/Level Of Care Needs home health PT   Plan   Planned Therapy Intervention Continue with current plan;Bed mobility training;Transfer training;Gait training;Balance Training;ROM   Therapy Frequency 2 times/day;daily;Monday-Friday;Saturday or Sunday   Physical Therapy Follow-up   PT Follow-up? Yes   PT - Next Visit Date 04/16/18   Treatment/Billable Minutes   Gait training 15   Therapeutic Activity 45   Therapeutic Exercise 30   Total Time 90             Huey Caicedo, PT 4/13/2018

## 2018-04-13 NOTE — PROGRESS NOTES
"Occupational Therapy  AM Treatment  Mary Carrillo   MRN: [id number]   Room/Bed: E257/E257 A       04/13/18 1030   OT Time Calculation   OT Start Time 1030   OT Stop Time 1115   OT Total Time (min) 45 min   General   OT Date of Treatment 04/13/18   Patient Found (position) Seated in wheelchair  (handed off by tech)   Precautions   General Precautions fall   Spinal Brace TLSO   Subjective   Patient states "I'm not ready to stand yet." OT attempted to encourage pt to stand but pt refused.   Pain/Comfort   Pain Rating 5/10   Location - Side Bilateral   Location back   Pain Addressed Pre-medicate for activity   Chair to Mat   Chair <>Mat Technique Scoot Pivot   Chair <> Mat Assistance Maximum Assistance   Chair<> Mat Assistive Device No Assistive Device   Trials/Comments from wc>EOM   Mat to Chair   Technique Scoot Pivot   Assistance Maximum Assistance   Assistive Device No Assistive Device   Trials/Comments from EOM>wc   Exercise Tools   Rickshaw 15# 3/20x reps for UE strengthening   Bilateral Moy 3# x 100 reps on incline to increase B UE strength in prep for ADLs and t/fs.   Other Exercise Tool 1 Unigym: chest press 5# x 100 reps and rows 10# x 100 reps to increase (B) UE strength in prep for ADLs and functional mobility.   Activity Tolerance   Activity Tolerance Patient tolerated treatment well   After Treatment   Patient Position After Treatment Seated in wheelchair   Patient after treatment left call button in reach   Assessment   Prognosis Good   Problem List Decreased Self Care skills;Decreased upper extremity strength;Decreased safe judgment during ADL;Decreased endurance;Decreased functional mobility;Decreased gross motor control;Decreased IADLs;Decreased Function of right upper extremity;Decreased Function of left upper extremity;Decreased trunk control for functional activities   Assessment Pt participated well in tx session but remains with decreased (I) with ADLs, t/fs and mobility. Pt would continue to " benefit from skilled OT services.   Level of Motivation/Participation Good   Barriers to Discharge Decreased caregiver support   Discharge Recommendations   Equipment Needed After Discharge 3-in-1 commode;bath bench;wheelchair;slide board   Discharge Facility/Level Of Care Needs home with home health   Plan   Plan Continue with current plan   Therapy Frequency 2 times/day   Occupational Therapy Follow-up   OT Follow-up? Yes   Treatment/Billable Minutes   Therapeutic Exercise 45   Total Time 45       DK Crocker  4/13/2018     Patient with wheelchair safety belt fastened and able to self release wheelchair safety belt. Pt handed off to PT for group session with all necessities in reach, PT notified.     LEGEND:   CGA: Contact Guard Assist   EOB: Edgeof Bed   HHA: Hand Held Assist   HOB: Head of Bed   (I): Independent-patient performs task in a timely manner   Max (A): Maximal Assist-patient performs 25-49% of task   Min (A): Minimal Assist- patient performs 75% or more of task   Mod (A): Moderate Assist- patient performs 50-74% of task   NA: Not applicable   NT: Not tested   OOB: Out of Bed   PTA: Prior to admit   QC: Quad Cane   RW: Rolling Walker   (S): Supervision- patient requires cues, coaxing, prompting   SBA: Stand By Assist   SC: Straight Cane   SW: Standard Walker   TBA: To be assessed   Total (A): Total Assist- patient performs less than 25% of task   WC: Wheelchair   WFL: Within Functional Limits   WNL: Within Normal Limits

## 2018-04-13 NOTE — SUBJECTIVE & OBJECTIVE
Interval History 4/13/2018:  Patient is seen for follow-up rehab evaluation and recommendations: No acute events overnight. Slept well. Right hip pain feels better with lidocaine patch. No pain this morning with patch off.. I have reviewed the HPI and PMFSH and there are no changes from admission.       Scheduled Medications:    amLODIPine  10 mg Oral Daily    carvedilol  25 mg Oral BID    gabapentin  300 mg Oral TID    heparin (porcine)  5,000 Units Subcutaneous Q8H    isoniazid  900 mg Oral Every Tues    isoniazid  900 mg Oral Every Fri    lidocaine  1 patch Transdermal Q24H    lisinopril  15 mg Oral Daily    polyethylene glycol  17 g Oral Daily    pyridoxine (vitamin B6)  50 mg Oral Daily    ramelteon  8 mg Oral QHS    rifAMpin  600 mg Oral Every Tues    rifAMpin  600 mg Oral Every Fri    senna-docusate 8.6-50 mg  2 tablet Oral Daily    sulfamethoxazole-trimethoprim 800-160mg  1 tablet Oral BID    traZODone  100 mg Oral QHS       Diagnostic Results: Labs: Reviewed  X-Ray: Reviewed    PRN Medications: acetaminophen, albuterol sulfate, bisacodyl, calcium carbonate, hydrOXYzine pamoate, magnesium hydroxide 400 mg/5 ml, methocarbamol, ondansetron, oxyCODONE-acetaminophen, senna, simethicone    Review of Systems   Constitutional: Positive for activity change. Negative for chills, fatigue and fever.   HENT: Negative for drooling, hearing loss, trouble swallowing and voice change.    Eyes: Negative for pain and visual disturbance.   Respiratory: Negative for cough, shortness of breath and wheezing.    Cardiovascular: Negative for chest pain and palpitations.   Gastrointestinal: Negative for abdominal distention, nausea and vomiting.   Genitourinary: Negative for difficulty urinating and flank pain.   Musculoskeletal: Positive for arthralgias, back pain and gait problem. Negative for neck pain.   Skin: Positive for wound. Negative for rash.   Neurological: Positive for weakness. Negative for dizziness,  numbness and headaches.   Psychiatric/Behavioral: Negative for agitation and hallucinations. The patient is not nervous/anxious.      Objective:     Vital Signs (Most Recent):  Temp: 98.4 °F (36.9 °C) (18 0710)  Pulse: 71 (18 0710)  Resp: 18 (18 0710)  BP: (!) 158/68 (18 0710)  SpO2: (!) 94 % (18 0710)    Vital Signs (24h Range):  Temp:  [98.4 °F (36.9 °C)-98.9 °F (37.2 °C)] 98.4 °F (36.9 °C)  Pulse:  [71-72] 71  Resp:  [18] 18  SpO2:  [94 %-96 %] 94 %  BP: (131-158)/(58-68) 158/68     Physical Exam   Constitutional: She is oriented to person, place, and time. She appears well-developed and well-nourished. No distress.       HENT:   Head: Normocephalic and atraumatic.   Nose: Nose normal.   Eyes: Right eye exhibits no discharge. Left eye exhibits no discharge. No scleral icterus.   Neck: Normal range of motion.   Cardiovascular: Normal rate, regular rhythm and intact distal pulses.    Pulmonary/Chest: Effort normal. No respiratory distress. She has no wheezes.   Abdominal: Soft. She exhibits no distension. There is no tenderness.   Musculoskeletal: She exhibits no edema or tenderness.        Right ankle: She exhibits decreased range of motion (foot drop).   TLSO brace intact  No pain with internal or external rotation of right hip  +TTP GTB, reproduces hip pain   Neurological: She is alert and oriented to person, place, and time. She exhibits normal muscle tone.   -  Mental Status:  AAOx3.  Follows commands.  Answers correct age and .  Recent and remote memory intact.  -  Speech and language:  no aphasia or dysarthria.    -  Vision:  no hemianopsia or ptosis.    -  Facial movement (CN VII): symmetrical.   -  Motor:  RUE: 4/5.  LUE: 4/5.  RLE:  Hip Flex 2/5, Knee Ext/Flex 3-/5,  DF 1/5, PF 4-/5.  LLE:  Hip Flex 2+/5, Knee Ext/Flex 4-/5,  DF 4/5, PF 4/5.  -  Sensory:  Intact to light touch and pin prick.   Skin: Skin is warm and dry. No rash noted.   Proximal incision site dehisced. No  drainage.    Psychiatric: She has a normal mood and affect. Her behavior is normal.   Vitals reviewed.    NEUROLOGICAL EXAMINATION:     MENTAL STATUS   Oriented to person, place, and time.

## 2018-04-13 NOTE — PROGRESS NOTES
[facility]  Physical Medicine & Rehab  Progress Note    Patient Name: Mary Carrillo  MRN: [id number]  Patient Class: IP- Rehab   Admission Date: 3/27/2018  Length of Stay: 17 days  Attending Physician: Jack Squires MD  Primary Care Provider: Jose Khan MD    Subjective:     Principal Problem:Osteoarthritis of spine with myelopathy, thoracolumbar region    Interval History 4/13/2018:  Patient is seen for follow-up rehab evaluation and recommendations: No acute events overnight. Slept well. Right hip pain feels better with lidocaine patch. No pain this morning with patch off.. I have reviewed the HPI and PMFSH and there are no changes from admission.       Scheduled Medications:    amLODIPine  10 mg Oral Daily    carvedilol  25 mg Oral BID    gabapentin  300 mg Oral TID    heparin (porcine)  5,000 Units Subcutaneous Q8H    isoniazid  900 mg Oral Every Tues    isoniazid  900 mg Oral Every Fri    lidocaine  1 patch Transdermal Q24H    lisinopril  15 mg Oral Daily    polyethylene glycol  17 g Oral Daily    pyridoxine (vitamin B6)  50 mg Oral Daily    ramelteon  8 mg Oral QHS    rifAMpin  600 mg Oral Every Tues    rifAMpin  600 mg Oral Every Fri    senna-docusate 8.6-50 mg  2 tablet Oral Daily    sulfamethoxazole-trimethoprim 800-160mg  1 tablet Oral BID    traZODone  100 mg Oral QHS       Diagnostic Results: Labs: Reviewed  X-Ray: Reviewed    PRN Medications: acetaminophen, albuterol sulfate, bisacodyl, calcium carbonate, hydrOXYzine pamoate, magnesium hydroxide 400 mg/5 ml, methocarbamol, ondansetron, oxyCODONE-acetaminophen, senna, simethicone    Review of Systems   Constitutional: Positive for activity change. Negative for chills, fatigue and fever.   HENT: Negative for drooling, hearing loss, trouble swallowing and voice change.    Eyes: Negative for pain and visual disturbance.   Respiratory: Negative for cough, shortness of breath and wheezing.    Cardiovascular: Negative  for chest pain and palpitations.   Gastrointestinal: Negative for abdominal distention, nausea and vomiting.   Genitourinary: Negative for difficulty urinating and flank pain.   Musculoskeletal: Positive for arthralgias, back pain and gait problem. Negative for neck pain.   Skin: Positive for wound. Negative for rash.   Neurological: Positive for weakness. Negative for dizziness, numbness and headaches.   Psychiatric/Behavioral: Negative for agitation and hallucinations. The patient is not nervous/anxious.      Objective:     Vital Signs (Most Recent):  Temp: 98.4 °F (36.9 °C) (18 0710)  Pulse: 71 (18 0710)  Resp: 18 (18)  BP: (!) 158/68 (18 0710)  SpO2: (!) 94 % (18)    Vital Signs (24h Range):  Temp:  [98.4 °F (36.9 °C)-98.9 °F (37.2 °C)] 98.4 °F (36.9 °C)  Pulse:  [71-72] 71  Resp:  [18] 18  SpO2:  [94 %-96 %] 94 %  BP: (131-158)/(58-68) 158/68     Physical Exam   Constitutional: She is oriented to person, place, and time. She appears well-developed and well-nourished. No distress.       HENT:   Head: Normocephalic and atraumatic.   Nose: Nose normal.   Eyes: Right eye exhibits no discharge. Left eye exhibits no discharge. No scleral icterus.   Neck: Normal range of motion.   Cardiovascular: Normal rate, regular rhythm and intact distal pulses.    Pulmonary/Chest: Effort normal. No respiratory distress. She has no wheezes.   Abdominal: Soft. She exhibits no distension. There is no tenderness.   Musculoskeletal: She exhibits no edema or tenderness.        Right ankle: She exhibits decreased range of motion (foot drop).   TLSO brace intact  No pain with internal or external rotation of right hip  +TTP GTB, reproduces hip pain   Neurological: She is alert and oriented to person, place, and time. She exhibits normal muscle tone.   -  Mental Status:  AAOx3.  Follows commands.  Answers correct age and .  Recent and remote memory intact.  -  Speech and language:  no aphasia or  dysarthria.    -  Vision:  no hemianopsia or ptosis.    -  Facial movement (CN VII): symmetrical.   -  Motor:  RUE: 4/5.  LUE: 4/5.  RLE:  Hip Flex 2/5, Knee Ext/Flex 3-/5,  DF 1/5, PF 4-/5.  LLE:  Hip Flex 2+/5, Knee Ext/Flex 4-/5,  DF 4/5, PF 4/5.  -  Sensory:  Intact to light touch and pin prick.   Skin: Skin is warm and dry. No rash noted.   Proximal incision site dehisced. No drainage.    Psychiatric: She has a normal mood and affect. Her behavior is normal.   Vitals reviewed.    NEUROLOGICAL EXAMINATION:     MENTAL STATUS   Oriented to person, place, and time.       Assessment/Plan:      Mary Carrillo is a 63 y.o. female admitted to inpatient rehabilitation on 3/27/2018 for Osteoarthritis of spine with myelopathy, thoracolumbar region with impaired mobility and ADLs. Patient remains appropriate for PT, OT, and as required Speech therapy. Patient continues to require 24 hour nursing care as well as daily Physician assessment.    * Osteoarthritis of spine with myelopathy, thoracolumbar region    -  MRI T-spine revealed vertebral osteomyelitis/discitis T12-L1 with severe compression and effacement of the ventral and dorsal subarachnoid spaces  -  S/p T12-L1 corpectomy with T10-L3 fusion on 3/18/18  -  PT/OT evaluate and treat  -  Pain management  -  Monitor for bowel and bladder dysfunction  -  Monitor for spasticity  -  Monitor for and prevent skin breakdown and pressure ulcers  Early mobility, repositioning/weight shifting every 20-30 minutes when sitting, turn patient every 2 hours, proper mattress/overlay and chair cushioning, pressure relief/heel protector boots  -  DVT prophylaxis:  on SQH        Decreased calculated GFR    4/12 gfr <60, repeat BMP tomorrow and encourage po fluids  4/13 BMP pending. If gfr still reduced, will give IV fluids.        Right hip pain    Xray reviewed. Pain stable.   4/11 lidocaine patch for right hip  4/12 improvement. Tenderness to palpation of Right GTB, consistent with  greater trochanteric bursitis.        Difficulty sleeping    4/9 trazodone at 100. Change ramelteon from prn to scheduled. Cont prn atarax        Alteration in skin integrity related to surgical incision    4/9 increased drainage. Will alert WC. Cont wound care to site. Start bactrim per neurosurgery recs.  4/10 Neurosurgery ordered CT T, L spine to evaluate infection. Imaging scheduled for 4/11.  4/11 no signs of infection of fluid collection        Dehiscence of operative wound    Noted on 3/31. No signs of infection. Culture sent and Bacitracin ointment started. Continue to monitor    4/1 - CBC sent to monitor WBC. Will consult wound care  4/2 WBC 11, stable  4/3 Staples removed  4/4 no drainage early morning, some reported late morning so ESR, CRP, CBC ordered. Cont wound care.  4/5 ESR, CRP elevated, but WBC within normal limits. Augmentin started. Incision improving.    4/7 covered w mepilex, c/d         Acute blood loss as cause of postoperative anemia    Continue to monitor H/H. Previous transfusions performed    4/2 H/H improved, 8.5/28.4    Lab Results   Component Value Date    WBC 10.39 04/05/2018    HGB 8.6 (L) 04/05/2018    HCT 28.2 (L) 04/05/2018    MCV 92 04/05/2018     04/05/2018               HTN (hypertension), benign    Continue Coreg and Amlodipine    3/29 - Lisinopril 10mg daily  3/30 - improved  4/2 - sbp 130s-160. Lisinopril increased to 20  4/3  this morning, improved  4/4 dec lisinopril back to 10 for low DBP  4/6 Lisinopril increased to 15mg   4/7 AM BP elevated, requested repeat which is pending. WIll cont to f/u.  4/8 Repeat yesterday afternoon was improved. Will cont w current regimen - may benefit from spacing out meds for smoother ocntrol  4/9 Inc lisinopril to 20  4/10 Cont to monitor.   4/12 cont to monitor. Cont lisinopril 15  Vitals:    04/12/18 0700 04/12/18 1341 04/12/18 1915 04/13/18 0710   BP: 139/66  (!) 131/58 (!) 158/68   BP Location: Right arm  Right arm Right  arm   Patient Position: Lying  Lying Sitting   Pulse: 63  72 71   Resp: 16  18 18   Temp: 98.6 °F (37 °C)  98.9 °F (37.2 °C) 98.4 °F (36.9 °C)   TempSrc: Oral  Oral Oral   SpO2: 96%  96% (!) 94%   Weight:  91.6 kg (201 lb 14.4 oz)     Height:                 Sarcoidosis    Currently holding Methotrexate due to TB            DISCHARGE PLANNING:  Tentative Discharge Date: 4/18/2018    Future Appointments  Date Time Provider Department Center   4/25/2018 10:00 AM LAB, [facility] STA LAB [facility]   5/4/2018 9:00 AM Andi Swain DO [facility] NEUROS7 [facility]       Edith Quiles MD  Department of Physical Medicine & Rehab   [facility]

## 2018-04-13 NOTE — ASSESSMENT & PLAN NOTE
Continue Coreg and Amlodipine    3/29 - Lisinopril 10mg daily  3/30 - improved  4/2 - sbp 130s-160. Lisinopril increased to 20  4/3  this morning, improved  4/4 dec lisinopril back to 10 for low DBP  4/6 Lisinopril increased to 15mg   4/7 AM BP elevated, requested repeat which is pending. WIll cont to f/u.  4/8 Repeat yesterday afternoon was improved. Will cont w current regimen - may benefit from spacing out meds for smoother ocntrol  4/9 Inc lisinopril to 20  4/10 Cont to monitor.   4/12 cont to monitor. Cont lisinopril 15  Vitals:    04/12/18 0700 04/12/18 1341 04/12/18 1915 04/13/18 0710   BP: 139/66  (!) 131/58 (!) 158/68   BP Location: Right arm  Right arm Right arm   Patient Position: Lying  Lying Sitting   Pulse: 63  72 71   Resp: 16  18 18   Temp: 98.6 °F (37 °C)  98.9 °F (37.2 °C) 98.4 °F (36.9 °C)   TempSrc: Oral  Oral Oral   SpO2: 96%  96% (!) 94%   Weight:  91.6 kg (201 lb 14.4 oz)     Height:

## 2018-04-13 NOTE — PROGRESS NOTES
Physical Therapy   Volleyball Group    Mary Carrillo   MRN: [id number]      04/13/18 1115   PT Time Calculation   PT Start Time 1115   PT Stop Time 1200   PT Total Time (min) 45 min   Treatment   Treatment Type Other (see comments)  (group treatment)   PT/PTA PT   General   PT Received On 04/13/18   Family/Caregiver Present No   Patient Found (position) Seated in wheelchair   Precautions   General Precautions fall   Orthopedic Yes   Required Braces or Orthoses Yes   Spinal Brace TLSO   Visual/Auditory Vision impaired   Subjective   Patient states Pt agreeable to participate in PT session.    Other Activities   Comments   Objective:   -The patient performed volleyball group at w/c level with __SBA____assist. The patient required _minimal___ rest breaks during this activity.  Patient performed activity using bilateral upper extremities to volley the ball, lateral arm movement noted throughout the activity.  Endurance __12___on the RPE scale. no pain complaints voiced or observed.  -Social Interaction: (choose FIM score rating below)    -   - Interacts appropriately 90% of time - needs monitoring or encouragement for participation or interaction  (5)  -     Goal: pts endurance will improve to a RPE of ___11_____       Assessment:  Patient participated in a 45 minute volleyball group activity.  The group activity challenged dynamic standing/sitting balance, core strengthening, bilateral upper extremity strengthening, cardiovascular and respiratory capacity, hand -eye coordination, visual scanning and social affect/mood.        Activity Tolerance   Activity Tolerance Patient tolerated treatment well   After Treatment   Patient Position After Treatment Seated in wheelchair  (seat belt donned)   Patient after treatment left call button in reach   Plan   Planned Therapy Intervention Continue with current plan   Therapy Frequency 2 times/day;daily;Monday-Friday;Saturday or Sunday   Physical Therapy Follow-up   PT Follow-up? Yes    PT - Next Visit Date 04/14/18   Treatment/Billable Minutes   Therapeutic Activity Group 45   Total Time 45   Vani Vogt, PT   4/13/2018

## 2018-04-13 NOTE — PLAN OF CARE
Problem: Patient Care Overview  Goal: Plan of Care Review  Outcome: Ongoing (interventions implemented as appropriate)  Frequent rounds to assess pain & safety.    Problem: Fall Risk (Adult)  Goal: Absence of Falls  Patient will demonstrate the desired outcomes by discharge/transition of care.   Outcome: Ongoing (interventions implemented as appropriate)  Patient remains free of fall or injury this shift, safety measures maintained.    Problem: Pain, Acute (Adult)  Goal: Acceptable Pain Control/Comfort Level  Patient will demonstrate the desired outcomes by discharge/transition of care.   Outcome: Ongoing (interventions implemented as appropriate)  Pain is being managed by prn meds    Problem: Skin Integrity Impairment, Risk/Actual (Adult)  Goal: Skin Integrity/Wound Healing  Patient will demonstrate the desired outcomes by discharge/transition of care.   Outcome: Ongoing (interventions implemented as appropriate)  dsg to back ins, unable to assess    Problem: Pressure Ulcer Risk (Bryant Scale) (Adult,Obstetrics,Pediatric)  Goal: Skin Integrity  Patient will demonstrate the desired outcomes by discharge/transition of care.   Outcome: Ongoing (interventions implemented as appropriate)  No new skin breakdown noted this, assistance is provided to turn & reposition often.

## 2018-04-13 NOTE — ASSESSMENT & PLAN NOTE
4/12 gfr <60, repeat BMP tomorrow and encourage po fluids  4/13 BMP pending. If gfr still reduced, will give IV fluids.

## 2018-04-14 PROBLEM — E87.5 HYPERKALEMIA: Status: ACTIVE | Noted: 2018-04-14

## 2018-04-14 LAB
ANION GAP SERPL CALC-SCNC: 6 MMOL/L
BUN SERPL-MCNC: 21 MG/DL
CALCIUM SERPL-MCNC: 9.4 MG/DL
CHLORIDE SERPL-SCNC: 106 MMOL/L
CO2 SERPL-SCNC: 24 MMOL/L
CREAT SERPL-MCNC: 1.3 MG/DL
EST. GFR  (AFRICAN AMERICAN): 49.7 ML/MIN/1.73 M^2
EST. GFR  (NON AFRICAN AMERICAN): 43.2 ML/MIN/1.73 M^2
GLUCOSE SERPL-MCNC: 80 MG/DL
POTASSIUM SERPL-SCNC: 5.2 MMOL/L
SODIUM SERPL-SCNC: 136 MMOL/L

## 2018-04-14 PROCEDURE — 11800000 HC REHAB PRIVATE ROOM

## 2018-04-14 PROCEDURE — 80048 BASIC METABOLIC PNL TOTAL CA: CPT

## 2018-04-14 PROCEDURE — 25000003 PHARM REV CODE 250: Performed by: PHYSICAL MEDICINE & REHABILITATION

## 2018-04-14 PROCEDURE — 99232 SBSQ HOSP IP/OBS MODERATE 35: CPT | Mod: ,,, | Performed by: PHYSICAL MEDICINE & REHABILITATION

## 2018-04-14 PROCEDURE — 63600175 PHARM REV CODE 636 W HCPCS: Performed by: HOSPITALIST

## 2018-04-14 PROCEDURE — 25000003 PHARM REV CODE 250: Performed by: HOSPITALIST

## 2018-04-14 RX ADMIN — Medication 50 MG: at 08:04

## 2018-04-14 RX ADMIN — LIDOCAINE 1 PATCH: 50 PATCH TOPICAL at 12:04

## 2018-04-14 RX ADMIN — RAMELTEON 8 MG: 8 TABLET, FILM COATED ORAL at 08:04

## 2018-04-14 RX ADMIN — HEPARIN SODIUM 5000 UNITS: 5000 INJECTION, SOLUTION INTRAVENOUS; SUBCUTANEOUS at 08:04

## 2018-04-14 RX ADMIN — HEPARIN SODIUM 5000 UNITS: 5000 INJECTION, SOLUTION INTRAVENOUS; SUBCUTANEOUS at 02:04

## 2018-04-14 RX ADMIN — HEPARIN SODIUM 5000 UNITS: 5000 INJECTION, SOLUTION INTRAVENOUS; SUBCUTANEOUS at 05:04

## 2018-04-14 RX ADMIN — GABAPENTIN 300 MG: 300 CAPSULE ORAL at 08:04

## 2018-04-14 RX ADMIN — CARVEDILOL 25 MG: 25 TABLET, FILM COATED ORAL at 08:04

## 2018-04-14 RX ADMIN — TRAZODONE HYDROCHLORIDE 100 MG: 50 TABLET ORAL at 08:04

## 2018-04-14 RX ADMIN — LISINOPRIL 15 MG: 10 TABLET ORAL at 08:04

## 2018-04-14 RX ADMIN — SULFAMETHOXAZOLE AND TRIMETHOPRIM 1 TABLET: 800; 160 TABLET ORAL at 08:04

## 2018-04-14 RX ADMIN — AMLODIPINE BESYLATE 10 MG: 10 TABLET ORAL at 08:04

## 2018-04-14 RX ADMIN — OXYCODONE AND ACETAMINOPHEN 1 TABLET: 10; 325 TABLET ORAL at 08:04

## 2018-04-14 NOTE — PROGRESS NOTES
[facility]  Physical Medicine & Rehab  Progress Note    Patient Name: Mary Carrillo  MRN: [id number]  Patient Class: IP- Rehab   Admission Date: 3/27/2018  Length of Stay: 18 days  Attending Physician: Jack Squires MD  Primary Care Provider: Jose Khan MD    Subjective:     Principal Problem:Osteoarthritis of spine with myelopathy, thoracolumbar region    Interval History 4/14/2018:  Patient is seen for follow-up rehab evaluation and recommendations: No acute events overnight. Some tingling around incision site. I have reviewed the HPI and PMFSH and there are no changes from admission.       Scheduled Medications:    amLODIPine  10 mg Oral Daily    carvedilol  25 mg Oral BID    gabapentin  300 mg Oral TID    heparin (porcine)  5,000 Units Subcutaneous Q8H    isoniazid  900 mg Oral Every Tues    isoniazid  900 mg Oral Every Fri    lidocaine  1 patch Transdermal Q24H    lisinopril  15 mg Oral Daily    polyethylene glycol  17 g Oral Daily    pyridoxine (vitamin B6)  50 mg Oral Daily    ramelteon  8 mg Oral QHS    rifAMpin  600 mg Oral Every Tues    rifAMpin  600 mg Oral Every Fri    senna-docusate 8.6-50 mg  2 tablet Oral Daily    sulfamethoxazole-trimethoprim 800-160mg  1 tablet Oral BID    traZODone  100 mg Oral QHS       Diagnostic Results: Labs: Reviewed  X-Ray: Reviewed    PRN Medications: acetaminophen, albuterol sulfate, bisacodyl, calcium carbonate, hydrOXYzine pamoate, magnesium hydroxide 400 mg/5 ml, methocarbamol, ondansetron, oxyCODONE-acetaminophen, senna, simethicone    Review of Systems   Constitutional: Positive for activity change. Negative for chills, fatigue and fever.   HENT: Negative for drooling, hearing loss, trouble swallowing and voice change.    Eyes: Negative for pain and visual disturbance.   Respiratory: Negative for cough, shortness of breath and wheezing.    Cardiovascular: Negative for chest pain and palpitations.   Gastrointestinal: Negative  for abdominal distention, nausea and vomiting.   Genitourinary: Negative for difficulty urinating and flank pain.   Musculoskeletal: Positive for arthralgias, back pain and gait problem. Negative for neck pain.   Skin: Positive for wound. Negative for rash.   Neurological: Positive for weakness. Negative for dizziness, numbness and headaches.   Psychiatric/Behavioral: Negative for agitation and hallucinations. The patient is not nervous/anxious.      Objective:     Vital Signs (Most Recent):  Temp: 98.5 °F (36.9 °C) (18)  Pulse: 70 (18 0820)  Resp: 17 (18)  BP: (!) 126/59 (18 08)  SpO2: (!) 93 % (18)    Vital Signs (24h Range):  Temp:  [98.5 °F (36.9 °C)] 98.5 °F (36.9 °C)  Pulse:  [66-70] 70  Resp:  [17] 17  SpO2:  [93 %] 93 %  BP: (126-159)/(59-69) 126/59     Physical Exam   Constitutional: She is oriented to person, place, and time. She appears well-developed and well-nourished. No distress.       HENT:   Head: Normocephalic and atraumatic.   Nose: Nose normal.   Eyes: Right eye exhibits no discharge. Left eye exhibits no discharge. No scleral icterus.   Neck: Normal range of motion.   Cardiovascular: Normal rate, regular rhythm and intact distal pulses.    Pulmonary/Chest: Effort normal. No respiratory distress. She has no wheezes.   Abdominal: Soft. She exhibits no distension. There is no tenderness.   Musculoskeletal: She exhibits no edema or tenderness.        Right ankle: She exhibits decreased range of motion (foot drop).   TLSO brace intact  No pain with internal or external rotation of right hip  +TTP GTB, reproduces hip pain   Neurological: She is alert and oriented to person, place, and time. She exhibits normal muscle tone.   -  Mental Status:  AAOx3.  Follows commands.  Answers correct age and .  Recent and remote memory intact.  -  Speech and language:  no aphasia or dysarthria.    -  Vision:  no hemianopsia or ptosis.    -  Facial movement (CN VII):  symmetrical.   -  Motor:  RUE: 4/5.  LUE: 4/5.  RLE:  Hip Flex 2/5, Knee Ext/Flex 3-/5,  DF 1/5, PF 4-/5.  LLE:  Hip Flex 2+/5, Knee Ext/Flex 4-/5,  DF 4/5, PF 4/5.  -  Sensory:  Intact to light touch and pin prick.   Skin: Skin is warm and dry. No rash noted.   Proximal incision site dehisced. No drainage.    Psychiatric: She has a normal mood and affect. Her behavior is normal.   Vitals reviewed.    NEUROLOGICAL EXAMINATION:     MENTAL STATUS   Oriented to person, place, and time.       Assessment/Plan:      Mary Carrillo is a 63 y.o. female admitted to inpatient rehabilitation on 3/27/2018 for Osteoarthritis of spine with myelopathy, thoracolumbar region with impaired mobility and ADLs. Patient remains appropriate for PT, OT, and as required Speech therapy. Patient continues to require 24 hour nursing care as well as daily Physician assessment.    * Osteoarthritis of spine with myelopathy, thoracolumbar region    -  MRI T-spine revealed vertebral osteomyelitis/discitis T12-L1 with severe compression and effacement of the ventral and dorsal subarachnoid spaces  -  S/p T12-L1 corpectomy with T10-L3 fusion on 3/18/18  -  PT/OT evaluate and treat  -  Pain management  -  Monitor for bowel and bladder dysfunction  -  Monitor for spasticity  -  Monitor for and prevent skin breakdown and pressure ulcers  Early mobility, repositioning/weight shifting every 20-30 minutes when sitting, turn patient every 2 hours, proper mattress/overlay and chair cushioning, pressure relief/heel protector boots  -  DVT prophylaxis:  on SQH        Hyperkalemia    4/13 K 5.2  4/14 K 5.2, stable        Decreased calculated GFR    4/12 gfr <60, repeat BMP tomorrow and encourage po fluids  4/13 BMP pending. If gfr still reduced, will give IV fluids.  4/14 improving gfr        Right hip pain    Xray reviewed. Pain stable.   4/11 lidocaine patch for right hip  4/12 improvement. Tenderness to palpation of Right GTB, consistent with greater  trochanteric bursitis.        Difficulty sleeping    4/9 trazodone at 100. Change ramelteon from prn to scheduled. Cont prn atarax        Alteration in skin integrity related to surgical incision    4/9 increased drainage. Will alert WC. Cont wound care to site. Start bactrim per neurosurgery recs.  4/10 Neurosurgery ordered CT T, L spine to evaluate infection. Imaging scheduled for 4/11.  4/11 no signs of infection of fluid collection        Dehiscence of operative wound    Noted on 3/31. No signs of infection. Culture sent and Bacitracin ointment started. Continue to monitor    4/1 - CBC sent to monitor WBC. Will consult wound care  4/2 WBC 11, stable  4/3 Staples removed  4/4 no drainage early morning, some reported late morning so ESR, CRP, CBC ordered. Cont wound care.  4/5 ESR, CRP elevated, but WBC within normal limits. Augmentin started. Incision improving.    4/7 covered w mepilex, c/d         Acute blood loss as cause of postoperative anemia    Continue to monitor H/H. Previous transfusions performed    4/2 H/H improved, 8.5/28.4    Lab Results   Component Value Date    WBC 10.39 04/05/2018    HGB 8.6 (L) 04/05/2018    HCT 28.2 (L) 04/05/2018    MCV 92 04/05/2018     04/05/2018               HTN (hypertension), benign    Continue Coreg and Amlodipine    3/29 - Lisinopril 10mg daily  3/30 - improved  4/2 - sbp 130s-160. Lisinopril increased to 20  4/3  this morning, improved  4/4 dec lisinopril back to 10 for low DBP  4/6 Lisinopril increased to 15mg   4/7 AM BP elevated, requested repeat which is pending. WIll cont to f/u.  4/8 Repeat yesterday afternoon was improved. Will cont w current regimen - may benefit from spacing out meds for smoother ocntrol  4/9 Inc lisinopril to 20  4/10 Cont to monitor.   4/12 cont to monitor. Cont lisinopril 15  4/14 improving  Vitals:    04/12/18 1915 04/13/18 0710 04/13/18 1935 04/14/18 0820   BP: (!) 131/58 (!) 158/68 (!) 159/69 (!) 126/59   BP Location:  Right arm Right arm Left arm    Patient Position: Lying Sitting Lying    Pulse: 72 71 66 70   Resp: 18 18 17    Temp: 98.9 °F (37.2 °C) 98.4 °F (36.9 °C) 98.5 °F (36.9 °C)    TempSrc: Oral Oral Oral    SpO2: 96% (!) 94% (!) 93%    Weight:       Height:                 Sarcoidosis    Currently holding Methotrexate due to TB            DISCHARGE PLANNING:  Tentative Discharge Date: 4/18/2018    Future Appointments  Date Time Provider Department Center   4/25/2018 10:00 AM LAB, [facility] STA LAB [facility]   5/4/2018 9:00 AM Andi Swain DO [facility] NEUROS7 [facility]       Edith Quiles MD  Department of Physical Medicine & Rehab   [facility]

## 2018-04-14 NOTE — SUBJECTIVE & OBJECTIVE
Interval History 4/14/2018:  Patient is seen for follow-up rehab evaluation and recommendations: No acute events overnight. Some tingling around incision site. I have reviewed the HPI and PMFSH and there are no changes from admission.       Scheduled Medications:    amLODIPine  10 mg Oral Daily    carvedilol  25 mg Oral BID    gabapentin  300 mg Oral TID    heparin (porcine)  5,000 Units Subcutaneous Q8H    isoniazid  900 mg Oral Every Tues    isoniazid  900 mg Oral Every Fri    lidocaine  1 patch Transdermal Q24H    lisinopril  15 mg Oral Daily    polyethylene glycol  17 g Oral Daily    pyridoxine (vitamin B6)  50 mg Oral Daily    ramelteon  8 mg Oral QHS    rifAMpin  600 mg Oral Every Tues    rifAMpin  600 mg Oral Every Fri    senna-docusate 8.6-50 mg  2 tablet Oral Daily    sulfamethoxazole-trimethoprim 800-160mg  1 tablet Oral BID    traZODone  100 mg Oral QHS       Diagnostic Results: Labs: Reviewed  X-Ray: Reviewed    PRN Medications: acetaminophen, albuterol sulfate, bisacodyl, calcium carbonate, hydrOXYzine pamoate, magnesium hydroxide 400 mg/5 ml, methocarbamol, ondansetron, oxyCODONE-acetaminophen, senna, simethicone    Review of Systems   Constitutional: Positive for activity change. Negative for chills, fatigue and fever.   HENT: Negative for drooling, hearing loss, trouble swallowing and voice change.    Eyes: Negative for pain and visual disturbance.   Respiratory: Negative for cough, shortness of breath and wheezing.    Cardiovascular: Negative for chest pain and palpitations.   Gastrointestinal: Negative for abdominal distention, nausea and vomiting.   Genitourinary: Negative for difficulty urinating and flank pain.   Musculoskeletal: Positive for arthralgias, back pain and gait problem. Negative for neck pain.   Skin: Positive for wound. Negative for rash.   Neurological: Positive for weakness. Negative for dizziness, numbness and headaches.   Psychiatric/Behavioral: Negative for  agitation and hallucinations. The patient is not nervous/anxious.      Objective:     Vital Signs (Most Recent):  Temp: 98.5 °F (36.9 °C) (18)  Pulse: 70 (18)  Resp: 17 (18)  BP: (!) 126/59 (18 0820)  SpO2: (!) 93 % (18)    Vital Signs (24h Range):  Temp:  [98.5 °F (36.9 °C)] 98.5 °F (36.9 °C)  Pulse:  [66-70] 70  Resp:  [17] 17  SpO2:  [93 %] 93 %  BP: (126-159)/(59-69) 126/59     Physical Exam   Constitutional: She is oriented to person, place, and time. She appears well-developed and well-nourished. No distress.       HENT:   Head: Normocephalic and atraumatic.   Nose: Nose normal.   Eyes: Right eye exhibits no discharge. Left eye exhibits no discharge. No scleral icterus.   Neck: Normal range of motion.   Cardiovascular: Normal rate, regular rhythm and intact distal pulses.    Pulmonary/Chest: Effort normal. No respiratory distress. She has no wheezes.   Abdominal: Soft. She exhibits no distension. There is no tenderness.   Musculoskeletal: She exhibits no edema or tenderness.        Right ankle: She exhibits decreased range of motion (foot drop).   TLSO brace intact  No pain with internal or external rotation of right hip  +TTP GTB, reproduces hip pain   Neurological: She is alert and oriented to person, place, and time. She exhibits normal muscle tone.   -  Mental Status:  AAOx3.  Follows commands.  Answers correct age and .  Recent and remote memory intact.  -  Speech and language:  no aphasia or dysarthria.    -  Vision:  no hemianopsia or ptosis.    -  Facial movement (CN VII): symmetrical.   -  Motor:  RUE: 4/5.  LUE: 4/5.  RLE:  Hip Flex 2/5, Knee Ext/Flex 3-/5,  DF 1/5, PF 4-/5.  LLE:  Hip Flex 2+/5, Knee Ext/Flex 4-/5,  DF 4/5, PF 4/5.  -  Sensory:  Intact to light touch and pin prick.   Skin: Skin is warm and dry. No rash noted.   Proximal incision site dehisced. No drainage.    Psychiatric: She has a normal mood and affect. Her behavior is normal.    Vitals reviewed.    NEUROLOGICAL EXAMINATION:     MENTAL STATUS   Oriented to person, place, and time.

## 2018-04-14 NOTE — ASSESSMENT & PLAN NOTE
4/12 gfr <60, repeat BMP tomorrow and encourage po fluids  4/13 BMP pending. If gfr still reduced, will give IV fluids.  4/14 improving gfr

## 2018-04-14 NOTE — PLAN OF CARE
Problem: Patient Care Overview  Goal: Plan of Care Review  Outcome: Ongoing (interventions implemented as appropriate)  Frequent rounds to assess pain & safety.    Problem: Fall Risk (Adult)  Goal: Absence of Falls  Patient will demonstrate the desired outcomes by discharge/transition of care.   Outcome: Ongoing (interventions implemented as appropriate)  Patient remains free of fall or injury this shift, safety measures maintained.    Problem: Pain, Acute (Adult)  Goal: Acceptable Pain Control/Comfort Level  Patient will demonstrate the desired outcomes by discharge/transition of care.   Outcome: Ongoing (interventions implemented as appropriate)  Pain is being managed by PRN meds    Problem: Skin Integrity Impairment, Risk/Actual (Adult)  Goal: Skin Integrity/Wound Healing  Patient will demonstrate the desired outcomes by discharge/transition of care.   Outcome: Ongoing (interventions implemented as appropriate)  No drainage noted to back wound    Problem: Pressure Ulcer Risk (Bryant Scale) (Adult,Obstetrics,Pediatric)  Goal: Skin Integrity  Patient will demonstrate the desired outcomes by discharge/transition of care.   Outcome: Ongoing (interventions implemented as appropriate)  No new skin breakdown noted this, assistance is provided to turn & reposition often.

## 2018-04-14 NOTE — ASSESSMENT & PLAN NOTE
Continue Coreg and Amlodipine    3/29 - Lisinopril 10mg daily  3/30 - improved  4/2 - sbp 130s-160. Lisinopril increased to 20  4/3  this morning, improved  4/4 dec lisinopril back to 10 for low DBP  4/6 Lisinopril increased to 15mg   4/7 AM BP elevated, requested repeat which is pending. WIll cont to f/u.  4/8 Repeat yesterday afternoon was improved. Will cont w current regimen - may benefit from spacing out meds for smoother ocntrol  4/9 Inc lisinopril to 20  4/10 Cont to monitor.   4/12 cont to monitor. Cont lisinopril 15  4/14 improving  Vitals:    04/12/18 1915 04/13/18 0710 04/13/18 1935 04/14/18 0820   BP: (!) 131/58 (!) 158/68 (!) 159/69 (!) 126/59   BP Location: Right arm Right arm Left arm    Patient Position: Lying Sitting Lying    Pulse: 72 71 66 70   Resp: 18 18 17    Temp: 98.9 °F (37.2 °C) 98.4 °F (36.9 °C) 98.5 °F (36.9 °C)    TempSrc: Oral Oral Oral    SpO2: 96% (!) 94% (!) 93%    Weight:       Height:

## 2018-04-15 PROCEDURE — 99232 SBSQ HOSP IP/OBS MODERATE 35: CPT | Mod: ,,, | Performed by: PHYSICAL MEDICINE & REHABILITATION

## 2018-04-15 PROCEDURE — 25000003 PHARM REV CODE 250: Performed by: HOSPITALIST

## 2018-04-15 PROCEDURE — 11800000 HC REHAB PRIVATE ROOM

## 2018-04-15 PROCEDURE — 25000003 PHARM REV CODE 250: Performed by: PHYSICAL MEDICINE & REHABILITATION

## 2018-04-15 PROCEDURE — 63600175 PHARM REV CODE 636 W HCPCS: Performed by: HOSPITALIST

## 2018-04-15 RX ADMIN — SULFAMETHOXAZOLE AND TRIMETHOPRIM 1 TABLET: 800; 160 TABLET ORAL at 09:04

## 2018-04-15 RX ADMIN — CARVEDILOL 25 MG: 25 TABLET, FILM COATED ORAL at 07:04

## 2018-04-15 RX ADMIN — CARVEDILOL 25 MG: 25 TABLET, FILM COATED ORAL at 08:04

## 2018-04-15 RX ADMIN — TRAZODONE HYDROCHLORIDE 100 MG: 50 TABLET ORAL at 09:04

## 2018-04-15 RX ADMIN — Medication 50 MG: at 07:04

## 2018-04-15 RX ADMIN — HEPARIN SODIUM 5000 UNITS: 5000 INJECTION, SOLUTION INTRAVENOUS; SUBCUTANEOUS at 09:04

## 2018-04-15 RX ADMIN — LIDOCAINE 1 PATCH: 50 PATCH TOPICAL at 02:04

## 2018-04-15 RX ADMIN — AMLODIPINE BESYLATE 10 MG: 10 TABLET ORAL at 07:04

## 2018-04-15 RX ADMIN — SULFAMETHOXAZOLE AND TRIMETHOPRIM 1 TABLET: 800; 160 TABLET ORAL at 08:04

## 2018-04-15 RX ADMIN — HEPARIN SODIUM 5000 UNITS: 5000 INJECTION, SOLUTION INTRAVENOUS; SUBCUTANEOUS at 05:04

## 2018-04-15 RX ADMIN — RAMELTEON 8 MG: 8 TABLET, FILM COATED ORAL at 09:04

## 2018-04-15 RX ADMIN — LISINOPRIL 15 MG: 10 TABLET ORAL at 07:04

## 2018-04-15 RX ADMIN — HEPARIN SODIUM 5000 UNITS: 5000 INJECTION, SOLUTION INTRAVENOUS; SUBCUTANEOUS at 02:04

## 2018-04-15 RX ADMIN — OXYCODONE AND ACETAMINOPHEN 1 TABLET: 10; 325 TABLET ORAL at 07:04

## 2018-04-15 NOTE — ASSESSMENT & PLAN NOTE
Continue Coreg and Amlodipine    3/29 - Lisinopril 10mg daily  3/30 - improved  4/2 - sbp 130s-160. Lisinopril increased to 20  4/3  this morning, improved  4/4 dec lisinopril back to 10 for low DBP  4/6 Lisinopril increased to 15mg   4/7 AM BP elevated, requested repeat which is pending. WIll cont to f/u.  4/8 Repeat yesterday afternoon was improved. Will cont w current regimen - may benefit from spacing out meds for smoother ocntrol  4/9 Inc lisinopril to 20  4/10 Cont to monitor.   4/12 cont to monitor. Cont lisinopril 15  4/14 improving  4/15 stable  Vitals:    04/14/18 0820 04/14/18 1835 04/15/18 0756 04/15/18 0757   BP: (!) 126/59 131/60 (!) 126/59 (!) 126/59   BP Location: Left arm Left arm     Patient Position: Sitting Lying     Pulse: 70 79  66   Resp: 17 16     Temp: 98.3 °F (36.8 °C) 99.1 °F (37.3 °C)     TempSrc: Oral Oral     SpO2: 97% 97%     Weight:       Height:

## 2018-04-15 NOTE — SUBJECTIVE & OBJECTIVE
Interval History 4/15/2018:  Patient is seen for follow-up rehab evaluation and recommendations: No acute events overnight. No complaints this morning. [facility] and [facility] are no changes from [facility].       Scheduled Medications:    amLODIPine  10 mg Oral Daily    carvedilol  25 mg Oral BID    gabapentin  300 mg Oral TID    heparin (porcine)  5,000 Units Subcutaneous Q8H    isoniazid  900 mg Oral Every Tues    isoniazid  900 mg Oral Every Fri    lidocaine  1 patch Transdermal Q24H    lisinopril  15 mg Oral Daily    polyethylene glycol  17 g Oral Daily    pyridoxine (vitamin B6)  50 mg Oral Daily    ramelteon  8 mg Oral QHS    rifAMpin  600 mg Oral Every Tues    rifAMpin  600 mg Oral Every Fri    senna-docusate 8.6-50 mg  2 tablet Oral Daily    sulfamethoxazole-trimethoprim 800-160mg  1 tablet Oral BID    traZODone  100 mg Oral QHS       Diagnostic Results: Labs: Reviewed  X-Ray: Reviewed    PRN Medications: acetaminophen, albuterol sulfate, bisacodyl, calcium carbonate, hydrOXYzine pamoate, magnesium hydroxide 400 mg/5 ml, methocarbamol, ondansetron, oxyCODONE-acetaminophen, senna, simethicone    Review of Systems   Constitutional: Positive for activity change. Negative for chills, fatigue and fever.   HENT: Negative for drooling, hearing loss, trouble swallowing and voice change.    Eyes: Negative for pain and visual disturbance.   Respiratory: Negative for cough, shortness of breath and wheezing.    Cardiovascular: Negative for chest pain and palpitations.   Gastrointestinal: Negative for abdominal distention, nausea and vomiting.   Genitourinary: Negative for difficulty urinating and flank pain.   Musculoskeletal: Positive for arthralgias, back pain and gait problem. Negative for neck pain.   Skin: Positive for wound. Negative for rash.   Neurological: Positive for weakness. Negative for dizziness, numbness and headaches.   Psychiatric/Behavioral: Negative for agitation  and hallucinations. The patient is not nervous/anxious.      Objective:     Vital Signs (Most Recent):  Temp: 99.1 °F (37.3 °C) (18 1835)  Pulse: 66 (04/15/18 0757)  Resp: 16 (18)  BP: (!) 126/59 (04/15/18 0757)  SpO2: 97 % (18)    Vital Signs (24h Range):  Temp:  [99.1 °F (37.3 °C)] 99.1 °F (37.3 °C)  Pulse:  [66-79] 66  Resp:  [16] 16  SpO2:  [97 %] 97 %  BP: (126-131)/(59-60) 126/59     Physical Exam   Constitutional: She is oriented to person, place, and time. She appears well-developed and well-nourished. No distress.       HENT:   Head: Normocephalic and atraumatic.   Nose: Nose normal.   Eyes: Right eye exhibits no discharge. Left eye exhibits no discharge. No scleral icterus.   Neck: Normal range of motion.   Cardiovascular: Normal rate, regular rhythm and intact distal pulses.    Pulmonary/Chest: Effort normal. No respiratory distress. She has no wheezes.   Abdominal: Soft. She exhibits no distension. There is no tenderness.   Musculoskeletal: She exhibits no edema or tenderness.        Right ankle: She exhibits decreased range of motion (foot drop).   TLSO brace intact  No pain with internal or external rotation of right hip  +TTP GTB, reproduces hip pain   Neurological: She is alert and oriented to person, place, and time. She exhibits normal muscle tone.   -  Mental Status:  AAOx3.  Follows commands.  Answers correct age and .  Recent and remote memory intact.  -  Speech and language:  no aphasia or dysarthria.    -  Vision:  no hemianopsia or ptosis.    -  Facial movement (CN VII): symmetrical.   -  Motor:  RUE: 4/5.  LUE: 4/5.  RLE:  Hip Flex 2/5, Knee Ext/Flex 3-/5,  DF 1/5, PF 4-/5.  LLE:  Hip Flex 2+/5, Knee Ext/Flex 4-/5,  DF 4/5, PF 4/5.  -  Sensory:  Intact to light touch and pin prick.   Skin: Skin is warm and dry. No rash noted.   Proximal incision site dehisced. No drainage.    Psychiatric: She has a normal mood and affect. Her behavior is normal.   Vitals  reviewed.    NEUROLOGICAL EXAMINATION:     MENTAL STATUS   Oriented to person, place, and time.

## 2018-04-15 NOTE — PROGRESS NOTES
[facility]  Physical Medicine & Rehab  Progress Note    Patient Name: Mary Carrillo  MRN: [id number]  Patient Class: IP- Rehab   Admission Date: 3/27/2018  Length of Stay: 19 days  Attending Physician: Jack Squires MD  Primary Care Provider: Jose Khan MD    Subjective:     Principal Problem:Osteoarthritis of spine with myelopathy, thoracolumbar region    Interval History 4/15/2018:  Patient is seen for follow-up rehab evaluation and recommendations: No acute events overnight. No complaints this morning. I have reviewed the HPI and PMFSH and there are no changes from admission.       Scheduled Medications:    amLODIPine  10 mg Oral Daily    carvedilol  25 mg Oral BID    gabapentin  300 mg Oral TID    heparin (porcine)  5,000 Units Subcutaneous Q8H    isoniazid  900 mg Oral Every Tues    isoniazid  900 mg Oral Every Fri    lidocaine  1 patch Transdermal Q24H    lisinopril  15 mg Oral Daily    polyethylene glycol  17 g Oral Daily    pyridoxine (vitamin B6)  50 mg Oral Daily    ramelteon  8 mg Oral QHS    rifAMpin  600 mg Oral Every Tues    rifAMpin  600 mg Oral Every Fri    senna-docusate 8.6-50 mg  2 tablet Oral Daily    sulfamethoxazole-trimethoprim 800-160mg  1 tablet Oral BID    traZODone  100 mg Oral QHS       Diagnostic Results: Labs: Reviewed  X-Ray: Reviewed    PRN Medications: acetaminophen, albuterol sulfate, bisacodyl, calcium carbonate, hydrOXYzine pamoate, magnesium hydroxide 400 mg/5 ml, methocarbamol, ondansetron, oxyCODONE-acetaminophen, senna, simethicone    Review of Systems   Constitutional: Positive for activity change. Negative for chills, fatigue and fever.   HENT: Negative for drooling, hearing loss, trouble swallowing and voice change.    Eyes: Negative for pain and visual disturbance.   Respiratory: Negative for cough, shortness of breath and wheezing.    Cardiovascular: Negative for chest pain and palpitations.   Gastrointestinal: Negative for  abdominal distention, nausea and vomiting.   Genitourinary: Negative for difficulty urinating and flank pain.   Musculoskeletal: Positive for arthralgias, back pain and gait problem. Negative for neck pain.   Skin: Positive for wound. Negative for rash.   Neurological: Positive for weakness. Negative for dizziness, numbness and headaches.   Psychiatric/Behavioral: Negative for agitation and hallucinations. The patient is not nervous/anxious.      Objective:     Vital Signs (Most Recent):  Temp: 99.1 °F (37.3 °C) (18)  Pulse: 66 (04/15/18 0757)  Resp: 16 (18)  BP: (!) 126/59 (04/15/18 0757)  SpO2: 97 % (18)    Vital Signs (24h Range):  Temp:  [99.1 °F (37.3 °C)] 99.1 °F (37.3 °C)  Pulse:  [66-79] 66  Resp:  [16] 16  SpO2:  [97 %] 97 %  BP: (126-131)/(59-60) 126/59     Physical Exam   Constitutional: She is oriented to person, place, and time. She appears well-developed and well-nourished. No distress.       HENT:   Head: Normocephalic and atraumatic.   Nose: Nose normal.   Eyes: Right eye exhibits no discharge. Left eye exhibits no discharge. No scleral icterus.   Neck: Normal range of motion.   Cardiovascular: Normal rate, regular rhythm and intact distal pulses.    Pulmonary/Chest: Effort normal. No respiratory distress. She has no wheezes.   Abdominal: Soft. She exhibits no distension. There is no tenderness.   Musculoskeletal: She exhibits no edema or tenderness.        Right ankle: She exhibits decreased range of motion (foot drop).   TLSO brace intact  No pain with internal or external rotation of right hip  +TTP GTB, reproduces hip pain   Neurological: She is alert and oriented to person, place, and time. She exhibits normal muscle tone.   -  Mental Status:  AAOx3.  Follows commands.  Answers correct age and .  Recent and remote memory intact.  -  Speech and language:  no aphasia or dysarthria.    -  Vision:  no hemianopsia or ptosis.    -  Facial movement (CN VII):  symmetrical.   -  Motor:  RUE: 4/5.  LUE: 4/5.  RLE:  Hip Flex 2/5, Knee Ext/Flex 3-/5,  DF 1/5, PF 4-/5.  LLE:  Hip Flex 2+/5, Knee Ext/Flex 4-/5,  DF 4/5, PF 4/5.  -  Sensory:  Intact to light touch and pin prick.   Skin: Skin is warm and dry. No rash noted.   Proximal incision site dehisced. No drainage.    Psychiatric: She has a normal mood and affect. Her behavior is normal.   Vitals reviewed.    NEUROLOGICAL EXAMINATION:     MENTAL STATUS   Oriented to person, place, and time.       Assessment/Plan:      Mary Carrillo is a 63 y.o. female admitted to inpatient rehabilitation on 3/27/2018 for Osteoarthritis of spine with myelopathy, thoracolumbar region with impaired mobility and ADLs. Patient remains appropriate for PT, OT, and as required Speech therapy. Patient continues to require 24 hour nursing care as well as daily Physician assessment.    * Osteoarthritis of spine with myelopathy, thoracolumbar region    -  MRI T-spine revealed vertebral osteomyelitis/discitis T12-L1 with severe compression and effacement of the ventral and dorsal subarachnoid spaces  -  S/p T12-L1 corpectomy with T10-L3 fusion on 3/18/18  -  PT/OT evaluate and treat  -  Pain management  -  Monitor for bowel and bladder dysfunction  -  Monitor for spasticity  -  Monitor for and prevent skin breakdown and pressure ulcers  Early mobility, repositioning/weight shifting every 20-30 minutes when sitting, turn patient every 2 hours, proper mattress/overlay and chair cushioning, pressure relief/heel protector boots  -  DVT prophylaxis:  on SQH        Hyperkalemia    4/13 K 5.2  4/14 K 5.2, stable        Decreased calculated GFR    4/12 gfr <60, repeat BMP tomorrow and encourage po fluids  4/13 BMP pending. If gfr still reduced, will give IV fluids.  4/14 improving gfr        Right hip pain    Xray reviewed. Pain stable.   4/11 lidocaine patch for right hip  4/12 improvement. Tenderness to palpation of Right GTB, consistent with greater  trochanteric bursitis.        Difficulty sleeping    4/9 trazodone at 100. Change ramelteon from prn to scheduled. Cont prn atarax        Alteration in skin integrity related to surgical incision    4/9 increased drainage. Will alert WC. Cont wound care to site. Start bactrim per neurosurgery recs.  4/10 Neurosurgery ordered CT T, L spine to evaluate infection. Imaging scheduled for 4/11.  4/11 no signs of infection of fluid collection        Dehiscence of operative wound    Noted on 3/31. No signs of infection. Culture sent and Bacitracin ointment started. Continue to monitor    4/1 - CBC sent to monitor WBC. Will consult wound care  4/2 WBC 11, stable  4/3 Staples removed  4/4 no drainage early morning, some reported late morning so ESR, CRP, CBC ordered. Cont wound care.  4/5 ESR, CRP elevated, but WBC within normal limits. Augmentin started. Incision improving.    4/7 covered w mepilex, c/d         Acute blood loss as cause of postoperative anemia    Continue to monitor H/H. Previous transfusions performed    4/2 H/H improved, 8.5/28.4    Lab Results   Component Value Date    WBC 10.39 04/05/2018    HGB 8.6 (L) 04/05/2018    HCT 28.2 (L) 04/05/2018    MCV 92 04/05/2018     04/05/2018               HTN (hypertension), benign    Continue Coreg and Amlodipine    3/29 - Lisinopril 10mg daily  3/30 - improved  4/2 - sbp 130s-160. Lisinopril increased to 20  4/3  this morning, improved  4/4 dec lisinopril back to 10 for low DBP  4/6 Lisinopril increased to 15mg   4/7 AM BP elevated, requested repeat which is pending. WIll cont to f/u.  4/8 Repeat yesterday afternoon was improved. Will cont w current regimen - may benefit from spacing out meds for smoother ocntrol  4/9 Inc lisinopril to 20  4/10 Cont to monitor.   4/12 cont to monitor. Cont lisinopril 15  4/14 improving  4/15 stable  Vitals:    04/14/18 0820 04/14/18 1835 04/15/18 0756 04/15/18 0757   BP: (!) 126/59 131/60 (!) 126/59 (!) 126/59   BP  Location: Left arm Left arm     Patient Position: Sitting Lying     Pulse: 70 79  66   Resp: 17 16     Temp: 98.3 °F (36.8 °C) 99.1 °F (37.3 °C)     TempSrc: Oral Oral     SpO2: 97% 97%     Weight:       Height:                 Sarcoidosis    Currently holding Methotrexate due to TB            DISCHARGE PLANNING:  Tentative Discharge Date: 4/18/2018    Future Appointments  Date Time Provider Department Center   4/25/2018 10:00 AM LAB, [facility] STA LAB Located within [facility]   5/4/2018 9:00 AM Andi Swain DO [facility] NEUROS7 [facility]       Edith Quiles MD  Department of Physical Medicine & Rehab   [facility]

## 2018-04-16 PROBLEM — E87.5 HYPERKALEMIA: Status: ACTIVE | Noted: 2018-04-16

## 2018-04-16 LAB
ANION GAP SERPL CALC-SCNC: 6 MMOL/L
BASOPHILS # BLD AUTO: 0.03 K/UL
BASOPHILS NFR BLD: 0.4 %
BUN SERPL-MCNC: 25 MG/DL
CALCIUM SERPL-MCNC: 8.9 MG/DL
CHLORIDE SERPL-SCNC: 107 MMOL/L
CO2 SERPL-SCNC: 21 MMOL/L
CREAT SERPL-MCNC: 1.2 MG/DL
DIFFERENTIAL METHOD: ABNORMAL
EOSINOPHIL # BLD AUTO: 0.2 K/UL
EOSINOPHIL NFR BLD: 2.7 %
ERYTHROCYTE [DISTWIDTH] IN BLOOD BY AUTOMATED COUNT: 19.6 %
EST. GFR  (AFRICAN AMERICAN): 54.8 ML/MIN/1.73 M^2
EST. GFR  (NON AFRICAN AMERICAN): 47.5 ML/MIN/1.73 M^2
GLUCOSE SERPL-MCNC: 75 MG/DL
HCT VFR BLD AUTO: 27.4 %
HGB BLD-MCNC: 8.5 G/DL
IMM GRANULOCYTES # BLD AUTO: 0.05 K/UL
IMM GRANULOCYTES NFR BLD AUTO: 0.6 %
LYMPHOCYTES # BLD AUTO: 2.2 K/UL
LYMPHOCYTES NFR BLD: 27.1 %
MAGNESIUM SERPL-MCNC: 1.7 MG/DL
MCH RBC QN AUTO: 29.3 PG
MCHC RBC AUTO-ENTMCNC: 31 G/DL
MCV RBC AUTO: 95 FL
MONOCYTES # BLD AUTO: 1.4 K/UL
MONOCYTES NFR BLD: 17.6 %
NEUTROPHILS # BLD AUTO: 4.2 K/UL
NEUTROPHILS NFR BLD: 51.6 %
NRBC BLD-RTO: 0 /100 WBC
PHOSPHATE SERPL-MCNC: 3.5 MG/DL
PLATELET # BLD AUTO: 290 K/UL
PMV BLD AUTO: 10.1 FL
POTASSIUM SERPL-SCNC: 4.4 MMOL/L
RBC # BLD AUTO: 2.9 M/UL
SODIUM SERPL-SCNC: 134 MMOL/L
WBC # BLD AUTO: 8.16 K/UL

## 2018-04-16 PROCEDURE — 36415 COLL VENOUS BLD VENIPUNCTURE: CPT

## 2018-04-16 PROCEDURE — 80048 BASIC METABOLIC PNL TOTAL CA: CPT

## 2018-04-16 PROCEDURE — 99232 SBSQ HOSP IP/OBS MODERATE 35: CPT | Mod: ,,, | Performed by: PHYSICAL MEDICINE & REHABILITATION

## 2018-04-16 PROCEDURE — 97110 THERAPEUTIC EXERCISES: CPT

## 2018-04-16 PROCEDURE — 25000003 PHARM REV CODE 250: Performed by: HOSPITALIST

## 2018-04-16 PROCEDURE — 25000003 PHARM REV CODE 250: Performed by: PHYSICAL MEDICINE & REHABILITATION

## 2018-04-16 PROCEDURE — 83735 ASSAY OF MAGNESIUM: CPT

## 2018-04-16 PROCEDURE — 97150 GROUP THERAPEUTIC PROCEDURES: CPT

## 2018-04-16 PROCEDURE — 63600175 PHARM REV CODE 636 W HCPCS: Performed by: HOSPITALIST

## 2018-04-16 PROCEDURE — 97535 SELF CARE MNGMENT TRAINING: CPT

## 2018-04-16 PROCEDURE — 84100 ASSAY OF PHOSPHORUS: CPT

## 2018-04-16 PROCEDURE — 11800000 HC REHAB PRIVATE ROOM

## 2018-04-16 PROCEDURE — 85025 COMPLETE CBC W/AUTO DIFF WBC: CPT

## 2018-04-16 PROCEDURE — 97116 GAIT TRAINING THERAPY: CPT

## 2018-04-16 PROCEDURE — 97530 THERAPEUTIC ACTIVITIES: CPT

## 2018-04-16 RX ORDER — CYCLOBENZAPRINE HCL 5 MG
5 TABLET ORAL 3 TIMES DAILY PRN
Status: DISCONTINUED | OUTPATIENT
Start: 2018-04-16 | End: 2018-04-18 | Stop reason: HOSPADM

## 2018-04-16 RX ADMIN — OXYCODONE AND ACETAMINOPHEN 1 TABLET: 10; 325 TABLET ORAL at 08:04

## 2018-04-16 RX ADMIN — LIDOCAINE 1 PATCH: 50 PATCH TOPICAL at 12:04

## 2018-04-16 RX ADMIN — CARVEDILOL 25 MG: 25 TABLET, FILM COATED ORAL at 08:04

## 2018-04-16 RX ADMIN — GABAPENTIN 300 MG: 300 CAPSULE ORAL at 08:04

## 2018-04-16 RX ADMIN — AMLODIPINE BESYLATE 10 MG: 10 TABLET ORAL at 08:04

## 2018-04-16 RX ADMIN — RAMELTEON 8 MG: 8 TABLET, FILM COATED ORAL at 08:04

## 2018-04-16 RX ADMIN — HEPARIN SODIUM 5000 UNITS: 5000 INJECTION, SOLUTION INTRAVENOUS; SUBCUTANEOUS at 02:04

## 2018-04-16 RX ADMIN — OXYCODONE AND ACETAMINOPHEN 1 TABLET: 10; 325 TABLET ORAL at 02:04

## 2018-04-16 RX ADMIN — HEPARIN SODIUM 5000 UNITS: 5000 INJECTION, SOLUTION INTRAVENOUS; SUBCUTANEOUS at 05:04

## 2018-04-16 RX ADMIN — HEPARIN SODIUM 5000 UNITS: 5000 INJECTION, SOLUTION INTRAVENOUS; SUBCUTANEOUS at 10:04

## 2018-04-16 RX ADMIN — Medication 50 MG: at 08:04

## 2018-04-16 RX ADMIN — OXYCODONE AND ACETAMINOPHEN 1 TABLET: 10; 325 TABLET ORAL at 01:04

## 2018-04-16 RX ADMIN — SULFAMETHOXAZOLE AND TRIMETHOPRIM 1 TABLET: 800; 160 TABLET ORAL at 08:04

## 2018-04-16 RX ADMIN — TRAZODONE HYDROCHLORIDE 100 MG: 50 TABLET ORAL at 08:04

## 2018-04-16 RX ADMIN — LISINOPRIL 15 MG: 10 TABLET ORAL at 08:04

## 2018-04-16 NOTE — ASSESSMENT & PLAN NOTE
4/12 gfr <60, repeat BMP tomorrow and encourage po fluids  4/13 BMP pending. If gfr still reduced, will give IV fluids.  4/14 improving gfr  4/16 improving, 54.8

## 2018-04-16 NOTE — ASSESSMENT & PLAN NOTE
Continue Coreg and Amlodipine    3/29 - Lisinopril 10mg daily  3/30 - improved  4/2 - sbp 130s-160. Lisinopril increased to 20  4/3  this morning, improved  4/4 dec lisinopril back to 10 for low DBP  4/6 Lisinopril increased to 15mg   4/7 AM BP elevated, requested repeat which is pending. WIll cont to f/u.  4/8 Repeat yesterday afternoon was improved. Will cont w current regimen - may benefit from spacing out meds for smoother ocntrol  4/9 Inc lisinopril to 20  4/10 Cont to monitor.   4/12 cont to monitor. Cont lisinopril 15  4/14 improving  4/15 stable  4/16 stable  Vitals:    04/15/18 0756 04/15/18 0757 04/15/18 2004 04/16/18 0700   BP: (!) 126/59 (!) 126/59 139/63 137/65   BP Location: Left arm  Left arm Right arm   Patient Position: Sitting  Lying Lying   Pulse:  66 78 64   Resp: 17  17 20   Temp: 98.4 °F (36.9 °C)  98.8 °F (37.1 °C) 98.5 °F (36.9 °C)   TempSrc: Oral  Oral Oral   SpO2: 95%  95% 95%   Weight:       Height:

## 2018-04-16 NOTE — SUBJECTIVE & OBJECTIVE
Interval History 4/16/2018:  Patient is seen for follow-up rehab evaluation and recommendations: No acute events overnight. No complaints this morning. [facility] and [facility] are no [facility] from [facility].       Scheduled Medications:    amLODIPine  10 mg Oral Daily    carvedilol  25 mg Oral BID    gabapentin  300 mg Oral TID    heparin (porcine)  5,000 Units Subcutaneous Q8H    isoniazid  900 mg Oral Every Tues    isoniazid  900 mg Oral Every Fri    lidocaine  1 patch Transdermal Q24H    lisinopril  15 mg Oral Daily    polyethylene glycol  17 g Oral Daily    pyridoxine (vitamin B6)  50 mg Oral Daily    ramelteon  8 mg Oral QHS    rifAMpin  600 mg Oral Every Tues    rifAMpin  600 mg Oral Every Fri    senna-docusate 8.6-50 mg  2 tablet Oral Daily    sulfamethoxazole-trimethoprim 800-160mg  1 tablet Oral BID    traZODone  100 mg Oral QHS       Diagnostic Results: Labs: Reviewed  X-Ray: Reviewed    PRN Medications: acetaminophen, albuterol sulfate, bisacodyl, calcium carbonate, hydrOXYzine pamoate, magnesium hydroxide 400 mg/5 ml, methocarbamol, ondansetron, oxyCODONE-acetaminophen, senna, simethicone    Review of Systems   Constitutional: Positive for activity change. Negative for chills, fatigue and fever.   HENT: Negative for drooling, hearing loss, trouble swallowing and voice change.    Eyes: Negative for pain and visual disturbance.   Respiratory: Negative for cough, shortness of breath and wheezing.    Cardiovascular: Negative for chest pain and palpitations.   Gastrointestinal: Negative for abdominal distention, nausea and vomiting.   Genitourinary: Negative for difficulty urinating and flank pain.   Musculoskeletal: Positive for arthralgias, back pain and gait problem. Negative for neck pain.   Skin: Positive for wound. Negative for rash.   Neurological: Positive for weakness. Negative for dizziness, numbness and headaches.   Psychiatric/Behavioral: Negative for agitation  and hallucinations. The patient is not nervous/anxious.      Objective:     Vital Signs (Most Recent):  Temp: 98.5 °F (36.9 °C) (18 0700)  Pulse: 64 (18 0700)  Resp: 20 (18 0700)  BP: 137/65 (18 0700)  SpO2: 95 % (18 0700)    Vital Signs (24h Range):  Temp:  [98.5 °F (36.9 °C)-98.8 °F (37.1 °C)] 98.5 °F (36.9 °C)  Pulse:  [64-78] 64  Resp:  [17-20] 20  SpO2:  [95 %] 95 %  BP: (137-139)/(63-65) 137/65     Physical Exam   Constitutional: She is oriented to person, place, and time. She appears well-developed and well-nourished. No distress.       HENT:   Head: Normocephalic and atraumatic.   Nose: Nose normal.   Eyes: Right eye exhibits no discharge. Left eye exhibits no discharge. No scleral icterus.   Neck: Normal range of motion.   Cardiovascular: Normal rate, regular rhythm and intact distal pulses.    Pulmonary/Chest: Effort normal. No respiratory distress. She has no wheezes.   Abdominal: Soft. She exhibits no distension. There is no tenderness.   Musculoskeletal: She exhibits no edema or tenderness.        Right ankle: She exhibits decreased range of motion (foot drop).   TLSO brace intact  No pain with internal or external rotation of right hip  +TTP GTB, reproduces hip pain   Neurological: She is alert and oriented to person, place, and time. She exhibits normal muscle tone.   -  Mental Status:  AAOx3.  Follows commands.  Answers correct age and .  Recent and remote memory intact.  -  Speech and language:  no aphasia or dysarthria.    -  Vision:  no hemianopsia or ptosis.    -  Facial movement (CN VII): symmetrical.   -  Motor:  RUE: 4/5.  LUE: 4/5.  RLE:  Hip Flex 2/5, Knee Ext/Flex 3-/5,  DF 1/5, PF 4-/5.  LLE:  Hip Flex 2+/5, Knee Ext/Flex 4-/5,  DF 4/5, PF 4/5.  -  Sensory:  Intact to light touch and pin prick.   Skin: Skin is warm and dry. No rash noted.   Proximal incision site dehisced. No drainage.    Psychiatric: She has a normal mood and affect. Her behavior is  normal.   Vitals reviewed.    NEUROLOGICAL EXAMINATION:     MENTAL STATUS   Oriented to person, place, and time.

## 2018-04-16 NOTE — PLAN OF CARE
Problem: Patient Care Overview  Goal: Plan of Care Review  Outcome: Ongoing (interventions implemented as appropriate)  POC reviewed with pt who verbalized understanding. Pt AAOX4.  Remains free of falls and injury. VSS and afebrile.  C/o  pain controlled with PRN medication. Encouraged pt repositioned to help decrease pressure. Pt sleeping throughout the night.  Infection control measures taken, such as hand washing and proper disposal of contaminated materials.Safety rounds maintained according to protocol, environmental consistency maintained. Mary Carrillo agrees to call when assistance is needed per call light which is within reach. No acute events. No distress noted. WCTM.

## 2018-04-16 NOTE — PROGRESS NOTES
"Occupational Therapy   Treatment    Mary Carrillo   MRN: [id number]          04/16/18 1300   OT Time Calculation   OT Start Time 1300   OT Stop Time 1359   OT Total Time (min) 59 min   General   OT Date of Treatment 04/16/18   Family/Caregiver Present No   Patient Found (position) Seated in wheelchair   Pt found with TLSO   Precautions   General Precautions fall   Orthopedic Yes   Required Braces or Orthoses Yes   Spinal Brace TLSO  (maintained all through session)   Visual/Auditory Vision impaired  (glasses)   Subjective   Patient states "I'm tired"   Pain/Comfort   Pain Rating 5/10  (7-8/10 at end of session)   Location back   Pain Addressed Reposition;Distraction;Nurse notified   Pain Comment Pt denied contacting nurse for pain medication prior to session. Nurse contacted at end of session   Mat to Chair   Technique Slide Board   Assistance Stand By Assistance   Assistive Device Slide Board   Trials/Comments close SBA from EOM to chair   Toilet Transfer   Toilet Transfer Technique Slide Board   Toilet Transfer Assistance Contact Guard Assistance   Toilet Transfer Assistive Device drop arm commode;Slide Board   Trials/Comments from wheelchair<>drop arm commode with slide board, from mat >drop arm commode with slide board; from droparm commode to mat with CGA scoot pivot   Feeding   Feeding Level of Assistance Independent   Feeding Where Assessed Wheelchair   Feeding Comments Per pt report, pt able to open all packages. Pt reports she has dentures but they are at home and she hasn't been wearing them.   Grooming   Additional Grooming yes   Grooming Level of Assistance Independent   Hand Washing Level of Assistance Independent   Face Washing Level of Assistance Independent   Oral Hygiene Level of Assistance Independent   Grooming Where Assessed Wheelchair   Grooming Comments Pt performed 3/3 tasks.    Activity Tolerance   Activity Tolerance Patient tolerated treatment well   Medical Staff Made Aware nursing   After " Treatment   Patient Position After Treatment Seated in wheelchair   Patient after treatment left (in gym awaiting PT session )   Assessment   Problem List Decreased Self Care skills;Decreased upper extremity range of motion;Decreased upper extremity strength;Decreased safe judgment during ADL;Decreased cognition;Decreased endurance;Decreased sensation;Decreased functional mobility;Decreased gross motor control;Decreased IADLs;Decreased trunk control for functional activities   Assessment Pt. tolerated treatment well. Pt. with complaint of fatigue during session, but participated with encouragement. Pt. would benefit from continued skilled OT services to maximize safety & I in ADLs.    Level of Motivation/Participation Good   Discharge Recommendations   Equipment Needed After Discharge 3-in-1 commode;bath bench;slide board;hip kit  (drop arm commode)   Discharge Facility/Level Of Care Needs home health OT   Plan   Plan Continue with current plan   Therapy Frequency 2 times/day   Occupational Therapy Follow-up   OT Follow-up? Yes   Treatment/Billable Minutes   Self Care/Home Management 51   Therapeutic Exercise 8   Total Time 59     KD Sullivan   4/16/2018

## 2018-04-16 NOTE — PROGRESS NOTES
Met with patient this AM who states she is doing a little better. Wants to stay longer than Wednesday but knows she will continue therapy with HH after discharge. States she does not want to go to a skilled facility. Daughter Chantal to come for family training on Wednesday before patient goes home. CM will continue to follow.

## 2018-04-16 NOTE — PLAN OF CARE
Problem: Skin Integrity Impairment, Risk/Actual (Adult)  Goal: Skin Integrity/Wound Healing  Patient will demonstrate the desired outcomes by discharge/transition of care.   Outcome: Ongoing (interventions implemented as appropriate)  Daily wound care with sterile normal saline, medi-honey applied to dehisced areas and island dressing applied, aseptic technique, hands washed before after wound care, administer bactrim as ordered, and maintain diet and fluids.

## 2018-04-16 NOTE — PROGRESS NOTES
Physical Therapy   Treatment    Mary Carrillo   MRN: [id number]                04/16/18 1400   PT Time Calculation   PT Start Time 1400   PT Stop Time 1445   PT Total Time (min) 45 min   Treatment   Treatment Type Treatment   PT/PTA PT   General   PT Received On 04/16/18   Family/Caregiver Present No   Patient Found (position) Seated in wheelchair;Other (comment)  (in gym)   Pt found with TLSO  (seat belt)   Precautions   General Precautions fall   Spinal Brace TLSO   Visual/Auditory Vision impaired;Other (see comments)  (glasses)   Subjective   Patient states she still has pain in her mid back region.   Pain/Comfort   Pain Rating 1 7/10   Location - Side 1 Bilateral   Location - Orientation 1 generalized   Location 1 back   Pain Addressed 1 Other (see comments)  (nurse brought pain meds during session)   Pain Rating Post-Intervention 1 5/10   Bed Mobility   Bed Mobility no   Transfers   Transfer yes   Sit to Stand   Sit <> Stand Assistance Minimum Assistance;Moderate Assistance   Sit <> Stand Assistive Device Rolling Walker;Other (see comments)  (parallel bars)   Trials/Comments multiple trials in parallel bars with min A; 3 trials from elevated mat to RW with min/mod A   Stand to Sit   Assistance Minimum Assistance;Moderate Assistance   Assistive Device Rolling Walker;Other (see comments)  (parallel bars)   Trials/Comments multiple trials in parallel bars with min A; 3 trials from RW to w/c with mod A   Chair to Mat   Chair<> Mat Technique Slide Board   Chair<>Mat Assistance Stand By Assistance   Chair <> Mat Assistive Device Slide Board   Trials/Comments 3 trials   Wheelchair Activities   Propulsion Yes   Propulsion Type 1 Manual   Level 1 Level tile   Method 1 Right upper extremity;Left upper extremity   Level of Assistance 1 Modified Independent   Description/ Details 1 200 feet   Gait   Gait Yes   Weight Bearing Status full   Gait 1   Gait Assistive Device Parallel bars   Other Apparatus 1 Wheelchair follow;Other  (Comment)  (by PT tech)   Assistance 1 Contact Guard Assistance;Total assistance;With assist of two  (2nd helper follows with w/c)   Gait Distance pt ambulates length of bars x 4 trials~ seated rest break between trials   Gait Pattern reciprocal   Gait Deviation(s) decreased devon;increased time in double stance;decreased velocity of limb motion;decreased step length;decreased stride length;decreased weight-shifting ability;forward lean   Impairments Contributing to Gait Deviations impaired balance;impaired motor control;impaired postural control;decreased strength;other (see comments)  (decreased endurance)   Gait 2   Surface 2 Level tile   Device 2 Rolling walker   Other Apparatus 2 Wheelchair follow;Other (Comment)  (by PT tech)   Assistance 2 Minimum assistance;Total assistance  (2nd helper follows with w/c)   Quality of Gait 2 similar to above with increased forward lean of trunk   Comments/Distance (ft) 2 pt ambulates 3 trials~ 9, 15 and 14 feet  (seated rest break between trials)   Gait Pattern Used swing-to gait   Gait Deviations Noted other (see comments)  (similar to above)   Impairments Contributing to Gait Deviations other (see comments)  (similar to above)   Stairs   Stairs No   Balance   Balance Yes   Static Standing Balance   Static Standing-Balance Support Right upper extremity supported;Left upper extremity supported   Static Standing-Level of Assistance Contact guard   Static Standing-Comment/# of Minutes pt stands in parallel bars x 3: 07    Activity Tolerance   Activity Tolerance Patient tolerated treatment well   After Treatment   Patient Position After Treatment Seated in wheelchair;Other (comment)  (seat belt and TLSO in place)   Patient after treatment left call button in reach   Assessment   Prognosis Fair   Problem List Decreased strength;Decreased range of motion;Decreased endurance;Impaired balance;Decreased mobility;Obesity;Pain   Session Assessment progressing toward goals    Assessment Pt did well this session, progressing to use of RW for short ambulation trials, as anticipated last week.  Pt still needs encouragement to perform to her fullest potential, but is easy to coax into action.  While pt did progress to RW on this date, future therapists and helpers should perform this activity with care, as pt is still a considerable fall risk due to decreased LE strength.  Pt also demonstrated good performance with slideboard in earlier transfer group.   Level of Motivation/Participation fair/good   Barriers to Discharge Decreased caregiver support   Barriers to Discharge Comments pt will need some assistance at discharge   Discharge Recommendations   Equipment Needed After Discharge 3-in-1 commode;bath bench;wheelchair;slide board;other (see comments)  (drop arm commode)   Discharge Facility/Level Of Care Needs home health PT   Plan   Planned Therapy Intervention Continue with current plan;Bed mobility training;Transfer training;Gait training;Balance Training;ROM;Strengthening;Neuromuscular Re-education;Postural Re-education;Wheelchair Management/Propulsion   Therapy Frequency 2 times/day;daily;Monday-Friday;Saturday or Sunday   Physical Therapy Follow-up   PT Follow-up? Yes   PT - Next Visit Date 04/17/18   Treatment/Billable Minutes   Gait training 25   Therapeutic Activity 20   Total Time 45             Huey Caicedo, PT 4/16/2018

## 2018-04-16 NOTE — PROGRESS NOTES
"Occupational Therapy   Treatment/Reassessment    Mary Carrillo   MRN: [id number]      04/16/18 0939   OT Time Calculation   OT Start Time 0939   OT Stop Time 1016   OT Total Time (min) 37 min   General   OT Date of Treatment 04/16/18   Family/Caregiver Present No   Patient Found (position) Supine in bed   Precautions   General Precautions fall   Orthopedic Yes   Required Braces or Orthoses Yes   Spinal Brace TLSO   Visual/Auditory Vision impaired  (glasses)   Subjective   Patient states "I'm going to need some help with this one"- referring to R shoe   Pain/Comfort   Pain Rating 6/10   Location back   Pain Addressed Pre-medicate for activity;Distraction;Reposition;Nurse notified  (pt reports she received pain meds ~ 1 hour prior)   Bed Mobility   Bed Mobility yes   Rolling/Turning to Left Supervision   Rolling/Turning Left Comments with bed rail    Rolling/Turning Right Supervision   Rolling/Turning Right Comments with bed rail   Supine to Sit Stand by Assistance   Supine to Sit Comments for safety; use of bed rail   Bed to Chair   Bed <> Chair Technique Slide Board   Bed <> Chair Transfer Assistance Contact Guard Assistance   Bed <> Chair Assistive Device Slide Board   Trials/Comments from EOB to wheelchair   Bathing   Bathing Level of Assistance Minimum assistance   Bathing Where Assessed Bed   Bathing Comments assist with B lower legs 2/10 parts; pt able to reach to ankles while in figure 4; unable to reach feet   UE Dressing   UE Dressing Level of Assistance Set-up Assistance   UE Dressing Where Assessed Edge of bed   UE Dressing Comments assist to don TLSO; Supervision for pullover shirt   LE Dressing   LE Dressing Adaptive Equipment Sock aide;Reacher;Shoe horn   LE Dressing  Level of Assistance Moderate assistance   LE Dressing Level of Assistance Stand by assistance   Sock Level of Assistance Stand by assistance   Shoe Level of Assistance Moderate assistance   Adult Briefs Level of Assistance Total assistance "   LE Dressing Where Assessed Edge of bed;Bed level   LE Dressing Comments Pt. able to use reacher to pull pants over BLEs; pt able to roll side to side to pull over hips; encouragement to complete tasks without assistance. Pt. able to use sockaid while sitting EOB to don socks. Pt. used shoe horn to don shoe to L with VC; assist with donning R. Pt.  required total A to don brief   Toileting   Toileting Level of Assistance Moderate assistance   Toileting Where Assessed Bed level   Toileting Comments pt requiring assistance to position bedpan; pt performed 2/3 steps.    Activity Tolerance   Activity Tolerance Patient tolerated treatment well   After Treatment   Patient Position After Treatment Seated in wheelchair  (handed off to tech to bring to group)   Patient after treatment left (direct hand off to PT group)   Assessment   Prognosis Good   Problem List Decreased Self Care skills;Decreased upper extremity range of motion;Decreased upper extremity strength;Decreased safe judgment during ADL;Decreased cognition;Decreased endurance;Decreased sensation;Decreased functional mobility;Decreased gross motor control;Decreased IADLs;Decreased trunk control for functional activities   Assessment Pt tolerated treatment session well. Pt. has made good progress towards goals & has met 5/9 LTGs. Pt. continues to be limited by LE weakness and decreased gross motor control & require assistance for ADL tasks and cues for safety.. Pt. would benefit from continued inpatient OT services to maximize safety & I in ADLs.    Level of Motivation/Participation Good   Long Term Goals   Additional Documentation yes   Pt Will Transfer Bed/Chair With contact guard assistance   Transfer Bed/Chair - Met/Not Met Met   Pt Will Transfer To Bedside Commode With contact guard assist   Transfer Bedside Commode -Met/Not Met Met   Pt Will Transfer To Shower With minimal assist  (dry transfer to TTB)   Transfer to Shower-Met/Not Met Not Met   Pt Will  Perform Eating With modified independence   Eating - Met/Not Met Met   Pt Will Perform Grooming Independently   Grooming - Met/Not Met Met   Pt Will Perform Bathing With minimal assistance   Bathing - Met/Not Met Met   Pt Will Perform UE Dressing With modified independence  (revised to set up for TLSO)   UE Dressing - Met/Not Met Not Met   Pt Will Perform LE Dressing With minimal assistance   LE Dressing - Met/Not Met Not Met   Pt Will Perform Toileting With minimal assistance   Toileting-Met/Not Met Not Met   Discharge Recommendations   Equipment Needed After Discharge 3-in-1 commode;bath bench;wheelchair;slide board  (drop arm)   Discharge Facility/Level Of Care Needs home health OT   Plan   Plan Continue with current plan   Therapy Frequency 2 times/day   Occupational Therapy Follow-up   OT Follow-up? Yes   Treatment/Billable Minutes   Self Care/Home Management 37   Total Time 37       DK Sullivan  4/16/2018

## 2018-04-16 NOTE — PROGRESS NOTES
Physical Therapy   Treatment/Transfer Group    Mary Carrillo   MRN: [id number]                04/16/18 1016   PT Time Calculation   PT Start Time 1016   PT Stop Time 1101   PT Total Time (min) 45 min   Treatment   Treatment Type Treatment;Other (see comments)  (Transfer Group)   PT/PTA PT   General   PT Received On 04/16/18   Family/Caregiver Present No   Patient Found (position) Seated in wheelchair;Other (comment)  (in gym)   Pt found with TLSO  (and slideboard)   Precautions   General Precautions fall   Spinal Brace TLSO   Visual/Auditory Vision impaired   Subjective   Patient states she has some pain to her mid back.   Pain/Comfort   Pain Rating 1 5/10   Location - Side 1 Bilateral   Location - Orientation 1 generalized   Location 1 back   Pain Addressed 1 Pre-medicate for activity;Distraction   Pain Rating Post-Intervention 1 5/10   Activity Tolerance   Activity Tolerance Patient tolerated treatment well   After Treatment   Patient Position After Treatment Seated in wheelchair;Other (comment)  (seat belt and TLSO in place)   Patient after treatment left (pt escorted to her room by PT tech)   Assessment   Prognosis Fair   Plan   Planned Therapy Intervention Continue with current plan   Therapy Frequency 2 times/day;daily;Monday-Friday;Saturday or Sunday   Physical Therapy Follow-up   PT Follow-up? Yes   PT - Next Visit Date 04/16/18   Treatment/Billable Minutes   Therapeutic Activity Group 45   Total Time 45        Objective:  Patient participated in a functional transfer group. The patient completed wheelchair to mat transfer with stand by assistance and use of slideboard( PT sets board for all transfers). Patient completed bed mobility from supine to sitting edge of mat with supervision.  Patient completed toilet transfer with stand by assistance, slideboard and use of drop arm 3 in 1 commode. Patient completed tub transfer with minimal assistance( PT assists with lifting L LE into and out of tub), slideboard and  use of tub bench.  Patient demonstrated appropriate safety awareness with functional transfers. Educated patient on compensatory and adaptive techniques for functional home transfers. Patient demonstrated increased independence with all transfers (or list specific transfers).   Endurance 11 on the RPE scale. No pain complaints voiced or observed.  For Social Interaction: (choose FIM score rating below)FIM=7     §     Interacts appropriately with others - No medications needed. Patient asleep all shift (7)  §     Interacts appropriately with others with medication or extra time(anti-anxiety, antidepressant)  (6)  §     Interacts appropriately 90% of time - needs monitoring or encouragement for participation or interaction  (5)  §     Interacts appropriately 75-89% of time - needs redirection for appropriate language or to initiate interaction  (4)  §     Interacts appropriately 50-74% of time - May be physically or verbally inappropriate   (3)  §     Interacts appropriately 25-49% of time - Needs frequent redirection  (2)  §     Interacts appropriately less than 25% of time - May be withdrawn or combative  (1)   Assessment:  The patient participated in a 45 functional transfer group. The group activity challenged bilateral upper extremity and lower extremity strengthening as well as cardiovascular and functional endurance. By the end of the group session, the patient was able to verbalize and demonstrate proper hand placement and DME management during transfer activities. These activities were performed in a group setting in order to facilitate patient learning by providing education about proper DME management and transfer safety, as well as to work towards patients individual transfer goals.              Huey Caicedo, PT 4/16/2018

## 2018-04-16 NOTE — TREATMENT PLAN
"Rehab Services' DME recommendations  DME to be delivered home unless otherwise specified.         [] NO DME NEEDED ________________________________________________________________________    []Walker:(can only select one of these)  []Adult (5'4"-6'6") []Golden (4'4"-5'7")                           [] Wide/ Heavy Duty         []Erwin                  []  Rollator                                                 [] knee walker       Accessories/Other:____________________________________     Wheels(must complete) [] Yes  [] No     []Crutches:  []Axillary []Loft strand    []Cane:              []Straight []Narrow based quad   []Wide based quad         [] Other:____________________________________________    [x] Transfer Devices:   []Deep Lift    [x]Slide Board ( [] with cut out  []26  [x]29)    ______________________________________________________________________    [x]Wheelchair: (please check)    Number of hours up in wheelchair per     day:___8__________     Style:  [] Standard [] Pediatric  [x] Light weight  []Reclining     []Amputee [] Erwin [] POV []Custom     Custom Vendor:________________________________________                                                      Seat Width: []18 (standard adult)    []16" (small adult)   []14(child)      [x] 20  [] 22 [] 24         Seat Depth:   [x]16    []18  []20      Back Height:    [x]Standard      []Erwin          []Other     Leg Support: []Standard []Heel loops []Elevating leg rest    []Articulating              [x]Swing Away     Arm Height:  []Detachable [x]Full   [] Desk  [x]Swing Away [] Adjustable Height          Lap Belt: []Velcro [x]Buckle                               Accessories: [] Front brakes          [] Brake Extensions  [x]Anti-tippers                          [x]Safety Belt    Other:_______________________________________________     Cushion: [x]Basic []Foam []Gel  []Roho []Raul I      Justification for Cushion(Must complete):_Pt up in chair up to " 8hrs/day________    ______________________________________________________________________        For wheelchair order: (please choose all that apply)  - Caregiver is capable and willing to operate wheelchair safely. [x]  - Patients upper body strength is sufficient for propulsion. [x]   - The patient has a cast, brace, or musculoskeletal condition which prevents 90 degree flexion of the knee. []  - The patient has significant edema of the lower extremities that requires elevating leg rests.  []  - A reclining back is ordered. []  - The patient requires the use of a wheel chair for ADLs within the home. []  - Patient mobility limitations cannot be sufficiently resolved by the use of other ambulatory therapies. []         __    [x]3 in 1 commode: []Standard     []Wide-Heavy Duty           [x]Drop arm                                      []Heavy Duty Drop Arm                              [x]Tub bench:  []Padded      [x](Unpadded=standard)     []Commode Opening                                          [] Heavy Duty    []Shower Chair: []With back   []Without back      []Hospital Bed: []Semi Electric    []Manual    []Electric   []Heavy Duty  []Extra Heavy Duty(>600#)  Patient requires a bed height different than a fixed height hospital bed to permit transfers to chair, wheel chair or standing. []Yes         []N/A     []Accessories: [] Gel Overlay Mattress     []Low Air Mattress   []Alternating Pressure Pad                              []Trapeze    [x] Hip Kit:  [] Short Horn     [x] Long Horn         []Other:________________________________________________________________    ________________________________________________________________________    [x]Home Health:  [x]OT [x]PT []SLP   [] MSW   [] Aide    []SN        []Outpatient:  []OT []PT []SLP [] MSW   [] CM      [] Internal Referral      [] External Referral  ________________________________________________________________________    DK Sullivan 4/16/2018

## 2018-04-16 NOTE — PROGRESS NOTES
[facility]  Physical Medicine & Rehab  Progress Note    Patient Name: Mary Carrillo  MRN: [id number]  Patient Class: IP- Rehab   Admission Date: 3/27/2018  Length of Stay: 20 days  Attending Physician: Jack Squires MD  Primary Care Provider: Jose Khan MD    Subjective:     Principal Problem:Osteoarthritis of spine with myelopathy, thoracolumbar region    Interval History 4/16/2018:  Patient is seen for follow-up rehab evaluation and recommendations: No acute events overnight. No complaints this morning. I have reviewed the HPI and PMFSH and there are no changes from admission.       Scheduled Medications:    amLODIPine  10 mg Oral Daily    carvedilol  25 mg Oral BID    gabapentin  300 mg Oral TID    heparin (porcine)  5,000 Units Subcutaneous Q8H    isoniazid  900 mg Oral Every Tues    isoniazid  900 mg Oral Every Fri    lidocaine  1 patch Transdermal Q24H    lisinopril  15 mg Oral Daily    polyethylene glycol  17 g Oral Daily    pyridoxine (vitamin B6)  50 mg Oral Daily    ramelteon  8 mg Oral QHS    rifAMpin  600 mg Oral Every Tues    rifAMpin  600 mg Oral Every Fri    senna-docusate 8.6-50 mg  2 tablet Oral Daily    sulfamethoxazole-trimethoprim 800-160mg  1 tablet Oral BID    traZODone  100 mg Oral QHS       Diagnostic Results: Labs: Reviewed  X-Ray: Reviewed    PRN Medications: acetaminophen, albuterol sulfate, bisacodyl, calcium carbonate, hydrOXYzine pamoate, magnesium hydroxide 400 mg/5 ml, methocarbamol, ondansetron, oxyCODONE-acetaminophen, senna, simethicone    Review of Systems   Constitutional: Positive for activity change. Negative for chills, fatigue and fever.   HENT: Negative for drooling, hearing loss, trouble swallowing and voice change.    Eyes: Negative for pain and visual disturbance.   Respiratory: Negative for cough, shortness of breath and wheezing.    Cardiovascular: Negative for chest pain and palpitations.   Gastrointestinal: Negative for  abdominal distention, nausea and vomiting.   Genitourinary: Negative for difficulty urinating and flank pain.   Musculoskeletal: Positive for arthralgias, back pain and gait problem. Negative for neck pain.   Skin: Positive for wound. Negative for rash.   Neurological: Positive for weakness. Negative for dizziness, numbness and headaches.   Psychiatric/Behavioral: Negative for agitation and hallucinations. The patient is not nervous/anxious.      Objective:     Vital Signs (Most Recent):  Temp: 98.5 °F (36.9 °C) (18 0700)  Pulse: 64 (18 0700)  Resp: 20 (18 0700)  BP: 137/65 (18 0700)  SpO2: 95 % (18 07)    Vital Signs (24h Range):  Temp:  [98.5 °F (36.9 °C)-98.8 °F (37.1 °C)] 98.5 °F (36.9 °C)  Pulse:  [64-78] 64  Resp:  [17-20] 20  SpO2:  [95 %] 95 %  BP: (137-139)/(63-65) 137/65     Physical Exam   Constitutional: She is oriented to person, place, and time. She appears well-developed and well-nourished. No distress.       HENT:   Head: Normocephalic and atraumatic.   Nose: Nose normal.   Eyes: Right eye exhibits no discharge. Left eye exhibits no discharge. No scleral icterus.   Neck: Normal range of motion.   Cardiovascular: Normal rate, regular rhythm and intact distal pulses.    Pulmonary/Chest: Effort normal. No respiratory distress. She has no wheezes.   Abdominal: Soft. She exhibits no distension. There is no tenderness.   Musculoskeletal: She exhibits no edema or tenderness.        Right ankle: She exhibits decreased range of motion (foot drop).   TLSO brace intact  No pain with internal or external rotation of right hip  +TTP GTB, reproduces hip pain   Neurological: She is alert and oriented to person, place, and time. She exhibits normal muscle tone.   -  Mental Status:  AAOx3.  Follows commands.  Answers correct age and .  Recent and remote memory intact.  -  Speech and language:  no aphasia or dysarthria.    -  Vision:  no hemianopsia or ptosis.    -  Facial movement  (CN VII): symmetrical.   -  Motor:  RUE: 4/5.  LUE: 4/5.  RLE:  Hip Flex 2/5, Knee Ext/Flex 3-/5,  DF 1/5, PF 4-/5.  LLE:  Hip Flex 2+/5, Knee Ext/Flex 4-/5,  DF 4/5, PF 4/5.  -  Sensory:  Intact to light touch and pin prick.   Skin: Skin is warm and dry. No rash noted.   Proximal incision site dehisced. No drainage.    Psychiatric: She has a normal mood and affect. Her behavior is normal.   Vitals reviewed.    NEUROLOGICAL EXAMINATION:     MENTAL STATUS   Oriented to person, place, and time.       Assessment/Plan:      Mary Carrillo is a 63 y.o. female admitted to inpatient rehabilitation on 3/27/2018 for Osteoarthritis of spine with myelopathy, thoracolumbar region with impaired mobility and ADLs. Patient remains appropriate for PT, OT, and as required Speech therapy. Patient continues to require 24 hour nursing care as well as daily Physician assessment.    * Osteoarthritis of spine with myelopathy, thoracolumbar region    -  MRI T-spine revealed vertebral osteomyelitis/discitis T12-L1 with severe compression and effacement of the ventral and dorsal subarachnoid spaces  -  S/p T12-L1 corpectomy with T10-L3 fusion on 3/18/18  -  PT/OT evaluate and treat  -  Pain management  -  Monitor for bowel and bladder dysfunction  -  Monitor for spasticity  -  Monitor for and prevent skin breakdown and pressure ulcers  Early mobility, repositioning/weight shifting every 20-30 minutes when sitting, turn patient every 2 hours, proper mattress/overlay and chair cushioning, pressure relief/heel protector boots  -  DVT prophylaxis:  on SQH        Hyperkalemia    4/13 K 5.2  4/14 K 5.2, stable  4/16 K 4.4        Decreased calculated GFR    4/12 gfr <60, repeat BMP tomorrow and encourage po fluids  4/13 BMP pending. If gfr still reduced, will give IV fluids.  4/14 improving gfr  4/16 improving, 54.8        Right hip pain    Xray reviewed. Pain stable.   4/11 lidocaine patch for right hip  4/12 improvement. Tenderness to palpation of  Right GTB, consistent with greater trochanteric bursitis.        Difficulty sleeping    4/9 trazodone at 100. Change ramelteon from prn to scheduled. Cont prn atarax        Alteration in skin integrity related to surgical incision    4/9 increased drainage. Will alert WC. Cont wound care to site. Start bactrim per neurosurgery recs.  4/10 Neurosurgery ordered CT T, L spine to evaluate infection. Imaging scheduled for 4/11.  4/11 no signs of infection of fluid collection        Dehiscence of operative wound    Noted on 3/31. No signs of infection. Culture sent and Bacitracin ointment started. Continue to monitor    4/1 - CBC sent to monitor WBC. Will consult wound care  4/2 WBC 11, stable  4/3 Staples removed  4/4 no drainage early morning, some reported late morning so ESR, CRP, CBC ordered. Cont wound care.  4/5 ESR, CRP elevated, but WBC within normal limits. Augmentin started. Incision improving.    4/7 covered w mepilex, c/d         Acute blood loss as cause of postoperative anemia    Continue to monitor H/H. Previous transfusions performed    4/2 H/H improved, 8.5/28.4    Lab Results   Component Value Date    WBC 10.39 04/05/2018    HGB 8.6 (L) 04/05/2018    HCT 28.2 (L) 04/05/2018    MCV 92 04/05/2018     04/05/2018               HTN (hypertension), benign    Continue Coreg and Amlodipine    3/29 - Lisinopril 10mg daily  3/30 - improved  4/2 - sbp 130s-160. Lisinopril increased to 20  4/3  this morning, improved  4/4 dec lisinopril back to 10 for low DBP  4/6 Lisinopril increased to 15mg   4/7 AM BP elevated, requested repeat which is pending. WIll cont to f/u.  4/8 Repeat yesterday afternoon was improved. Will cont w current regimen - may benefit from spacing out meds for smoother ocntrol  4/9 Inc lisinopril to 20  4/10 Cont to monitor.   4/12 cont to monitor. Cont lisinopril 15  4/14 improving  4/15 stable  4/16 stable  Vitals:    04/15/18 0756 04/15/18 0757 04/15/18 2004 04/16/18 0700   BP:  (!) 126/59 (!) 126/59 139/63 137/65   BP Location: Left arm  Left arm Right arm   Patient Position: Sitting  Lying Lying   Pulse:  66 78 64   Resp: 17  17 20   Temp: 98.4 °F (36.9 °C)  98.8 °F (37.1 °C) 98.5 °F (36.9 °C)   TempSrc: Oral  Oral Oral   SpO2: 95%  95% 95%   Weight:       Height:                 Sarcoidosis    Currently holding Methotrexate due to TB            DISCHARGE PLANNING:  Tentative Discharge Date: 4/18/2018    Future Appointments  Date Time Provider Department Center   4/25/2018 10:00 AM LAB, [facility] STA LAB [facility]   5/4/2018 9:00 AM Andi Swian DO [facility] NEUROS7 [facility]       Edith Quiles MD  Department of Physical Medicine & Rehab   [facility]

## 2018-04-17 PROBLEM — R94.4 DECREASED CALCULATED GFR: Status: RESOLVED | Noted: 2018-04-12 | Resolved: 2018-04-17

## 2018-04-17 PROBLEM — M79.18 MYOFASCIAL PAIN: Status: ACTIVE | Noted: 2018-04-17

## 2018-04-17 PROBLEM — E87.5 HYPERKALEMIA: Status: RESOLVED | Noted: 2018-04-16 | Resolved: 2018-04-17

## 2018-04-17 PROCEDURE — 11800000 HC REHAB PRIVATE ROOM

## 2018-04-17 PROCEDURE — 97116 GAIT TRAINING THERAPY: CPT

## 2018-04-17 PROCEDURE — 97535 SELF CARE MNGMENT TRAINING: CPT

## 2018-04-17 PROCEDURE — 97110 THERAPEUTIC EXERCISES: CPT

## 2018-04-17 PROCEDURE — 25000003 PHARM REV CODE 250: Performed by: PHYSICAL MEDICINE & REHABILITATION

## 2018-04-17 PROCEDURE — 97530 THERAPEUTIC ACTIVITIES: CPT

## 2018-04-17 PROCEDURE — 63600175 PHARM REV CODE 636 W HCPCS: Performed by: HOSPITALIST

## 2018-04-17 PROCEDURE — 97150 GROUP THERAPEUTIC PROCEDURES: CPT

## 2018-04-17 PROCEDURE — 25000003 PHARM REV CODE 250: Performed by: HOSPITALIST

## 2018-04-17 RX ORDER — SULFAMETHOXAZOLE AND TRIMETHOPRIM 800; 160 MG/1; MG/1
1 TABLET ORAL 2 TIMES DAILY
Qty: 14 TABLET | Refills: 0 | Status: SHIPPED | OUTPATIENT
Start: 2018-04-17 | End: 2018-04-24

## 2018-04-17 RX ORDER — RIFAMPIN 300 MG/1
600 CAPSULE ORAL
Qty: 30 CAPSULE | Refills: 0 | Status: SHIPPED | OUTPATIENT
Start: 2018-04-24 | End: 2018-06-21

## 2018-04-17 RX ORDER — LANOLIN ALCOHOL/MO/W.PET/CERES
50 CREAM (GRAM) TOPICAL DAILY
Refills: 0 | COMMUNITY
Start: 2018-04-18 | End: 2018-05-17 | Stop reason: SDUPTHER

## 2018-04-17 RX ORDER — ISONIAZID 300 MG/1
900 TABLET ORAL
Qty: 30 TABLET | Refills: 0 | Status: SHIPPED | OUTPATIENT
Start: 2018-04-20 | End: 2018-06-21 | Stop reason: SDUPTHER

## 2018-04-17 RX ORDER — LISINOPRIL 5 MG/1
15 TABLET ORAL DAILY
Qty: 90 TABLET | Refills: 11 | Status: SHIPPED | OUTPATIENT
Start: 2018-04-18 | End: 2018-05-17 | Stop reason: SDUPTHER

## 2018-04-17 RX ORDER — ISONIAZID 300 MG/1
900 TABLET ORAL
Qty: 30 TABLET | Refills: 0 | Status: SHIPPED | OUTPATIENT
Start: 2018-04-24 | End: 2018-06-21

## 2018-04-17 RX ORDER — OXYCODONE AND ACETAMINOPHEN 10; 325 MG/1; MG/1
1 TABLET ORAL EVERY 4 HOURS PRN
Qty: 40 TABLET | Refills: 0 | Status: SHIPPED | OUTPATIENT
Start: 2018-04-17 | End: 2018-05-17 | Stop reason: SDUPTHER

## 2018-04-17 RX ORDER — LIDOCAINE 50 MG/G
OINTMENT TOPICAL 4 TIMES DAILY
Status: DISCONTINUED | OUTPATIENT
Start: 2018-04-17 | End: 2018-04-18 | Stop reason: HOSPADM

## 2018-04-17 RX ORDER — LIDOCAINE 50 MG/G
OINTMENT TOPICAL 4 TIMES DAILY
Qty: 150 G | Refills: 3 | Status: SHIPPED | OUTPATIENT
Start: 2018-04-17 | End: 2018-06-21

## 2018-04-17 RX ORDER — RIFAMPIN 300 MG/1
600 CAPSULE ORAL
Qty: 30 CAPSULE | Refills: 0 | Status: SHIPPED | OUTPATIENT
Start: 2018-04-20 | End: 2018-06-21

## 2018-04-17 RX ORDER — CYCLOBENZAPRINE HCL 5 MG
5 TABLET ORAL 3 TIMES DAILY PRN
Qty: 30 TABLET | Refills: 0 | Status: SHIPPED | OUTPATIENT
Start: 2018-04-17 | End: 2018-04-27

## 2018-04-17 RX ADMIN — ISONIAZID 900 MG: 300 TABLET ORAL at 01:04

## 2018-04-17 RX ADMIN — OXYCODONE AND ACETAMINOPHEN 1 TABLET: 10; 325 TABLET ORAL at 08:04

## 2018-04-17 RX ADMIN — TRAZODONE HYDROCHLORIDE 100 MG: 50 TABLET ORAL at 09:04

## 2018-04-17 RX ADMIN — HEPARIN SODIUM 5000 UNITS: 5000 INJECTION, SOLUTION INTRAVENOUS; SUBCUTANEOUS at 05:04

## 2018-04-17 RX ADMIN — CYCLOBENZAPRINE HYDROCHLORIDE 5 MG: 5 TABLET, FILM COATED ORAL at 09:04

## 2018-04-17 RX ADMIN — SULFAMETHOXAZOLE AND TRIMETHOPRIM 1 TABLET: 800; 160 TABLET ORAL at 09:04

## 2018-04-17 RX ADMIN — RIFAMPIN 600 MG: 300 CAPSULE ORAL at 01:04

## 2018-04-17 RX ADMIN — CARVEDILOL 25 MG: 25 TABLET, FILM COATED ORAL at 08:04

## 2018-04-17 RX ADMIN — Medication 50 MG: at 08:04

## 2018-04-17 RX ADMIN — HEPARIN SODIUM 5000 UNITS: 5000 INJECTION, SOLUTION INTRAVENOUS; SUBCUTANEOUS at 01:04

## 2018-04-17 RX ADMIN — HEPARIN SODIUM 5000 UNITS: 5000 INJECTION, SOLUTION INTRAVENOUS; SUBCUTANEOUS at 09:04

## 2018-04-17 RX ADMIN — LISINOPRIL 15 MG: 10 TABLET ORAL at 08:04

## 2018-04-17 RX ADMIN — AMLODIPINE BESYLATE 10 MG: 10 TABLET ORAL at 08:04

## 2018-04-17 RX ADMIN — LIDOCAINE: 50 OINTMENT TOPICAL at 09:04

## 2018-04-17 RX ADMIN — CARVEDILOL 25 MG: 25 TABLET, FILM COATED ORAL at 09:04

## 2018-04-17 RX ADMIN — LIDOCAINE 1 PATCH: 50 PATCH TOPICAL at 01:04

## 2018-04-17 RX ADMIN — SULFAMETHOXAZOLE AND TRIMETHOPRIM 1 TABLET: 800; 160 TABLET ORAL at 08:04

## 2018-04-17 NOTE — SUBJECTIVE & OBJECTIVE
Interval History 4/17/2018:  Patient is seen for follow-up rehab evaluation and recommendations: No acute events overnight. Some tenderness over the back of her right knee. I have reviewed the HPI and PMFSH and there are no changes from admission.       Scheduled Medications:    amLODIPine  10 mg Oral Daily    carvedilol  25 mg Oral BID    gabapentin  300 mg Oral TID    heparin (porcine)  5,000 Units Subcutaneous Q8H    isoniazid  900 mg Oral Every Tues    isoniazid  900 mg Oral Every Fri    lidocaine   Topical (Top) QID    lidocaine  1 patch Transdermal Q24H    lisinopril  15 mg Oral Daily    polyethylene glycol  17 g Oral Daily    pyridoxine (vitamin B6)  50 mg Oral Daily    ramelteon  8 mg Oral QHS    rifAMpin  600 mg Oral Every Tues    rifAMpin  600 mg Oral Every Fri    senna-docusate 8.6-50 mg  2 tablet Oral Daily    sulfamethoxazole-trimethoprim 800-160mg  1 tablet Oral BID    traZODone  100 mg Oral QHS       Diagnostic Results: Labs: Reviewed  X-Ray: Reviewed    PRN Medications: acetaminophen, albuterol sulfate, bisacodyl, calcium carbonate, cyclobenzaprine, hydrOXYzine pamoate, magnesium hydroxide 400 mg/5 ml, ondansetron, oxyCODONE-acetaminophen, senna, simethicone    Review of Systems   Constitutional: Positive for activity change. Negative for chills, fatigue and fever.   HENT: Negative for drooling, hearing loss, trouble swallowing and voice change.    Eyes: Negative for pain and visual disturbance.   Respiratory: Negative for cough, shortness of breath and wheezing.    Cardiovascular: Negative for chest pain and palpitations.   Gastrointestinal: Negative for abdominal distention, nausea and vomiting.   Genitourinary: Negative for difficulty urinating and flank pain.   Musculoskeletal: Positive for arthralgias, back pain and gait problem. Negative for neck pain.   Skin: Positive for wound. Negative for rash.   Neurological: Positive for weakness. Negative for dizziness, numbness and  headaches.   Psychiatric/Behavioral: Negative for agitation and hallucinations. The patient is not nervous/anxious.      Objective:     Vital Signs (Most Recent):  Temp: 98.2 °F (36.8 °C) (18 07)  Pulse: 68 (18 07)  Resp: 20 (18 07)  BP: (!) 174/77 (18 07)  SpO2: (!) 94 % (18)    Vital Signs (24h Range):  Temp:  [98.2 °F (36.8 °C)-98.5 °F (36.9 °C)] 98.2 °F (36.8 °C)  Pulse:  [68-69] 68  Resp:  [20] 20  SpO2:  [94 %-96 %] 94 %  BP: (139-174)/(63-77) 174/77     Physical Exam   Constitutional: She is oriented to person, place, and time. She appears well-developed and well-nourished. No distress.       HENT:   Head: Normocephalic and atraumatic.   Nose: Nose normal.   Eyes: Right eye exhibits no discharge. Left eye exhibits no discharge. No scleral icterus.   Neck: Normal range of motion.   Cardiovascular: Normal rate, regular rhythm and intact distal pulses.    Pulmonary/Chest: Effort normal. No respiratory distress. She has no wheezes.   Abdominal: Soft. She exhibits no distension. There is no tenderness.   Musculoskeletal: She exhibits no edema or tenderness.        Right ankle: She exhibits decreased range of motion (foot drop).     Right knee:  No gross deformity  No TTP medial, lateral jt lines, pes anserine, patella tendon  +TTP hamstring tendons  Neg lachman's, chelly's, patella grind  No laxity   Neurological: She is alert and oriented to person, place, and time. She exhibits normal muscle tone.   -  Mental Status:  AAOx3.  Follows commands.  Answers correct age and .  Recent and remote memory intact.  -  Speech and language:  no aphasia or dysarthria.    -  Vision:  no hemianopsia or ptosis.    -  Facial movement (CN VII): symmetrical.   -  Motor:  Right: HF 4/5 KE 4+/5 DF 3/5   Skin: Skin is warm and dry. No rash noted.   Proximal incision site dehisced. No drainage.    Psychiatric: She has a normal mood and affect. Her behavior is normal.   Vitals  reviewed.    NEUROLOGICAL EXAMINATION:     MENTAL STATUS   Oriented to person, place, and time.

## 2018-04-17 NOTE — PROGRESS NOTES
"Physical Therapy   Treatment/FIM assessment    Mary Carrillo   MRN: [id number]                04/17/18 1031   PT Time Calculation   PT Start Time 1031   PT Stop Time 1201   PT Total Time (min) 90 min   Treatment   Treatment Type Treatment;Other (see comments)  (FIM assessment)   PT/PTA PT   General   PT Received On 04/17/18   Family/Caregiver Present No   Patient Found (position) Seated in wheelchair;Other (comment)  (in gym)   Pt found with (seat belt; TLSO in lap)   Precautions   General Precautions fall   Orthopedic Yes   Required Braces or Orthoses Yes   Spinal Brace TLSO;Other (Comment)  (for standing and ambulation)   Visual/Auditory Vision impaired;Other (see comments)  (glasses)   Subjective   Patient states " Every time it's in a different place."  (pt referring to her pain)   Pain/Comfort   Pain Rating 1 4/10   Location - Side 1 Right   Location - Orientation 1 generalized   Location 1 knee   Pain Addressed 1 Pre-medicate for activity;Distraction   Pain Rating Post-Intervention 1 no pain   Bed Mobility   Bed Mobility yes   Rolling/Turning to Left Independent  (mat x 2 trials)   Rolling/Turning Right Independent  (mat x 2 trials)   Supine to Sit Modified Independent   Supine to Sit Comments rising from R side of mat   Sit to Supine Modified Independent;Other (see comments)  (lowering to R side of mat)   Transfers   Transfer yes   Sit to Stand   Sit <> Stand Assistance Moderate Assistance;Other (see comments)  (for hip elevation)   Sit <> Stand Assistive Device Rolling Walker   Trials/Comments 3 trials~ cues for hand placement   Stand to Sit   Assistance Moderate Assistance   Assistive Device Rolling Walker   Trials/Comments 3 trials to lower hips into w/c~ cues for hand placement   Chair to Mat   Chair<> Mat Technique Scoot Pivot   Chair<>Mat Assistance Supervision   Chair <> Mat Assistive Device No Assistive Device   Trials/Comments 1 trial   Mat to Chair   Technique Scoot Pivot   Assistance Stand By " Assistance   Assistive Device No Assistive Device   Trials/Comments 1 trial   Tub Bench Transfer   Technique Scoot Pivot   Assistance Minimum Assistance   Assistive Device No Assistive Device   Trials/Comments 1 trial~ pt needs some assistance to lift L LE into tub   Car Transfer   Car Transfer Technique Slide Board   Car Transfer Assistance Minimum Assistance   Car Transfer Assistive Device Slide board   Trials/Comments 1 trial~ pt needs assistance to manage L LE into and out of car   Wheelchair Activities   Propulsion Yes   Propulsion Type 1 Manual   Level 1 Level tile   Method 1 Right upper extremity;Left upper extremity   Level of Assistance 1 Modified Independent   Description/ Details 1 200 feet   Gait   Gait Yes   Weight Bearing Status full   Gait 1   Surface 1 Level tile   Gait Assistive Device Rolling walker   Other Apparatus 1 Wheelchair follow;Other (Comment)  (by PT tech/volunteer)   Assistance 1 Minimum assistance;Total assistance;With assist of two  (2nd helper follows with w/c)   Gait Distance pt ambulates 3 trials~ 17, 21 and 18 feet~ seated rest breaks between trials   Gait Pattern swing-to gait   Gait Deviation(s) decreased devon;increased time in double stance;decreased velocity of limb motion;decreased step length;decreased stride length;decreased swing-to-stance ratio;decreased toe-to-floor clearance;decreased weight-shifting ability;forward lean;other (see comments)  (narrow SIMONA)   Impairments Contributing to Gait Deviations impaired balance;impaired postural control;decreased strength;other (see comments);impaired motor control  (decreased endurance)   Stairs   Stairs No  (not attempted due to safety concerns)   Balance   Balance No   Supine   Supine-Exercises Lower extremity   Supine-Exercise Type Ankle pumps;Quad sets;Glut sets;Heel slides;ABD/ADD;Short arc quads   Supine-Exercise Comments pt performs above ex x  15 reps each, AAROM( 2 # ankle weight)   Seated   Seated-Exercises Upper  extremity   Seated-Exercise Type (universal gym)   Seated-Exercise Comments pt performs 3 x 15 reps of chest press and lat pulls with 5 # weight   Equipment Use   Recumbent Bike Comments pt pedals LBE x 10 minutes, mod I for improved LE strength and motor control   Other Activities   Other Activities Other (Comment)  (slideboard transfers w/c<> 3 in 1 drop arm commode, SBA)   Comments PT sets board for pt   Activity Tolerance   Activity Tolerance Patient tolerated treatment well   Other Comments   Comments pt also taps and tosses beach ball from w/c x 7 minutes for additional UE strengthening   After Treatment   Patient Position After Treatment Seated in wheelchair;Other (comment)  (seat belt in place; PT removed TLSO per pt request)   Patient after treatment left call button in reach  (PT set pt up for lunch)   Assessment   Prognosis Fair   Problem List Decreased strength;Decreased range of motion;Decreased endurance;Impaired balance;Decreased mobility;Obesity;Pain   Session Assessment progressing toward goals   Assessment Pt did extremely well in rehab and has gained considerable confidence in her abilities this past week.  Pt remains limited by decreased strength, balance and endurance, though has good potential for further improvement.  Pt will benefit from HHPT upon discharge.   Level of Motivation/Participation good   Barriers to Discharge Decreased caregiver support   Barriers to Discharge Comments pt will need light assistance at discharge   Long Term Goals   Supine to Sit-Met/Not Met Met   Sit to Supine - Met/Not Met Met   Logroll - Met/Not Met Met   Transfer Sit to stand - Met/Not Met Met   Transfer Bed/Chair - Met/Not Met Met   Ambulate - Met/Not Met Met   Sit Edge Of Bed - Met Met   Stand - Met/Not Met Met   Tolerate Exercise - Met/Not Met Met   Propel Wheelchair - Met/Not Met Met   Other Goal 2 Met   Other Goal 3 Not Met  (family not present for training)   Discharge Recommendations   Equipment Needed  After Discharge 3-in-1 commode;bath bench;wheelchair;slide board;other (see comments)  (drop arm commode)   Discharge Facility/Level Of Care Needs home health PT   Plan   Planned Therapy Intervention Continue with current plan;Bed mobility training;Transfer training;Gait training;Balance Training;ROM;Strengthening;Neuromuscular Re-education;Motor Coordination Training;Postural Re-education;Wheelchair Management/Propulsion   Therapy Frequency 2 times/day;daily;Monday-Friday;Saturday or Sunday   Physical Therapy Follow-up   PT Follow-up? Yes   PT - Next Visit Date 04/18/18   Treatment/Billable Minutes   Gait training 15   Therapeutic Activity 45   Therapeutic Exercise 30   Total Time 90           Huey Caicedo, PT 4/17/2018

## 2018-04-17 NOTE — PROGRESS NOTES
Report received from PAMELA Hammond. I have reviewed the documentation and care plan and/or discussed the patient with the LPN and visualized/assessed the patient myself and agree with the documentation and care plan. I assumed care.  Pt is AAOx4. Pt is comfortable resting with eyes closed. Behavior is age appropriate and pleasant.  Airway is patent.  Respirations are full, even, and non labored.   No needs verbalized or noted. Will continue to monitor.

## 2018-04-17 NOTE — PROGRESS NOTES
"Occupational Therapy   Endurance Group    Mary Carrillo   MRN: [id number]            04/17/18 1501   OT Time Calculation   OT Start Time 1501   OT Stop Time 1546   OT Total Time (min) 45 min   General   OT Date of Treatment 04/17/18   Family/Caregiver Present No   Patient Found (position) Seated in wheelchair   Precautions   General Precautions fall   Orthopedic Yes   Required Braces or Orthoses Yes   Spinal Brace TLSO  (for standing and ambulation )   Visual/Auditory Vision impaired  (pt has glasses)   Subjective   Patient states "I'm alright"- in reference to back   Pain/Comfort   Pain Rating 5/10   Location back   Pain Addressed Distraction  (pt declined calling for pain meds)   OT Therapeutic Groups   OT Therapeutic Yes   Other    Objective:    Patient participated in a 45 minute seated therapeutic exercises program with SBA  assist. This group activity challenged the patient's upper and lower extremity strength, seated balance, and endurance to improve functional mobility, decrease risk of falls, and increase activity tolerance. Patient required  occasional rest breaks during this activity. Patient performed the following exercises: hip flexion, LAQs, ankle pumps, glute sets, bicep curls, tricep extension, and shoulder extension/flexion/abduction within precautions.     -Endurance 10 on the RPE scale. No pain complaints voiced or observed.      -Social Interaction:      Interacts appropriately 90% of time - needs monitoring or encouragement for participation or interaction  (5)     Assessment:  Patient participated in a 45 minute seated endurance activity.  The group activity challenged dynamic sitting balance, core strengthening, bilateral upper and lower extremity strengthening, cardiovascular and respiratory capacity, and social affect/mood. Patient will improve activity tolerance by requiring no rest breaks and maintain a RPE of 11 throughout the session.    Activity Tolerance   Activity Tolerance Patient " tolerated treatment well   After Treatment   Patient Position After Treatment Seated in wheelchair   Patient after treatment left call button in reach  (seat belt alarm in place)   Occupational Therapy Follow-up   OT Follow-up? No   Treatment/Billable Minutes   Therapeutic Group 45   Total Time 45

## 2018-04-17 NOTE — PROGRESS NOTES
[facility]  Physical Medicine & Rehab  Progress Note    Patient Name: Mary Carrillo  MRN: [id number]  Patient Class: IP- Rehab   Admission Date: 3/27/2018  Length of Stay: 21 days  Attending Physician: Jack Squires MD  Primary Care Provider: Jose Khan MD    Subjective:     Principal Problem:Osteoarthritis of spine with myelopathy, thoracolumbar region    Interval History 4/17/2018:  Patient is seen for follow-up rehab evaluation and recommendations: No acute events overnight. Some tenderness over the back of her right knee. I have reviewed the HPI and PMFSH and there are no changes from admission.       Scheduled Medications:    amLODIPine  10 mg Oral Daily    carvedilol  25 mg Oral BID    gabapentin  300 mg Oral TID    heparin (porcine)  5,000 Units Subcutaneous Q8H    isoniazid  900 mg Oral Every Tues    isoniazid  900 mg Oral Every Fri    lidocaine   Topical (Top) QID    lidocaine  1 patch Transdermal Q24H    lisinopril  15 mg Oral Daily    polyethylene glycol  17 g Oral Daily    pyridoxine (vitamin B6)  50 mg Oral Daily    ramelteon  8 mg Oral QHS    rifAMpin  600 mg Oral Every Tues    rifAMpin  600 mg Oral Every Fri    senna-docusate 8.6-50 mg  2 tablet Oral Daily    sulfamethoxazole-trimethoprim 800-160mg  1 tablet Oral BID    traZODone  100 mg Oral QHS       Diagnostic Results: Labs: Reviewed  X-Ray: Reviewed    PRN Medications: acetaminophen, albuterol sulfate, bisacodyl, calcium carbonate, cyclobenzaprine, hydrOXYzine pamoate, magnesium hydroxide 400 mg/5 ml, ondansetron, oxyCODONE-acetaminophen, senna, simethicone    Review of Systems   Constitutional: Positive for activity change. Negative for chills, fatigue and fever.   HENT: Negative for drooling, hearing loss, trouble swallowing and voice change.    Eyes: Negative for pain and visual disturbance.   Respiratory: Negative for cough, shortness of breath and wheezing.    Cardiovascular: Negative for chest pain  and palpitations.   Gastrointestinal: Negative for abdominal distention, nausea and vomiting.   Genitourinary: Negative for difficulty urinating and flank pain.   Musculoskeletal: Positive for arthralgias, back pain and gait problem. Negative for neck pain.   Skin: Positive for wound. Negative for rash.   Neurological: Positive for weakness. Negative for dizziness, numbness and headaches.   Psychiatric/Behavioral: Negative for agitation and hallucinations. The patient is not nervous/anxious.      Objective:     Vital Signs (Most Recent):  Temp: 98.2 °F (36.8 °C) (18 07)  Pulse: 68 (18 07)  Resp: 20 (18 07)  BP: (!) 174/77 (18 07)  SpO2: (!) 94 % (18)    Vital Signs (24h Range):  Temp:  [98.2 °F (36.8 °C)-98.5 °F (36.9 °C)] 98.2 °F (36.8 °C)  Pulse:  [68-69] 68  Resp:  [20] 20  SpO2:  [94 %-96 %] 94 %  BP: (139-174)/(63-77) 174/77     Physical Exam   Constitutional: She is oriented to person, place, and time. She appears well-developed and well-nourished. No distress.       HENT:   Head: Normocephalic and atraumatic.   Nose: Nose normal.   Eyes: Right eye exhibits no discharge. Left eye exhibits no discharge. No scleral icterus.   Neck: Normal range of motion.   Cardiovascular: Normal rate, regular rhythm and intact distal pulses.    Pulmonary/Chest: Effort normal. No respiratory distress. She has no wheezes.   Abdominal: Soft. She exhibits no distension. There is no tenderness.   Musculoskeletal: She exhibits no edema or tenderness.        Right ankle: She exhibits decreased range of motion (foot drop).     Right knee:  No gross deformity  No TTP medial, lateral jt lines, pes anserine, patella tendon  +TTP hamstring tendons  Neg lachman's, chelly's, patella grind  No laxity   Neurological: She is alert and oriented to person, place, and time. She exhibits normal muscle tone.   -  Mental Status:  AAOx3.  Follows commands.  Answers correct age and .  Recent and remote  memory intact.  -  Speech and language:  no aphasia or dysarthria.    -  Vision:  no hemianopsia or ptosis.    -  Facial movement (CN VII): symmetrical.   -  Motor:  Right: HF 4/5 KE 4+/5 DF 3/5   Skin: Skin is warm and dry. No rash noted.   Proximal incision site dehisced. No drainage.    Psychiatric: She has a normal mood and affect. Her behavior is normal.   Vitals reviewed.    NEUROLOGICAL EXAMINATION:     MENTAL STATUS   Oriented to person, place, and time.       Assessment/Plan:      Mary Carrillo is a 63 y.o. female admitted to inpatient rehabilitation on 3/27/2018 for Osteoarthritis of spine with myelopathy, thoracolumbar region with impaired mobility and ADLs. Patient remains appropriate for PT, OT, and as required Speech therapy. Patient continues to require 24 hour nursing care as well as daily Physician assessment.    * Osteoarthritis of spine with myelopathy, thoracolumbar region    -  MRI T-spine revealed vertebral osteomyelitis/discitis T12-L1 with severe compression and effacement of the ventral and dorsal subarachnoid spaces  -  S/p T12-L1 corpectomy with T10-L3 fusion on 3/18/18  -  PT/OT evaluate and treat  -  Pain management  -  Monitor for bowel and bladder dysfunction  -  Monitor for spasticity  -  Monitor for and prevent skin breakdown and pressure ulcers  Early mobility, repositioning/weight shifting every 20-30 minutes when sitting, turn patient every 2 hours, proper mattress/overlay and chair cushioning, pressure relief/heel protector boots  -  DVT prophylaxis:  on [facility]        Myofascial pain    4/17 lidocaine gel for right knee        Hyperkalemia    4/13 K 5.2  4/14 K 5.2, stable  4/16 K 4.4        Decreased calculated GFR    4/12 gfr <60, repeat BMP tomorrow and encourage po fluids  4/13 BMP pending. If gfr still reduced, will give IV fluids.  4/14 improving gfr  4/16 improving, 54.8        Right hip pain    Xray reviewed. Pain stable.   4/11 lidocaine patch for right hip  4/12  improvement. Tenderness to palpation of Right GTB, consistent with greater trochanteric bursitis.        Difficulty sleeping    4/9 trazodone at 100. Change ramelteon from prn to scheduled. Cont prn atarax        Alteration in skin integrity related to surgical incision    4/9 increased drainage. Will alert WC. Cont wound care to site. Start bactrim per neurosurgery recs.  4/10 Neurosurgery ordered CT T, L spine to evaluate infection. Imaging scheduled for 4/11.  4/11 no signs of infection of fluid collection        Dehiscence of operative wound    Noted on 3/31. No signs of infection. Culture sent and Bacitracin ointment started. Continue to monitor    4/1 - CBC sent to monitor WBC. Will consult wound care  4/2 WBC 11, stable  4/3 Staples removed  4/4 no drainage early morning, some reported late morning so ESR, CRP, CBC ordered. Cont wound care.  4/5 ESR, CRP elevated, but WBC within normal limits. Augmentin started. Incision improving.    4/7 covered w mepilex, c/d         Acute blood loss as cause of postoperative anemia    Continue to monitor H/H. Previous transfusions performed    4/2 H/H improved, 8.5/28.4    Lab Results   Component Value Date    WBC 10.39 04/05/2018    HGB 8.6 (L) 04/05/2018    HCT 28.2 (L) 04/05/2018    MCV 92 04/05/2018     04/05/2018               HTN (hypertension), benign    Continue Coreg and Amlodipine    3/29 - Lisinopril 10mg daily  3/30 - improved  4/2 - sbp 130s-160. Lisinopril increased to 20  4/3  this morning, improved  4/4 dec lisinopril back to 10 for low DBP  4/6 Lisinopril increased to 15mg   4/7 AM BP elevated, requested repeat which is pending. WIll cont to f/u.  4/8 Repeat yesterday afternoon was improved. Will cont w current regimen - may benefit from spacing out meds for smoother ocntrol  4/9 Inc lisinopril to 20  4/10 Cont to monitor.   4/12 cont to monitor. Cont lisinopril 15  4/14 improving  4/15 stable  4/16 stable  417 one elevated SBP this morning  before getting lisinopril  Vitals:    04/15/18 2004 04/16/18 0700 04/16/18 1926 04/17/18 0700   BP: 139/63 137/65 139/63 (!) 174/77   BP Location: Left arm Right arm Left arm Right arm   Patient Position: Lying Lying Lying Sitting   Pulse: 78 64 69 68   Resp: 17 20 20 20   Temp: 98.8 °F (37.1 °C) 98.5 °F (36.9 °C) 98.5 °F (36.9 °C) 98.2 °F (36.8 °C)   TempSrc: Oral Oral Oral Oral   SpO2: 95% 95% 96% (!) 94%   Weight:       Height:                 Sarcoidosis    Currently holding Methotrexate due to TB            DISCHARGE PLANNING:  Tentative Discharge Date: 4/18/2018    Future Appointments  Date Time Provider Department Center   4/25/2018 10:00 AM LAB, [facility] STA LAB [facility]   5/4/2018 9:00 AM Andi Swain DO [facility] NEUROS7 [facility]       Edith Quiles MD  Department of Physical Medicine & Rehab   [facility]

## 2018-04-17 NOTE — PROGRESS NOTES
Occupational Therapy  Discharge FIM SCORES    Mary Carrillo  MRN: [id number]  Room/Bed: E257/E257 A       04/17/18 0730   Transfers   Bed/Chair/WC 4   Toilet 4   Tub 4   Self Care   Eating 7   Grooming 7   Bathing 4   Dressing-Upper 5   Dressing-Lower 3   Toileting 4   Communication   Comprehension 5   Mode B   Expression 5   Mode B   Social Cognition   Social Interaction 6   Problem Solving 4   Memory 3     Addendum( adding a detail to already initiated content)documented on 4/20/2018 (date of addendum) for services provided on 04/17/4018 (date of actual service) by DK Zuniga (name and title of person making change) due to entered a score in error (reason).      Deb Kim OT  4/17/2018

## 2018-04-17 NOTE — HPI
Mary Carrillo is a 62-year-old female with PMHx of HTN, HLD, glaucoma, gout, arthritis, asthma, sarcoidosis, and Pott's disease with recent admission at [facility] (12/17/17-1/18/18) for MTB in spine and lungs s/p RIPE x 2 weeks while inpatient with discharge home for completion of treatment despite attempts for NH/SNF placement.  Since discharge, patient has been mostly bed/sofa bound 2/2 severe back pain.  Patient presented to [facility] with worsening back and BLE pain with associated BLE weakness.  MRI thoracic spine revealed vertebral osteomyelitis/discitis T12-L1 with severe compression and effacement of the ventral and dorsal subarachnoid spaces.  She was then transferred to [facility]/17/18 for further evaluation and management.  Upon admission, evaluated by Neurosurgery and underwent T12-L1 corpectomy with T10-L3 fusion on 3/18/18.  ID consulted and recommended airborne precautions be discontinued 2/2 completion of TB treatment.  Post-op course complicated by anemia requiring 4 units PRBC, 2 FFP, and 1 unit platelets on 3/18/18 and 2 units PRBC on 3/23/18.

## 2018-04-17 NOTE — HOSPITAL COURSE
Ms. Carrillo completed inpatient rehab and made gains. Meds were titrated for hypertension. She was given lidocaine patches for right hip pain and lidocaine cream for myofascial pain behind her right knee. She was continent of bowel and bladder. Her wound dehisced but had daily wound care. CT scans of L and T spine did not show spread of localized wound infection, for which she was given antibiotics. Decreased GFR and hyperkalemia resolved without treatment. Difficulty sleeping resolved with trazodone and ramelteon.  Discharge FIM scores:  Transfers   Bed/Chair/WC 4   Toilet 4   Tub 4   Self Care   Eating 7   Grooming 7   Bathing 4   Dressing-Upper 5   Dressing-Lower 3   Toileting 4   Communication   Comprehension 5   Mode B   Expression 5   Mode B   Social Cognition   Social Interaction 5   Problem Solving 4   Memory 3

## 2018-04-17 NOTE — PROGRESS NOTES
"Occupational Therapy  AM Treatment/Discharge Summary  Mary Carrillo   MRN: [id number]   Room/Bed: E257/E257 A     04/17/18 0730   OT Time Calculation   OT Start Time 0730   OT Stop Time 0816   OT Total Time (min) 46 min   General   OT Date of Treatment 04/17/18   Family/Caregiver Present No   Patient Found (position) Supine in bed   Precautions   General Precautions fall   Orthopedic Yes   Required Braces or Orthoses Yes   Spinal Brace TLSO   Visual/Auditory Vision impaired  (glasses)   Subjective   Patient states "Yeah, I feel ready to go home."    Pain/Comfort   Pain Rating 6/10   Location - Orientation lower   Location back   Pain Addressed Distraction;Nurse notified   Bed Mobility   Bed Mobility yes   Rolling/Turning to Left Modified independent   Rolling/Turning Left Comments with bed rail   Rolling/Turning Right Modified independent   Rolling/Turning Right Comments with bed rail   Scooting/Bridging Independent   Scooting/Bridging Comments to EOB   Supine to Sit Independent   Supine to Sit Comments to EOB   Sit to Supine Independent   Sit to Supine Comments from EOB   Transfers   Transfer yes   Bed to Chair   Bed <> Chair Technique Scoot Pivot   Bed <> Chair Transfer Assistance Contact Guard Assistance   Bed <> Chair Assistive Device No Assistive Device   Trials/Comments CGA for steadying from EOB > w/c   Tub Bench Transfer   Tub Transfer Technique Slide Board   Tub Bench Transfer Assistance Minimum Assistance   Tub Transfer Assistive Device Tub transfer bench   Trials/Comments Min (A) for steadying from w/c   Toilet Transfer   Toilet Transfer Technique Stand Pivot   Toilet Transfer Assistance Contact Guard Assistance   Toilet Transfer Assistive Device drop arm commode   Trials/Comments CGA for steadying from EOB <> drop arm commode   Feeding   Feeding Level of Assistance Independent   Feeding Where Assessed Wheelchair   Feeding Comments (I) with all aspects   Grooming   Additional Grooming yes   Grooming Level of " Assistance Independent   Hand Washing Level of Assistance Independent   Face Washing Level of Assistance Independent   Oral Hygeine Level of Assistance Independent   Grooming Where Assessed Wheelchair   Grooming Comments (I) with 3/3 G/H tasks seated in w/c   Bathing   Bathing Level of Assistance Minimum assistance   Bathing Where Assessed Edge of bed   Bathing Comments Min (A) for BLE below knees (pt bathes 8/10 body parts seated at EOB and completed rolling to each side for pericare at bed level)    UE Dressing   UE Dressing Level of Assistance Set-up Assistance   UE Dressing Where Assessed Edge of bed   UE Dressing Comments Set-up (A) for TLSO; pt (I) with donning pullover shirt   LE Dressing   LE Dressing Yes   LE Dressing Adaptive Equipment Reacher;Sock aide   LE Dressing  Level of Assistance Moderate assistance   LE Dressing Level of Assistance Stand by assistance   Sock Level of Assistance Minimum assistance   Adult Briefs Level of Assistance Total assistance   LE Dressing Where Assessed Edge of bed;Bed level   LE Dressing Comments Mod (A) as pt completed 5/8 steps; pt able to thread BLE with reacher while seated EOB, then performs rolling to each side with SBA to don pants over hips at bed level; min (A) for adjusting L sock over heel after pt used sock aide    Toileting   Toileting Level of Assistance Contact guard   Toileting Where Assessed Bedside commode   Toileting Comments CGA for steadying assist during weight-shifting as pt performed 3/3 steps with continent, solid BM episode    Activity Tolerance   Activity Tolerance Patient tolerated treatment well   After Treatment   Patient Position After Treatment Seated in wheelchair   Patient after treatment left call button in reach  (safety belt intact)   Assessment   Prognosis Good   Problem List Decreased Self Care skills;Decreased upper extremity strength;Decreased safe judgment during ADL;Decreased cognition;Decreased endurance;Decreased functional  mobility;Decreased gross motor control;Decreased IADLs;Decreased trunk control for functional activities   Assessment Pt has met all STG and 8/9 LTG upon d/c today. Pt has made good progress with ADLs while completing bathing at EOB and in supine while rolling to each side to perform pericare with increased (I). Pt with CGA for steadying assist for t/fs with scoot pivots to block BLE while scooting from EOB > w/c. Pt has good support for d/c home and would continue to benefit from skilled OT home health services to further (I) with ADLs/IADLs.   Level of Motivation/Participation good   Short Term Goals   Additional Documentation yes   Pt Will Perform Supine To Sit With moderate assist   Supine to Sit - Met/Not Met Met   Pt Will Perform Sit to Supine With minimal assist   Supine to Sit - Met/Not Met Met   Pt Will Transfer Bed/Chair With moderate assist   Transfer Bed/Chair - Met/Not Met Met   Pt Will Perform LE Dressing With maximum assistance   LE Dressing - Met/Not Met Met   Long Term Goals   Additional Documentation yes   Pt Will Transfer Bed/Chair With contact guard assistance   Transfer Bed/Chair - Met/Not Met Met   Pt Will Transfer To Bedside Commode With contact guard assist   Transfer Bedside Commode -Met/Not Met Met   Pt Will Transfer To Shower With minimal assist   Transfer to Shower-Met/Not Met Met   Pt Will Perform Eating With modified independence   Eating - Met/Not Met Met   Pt Will Perform Grooming Independently   Grooming - Met/Not Met Met   Pt Will Perform Bathing With minimal assistance   Bathing - Met/Not Met Met   Pt Will Perform UE Dressing With setup   UE Dressing - Met/Not Met Met   Pt Will Perform LE Dressing With minimal assistance   LE Dressing - Met/Not Met Not Met   Pt Will Perform Toileting With minimal assistance   Toileting-Met/Not Met Met   Discharge Recommendations   Equipment Needed After Discharge bath bench;slide board;wheelchair  (drop arm commode)   Plan   Plan Alter current plan    Plan Comments discharge from skilled OT services   Occupational Therapy Follow-up   OT Follow-up? No   Treatment/Billable Minutes   Self Care/Home Management 46   Total Time 46       Deb Kim OT  4/17/2018     Patient with wheelchair safety belt fastened and able to self release wheelchair safety belt. Pt left seated in w/c next to bedside in pt's room (location) with call light and all necessities in reach, nursing notified.     LEGEND:   CGA: Contact Guard Assist   EOB: Edgeof Bed   HHA: Hand Held Assist   HOB: Head of Bed   (I): Independent-patient performs task in a timely manner   Max (A): Maximal Assist-patient performs 25-49% of task   Min (A): Minimal Assist- patient performs 75% or more of task   Mod (A): Moderate Assist- patient performs 50-74% of task   NA: Not applicable   NT: Not tested   OOB: Out of Bed   PTA: Prior to admit   QC: Quad Cane   RW: Rolling Walker   (S): Supervision- patient requires cues, coaxing, prompting   SBA: Stand By Assist   SC: Straight Cane   SW: Standard Walker   TBA: To be assessed   Total (A): Total Assist- patient performs less than 25% of task   WC: Wheelchair   WFL: Within Functional Limits   WNL: Within Normal Limits

## 2018-04-17 NOTE — PROGRESS NOTES
Physical Therapy  Discharge FIM scores(PT)    Mary Carrillo   MRN: [id number]                04/17/18 1031   Transfers   Bed/Chair/WC 5   Toilet 5   Tub 4   Locomotion   Distance Walked 1   Distance Wheelchair 3   Walk 1   Wheelchair 6   Mode C   Stairs 1             Huey Caicedo, PT 4/17/2018       Correction(making changes or corrections to something that was documented in error) documented on _4/17/18_________(date of correction or error) for services provided on __4/17/18_________(date of actual service) by _Huey Caicedo, PT__________________ (name and title of person making change) due to _incorrect FIM score for w/c mobility_~ originally listed as 3 instead of 6__________________ (reason).          Huey Caicedo, PT 4/19/2018

## 2018-04-17 NOTE — ASSESSMENT & PLAN NOTE
Continue Coreg and Amlodipine    3/29 - Lisinopril 10mg daily  3/30 - improved  4/2 - sbp 130s-160. Lisinopril increased to 20  4/3  this morning, improved  4/4 dec lisinopril back to 10 for low DBP  4/6 Lisinopril increased to 15mg   4/7 AM BP elevated, requested repeat which is pending. WIll cont to f/u.  4/8 Repeat yesterday afternoon was improved. Will cont w current regimen - may benefit from spacing out meds for smoother ocntrol  4/9 Inc lisinopril to 20  4/10 Cont to monitor.   4/12 cont to monitor. Cont lisinopril 15  4/14 improving  4/15 stable  4/16 stable  417 one elevated SBP this morning before getting lisinopril  Vitals:    04/15/18 2004 04/16/18 0700 04/16/18 1926 04/17/18 0700   BP: 139/63 137/65 139/63 (!) 174/77   BP Location: Left arm Right arm Left arm Right arm   Patient Position: Lying Lying Lying Sitting   Pulse: 78 64 69 68   Resp: 17 20 20 20   Temp: 98.8 °F (37.1 °C) 98.5 °F (36.9 °C) 98.5 °F (36.9 °C) 98.2 °F (36.8 °C)   TempSrc: Oral Oral Oral Oral   SpO2: 95% 95% 96% (!) 94%   Weight:       Height:

## 2018-04-18 VITALS
BODY MASS INDEX: 33.63 KG/M2 | HEIGHT: 65 IN | OXYGEN SATURATION: 95 % | TEMPERATURE: 99 F | SYSTOLIC BLOOD PRESSURE: 111 MMHG | DIASTOLIC BLOOD PRESSURE: 56 MMHG | HEART RATE: 59 BPM | WEIGHT: 201.88 LBS | RESPIRATION RATE: 20 BRPM

## 2018-04-18 LAB
FUNGUS SPEC CULT: NORMAL

## 2018-04-18 PROCEDURE — 97530 THERAPEUTIC ACTIVITIES: CPT

## 2018-04-18 PROCEDURE — 99232 SBSQ HOSP IP/OBS MODERATE 35: CPT | Mod: ,,, | Performed by: PHYSICAL MEDICINE & REHABILITATION

## 2018-04-18 PROCEDURE — 25000003 PHARM REV CODE 250: Performed by: PHYSICAL MEDICINE & REHABILITATION

## 2018-04-18 PROCEDURE — 97803 MED NUTRITION INDIV SUBSEQ: CPT

## 2018-04-18 PROCEDURE — 63600175 PHARM REV CODE 636 W HCPCS: Performed by: HOSPITALIST

## 2018-04-18 PROCEDURE — 25000003 PHARM REV CODE 250: Performed by: HOSPITALIST

## 2018-04-18 RX ADMIN — LISINOPRIL 15 MG: 10 TABLET ORAL at 09:04

## 2018-04-18 RX ADMIN — SULFAMETHOXAZOLE AND TRIMETHOPRIM 1 TABLET: 800; 160 TABLET ORAL at 09:04

## 2018-04-18 RX ADMIN — AMLODIPINE BESYLATE 10 MG: 10 TABLET ORAL at 09:04

## 2018-04-18 RX ADMIN — CARVEDILOL 25 MG: 25 TABLET, FILM COATED ORAL at 09:04

## 2018-04-18 RX ADMIN — LIDOCAINE 1 PATCH: 50 PATCH TOPICAL at 12:04

## 2018-04-18 RX ADMIN — OXYCODONE AND ACETAMINOPHEN 1 TABLET: 10; 325 TABLET ORAL at 09:04

## 2018-04-18 RX ADMIN — LIDOCAINE: 50 OINTMENT TOPICAL at 09:04

## 2018-04-18 RX ADMIN — HEPARIN SODIUM 5000 UNITS: 5000 INJECTION, SOLUTION INTRAVENOUS; SUBCUTANEOUS at 05:04

## 2018-04-18 RX ADMIN — Medication 50 MG: at 09:04

## 2018-04-18 NOTE — DISCHARGE INSTRUCTIONS
[employer] [phone] will provide therapy in your home. You will be contacted to schedule initial visit. If you have not received a call the day after discharge you should call the company at the number provided.    Bedside commode provided by [employer] [phone].  Hip kit, Slide board and tub bench provided by [facility] [employer] [phone].

## 2018-04-18 NOTE — PLAN OF CARE
Problem: Patient Care Overview  Goal: Plan of Care Review  Outcome: Ongoing (interventions implemented as appropriate)  Fall Risk:Encouraged patient to call for assistance OOB and with transfers. Explained importance of staff assistance to help prevent falls. Patient verbalized understanding. Will continue to monitor pt status.

## 2018-04-18 NOTE — SUBJECTIVE & OBJECTIVE
Interval History 4/18/2018:  Patient is seen for follow-up rehab evaluation and recommendations: No acute events overnight. Improvement of knee pain with lidocaine cream. Ready to go home.  [facility] and [facility] are no changes from admission.       Scheduled Medications:    amLODIPine  10 mg Oral Daily    carvedilol  25 mg Oral BID    gabapentin  300 mg Oral TID    heparin (porcine)  5,000 Units Subcutaneous Q8H    isoniazid  900 mg Oral Every Tues    isoniazid  900 mg Oral Every Fri    lidocaine   Topical (Top) QID    lidocaine  1 patch Transdermal Q24H    lisinopril  15 mg Oral Daily    polyethylene glycol  17 g Oral Daily    pyridoxine (vitamin B6)  50 mg Oral Daily    ramelteon  8 mg Oral QHS    rifAMpin  600 mg Oral Every Tues    rifAMpin  600 mg Oral Every Fri    senna-docusate 8.6-50 mg  2 tablet Oral Daily    sulfamethoxazole-trimethoprim 800-160mg  1 tablet Oral BID    traZODone  100 mg Oral QHS       Diagnostic Results: Labs: Reviewed  X-Ray: Reviewed    PRN Medications: acetaminophen, albuterol sulfate, bisacodyl, calcium carbonate, cyclobenzaprine, hydrOXYzine pamoate, magnesium hydroxide 400 mg/5 ml, ondansetron, oxyCODONE-acetaminophen, senna, simethicone    Review of Systems   Constitutional: Positive for activity change. Negative for chills, fatigue and fever.   HENT: Negative for drooling, hearing loss, trouble swallowing and voice change.    Eyes: Negative for pain and visual disturbance.   Respiratory: Negative for cough, shortness of breath and wheezing.    Cardiovascular: Negative for chest pain and palpitations.   Gastrointestinal: Negative for abdominal distention, nausea and vomiting.   Genitourinary: Negative for difficulty urinating and flank pain.   Musculoskeletal: Positive for gait problem. Negative for neck pain.   Skin: Positive for wound. Negative for rash.   Neurological: Positive for weakness. Negative for dizziness, numbness and headaches.    Psychiatric/Behavioral: Negative for agitation and hallucinations. The patient is not nervous/anxious.      Objective:     Vital Signs (Most Recent):  Temp: 98.7 °F (37.1 °C) (18)  Pulse: (!) 59 (18)  Resp: 20 (18)  BP: (!) 111/56 (18 09)  SpO2: 95 % (18)    Vital Signs (24h Range):  Temp:  [98.7 °F (37.1 °C)] 98.7 °F (37.1 °C)  Pulse:  [59-82] 59  Resp:  [20] 20  SpO2:  [95 %] 95 %  BP: (111-131)/(56-63) 111/56     Physical Exam   Constitutional: She is oriented to person, place, and time. She appears well-developed and well-nourished. No distress.       HENT:   Head: Normocephalic and atraumatic.   Nose: Nose normal.   Eyes: Right eye exhibits no discharge. Left eye exhibits no discharge. No scleral icterus.   Neck: Normal range of motion.   Cardiovascular: Normal rate, regular rhythm and intact distal pulses.    Pulmonary/Chest: Effort normal. No respiratory distress. She has no wheezes.   Abdominal: Soft. She exhibits no distension. There is no tenderness.   Musculoskeletal: She exhibits no edema or tenderness.        Right ankle: She exhibits decreased range of motion (foot drop).     Right knee:  No gross deformity  No TTP medial, lateral jt lines, pes anserine, patella tendon  +TTP hamstring tendons  Neg lachman's, chelly's, patella grind  No laxity   Neurological: She is alert and oriented to person, place, and time. She exhibits normal muscle tone.   -  Mental Status:  AAOx3.  Follows commands.  Answers correct age and .  Recent and remote memory intact.  -  Speech and language:  no aphasia or dysarthria.    -  Vision:  no hemianopsia or ptosis.    -  Facial movement (CN VII): symmetrical.   -  Motor:  Right: HF 4/5 KE 4+/5 DF 3/5   Skin: Skin is warm and dry. No rash noted.   Proximal incision site dehisced. No drainage.    Psychiatric: She has a normal mood and affect. Her behavior is normal.   Vitals reviewed.    NEUROLOGICAL EXAMINATION:      MENTAL STATUS   Oriented to person, place, and time.

## 2018-04-18 NOTE — PROGRESS NOTES
" [facility]  Adult Nutrition  Progress Note    SUMMARY       Recommendations    Recommendation/Intervention:   1. Continue Cardiac diet.   2. Recommend Arginaid for wound healing and dc boost supplement.   3. RD to follow.    Goals: Meet >85% of EEN/EPN  Nutrition Goal Status: goal met    Communication of RD Recs:  (POC)    Reason for Assessment    Reason for Assessment: RD follow-up  Diagnosis:  (Discitis of thoracolumbar region)  Relevant Medical History: HTN, sarcoidosis, Pott's disease, HLD  Interdisciplinary Rounds: attended    General Information Comments: Pt reports a good appetite with a 100% PO intake. Pt is drinking ONS. Pt denies N/V/D/C at this time.   Nutrition Discharge Planning: Adequate nutrition on Cardiac diet.    Nutrition Risk Screen    Nutrition Risk Screen: no indicators present    Nutrition/Diet History    Patient Reported Diet/Restrictions/Preferences: general  Food Preferences: Fresh fruit  Do you have any cultural, spiritual, Moravian conflicts, given your current situation?: none    Anthropometrics    Temp: 98.7 °F (37.1 °C)  Height: 5' 5" (165.1 cm)  Height (inches): 65 in  Weight Method: Standard Scale  Weight: 91.6 kg (201 lb 14.4 oz)  Weight (lb): 201.9 lb  Ideal Body Weight (IBW), Female: 125 lb  % Ideal Body Weight, Female (lb): 167.2 lb  BMI (Calculated): 34.9  BMI Grade: 30 - 34.9- obesity - grade I       Lab/Procedures/Meds    Pertinent Labs Reviewed: reviewed  Pertinent Labs Comments: Na 134, BUN 25  Pertinent Medications Reviewed: reviewed  Pertinent Medications Comments: Carvedilol, heparin, ondansetron, senna, Ca     Physical Findings/Assessment    Overall Physical Appearance: nourished  Oral/Mouth Cavity: WDL  Skin: non-healing wound(s), incision(s)    Estimated/Assessed Needs    Weight Used For Calorie Calculations: 91.6 kg (201 lb 15.1 oz)  Energy Calorie Requirements (kcal): 1753  Energy Need Method: [place] (PAL x 1.2)  Protein Requirements: " 110-129g/day (1.2-1.4g/kg)  Weight Used For Protein Calculations: 91.6 kg (201 lb 15.1 oz)  Fluid Requirements (mL): 1mL/kg or per MD     RDA Method (mL): 1753         Nutrition Prescription Ordered    Current Diet Order: Cardiac  Oral Nutrition Supplement: Boost    Evaluation of Received Nutrient/Fluid Intake    Comments: LBM: 4/16/18  % Intake of Estimated Energy Needs: 75 - 100 %  % Meal Intake: 75 - 100 %    Nutrition Risk    Level of Risk/Frequency of Follow-up: low     Assessment and Plan    Nutrition Problem  Increased protein needs     Related to (etiology):   Non-healing wounds in back area     Signs and Symptoms (as evidenced by):    Protein PO intake <85%.      Interventions/Recommendations (treatment strategy):  Boost Plus supplement  And Arginaid for wound healing      Nutrition Diagnosis Status:   Continues     Monitor and Evaluation    Food and Nutrient Intake: energy intake, food and beverage intake  Food and Nutrient Adminstration: diet order  Knowledge/Beliefs/Attitudes: food and nutrition knowledge/skill, beliefs and attitudes  Physical Activity and Function: nutrition-related ADLs and IADLs  Anthropometric Measurements: weight, body mass index, weight change  Biochemical Data, Medical Tests and Procedures: electrolyte and renal panel, gastrointestinal profile, glucose/endocrine profile, inflammatory profile, lipid profile  Nutrition-Focused Physical Findings: overall appearance     Nutrition Follow-Up    RD Follow-up?: Yes

## 2018-04-18 NOTE — ASSESSMENT & PLAN NOTE
Continue Coreg and Amlodipine    3/29 - Lisinopril 10mg daily  3/30 - improved  4/2 - sbp 130s-160. Lisinopril increased to 20  4/3  this morning, improved  4/4 dec lisinopril back to 10 for low DBP  4/6 Lisinopril increased to 15mg   4/7 AM BP elevated, requested repeat which is pending. WIll cont to f/u.  4/8 Repeat yesterday afternoon was improved. Will cont w current regimen - may benefit from spacing out meds for smoother ocntrol  4/9 Inc lisinopril to 20  4/10 Cont to monitor.   4/12 cont to monitor. Cont lisinopril 15  4/14 improving  4/15 stable  4/16 stable  417 one elevated SBP this morning before getting lisinopril  4/18 cont lisinopril 15 on d/c  Vitals:    04/17/18 0700 04/17/18 1920 04/18/18 0918 04/18/18 0919   BP: (!) 174/77 131/63 (!) 111/56 (!) 111/56   BP Location: Right arm Right arm     Patient Position: Sitting Lying     Pulse: 68 82  (!) 59   Resp: 20 20     Temp: 98.2 °F (36.8 °C) 98.7 °F (37.1 °C)     TempSrc: Oral Oral     SpO2: (!) 94% 95%     Weight:       Height:
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
## 2018-04-18 NOTE — PROGRESS NOTES
[facility]  Physical Medicine & Rehab  Progress Note    Patient Name: Mary Carrillo  MRN: [id number]  Patient Class: IP- Rehab   Admission Date: 3/27/2018  Length of Stay: 22 days  Attending Physician: Jack Squires MD  Primary Care Provider: Jose Khan MD    Subjective:     Principal Problem:Osteoarthritis of spine with myelopathy, thoracolumbar region    Interval History 4/18/2018:  Patient is seen for follow-up rehab evaluation and recommendations: No acute events overnight. Improvement of knee pain with lidocaine cream. Ready to go home.  I have reviewed the HPI and PMFSH and there are no changes from admission.       Scheduled Medications:    amLODIPine  10 mg Oral Daily    carvedilol  25 mg Oral BID    gabapentin  300 mg Oral TID    heparin (porcine)  5,000 Units Subcutaneous Q8H    isoniazid  900 mg Oral Every Tues    isoniazid  900 mg Oral Every Fri    lidocaine   Topical (Top) QID    lidocaine  1 patch Transdermal Q24H    lisinopril  15 mg Oral Daily    polyethylene glycol  17 g Oral Daily    pyridoxine (vitamin B6)  50 mg Oral Daily    ramelteon  8 mg Oral QHS    rifAMpin  600 mg Oral Every Tues    rifAMpin  600 mg Oral Every Fri    senna-docusate 8.6-50 mg  2 tablet Oral Daily    sulfamethoxazole-trimethoprim 800-160mg  1 tablet Oral BID    traZODone  100 mg Oral QHS       Diagnostic Results: Labs: Reviewed  X-Ray: Reviewed    PRN Medications: acetaminophen, albuterol sulfate, bisacodyl, calcium carbonate, cyclobenzaprine, hydrOXYzine pamoate, magnesium hydroxide 400 mg/5 ml, ondansetron, oxyCODONE-acetaminophen, senna, simethicone    Review of Systems   Constitutional: Positive for activity change. Negative for chills, fatigue and fever.   HENT: Negative for drooling, hearing loss, trouble swallowing and voice change.    Eyes: Negative for pain and visual disturbance.   Respiratory: Negative for cough, shortness of breath and wheezing.    Cardiovascular:  Negative for chest pain and palpitations.   Gastrointestinal: Negative for abdominal distention, nausea and vomiting.   Genitourinary: Negative for difficulty urinating and flank pain.   Musculoskeletal: Positive for gait problem. Negative for neck pain.   Skin: Positive for wound. Negative for rash.   Neurological: Positive for weakness. Negative for dizziness, numbness and headaches.   Psychiatric/Behavioral: Negative for agitation and hallucinations. The patient is not nervous/anxious.      Objective:     Vital Signs (Most Recent):  Temp: 98.7 °F (37.1 °C) (18)  Pulse: (!) 59 (18)  Resp: 20 (18)  BP: (!) 111/56 (18)  SpO2: 95 % (18)    Vital Signs (24h Range):  Temp:  [98.7 °F (37.1 °C)] 98.7 °F (37.1 °C)  Pulse:  [59-82] 59  Resp:  [20] 20  SpO2:  [95 %] 95 %  BP: (111-131)/(56-63) 111/56     Physical Exam   Constitutional: She is oriented to person, place, and time. She appears well-developed and well-nourished. No distress.       HENT:   Head: Normocephalic and atraumatic.   Nose: Nose normal.   Eyes: Right eye exhibits no discharge. Left eye exhibits no discharge. No scleral icterus.   Neck: Normal range of motion.   Cardiovascular: Normal rate, regular rhythm and intact distal pulses.    Pulmonary/Chest: Effort normal. No respiratory distress. She has no wheezes.   Abdominal: Soft. She exhibits no distension. There is no tenderness.   Musculoskeletal: She exhibits no edema or tenderness.        Right ankle: She exhibits decreased range of motion (foot drop).     Right knee:  No gross deformity  No TTP medial, lateral jt lines, pes anserine, patella tendon  +TTP hamstring tendons  Neg lachman's, chelly's, patella grind  No laxity   Neurological: She is alert and oriented to person, place, and time. She exhibits normal muscle tone.   -  Mental Status:  AAOx3.  Follows commands.  Answers correct age and .  Recent and remote memory intact.  -  Speech  and language:  no aphasia or dysarthria.    -  Vision:  no hemianopsia or ptosis.    -  Facial movement (CN VII): symmetrical.   -  Motor:  Right: HF 4/5 KE 4+/5 DF 3/5   Skin: Skin is warm and dry. No rash noted.   Proximal incision site dehisced. No drainage.    Psychiatric: She has a normal mood and affect. Her behavior is normal.   Vitals reviewed.    NEUROLOGICAL EXAMINATION:     MENTAL STATUS   Oriented to person, place, and time.       Assessment/Plan:      Mary Carrillo is a 63 y.o. female admitted to inpatient rehabilitation on 3/27/2018 for Osteoarthritis of spine with myelopathy, thoracolumbar region with impaired mobility and ADLs. Patient remains appropriate for PT, OT, and as required Speech therapy. Patient continues to require 24 hour nursing care as well as daily Physician assessment.    * Osteoarthritis of spine with myelopathy, thoracolumbar region    -  MRI T-spine revealed vertebral osteomyelitis/discitis T12-L1 with severe compression and effacement of the ventral and dorsal subarachnoid spaces  -  S/p T12-L1 corpectomy with T10-L3 fusion on 3/18/18  -  PT/OT evaluate and treat  -  Pain management  -  Monitor for bowel and bladder dysfunction  -  Monitor for spasticity  -  Monitor for and prevent skin breakdown and pressure ulcers  Early mobility, repositioning/weight shifting every 20-30 minutes when sitting, turn patient every 2 hours, proper mattress/overlay and chair cushioning, pressure relief/heel protector boots  -  DVT prophylaxis:  on [facility]        Myofascial pain    4/17 lidocaine gel for right knee        Right hip pain    Xray reviewed. Pain stable.   4/11 lidocaine patch for right hip  4/12 improvement. Tenderness to palpation of Right GTB, consistent with greater trochanteric bursitis.        Difficulty sleeping    4/9 trazodone at 100. Change ramelteon from prn to scheduled. Cont prn atarax        Alteration in skin integrity related to surgical incision    4/9 increased drainage.  Will alert WC. Cont wound care to site. Start bactrim per neurosurgery recs.  4/10 Neurosurgery ordered CT T, L spine to evaluate infection. Imaging scheduled for 4/11.  4/11 no signs of infection of fluid collection        Dehiscence of operative wound    Noted on 3/31. No signs of infection. Culture sent and Bacitracin ointment started. Continue to monitor    4/1 - CBC sent to monitor WBC. Will consult wound care  4/2 WBC 11, stable  4/3 Staples removed  4/4 no drainage early morning, some reported late morning so ESR, CRP, CBC ordered. Cont wound care.  4/5 ESR, CRP elevated, but WBC within normal limits. Augmentin started. Incision improving.    4/7 covered w mepilex, c/d         Acute blood loss as cause of postoperative anemia    Continue to monitor H/H. Previous transfusions performed    4/2 H/H improved, 8.5/28.4    Lab Results   Component Value Date    WBC 10.39 04/05/2018    HGB 8.6 (L) 04/05/2018    HCT 28.2 (L) 04/05/2018    MCV 92 04/05/2018     04/05/2018               HTN (hypertension), benign    Continue Coreg and Amlodipine    3/29 - Lisinopril 10mg daily  3/30 - improved  4/2 - sbp 130s-160. Lisinopril increased to 20  4/3  this morning, improved  4/4 dec lisinopril back to 10 for low DBP  4/6 Lisinopril increased to 15mg   4/7 AM BP elevated, requested repeat which is pending. WIll cont to f/u.  4/8 Repeat yesterday afternoon was improved. Will cont w current regimen - may benefit from spacing out meds for smoother ocntrol  4/9 Inc lisinopril to 20  4/10 Cont to monitor.   4/12 cont to monitor. Cont lisinopril 15  4/14 improving  4/15 stable  4/16 stable  417 one elevated SBP this morning before getting lisinopril  4/18 cont lisinopril 15 on d/c  Vitals:    04/17/18 0700 04/17/18 1920 04/18/18 0918 04/18/18 0919   BP: (!) 174/77 131/63 (!) 111/56 (!) 111/56   BP Location: Right arm Right arm     Patient Position: Sitting Lying     Pulse: 68 82  (!) 59   Resp: 20 20     Temp: 98.2 °F  (36.8 °C) 98.7 °F (37.1 °C)     TempSrc: Oral Oral     SpO2: (!) 94% 95%     Weight:       Height:                 Sarcoidosis    Currently holding Methotrexate due to TB            DISCHARGE PLANNING:  Tentative Discharge Date: 4/18/2018    Future Appointments  Date Time Provider Department Center   4/25/2018 10:00 AM LAB, [facility] STA LAB [facility]   5/4/2018 9:00 AM Andi Swain DO [facility] NEUROS7 [facility]       Edith Quiles MD  Department of Physical Medicine & Rehab   [facility]

## 2018-04-18 NOTE — DISCHARGE SUMMARY
[facility]  Physical Medicine & Rehab  Discharge Summary    Patient Name: Mary Carrillo  MRN: [id number]  Admission Date: 3/27/2018  Hospital Length of Stay: 22 days  Discharge Date and Time: No discharge date for patient encounter.  Attending Physician: Jack Squires MD  Discharging Provider: Edith Quiles MD  Primary Care Physician: Jose Khan MD    HPI: Mary Carrillo is a 62-year-old female with PMHx of HTN, HLD, glaucoma, gout, arthritis, asthma, sarcoidosis, and Pott's disease with recent admission at [facility] (12/17/17-1/18/18) for MTB in spine and lungs s/p RIPE x 2 weeks while inpatient with discharge home for completion of treatment despite attempts for NH/SNF placement.  Since discharge, patient has been mostly bed/sofa bound 2/2 severe back pain.  Patient presented to [facility] with worsening back and BLE pain with associated BLE weakness.  MRI thoracic spine revealed vertebral osteomyelitis/discitis T12-L1 with severe compression and effacement of the ventral and dorsal subarachnoid spaces.  She was then [place] [facility] [place] for further evaluation and management.  Upon admission, evaluated by Neurosurgery and underwent T12-L1 corpectomy with T10-L3 fusion on 3/18/18.  ID consulted and recommended airborne precautions be discontinued 2/2 completion of TB treatment.  Post-op course complicated by anemia requiring 4 units PRBC, 2 FFP, and 1 unit platelets on 3/18/18 and 2 units PRBC on 3/23/18.     * No surgery found *     Indwelling Lines/Drains at time of discharge:   Lines/Drains/Airways          No matching active lines, drains, or airways          Hospital Course:   Ms. Carrillo completed inpatient rehab and made gains. Meds were titrated for hypertension. She was given lidocaine patches for right hip pain and lidocaine cream for myofascial pain behind her right knee. She was continent of bowel and bladder. Her wound dehisced but had daily  wound care. CT scans of L and T spine did not show spread of localized wound infection, for which she was given antibiotics. Decreased GFR and hyperkalemia resolved without treatment. Difficulty sleeping resolved with trazodone and ramelteon.  Discharge FIM scores:  Transfers   Bed/Chair/WC 4   Toilet 4   Tub 4   Self Care   Eating 7   Grooming 7   Bathing 4   Dressing-Upper 5   Dressing-Lower 3   Toileting 4   Communication   Comprehension 5   Mode B   Expression 5   Mode B   Social Cognition   Social Interaction 5   Problem Solving 4   Memory 3       Consults         Status Ordering Provider     Inpatient consult to Registered Dietitian/Nutritionist  Once     Provider:  (Not yet assigned)    Completed JACK SQUIRES          Significant Diagnostic Studies: Labs: reviewed.    Final Active Diagnoses:    Diagnosis Date Noted POA    PRINCIPAL PROBLEM:  Osteoarthritis of spine with myelopathy, thoracolumbar region [M47.15] 03/17/2018 Yes    Myofascial pain [M79.1] 04/17/2018 Yes    Difficulty sleeping [G47.9] 04/09/2018 Yes    Right hip pain [M25.551] 04/09/2018 Yes    Alteration in skin integrity related to surgical incision [R23.9] 04/03/2018 Yes    Dehiscence of operative wound [T81.31XA] 04/01/2018 Yes    Acute blood loss as cause of postoperative anemia [D62] 03/23/2018 Yes    Discitis of thoracolumbar region [M46.45] 12/08/2017 Yes    HTN (hypertension), benign [I10] 12/26/2014 Yes    Sarcoidosis [D86.9] 12/23/2014 Yes      Problems Resolved During this Admission:    Diagnosis Date Noted Date Resolved POA    Hyperkalemia [E87.5] 04/16/2018 04/17/2018 No    Decreased calculated GFR [R94.4] 04/12/2018 04/17/2018 No       Discharged Condition: good    Disposition: Home or Self Care    Follow Up:  Follow-up Information     Jose Khan MD In 1 week.    Specialty:  Internal Medicine  Contact information:  [address]  [place] [id number] [phone]             Jack Squires MD In 2 months.   "  Specialty:  Physical Medicine and Rehabilitation  Contact information:  [address] [facility] [address]  [phone]                 Patient Instructions:     SLIDING BOARD FOR HOME USE   Order Comments: 29 or 30 inch   Order Specific Question Answer Comments   Height: 5' 5" (1.651 m)    Weight: 91.6 kg (201 lb 14.4 oz)    Does patient have medical equipment at home? wheelchair    Does patient have medical equipment at home? walker, rolling    Length of need (1-99 months): 99    Need for transfer? Yes      COMMODE FOR HOME USE   Order Specific Question Answer Comments   Type: Drop arm    Height: 5' 5" (1.651 m)    Weight: 91.6 kg (201 lb 14.4 oz)    Does patient have medical equipment at home? wheelchair    Does patient have medical equipment at home? walker, rolling    Length of need (1-99 months): 99      TRANSFER TUB BENCH FOR HOME USE   Order Specific Question Answer Comments   Type of Transfer Tub Bench: Unpadded    Height: 5' 5" (1.651 m)    Weight: 91.6 kg (201 lb 14.4 oz)    Does patient have medical equipment at home? wheelchair    Does patient have medical equipment at home? walker, rolling    Length of need (1-99 months): 99      HIP KIT FOR HOME USE   Order Specific Question Answer Comments   Height: 5' 5" (1.651 m)    Weight: 91.6 kg (201 lb 14.4 oz)    Does patient have medical equipment at home? wheelchair    Does patient have medical equipment at home? walker, rolling    Length of need (1-99 months): 99    Type: [place] Hip Kit      Referral to Home health   Referral Priority: Routine Referral Type: Home Health Care   Referral Reason: Specialty Services Required    Requested Specialty: Home Health Services    Number of Visits Requested: 1      Referral to Home health   Referral Priority: Routine Referral Type: Home Health   Referral Reason: Specialty Services Required    Requested Specialty: Home Health Services    Number of Visits Requested: 1      Other restrictions " (specify):   Order Comments: TLSO brace when standing       Medications:  Reconciled Home Medications:      Medication List      START taking these medications    cyclobenzaprine 5 MG tablet  Commonly known as:  FLEXERIL  Take 1 tablet (5 mg total) by mouth 3 (three) times daily as needed for Muscle spasms.     lidocaine 5 % Oint ointment  Commonly known as:  XYLOCAINE  Apply topically 4 (four) times daily.     sulfamethoxazole-trimethoprim 800-160mg 800-160 mg Tab  Commonly known as:  BACTRIM DS  Take 1 tablet by mouth 2 (two) times daily.        CHANGE how you take these medications    acetaminophen-codeine 300-60mg 300-60 mg Tab  Commonly known as:  TYLENOL #4  TAKE ONE TABLET BY MOUTH 4 TIMES DAILY AS NEEDED  What changed:  Another medication with the same name was removed. Continue taking this medication, and follow the directions you see here.     * isoniazid 300 MG Tab  Commonly known as:  NYDRAZID  Take 3 tablets (900 mg total) by mouth Every Friday.  Start taking on:  4/20/2018  What changed:  medication strength     * isoniazid 300 MG Tab  Commonly known as:  NYDRAZID  Take 3 tablets (900 mg total) by mouth every Tuesday.  Start taking on:  4/24/2018  What changed:  You were already taking a medication with the same name, and this prescription was added. Make sure you understand how and when to take each.     lisinopril 5 MG tablet  Commonly known as:  PRINIVIL,ZESTRIL  Take 3 tablets (15 mg total) by mouth once daily.  What changed:  medication strength     oxyCODONE-acetaminophen  mg per tablet  Commonly known as:  PERCOCET  Take 1 tablet by mouth every 4 (four) hours as needed.  What changed:  · how much to take  · how to take this  · when to take this  · reasons to take this  · additional instructions     * rifAMpin 300 MG capsule  Commonly known as:  RIFADIN  Take 2 capsules (600 mg total) by mouth Every Friday.  Start taking on:  4/20/2018  What changed:  You were already taking a medication  with the same name, and this prescription was added. Make sure you understand how and when to take each.     * rifAMpin 300 MG capsule  Commonly known as:  RIFADIN  Take 2 capsules (600 mg total) by mouth every Tuesday.  Start taking on:  4/24/2018  What changed:  medication strength        * This list has 4 medication(s) that are the same as other medications prescribed for you. Read the directions carefully, and ask your doctor or other care provider to review them with you.            CONTINUE taking these medications    * albuterol 90 mcg/actuation inhaler  Inhale 2 puffs into the lungs every 4 (four) hours as needed for Wheezing or Shortness of Breath (cough).     * albuterol 2.5 mg /3 mL (0.083 %) nebulizer solution  Commonly known as:  PROVENTIL  Take 3 mLs (2.5 mg total) by nebulization every 4 (four) hours as needed for Wheezing or Shortness of Breath (cough). Rescue     amLODIPine 10 MG tablet  Commonly known as:  NORVASC  Take 1 tablet (10 mg total) by mouth once daily.     carvedilol 25 MG tablet  Commonly known as:  COREG  Take 1 tablet (25 mg total) by mouth 2 (two) times daily with meals.     folic acid 1 MG tablet  Commonly known as:  FOLVITE  Take 1 tablet (1 mg total) by mouth once daily.     gabapentin 300 MG capsule  Commonly known as:  NEURONTIN  Take 300 mg by mouth 3 (three) times daily.     isosorbide-hydrALAZINE 20-37.5 mg 20-37.5 mg Tab  Commonly known as:  BIDIL  Take 1 tablet by mouth 3 (three) times daily.     polyethylene glycol 17 gram Pwpk  Commonly known as:  GLYCOLAX  Take by mouth.     pyridoxine (vitamin B6) 50 MG Tab  Commonly known as:  B-6  Take 1 tablet (50 mg total) by mouth once daily.     traZODone 100 MG tablet  Commonly known as:  DESYREL  TAKE 1 TO 3 TABLETS BY MOUTH NIGHTLY AS NEEDED        * This list has 2 medication(s) that are the same as other medications prescribed for you. Read the directions carefully, and ask your doctor or other care provider to review them  with you.            STOP taking these medications    AUGMENTIN 875-125 mg per tablet  Generic drug:  amoxicillin-clavulanate 875-125mg     doxazosin 4 MG tablet  Commonly known as:  CARDURA     heparin (porcine) 5,000 unit/mL injection     isosorbide dinitrate 20 MG tablet  Commonly known as:  ISORDIL     senna-docusate 8.6-50 mg 8.6-50 mg per tablet  Commonly known as:  PERICOLACE            Time spent on the discharge of patient: 20 minutes    Edith Quiles MD  Department of Physical Medicine & Rehab  [facility]

## 2018-05-20 LAB
ACID FAST MOD KINY STN SPEC: NORMAL
MYCOBACTERIUM SPEC QL CULT: NORMAL

## 2018-05-21 ENCOUNTER — LAB VISIT (OUTPATIENT)
Dept: LAB | Facility: HOSPITAL | Age: 63
End: 2018-05-21
Attending: INTERNAL MEDICINE
Payer: MEDICARE

## 2018-05-21 DIAGNOSIS — E55.9 VITAMIN D DEFICIENCY: ICD-10-CM

## 2018-05-21 DIAGNOSIS — A18.01 POTT'S DISEASE (SPINAL TUBERCULOSIS): ICD-10-CM

## 2018-05-21 LAB
25(OH)D3+25(OH)D2 SERPL-MCNC: 22 NG/ML
25(OH)D3+25(OH)D2 SERPL-MCNC: 22 NG/ML
ALBUMIN SERPL BCP-MCNC: 3 G/DL
ALP SERPL-CCNC: 76 U/L
ALT SERPL W/O P-5'-P-CCNC: 11 U/L
ANION GAP SERPL CALC-SCNC: 6 MMOL/L
AST SERPL-CCNC: 22 U/L
BASOPHILS # BLD AUTO: 0.03 K/UL
BASOPHILS NFR BLD: 0.4 %
BILIRUB SERPL-MCNC: 0.2 MG/DL
BUN SERPL-MCNC: 23 MG/DL
CALCIUM SERPL-MCNC: 9.6 MG/DL
CHLORIDE SERPL-SCNC: 109 MMOL/L
CO2 SERPL-SCNC: 24 MMOL/L
CREAT SERPL-MCNC: 1 MG/DL
CRP SERPL-MCNC: 11.1 MG/L
DIFFERENTIAL METHOD: ABNORMAL
EOSINOPHIL # BLD AUTO: 0.1 K/UL
EOSINOPHIL NFR BLD: 1.9 %
ERYTHROCYTE [DISTWIDTH] IN BLOOD BY AUTOMATED COUNT: 15.8 %
EST. GFR  (AFRICAN AMERICAN): >60 ML/MIN/1.73 M^2
EST. GFR  (NON AFRICAN AMERICAN): 59 ML/MIN/1.73 M^2
GLUCOSE SERPL-MCNC: 94 MG/DL
HCT VFR BLD AUTO: 32.9 %
HGB BLD-MCNC: 10.4 G/DL
LYMPHOCYTES # BLD AUTO: 2.2 K/UL
LYMPHOCYTES NFR BLD: 29.8 %
MCH RBC QN AUTO: 28.5 PG
MCHC RBC AUTO-ENTMCNC: 31.6 G/DL
MCV RBC AUTO: 90 FL
MONOCYTES # BLD AUTO: 0.9 K/UL
MONOCYTES NFR BLD: 11.7 %
NEUTROPHILS # BLD AUTO: 4.2 K/UL
NEUTROPHILS NFR BLD: 56.2 %
PLATELET # BLD AUTO: 250 K/UL
PMV BLD AUTO: 8.9 FL
POTASSIUM SERPL-SCNC: 3.6 MMOL/L
PROT SERPL-MCNC: 8.8 G/DL
RBC # BLD AUTO: 3.65 M/UL
SODIUM SERPL-SCNC: 139 MMOL/L
WBC # BLD AUTO: 7.38 K/UL

## 2018-05-21 PROCEDURE — 36415 COLL VENOUS BLD VENIPUNCTURE: CPT

## 2018-05-21 PROCEDURE — 80053 COMPREHEN METABOLIC PANEL: CPT

## 2018-05-21 PROCEDURE — 86140 C-REACTIVE PROTEIN: CPT

## 2018-05-21 PROCEDURE — 85025 COMPLETE CBC W/AUTO DIFF WBC: CPT

## 2018-05-21 PROCEDURE — 82306 VITAMIN D 25 HYDROXY: CPT

## 2018-05-28 ENCOUNTER — TELEPHONE (OUTPATIENT)
Dept: NEUROSURGERY | Facility: CLINIC | Age: 63
End: 2018-05-28

## 2018-05-28 NOTE — TELEPHONE ENCOUNTER
----- Message from Prieto Pineda sent at 5/28/2018 10:57 AM CDT -----  Contact: Stephen/[facility]  Jason called requesting extended orders for pt's Physical Therapy orders. For additional information contact Stephen at [phone]

## 2018-05-28 NOTE — TELEPHONE ENCOUNTER
Spoke to Stephen he states he would like to re-evealute patient for PT and needs orders, verbal order provided per Dr Wiliam bush.

## 2018-06-07 ENCOUNTER — TELEPHONE (OUTPATIENT)
Dept: NEUROSURGERY | Facility: CLINIC | Age: 63
End: 2018-06-07

## 2018-06-07 NOTE — TELEPHONE ENCOUNTER
Spoke with pt with making a f/u appt. Pt recently discharged from inpatient therapy and is now doing home health. Appt scheduled for 6/11/18 at 8:40am with xray's to follow.

## 2018-06-11 ENCOUNTER — OFFICE VISIT (OUTPATIENT)
Dept: NEUROSURGERY | Facility: CLINIC | Age: 63
End: 2018-06-11
Payer: MEDICARE

## 2018-06-11 ENCOUNTER — HOSPITAL ENCOUNTER (OUTPATIENT)
Dept: RADIOLOGY | Facility: HOSPITAL | Age: 63
Discharge: HOME | End: 2018-06-11
Attending: PHYSICIAN ASSISTANT
Payer: MEDICARE

## 2018-06-11 VITALS
DIASTOLIC BLOOD PRESSURE: 80 MMHG | SYSTOLIC BLOOD PRESSURE: 140 MMHG | HEART RATE: 62 BPM | WEIGHT: 193.38 LBS | BODY MASS INDEX: 33.02 KG/M2 | HEIGHT: 64 IN | TEMPERATURE: 99 F | RESPIRATION RATE: 18 BRPM

## 2018-06-11 DIAGNOSIS — Z98.1 STATUS POST THORACIC SPINAL FUSION: Primary | ICD-10-CM

## 2018-06-11 DIAGNOSIS — Z98.1 STATUS POST THORACIC SPINAL FUSION: ICD-10-CM

## 2018-06-11 DIAGNOSIS — A18.01 POTT'S DISEASE (SPINAL TUBERCULOSIS): ICD-10-CM

## 2018-06-11 PROCEDURE — 99214 OFFICE O/P EST MOD 30 MIN: CPT | Mod: PBBFAC,25,PO | Performed by: NEUROLOGICAL SURGERY

## 2018-06-11 PROCEDURE — 99999 PR PBB SHADOW E&M-EST. PATIENT-LVL IV: CPT | Mod: PBBFAC,,, | Performed by: NEUROLOGICAL SURGERY

## 2018-06-11 PROCEDURE — 99024 POSTOP FOLLOW-UP VISIT: CPT | Mod: POP,,, | Performed by: NEUROLOGICAL SURGERY

## 2018-06-11 PROCEDURE — 72080 X-RAY EXAM THORACOLMB 2/> VW: CPT | Mod: 26,,, | Performed by: RADIOLOGY

## 2018-06-11 PROCEDURE — 72080 X-RAY EXAM THORACOLMB 2/> VW: CPT | Mod: TC,PO

## 2018-06-11 NOTE — PROGRESS NOTES
Per pt - she had surgery in March 23018. Per provider notes, images need to be standing (I had the daughter help w pt during imaging). PRINCE

## 2018-06-11 NOTE — PROGRESS NOTES
CHIEF COMPLAINT:  3 month postop f/u    INTERVAL HISTORY (6/11/18):  She returns for her 3 month postoperative visit.  She missed her 6 week postop appointment but reports that she is doing very well and is essentially pain free.  She completed her stay at rehabilitation and just recently completed a course of home health physical therapy approximately 2 weeks ago.  Although she presents in a wheelchair today she states that she is ambulatory with a walker.  She relies on the walker because of mild residual subjective weakness.  She stopped wearing her TLSO brace approximately 2 weeks ago when her physical therapy stopped.  She finds it more comfortable not to wear it. She completed her antibiotics for mycobacterium TB several weeks ago as well as a course of Augmentin for mild superficial wound dehiscence.  She reports that her wound has healed without any further dehiscence, drainage, or discharge.  She notes that the bottoms of her toes of both feet tingle and she has a patch of numbness over her right flank and low back.  She also complains of tingling in her right fifth digit but reports that it is not overly bothersome at this time.    HPI:  Mary Carrillo is a 63 y.o.  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  She was found to have spinal cord compression due to pathologic fracture and collapse of T12 and L1 vertebral bodies due to osteo-discitis.  Prior to surgery she was bedbound for many weeks and in severe, debilitating pain.  She was taken for urgent decompression of the spinal cord as well as a right sided costotransversectomy for T 12 and L1 corpectomy followed by T10-L3 fusion.  Postoperatively she recovered well with significant improvement in her preoperative pain and strength.  She was placed on Noman spectrum empiric antibiotics  to cover for TB and osteo-discitis, and was discharged to inpatient rehabilitation at [facility].  She reports that she is working diligently with rehabilitation and is able to stand but has not regained the leg strength enough to begin walking.  Overall she feels the surgery was extremely helpful.  She complains that the TLSO brace is cumbersome and causes her additional pain, and often does not remained in the proper place.  She also reports that her wound has had some breakdown recently and it is being managed by wound care at the rehabilitation.  She is currently on rifampin and isoniazid for TB coverage and amoxicillin clavulanic acid for presumed superficial skin infection/breakdown.  Recent lab work revealed an elevated ESR and CRP.  She has not had any pus or drainage from the wound outside of the superficial breakdown.  Although she endorses that her legs are much stronger compared to preop she still has weakness in her hip flexion and a chronic mild right foot drop.      Review of patient's allergies indicates:   Allergen Reactions    Prednisone Hives and Itching     Pills only, can take if she needs to    Naproxen Rash       Past Medical History:   Diagnosis Date    Arthritis     Asthma     Back pain     Encounter for blood transfusion     Glaucoma     Gout     Hyperlipidemia     Hypertension     Sarcoidosis     Vitritis     Vitritis      Past Surgical History:   Procedure Laterality Date    BACK SURGERY  2018    Laminectomy.     CATARACT EXTRACTION Bilateral      SECTION      HYSTERECTOMY      uterus/cervix d/t uterine fibroids.     Family History   Problem Relation Age of Onset    Diabetes Mother     Kidney disease Mother     Diabetes Father     Kidney disease Father      Social History   Substance Use Topics    Smoking status: Former Smoker     Packs/day: 1.00     Years: 35.00     Types: Cigarettes     Start date: 1967     Quit date: 1994     Smokeless tobacco: Never Used    Alcohol use No        Review of Systems   Constitutional: Negative.    Respiratory: Negative for cough and shortness of breath.    Cardiovascular: Negative for chest pain, palpitations, claudication and leg swelling.   Gastrointestinal: Negative for abdominal pain, constipation and diarrhea.   Genitourinary: Negative for flank pain, frequency and urgency.   Musculoskeletal: Negative for back pain, falls, joint pain, myalgias and neck pain.   Skin: Negative.    Neurological: Positive for tingling and sensory change. Negative for dizziness, tremors, speech change, focal weakness, seizures, loss of consciousness and headaches.   Psychiatric/Behavioral: Negative.        OBJECTIVE:     Vitals:    06/11/18 0835   BP: (!) 140/80   Pulse: 62   Resp: 18   Temp: 98.9 °F (37.2 °C)       Physical Exam:  Constitutional: Patient sitting comfortably in wheel chair. Obese.  Skin: Exposed areas are intact without abnormal markings, rashes or other lesions.  HEENT: Normocephalic. Normal conjunctivae.  Cardiovascular: Normal rate and regular rhythm.  Respiratory: Chest wall rises and falls symmetrically, without signs of respiratory distress.  Abdomen: Soft and non-tender.  Extremities: Warm and without edema. Calves supple, non-tender.  Psych/Behavior: Normal affect.     Neurological:     Mental status: Alert and oriented. Conversational and appropriate.       Cranial Nerves: VFF to confrontation. PERRL. EOMI without nystagmus. Facial STLT normal and symmetric. Strong, symmetric muscles of mastication. Facial strength full and symmetric. Hearing equal bilaterally to finger rub. Palate and uvula rise and fall normally in midline. Shoulder shrug 5/5 strength. Tongue midline.      Motor:     Upper:   Deltoids Triceps Biceps WE WF  FA     R 5/5 5/5 5/5 5/5 5/5 5/5 5/5     L 5/5 5/5 5/5 5/5 5/5 5/5 5/5      Lower:   HF KE KF DF PF EHL     R 4+/5 5/5 5/5 5/5 5/5 4/5     L 4+/5 5/5 5/5 5/5 5/5 5/5       Sensory: Intact sensation to light touch and pinprick in all extremities.      Reflexes:      DTR: 1+ knees and biceps symmetrically.     Vasquez's: Right     Babinski's: Negative.     Clonus: None     Cerebellar: Finger-to-nose and rapid alternating movements normal.      Gait:  Arrived in wheel chair but walks with walker     Wound:  Well approximated and healed, no drainage or dehiscence    Diagnostic Results:  All imaging was independently reviewed by me.    CT T+L spine, dated 4/11/18:  1. Good placement of pedicle screws and rods  2. Possible mild subsidence of corpectomy cage  3. Stable T+L alignment    ASSESSMENT/PLAN:     Problem List Items Addressed This Visit        ID    Pott's disease (spinal tuberculosis)    Relevant Orders    X-Ray Lumbar Spine AP And Lateral    X-Ray Thoracic Spine AP Lateral       Orthopedic    Status post thoracic spinal fusion - Primary    Relevant Orders    X-Ray Lumbar Spine AP And Lateral    X-Ray Thoracic Spine AP Lateral          VISIT SUMMARY:  Patient doing well postop status post T12-L1 corpectomy and posterior T10-L3 fusion.  She is essentially pain free and neurologically intact with the exception of mild tingling in the bottom of her toes bilaterally and a patch of numbness over the right flank area.  Her strength is grossly intact with some mild hip flexion weakness (4+) and is currently ambulating with the assistance of a walker.  She has completed home health physical therapy as well as her full course of TB antibiotics.  Her wound has healed without any further complications.  She states that she stopped wearing her TLSO brace approximately 2 weeks ago and is not experiencing any increased pain.  Overall she is quite satisfied with the outcome of surgery as she was in debilitating pain previously.    PATIENT EDUCATION:  More than half the clinic visit was spent showing with patient the pertinent findings on imaging and educating the patient about natural  history of the pathology.   We discussed options for treatment as well as the risks and benefits of each option.  All questions were answered.     The patient understands and agrees with the following plan of care.    1. Ordered thoracic and lumbar spine xray to evaluate your spine fusion  2. Cont TLSO brace as needed for comfort and support when upright  3. Return to clinic in 3 months for 6 month postop visit              .

## 2018-06-11 NOTE — PATIENT INSTRUCTIONS
1. Ordered thoracic and lumbar spine xray to evaluate your spine fusion  2. Cont TLSO brace as needed for comfort and support  3. Return to clinic in 3 months for 6 month postop visit

## 2018-06-25 ENCOUNTER — OFFICE VISIT (OUTPATIENT)
Dept: PHYSICAL MEDICINE AND REHAB | Facility: CLINIC | Age: 63
End: 2018-06-25
Payer: MEDICARE

## 2018-06-25 VITALS
SYSTOLIC BLOOD PRESSURE: 189 MMHG | HEIGHT: 64 IN | WEIGHT: 194 LBS | BODY MASS INDEX: 33.12 KG/M2 | HEART RATE: 74 BPM | DIASTOLIC BLOOD PRESSURE: 92 MMHG

## 2018-06-25 DIAGNOSIS — M47.15 OSTEOARTHRITIS OF SPINE WITH MYELOPATHY, THORACOLUMBAR REGION: Primary | ICD-10-CM

## 2018-06-25 PROCEDURE — 99212 OFFICE O/P EST SF 10 MIN: CPT | Mod: S$PBB,,, | Performed by: PHYSICAL MEDICINE & REHABILITATION

## 2018-06-25 PROCEDURE — 99999 PR PBB SHADOW E&M-EST. PATIENT-LVL III: CPT | Mod: PBBFAC,,, | Performed by: PHYSICAL MEDICINE & REHABILITATION

## 2018-06-25 PROCEDURE — 99213 OFFICE O/P EST LOW 20 MIN: CPT | Mod: PBBFAC,PO | Performed by: PHYSICAL MEDICINE & REHABILITATION

## 2018-06-26 ENCOUNTER — TELEPHONE (OUTPATIENT)
Dept: PHYSICAL MEDICINE AND REHAB | Facility: CLINIC | Age: 63
End: 2018-06-26

## 2018-06-26 NOTE — TELEPHONE ENCOUNTER
----- Message from Gina Hubbard sent at 6/26/2018  1:30 PM CDT -----  Contact: Stephen ([facility]) @ [phone]  Calling to speak with someone in Dr. Squires's office regarding orders for the patient to get physical therapy ([facility]) . Please call.

## 2018-06-26 NOTE — TELEPHONE ENCOUNTER
Dr. Squires can you please place orders in the system for the patient so I can fax over to them.  Please note in the orders if the patient will have any restrictions or limits due to back brace.  Let me know when completed.  Thanks       [facility]  [phone]

## 2018-07-03 NOTE — PROGRESS NOTES
Subjective:          Chief Complaint: Mary Carrillo is a 63 y.o. female who had no chief complaint listed for this encounter.    62 y/o w/ Gout, Sarcoidosis, Vit D deficiency, IFG, hx of vitreitis / uveitis, Asthma, HLD, HTN, Pulm HTN, HFrEF (50-55% 11/15), CKD 2 and most notably Pulmonary TB and Pott's disease / vertebral MtB infection in March 2018. She has a remote TB exposure as a teenager (aunt w/ active TB). She completed latent TB tx at that time.  She was diagnosed w/ active Pulm TB at [facility] in Dec 17. She intitially did well, but was admitted to [facility] in March 18 w/ cord compression symptoms requiring emergent transfer to [facility].     She ultimately required decompression for a pathologic fx (see operative report below). She is doing well, intermittent pain controlled w/ current medications. She does admit to some tingling in the feet. She has been taking her folic acid.   She has improved, but is still having some difficulty ambulation and admits to her strength not being adequate.         Review of Systems   Constitution: Negative for decreased appetite, diaphoresis and night sweats.   HENT: Negative for congestion, ear discharge and nosebleeds.    Eyes: Negative for blurred vision, pain, vision loss in left eye and vision loss in right eye.   Cardiovascular: Negative for chest pain and palpitations.   Respiratory: Negative for cough, shortness of breath and wheezing.    Endocrine: Negative for cold intolerance and heat intolerance.   Hematologic/Lymphatic: Negative for adenopathy. Does not bruise/bleed easily.   Skin: Negative for dry skin and itching.   Musculoskeletal: Positive for back pain and stiffness.   Gastrointestinal: Negative for anorexia and diarrhea.   Genitourinary: Negative for dysuria and frequency.   Neurological: Negative for dizziness, headaches and loss of balance.   Psychiatric/Behavioral: The patient is not nervous/anxious.    Allergic/Immunologic: Negative for hives.         Objective:         General: Mary is well-developed, well-nourished, appears stated age, in no acute distress, alert and oriented to time, place and person.     General    Vitals reviewed.  Constitutional: She is oriented to person, place, and time. She appears well-developed and well-nourished.   HENT:   Head: Normocephalic and atraumatic.   Right Ear: External ear normal.   Left Ear: External ear normal.   Nose: Nose normal.   Eyes: Conjunctivae and EOM are normal. Pupils are equal, round, and reactive to light.   Neck: Normal range of motion. Neck supple.   Cardiovascular: Normal rate and intact distal pulses.    Pulmonary/Chest: Effort normal. No respiratory distress.   Abdominal: Soft. She exhibits no distension.   Neurological: She is alert and oriented to person, place, and time. She has normal reflexes.   Psychiatric: She has a normal mood and affect. Her behavior is normal. Judgment and thought content normal.         Back (L-Spine & T-Spine) / Neck (C-Spine) Exam     Comments:  Incision well healed                Assessment:     62 yo Female  Encounter Diagnosis   Name Primary?    Osteoarthritis of spine with myelopathy, thoracolumbar region Yes          Plan:     -- Home Health ordered to continue PT/OT    -- Continue follow up with Surgeon and PCP    -- RTC PRN

## 2018-08-07 ENCOUNTER — TELEPHONE (OUTPATIENT)
Dept: PHYSICAL MEDICINE AND REHAB | Facility: CLINIC | Age: 63
End: 2018-08-07

## 2018-08-07 NOTE — TELEPHONE ENCOUNTER
----- Message from Theresa Landaverde sent at 8/7/2018 10:19 AM CDT -----  Contact: Stephen With  [facility] [phone]  discharging Pt from PT and want to know if you want her in out patient Physical Therpy     can be reached at [phone]

## 2018-09-13 ENCOUNTER — OFFICE VISIT (OUTPATIENT)
Dept: NEUROSURGERY | Facility: CLINIC | Age: 63
End: 2018-09-13
Payer: MEDICARE

## 2018-09-13 VITALS
HEIGHT: 64 IN | BODY MASS INDEX: 35.46 KG/M2 | WEIGHT: 207.69 LBS | SYSTOLIC BLOOD PRESSURE: 188 MMHG | HEART RATE: 61 BPM | DIASTOLIC BLOOD PRESSURE: 78 MMHG

## 2018-09-13 DIAGNOSIS — Z98.1 STATUS POST THORACIC SPINAL FUSION: Primary | ICD-10-CM

## 2018-09-13 DIAGNOSIS — M79.18 MYOFASCIAL PAIN: ICD-10-CM

## 2018-09-13 PROCEDURE — 99214 OFFICE O/P EST MOD 30 MIN: CPT | Mod: S$PBB,,, | Performed by: NEUROLOGICAL SURGERY

## 2018-09-13 PROCEDURE — 99214 OFFICE O/P EST MOD 30 MIN: CPT | Mod: PBBFAC | Performed by: NEUROLOGICAL SURGERY

## 2018-09-13 PROCEDURE — 99999 PR PBB SHADOW E&M-EST. PATIENT-LVL IV: CPT | Mod: PBBFAC,,, | Performed by: NEUROLOGICAL SURGERY

## 2018-09-13 RX ORDER — CYCLOBENZAPRINE HCL 5 MG
5 TABLET ORAL 3 TIMES DAILY PRN
Qty: 90 TABLET | Refills: 0 | Status: SHIPPED | OUTPATIENT
Start: 2018-09-13 | End: 2018-09-23

## 2018-09-13 NOTE — PROGRESS NOTES
CHIEF COMPLAINT:  Chief Complaint   Patient presents with    Follow-up     side pain     INTERVAL HISTORY (9/13/18):    Returns for her 6 month postoperative visit.  She reports complete resolution of her thoracic spine pain.  Her wound has healed well.  She reports pain in the right buttock and hip that radiates upward toward her right lumbar paraspinal area.  This pain is only occurs when she is moving and can get up to 8/10.  She is now using a rolling walker where as she was wheelchair bound the last visit.  She finds the right buttock and low back pain neck pain.  She also reports that she has some numbness in the 5th and ulnar aspect of 4th digits of her right hand that has been present since surgery.    She denies any new weakness or bowel / bladder dysfunction.    INTERVAL HISTORY (6/11/18):  She returns for her 3 month postoperative visit.  She missed her 6 week postop appointment but reports that she is doing very well and is essentially pain free.  She completed her stay at rehabilitation and just recently completed a course of home health physical therapy approximately 2 weeks ago.  Although she presents in a wheelchair today she states that she is ambulatory with a walker.  She relies on the walker because of mild residual subjective weakness.  She stopped wearing her TLSO brace approximately 2 weeks ago when her physical therapy stopped.  She finds it more comfortable not to wear it. She completed her antibiotics for mycobacterium TB several weeks ago as well as a course of Augmentin for mild superficial wound dehiscence.  She reports that her wound has healed without any further dehiscence, drainage, or discharge.  She notes that the bottoms of her toes of both feet tingle and she has a patch of numbness over her right flank and low back.  She also complains of tingling in her right fifth digit but reports that it is not overly bothersome at this time.     HPI:  Mary Carrillo is a 63  y.o.  female with previous dx of TB and Pott's disease as well as sarcoidosis, who was initially transferred from [facility] for acute worsening of back and leg pain as well as bilateral lower extremity weakness.  She was found to have spinal cord compression due to pathologic fracture and collapse of T12 and L1 vertebral bodies due to osteo-discitis.  Prior to surgery she was bedbound for many weeks and in severe, debilitating pain.  She was taken for urgent decompression of the spinal cord as well as a right sided costotransversectomy for T 12 and L1 corpectomy followed by T10-L3 fusion.  Postoperatively she recovered well with significant improvement in her preoperative pain and strength.  She was placed on Noman spectrum empiric antibiotics to cover for TB and osteo-discitis, and was discharged to inpatient rehabilitation at [facility].  She reports that she is working diligently with rehabilitation and is able to stand but has not regained the leg strength enough to begin walking.  Overall she feels the surgery was extremely helpful.  She complains that the TLSO brace is cumbersome and causes her additional pain, and often does not remained in the proper place.  She also reports that her wound has had some breakdown recently and it is being managed by wound care at the rehabilitation.  She is currently on rifampin and isoniazid for TB coverage and amoxicillin clavulanic acid for presumed superficial skin infection/breakdown.  Recent lab work revealed an elevated ESR and CRP.  She has not had any pus or drainage from the wound outside of the superficial breakdown.  Although she endorses that her legs are much stronger compared to preop she still has weakness in her hip flexion and a chronic mild right foot drop.      Review of patient's allergies indicates:   Allergen Reactions    Prednisone Hives and Itching     Pills only, can take if she needs to    Naproxen Rash       Past Medical  History:   Diagnosis Date    Arthritis     Asthma     Back pain     Encounter for blood transfusion     Glaucoma     Gout     Hyperlipidemia     Hypertension     Sarcoidosis     Vitritis     Vitritis      Past Surgical History:   Procedure Laterality Date    BACK SURGERY  2018    Laminectomy.     BIOPSY-BONE; LUMBAR SPINE T12-L1 N/A 2017    Performed by Greyson Blue Jr., [facility] at [facility] OR    CATARACT EXTRACTION Bilateral      SECTION      COLONOSCOPY N/A 2014    Per[facility]z [facility] ENDO    CORPECTOMY THORACIC T12-L1 corpectomy with T10-L3 fusion, neuromonitoring, globus Left 3/18/2018    Performed by Andi Swain DO at [facility] OR 2ND FLR    HYSTERECTOMY  1977    uterus/cervix d/t uterine fibroids.    BRUCE During Cath Case N/A 2017    Performed by Carlos Eduardo Almanza MD at [facility] CATH     Family History   Problem Relation Age of Onset    Diabetes Mother     Kidney disease Mother     Diabetes Father     Kidney disease Father      Social History     Tobacco Use    Smoking status: Former Smoker     Packs/day: 1.00     Years: 35.00     Pack years: 35.00     Types: Cigarettes     Start date: 1967     Last attempt to quit: 1994     Years since quittin.7    Smokeless tobacco: Never Used   Substance Use Topics    Alcohol use: No     Alcohol/week: 0.0 oz    Drug use: No        Review of Systems   Constitutional: Negative.    Respiratory: Negative for cough and shortness of breath.    Cardiovascular: Negative for chest pain, palpitations, claudication and leg swelling.   Gastrointestinal: Negative for abdominal pain, constipation and diarrhea.   Genitourinary: Negative for flank pain, frequency and urgency.   Musculoskeletal: Positive for back pain (R paraspinal lumbar and buttock).   Skin: Negative.    Neurological: Positive for tingling (R 5th digit and medial 4th). Negative for dizziness, tremors, sensory change, speech change, focal  weakness, seizures, loss of consciousness and headaches.   Psychiatric/Behavioral: Negative.        OBJECTIVE:   Vital Signs:  Pulse: 61 (09/13/18 0935)  BP: (!) 188/78 (09/13/18 0935)    Physical Exam:  Constitutional: Patient sitting comfortably in chair. Appears well developed and well nourished.  Skin: Exposed areas are intact without abnormal markings, rashes or other lesions.  HEENT: Normocephalic. Normal conjunctivae.  Cardiovascular: Normal rate and regular rhythm.  Respiratory: Chest wall rises and falls symmetrically, without signs of respiratory distress.  Abdomen: Soft and non-tender.  Extremities: Warm and without edema. Calves supple, non-tender.  Psych/Behavior: Normal affect.    Neurological:    Mental status: Alert and oriented. Conversational and appropriate.       Cranial Nerves: VFF to confrontation. PERRL. EOMI without nystagmus. Facial STLT normal and symmetric. Strong, symmetric muscles of mastication. Facial strength full and symmetric. Hearing equal bilaterally to finger rub. Palate and uvula rise and fall normally in midline. Shoulder shrug 5/5 strength. Tongue midline.     Motor:    Upper:  Deltoids Triceps Biceps WE WF  FA    R 5/5 5/5 5/5 5/5 5/5 5/5 5/5    L 5/5 5/5 5/5 5/5 5/5 5/5 5/5      Lower:  HF KE KF DF PF EHL    R 5-/5 5/5 5/5 5/5 5/5 5/5    L 5-/5 5/5 5/5 5/5 5/5 5/5     Sensory: Intact sensation to light touch and pinprick in all extremities.     Reflexes:      DTR: 1+ knees and biceps symmetrically.     Vasquez's: Right.     Babinski's: Negative.     Clonus: 3-4 beats bilaterally    Cerebellar: Finger-to-nose and rapid alternating movements normal.     Gait:  Using FWW but stable, fluid. Able to walk several short steps without FWW.    Incision well healed    Diagnostic Results:  All imaging was independently reviewed by me.     T-L xray, 6/1//18:  Stable placement of hardware and alignment  Mild subsidence of cage    CT T+L spine, dated 4/11/18:  1. Good placement of  pedicle screws and rods  2. Possible mild subsidence of corpectomy cage  3. Stable T+L alignment      ASSESSMENT/PLAN:     Problem List Items Addressed This Visit        Orthopedic    Status post thoracic spinal fusion - Primary    Relevant Orders    Ambulatory Referral to Physical/Occupational Therapy    CT Thoracic Spine Without Contrast    CT Lumbar Spine Without Contrast    Myofascial pain    Relevant Orders    Ambulatory Referral to Physical/Occupational Therapy    CT Thoracic Spine Without Contrast    CT Lumbar Spine Without Contrast          VISIT SUMMARY:    With the exception of some right-sided paraspinal lumbar and buttock pain and muscle spasms the patient is recovering very well.  She has complete resolution of her thoracic spine pain, her wound is well healed, and she has progressed to being able to ambulate with a walker and is no longer using a wheelchair.  She has no new weakness.  She does report some numbness in the right 5th digit and medial aspect of the 4th digit which could represent an ulnar neuropathy.  She states that she would not want surgery for that and therefore we will just monitor at this time.    PATIENT EDUCATION:  More than half the clinic visit was spent showing with patient the pertinent findings on imaging and educating the patient about natural history of the pathology.   We discussed options for treatment as well as the risks and benefits of each option.  All questions were answered.     The patient understands and agrees with the following plan of care.    - External referral to PT ([facility] in [place], [place])  - Ordered Flexeril  - Ordered CT scan to evaluate fusion and stability                .

## 2018-09-17 ENCOUNTER — HOSPITAL ENCOUNTER (OUTPATIENT)
Dept: RADIOLOGY | Facility: HOSPITAL | Age: 63
Discharge: HOME | End: 2018-09-17
Attending: NEUROLOGICAL SURGERY
Payer: MEDICARE

## 2018-09-17 DIAGNOSIS — Z98.1 STATUS POST THORACIC SPINAL FUSION: ICD-10-CM

## 2018-09-17 DIAGNOSIS — M79.18 MYOFASCIAL PAIN: ICD-10-CM

## 2018-09-17 PROCEDURE — 72128 CT CHEST SPINE W/O DYE: CPT | Mod: 26,,, | Performed by: RADIOLOGY

## 2018-09-17 PROCEDURE — 72128 CT CHEST SPINE W/O DYE: CPT | Mod: TC

## 2018-09-17 PROCEDURE — 72131 CT LUMBAR SPINE W/O DYE: CPT | Mod: 26,,, | Performed by: RADIOLOGY

## 2018-09-17 PROCEDURE — 72131 CT LUMBAR SPINE W/O DYE: CPT | Mod: TC

## 2018-10-29 ENCOUNTER — TELEPHONE (OUTPATIENT)
Dept: NEUROSURGERY | Facility: CLINIC | Age: 63
End: 2018-10-29

## 2018-10-29 NOTE — TELEPHONE ENCOUNTER
Received fax from [facility] phone# [phone] and fax# [phone] requesting a refill of cyclobenzaprine 5 mg. Gave to ALEC Suazo. Signed and faxed back to pharmacy. Quantity was for 60 tablets with 0 (zero) refills.

## 2018-11-02 ENCOUNTER — TELEPHONE (OUTPATIENT)
Dept: NEUROLOGY | Facility: CLINIC | Age: 63
End: 2018-11-02

## 2018-11-20 RX ORDER — CYCLOBENZAPRINE HCL 5 MG
TABLET ORAL
Qty: 60 TABLET | Refills: 11 | Status: SHIPPED | OUTPATIENT
Start: 2018-11-20 | End: 2019-04-01

## 2019-04-11 ENCOUNTER — OFFICE VISIT (OUTPATIENT)
Dept: NEUROSURGERY | Facility: CLINIC | Age: 64
End: 2019-04-11
Payer: MEDICARE

## 2019-04-11 VITALS
HEART RATE: 70 BPM | WEIGHT: 218.06 LBS | DIASTOLIC BLOOD PRESSURE: 98 MMHG | RESPIRATION RATE: 18 BRPM | HEIGHT: 64 IN | BODY MASS INDEX: 37.23 KG/M2 | SYSTOLIC BLOOD PRESSURE: 188 MMHG

## 2019-04-11 DIAGNOSIS — M54.50 THORACOLUMBAR BACK PAIN: ICD-10-CM

## 2019-04-11 DIAGNOSIS — M54.6 THORACOLUMBAR BACK PAIN: ICD-10-CM

## 2019-04-11 DIAGNOSIS — Z98.1 STATUS POST THORACIC SPINAL FUSION: ICD-10-CM

## 2019-04-11 DIAGNOSIS — A18.01 POTT'S DISEASE (SPINAL TUBERCULOSIS): Primary | ICD-10-CM

## 2019-04-11 PROBLEM — T81.31XA DEHISCENCE OF OPERATIVE WOUND: Status: RESOLVED | Noted: 2018-04-01 | Resolved: 2019-04-11

## 2019-04-11 PROCEDURE — 99213 PR OFFICE/OUTPT VISIT, EST, LEVL III, 20-29 MIN: ICD-10-PCS | Mod: S$PBB,,, | Performed by: NEUROLOGICAL SURGERY

## 2019-04-11 PROCEDURE — 99213 OFFICE O/P EST LOW 20 MIN: CPT | Mod: PBBFAC | Performed by: NEUROLOGICAL SURGERY

## 2019-04-11 PROCEDURE — 99213 OFFICE O/P EST LOW 20 MIN: CPT | Mod: S$PBB,,, | Performed by: NEUROLOGICAL SURGERY

## 2019-04-11 PROCEDURE — 99999 PR PBB SHADOW E&M-EST. PATIENT-LVL III: CPT | Mod: PBBFAC,,, | Performed by: NEUROLOGICAL SURGERY

## 2019-04-11 PROCEDURE — 99999 PR PBB SHADOW E&M-EST. PATIENT-LVL III: ICD-10-PCS | Mod: PBBFAC,,, | Performed by: NEUROLOGICAL SURGERY

## 2019-04-15 ENCOUNTER — HOSPITAL ENCOUNTER (OUTPATIENT)
Dept: RADIOLOGY | Facility: HOSPITAL | Age: 64
Discharge: HOME | End: 2019-04-15
Attending: NEUROLOGICAL SURGERY
Payer: MEDICARE

## 2019-04-15 DIAGNOSIS — A18.01 POTT'S DISEASE (SPINAL TUBERCULOSIS): ICD-10-CM

## 2019-04-15 DIAGNOSIS — Z98.1 STATUS POST THORACIC SPINAL FUSION: ICD-10-CM

## 2019-04-15 PROCEDURE — 72100 X-RAY EXAM L-S SPINE 2/3 VWS: CPT | Mod: 26,,, | Performed by: RADIOLOGY

## 2019-04-15 PROCEDURE — 72100 X-RAY EXAM L-S SPINE 2/3 VWS: CPT | Mod: TC

## 2019-04-15 PROCEDURE — 72070 X-RAY EXAM THORAC SPINE 2VWS: CPT | Mod: 26,,, | Performed by: RADIOLOGY

## 2019-04-15 PROCEDURE — 72070 X-RAY EXAM THORAC SPINE 2VWS: CPT | Mod: TC

## 2019-04-15 PROCEDURE — 72070 XR THORACIC SPINE AP LATERAL: ICD-10-PCS | Mod: 26,,, | Performed by: RADIOLOGY

## 2019-04-15 PROCEDURE — 72100 XR LUMBAR SPINE AP AND LATERAL: ICD-10-PCS | Mod: 26,,, | Performed by: RADIOLOGY

## 2019-04-15 NOTE — PROGRESS NOTES
CHIEF COMPLAINT:  Postop f/u     INTERVAL HISTORY (4/11/19):  Patient returns for her routine approximately 1 year follow-up status post thoracolumbar fusion.  She reports continued resolution of her thoracolumbar spine pain.  She feels like the surgery was a success.  However she notes tightness and pain along the right lateral flank and hip that will travel into her buttock and the upper and posteriorportion of her leg.   She also notes that the area of the pain distribution feels numb at times.  This pain is different than the pain she was having before surgery.   It is daily and she rates it as 7/10 on average.  She denies any weakness or changes in sensation or bowel and bladder dysfunction.  She has since finished her TB meds.  She is been undergoing physical therapy and it has helped some.  She still takes Norco 5/325 (gets from PCP) and stopped the muscle relaxer.    INTERVAL HISTORY (9/13/18):    Returns for her 6 month postoperative visit.  She reports complete resolution of her thoracic spine pain.  Her wound has healed well.  She reports pain in the right buttock and hip that radiates upward toward her right lumbar paraspinal area.  This pain is only occurs when she is moving and can get up to 8/10.  She is now using a rolling walker where as she was wheelchair bound the last visit.  She finds the right buttock and low back pain neck pain.  She also reports that she has some numbness in the 5th and ulnar aspect of 4th digits of her right hand that has been present since surgery.    She denies any new weakness or bowel / bladder dysfunction.     INTERVAL HISTORY (6/11/18):  She returns for her 3 month postoperative visit.  She missed her 6 week postop appointment but reports that she is doing very well and is essentially pain free.  She completed her stay at rehabilitation and just recently completed a course of home health physical therapy approximately 2 weeks ago.  Although she presents in a wheelchair  today she states that she is ambulatory with a walker.  She relies on the walker because of mild residual subjective weakness.  She stopped wearing her TLSO brace approximately 2 weeks ago when her physical therapy stopped.  She finds it more comfortable not to wear it. She completed her antibiotics for mycobacterium TB several weeks ago as well as a course of Augmentin for mild superficial wound dehiscence.  She reports that her wound has healed without any further dehiscence, drainage, or discharge.  She notes that the bottoms of her toes of both feet tingle and she has a patch of numbness over her right flank and low back.  She also complains of tingling in her right fifth digit but reports that it is not overly bothersome at this time.     HPI:  Mary Carrillo is a 63 y.o.  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  She was found to have spinal cord compression due to pathologic fracture and collapse of T12 and L1 vertebral bodies due to osteo-discitis.  Prior to surgery she was bedbound for many weeks and in severe, debilitating pain.  She was taken for urgent decompression of the spinal cord as well as a right sided costotransversectomy for T 12 and L1 corpectomy followed by T10-L3 fusion.  Postoperatively she recovered well with significant improvement in her preoperative pain and strength.  She was placed on Noman spectrum empiric antibiotics to cover for TB and osteo-discitis, and was discharged to inpatient rehabilitation at [facility] on [facility].  She reports that she is working diligently with rehabilitation and is able to stand but has not regained the leg strength enough to begin walking.  Overall she feels the surgery was extremely helpful.  She complains that the TLSO brace is cumbersome and causes her additional pain, and often  does not remained in the proper place.  She also reports that her wound has had some breakdown recently and it is being managed by wound care at the rehabilitation.  She is currently on rifampin and isoniazid for TB coverage and amoxicillin clavulanic acid for presumed superficial skin infection/breakdown.  Recent lab work revealed an elevated ESR and CRP.  She has not had any pus or drainage from the wound outside of the superficial breakdown.  Although she endorses that her legs are much stronger compared to preop she still has weakness in her hip flexion and a chronic mild right foot drop.              Review of patient's allergies indicates:   Allergen Reactions    Prednisone Hives and Itching       Pills only, can take if she needs to    Naproxen Rash              Past Medical History:   Diagnosis Date    Arthritis      Asthma      Back pain      Encounter for blood transfusion      Glaucoma      Gout      Hyperlipidemia      Hypertension      Sarcoidosis      Vitritis      Vitritis              Past Surgical History:   Procedure Laterality Date    BACK SURGERY   2018     Laminectomy.     BIOPSY-BONE; LUMBAR SPINE T12-L1 N/A 2017     Performed by Greyson Blue Jr., MD at [facility] OR    CATARACT EXTRACTION Bilateral       SECTION        COLONOSCOPY N/A 2014     Performed by Alea Chaidez MD at [facility] ENDO    CORPECTOMY THORACIC T12-L1 corpectomy with T10-L3 fusion, neuromonitoring, globus Left 3/18/2018     Performed by Andi Swain DO at [facility] OR [facility] FLR    HYSTERECTOMY   1977     uterus/cervix d/t uterine fibroids.    BRUCE During Cath Case N/A 2017     Performed by Carlos Eduardo Almanza MD at [facility] CATH            Family History   Problem Relation Age of Onset    Diabetes Mother      Kidney disease Mother      Diabetes Father      Kidney disease Father        Social History            Tobacco Use    Smoking status: Former Smoker       Packs/day: 1.00        Years: 35.00       Pack years: 35.00       Types: Cigarettes       Start date: 1967       Last attempt to quit: 1994       Years since quittin.7    Smokeless tobacco: Never Used   Substance Use Topics    Alcohol use: No       Alcohol/week: 0.0 oz    Drug use: No         Review of Systems   Constitutional: Negative.    Respiratory: Negative for cough and shortness of breath.    Cardiovascular: Negative for chest pain, palpitations, claudication and leg swelling.   Gastrointestinal: Negative for abdominal pain, constipation and diarrhea.   Genitourinary: Negative for flank pain, frequency and urgency.   Musculoskeletal: Positive for back pain (R paraspinal lumbar and buttock).   Skin: Negative.    Neurological: Positive for tingling (R 5th digit and medial 4th). Negative for dizziness, tremors, sensory change, speech change, focal weakness, seizures, loss of consciousness and headaches.   Psychiatric/Behavioral: Negative.          OBJECTIVE:     Vitals:    19 1418   BP: (!) 188/98   Pulse: 70   Resp: 18       Physical Exam:  Constitutional: Patient sitting comfortably in chair. Appears well developed and well nourished.  Skin: Exposed areas are intact without abnormal markings, rashes or other lesions.  HEENT: Normocephalic. Normal conjunctivae.  Cardiovascular: Normal rate and regular rhythm.  Respiratory: Chest wall rises and falls symmetrically, without signs of respiratory distress.  Abdomen: Soft and non-tender.  Extremities: Warm and without edema. Calves supple, non-tender.  Psych/Behavior: Normal affect.     Neurological:     Mental status: Alert and oriented. Conversational and appropriate.       Cranial Nerves: VFF to confrontation. PERRL. EOMI without nystagmus. Facial STLT normal and symmetric. Strong, symmetric muscles of mastication. Facial strength full and symmetric. Hearing equal bilaterally to finger rub. Palate and uvula rise and fall normally in midline. Shoulder shrug 5/5  strength. Tongue midline.      Motor:     Upper:   Deltoids Triceps Biceps WE WF  FA     R 5/5 5/5 5/5 5/5 5/5 5/5 5/5     L 5/5 5/5 5/5 5/5 5/5 5/5 5/5      Lower:   HF KE KF DF PF EHL     R 5-/5 5/5 5/5 5/5 5/5 5/5     L 5-/5 5/5 5/5 5/5 5/5 5/5      Sensory: Intact sensation to light touch and pinprick in all extremities.      Reflexes:      DTR: 1+ knees and biceps symmetrically.     Vasquez's: Right.     Babinski's: Negative.     Clonus: 3-4 beats bilaterally     Cerebellar: Finger-to-nose and rapid alternating movements normal.      Gait:  Stable and careful     Incision well healed     Diagnostic Results:  All imaging was independently reviewed by me.    CT T+L spine, dated 9/17/18:  1. Good placement of pedicle screws and rods  2. Stable mild subsidence of corpectomy cage  3. Stable T+L alignment    T-L xray, 6/1//18:  Stable placement of hardware and alignment  Mild subsidence of cage     CT T+L spine, dated 4/11/18:  1. Good placement of pedicle screws and rods  2. Possible mild subsidence of corpectomy cage  3. Stable T+L alignment        ASSESSMENT/PLAN:     Problem List Items Addressed This Visit        ID    Pott's disease (spinal tuberculosis) - Primary       Orthopedic    Status post thoracic spinal fusion    Thoracolumbar back pain        VISIT SUMMARY:  Overall patient has recovered well from surgery however c/o R paraspinal muscle spasms and pain in R flank/hip/buttock with some associated numbness.  This is likely due to myofascial strain due to loss of ROM from T-L fusion and numbness may be due to T12 nerve root irritation from surgery.  While helpful PT has not significantly helped her right sided pain.     PATIENT EDUCATION:  More than half the clinic visit was spent showing with patient the pertinent findings on imaging and educating the patient about natural history of the pathology.   We discussed options for treatment as well as the risks and benefits of each option.  All questions  were answered.      The patient understands and agrees with the following plan of care.     - Ordered T+L spine xray (will call with result)  - DEXA scan scheduled for 4/16/19  - RTC as needed

## 2019-04-23 ENCOUNTER — PATIENT OUTREACH (OUTPATIENT)
Dept: ADMINISTRATIVE | Facility: HOSPITAL | Age: 64
End: 2019-04-23

## 2019-06-18 NOTE — ASSESSMENT & PLAN NOTE
- Baseline 10-12 Hgb  - h/h drop s/p surgery   - 3/18, 4U PRBC, 2FFP and 1U Plt given  - continue to trend down, 6.6/20, 2units PRBCs transfused 3/23, Hgb at 9  - will follow h/h, goal Hgb>7     2 = assistive person

## 2019-08-14 NOTE — SUBJECTIVE & OBJECTIVE
Interval History 4/12/2018:  Patient is seen for follow-up rehab evaluation and recommendations: No acute events overnight. Right hip pain feels better with lidocaine patch. I have reviewed the HPI and PMFSH and there are no changes from admission.       Scheduled Medications:    amLODIPine  10 mg Oral Daily    carvedilol  25 mg Oral BID    gabapentin  300 mg Oral TID    heparin (porcine)  5,000 Units Subcutaneous Q8H    isoniazid  900 mg Oral Every Tues    isoniazid  900 mg Oral Every Fri    lidocaine  1 patch Transdermal Q24H    lisinopril  15 mg Oral Daily    polyethylene glycol  17 g Oral Daily    pyridoxine (vitamin B6)  50 mg Oral Daily    ramelteon  8 mg Oral QHS    rifAMpin  600 mg Oral Every Tues    rifAMpin  600 mg Oral Every Fri    senna-docusate 8.6-50 mg  2 tablet Oral Daily    sulfamethoxazole-trimethoprim 800-160mg  1 tablet Oral BID    traZODone  100 mg Oral QHS       Diagnostic Results: Labs: Reviewed  X-Ray: Reviewed    PRN Medications: acetaminophen, albuterol sulfate, bisacodyl, calcium carbonate, hydrOXYzine pamoate, magnesium hydroxide 400 mg/5 ml, methocarbamol, ondansetron, oxyCODONE-acetaminophen, senna, simethicone    Review of Systems   Constitutional: Positive for activity change. Negative for chills, fatigue and fever.   HENT: Negative for drooling, hearing loss, trouble swallowing and voice change.    Eyes: Negative for pain and visual disturbance.   Respiratory: Negative for cough, shortness of breath and wheezing.    Cardiovascular: Negative for chest pain and palpitations.   Gastrointestinal: Negative for abdominal distention, nausea and vomiting.   Genitourinary: Negative for difficulty urinating and flank pain.   Musculoskeletal: Positive for arthralgias, back pain and gait problem. Negative for neck pain.   Skin: Positive for wound. Negative for rash.   Neurological: Positive for weakness. Negative for dizziness, numbness and headaches.   Psychiatric/Behavioral:  Stroke work up negative.  Patient reports his speech has been fine and there isn't constant change, occasional word finding difficulties.  At this time, no formal speech therapy required.   Negative for agitation and hallucinations. The patient is not nervous/anxious.      Objective:     Vital Signs (Most Recent):  Temp: 98.6 °F (37 °C) (18 0700)  Pulse: 63 (18 0700)  Resp: 16 (18 0700)  BP: 139/66 (18 0700)  SpO2: 96 % (18 0700)    Vital Signs (24h Range):  Temp:  [98.5 °F (36.9 °C)-98.6 °F (37 °C)] 98.6 °F (37 °C)  Pulse:  [63-78] 63  Resp:  [16-18] 16  SpO2:  [93 %-96 %] 96 %  BP: (134-139)/(63-66) 139/66     Physical Exam   Constitutional: She is oriented to person, place, and time. She appears well-developed and well-nourished. No distress.       HENT:   Head: Normocephalic and atraumatic.   Nose: Nose normal.   Eyes: Right eye exhibits no discharge. Left eye exhibits no discharge. No scleral icterus.   Neck: Normal range of motion.   Cardiovascular: Normal rate, regular rhythm and intact distal pulses.    Pulmonary/Chest: Effort normal. No respiratory distress. She has no wheezes.   Abdominal: Soft. She exhibits no distension. There is no tenderness.   Musculoskeletal: She exhibits no edema or tenderness.        Right ankle: She exhibits decreased range of motion (foot drop).   TLSO brace intact  No pain with internal or external rotation of right hip  +TTP GTB, reproduces hip pain   Neurological: She is alert and oriented to person, place, and time. She exhibits normal muscle tone.   -  Mental Status:  AAOx3.  Follows commands.  Answers correct age and .  Recent and remote memory intact.  -  Speech and language:  no aphasia or dysarthria.    -  Vision:  no hemianopsia or ptosis.    -  Facial movement (CN VII): symmetrical.   -  Motor:  RUE: 4/5.  LUE: 4/5.  RLE:  Hip Flex 2/5, Knee Ext/Flex 3-/5,  DF 1/5, PF 4-/5.  LLE:  Hip Flex 2+/5, Knee Ext/Flex 4-/5,  DF 4/5, PF 4/5.  -  Sensory:  Intact to light touch and pin prick.   Skin: Skin is warm and dry. No rash noted.   Proximal incision site dehisced. No drainage.    Psychiatric: She has a normal mood and affect.  Her behavior is normal.   Vitals reviewed.    NEUROLOGICAL EXAMINATION:     MENTAL STATUS   Oriented to person, place, and time.

## 2019-10-08 PROBLEM — N04.9 NEPHROTIC SYNDROME: Status: ACTIVE | Noted: 2019-10-08

## 2019-12-19 ENCOUNTER — TELEPHONE (OUTPATIENT)
Dept: TRANSPLANT | Facility: CLINIC | Age: 64
End: 2019-12-19

## 2020-01-01 ENCOUNTER — OFFICE VISIT (OUTPATIENT)
Dept: TRANSPLANT | Facility: CLINIC | Age: 65
End: 2020-01-01
Payer: MEDICARE

## 2020-01-01 ENCOUNTER — DOCUMENT SCAN (OUTPATIENT)
Dept: HOME HEALTH SERVICES | Facility: HOSPITAL | Age: 65
End: 2020-01-01
Payer: MEDICARE

## 2020-01-01 ENCOUNTER — ANESTHESIA EVENT (OUTPATIENT)
Dept: SURGERY | Facility: HOSPITAL | Age: 65
DRG: 981 | End: 2020-01-01
Payer: MEDICARE

## 2020-01-01 ENCOUNTER — OFFICE VISIT (OUTPATIENT)
Dept: SURGERY | Facility: CLINIC | Age: 65
End: 2020-01-01
Payer: MEDICARE

## 2020-01-01 ENCOUNTER — TELEPHONE (OUTPATIENT)
Dept: TRANSPLANT | Facility: CLINIC | Age: 65
End: 2020-01-01

## 2020-01-01 ENCOUNTER — TELEPHONE (OUTPATIENT)
Dept: SURGERY | Facility: CLINIC | Age: 65
End: 2020-01-01

## 2020-01-01 ENCOUNTER — LAB VISIT (OUTPATIENT)
Dept: LAB | Facility: HOSPITAL | Age: 65
End: 2020-01-01
Payer: MEDICARE

## 2020-01-01 ENCOUNTER — HOSPITAL ENCOUNTER (EMERGENCY)
Facility: HOSPITAL | Age: 65
Discharge: ANOTHER HOSPITAL | End: 2020-07-26
Attending: SURGERY
Payer: MEDICARE

## 2020-01-01 ENCOUNTER — EXTERNAL HOME HEALTH (OUTPATIENT)
Dept: HOME HEALTH SERVICES | Facility: HOSPITAL | Age: 65
End: 2020-01-01
Payer: MEDICARE

## 2020-01-01 ENCOUNTER — PATIENT OUTREACH (OUTPATIENT)
Dept: ADMINISTRATIVE | Facility: HOSPITAL | Age: 65
End: 2020-01-01

## 2020-01-01 ENCOUNTER — COMMITTEE REVIEW (OUTPATIENT)
Dept: TRANSPLANT | Facility: CLINIC | Age: 65
End: 2020-01-01

## 2020-01-01 ENCOUNTER — ANESTHESIA (OUTPATIENT)
Dept: SURGERY | Facility: HOSPITAL | Age: 65
DRG: 981 | End: 2020-01-01
Payer: MEDICARE

## 2020-01-01 ENCOUNTER — HOSPITAL ENCOUNTER (INPATIENT)
Facility: HOSPITAL | Age: 65
LOS: 9 days | Discharge: HOME HEALTH | DRG: 981 | End: 2020-08-04
Attending: HOSPITALIST | Admitting: HOSPITALIST
Payer: MEDICARE

## 2020-01-01 ENCOUNTER — HOSPITAL ENCOUNTER (EMERGENCY)
Facility: HOSPITAL | Age: 65
Discharge: ANOTHER HOSPITAL | End: 2020-12-31
Attending: SURGERY
Payer: MEDICARE

## 2020-01-01 ENCOUNTER — PATIENT OUTREACH (OUTPATIENT)
Dept: ADMINISTRATIVE | Facility: CLINIC | Age: 65
End: 2020-01-01

## 2020-01-01 ENCOUNTER — EXTERNAL HOME HEALTH (OUTPATIENT)
Dept: HOME HEALTH SERVICES | Facility: HOSPITAL | Age: 65
End: 2020-01-01

## 2020-01-01 ENCOUNTER — DOCUMENTATION ONLY (OUTPATIENT)
Dept: TRANSPLANT | Facility: CLINIC | Age: 65
End: 2020-01-01

## 2020-01-01 ENCOUNTER — NURSE TRIAGE (OUTPATIENT)
Dept: ADMINISTRATIVE | Facility: CLINIC | Age: 65
End: 2020-01-01

## 2020-01-01 VITALS
HEART RATE: 87 BPM | HEIGHT: 65 IN | WEIGHT: 208 LBS | BODY MASS INDEX: 34.66 KG/M2 | OXYGEN SATURATION: 94 % | SYSTOLIC BLOOD PRESSURE: 133 MMHG | DIASTOLIC BLOOD PRESSURE: 63 MMHG | RESPIRATION RATE: 30 BRPM | TEMPERATURE: 98 F

## 2020-01-01 VITALS
RESPIRATION RATE: 18 BRPM | TEMPERATURE: 99 F | HEIGHT: 65 IN | WEIGHT: 212.06 LBS | BODY MASS INDEX: 35.33 KG/M2 | HEART RATE: 87 BPM | DIASTOLIC BLOOD PRESSURE: 60 MMHG | OXYGEN SATURATION: 98 % | SYSTOLIC BLOOD PRESSURE: 134 MMHG

## 2020-01-01 VITALS
DIASTOLIC BLOOD PRESSURE: 77 MMHG | HEART RATE: 86 BPM | OXYGEN SATURATION: 96 % | TEMPERATURE: 96 F | RESPIRATION RATE: 18 BRPM | SYSTOLIC BLOOD PRESSURE: 161 MMHG

## 2020-01-01 VITALS
SYSTOLIC BLOOD PRESSURE: 120 MMHG | BODY MASS INDEX: 35.26 KG/M2 | WEIGHT: 211.63 LBS | HEIGHT: 65 IN | HEART RATE: 81 BPM | DIASTOLIC BLOOD PRESSURE: 67 MMHG

## 2020-01-01 VITALS
SYSTOLIC BLOOD PRESSURE: 155 MMHG | OXYGEN SATURATION: 93 % | WEIGHT: 221.13 LBS | HEIGHT: 63 IN | RESPIRATION RATE: 16 BRPM | TEMPERATURE: 98 F | DIASTOLIC BLOOD PRESSURE: 78 MMHG | BODY MASS INDEX: 39.18 KG/M2 | HEART RATE: 98 BPM

## 2020-01-01 DIAGNOSIS — I63.9 ACUTE CVA (CEREBROVASCULAR ACCIDENT): ICD-10-CM

## 2020-01-01 DIAGNOSIS — A18.01 POTT'S DISEASE (SPINAL TUBERCULOSIS): Primary | ICD-10-CM

## 2020-01-01 DIAGNOSIS — A15.0 PULMONARY TUBERCULOSIS: ICD-10-CM

## 2020-01-01 DIAGNOSIS — I50.9 CONGESTIVE HEART FAILURE, UNSPECIFIED HF CHRONICITY, UNSPECIFIED HEART FAILURE TYPE: ICD-10-CM

## 2020-01-01 DIAGNOSIS — E66.01 CLASS 2 SEVERE OBESITY WITH SERIOUS COMORBIDITY AND BODY MASS INDEX (BMI) OF 39.0 TO 39.9 IN ADULT, UNSPECIFIED OBESITY TYPE: ICD-10-CM

## 2020-01-01 DIAGNOSIS — N18.6 ESRD (END STAGE RENAL DISEASE): ICD-10-CM

## 2020-01-01 DIAGNOSIS — N18.5 CKD (CHRONIC KIDNEY DISEASE) STAGE 5, GFR LESS THAN 15 ML/MIN: ICD-10-CM

## 2020-01-01 DIAGNOSIS — M54.6 THORACOLUMBAR BACK PAIN: ICD-10-CM

## 2020-01-01 DIAGNOSIS — U07.1 COVID-19 VIRUS INFECTION: Primary | ICD-10-CM

## 2020-01-01 DIAGNOSIS — R07.9 CHEST PAIN: ICD-10-CM

## 2020-01-01 DIAGNOSIS — Z76.82 ORGAN TRANSPLANT CANDIDATE: Primary | ICD-10-CM

## 2020-01-01 DIAGNOSIS — D86.9 SARCOIDOSIS: ICD-10-CM

## 2020-01-01 DIAGNOSIS — R53.1 GENERALIZED WEAKNESS: ICD-10-CM

## 2020-01-01 DIAGNOSIS — R34 ANURIA AND OLIGURIA: ICD-10-CM

## 2020-01-01 DIAGNOSIS — N18.5 CKD (CHRONIC KIDNEY DISEASE) STAGE 5, GFR LESS THAN 15 ML/MIN: Primary | ICD-10-CM

## 2020-01-01 DIAGNOSIS — R60.9 FLUID RETENTION: ICD-10-CM

## 2020-01-01 DIAGNOSIS — R09.02 HYPOXIA: ICD-10-CM

## 2020-01-01 DIAGNOSIS — R06.02 SOB (SHORTNESS OF BREATH): Primary | ICD-10-CM

## 2020-01-01 DIAGNOSIS — U07.1 COVID-19 VIRUS DETECTED: ICD-10-CM

## 2020-01-01 DIAGNOSIS — N25.81 SECONDARY HYPERPARATHYROIDISM OF RENAL ORIGIN: ICD-10-CM

## 2020-01-01 DIAGNOSIS — I51.89 DIASTOLIC DYSFUNCTION: ICD-10-CM

## 2020-01-01 DIAGNOSIS — R06.02 SHORTNESS OF BREATH: ICD-10-CM

## 2020-01-01 DIAGNOSIS — Z76.82 ORGAN TRANSPLANT CANDIDATE: ICD-10-CM

## 2020-01-01 DIAGNOSIS — E87.70 HYPERVOLEMIA ASSOCIATED WITH RENAL INSUFFICIENCY: Primary | ICD-10-CM

## 2020-01-01 DIAGNOSIS — N28.9 HYPERVOLEMIA ASSOCIATED WITH RENAL INSUFFICIENCY: Primary | ICD-10-CM

## 2020-01-01 DIAGNOSIS — M54.50 THORACOLUMBAR BACK PAIN: ICD-10-CM

## 2020-01-01 LAB
ABO + RH BLD: NORMAL
ABO + RH BLD: NORMAL
ALBUMIN SERPL BCP-MCNC: 2.1 G/DL (ref 3.5–5.2)
ALBUMIN SERPL BCP-MCNC: 2.2 G/DL (ref 3.5–5.2)
ALBUMIN SERPL BCP-MCNC: 2.2 G/DL (ref 3.5–5.2)
ALBUMIN SERPL BCP-MCNC: 2.4 G/DL (ref 3.5–5.2)
ALBUMIN SERPL BCP-MCNC: 2.6 G/DL (ref 3.5–5.2)
ALBUMIN SERPL BCP-MCNC: 2.9 G/DL (ref 3.5–5.2)
ALLENS TEST: ABNORMAL
ALP SERPL-CCNC: 100 U/L (ref 55–135)
ALP SERPL-CCNC: 109 U/L (ref 55–135)
ALP SERPL-CCNC: 110 U/L (ref 55–135)
ALP SERPL-CCNC: 121 U/L (ref 55–135)
ALP SERPL-CCNC: 137 U/L (ref 55–135)
ALP SERPL-CCNC: 93 U/L (ref 55–135)
ALT SERPL W/O P-5'-P-CCNC: 11 U/L (ref 10–44)
ALT SERPL W/O P-5'-P-CCNC: 12 U/L (ref 10–44)
ALT SERPL W/O P-5'-P-CCNC: 13 U/L (ref 10–44)
ALT SERPL W/O P-5'-P-CCNC: 34 U/L (ref 10–44)
ANION GAP SERPL CALC-SCNC: 10 MMOL/L (ref 8–16)
ANION GAP SERPL CALC-SCNC: 11 MMOL/L (ref 8–16)
ANION GAP SERPL CALC-SCNC: 12 MMOL/L (ref 8–16)
ANION GAP SERPL CALC-SCNC: 13 MMOL/L (ref 8–16)
ANION GAP SERPL CALC-SCNC: 13 MMOL/L (ref 8–16)
ANION GAP SERPL CALC-SCNC: 14 MMOL/L (ref 8–16)
ANION GAP SERPL CALC-SCNC: 14 MMOL/L (ref 8–16)
ANION GAP SERPL CALC-SCNC: 18 MMOL/L (ref 8–16)
ANION GAP SERPL CALC-SCNC: 25 MMOL/L (ref 8–16)
ANION GAP SERPL CALC-SCNC: 6 MMOL/L (ref 8–16)
ANION GAP SERPL CALC-SCNC: 9 MMOL/L (ref 8–16)
AORTIC ROOT ANNULUS: 3.33 CM
APTT BLDCRRT: 29.5 SEC (ref 21–32)
APTT BLDCRRT: 33.7 SEC (ref 21–32)
APTT BLDCRRT: 36.6 SEC (ref 21–32)
APTT BLDCRRT: 40.5 SEC (ref 21–32)
ASCENDING AORTA: 3.02 CM
AST SERPL-CCNC: 14 U/L (ref 10–40)
AST SERPL-CCNC: 16 U/L (ref 10–40)
AST SERPL-CCNC: 20 U/L (ref 10–40)
AST SERPL-CCNC: 23 U/L (ref 10–40)
AST SERPL-CCNC: 24 U/L (ref 10–40)
AST SERPL-CCNC: 47 U/L (ref 10–40)
AV INDEX (PROSTH): 0.53
AV MEAN GRADIENT: 8 MMHG
AV PEAK GRADIENT: 12 MMHG
AV VALVE AREA: 1.82 CM2
AV VELOCITY RATIO: 0.54
B CELL RESULTS - XM AUTO: NEGATIVE
B MCS AVERAGE - XM AUTO: 8.2
BACTERIA #/AREA URNS HPF: ABNORMAL /HPF
BACTERIA #/AREA URNS HPF: NORMAL /HPF
BACTERIA BLD CULT: NORMAL
BASOPHILS # BLD AUTO: 0.01 K/UL (ref 0–0.2)
BASOPHILS # BLD AUTO: 0.01 K/UL (ref 0–0.2)
BASOPHILS # BLD AUTO: 0.02 K/UL (ref 0–0.2)
BASOPHILS # BLD AUTO: 0.03 K/UL (ref 0–0.2)
BASOPHILS # BLD AUTO: 0.04 K/UL (ref 0–0.2)
BASOPHILS # BLD AUTO: 0.05 K/UL (ref 0–0.2)
BASOPHILS NFR BLD: 0.1 % (ref 0–1.9)
BASOPHILS NFR BLD: 0.2 % (ref 0–1.9)
BASOPHILS NFR BLD: 0.3 % (ref 0–1.9)
BASOPHILS NFR BLD: 0.3 % (ref 0–1.9)
BASOPHILS NFR BLD: 0.4 % (ref 0–1.9)
BILIRUB DIRECT SERPL-MCNC: 0.1 MG/DL (ref 0.1–0.3)
BILIRUB SERPL-MCNC: 0.1 MG/DL (ref 0.1–1)
BILIRUB SERPL-MCNC: 0.1 MG/DL (ref 0.1–1)
BILIRUB SERPL-MCNC: 0.2 MG/DL (ref 0.1–1)
BILIRUB SERPL-MCNC: 0.2 MG/DL (ref 0.1–1)
BILIRUB SERPL-MCNC: 0.3 MG/DL (ref 0.1–1)
BILIRUB SERPL-MCNC: 0.3 MG/DL (ref 0.1–1)
BILIRUB UR QL STRIP: NEGATIVE
BILIRUB UR QL STRIP: NEGATIVE
BLD GP AB SCN CELLS X3 SERPL QL: NORMAL
BLD GP AB SCN CELLS X3 SERPL QL: NORMAL
BLD PROD TYP BPU: NORMAL
BLOOD UNIT EXPIRATION DATE: NORMAL
BLOOD UNIT TYPE CODE: 5100
BLOOD UNIT TYPE: NORMAL
BNP SERPL-MCNC: 10 PG/ML (ref 0–99)
BNP SERPL-MCNC: 1261 PG/ML (ref 0–99)
BSA FOR ECHO PROCEDURE: 2.1 M2
BUN SERPL-MCNC: 28 MG/DL (ref 8–23)
BUN SERPL-MCNC: 28 MG/DL (ref 8–23)
BUN SERPL-MCNC: 32 MG/DL (ref 8–23)
BUN SERPL-MCNC: 33 MG/DL (ref 8–23)
BUN SERPL-MCNC: 33 MG/DL (ref 8–23)
BUN SERPL-MCNC: 41 MG/DL (ref 8–23)
BUN SERPL-MCNC: 41 MG/DL (ref 8–23)
BUN SERPL-MCNC: 42 MG/DL (ref 8–23)
BUN SERPL-MCNC: 48 MG/DL (ref 8–23)
BUN SERPL-MCNC: 50 MG/DL (ref 8–23)
BUN SERPL-MCNC: 52 MG/DL (ref 8–23)
BUN SERPL-MCNC: 52 MG/DL (ref 8–23)
BUN SERPL-MCNC: 66 MG/DL (ref 8–23)
BUN SERPL-MCNC: 68 MG/DL (ref 8–23)
BUN SERPL-MCNC: 70 MG/DL (ref 8–23)
BUN SERPL-MCNC: 83 MG/DL (ref 8–23)
CALCIUM SERPL-MCNC: 5.6 MG/DL (ref 8.7–10.5)
CALCIUM SERPL-MCNC: 5.7 MG/DL (ref 8.7–10.5)
CALCIUM SERPL-MCNC: 6.3 MG/DL (ref 8.7–10.5)
CALCIUM SERPL-MCNC: 6.4 MG/DL (ref 8.7–10.5)
CALCIUM SERPL-MCNC: 6.6 MG/DL (ref 8.7–10.5)
CALCIUM SERPL-MCNC: 6.8 MG/DL (ref 8.7–10.5)
CALCIUM SERPL-MCNC: 6.8 MG/DL (ref 8.7–10.5)
CALCIUM SERPL-MCNC: 7.1 MG/DL (ref 8.7–10.5)
CALCIUM SERPL-MCNC: 7.2 MG/DL (ref 8.7–10.5)
CALCIUM SERPL-MCNC: 7.3 MG/DL (ref 8.7–10.5)
CALCIUM SERPL-MCNC: 7.4 MG/DL (ref 8.7–10.5)
CALCIUM SERPL-MCNC: 7.5 MG/DL (ref 8.7–10.5)
CALCIUM SERPL-MCNC: 8.1 MG/DL (ref 8.7–10.5)
CALCIUM SERPL-MCNC: 8.1 MG/DL (ref 8.7–10.5)
CHLORIDE SERPL-SCNC: 101 MMOL/L (ref 95–110)
CHLORIDE SERPL-SCNC: 102 MMOL/L (ref 95–110)
CHLORIDE SERPL-SCNC: 103 MMOL/L (ref 95–110)
CHLORIDE SERPL-SCNC: 104 MMOL/L (ref 95–110)
CHLORIDE SERPL-SCNC: 105 MMOL/L (ref 95–110)
CHLORIDE SERPL-SCNC: 106 MMOL/L (ref 95–110)
CHLORIDE SERPL-SCNC: 106 MMOL/L (ref 95–110)
CHLORIDE SERPL-SCNC: 107 MMOL/L (ref 95–110)
CHLORIDE SERPL-SCNC: 109 MMOL/L (ref 95–110)
CHLORIDE SERPL-SCNC: 109 MMOL/L (ref 95–110)
CHLORIDE SERPL-SCNC: 95 MMOL/L (ref 95–110)
CHOLEST SERPL-MCNC: 143 MG/DL (ref 120–199)
CHOLEST SERPL-MCNC: 210 MG/DL (ref 120–199)
CHOLEST/HDLC SERPL: 6 {RATIO} (ref 2–5)
CHOLEST/HDLC SERPL: 6 {RATIO} (ref 2–5)
CK MB SERPL-MCNC: 1.2 NG/ML (ref 0.1–6.5)
CK MB SERPL-MCNC: 4.2 NG/ML (ref 0.1–6.5)
CK MB SERPL-MCNC: 5.4 NG/ML (ref 0.1–6.5)
CK MB SERPL-RTO: 0.2 % (ref 0–5)
CK MB SERPL-RTO: 0.6 % (ref 0–5)
CK MB SERPL-RTO: 1.5 % (ref 0–5)
CK SERPL-CCNC: 354 U/L (ref 20–180)
CK SERPL-CCNC: 699 U/L (ref 20–180)
CK SERPL-CCNC: 699 U/L (ref 20–180)
CK SERPL-CCNC: 718 U/L (ref 20–180)
CK SERPL-CCNC: 718 U/L (ref 20–180)
CLARITY UR: CLEAR
CLARITY UR: CLEAR
CLASS I ANTIBODIES - LUMINEX: NORMAL
CLASS II ANTIBODY COMMENTS - LUMINEX: NORMAL
CMV IGG SERPL QL IA: REACTIVE
CO2 SERPL-SCNC: 17 MMOL/L (ref 23–29)
CO2 SERPL-SCNC: 19 MMOL/L (ref 23–29)
CO2 SERPL-SCNC: 20 MMOL/L (ref 23–29)
CO2 SERPL-SCNC: 21 MMOL/L (ref 23–29)
CO2 SERPL-SCNC: 21 MMOL/L (ref 23–29)
CO2 SERPL-SCNC: 22 MMOL/L (ref 23–29)
CO2 SERPL-SCNC: 23 MMOL/L (ref 23–29)
CO2 SERPL-SCNC: 23 MMOL/L (ref 23–29)
CO2 SERPL-SCNC: 24 MMOL/L (ref 23–29)
CO2 SERPL-SCNC: 25 MMOL/L (ref 23–29)
CO2 SERPL-SCNC: 25 MMOL/L (ref 23–29)
CODING SYSTEM: NORMAL
COLOR UR: YELLOW
COLOR UR: YELLOW
CPRA %: 19
CREAT SERPL-MCNC: 10 MG/DL (ref 0.5–1.4)
CREAT SERPL-MCNC: 4 MG/DL (ref 0.5–1.4)
CREAT SERPL-MCNC: 4.1 MG/DL (ref 0.5–1.4)
CREAT SERPL-MCNC: 4.8 MG/DL (ref 0.5–1.4)
CREAT SERPL-MCNC: 4.9 MG/DL (ref 0.5–1.4)
CREAT SERPL-MCNC: 4.9 MG/DL (ref 0.5–1.4)
CREAT SERPL-MCNC: 5.1 MG/DL (ref 0.5–1.4)
CREAT SERPL-MCNC: 5.8 MG/DL (ref 0.5–1.4)
CREAT SERPL-MCNC: 5.8 MG/DL (ref 0.5–1.4)
CREAT SERPL-MCNC: 5.9 MG/DL (ref 0.5–1.4)
CREAT SERPL-MCNC: 5.9 MG/DL (ref 0.5–1.4)
CREAT SERPL-MCNC: 6.2 MG/DL (ref 0.5–1.4)
CREAT SERPL-MCNC: 6.3 MG/DL (ref 0.5–1.4)
CREAT SERPL-MCNC: 6.7 MG/DL (ref 0.5–1.4)
CREAT SERPL-MCNC: 6.8 MG/DL (ref 0.5–1.4)
CREAT SERPL-MCNC: 7 MG/DL (ref 0.5–1.4)
CRP SERPL-MCNC: 60.1 MG/L (ref 0–8.2)
CV ECHO LV RWT: 0.53 CM
D DIMER PPP IA.FEU-MCNC: 2.77 MG/L FEU
D DIMER PPP IA.FEU-MCNC: 3.84 MG/L FEU
DELSYS: ABNORMAL
DIFFERENTIAL METHOD: ABNORMAL
DISPENSE STATUS: NORMAL
DOP CALC AO PEAK VEL: 1.75 M/S
DOP CALC AO VTI: 34.82 CM
DOP CALC LVOT AREA: 3.5 CM2
DOP CALC LVOT DIAMETER: 2.1 CM
DOP CALC LVOT PEAK VEL: 0.94 M/S
DOP CALC LVOT STROKE VOLUME: 63.39 CM3
DOP CALCLVOT PEAK VEL VTI: 18.31 CM
E WAVE DECELERATION TIME: 285.05 MSEC
E/A RATIO: 0.99
E/E' RATIO: 20 M/S
ECHO LV POSTERIOR WALL: 1.19 CM (ref 0.6–1.1)
EOSINOPHIL # BLD AUTO: 0 K/UL (ref 0–0.5)
EOSINOPHIL # BLD AUTO: 0.1 K/UL (ref 0–0.5)
EOSINOPHIL # BLD AUTO: 0.1 K/UL (ref 0–0.5)
EOSINOPHIL # BLD AUTO: 0.2 K/UL (ref 0–0.5)
EOSINOPHIL # BLD AUTO: 0.4 K/UL (ref 0–0.5)
EOSINOPHIL # BLD AUTO: 0.5 K/UL (ref 0–0.5)
EOSINOPHIL # BLD AUTO: 0.5 K/UL (ref 0–0.5)
EOSINOPHIL # BLD AUTO: 0.6 K/UL (ref 0–0.5)
EOSINOPHIL # BLD AUTO: 0.8 K/UL (ref 0–0.5)
EOSINOPHIL NFR BLD: 0.1 % (ref 0–8)
EOSINOPHIL NFR BLD: 0.2 % (ref 0–8)
EOSINOPHIL NFR BLD: 0.3 % (ref 0–8)
EOSINOPHIL NFR BLD: 1.9 % (ref 0–8)
EOSINOPHIL NFR BLD: 2.1 % (ref 0–8)
EOSINOPHIL NFR BLD: 2.5 % (ref 0–8)
EOSINOPHIL NFR BLD: 2.8 % (ref 0–8)
EOSINOPHIL NFR BLD: 3 % (ref 0–8)
EOSINOPHIL NFR BLD: 3 % (ref 0–8)
EOSINOPHIL NFR BLD: 3.5 % (ref 0–8)
EOSINOPHIL NFR BLD: 3.6 % (ref 0–8)
EOSINOPHIL NFR BLD: 4.7 % (ref 0–8)
EOSINOPHIL NFR BLD: 5.6 % (ref 0–8)
EOSINOPHIL NFR BLD: 5.7 % (ref 0–8)
EOSINOPHIL NFR BLD: 5.7 % (ref 0–8)
EOSINOPHIL NFR BLD: 6.8 % (ref 0–8)
ERYTHROCYTE [DISTWIDTH] IN BLOOD BY AUTOMATED COUNT: 15.9 % (ref 11.5–14.5)
ERYTHROCYTE [DISTWIDTH] IN BLOOD BY AUTOMATED COUNT: 18.3 % (ref 11.5–14.5)
ERYTHROCYTE [DISTWIDTH] IN BLOOD BY AUTOMATED COUNT: 18.5 % (ref 11.5–14.5)
ERYTHROCYTE [DISTWIDTH] IN BLOOD BY AUTOMATED COUNT: 18.6 % (ref 11.5–14.5)
ERYTHROCYTE [DISTWIDTH] IN BLOOD BY AUTOMATED COUNT: 18.7 % (ref 11.5–14.5)
ERYTHROCYTE [DISTWIDTH] IN BLOOD BY AUTOMATED COUNT: 18.8 % (ref 11.5–14.5)
ERYTHROCYTE [DISTWIDTH] IN BLOOD BY AUTOMATED COUNT: 18.8 % (ref 11.5–14.5)
ERYTHROCYTE [DISTWIDTH] IN BLOOD BY AUTOMATED COUNT: 18.9 % (ref 11.5–14.5)
ERYTHROCYTE [DISTWIDTH] IN BLOOD BY AUTOMATED COUNT: 18.9 % (ref 11.5–14.5)
ERYTHROCYTE [DISTWIDTH] IN BLOOD BY AUTOMATED COUNT: 19 % (ref 11.5–14.5)
ERYTHROCYTE [DISTWIDTH] IN BLOOD BY AUTOMATED COUNT: 19.1 % (ref 11.5–14.5)
ERYTHROCYTE [DISTWIDTH] IN BLOOD BY AUTOMATED COUNT: 19.2 % (ref 11.5–14.5)
ERYTHROCYTE [DISTWIDTH] IN BLOOD BY AUTOMATED COUNT: 19.5 % (ref 11.5–14.5)
ERYTHROCYTE [SEDIMENTATION RATE] IN BLOOD BY WESTERGREN METHOD: 108 MM/HR (ref 0–20)
ERYTHROCYTE [SEDIMENTATION RATE] IN BLOOD BY WESTERGREN METHOD: >120 MM/HR (ref 0–20)
EST. GFR  (AFRICAN AMERICAN): 10 ML/MIN/1.73 M^2
EST. GFR  (AFRICAN AMERICAN): 12 ML/MIN/1.73 M^2
EST. GFR  (AFRICAN AMERICAN): 13 ML/MIN/1.73 M^2
EST. GFR  (AFRICAN AMERICAN): 4 ML/MIN/1.73 M^2
EST. GFR  (AFRICAN AMERICAN): 6 ML/MIN/1.73 M^2
EST. GFR  (AFRICAN AMERICAN): 7 ML/MIN/1.73 M^2
EST. GFR  (AFRICAN AMERICAN): 7.3 ML/MIN/1.73 M^2
EST. GFR  (AFRICAN AMERICAN): 8 ML/MIN/1.73 M^2
EST. GFR  (AFRICAN AMERICAN): 9 ML/MIN/1.73 M^2
EST. GFR  (NON AFRICAN AMERICAN): 11 ML/MIN/1.73 M^2
EST. GFR  (NON AFRICAN AMERICAN): 11 ML/MIN/1.73 M^2
EST. GFR  (NON AFRICAN AMERICAN): 4 ML/MIN/1.73 M^2
EST. GFR  (NON AFRICAN AMERICAN): 6 ML/MIN/1.73 M^2
EST. GFR  (NON AFRICAN AMERICAN): 6.3 ML/MIN/1.73 M^2
EST. GFR  (NON AFRICAN AMERICAN): 7 ML/MIN/1.73 M^2
EST. GFR  (NON AFRICAN AMERICAN): 8 ML/MIN/1.73 M^2
EST. GFR  (NON AFRICAN AMERICAN): 9 ML/MIN/1.73 M^2
ESTIMATED AVG GLUCOSE: 103 MG/DL (ref 68–131)
FERRITIN SERPL-MCNC: 2518 NG/ML (ref 20–300)
FRACTIONAL SHORTENING: 25 % (ref 28–44)
FXMAU TESTING DATE: NORMAL
GAMMA INTERFERON BACKGROUND BLD IA-ACNC: 0.44 IU/ML
GLUCOSE SERPL-MCNC: 114 MG/DL (ref 70–110)
GLUCOSE SERPL-MCNC: 114 MG/DL (ref 70–110)
GLUCOSE SERPL-MCNC: 119 MG/DL (ref 70–110)
GLUCOSE SERPL-MCNC: 120 MG/DL (ref 70–110)
GLUCOSE SERPL-MCNC: 81 MG/DL (ref 70–110)
GLUCOSE SERPL-MCNC: 84 MG/DL (ref 70–110)
GLUCOSE SERPL-MCNC: 90 MG/DL (ref 70–110)
GLUCOSE SERPL-MCNC: 91 MG/DL (ref 70–110)
GLUCOSE SERPL-MCNC: 92 MG/DL (ref 70–110)
GLUCOSE SERPL-MCNC: 98 MG/DL (ref 70–110)
GLUCOSE UR QL STRIP: NEGATIVE
GLUCOSE UR QL STRIP: NEGATIVE
HBA1C MFR BLD HPLC: 5.2 % (ref 4–5.6)
HBV CORE AB SERPL QL IA: NEGATIVE
HBV CORE AB SERPL QL IA: NEGATIVE
HBV SURFACE AB SER QL IA: NEGATIVE
HBV SURFACE AB SER-ACNC: NEGATIVE M[IU]/ML
HBV SURFACE AB SERPL IA-ACNC: <3 MIU/ML
HBV SURFACE AG SERPL QL IA: NEGATIVE
HBV SURFACE AG SERPL QL IA: NEGATIVE
HCO3 UR-SCNC: 21.9 MMOL/L (ref 24–28)
HCT VFR BLD AUTO: 20.5 % (ref 37–48.5)
HCT VFR BLD AUTO: 20.5 % (ref 37–48.5)
HCT VFR BLD AUTO: 22.7 % (ref 37–48.5)
HCT VFR BLD AUTO: 23.4 % (ref 37–48.5)
HCT VFR BLD AUTO: 24 % (ref 37–48.5)
HCT VFR BLD AUTO: 24.1 % (ref 37–48.5)
HCT VFR BLD AUTO: 24.2 % (ref 37–48.5)
HCT VFR BLD AUTO: 25.1 % (ref 37–48.5)
HCT VFR BLD AUTO: 25.1 % (ref 37–48.5)
HCT VFR BLD AUTO: 25.5 % (ref 37–48.5)
HCT VFR BLD AUTO: 25.8 % (ref 37–48.5)
HCT VFR BLD AUTO: 26.1 % (ref 37–48.5)
HCT VFR BLD AUTO: 26.8 % (ref 37–48.5)
HCT VFR BLD AUTO: 27.5 % (ref 37–48.5)
HCT VFR BLD AUTO: 27.5 % (ref 37–48.5)
HCT VFR BLD AUTO: 27.7 % (ref 37–48.5)
HCT VFR BLD AUTO: 36.2 % (ref 37–48.5)
HCV AB SERPL QL IA: NEGATIVE
HDLC SERPL-MCNC: 24 MG/DL (ref 40–75)
HDLC SERPL-MCNC: 35 MG/DL (ref 40–75)
HDLC SERPL: 16.7 % (ref 20–50)
HDLC SERPL: 16.8 % (ref 20–50)
HGB BLD-MCNC: 11.6 G/DL (ref 12–16)
HGB BLD-MCNC: 6.2 G/DL (ref 12–16)
HGB BLD-MCNC: 6.2 G/DL (ref 12–16)
HGB BLD-MCNC: 6.8 G/DL (ref 12–16)
HGB BLD-MCNC: 7 G/DL (ref 12–16)
HGB BLD-MCNC: 7.1 G/DL (ref 12–16)
HGB BLD-MCNC: 7.2 G/DL (ref 12–16)
HGB BLD-MCNC: 7.3 G/DL (ref 12–16)
HGB BLD-MCNC: 7.3 G/DL (ref 12–16)
HGB BLD-MCNC: 7.4 G/DL (ref 12–16)
HGB BLD-MCNC: 7.6 G/DL (ref 12–16)
HGB BLD-MCNC: 7.9 G/DL (ref 12–16)
HGB BLD-MCNC: 8 G/DL (ref 12–16)
HGB BLD-MCNC: 8.1 G/DL (ref 12–16)
HGB BLD-MCNC: 8.2 G/DL (ref 12–16)
HGB BLD-MCNC: 8.2 G/DL (ref 12–16)
HGB BLD-MCNC: 8.5 G/DL (ref 12–16)
HGB UR QL STRIP: ABNORMAL
HGB UR QL STRIP: ABNORMAL
HIV 1+2 AB+HIV1 P24 AG SERPL QL IA: NEGATIVE
HLA AB QL: NEGATIVE
HLA AB SERPL: NORMAL
HLA DRB4 1: NORMAL
HLA SSO DNA TYPING CLASS I & II INTERPRETATION: NORMAL
HLA-A 1 SERO. EQUIV: 1
HLA-A 1: NORMAL
HLA-A 2 SERO. EQUIV: 2
HLA-A 2: NORMAL
HLA-B 1 SERO. EQUIV: 72
HLA-B 1: NORMAL
HLA-B 2 SERO. EQUIV: 49
HLA-B 2: NORMAL
HLA-BW 1 SERO. EQUIV: 6
HLA-BW 2 SERO. EQUIV: 4
HLA-C 1: NORMAL
HLA-C 2: NORMAL
HLA-CW 1 SERO. EQUIV: 2
HLA-CW 2 SERO. EQUIV: 7
HLA-DQ 1 SERO. EQUIV: 7
HLA-DQ 2 SERO. EQUIV: 8
HLA-DQB1 1: NORMAL
HLA-DQB1 2: NORMAL
HLA-DRB1 1 SERO. EQUIV: 4
HLA-DRB1 1: NORMAL
HLA-DRB1 2 SERO. EQUIV: 11
HLA-DRB1 2: NORMAL
HLA-DRB3 1: NORMAL
HLA-DRB3 2: NORMAL
HLA-DRB345 1 SERO. EQUIV: 52
HLA-DRB345 2 SERO. EQUIV: 53
HLA-DRB4 2: NORMAL
HLA-DRB5 1: NORMAL
HLA-DRB5 2: NORMAL
HLATY INTERPRETATION: NORMAL
HYALINE CASTS #/AREA URNS LPF: 0 /LPF
HYALINE CASTS #/AREA URNS LPF: 1 /LPF
IMM GRANULOCYTES # BLD AUTO: 0.03 K/UL (ref 0–0.04)
IMM GRANULOCYTES # BLD AUTO: 0.04 K/UL (ref 0–0.04)
IMM GRANULOCYTES # BLD AUTO: 0.06 K/UL (ref 0–0.04)
IMM GRANULOCYTES # BLD AUTO: 0.06 K/UL (ref 0–0.04)
IMM GRANULOCYTES # BLD AUTO: 0.08 K/UL (ref 0–0.04)
IMM GRANULOCYTES # BLD AUTO: 0.08 K/UL (ref 0–0.04)
IMM GRANULOCYTES # BLD AUTO: 0.09 K/UL (ref 0–0.04)
IMM GRANULOCYTES # BLD AUTO: 0.1 K/UL (ref 0–0.04)
IMM GRANULOCYTES # BLD AUTO: 0.1 K/UL (ref 0–0.04)
IMM GRANULOCYTES # BLD AUTO: 0.13 K/UL (ref 0–0.04)
IMM GRANULOCYTES # BLD AUTO: 0.14 K/UL (ref 0–0.04)
IMM GRANULOCYTES # BLD AUTO: 0.18 K/UL (ref 0–0.04)
IMM GRANULOCYTES # BLD AUTO: 0.19 K/UL (ref 0–0.04)
IMM GRANULOCYTES # BLD AUTO: 0.22 K/UL (ref 0–0.04)
IMM GRANULOCYTES # BLD AUTO: 0.4 K/UL (ref 0–0.04)
IMM GRANULOCYTES # BLD AUTO: 0.49 K/UL (ref 0–0.04)
IMM GRANULOCYTES NFR BLD AUTO: 0.3 % (ref 0–0.5)
IMM GRANULOCYTES NFR BLD AUTO: 0.3 % (ref 0–0.5)
IMM GRANULOCYTES NFR BLD AUTO: 0.5 % (ref 0–0.5)
IMM GRANULOCYTES NFR BLD AUTO: 0.5 % (ref 0–0.5)
IMM GRANULOCYTES NFR BLD AUTO: 0.6 % (ref 0–0.5)
IMM GRANULOCYTES NFR BLD AUTO: 0.7 % (ref 0–0.5)
IMM GRANULOCYTES NFR BLD AUTO: 0.9 % (ref 0–0.5)
IMM GRANULOCYTES NFR BLD AUTO: 1 % (ref 0–0.5)
IMM GRANULOCYTES NFR BLD AUTO: 1.1 % (ref 0–0.5)
IMM GRANULOCYTES NFR BLD AUTO: 1.1 % (ref 0–0.5)
IMM GRANULOCYTES NFR BLD AUTO: 1.5 % (ref 0–0.5)
IMM GRANULOCYTES NFR BLD AUTO: 1.9 % (ref 0–0.5)
IMM GRANULOCYTES NFR BLD AUTO: 2.3 % (ref 0–0.5)
INFLUENZA A, MOLECULAR: NEGATIVE
INFLUENZA B, MOLECULAR: NEGATIVE
INR PPP: 1 (ref 0.8–1.2)
INR PPP: 1.1 (ref 0.8–1.2)
INTERVENTRICULAR SEPTUM: 1.29 CM (ref 0.6–1.1)
IVRT: 134.16 MSEC
KETONES UR QL STRIP: NEGATIVE
KETONES UR QL STRIP: NEGATIVE
LA MAJOR: 5.14 CM
LA MINOR: 5.08 CM
LA WIDTH: 3.92 CM
LACTATE SERPL-SCNC: 0.7 MMOL/L (ref 0.5–2.2)
LACTATE SERPL-SCNC: 1.9 MMOL/L (ref 0.5–2.2)
LDH SERPL L TO P-CCNC: 487 U/L (ref 110–260)
LDLC SERPL CALC-MCNC: 150.4 MG/DL (ref 63–159)
LDLC SERPL CALC-MCNC: 92.6 MG/DL (ref 63–159)
LEFT ATRIUM SIZE: 3.86 CM
LEFT ATRIUM VOLUME INDEX: 32.4 ML/M2
LEFT ATRIUM VOLUME: 65.72 CM3
LEFT INTERNAL DIMENSION IN SYSTOLE: 3.39 CM (ref 2.1–4)
LEFT VENTRICLE DIASTOLIC VOLUME INDEX: 46.28 ML/M2
LEFT VENTRICLE DIASTOLIC VOLUME: 93.84 ML
LEFT VENTRICLE MASS INDEX: 103 G/M2
LEFT VENTRICLE SYSTOLIC VOLUME INDEX: 23.3 ML/M2
LEFT VENTRICLE SYSTOLIC VOLUME: 47.26 ML
LEFT VENTRICULAR INTERNAL DIMENSION IN DIASTOLE: 4.53 CM (ref 3.5–6)
LEFT VENTRICULAR MASS: 209.86 G
LEUKOCYTE ESTERASE UR QL STRIP: ABNORMAL
LEUKOCYTE ESTERASE UR QL STRIP: NEGATIVE
LV LATERAL E/E' RATIO: 23.33 M/S
LV SEPTAL E/E' RATIO: 17.5 M/S
LYMPHOCYTES # BLD AUTO: 1 K/UL (ref 1–4.8)
LYMPHOCYTES # BLD AUTO: 1 K/UL (ref 1–4.8)
LYMPHOCYTES # BLD AUTO: 1.2 K/UL (ref 1–4.8)
LYMPHOCYTES # BLD AUTO: 1.2 K/UL (ref 1–4.8)
LYMPHOCYTES # BLD AUTO: 1.3 K/UL (ref 1–4.8)
LYMPHOCYTES # BLD AUTO: 1.3 K/UL (ref 1–4.8)
LYMPHOCYTES # BLD AUTO: 1.4 K/UL (ref 1–4.8)
LYMPHOCYTES # BLD AUTO: 1.5 K/UL (ref 1–4.8)
LYMPHOCYTES # BLD AUTO: 1.5 K/UL (ref 1–4.8)
LYMPHOCYTES # BLD AUTO: 1.7 K/UL (ref 1–4.8)
LYMPHOCYTES # BLD AUTO: 1.7 K/UL (ref 1–4.8)
LYMPHOCYTES # BLD AUTO: 1.8 K/UL (ref 1–4.8)
LYMPHOCYTES # BLD AUTO: 1.9 K/UL (ref 1–4.8)
LYMPHOCYTES # BLD AUTO: 2.3 K/UL (ref 1–4.8)
LYMPHOCYTES NFR BLD: 10.7 % (ref 18–48)
LYMPHOCYTES NFR BLD: 12.3 % (ref 18–48)
LYMPHOCYTES NFR BLD: 13.1 % (ref 18–48)
LYMPHOCYTES NFR BLD: 13.1 % (ref 18–48)
LYMPHOCYTES NFR BLD: 13.8 % (ref 18–48)
LYMPHOCYTES NFR BLD: 14 % (ref 18–48)
LYMPHOCYTES NFR BLD: 14.2 % (ref 18–48)
LYMPHOCYTES NFR BLD: 14.8 % (ref 18–48)
LYMPHOCYTES NFR BLD: 18 % (ref 18–48)
LYMPHOCYTES NFR BLD: 19.7 % (ref 18–48)
LYMPHOCYTES NFR BLD: 5.6 % (ref 18–48)
LYMPHOCYTES NFR BLD: 5.9 % (ref 18–48)
LYMPHOCYTES NFR BLD: 6.1 % (ref 18–48)
LYMPHOCYTES NFR BLD: 6.7 % (ref 18–48)
LYMPHOCYTES NFR BLD: 7.1 % (ref 18–48)
LYMPHOCYTES NFR BLD: 7.1 % (ref 18–48)
M TB IFN-G CD4+ BCKGRND COR BLD-ACNC: 7 IU/ML
MAGNESIUM SERPL-MCNC: 1.7 MG/DL (ref 1.6–2.6)
MAGNESIUM SERPL-MCNC: 1.9 MG/DL (ref 1.6–2.6)
MAGNESIUM SERPL-MCNC: 2 MG/DL (ref 1.6–2.6)
MAGNESIUM SERPL-MCNC: 2.1 MG/DL (ref 1.6–2.6)
MCH RBC QN AUTO: 23.8 PG (ref 27–31)
MCH RBC QN AUTO: 23.9 PG (ref 27–31)
MCH RBC QN AUTO: 24 PG (ref 27–31)
MCH RBC QN AUTO: 24.1 PG (ref 27–31)
MCH RBC QN AUTO: 24.1 PG (ref 27–31)
MCH RBC QN AUTO: 24.3 PG (ref 27–31)
MCH RBC QN AUTO: 24.5 PG (ref 27–31)
MCH RBC QN AUTO: 24.7 PG (ref 27–31)
MCH RBC QN AUTO: 24.9 PG (ref 27–31)
MCH RBC QN AUTO: 24.9 PG (ref 27–31)
MCH RBC QN AUTO: 25.8 PG (ref 27–31)
MCH RBC QN AUTO: 25.9 PG (ref 27–31)
MCH RBC QN AUTO: 26.3 PG (ref 27–31)
MCH RBC QN AUTO: 26.3 PG (ref 27–31)
MCH RBC QN AUTO: 26.6 PG (ref 27–31)
MCH RBC QN AUTO: 27.8 PG (ref 27–31)
MCHC RBC AUTO-ENTMCNC: 29.1 G/DL (ref 32–36)
MCHC RBC AUTO-ENTMCNC: 29.2 G/DL (ref 32–36)
MCHC RBC AUTO-ENTMCNC: 29.5 G/DL (ref 32–36)
MCHC RBC AUTO-ENTMCNC: 29.8 G/DL (ref 32–36)
MCHC RBC AUTO-ENTMCNC: 29.8 G/DL (ref 32–36)
MCHC RBC AUTO-ENTMCNC: 29.9 G/DL (ref 32–36)
MCHC RBC AUTO-ENTMCNC: 30 G/DL (ref 32–36)
MCHC RBC AUTO-ENTMCNC: 30.2 G/DL (ref 32–36)
MCHC RBC AUTO-ENTMCNC: 30.3 G/DL (ref 32–36)
MCHC RBC AUTO-ENTMCNC: 30.6 G/DL (ref 32–36)
MCHC RBC AUTO-ENTMCNC: 30.7 G/DL (ref 32–36)
MCHC RBC AUTO-ENTMCNC: 31.4 G/DL (ref 32–36)
MCHC RBC AUTO-ENTMCNC: 31.5 G/DL (ref 32–36)
MCHC RBC AUTO-ENTMCNC: 32 G/DL (ref 32–36)
MCV RBC AUTO: 80 FL (ref 82–98)
MCV RBC AUTO: 80 FL (ref 82–98)
MCV RBC AUTO: 81 FL (ref 82–98)
MCV RBC AUTO: 81 FL (ref 82–98)
MCV RBC AUTO: 82 FL (ref 82–98)
MCV RBC AUTO: 83 FL (ref 82–98)
MCV RBC AUTO: 83 FL (ref 82–98)
MCV RBC AUTO: 84 FL (ref 82–98)
MCV RBC AUTO: 84 FL (ref 82–98)
MCV RBC AUTO: 85 FL (ref 82–98)
MCV RBC AUTO: 85 FL (ref 82–98)
MCV RBC AUTO: 87 FL (ref 82–98)
MCV RBC AUTO: 88 FL (ref 82–98)
MICROSCOPIC COMMENT: ABNORMAL
MICROSCOPIC COMMENT: NORMAL
MITOGEN IGNF BCKGRD COR BLD-ACNC: 6.58 IU/ML
MONOCYTES # BLD AUTO: 1 K/UL (ref 0.3–1)
MONOCYTES # BLD AUTO: 1.2 K/UL (ref 0.3–1)
MONOCYTES # BLD AUTO: 1.4 K/UL (ref 0.3–1)
MONOCYTES # BLD AUTO: 1.5 K/UL (ref 0.3–1)
MONOCYTES # BLD AUTO: 1.6 K/UL (ref 0.3–1)
MONOCYTES # BLD AUTO: 1.7 K/UL (ref 0.3–1)
MONOCYTES # BLD AUTO: 1.8 K/UL (ref 0.3–1)
MONOCYTES # BLD AUTO: 1.9 K/UL (ref 0.3–1)
MONOCYTES # BLD AUTO: 2 K/UL (ref 0.3–1)
MONOCYTES # BLD AUTO: 2.3 K/UL (ref 0.3–1)
MONOCYTES NFR BLD: 10.2 % (ref 4–15)
MONOCYTES NFR BLD: 10.5 % (ref 4–15)
MONOCYTES NFR BLD: 11.1 % (ref 4–15)
MONOCYTES NFR BLD: 11.3 % (ref 4–15)
MONOCYTES NFR BLD: 11.3 % (ref 4–15)
MONOCYTES NFR BLD: 11.6 % (ref 4–15)
MONOCYTES NFR BLD: 12.1 % (ref 4–15)
MONOCYTES NFR BLD: 12.2 % (ref 4–15)
MONOCYTES NFR BLD: 14.4 % (ref 4–15)
MONOCYTES NFR BLD: 14.4 % (ref 4–15)
MONOCYTES NFR BLD: 14.9 % (ref 4–15)
MONOCYTES NFR BLD: 15 % (ref 4–15)
MONOCYTES NFR BLD: 15.2 % (ref 4–15)
MONOCYTES NFR BLD: 7.3 % (ref 4–15)
MONOCYTES NFR BLD: 7.9 % (ref 4–15)
MONOCYTES NFR BLD: 8.7 % (ref 4–15)
MV PEAK A VEL: 0.71 M/S
MV PEAK E VEL: 0.7 M/S
MV STENOSIS PRESSURE HALF TIME: 82.66 MS
MV VALVE AREA P 1/2 METHOD: 2.66 CM2
NEUTROPHILS # BLD AUTO: 10.8 K/UL (ref 1.8–7.7)
NEUTROPHILS # BLD AUTO: 10.8 K/UL (ref 1.8–7.7)
NEUTROPHILS # BLD AUTO: 16.7 K/UL (ref 1.8–7.7)
NEUTROPHILS # BLD AUTO: 17 K/UL (ref 1.8–7.7)
NEUTROPHILS # BLD AUTO: 17 K/UL (ref 1.8–7.7)
NEUTROPHILS # BLD AUTO: 17.1 K/UL (ref 1.8–7.7)
NEUTROPHILS # BLD AUTO: 6.8 K/UL (ref 1.8–7.7)
NEUTROPHILS # BLD AUTO: 7.9 K/UL (ref 1.8–7.7)
NEUTROPHILS # BLD AUTO: 8.3 K/UL (ref 1.8–7.7)
NEUTROPHILS # BLD AUTO: 8.3 K/UL (ref 1.8–7.7)
NEUTROPHILS # BLD AUTO: 8.4 K/UL (ref 1.8–7.7)
NEUTROPHILS # BLD AUTO: 8.4 K/UL (ref 1.8–7.7)
NEUTROPHILS # BLD AUTO: 8.6 K/UL (ref 1.8–7.7)
NEUTROPHILS # BLD AUTO: 8.6 K/UL (ref 1.8–7.7)
NEUTROPHILS # BLD AUTO: 8.7 K/UL (ref 1.8–7.7)
NEUTROPHILS # BLD AUTO: 8.8 K/UL (ref 1.8–7.7)
NEUTROPHILS NFR BLD: 64.1 % (ref 38–73)
NEUTROPHILS NFR BLD: 65.6 % (ref 38–73)
NEUTROPHILS NFR BLD: 65.7 % (ref 38–73)
NEUTROPHILS NFR BLD: 66.6 % (ref 38–73)
NEUTROPHILS NFR BLD: 66.8 % (ref 38–73)
NEUTROPHILS NFR BLD: 68.1 % (ref 38–73)
NEUTROPHILS NFR BLD: 69.4 % (ref 38–73)
NEUTROPHILS NFR BLD: 69.6 % (ref 38–73)
NEUTROPHILS NFR BLD: 72.2 % (ref 38–73)
NEUTROPHILS NFR BLD: 72.9 % (ref 38–73)
NEUTROPHILS NFR BLD: 75 % (ref 38–73)
NEUTROPHILS NFR BLD: 75 % (ref 38–73)
NEUTROPHILS NFR BLD: 81 % (ref 38–73)
NEUTROPHILS NFR BLD: 81.9 % (ref 38–73)
NEUTROPHILS NFR BLD: 82 % (ref 38–73)
NEUTROPHILS NFR BLD: 83.9 % (ref 38–73)
NITRITE UR QL STRIP: NEGATIVE
NITRITE UR QL STRIP: NEGATIVE
NONHDLC SERPL-MCNC: 119 MG/DL
NONHDLC SERPL-MCNC: 175 MG/DL
NRBC BLD-RTO: 0 /100 WBC
NRBC BLD-RTO: 1 /100 WBC
NRBC BLD-RTO: 2 /100 WBC
NRBC BLD-RTO: 3 /100 WBC
NUM UNITS TRANS PACKED RBC: NORMAL
PCO2 BLDA: 47.2 MMHG (ref 35–45)
PH SMN: 7.28 [PH] (ref 7.35–7.45)
PH UR STRIP: 6 [PH] (ref 5–8)
PH UR STRIP: 7 [PH] (ref 5–8)
PHOSPHATE SERPL-MCNC: 4.9 MG/DL (ref 2.7–4.5)
PHOSPHATE SERPL-MCNC: 4.9 MG/DL (ref 2.7–4.5)
PHOSPHATE SERPL-MCNC: 6.2 MG/DL (ref 2.7–4.5)
PHOSPHATE SERPL-MCNC: 6.3 MG/DL (ref 2.7–4.5)
PHOSPHATE SERPL-MCNC: 6.5 MG/DL (ref 2.7–4.5)
PHOSPHATE SERPL-MCNC: 6.7 MG/DL (ref 2.7–4.5)
PHOSPHATE SERPL-MCNC: 6.9 MG/DL (ref 2.7–4.5)
PHOSPHATE SERPL-MCNC: 7 MG/DL (ref 2.7–4.5)
PHOSPHATE SERPL-MCNC: 7.5 MG/DL (ref 2.7–4.5)
PHOSPHATE SERPL-MCNC: 7.9 MG/DL (ref 2.7–4.5)
PISA TR MAX VEL: 2.85 M/S
PLATELET # BLD AUTO: 237 K/UL (ref 150–350)
PLATELET # BLD AUTO: 239 K/UL (ref 150–350)
PLATELET # BLD AUTO: 245 K/UL (ref 150–350)
PLATELET # BLD AUTO: 246 K/UL (ref 150–350)
PLATELET # BLD AUTO: 249 K/UL (ref 150–350)
PLATELET # BLD AUTO: 250 K/UL (ref 150–350)
PLATELET # BLD AUTO: 262 K/UL (ref 150–350)
PLATELET # BLD AUTO: 276 K/UL (ref 150–350)
PLATELET # BLD AUTO: 286 K/UL (ref 150–350)
PLATELET # BLD AUTO: 288 K/UL (ref 150–350)
PLATELET # BLD AUTO: 291 K/UL (ref 150–350)
PLATELET # BLD AUTO: 298 K/UL (ref 150–350)
PLATELET # BLD AUTO: 309 K/UL (ref 150–350)
PLATELET # BLD AUTO: 338 K/UL (ref 150–350)
PLATELET BLD QL SMEAR: ABNORMAL
PMV BLD AUTO: 10.8 FL (ref 9.2–12.9)
PMV BLD AUTO: 9 FL (ref 9.2–12.9)
PMV BLD AUTO: 9.1 FL (ref 9.2–12.9)
PMV BLD AUTO: 9.2 FL (ref 9.2–12.9)
PMV BLD AUTO: 9.3 FL (ref 9.2–12.9)
PMV BLD AUTO: 9.3 FL (ref 9.2–12.9)
PMV BLD AUTO: 9.5 FL (ref 9.2–12.9)
PMV BLD AUTO: 9.6 FL (ref 9.2–12.9)
PMV BLD AUTO: 9.7 FL (ref 9.2–12.9)
PMV BLD AUTO: 9.8 FL (ref 9.2–12.9)
PMV BLD AUTO: 9.8 FL (ref 9.2–12.9)
PMV BLD AUTO: 9.9 FL (ref 9.2–12.9)
PO2 BLDA: 95 MMHG (ref 80–100)
POC BE: -5 MMOL/L
POC SATURATED O2: 96 % (ref 95–100)
POC TCO2: 23 MMOL/L (ref 23–27)
POCT GLUCOSE: 108 MG/DL (ref 70–110)
POCT GLUCOSE: 136 MG/DL (ref 70–110)
POCT GLUCOSE: 179 MG/DL (ref 70–110)
POCT GLUCOSE: 95 MG/DL (ref 70–110)
POTASSIUM SERPL-SCNC: 2.8 MMOL/L (ref 3.5–5.1)
POTASSIUM SERPL-SCNC: 3.2 MMOL/L (ref 3.5–5.1)
POTASSIUM SERPL-SCNC: 3.4 MMOL/L (ref 3.5–5.1)
POTASSIUM SERPL-SCNC: 3.6 MMOL/L (ref 3.5–5.1)
POTASSIUM SERPL-SCNC: 3.7 MMOL/L (ref 3.5–5.1)
POTASSIUM SERPL-SCNC: 3.7 MMOL/L (ref 3.5–5.1)
POTASSIUM SERPL-SCNC: 3.8 MMOL/L (ref 3.5–5.1)
POTASSIUM SERPL-SCNC: 3.8 MMOL/L (ref 3.5–5.1)
POTASSIUM SERPL-SCNC: 3.9 MMOL/L (ref 3.5–5.1)
POTASSIUM SERPL-SCNC: 3.9 MMOL/L (ref 3.5–5.1)
POTASSIUM SERPL-SCNC: 4 MMOL/L (ref 3.5–5.1)
PROCALCITONIN SERPL IA-MCNC: 0.95 NG/ML
PROCALCITONIN SERPL IA-MCNC: 1.28 NG/ML
PROT SERPL-MCNC: 10.2 G/DL (ref 6–8.4)
PROT SERPL-MCNC: 7.7 G/DL (ref 6–8.4)
PROT SERPL-MCNC: 7.8 G/DL (ref 6–8.4)
PROT SERPL-MCNC: 7.9 G/DL (ref 6–8.4)
PROT SERPL-MCNC: 9.1 G/DL (ref 6–8.4)
PROT SERPL-MCNC: 9.5 G/DL (ref 6–8.4)
PROT UR QL STRIP: ABNORMAL
PROT UR QL STRIP: ABNORMAL
PROTHROMBIN TIME: 10.7 SEC (ref 9–12.5)
PROTHROMBIN TIME: 11.2 SEC (ref 9–12.5)
PROTHROMBIN TIME: 11.3 SEC (ref 9–12.5)
PROTHROMBIN TIME: 11.5 SEC (ref 9–12.5)
PROTHROMBIN TIME: 11.9 SEC (ref 9–12.5)
PTH-INTACT SERPL-MCNC: 1287 PG/ML (ref 9–77)
PULM VEIN S/D RATIO: 1.35
PV PEAK D VEL: 0.26 M/S
PV PEAK S VEL: 0.35 M/S
RA MAJOR: 4.88 CM
RA WIDTH: 4.44 CM
RBC # BLD AUTO: 2.49 M/UL (ref 4–5.4)
RBC # BLD AUTO: 2.49 M/UL (ref 4–5.4)
RBC # BLD AUTO: 2.77 M/UL (ref 4–5.4)
RBC # BLD AUTO: 2.88 M/UL (ref 4–5.4)
RBC # BLD AUTO: 2.9 M/UL (ref 4–5.4)
RBC # BLD AUTO: 2.99 M/UL (ref 4–5.4)
RBC # BLD AUTO: 3 M/UL (ref 4–5.4)
RBC # BLD AUTO: 3.04 M/UL (ref 4–5.4)
RBC # BLD AUTO: 3.04 M/UL (ref 4–5.4)
RBC # BLD AUTO: 3.05 M/UL (ref 4–5.4)
RBC # BLD AUTO: 3.12 M/UL (ref 4–5.4)
RBC # BLD AUTO: 3.16 M/UL (ref 4–5.4)
RBC # BLD AUTO: 3.2 M/UL (ref 4–5.4)
RBC # BLD AUTO: 3.29 M/UL (ref 4–5.4)
RBC # BLD AUTO: 3.35 M/UL (ref 4–5.4)
RBC # BLD AUTO: 4.18 M/UL (ref 4–5.4)
RBC #/AREA URNS HPF: 4 /HPF (ref 0–4)
RBC #/AREA URNS HPF: 5 /HPF (ref 0–4)
RIGHT VENTRICULAR END-DIASTOLIC DIMENSION: 3.51 CM
RPR SER QL: NORMAL
SAMPLE: ABNORMAL
SARS-COV-2 RDRP RESP QL NAA+PROBE: NEGATIVE
SARS-COV-2 RDRP RESP QL NAA+PROBE: POSITIVE
SCRFL TESTING DATE: NORMAL
SERUM COLLECTION DT - LUMINEX CLASS I: NORMAL
SERUM COLLECTION DT - LUMINEX CLASS II: NORMAL
SERUM COLLECTION DT - XM AUTO: NORMAL
SERUM COLLECTION DT: NORMAL
SITE: ABNORMAL
SODIUM SERPL-SCNC: 135 MMOL/L (ref 136–145)
SODIUM SERPL-SCNC: 135 MMOL/L (ref 136–145)
SODIUM SERPL-SCNC: 136 MMOL/L (ref 136–145)
SODIUM SERPL-SCNC: 137 MMOL/L (ref 136–145)
SODIUM SERPL-SCNC: 138 MMOL/L (ref 136–145)
SODIUM SERPL-SCNC: 140 MMOL/L (ref 136–145)
SODIUM SERPL-SCNC: 140 MMOL/L (ref 136–145)
SODIUM SERPL-SCNC: 141 MMOL/L (ref 136–145)
SODIUM SERPL-SCNC: 142 MMOL/L (ref 136–145)
SODIUM SERPL-SCNC: 143 MMOL/L (ref 136–145)
SODIUM SERPL-SCNC: 144 MMOL/L (ref 136–145)
SP GR UR STRIP: 1.02 (ref 1–1.03)
SP GR UR STRIP: >=1.03 (ref 1–1.03)
SPCL1 TESTING DATE: NORMAL
SPCL2 TESTING DATE: NORMAL
SPCLU TESTING DATE: NORMAL
SPECIMEN SOURCE: NORMAL
SSDQB TESTING DATE: NORMAL
SSDRB TESTING DATE: NORMAL
SSOA TESTING DATE: NORMAL
SSOB TESTING DATE: NORMAL
SSOC TESTING DATE: NORMAL
SSODR TESTING DATE: NORMAL
STJ: 3.03 CM
STRONGYLOIDES ANTIBODY IGG: NEGATIVE
T CELL RESULTS - XM AUTO: NEGATIVE
T MCS AVERAGE - XM AUTO: -12.4
TB GOLD PLUS: POSITIVE
TB2 - NIL: 6.98 IU/ML
TDI LATERAL: 0.03 M/S
TDI SEPTAL: 0.04 M/S
TDI: 0.04 M/S
TR MAX PG: 32 MMHG
TRICUSPID ANNULAR PLANE SYSTOLIC EXCURSION: 2.63 CM
TRIGL SERPL-MCNC: 123 MG/DL (ref 30–150)
TRIGL SERPL-MCNC: 132 MG/DL (ref 30–150)
TROPONIN I SERPL DL<=0.01 NG/ML-MCNC: 0.07 NG/ML (ref 0–0.03)
TROPONIN I SERPL DL<=0.01 NG/ML-MCNC: 0.09 NG/ML (ref 0–0.03)
TROPONIN I SERPL DL<=0.01 NG/ML-MCNC: 0.12 NG/ML (ref 0–0.03)
TROPONIN I SERPL DL<=0.01 NG/ML-MCNC: 0.12 NG/ML (ref 0–0.03)
TROPONIN I SERPL DL<=0.01 NG/ML-MCNC: 0.15 NG/ML (ref 0–0.03)
TROPONIN I SERPL DL<=0.01 NG/ML-MCNC: 0.17 NG/ML (ref 0–0.03)
TROPONIN I SERPL DL<=0.01 NG/ML-MCNC: 0.18 NG/ML (ref 0–0.03)
TSH SERPL DL<=0.005 MIU/L-ACNC: 2.08 UIU/ML (ref 0.4–4)
TYSSO TESTING DATE: NORMAL
URN SPEC COLLECT METH UR: ABNORMAL
URN SPEC COLLECT METH UR: ABNORMAL
UROBILINOGEN UR STRIP-ACNC: NEGATIVE EU/DL
UROBILINOGEN UR STRIP-ACNC: NEGATIVE EU/DL
VANCOMYCIN SERPL-MCNC: 21.3 UG/ML
VARICELLA INTERPRETATION: POSITIVE
VARICELLA ZOSTER IGG: 3.18 ISR (ref 0–0.9)
WBC # BLD AUTO: 11.65 K/UL (ref 3.9–12.7)
WBC # BLD AUTO: 11.83 K/UL (ref 3.9–12.7)
WBC # BLD AUTO: 11.96 K/UL (ref 3.9–12.7)
WBC # BLD AUTO: 12.46 K/UL (ref 3.9–12.7)
WBC # BLD AUTO: 12.5 K/UL (ref 3.9–12.7)
WBC # BLD AUTO: 12.6 K/UL (ref 3.9–12.7)
WBC # BLD AUTO: 12.71 K/UL (ref 3.9–12.7)
WBC # BLD AUTO: 12.95 K/UL (ref 3.9–12.7)
WBC # BLD AUTO: 13.36 K/UL (ref 3.9–12.7)
WBC # BLD AUTO: 14.4 K/UL (ref 3.9–12.7)
WBC # BLD AUTO: 14.4 K/UL (ref 3.9–12.7)
WBC # BLD AUTO: 20.23 K/UL (ref 3.9–12.7)
WBC # BLD AUTO: 20.62 K/UL (ref 3.9–12.7)
WBC # BLD AUTO: 20.75 K/UL (ref 3.9–12.7)
WBC # BLD AUTO: 20.91 K/UL (ref 3.9–12.7)
WBC # BLD AUTO: 9.7 K/UL (ref 3.9–12.7)
WBC #/AREA URNS HPF: 4 /HPF (ref 0–5)
WBC #/AREA URNS HPF: 5 /HPF (ref 0–5)

## 2020-01-01 PROCEDURE — 27000221 HC OXYGEN, UP TO 24 HOURS

## 2020-01-01 PROCEDURE — 85730 THROMBOPLASTIN TIME PARTIAL: CPT | Mod: TXP

## 2020-01-01 PROCEDURE — 63600175 PHARM REV CODE 636 W HCPCS: Performed by: NURSE PRACTITIONER

## 2020-01-01 PROCEDURE — 99215 OFFICE O/P EST HI 40 MIN: CPT | Mod: PBBFAC,25

## 2020-01-01 PROCEDURE — U0002 COVID-19 LAB TEST NON-CDC: HCPCS

## 2020-01-01 PROCEDURE — 36415 COLL VENOUS BLD VENIPUNCTURE: CPT | Mod: TXP

## 2020-01-01 PROCEDURE — 83735 ASSAY OF MAGNESIUM: CPT

## 2020-01-01 PROCEDURE — 85025 COMPLETE CBC W/AUTO DIFF WBC: CPT

## 2020-01-01 PROCEDURE — 94640 AIRWAY INHALATION TREATMENT: CPT

## 2020-01-01 PROCEDURE — 63600175 PHARM REV CODE 636 W HCPCS: Performed by: INTERNAL MEDICINE

## 2020-01-01 PROCEDURE — 99900035 HC TECH TIME PER 15 MIN (STAT)

## 2020-01-01 PROCEDURE — 93005 ELECTROCARDIOGRAM TRACING: CPT

## 2020-01-01 PROCEDURE — 25000242 PHARM REV CODE 250 ALT 637 W/ HCPCS: Performed by: SURGERY

## 2020-01-01 PROCEDURE — 80053 COMPREHEN METABOLIC PANEL: CPT

## 2020-01-01 PROCEDURE — 96365 THER/PROPH/DIAG IV INF INIT: CPT

## 2020-01-01 PROCEDURE — 97110 THERAPEUTIC EXERCISES: CPT | Mod: CQ

## 2020-01-01 PROCEDURE — 81373 HLA I TYPING 1 LOCUS LR: CPT | Mod: PO

## 2020-01-01 PROCEDURE — 82803 BLOOD GASES ANY COMBINATION: CPT

## 2020-01-01 PROCEDURE — 49326 LAP W/OMENTOPEXY ADD-ON: CPT | Mod: ,,, | Performed by: STUDENT IN AN ORGANIZED HEALTH CARE EDUCATION/TRAINING PROGRAM

## 2020-01-01 PROCEDURE — 85730 THROMBOPLASTIN TIME PARTIAL: CPT

## 2020-01-01 PROCEDURE — 87040 BLOOD CULTURE FOR BACTERIA: CPT | Mod: 59

## 2020-01-01 PROCEDURE — 85025 COMPLETE CBC W/AUTO DIFF WBC: CPT | Mod: TXP

## 2020-01-01 PROCEDURE — 25000003 PHARM REV CODE 250: Performed by: NURSE PRACTITIONER

## 2020-01-01 PROCEDURE — 20000000 HC ICU ROOM

## 2020-01-01 PROCEDURE — 86480 TB TEST CELL IMMUN MEASURE: CPT | Mod: TXP

## 2020-01-01 PROCEDURE — 99999 PR PBB SHADOW E&M-EST. PATIENT-LVL V: CPT | Mod: PBBFAC,,,

## 2020-01-01 PROCEDURE — 25000003 PHARM REV CODE 250: Performed by: INTERNAL MEDICINE

## 2020-01-01 PROCEDURE — 99285 EMERGENCY DEPT VISIT HI MDM: CPT | Mod: 25

## 2020-01-01 PROCEDURE — 84100 ASSAY OF PHOSPHORUS: CPT

## 2020-01-01 PROCEDURE — 71000039 HC RECOVERY, EACH ADD'L HOUR: Performed by: STUDENT IN AN ORGANIZED HEALTH CARE EDUCATION/TRAINING PROGRAM

## 2020-01-01 PROCEDURE — 36556 INSERT NON-TUNNEL CV CATH: CPT | Mod: ,,, | Performed by: STUDENT IN AN ORGANIZED HEALTH CARE EDUCATION/TRAINING PROGRAM

## 2020-01-01 PROCEDURE — 63600175 PHARM REV CODE 636 W HCPCS: Performed by: STUDENT IN AN ORGANIZED HEALTH CARE EDUCATION/TRAINING PROGRAM

## 2020-01-01 PROCEDURE — 92526 ORAL FUNCTION THERAPY: CPT

## 2020-01-01 PROCEDURE — 85652 RBC SED RATE AUTOMATED: CPT

## 2020-01-01 PROCEDURE — 94761 N-INVAS EAR/PLS OXIMETRY MLT: CPT

## 2020-01-01 PROCEDURE — 25000003 PHARM REV CODE 250: Performed by: SURGERY

## 2020-01-01 PROCEDURE — 71000033 HC RECOVERY, INTIAL HOUR: Performed by: STUDENT IN AN ORGANIZED HEALTH CARE EDUCATION/TRAINING PROGRAM

## 2020-01-01 PROCEDURE — 90935 HEMODIALYSIS ONE EVALUATION: CPT

## 2020-01-01 PROCEDURE — 81373 HLA I TYPING 1 LOCUS LR: CPT | Mod: PO,TXP

## 2020-01-01 PROCEDURE — 27100171 HC OXYGEN HIGH FLOW UP TO 24 HOURS

## 2020-01-01 PROCEDURE — 36415 COLL VENOUS BLD VENIPUNCTURE: CPT

## 2020-01-01 PROCEDURE — 80100016 HC MAINTENANCE HEMODIALYSIS

## 2020-01-01 PROCEDURE — 81376 HLA II TYPING 1 LOCUS LR: CPT | Mod: PO

## 2020-01-01 PROCEDURE — 86832 HLA CLASS I HIGH DEFIN QUAL: CPT | Mod: PO

## 2020-01-01 PROCEDURE — 25000003 PHARM REV CODE 250: Performed by: STUDENT IN AN ORGANIZED HEALTH CARE EDUCATION/TRAINING PROGRAM

## 2020-01-01 PROCEDURE — G0179 PR HOME HEALTH MD RECERTIFICATION: ICD-10-PCS | Mod: ,,, | Performed by: HOSPITALIST

## 2020-01-01 PROCEDURE — 37000009 HC ANESTHESIA EA ADD 15 MINS: Performed by: STUDENT IN AN ORGANIZED HEALTH CARE EDUCATION/TRAINING PROGRAM

## 2020-01-01 PROCEDURE — 99205 OFFICE O/P NEW HI 60 MIN: CPT | Mod: S$PBB,,, | Performed by: INTERNAL MEDICINE

## 2020-01-01 PROCEDURE — 27000190 HC CPAP FULL FACE MASK W/VALVE

## 2020-01-01 PROCEDURE — 11000001 HC ACUTE MED/SURG PRIVATE ROOM

## 2020-01-01 PROCEDURE — 87340 HEPATITIS B SURFACE AG IA: CPT

## 2020-01-01 PROCEDURE — 36600 WITHDRAWAL OF ARTERIAL BLOOD: CPT

## 2020-01-01 PROCEDURE — 81376 HLA II TYPING 1 LOCUS LR: CPT | Mod: 91,PO

## 2020-01-01 PROCEDURE — 84484 ASSAY OF TROPONIN QUANT: CPT

## 2020-01-01 PROCEDURE — 99024 POSTOP FOLLOW-UP VISIT: CPT | Mod: POP,,, | Performed by: STUDENT IN AN ORGANIZED HEALTH CARE EDUCATION/TRAINING PROGRAM

## 2020-01-01 PROCEDURE — 49324 PR LAP INSERTION TUNNELED INTRAPERITONEAL CATHETER: ICD-10-PCS | Mod: ,,, | Performed by: STUDENT IN AN ORGANIZED HEALTH CARE EDUCATION/TRAINING PROGRAM

## 2020-01-01 PROCEDURE — 99223 PR INITIAL HOSPITAL CARE,LEVL III: ICD-10-PCS | Mod: ,,, | Performed by: INTERNAL MEDICINE

## 2020-01-01 PROCEDURE — 82553 CREATINE MB FRACTION: CPT

## 2020-01-01 PROCEDURE — 25500020 PHARM REV CODE 255: Performed by: INTERNAL MEDICINE

## 2020-01-01 PROCEDURE — 80061 LIPID PANEL: CPT

## 2020-01-01 PROCEDURE — 86803 HEPATITIS C AB TEST: CPT | Mod: TXP

## 2020-01-01 PROCEDURE — 25000242 PHARM REV CODE 250 ALT 637 W/ HCPCS: Performed by: INTERNAL MEDICINE

## 2020-01-01 PROCEDURE — 81000 URINALYSIS NONAUTO W/SCOPE: CPT

## 2020-01-01 PROCEDURE — 30200315 PPD INTRADERMAL TEST REV CODE 302: Performed by: HOSPITALIST

## 2020-01-01 PROCEDURE — 86829 HLA CLASS I/II ANTIBODY QUAL: CPT | Mod: PO,TXP

## 2020-01-01 PROCEDURE — 63600175 PHARM REV CODE 636 W HCPCS: Performed by: SURGERY

## 2020-01-01 PROCEDURE — 93010 ELECTROCARDIOGRAM REPORT: CPT | Mod: ,,, | Performed by: INTERNAL MEDICINE

## 2020-01-01 PROCEDURE — 85379 FIBRIN DEGRADATION QUANT: CPT

## 2020-01-01 PROCEDURE — 25000003 PHARM REV CODE 250: Performed by: HOSPITALIST

## 2020-01-01 PROCEDURE — 97166 OT EVAL MOD COMPLEX 45 MIN: CPT

## 2020-01-01 PROCEDURE — 80053 COMPREHEN METABOLIC PANEL: CPT | Mod: TXP

## 2020-01-01 PROCEDURE — 97530 THERAPEUTIC ACTIVITIES: CPT

## 2020-01-01 PROCEDURE — 85025 COMPLETE CBC W/AUTO DIFF WBC: CPT | Mod: 91

## 2020-01-01 PROCEDURE — 86706 HEP B SURFACE ANTIBODY: CPT

## 2020-01-01 PROCEDURE — 84484 ASSAY OF TROPONIN QUANT: CPT | Mod: 91

## 2020-01-01 PROCEDURE — 99499 NO LOS: ICD-10-PCS | Mod: 95,,, | Performed by: PSYCHIATRY & NEUROLOGY

## 2020-01-01 PROCEDURE — 82728 ASSAY OF FERRITIN: CPT

## 2020-01-01 PROCEDURE — 25000242 PHARM REV CODE 250 ALT 637 W/ HCPCS: Performed by: NURSE PRACTITIONER

## 2020-01-01 PROCEDURE — 80048 BASIC METABOLIC PNL TOTAL CA: CPT

## 2020-01-01 PROCEDURE — 36430 TRANSFUSION BLD/BLD COMPNT: CPT

## 2020-01-01 PROCEDURE — 83880 ASSAY OF NATRIURETIC PEPTIDE: CPT

## 2020-01-01 PROCEDURE — 80202 ASSAY OF VANCOMYCIN: CPT

## 2020-01-01 PROCEDURE — 99999 PR PBB SHADOW E&M-EST. PATIENT-LVL III: ICD-10-PCS | Mod: PBBFAC,,, | Performed by: STUDENT IN AN ORGANIZED HEALTH CARE EDUCATION/TRAINING PROGRAM

## 2020-01-01 PROCEDURE — 99222 1ST HOSP IP/OBS MODERATE 55: CPT | Mod: 25,,, | Performed by: STUDENT IN AN ORGANIZED HEALTH CARE EDUCATION/TRAINING PROGRAM

## 2020-01-01 PROCEDURE — 84100 ASSAY OF PHOSPHORUS: CPT | Mod: TXP

## 2020-01-01 PROCEDURE — 96367 TX/PROPH/DG ADDL SEQ IV INF: CPT

## 2020-01-01 PROCEDURE — 86704 HEP B CORE ANTIBODY TOTAL: CPT

## 2020-01-01 PROCEDURE — 63600175 PHARM REV CODE 636 W HCPCS: Mod: TB | Performed by: INTERNAL MEDICINE

## 2020-01-01 PROCEDURE — G0180 MD CERTIFICATION HHA PATIENT: HCPCS | Mod: ,,, | Performed by: HOSPITALIST

## 2020-01-01 PROCEDURE — 97110 THERAPEUTIC EXERCISES: CPT

## 2020-01-01 PROCEDURE — 81373 HLA I TYPING 1 LOCUS LR: CPT | Mod: 91,PO

## 2020-01-01 PROCEDURE — 25000242 PHARM REV CODE 250 ALT 637 W/ HCPCS: Performed by: STUDENT IN AN ORGANIZED HEALTH CARE EDUCATION/TRAINING PROGRAM

## 2020-01-01 PROCEDURE — 92523 SPEECH SOUND LANG COMPREHEN: CPT

## 2020-01-01 PROCEDURE — 97162 PT EVAL MOD COMPLEX 30 MIN: CPT

## 2020-01-01 PROCEDURE — 97535 SELF CARE MNGMENT TRAINING: CPT

## 2020-01-01 PROCEDURE — 85610 PROTHROMBIN TIME: CPT

## 2020-01-01 PROCEDURE — 83615 LACTATE (LD) (LDH) ENZYME: CPT

## 2020-01-01 PROCEDURE — 84145 PROCALCITONIN (PCT): CPT

## 2020-01-01 PROCEDURE — 99222 PR INITIAL HOSPITAL CARE,LEVL II: ICD-10-PCS | Mod: 25,,, | Performed by: STUDENT IN AN ORGANIZED HEALTH CARE EDUCATION/TRAINING PROGRAM

## 2020-01-01 PROCEDURE — 83605 ASSAY OF LACTIC ACID: CPT

## 2020-01-01 PROCEDURE — 93010 EKG 12-LEAD: ICD-10-PCS | Mod: ,,, | Performed by: INTERNAL MEDICINE

## 2020-01-01 PROCEDURE — 86787 VARICELLA-ZOSTER ANTIBODY: CPT | Mod: TXP

## 2020-01-01 PROCEDURE — 81376 HLA II TYPING 1 LOCUS LR: CPT | Mod: 91,PO,TXP

## 2020-01-01 PROCEDURE — 86901 BLOOD TYPING SEROLOGIC RH(D): CPT | Mod: TXP

## 2020-01-01 PROCEDURE — 63600175 PHARM REV CODE 636 W HCPCS: Performed by: NURSE ANESTHETIST, CERTIFIED REGISTERED

## 2020-01-01 PROCEDURE — 80100014 HC HEMODIALYSIS 1:1

## 2020-01-01 PROCEDURE — 86580 TB INTRADERMAL TEST: CPT | Performed by: HOSPITALIST

## 2020-01-01 PROCEDURE — 49326 PR LAP OMENTOPEXY ADD-ON: ICD-10-PCS | Mod: ,,, | Performed by: STUDENT IN AN ORGANIZED HEALTH CARE EDUCATION/TRAINING PROGRAM

## 2020-01-01 PROCEDURE — P9016 RBC LEUKOCYTES REDUCED: HCPCS

## 2020-01-01 PROCEDURE — 85651 RBC SED RATE NONAUTOMATED: CPT

## 2020-01-01 PROCEDURE — 84443 ASSAY THYROID STIM HORMONE: CPT

## 2020-01-01 PROCEDURE — 94799 UNLISTED PULMONARY SVC/PX: CPT

## 2020-01-01 PROCEDURE — 49324 LAP INSERT TUNNEL IP CATH: CPT | Mod: ,,, | Performed by: STUDENT IN AN ORGANIZED HEALTH CARE EDUCATION/TRAINING PROGRAM

## 2020-01-01 PROCEDURE — 80061 LIPID PANEL: CPT | Mod: TXP

## 2020-01-01 PROCEDURE — 97116 GAIT TRAINING THERAPY: CPT | Mod: CQ

## 2020-01-01 PROCEDURE — 27201423 OPTIME MED/SURG SUP & DEVICES STERILE SUPPLY: Performed by: STUDENT IN AN ORGANIZED HEALTH CARE EDUCATION/TRAINING PROGRAM

## 2020-01-01 PROCEDURE — 99999 PR PBB SHADOW E&M-EST. PATIENT-LVL V: ICD-10-PCS | Mod: PBBFAC,,,

## 2020-01-01 PROCEDURE — 86825 HLA X-MATH NON-CYTOTOXIC: CPT | Mod: 91,PO,TXP

## 2020-01-01 PROCEDURE — 86592 SYPHILIS TEST NON-TREP QUAL: CPT | Mod: TXP

## 2020-01-01 PROCEDURE — 82248 BILIRUBIN DIRECT: CPT | Mod: TXP

## 2020-01-01 PROCEDURE — 81376 HLA II TYPING 1 LOCUS LR: CPT | Mod: PO,TXP

## 2020-01-01 PROCEDURE — 86704 HEP B CORE ANTIBODY TOTAL: CPT | Mod: TXP

## 2020-01-01 PROCEDURE — 86644 CMV ANTIBODY: CPT | Mod: TXP

## 2020-01-01 PROCEDURE — 86825 HLA X-MATH NON-CYTOTOXIC: CPT | Mod: PO,TXP

## 2020-01-01 PROCEDURE — G0180 PR HOME HEALTH MD CERTIFICATION: ICD-10-PCS | Mod: ,,, | Performed by: HOSPITALIST

## 2020-01-01 PROCEDURE — 36000708 HC OR TIME LEV III 1ST 15 MIN: Performed by: STUDENT IN AN ORGANIZED HEALTH CARE EDUCATION/TRAINING PROGRAM

## 2020-01-01 PROCEDURE — 81373 HLA I TYPING 1 LOCUS LR: CPT | Mod: 91,PO,TXP

## 2020-01-01 PROCEDURE — 82550 ASSAY OF CK (CPK): CPT

## 2020-01-01 PROCEDURE — 83036 HEMOGLOBIN GLYCOSYLATED A1C: CPT

## 2020-01-01 PROCEDURE — G0179 MD RECERTIFICATION HHA PT: HCPCS | Mod: ,,, | Performed by: HOSPITALIST

## 2020-01-01 PROCEDURE — 86920 COMPATIBILITY TEST SPIN: CPT

## 2020-01-01 PROCEDURE — 85014 HEMATOCRIT: CPT

## 2020-01-01 PROCEDURE — 86703 HIV-1/HIV-2 1 RESULT ANTBDY: CPT | Mod: TXP

## 2020-01-01 PROCEDURE — 83735 ASSAY OF MAGNESIUM: CPT | Mod: 91

## 2020-01-01 PROCEDURE — 86833 HLA CLASS II HIGH DEFIN QUAL: CPT | Mod: PO,TXP

## 2020-01-01 PROCEDURE — 51702 INSERT TEMP BLADDER CATH: CPT

## 2020-01-01 PROCEDURE — 85610 PROTHROMBIN TIME: CPT | Mod: TXP

## 2020-01-01 PROCEDURE — 97802 MEDICAL NUTRITION INDIV IN: CPT

## 2020-01-01 PROCEDURE — 36556 PR INSERT NON-TUNNEL CV CATH 5+ YRS OLD: ICD-10-PCS | Mod: ,,, | Performed by: STUDENT IN AN ORGANIZED HEALTH CARE EDUCATION/TRAINING PROGRAM

## 2020-01-01 PROCEDURE — 92507 TX SP LANG VOICE COMM INDIV: CPT

## 2020-01-01 PROCEDURE — 87040 BLOOD CULTURE FOR BACTERIA: CPT

## 2020-01-01 PROCEDURE — 87502 INFLUENZA DNA AMP PROBE: CPT

## 2020-01-01 PROCEDURE — 96374 THER/PROPH/DIAG INJ IV PUSH: CPT

## 2020-01-01 PROCEDURE — 99999 PR PBB SHADOW E&M-EST. PATIENT-LVL III: CPT | Mod: PBBFAC,,, | Performed by: STUDENT IN AN ORGANIZED HEALTH CARE EDUCATION/TRAINING PROGRAM

## 2020-01-01 PROCEDURE — 86706 HEP B SURFACE ANTIBODY: CPT | Mod: TXP

## 2020-01-01 PROCEDURE — 92610 EVALUATE SWALLOWING FUNCTION: CPT

## 2020-01-01 PROCEDURE — 99205 PR OFFICE/OUTPT VISIT, NEW, LEVL V, 60-74 MIN: ICD-10-PCS | Mod: S$PBB,,, | Performed by: INTERNAL MEDICINE

## 2020-01-01 PROCEDURE — 85018 HEMOGLOBIN: CPT

## 2020-01-01 PROCEDURE — 87340 HEPATITIS B SURFACE AG IA: CPT | Mod: TXP

## 2020-01-01 PROCEDURE — 99499 UNLISTED E&M SERVICE: CPT | Mod: 95,,, | Performed by: PSYCHIATRY & NEUROLOGY

## 2020-01-01 PROCEDURE — 86140 C-REACTIVE PROTEIN: CPT

## 2020-01-01 PROCEDURE — 99213 OFFICE O/P EST LOW 20 MIN: CPT | Mod: PBBFAC,PO | Performed by: STUDENT IN AN ORGANIZED HEALTH CARE EDUCATION/TRAINING PROGRAM

## 2020-01-01 PROCEDURE — 83970 ASSAY OF PARATHORMONE: CPT | Mod: TXP

## 2020-01-01 PROCEDURE — 99223 1ST HOSP IP/OBS HIGH 75: CPT | Mod: ,,, | Performed by: INTERNAL MEDICINE

## 2020-01-01 PROCEDURE — 86828 HLA CLASS I&II ANTIBODY QUAL: CPT | Mod: 91,PO,TXP

## 2020-01-01 PROCEDURE — 36000709 HC OR TIME LEV III EA ADD 15 MIN: Performed by: STUDENT IN AN ORGANIZED HEALTH CARE EDUCATION/TRAINING PROGRAM

## 2020-01-01 PROCEDURE — 86850 RBC ANTIBODY SCREEN: CPT

## 2020-01-01 PROCEDURE — 99024 PR POST-OP FOLLOW-UP VISIT: ICD-10-PCS | Mod: POP,,, | Performed by: STUDENT IN AN ORGANIZED HEALTH CARE EDUCATION/TRAINING PROGRAM

## 2020-01-01 PROCEDURE — 37000008 HC ANESTHESIA 1ST 15 MINUTES: Performed by: STUDENT IN AN ORGANIZED HEALTH CARE EDUCATION/TRAINING PROGRAM

## 2020-01-01 PROCEDURE — C1750 CATH, HEMODIALYSIS,LONG-TERM: HCPCS | Performed by: STUDENT IN AN ORGANIZED HEALTH CARE EDUCATION/TRAINING PROGRAM

## 2020-01-01 PROCEDURE — 94660 CPAP INITIATION&MGMT: CPT

## 2020-01-01 PROCEDURE — 25000003 PHARM REV CODE 250: Performed by: NURSE ANESTHETIST, CERTIFIED REGISTERED

## 2020-01-01 RX ORDER — ACETAMINOPHEN 10 MG/ML
INJECTION, SOLUTION INTRAVENOUS
Status: DISCONTINUED | OUTPATIENT
Start: 2020-01-01 | End: 2020-01-01

## 2020-01-01 RX ORDER — PROPOFOL 10 MG/ML
VIAL (ML) INTRAVENOUS
Status: DISCONTINUED | OUTPATIENT
Start: 2020-01-01 | End: 2020-01-01

## 2020-01-01 RX ORDER — LIDOCAINE HCL/PF 100 MG/5ML
SYRINGE (ML) INTRAVENOUS
Status: DISCONTINUED | OUTPATIENT
Start: 2020-01-01 | End: 2020-01-01

## 2020-01-01 RX ORDER — ATORVASTATIN CALCIUM 40 MG/1
40 TABLET, FILM COATED ORAL
Status: COMPLETED | OUTPATIENT
Start: 2020-01-01 | End: 2020-01-01

## 2020-01-01 RX ORDER — HYDRALAZINE HYDROCHLORIDE 25 MG/1
50 TABLET, FILM COATED ORAL DAILY
Status: DISCONTINUED | OUTPATIENT
Start: 2020-01-01 | End: 2020-01-01

## 2020-01-01 RX ORDER — PANTOPRAZOLE SODIUM 40 MG/1
40 TABLET, DELAYED RELEASE ORAL DAILY
Status: DISCONTINUED | OUTPATIENT
Start: 2020-01-01 | End: 2020-01-01 | Stop reason: HOSPADM

## 2020-01-01 RX ORDER — INSULIN ASPART 100 [IU]/ML
0-5 INJECTION, SOLUTION INTRAVENOUS; SUBCUTANEOUS
Status: DISCONTINUED | OUTPATIENT
Start: 2020-01-01 | End: 2020-01-01 | Stop reason: HOSPADM

## 2020-01-01 RX ORDER — ASPIRIN 81 MG/1
81 TABLET ORAL DAILY
Qty: 30 TABLET | Refills: 11 | Status: SHIPPED | OUTPATIENT
Start: 2020-01-01 | End: 2021-08-04

## 2020-01-01 RX ORDER — SODIUM CHLORIDE 0.9 % (FLUSH) 0.9 %
3 SYRINGE (ML) INJECTION
Status: DISCONTINUED | OUTPATIENT
Start: 2020-01-01 | End: 2020-01-01 | Stop reason: HOSPADM

## 2020-01-01 RX ORDER — CLOPIDOGREL BISULFATE 75 MG/1
75 TABLET ORAL DAILY
Status: DISCONTINUED | OUTPATIENT
Start: 2020-01-01 | End: 2020-01-01

## 2020-01-01 RX ORDER — ACETAMINOPHEN 500 MG
1000 TABLET ORAL EVERY 8 HOURS PRN
Status: DISCONTINUED | OUTPATIENT
Start: 2020-01-01 | End: 2020-01-01 | Stop reason: HOSPADM

## 2020-01-01 RX ORDER — SODIUM CHLORIDE 9 MG/ML
INJECTION, SOLUTION INTRAVENOUS
Status: DISCONTINUED | OUTPATIENT
Start: 2020-01-01 | End: 2020-01-01 | Stop reason: HOSPADM

## 2020-01-01 RX ORDER — SEVELAMER CARBONATE 800 MG/1
2400 TABLET, FILM COATED ORAL
Status: DISCONTINUED | OUTPATIENT
Start: 2020-01-01 | End: 2020-01-01 | Stop reason: HOSPADM

## 2020-01-01 RX ORDER — ACETAMINOPHEN 325 MG/1
650 TABLET ORAL EVERY 4 HOURS PRN
Status: DISCONTINUED | OUTPATIENT
Start: 2020-01-01 | End: 2020-01-01 | Stop reason: HOSPADM

## 2020-01-01 RX ORDER — ROCURONIUM BROMIDE 10 MG/ML
INJECTION, SOLUTION INTRAVENOUS
Status: DISCONTINUED | OUTPATIENT
Start: 2020-01-01 | End: 2020-01-01

## 2020-01-01 RX ORDER — TALC
6 POWDER (GRAM) TOPICAL NIGHTLY PRN
Status: DISCONTINUED | OUTPATIENT
Start: 2020-01-01 | End: 2020-01-01

## 2020-01-01 RX ORDER — IPRATROPIUM BROMIDE AND ALBUTEROL SULFATE 2.5; .5 MG/3ML; MG/3ML
3 SOLUTION RESPIRATORY (INHALATION)
Status: DISCONTINUED | OUTPATIENT
Start: 2020-01-01 | End: 2020-01-01 | Stop reason: HOSPADM

## 2020-01-01 RX ORDER — ASPIRIN 81 MG/1
81 TABLET ORAL DAILY
Status: DISCONTINUED | OUTPATIENT
Start: 2020-01-01 | End: 2020-01-01 | Stop reason: HOSPADM

## 2020-01-01 RX ORDER — CARVEDILOL 25 MG/1
25 TABLET ORAL 2 TIMES DAILY WITH MEALS
Status: DISCONTINUED | OUTPATIENT
Start: 2020-01-01 | End: 2020-01-01

## 2020-01-01 RX ORDER — HEPARIN 100 UNIT/ML
SYRINGE INTRAVENOUS
Status: DISPENSED
Start: 2020-01-01 | End: 2020-01-01

## 2020-01-01 RX ORDER — SODIUM CHLORIDE 9 MG/ML
INJECTION, SOLUTION INTRAVENOUS CONTINUOUS PRN
Status: DISCONTINUED | OUTPATIENT
Start: 2020-01-01 | End: 2020-01-01

## 2020-01-01 RX ORDER — ATORVASTATIN CALCIUM 40 MG/1
40 TABLET, FILM COATED ORAL DAILY
Status: DISCONTINUED | OUTPATIENT
Start: 2020-01-01 | End: 2020-01-01 | Stop reason: HOSPADM

## 2020-01-01 RX ORDER — TRAZODONE HYDROCHLORIDE 50 MG/1
50 TABLET ORAL NIGHTLY PRN
Status: DISCONTINUED | OUTPATIENT
Start: 2020-01-01 | End: 2020-01-01 | Stop reason: HOSPADM

## 2020-01-01 RX ORDER — GLUCAGON 1 MG
1 KIT INJECTION
Status: DISCONTINUED | OUTPATIENT
Start: 2020-01-01 | End: 2020-01-01 | Stop reason: HOSPADM

## 2020-01-01 RX ORDER — LABETALOL HYDROCHLORIDE 5 MG/ML
10 INJECTION, SOLUTION INTRAVENOUS EVERY 6 HOURS PRN
Status: DISCONTINUED | OUTPATIENT
Start: 2020-01-01 | End: 2020-01-01 | Stop reason: HOSPADM

## 2020-01-01 RX ORDER — MAGNESIUM SULFATE 1 G/100ML
1 INJECTION INTRAVENOUS ONCE
Status: DISCONTINUED | OUTPATIENT
Start: 2020-01-01 | End: 2020-01-01

## 2020-01-01 RX ORDER — MIDAZOLAM HYDROCHLORIDE 1 MG/ML
INJECTION, SOLUTION INTRAMUSCULAR; INTRAVENOUS
Status: DISCONTINUED | OUTPATIENT
Start: 2020-01-01 | End: 2020-01-01

## 2020-01-01 RX ORDER — NEOSTIGMINE METHYLSULFATE 1 MG/ML
INJECTION, SOLUTION INTRAVENOUS
Status: DISCONTINUED | OUTPATIENT
Start: 2020-01-01 | End: 2020-01-01

## 2020-01-01 RX ORDER — SEVELAMER CARBONATE 800 MG/1
2400 TABLET, FILM COATED ORAL
Qty: 360 TABLET | Refills: 11 | Status: SHIPPED | OUTPATIENT
Start: 2020-01-01 | End: 2021-08-04

## 2020-01-01 RX ORDER — HEPARIN SODIUM 1000 [USP'U]/ML
2500 INJECTION, SOLUTION INTRAVENOUS; SUBCUTANEOUS
Status: DISCONTINUED | OUTPATIENT
Start: 2020-01-01 | End: 2020-01-01 | Stop reason: HOSPADM

## 2020-01-01 RX ORDER — BUPIVACAINE HYDROCHLORIDE 2.5 MG/ML
INJECTION, SOLUTION EPIDURAL; INFILTRATION; INTRACAUDAL
Status: DISCONTINUED | OUTPATIENT
Start: 2020-01-01 | End: 2020-01-01 | Stop reason: HOSPADM

## 2020-01-01 RX ORDER — CEFAZOLIN SODIUM 1 G/3ML
INJECTION, POWDER, FOR SOLUTION INTRAMUSCULAR; INTRAVENOUS
Status: DISCONTINUED | OUTPATIENT
Start: 2020-01-01 | End: 2020-01-01

## 2020-01-01 RX ORDER — LIDOCAINE HYDROCHLORIDE 10 MG/ML
INJECTION INFILTRATION; PERINEURAL
Status: DISCONTINUED | OUTPATIENT
Start: 2020-01-01 | End: 2020-01-01 | Stop reason: HOSPADM

## 2020-01-01 RX ORDER — SEVELAMER CARBONATE 800 MG/1
2400 TABLET, FILM COATED ORAL
Status: DISCONTINUED | OUTPATIENT
Start: 2020-01-01 | End: 2020-01-01

## 2020-01-01 RX ORDER — FUROSEMIDE 80 MG/1
160 TABLET ORAL 2 TIMES DAILY
Qty: 120 TABLET | Refills: 11 | Status: SHIPPED | OUTPATIENT
Start: 2020-01-01 | End: 2020-01-01 | Stop reason: SDUPTHER

## 2020-01-01 RX ORDER — POLYETHYLENE GLYCOL 3350 17 G/17G
17 POWDER, FOR SOLUTION ORAL DAILY
Status: DISCONTINUED | OUTPATIENT
Start: 2020-01-01 | End: 2020-01-01 | Stop reason: HOSPADM

## 2020-01-01 RX ORDER — IBUPROFEN 200 MG
16 TABLET ORAL
Status: DISCONTINUED | OUTPATIENT
Start: 2020-01-01 | End: 2020-01-01 | Stop reason: HOSPADM

## 2020-01-01 RX ORDER — PHENYLEPHRINE HYDROCHLORIDE 10 MG/ML
INJECTION INTRAVENOUS
Status: DISCONTINUED | OUTPATIENT
Start: 2020-01-01 | End: 2020-01-01

## 2020-01-01 RX ORDER — SODIUM CHLORIDE 9 MG/ML
INJECTION, SOLUTION INTRAVENOUS ONCE
Status: COMPLETED | OUTPATIENT
Start: 2020-01-01 | End: 2020-01-01

## 2020-01-01 RX ORDER — HEPARIN SODIUM 10000 [USP'U]/ML
INJECTION, SOLUTION INTRAVENOUS; SUBCUTANEOUS
Status: DISCONTINUED | OUTPATIENT
Start: 2020-01-01 | End: 2020-01-01 | Stop reason: HOSPADM

## 2020-01-01 RX ORDER — PARICALCITOL 5 UG/ML
0.1 INJECTION, SOLUTION INTRAVENOUS
Status: DISCONTINUED | OUTPATIENT
Start: 2020-01-01 | End: 2020-01-01 | Stop reason: HOSPADM

## 2020-01-01 RX ORDER — HEPARIN SODIUM 5000 [USP'U]/ML
5000 INJECTION, SOLUTION INTRAVENOUS; SUBCUTANEOUS EVERY 8 HOURS
Status: DISCONTINUED | OUTPATIENT
Start: 2020-01-01 | End: 2020-01-01

## 2020-01-01 RX ORDER — SEVELAMER CARBONATE 800 MG/1
800 TABLET, FILM COATED ORAL
Status: DISCONTINUED | OUTPATIENT
Start: 2020-01-01 | End: 2020-01-01

## 2020-01-01 RX ORDER — IPRATROPIUM BROMIDE AND ALBUTEROL SULFATE 2.5; .5 MG/3ML; MG/3ML
3 SOLUTION RESPIRATORY (INHALATION)
Status: COMPLETED | OUTPATIENT
Start: 2020-01-01 | End: 2020-01-01

## 2020-01-01 RX ORDER — IBUPROFEN 200 MG
24 TABLET ORAL
Status: DISCONTINUED | OUTPATIENT
Start: 2020-01-01 | End: 2020-01-01 | Stop reason: HOSPADM

## 2020-01-01 RX ORDER — TIZANIDINE 4 MG/1
4 TABLET ORAL EVERY 8 HOURS PRN
Status: DISCONTINUED | OUTPATIENT
Start: 2020-01-01 | End: 2020-01-01 | Stop reason: HOSPADM

## 2020-01-01 RX ORDER — FUROSEMIDE 40 MG/1
160 TABLET ORAL 2 TIMES DAILY
Status: DISCONTINUED | OUTPATIENT
Start: 2020-01-01 | End: 2020-01-01 | Stop reason: HOSPADM

## 2020-01-01 RX ORDER — HYDROCODONE BITARTRATE AND ACETAMINOPHEN 10; 325 MG/1; MG/1
1 TABLET ORAL EVERY 6 HOURS PRN
Status: DISCONTINUED | OUTPATIENT
Start: 2020-01-01 | End: 2020-01-01 | Stop reason: HOSPADM

## 2020-01-01 RX ORDER — FUROSEMIDE 10 MG/ML
100 INJECTION INTRAMUSCULAR; INTRAVENOUS
Status: COMPLETED | OUTPATIENT
Start: 2020-01-01 | End: 2020-01-01

## 2020-01-01 RX ORDER — FENTANYL CITRATE 50 UG/ML
INJECTION, SOLUTION INTRAMUSCULAR; INTRAVENOUS
Status: DISCONTINUED | OUTPATIENT
Start: 2020-01-01 | End: 2020-01-01

## 2020-01-01 RX ORDER — MUPIROCIN 20 MG/G
OINTMENT TOPICAL 2 TIMES DAILY
Status: COMPLETED | OUTPATIENT
Start: 2020-01-01 | End: 2020-01-01

## 2020-01-01 RX ORDER — CLOPIDOGREL BISULFATE 75 MG/1
75 TABLET ORAL DAILY
Status: DISCONTINUED | OUTPATIENT
Start: 2020-01-01 | End: 2020-01-01 | Stop reason: HOSPADM

## 2020-01-01 RX ORDER — HEPARIN SODIUM 1000 [USP'U]/ML
1000 INJECTION, SOLUTION INTRAVENOUS; SUBCUTANEOUS
Status: DISCONTINUED | OUTPATIENT
Start: 2020-01-01 | End: 2020-01-01

## 2020-01-01 RX ORDER — CARVEDILOL 3.12 MG/1
6.25 TABLET ORAL 2 TIMES DAILY WITH MEALS
Status: DISCONTINUED | OUTPATIENT
Start: 2020-01-01 | End: 2020-01-01

## 2020-01-01 RX ORDER — SODIUM CHLORIDE 0.9 % (FLUSH) 0.9 %
10 SYRINGE (ML) INJECTION
Status: DISCONTINUED | OUTPATIENT
Start: 2020-01-01 | End: 2020-01-01 | Stop reason: HOSPADM

## 2020-01-01 RX ORDER — CLOPIDOGREL BISULFATE 75 MG/1
75 TABLET ORAL DAILY
Qty: 30 TABLET | Refills: 2 | Status: SHIPPED | OUTPATIENT
Start: 2020-01-01 | End: 2020-01-01

## 2020-01-01 RX ORDER — SEVELAMER CARBONATE 800 MG/1
1600 TABLET, FILM COATED ORAL
Status: DISCONTINUED | OUTPATIENT
Start: 2020-01-01 | End: 2020-01-01

## 2020-01-01 RX ORDER — MAGNESIUM SULFATE 1 G/100ML
1 INJECTION INTRAVENOUS ONCE
Status: COMPLETED | OUTPATIENT
Start: 2020-01-01 | End: 2020-01-01

## 2020-01-01 RX ORDER — GLYCOPYRROLATE 0.2 MG/ML
INJECTION INTRAMUSCULAR; INTRAVENOUS
Status: DISCONTINUED | OUTPATIENT
Start: 2020-01-01 | End: 2020-01-01

## 2020-01-01 RX ORDER — ONDANSETRON HYDROCHLORIDE 2 MG/ML
INJECTION, SOLUTION INTRAMUSCULAR; INTRAVENOUS
Status: DISCONTINUED | OUTPATIENT
Start: 2020-01-01 | End: 2020-01-01

## 2020-01-01 RX ORDER — TRAZODONE HYDROCHLORIDE 50 MG/1
50 TABLET ORAL NIGHTLY PRN
Start: 2020-01-01 | End: 2021-08-04

## 2020-01-01 RX ORDER — HYDROCODONE BITARTRATE AND ACETAMINOPHEN 500; 5 MG/1; MG/1
TABLET ORAL
Status: DISCONTINUED | OUTPATIENT
Start: 2020-01-01 | End: 2020-01-01 | Stop reason: HOSPADM

## 2020-01-01 RX ORDER — PANTOPRAZOLE SODIUM 40 MG/1
40 TABLET, DELAYED RELEASE ORAL
Status: COMPLETED | OUTPATIENT
Start: 2020-01-01 | End: 2020-01-01

## 2020-01-01 RX ORDER — MAGNESIUM SULFATE HEPTAHYDRATE 40 MG/ML
2 INJECTION, SOLUTION INTRAVENOUS ONCE
Status: DISCONTINUED | OUTPATIENT
Start: 2020-01-01 | End: 2020-01-01

## 2020-01-01 RX ORDER — AMLODIPINE BESYLATE 5 MG/1
10 TABLET ORAL DAILY
Status: DISCONTINUED | OUTPATIENT
Start: 2020-01-01 | End: 2020-01-01

## 2020-01-01 RX ORDER — IPRATROPIUM BROMIDE AND ALBUTEROL SULFATE 2.5; .5 MG/3ML; MG/3ML
3 SOLUTION RESPIRATORY (INHALATION) EVERY 4 HOURS PRN
Status: DISCONTINUED | OUTPATIENT
Start: 2020-01-01 | End: 2020-01-01

## 2020-01-01 RX ORDER — PANTOPRAZOLE SODIUM 40 MG/1
40 TABLET, DELAYED RELEASE ORAL DAILY
Qty: 30 TABLET | Refills: 11 | Status: SHIPPED | OUTPATIENT
Start: 2020-01-01 | End: 2021-08-04

## 2020-01-01 RX ORDER — POTASSIUM CHLORIDE 20 MEQ/1
20 TABLET, EXTENDED RELEASE ORAL ONCE
Status: COMPLETED | OUTPATIENT
Start: 2020-01-01 | End: 2020-01-01

## 2020-01-01 RX ADMIN — CLOPIDOGREL 75 MG: 75 TABLET, FILM COATED ORAL at 08:07

## 2020-01-01 RX ADMIN — SEVELAMER CARBONATE 800 MG: 800 TABLET, FILM COATED ORAL at 12:07

## 2020-01-01 RX ADMIN — FENTANYL CITRATE 100 MCG: 50 INJECTION, SOLUTION INTRAMUSCULAR; INTRAVENOUS at 07:08

## 2020-01-01 RX ADMIN — IPRATROPIUM BROMIDE AND ALBUTEROL SULFATE 3 ML: .5; 3 SOLUTION RESPIRATORY (INHALATION) at 12:07

## 2020-01-01 RX ADMIN — PROPOFOL 50 MG: 10 INJECTION, EMULSION INTRAVENOUS at 07:08

## 2020-01-01 RX ADMIN — PIPERACILLIN AND TAZOBACTAM 4.5 G: 4; .5 INJECTION, POWDER, LYOPHILIZED, FOR SOLUTION INTRAVENOUS; PARENTERAL at 01:07

## 2020-01-01 RX ADMIN — MUPIROCIN: 20 OINTMENT TOPICAL at 10:07

## 2020-01-01 RX ADMIN — SODIUM CHLORIDE 500 ML: 0.9 INJECTION, SOLUTION INTRAVENOUS at 12:07

## 2020-01-01 RX ADMIN — IPRATROPIUM BROMIDE AND ALBUTEROL SULFATE 3 ML: .5; 3 SOLUTION RESPIRATORY (INHALATION) at 04:07

## 2020-01-01 RX ADMIN — SEVELAMER CARBONATE 800 MG: 800 TABLET, FILM COATED ORAL at 08:07

## 2020-01-01 RX ADMIN — HYDRALAZINE HYDROCHLORIDE 50 MG: 25 TABLET, FILM COATED ORAL at 09:07

## 2020-01-01 RX ADMIN — SEVELAMER CARBONATE 800 MG: 800 TABLET, FILM COATED ORAL at 05:07

## 2020-01-01 RX ADMIN — NEPHROCAP 1 CAPSULE: 1 CAP ORAL at 08:08

## 2020-01-01 RX ADMIN — PIPERACILLIN AND TAZOBACTAM 4.5 G: 4; .5 INJECTION, POWDER, LYOPHILIZED, FOR SOLUTION INTRAVENOUS; PARENTERAL at 02:07

## 2020-01-01 RX ADMIN — IPRATROPIUM BROMIDE AND ALBUTEROL SULFATE 3 ML: .5; 3 SOLUTION RESPIRATORY (INHALATION) at 08:08

## 2020-01-01 RX ADMIN — IPRATROPIUM BROMIDE AND ALBUTEROL SULFATE 3 ML: .5; 3 SOLUTION RESPIRATORY (INHALATION) at 08:07

## 2020-01-01 RX ADMIN — TRAZODONE HYDROCHLORIDE 50 MG: 50 TABLET ORAL at 09:07

## 2020-01-01 RX ADMIN — SEVELAMER CARBONATE 2400 MG: 800 TABLET, FILM COATED ORAL at 09:08

## 2020-01-01 RX ADMIN — IPRATROPIUM BROMIDE AND ALBUTEROL SULFATE 3 ML: .5; 3 SOLUTION RESPIRATORY (INHALATION) at 12:08

## 2020-01-01 RX ADMIN — CLOPIDOGREL 75 MG: 75 TABLET, FILM COATED ORAL at 09:08

## 2020-01-01 RX ADMIN — PARICALCITOL 10 MCG: 5 INJECTION, SOLUTION INTRAVENOUS at 11:08

## 2020-01-01 RX ADMIN — TIZANIDINE 4 MG: 4 TABLET ORAL at 09:07

## 2020-01-01 RX ADMIN — NEPHROCAP 1 CAPSULE: 1 CAP ORAL at 09:08

## 2020-01-01 RX ADMIN — FUROSEMIDE 160 MG: 40 TABLET ORAL at 09:08

## 2020-01-01 RX ADMIN — IPRATROPIUM BROMIDE AND ALBUTEROL SULFATE 3 ML: .5; 3 SOLUTION RESPIRATORY (INHALATION) at 01:07

## 2020-01-01 RX ADMIN — CARVEDILOL 25 MG: 25 TABLET, FILM COATED ORAL at 09:07

## 2020-01-01 RX ADMIN — IPRATROPIUM BROMIDE AND ALBUTEROL SULFATE 3 ML: .5; 3 SOLUTION RESPIRATORY (INHALATION) at 11:07

## 2020-01-01 RX ADMIN — PIPERACILLIN AND TAZOBACTAM 4.5 G: 4; .5 INJECTION, POWDER, LYOPHILIZED, FOR SOLUTION INTRAVENOUS; PARENTERAL at 12:08

## 2020-01-01 RX ADMIN — IPRATROPIUM BROMIDE AND ALBUTEROL SULFATE 3 ML: .5; 3 SOLUTION RESPIRATORY (INHALATION) at 02:07

## 2020-01-01 RX ADMIN — SEVELAMER CARBONATE 800 MG: 800 TABLET, FILM COATED ORAL at 10:07

## 2020-01-01 RX ADMIN — SEVELAMER CARBONATE 1600 MG: 800 TABLET, FILM COATED ORAL at 09:08

## 2020-01-01 RX ADMIN — CLOPIDOGREL 75 MG: 75 TABLET, FILM COATED ORAL at 11:08

## 2020-01-01 RX ADMIN — MUPIROCIN: 20 OINTMENT TOPICAL at 08:07

## 2020-01-01 RX ADMIN — PROPOFOL 100 MG: 10 INJECTION, EMULSION INTRAVENOUS at 07:08

## 2020-01-01 RX ADMIN — MUPIROCIN: 20 OINTMENT TOPICAL at 07:07

## 2020-01-01 RX ADMIN — ATORVASTATIN CALCIUM 40 MG: 40 TABLET, FILM COATED ORAL at 11:08

## 2020-01-01 RX ADMIN — POTASSIUM CHLORIDE 20 MEQ: 1500 TABLET, EXTENDED RELEASE ORAL at 11:08

## 2020-01-01 RX ADMIN — HYDRALAZINE HYDROCHLORIDE 50 MG: 25 TABLET, FILM COATED ORAL at 08:07

## 2020-01-01 RX ADMIN — PARICALCITOL 10 MCG: 5 INJECTION, SOLUTION INTRAVENOUS at 05:07

## 2020-01-01 RX ADMIN — CLOPIDOGREL 75 MG: 75 TABLET, FILM COATED ORAL at 10:07

## 2020-01-01 RX ADMIN — TIZANIDINE 4 MG: 4 TABLET ORAL at 06:07

## 2020-01-01 RX ADMIN — HYDROCODONE BITARTRATE AND ACETAMINOPHEN 1 TABLET: 10; 325 TABLET ORAL at 06:08

## 2020-01-01 RX ADMIN — IPRATROPIUM BROMIDE AND ALBUTEROL SULFATE 3 ML: .5; 3 SOLUTION RESPIRATORY (INHALATION) at 03:08

## 2020-01-01 RX ADMIN — CLOPIDOGREL 75 MG: 75 TABLET, FILM COATED ORAL at 08:08

## 2020-01-01 RX ADMIN — PANTOPRAZOLE SODIUM 40 MG: 40 TABLET, DELAYED RELEASE ORAL at 09:08

## 2020-01-01 RX ADMIN — IPRATROPIUM BROMIDE AND ALBUTEROL SULFATE 3 ML: .5; 3 SOLUTION RESPIRATORY (INHALATION) at 11:08

## 2020-01-01 RX ADMIN — SODIUM CHLORIDE: 0.9 INJECTION, SOLUTION INTRAVENOUS at 04:07

## 2020-01-01 RX ADMIN — FUROSEMIDE 160 MG: 40 TABLET ORAL at 05:08

## 2020-01-01 RX ADMIN — SEVELAMER CARBONATE 1600 MG: 800 TABLET, FILM COATED ORAL at 01:08

## 2020-01-01 RX ADMIN — PIPERACILLIN AND TAZOBACTAM 4.5 G: 4; .5 INJECTION, POWDER, LYOPHILIZED, FOR SOLUTION INTRAVENOUS; PARENTERAL at 12:07

## 2020-01-01 RX ADMIN — MUPIROCIN: 20 OINTMENT TOPICAL at 09:07

## 2020-01-01 RX ADMIN — PIPERACILLIN AND TAZOBACTAM 4.5 G: 4; .5 INJECTION, POWDER, LYOPHILIZED, FOR SOLUTION INTRAVENOUS; PARENTERAL at 01:08

## 2020-01-01 RX ADMIN — IRON SUCROSE 100 MG: 20 INJECTION, SOLUTION INTRAVENOUS at 12:07

## 2020-01-01 RX ADMIN — ACETAMINOPHEN 1000 MG: 10 INJECTION, SOLUTION INTRAVENOUS at 08:08

## 2020-01-01 RX ADMIN — SEVELAMER CARBONATE 2400 MG: 800 TABLET, FILM COATED ORAL at 06:08

## 2020-01-01 RX ADMIN — IPRATROPIUM BROMIDE AND ALBUTEROL SULFATE 3 ML: .5; 3 SOLUTION RESPIRATORY (INHALATION) at 04:08

## 2020-01-01 RX ADMIN — ATORVASTATIN CALCIUM 40 MG: 40 TABLET, FILM COATED ORAL at 09:08

## 2020-01-01 RX ADMIN — PANTOPRAZOLE SODIUM 40 MG: 40 TABLET, DELAYED RELEASE ORAL at 08:08

## 2020-01-01 RX ADMIN — IPRATROPIUM BROMIDE AND ALBUTEROL SULFATE 3 ML: .5; 3 SOLUTION RESPIRATORY (INHALATION) at 07:08

## 2020-01-01 RX ADMIN — NITROGLYCERIN 0.5 INCH: 20 OINTMENT TOPICAL at 03:07

## 2020-01-01 RX ADMIN — ATORVASTATIN CALCIUM 40 MG: 40 TABLET, FILM COATED ORAL at 08:07

## 2020-01-01 RX ADMIN — PHENYLEPHRINE HYDROCHLORIDE 200 MCG: 10 INJECTION INTRAVENOUS at 08:08

## 2020-01-01 RX ADMIN — NEPHROCAP 1 CAPSULE: 1 CAP ORAL at 11:08

## 2020-01-01 RX ADMIN — SODIUM CHLORIDE 600 ML: 0.9 INJECTION, SOLUTION INTRAVENOUS at 12:07

## 2020-01-01 RX ADMIN — PARICALCITOL 10 MCG: 5 INJECTION, SOLUTION INTRAVENOUS at 10:07

## 2020-01-01 RX ADMIN — AMLODIPINE BESYLATE 10 MG: 5 TABLET ORAL at 08:07

## 2020-01-01 RX ADMIN — AMLODIPINE BESYLATE 10 MG: 5 TABLET ORAL at 09:07

## 2020-01-01 RX ADMIN — EPOETIN ALFA-EPBX 5000 UNITS: 10000 INJECTION, SOLUTION INTRAVENOUS; SUBCUTANEOUS at 10:07

## 2020-01-01 RX ADMIN — VANCOMYCIN HYDROCHLORIDE 1500 MG: 1.5 INJECTION, POWDER, LYOPHILIZED, FOR SOLUTION INTRAVENOUS at 01:07

## 2020-01-01 RX ADMIN — PANTOPRAZOLE SODIUM 40 MG: 40 TABLET, DELAYED RELEASE ORAL at 03:07

## 2020-01-01 RX ADMIN — FUROSEMIDE 160 MG: 40 TABLET ORAL at 08:08

## 2020-01-01 RX ADMIN — NEPHROCAP 1 CAPSULE: 1 CAP ORAL at 10:07

## 2020-01-01 RX ADMIN — HEPARIN SODIUM 2500 UNITS: 1000 INJECTION, SOLUTION INTRAVENOUS; SUBCUTANEOUS at 10:07

## 2020-01-01 RX ADMIN — FUROSEMIDE 100 MG: 10 INJECTION, SOLUTION INTRAVENOUS at 03:07

## 2020-01-01 RX ADMIN — SEVELAMER CARBONATE 2400 MG: 800 TABLET, FILM COATED ORAL at 04:08

## 2020-01-01 RX ADMIN — SEVELAMER CARBONATE 1600 MG: 800 TABLET, FILM COATED ORAL at 05:08

## 2020-01-01 RX ADMIN — NEPHROCAP 1 CAPSULE: 1 CAP ORAL at 09:07

## 2020-01-01 RX ADMIN — NEPHROCAP 1 CAPSULE: 1 CAP ORAL at 08:07

## 2020-01-01 RX ADMIN — IPRATROPIUM BROMIDE AND ALBUTEROL SULFATE 3 ML: .5; 3 SOLUTION RESPIRATORY (INHALATION) at 07:07

## 2020-01-01 RX ADMIN — ATORVASTATIN CALCIUM 40 MG: 40 TABLET, FILM COATED ORAL at 10:07

## 2020-01-01 RX ADMIN — FUROSEMIDE 160 MG: 40 TABLET ORAL at 06:08

## 2020-01-01 RX ADMIN — CARVEDILOL 25 MG: 25 TABLET, FILM COATED ORAL at 08:07

## 2020-01-01 RX ADMIN — TUBERCULIN PURIFIED PROTEIN DERIVATIVE 5 UNITS: 5 INJECTION, SOLUTION INTRADERMAL at 09:07

## 2020-01-01 RX ADMIN — NEOSTIGMINE METHYLSULFATE 5 MG: 1 INJECTION INTRAVENOUS at 08:08

## 2020-01-01 RX ADMIN — EPOETIN ALFA-EPBX 10000 UNITS: 10000 INJECTION, SOLUTION INTRAVENOUS; SUBCUTANEOUS at 04:08

## 2020-01-01 RX ADMIN — CARVEDILOL 25 MG: 25 TABLET, FILM COATED ORAL at 05:07

## 2020-01-01 RX ADMIN — MIDAZOLAM 2 MG: 1 INJECTION INTRAMUSCULAR; INTRAVENOUS at 07:08

## 2020-01-01 RX ADMIN — HYDROCODONE BITARTRATE AND ACETAMINOPHEN 1 TABLET: 10; 325 TABLET ORAL at 12:08

## 2020-01-01 RX ADMIN — ONDANSETRON 8 MG: 2 INJECTION, SOLUTION INTRAMUSCULAR; INTRAVENOUS at 08:08

## 2020-01-01 RX ADMIN — HEPARIN SODIUM 2500 UNITS: 1000 INJECTION, SOLUTION INTRAVENOUS; SUBCUTANEOUS at 04:08

## 2020-01-01 RX ADMIN — IRON SUCROSE 100 MG: 20 INJECTION, SOLUTION INTRAVENOUS at 05:07

## 2020-01-01 RX ADMIN — ATORVASTATIN CALCIUM 40 MG: 40 TABLET, FILM COATED ORAL at 08:08

## 2020-01-01 RX ADMIN — PARICALCITOL 10 MCG: 5 INJECTION, SOLUTION INTRAVENOUS at 08:07

## 2020-01-01 RX ADMIN — SEVELAMER CARBONATE 2400 MG: 800 TABLET, FILM COATED ORAL at 12:08

## 2020-01-01 RX ADMIN — HEPARIN SODIUM 1000 UNITS: 1000 INJECTION, SOLUTION INTRAVENOUS; SUBCUTANEOUS at 05:07

## 2020-01-01 RX ADMIN — MELATONIN 6 MG: at 12:07

## 2020-01-01 RX ADMIN — POLYETHYLENE GLYCOL (3350) 17 G: 17 POWDER, FOR SOLUTION ORAL at 08:07

## 2020-01-01 RX ADMIN — AZITHROMYCIN MONOHYDRATE 500 MG: 500 INJECTION, POWDER, LYOPHILIZED, FOR SOLUTION INTRAVENOUS at 02:12

## 2020-01-01 RX ADMIN — FUROSEMIDE 160 MG: 40 TABLET ORAL at 11:08

## 2020-01-01 RX ADMIN — PANTOPRAZOLE SODIUM 40 MG: 40 TABLET, DELAYED RELEASE ORAL at 10:07

## 2020-01-01 RX ADMIN — POLYETHYLENE GLYCOL (3350) 17 G: 17 POWDER, FOR SOLUTION ORAL at 10:07

## 2020-01-01 RX ADMIN — IPRATROPIUM BROMIDE AND ALBUTEROL SULFATE 3 ML: .5; 3 SOLUTION RESPIRATORY (INHALATION) at 03:07

## 2020-01-01 RX ADMIN — MUPIROCIN: 20 OINTMENT TOPICAL at 02:07

## 2020-01-01 RX ADMIN — MAGNESIUM SULFATE 1 G: 1 INJECTION INTRAVENOUS at 12:07

## 2020-01-01 RX ADMIN — ASPIRIN 81 MG: 81 TABLET, COATED ORAL at 09:08

## 2020-01-01 RX ADMIN — IRON SUCROSE 100 MG: 20 INJECTION, SOLUTION INTRAVENOUS at 11:07

## 2020-01-01 RX ADMIN — SEVELAMER CARBONATE 800 MG: 800 TABLET, FILM COATED ORAL at 11:08

## 2020-01-01 RX ADMIN — NEPHROCAP 1 CAPSULE: 1 CAP ORAL at 02:07

## 2020-01-01 RX ADMIN — SODIUM CHLORIDE: 9 INJECTION, SOLUTION INTRAVENOUS at 07:08

## 2020-01-01 RX ADMIN — PANTOPRAZOLE SODIUM 40 MG: 40 TABLET, DELAYED RELEASE ORAL at 08:07

## 2020-01-01 RX ADMIN — IOHEXOL 100 ML: 350 INJECTION, SOLUTION INTRAVENOUS at 12:08

## 2020-01-01 RX ADMIN — CEFAZOLIN 2 G: 330 INJECTION, POWDER, FOR SOLUTION INTRAMUSCULAR; INTRAVENOUS at 08:08

## 2020-01-01 RX ADMIN — GLYCOPYRROLATE 0.6 MG: 0.2 INJECTION, SOLUTION INTRAMUSCULAR; INTRAVENOUS at 08:08

## 2020-01-01 RX ADMIN — ATORVASTATIN CALCIUM 40 MG: 40 TABLET, FILM COATED ORAL at 09:07

## 2020-01-01 RX ADMIN — CEFTRIAXONE 1 G: 1 INJECTION, SOLUTION INTRAVENOUS at 02:12

## 2020-01-01 RX ADMIN — SEVELAMER CARBONATE 800 MG: 800 TABLET, FILM COATED ORAL at 09:07

## 2020-01-01 RX ADMIN — HEPARIN SODIUM 1000 UNITS: 1000 INJECTION, SOLUTION INTRAVENOUS; SUBCUTANEOUS at 01:07

## 2020-01-01 RX ADMIN — ROCURONIUM BROMIDE 30 MG: 10 INJECTION, SOLUTION INTRAVENOUS at 07:08

## 2020-01-01 RX ADMIN — SEVELAMER CARBONATE 800 MG: 800 TABLET, FILM COATED ORAL at 08:08

## 2020-01-01 RX ADMIN — ATORVASTATIN CALCIUM 40 MG: 40 TABLET, FILM COATED ORAL at 03:07

## 2020-01-01 RX ADMIN — EPOETIN ALFA-EPBX 5000 UNITS: 10000 INJECTION, SOLUTION INTRAVENOUS; SUBCUTANEOUS at 05:07

## 2020-01-16 NOTE — HOSPITAL COURSE
Left generic voicemail for patient to call office back.      Patient was transferred OM from 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 On 3/18 admitted to NICU s/p T12-L1 corpectomy and T10-L3 fusion. ID recommended to discontinue Airborne Precautions due to pt being completely treated for TB.  Patient with stable exam, Pt is neurologically stable.  Pain controlled overnight on new regimen. Continue subfascial hemovac drain per NSGY. Continue TLSO brace.     She was transferred to hospital medicine service on 3/23 for further care, Hgb drop, 2U PRBC given on 3/23. Hgb now stabilized. Rehab consulted. CM/SW are working on placement. Drained pulled by NSGY on 3/26. Pt has been accepted to [facility] rehab but family wants closer facility to [place]. TREVON is aware.

## 2020-03-15 PROBLEM — I50.20 HEART FAILURE WITH REDUCED EJECTION FRACTION, NYHA CLASS I: Status: ACTIVE | Noted: 2020-01-01

## 2020-03-15 PROBLEM — I35.0 AORTIC VALVE STENOSIS: Status: ACTIVE | Noted: 2020-01-01

## 2020-03-15 PROBLEM — R94.31 ELECTROCARDIOGRAM SHOWING T WAVE ABNORMALITIES: Status: ACTIVE | Noted: 2020-01-01

## 2020-03-15 PROBLEM — I51.89 DIASTOLIC DYSFUNCTION: Status: ACTIVE | Noted: 2020-01-01

## 2020-03-15 PROBLEM — I70.0 AORTIC ATHEROSCLEROSIS: Status: ACTIVE | Noted: 2020-01-01

## 2020-03-16 PROBLEM — I42.9 CARDIOMYOPATHY: Status: ACTIVE | Noted: 2020-01-01

## 2020-03-16 PROBLEM — I50.20 HEART FAILURE WITH REDUCED EJECTION FRACTION, NYHA CLASS I: Status: RESOLVED | Noted: 2020-01-01 | Resolved: 2020-01-01

## 2020-07-13 NOTE — PROGRESS NOTES
Spoke to Ms Carrillo back in May to complete her history call again to make sure they was not any changes her daughter chi carrillo will still come with her to her appointment she is also her caregiver she is aware that she have to bring a small breakfast  to eat after blood is drawn she also is aware that the cafe will be open. She do not need a

## 2020-07-14 PROBLEM — N25.81 SECONDARY HYPERPARATHYROIDISM OF RENAL ORIGIN: Status: ACTIVE | Noted: 2020-01-01

## 2020-07-14 PROBLEM — N18.5 CKD (CHRONIC KIDNEY DISEASE) STAGE 5, GFR LESS THAN 15 ML/MIN: Status: ACTIVE | Noted: 2020-01-01

## 2020-07-14 NOTE — PROGRESS NOTES
PHARM.D. PRE-TRANSPLANT NOTE:    This patient's medication therapy was evaluated as part of her pre-transplant evaluation.      The following general pharmacologic concerns were noted: None    The following concerns for post-operative pain management were noted: Patient takes Hydrocodone/APAP 3-4 times a week.    The following pharmacologic concerns related to HCV therapy were noted: None      This patient's medication profile was reviewed for considerations for DAA Hepatitis C therapy:    [X]  No current inducers of CYP 3A4 or PGP  [X]  No amiodarone on this patient's EMR profile in the last 24 months  [X]  No past or current atrial fibrillation on this patient's EMR profile       Current Outpatient Medications   Medication Sig Dispense Refill    albuterol (PROAIR HFA) 90 mcg/actuation inhaler Inhale 2 puffs into the lungs every 6 (six) hours as needed for Wheezing. Rescue 1 Inhaler 11    amLODIPine (NORVASC) 10 MG tablet Take 1 tablet (10 mg total) by mouth once daily. 90 tablet 3    carvedilol (COREG) 25 MG tablet Take 1 tablet (25 mg total) by mouth 2 (two) times daily with meals. 60 tablet 11    folic acid (FOLVITE) 1 MG tablet Take 1 tablet (1 mg total) by mouth once daily. 90 tablet 3    furosemide (LASIX) 40 MG tablet Take 1 tablet (40 mg total) by mouth 2 (two) times daily. (Patient taking differently: Take 40 mg by mouth once daily. ) 60 tablet 11    hydrALAZINE (APRESOLINE) 50 MG tablet Take 1 tablet (50 mg total) by mouth 3 (three) times daily. (Patient taking differently: Take 50 mg by mouth once daily. ) 270 tablet 3    HYDROcodone-acetaminophen (NORCO)  mg per tablet Take 1 tablet by mouth every 12 (twelve) hours as needed for Pain. 28 tablet 0    hydrOXYzine pamoate (VISTARIL) 50 MG Cap Take 1 capsule (50 mg total) by mouth every evening. 60 capsule 3    LIPITOR 40 mg tablet Take 1 tablet (40 mg total) by mouth once daily. 90 tablet 3    tiZANidine (ZANAFLEX) 4 MG tablet Take 4 mg by  mouth every 8 (eight) hours as needed.       traZODone (DESYREL) 100 MG tablet Take 100 mg by mouth nightly as needed.        No current facility-administered medications for this visit.          Currently the patient is responsible for preparing / administering this patient's medications on a daily basis.  I am available for consultation and can be contacted, as needed by the other members of the Kidney Transplant team.

## 2020-07-14 NOTE — PROGRESS NOTES
Transplant Nephrology  Kidney Transplant Recipient Evaluation    Referring Physician: Amos Mcintyre  Current Nephrologist: Amos Mcintyre    Subjective:   CC:  Initial evaluation of kidney transplant candidacy.    HPI:  Ms. Carrillo is a 65 y.o. year old Black or  female who has presented to be evaluated as a potential kidney transplant recipient.  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  Patient is currently pre-dialysis. She has a no dialysis access for dialysis access.     She was told about the kidney disease in 2019. She was referred by her PCP to see Dr Mcintyre due to nephrotic syndrome and a kidney biopsy showed collapsing FSGS. Patient is not aware of steroids or immunosuppressive therapy.     She was diagnosed with hypertension 10 yrs ago in 2010.    She was diagnosed with sarcoidosis 15 yrs ago in 2005. Skin, eye and pulmonary changes (dyspnea), she was taking medications but does not remember the name of the medicines. She does not remember getting treatment with steroids. She was following with her PCP, no recollection of other providers.     Patient had CKD stage 3 at least since 2018 and more severe decline since 2/2019. currently her GFR is 10 ml/min. No dialysis access. She states that saw Dr SANGEETA Mcintyre in March 2020.     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 She also had positive Mycobacterium tuberculosis cultures in the sputum. Please discharge summaries for detail data regarding medications used and time frame for this treatment.     T12-L1 corpectomy with T10-L3 fusion on 3/18/18 due to Pott's disease.        Previous Transplant: no    Past Medical and Surgical History: Ms. Carrillo  has a past medical history of Arthritis, Asthma, Back pain, Dehiscence of operative wound, Disorder of kidney and ureter, Glaucoma, Gout, Hyperlipidemia, Hypertension, Obesity, Sarcoidosis,  Vitritis, and Vitritis.  She has a past surgical history that includes Cataract extraction (Bilateral); Back surgery (2018);  section (,  ); Back surgery; and Hysterectomy ().    Past Social and Family History: Ms. Carrillo reports that she quit smoking about 25 years ago. Her smoking use included cigarettes. She started smoking about 53 years ago. She has a 35.00 pack-year smoking history. She has never used smokeless tobacco. She reports that she does not drink alcohol or use drugs. Her family history includes Asthma in her brother; Diabetes in her father and mother; Hypertension in her brother; Kidney disease in her father and mother; No Known Problems in her sister, sister, and sister.     Current Outpatient Medications on File Prior to Visit   Medication Sig Dispense Refill    albuterol (PROAIR HFA) 90 mcg/actuation inhaler Inhale 2 puffs into the lungs every 6 (six) hours as needed for Wheezing. Rescue 1 Inhaler 11    amLODIPine (NORVASC) 10 MG tablet Take 1 tablet (10 mg total) by mouth once daily. 90 tablet 3    carvedilol (COREG) 25 MG tablet Take 1 tablet (25 mg total) by mouth 2 (two) times daily with meals. 60 tablet 11    folic acid (FOLVITE) 1 MG tablet Take 1 tablet (1 mg total) by mouth once daily. 90 tablet 3    furosemide (LASIX) 40 MG tablet Take 1 tablet (40 mg total) by mouth 2 (two) times daily. (Patient taking differently: Take 40 mg by mouth once daily. ) 60 tablet 11    hydrALAZINE (APRESOLINE) 50 MG tablet Take 1 tablet (50 mg total) by mouth 3 (three) times daily. (Patient taking differently: Take 50 mg by mouth once daily. ) 270 tablet 3    HYDROcodone-acetaminophen (NORCO)  mg per tablet Take 1 tablet by mouth every 12 (twelve) hours as needed for Pain. 28 tablet 0    hydrOXYzine pamoate (VISTARIL) 50 MG Cap Take 1 capsule (50 mg total) by mouth every evening. 60 capsule 3    LIPITOR 40 mg tablet Take 1 tablet (40 mg total) by mouth once daily. 90 tablet 3  "   tiZANidine (ZANAFLEX) 4 MG tablet Take 4 mg by mouth every 8 (eight) hours as needed.       traZODone (DESYREL) 100 MG tablet Take 100 mg by mouth nightly as needed.       [DISCONTINUED] albuterol (PROAIR HFA) 90 mcg/actuation inhaler Inhale 2 puffs into the lungs every 6 (six) hours as needed for Wheezing. Rescue 18 g 6    [DISCONTINUED] ergocalciferol (ERGOCALCIFEROL) 50,000 unit Cap Take 1 capsule (50,000 Units total) by mouth every 7 days. (Patient not taking: Reported on 6/8/2020) 12 capsule 0    [DISCONTINUED] isosorbide mononitrate (IMDUR) 30 MG 24 hr tablet Take 1 tablet (30 mg total) by mouth once daily. (Patient not taking: Reported on 6/8/2020) 30 tablet 11     No current facility-administered medications on file prior to visit.       Review of Systems     Skin: no skin rash  CNS; no headaches, blurred vision, seizure, or syncope  ENT: No JVD,  Adenopathies,  nasal congestion. No oral lesions  Cardiac: No chest pain, dyspnea, claudication, edema or palpitations  Respiratory: (++) SOB, cough, hemoptysis   Gastro-intestinal: No diarrhea, constipation, abdominal pain, nausea, vomit. No ascitis  Genitourinary: no hematuria, dysuria, frequency, frequency  Musculoskeletal: back joint pain, arthritis or vasculitic changes  Psych: alert awake, oriented, No cranial nerves deficit.      Objective:   Blood pressure (!) 155/78, pulse 98, temperature 98 °F (36.7 °C), temperature source Oral, resp. rate 16, height 5' 2.99" (1.6 m), weight 100.3 kg (221 lb 1.9 oz), SpO2 (!) 93 %.body mass index is 39.18 kg/m².    Physical Exam     Frail looking appearing lady using a walker. Some difficulty getting to the examination table.     Head: normocephalic  Neck: No JVD, cervical axillary, or femoral adenopathies  Heart: no murmurs, Normal s1 and s2, No gallops, no rubs, No murmurs  Lungs; CTA, good respiratory effort, no crackles  Abdomen: soft, non tender, no splenomegaly or hepatomegaly, no massess, no " bruits  Extremities: No edema, skin rash, joint pain  SNC: awake, alert oriented. Cranial nerves are intact, no focalized, sensitivity and strength preserved      Labs:  Lab Results   Component Value Date    WBC 11.65 07/14/2020    HGB 8.0 (L) 07/14/2020    HCT 27.5 (L) 07/14/2020     06/15/2020    K 3.4 (L) 06/15/2020     (H) 06/15/2020    CO2 18 (L) 06/15/2020    BUN 46 (H) 06/15/2020    CREATININE 4.7 (H) 06/15/2020    EGFRNONAA 9.0 (A) 06/15/2020    CALCIUM 6.6 (LL) 06/15/2020    PHOS 5.0 (H) 03/16/2020    MG 1.9 10/08/2019    ALBUMIN 2.6 (L) 06/15/2020    AST 15 06/15/2020    ALT 10 06/15/2020    UTPCR 8.49 (H) 12/12/2019    PTH 1,287.0 (H) 07/14/2020       Lab Results   Component Value Date    BILIRUBINUA Negative 10/07/2019    PROTEINUA 3+ (A) 10/07/2019    NITRITE Negative 10/07/2019    RBCUA 1 10/07/2019    WBCUA 5 10/07/2019       No results found for: HLAABCTYPE    Labs were reviewed with the patient.    Assessment:     1. Pott's disease (spinal tuberculosis)    2. Class 2 severe obesity with serious comorbidity and body mass index (BMI) of 39.0 to 39.9 in adult, unspecified obesity type    3. Sarcoidosis    4. Thoracolumbar back pain    5. Pulmonary tuberculosis    6. Diastolic dysfunction    7. Secondary hyperparathyroidism of renal origin    8. CKD (chronic kidney disease) stage 5, GFR less than 15 ml/min    9. BMI 39.0-39.9,adult        Plan:     Transplant Candidacy:   Based on available information, Ms. Carrillo is an unacceptable kidney transplant candidate.   Meets center eligibility for accepting HCV+ donor offer - yes.  Patient educated on HCV+ donors. Mary is willing to accept HCV+ donor offer - yes   Patient is a candidate for KDPI > 85 kidney donor offer - no.      Poor functional capacity  Elevated Frailty index  Low Albumin.  Concerning her h/o Pott's and pulmonary Tuberculosis  High PTH 1,200  Obesity BMI 39      Will discuss with the transplant team  I sent a message to her  nephrologist about the findings a transplant candidacy.  Patient was directed to go to the ER due to hypocalcemia of 6.3    I discussed at lengthy the transplant candidacy with patient and daughter.     Extensive education provided  All questions answered    Juan C Fajardo MD       [facility] Patient Status  Functional Status: 50% - Requires considerable assistance and frequent medical care  Physical Capacity: Limited Mobility

## 2020-07-14 NOTE — PROGRESS NOTES
Lab called an gave me a critical lab value, calcium 6.3 at 10:59am, notified Tana Lopez at 11:05am, his instructions was to send her to the ER, and to notify her nephrology drCandida Of the critical lab.  Ms. Carrillo was not answering the number that was  on the demographics page.  However her granddaughter did answer after 3 try's, she gave me a number of the person that was attending with ms Carrillo .  I was able to speak with her at 11:22am  to tell her the critical lab value and instructed her to go to the ER  and to notify her nephrology doctor , she said she would go the ER in [place],  however,  Dr. Fajardo called and notified her nephrology doctor

## 2020-07-17 NOTE — LETTER
July 17, 2020    Mary [address]MONS [address]  [facility] [address]        Dear Mary Gerardo:  MRN: [id number]    It is the duty of the [facility] Selection [facility] to determine which patients are candidates for a transplant. For this reason, our committee has the difficult task of evaluating patients to determine which ones have the greatest chance of having a successful transplant. We are aware of the magnitude of this responsibility, and we approach it with reverence and humility.    It is with regret I inform you that you are not approved as a transplant candidate due to poor functional capacity, generalized weakness, low albumin level, elevated parathyroid hormone level (PTH) and body mass index (BMI) of 39. You may be re-referred for kidney transplant evaluation when your functional status improves, albumin level is greater than 2.5, your PTH is below 1,000 pg/mL, and you will need to lose 11 pounds for a weight goal of 210 pounds.  Please make sure you follow up with an infectious disease physician as you tested positive for quantiferon gold TB per our discussion with you today.  Your future transplant appointments for July 28, 2020 and August 06, 2020 have been canceled.  Based on this review, we have determined that at this time, you are not a candidate for a transplant at [facility].      The selection committee carefully considers each patient's transplant candidacy and determines whether it is safe to proceed with transplantation on a case-by-case basis using established selection criteria.  At present, the risk of proceeding with an elective transplant surgery has become too high.                                                                               Although the selection committee believes you are not a suitable transplant candidate, you have the option to be evaluated at other transplant centers who may have different selection criteria.  You may request your [facility] records be sent to any  center of your choice by contacting our Medical Records Department at [phone].                                                                               Attached is a letter from the [employer] for Organ Sharing (UNOS).  It describes the services and information offered to patients by UNOS and the Organ Procurement and Transplant Network.    The [facility] Selection [facility] sincerely wishes you the best and remains available to answer any questions.  Please do not hesitate to contact our pre-transplant office if we can assist you in any other way.                                                                               Sincerely,      Lana Li MD  Medical Director, Kidney & Kidney/Pancreas Transplantation    Encl: UNOS Letter             The Organ Procurement and Transplantation Network   Toll-free patient services line: Your resource for organ transplant information     If you have a question regarding your own medical care, you always should call your transplant hospital first. However, for general organ transplant-related information, you can call the Organ Procurement and Transplantation Network (OPTN) toll-free patient services line at [phone].     Anyone, including potential transplant candidates, candidates, recipients, family members, friends, living donors, and donor family members, can call this number to:     · Talk about organ donation, living donation, the transplant process, the donation process, and transplant policies.   · Get a free patient information kit with helpful booklets, waiting list and transplant information, and a list of all transplant hospitals.   · Ask questions about the OPTN website (https://optn.transplant.hrsa.gov/), the [employer] for Organ Sharings (UNOS) website (https://unos.org/), or the UNOS website for living donors and transplant recipients. (https://www.transplantliving.org/).   · Learn how the OPTN can help you.    · Talk about any concerns that you may have with a transplant hospital.     The nations transplant system, the OPTN, is managed under federal contract by the [employer] Organ Sharing (UNOS), which is a non-profit charitable organization. The OPTN helps create and define organ sharing policies that make the best use of donated organs. This process continuously evaluating new advances and discoveries so policies can be adapted to best serve patients waiting for transplants. To do so, the OPTN works closely with transplant professionals, transplant patients, transplant candidates, donor families, living donors, and the public. All transplant programs and organ procurement organizations throughout the country are OPTN members and are obligated to follow the policies the OPTN creates for allocating organs.     The OPTN also is responsible for:   · Providing educational material for patients, the public, and professionals.   · Raising awareness of the need for donated organs and tissue.   · Coordinating organ procurement, matching, and placement.   · Collecting information about every organ transplant and donation that occurs in the [place].     Remember, you should contact your transplant hospital directly if you have questions or concerns about your own medical care including medical records, work-up progress, and test results.     We are not your transplant hospital, and our staff will not be able to answer questions about your case, so please keep your transplant hospitals phone number handy.   However, while you research your transplant needs and learn as much as you can about transplantation and donation, we welcome your call to our toll-free patient services line at [phone].

## 2020-07-17 NOTE — NURSING
Results reviewed, action needed.  ID referral + quant gold          Patient was sent to ED for Ca++ 6.2 for further eval.  Please send results to patient's dialysis center.     I called the patient and informed her of the positive TB test.  I Advised patient to speak to her referring/PCP physician for advise on her positive TB test and recommendation for a referral.  I faxed the CMP, positive TB results and her PTH to her referring physician.  Patient verbalized understanding.

## 2020-07-17 NOTE — COMMITTEE REVIEW
Native Organ Dx: Focal Glomerular Sclerosis (Focal Segmental - FSG)      Not approved for LRD/CAD transplant  due to poor functional capacity, generalized weakness, low albumin level (2.4), elevated parathyroid hormone level (PTH) and body mass index (BMI) of 39. You may be re-referred for kidney transplant evaluation when your functional status improves, albumin level is greater than 2.5, your PTH is below 1,000 pg/mL, and you will need to lose 11 pounds for a weight goal of 210 pounds. If patient is re referred she will need to see ID for hx of Pott's. (bone TB)     Note written by Yris Gregory RN    ===============================================    I was present at the meeting and attest to the decision of the committee.

## 2020-07-17 NOTE — LETTER
July 23, 2020    Mary [address]MONS [address]  [facility] [address]     Dear Maryloraine Carrillo:  MRN: [id number]    It is the duty of the [facility] Selection [facility] to determine which patients are candidates for a transplant. For this reason, our committee has the difficult task of evaluating patients to determine which ones have the greatest chance of having a successful transplant. We are aware of the magnitude of this responsibility, and we approach it with reverence and humility.     It is with regret I inform you that you are not approved as a transplant candidate due to poor functional capacity, generalized weakness, low albumin level of 2.4, elevated parathyroid hormone level (PTH) greater than 1000 and body mass index (BMI) of 39.        You may be re-referred for kidney transplant evaluation when your functional status improves, albumin level is greater than 2.5, your PTH is below 1,000 pg/mL, and you will need to lose 11 pounds for a weight goal of 210 pounds.   Your future transplant appointments for July 28, 2020 and August 06, 2020 have been canceled.  Based on this review, we have determined that at this time, you are not a candidate for a transplant at [facility].        The selection committee carefully considers each patient's transplant candidacy and determines whether it is safe to proceed with transplantation on a case-by-case basis using established selection criteria.  At present, the risk of proceeding with an elective transplant surgery has become too high.                                                                               Although the selection committee believes you are not a suitable transplant candidate, you have the option to be evaluated at other transplant centers who may have different selection criteria.  You may request your [facility] records be sent to any center of your choice by contacting our Medical Records Department at [phone].                                                                                Attached is a letter from the [employer] Organ Sharing (UNOS).  It describes the services and information offered to patients by UNOS and the Organ Procurement and Transplant Network.    The [facility] sincerely wishes you the best and remains available to answer any questions.  Please do not hesitate to contact our pre-transplant office if we can assist you in any other way.                                                                               Sincerely,      Lana Li MD  Medical Director, Kidney & Kidney/Pancreas Transplantation    CC:   Dr. Amos Mcintyre  Encl: UNOS Letter             The Organ Procurement and Transplantation Network   Toll-free patient services line: Your resource for organ transplant information     If you have a question regarding your own medical care, you always should call your transplant hospital first. However, for general organ transplant-related information, you can call the Organ Procurement and Transplantation Network (OPTN) toll-free patient services line at [phone].     Anyone, including potential transplant candidates, candidates, recipients, family members, friends, living donors, and donor family members, can call this number to:     · Talk about organ donation, living donation, the transplant process, the donation process, and transplant policies.   · Get a free patient information kit with helpful booklets, waiting list and transplant information, and a list of all transplant hospitals.   · Ask questions about the OPTN website (https://optn.transplant.hrsa.gov/), the [employer] for Organ Sharings (UNOS) website (https://unos.org/), or the UNOS website for living donors and transplant recipients. (https://www.transplantliving.org/).   · Learn how the OPTN can help you.   · Talk about any concerns that you may have with a transplant hospital.     The nations  transplant system, the OPTN, is managed under federal contract by the [employer] Organ Sharing (UNOS), which is a non-profit charitable organization. The OPTN helps create and define organ sharing policies that make the best use of donated organs. This process continuously evaluating new advances and discoveries so policies can be adapted to best serve patients waiting for transplants. To do so, the OPTN works closely with transplant professionals, transplant patients, transplant candidates, donor families, living donors, and the public. All transplant programs and organ procurement organizations throughout the country are OPTN members and are obligated to follow the policies the OPTN creates for allocating organs.     The OPTN also is responsible for:   · Providing educational material for patients, the public, and professionals.   · Raising awareness of the need for donated organs and tissue.   · Coordinating organ procurement, matching, and placement.   · Collecting information about every organ transplant and donation that occurs in the [place].     Remember, you should contact your transplant hospital directly if you have questions or concerns about your own medical care including medical records, work-up progress, and test results.     We are not your transplant hospital, and our staff will not be able to answer questions about your case, so please keep your transplant hospitals phone number handy.   However, while you research your transplant needs and learn as much as you can about transplantation and donation, we welcome your call to our toll-free patient services line at [phone].

## 2020-07-26 NOTE — ED NOTES
Pt accepted to [facility] to room 404. Transfer center reported they will set up transfer for within the hour.

## 2020-07-26 NOTE — PLAN OF CARE
[facility] Transfer Acceptance Note    Transferring Facility/Hospital: [facility]     Referring Provider/Specialty giving report: Dr. Benjamin Tyler - emergency medicine     Accepting Physician for admission to hospital: Henry Durham MD    Target Destination & Service: [facility]     Date of acceptance:  7/26/2020   4:14 PM    Patients name: Mary Carrillo     Allergies:  Review of patient's allergies indicates:   Allergen Reactions    Prednisone Hives and Itching     Pills only, can take if she needs to    Naproxen Rash        Reason for transfer:  nephrology evaluation     Overview/ Report from Physician/Mid-Level Provider:    HPI:  64 y/o hx of CKD stage 5 who was recently referred for Eval of PD cath placement, HTN, Obesity presents with concerns for hypervolemia secondary to renal insufficiency.     Patient presented with dyspnea and tachypnea, this was improved with nebulizer. Patient reporting that she quit making urine a few days ago.  Her home medications have lasix 40mg PO BID    Pts CXR with concern for pulmonary edema/CHF appearance.     Patient with some hypoxia and is on 3L currently.  Described as not being in acute respiratory distress and not requiring non-invasive ventilation.      She has received 0.5 inch nitropaste for hypertension.  I have requested patient receive lasix 100mg IV push to see if pt will diurese.      Pt may need HD catheter placed to undergo HD this admission.  Shedoes not have hyperkalemia or acute acidosis, hypervolemia being primary etiology.     Her nephrologist Dr. Amos Mcintyre @ [facility].  [facility] does not have inpt beds at this time.     VS: Temp:  [98.6 °F (37 °C)]   Pulse:  [88-94]   Resp:  [18-32]   BP: (138-153)/(65-77)   SpO2:  [68 %-98 %]     Labs:  Lab Results   Component Value Date    IPB18RILOQHX Negative 07/26/2020     BMP  Lab Results   Component Value Date     07/26/2020    K 3.4 (L)  07/26/2020     07/26/2020    CO2 19 (L) 07/26/2020    BUN 66 (H) 07/26/2020    CREATININE 6.7 (H) 07/26/2020    CALCIUM 5.7 (LL) 07/26/2020    ANIONGAP 18 (H) 07/26/2020    ESTGFRAFRICA 7 (A) 07/26/2020    EGFRNONAA 6 (A) 07/26/2020           Diagnostic Tests/Radiographs: see Epic       To Do List upon arrival:    1. Trend renal function panel daily     2. Consult nephrology    >discuss arranging temporary vs tunneled HD catheter placement.     3. F/u results of IV lasix dose to see if patient making urine.             Henry Durham M.D.  Department of Hospital Medicine  Physician in Lead of Transfers ()  [facility] - [phone]   Ext: [phone]  ([phone])  Pager: [phone]

## 2020-07-26 NOTE — ED NOTES
Care assumed; report received from Ramila BARBOZA.   The patient is awake, alert and cooperative with a calm affect, patient is aware of environment. Airway is open and patent, respirations are spontaneous, normal respiratory effort and rate noted, skin warm and dry, full ROM in all extremities, appearance: no apparent distress noted, resting comfortably.  VSS, no change from previous assessment.  Bed in low, locked position, SR x2, HOB 30 degrees.  Pt able to change position independently.  Call bell within reach of pt.  Pt verbalizes understanding of use. Awaiting ambulance for transport to [place]. Report called per Ramila BARBOZA.  Will continue to monitor.

## 2020-07-26 NOTE — ED PROVIDER NOTES
[facility]                                                 Chief Complaint  65 y.o. female with Shortness of Breath      History of Present Illness  Mary Carrillo presents to the emergency room with shortness of breath today  Patient looks fluid overloaded on presentation, end-stage renal disease noted   The patient states she has never had a history of congestive heart failure given  Patient is currently under evaluation for peritoneal dialysis catheter placement  Patient states she has stopped making urine, no gross hypoxia noted in the ER  Chest x-ray shows gross fluid overload with a markedly elevated BNP in the ER    The history is provided by the patient   device was not used during this ER visit    Past Medical History   -- Arthritis    -- Asthma    -- Back pain    -- BMI 39.0-39.9,adult    -- CKD (chronic kidney disease) stage 5, GFR less than 15 ml/min    -- Dehiscence of operative wound    -- Disorder of kidney and ureter    -- Glaucoma    -- Gout    -- Hyperlipidemia    -- Hypertension    -- Obesity    -- Pott's disease (spinal tuberculosis)    -- Sarcoidosis    -- Sarcoidosis    -- Secondary hyperparathyroidism of renal origin    -- Vitritis      Surgeries:  Back surgery, cataracts,  section, hysterectomy  Allergies to prednisone and naproxen    I have reviewed all of this patient's past medical, surgical, family, and social   histories as well as active allergies and medications documented in the  electronic medical record    Review of Systems and Physical Exam      Review of Systems  -- Constitution - no fever, denies fatigue, no weakness, no chills  -- Eyes - no tearing or redness, no visual disturbance  -- Ear, Nose - no tinnitus or earache, no nasal congestion or discharge  -- Mouth,Throat - no sore throat, no toothache, normal voice, normal swallowing  -- Respiratory - shortness of breath, CARRASCO, no cough or congestion  -- Cardiovascular - denies chest  pain, no palpitations, denies claudication  -- Gastrointestinal - denies abdominal pain, nausea, vomiting, or diarrhea  -- Genitourinary - no dysuria, denies flank pain, no hematuria, no STD risk  -- Musculoskeletal - denies back pain, negative for trauma or injury  -- Neurological - no headache, denies weakness or seizure; no LOC  -- Skin - denies pallor, rash, or changes in skin. no hives or welts noted    Vital Signs  Tympanic temperature is 98.6 °F (37 °C).   Her blood pressure is 138/65 and her pulse is 89.   Her respiration is 21 (abnormal) and oxygen saturation is 95%.     Physical Exam  -- Nursing note and vitals reviewed  -- Constitutional: Appears well-developed and well-nourished  -- Head: Atraumatic. Normocephalic. No obvious abnormality  -- Eyes: Pupils are equal and reactive to light. Normal conjunctiva and lids  -- Cardiac: Normal rate, regular rhythm and normal heart sounds  -- Pulmonary: Coarse breath sounds at the bilateral bases  -- Abdominal: Soft, no tenderness. Normal bowel sounds. Normal liver edge  -- Musculoskeletal: Normal range of motion, no effusions. Joints stable   -- Neurological: No focal deficits. Showed good interaction with staff  -- Vascular: Posterior tibial, dorsalis pedis and radial pulses 2+ bilaterally      Emergency Room Course      Lab Results     K 3.4 (L)      CO2 19 (L)   BUN 66 (H)   CREATININE 6.7 (H)   GLU 90   ALKPHOS 121   AST 16   ALT 12   BILITOT 0.1   ALBUMIN 2.6 (L)   PROT 9.5 (H)   WBC 12.50   HGB 7.6 (L)   HCT 25.5 (L)       (H)    (H)   CPKMB 4.2   TROPONINI 0.123 (H)   INR 1.1   BNP 1,261 (H)   DDIMER 3.84 (H)     EKG  -- The EKG findings today were without concerning findings from baseline     Chest x-ray  Cardiomediastinal silhouette enlargement with patchy bilateral airspace opacities concerning for pulmonary edema or pneumonia.  Left lower lobe atelectasis versus small pleural effusion.     Additional Work up  -- rapid  Coronavirus PCR was negative    Medications Given  -- Duoneb breathing treatment given today in the ER  -- 0.5 inch of nitropaste  -- 40 milligrams Protonix p.o. given the ER  -- 40 milligrams Lipitor given in the ER  -- aspirin given in the ER, 325 milligram    ED Physician Management  -- Diagnosis management comments: 65 y.o. female with shortness of breath today  -- patient with the BNP of 1200 with a fluid overload on chest x-ray today  -- patient is being evaluated for dialysis as we speak, not making urine at all  -- pt with a mildly elevated troponin available from kidney disease creatinine 6.7  -- seek transfer for dialysis evaluation, no distress on ER presentation    Diagnosis  [R06.02] SOB (shortness of breath) (Primary)  [N18.6] ESRD (end stage renal disease)  [I50.9] Congestive heart failure, unspecified HF chronicity, unspecified heart failure type  [R60.9] Fluid retention  [R34] Anuria and oliguria    Disposition and Plan  -- Disposition: transfer  -- Condition: stable    This note is dictated on M*Modal word recognition program.  There are word recognition mistakes that are occasionally missed on review.         Benjamin Tyler MD  07/26/20 7946

## 2020-07-27 PROBLEM — E87.70 HYPERVOLEMIA ASSOCIATED WITH RENAL INSUFFICIENCY: Status: ACTIVE | Noted: 2020-01-01

## 2020-07-27 PROBLEM — N28.9 HYPERVOLEMIA ASSOCIATED WITH RENAL INSUFFICIENCY: Status: ACTIVE | Noted: 2020-01-01

## 2020-07-27 PROBLEM — I10 ESSENTIAL HYPERTENSION: Status: ACTIVE | Noted: 2020-01-01

## 2020-07-27 PROBLEM — D63.8 ANEMIA OF CHRONIC DISEASE: Status: ACTIVE | Noted: 2020-01-01

## 2020-07-27 NOTE — ASSESSMENT & PLAN NOTE
S/p T12-L1 corpectomy with T10-L3 fusion on 3/18/18 due to Pott's disease. Per chart review patient treated with prolonged protocol per ID. No acute issues.

## 2020-07-27 NOTE — PLAN OF CARE
Pt oriented. DCA done at bedside with patient. Demographics/Ins/NextofKin/PCP verified with patient/family. Denies any DME needs at present from pt/family. Patient/family plans to return home with family. Educated on bedside RX. Patient consents for [place] to discuss discharge plan with next of kin. Preferences appointment times and location obtained. Patient reports they will have transportation home upon discharge.    Pt was being followed by Dr. Mcintyre Nephrology at [facility]. She was reportedly being evaluated for PD due to CKD 5. Pt was anuric and transferred here from [facility] due to them not having beds at the time.  Pt will be new to dialysis.    This  put name on white board and explained blue discharge folder to patient. Discharge planning brochure and/or business card given to patient.  Patient verbalized understanding.    Future Appointments   Date Time Provider Department Center   8/3/2020 11:45 AM [facility]   8/3/2020  1:00 PM Amos Mcintyre MD [facility] NEPHRO NIR FRNT   8/6/2020 12:35 PM ANNUAL WELLNESS NURSE 2, [facility] CCCLIN NIR CCC   10/22/2020  1:45 PM [facility]   10/29/2020  9:30 AM Alejandro Ram MD [facility] RHEUM NIR ACT   11/9/2020  1:20 PM Jose Khan Jr., MD [facility] FAM MED NIR ACC        07/27/20 1044   Discharge Assessment   Assessment Type Discharge Planning Assessment   Confirmed/corrected address and phone number on facesheet? Yes   Assessment information obtained from? Patient   Communicated expected length of stay with patient/caregiver yes   Prior to hospitilization cognitive status: Alert/Oriented;No Deficits   Prior to hospitalization functional status: Independent;Assistive Equipment;Needs Assistance  (minimal assistance reported at times)   Current cognitive status: Alert/Oriented;No Deficits   Current Functional Status: Independent;Assistive Equipment;Needs Assistance  (minimal assistance reported at times)    [place] [place] [facility]   Lives With spouse   Able to Return to Prior Arrangements yes   Is patient able to care for self after discharge? Yes   Who are your caregiver(s) and their phone number(s)? Daughter Alea Carrillo   Readmission Within the Last 30 Days no previous admission in last 30 days   Patient currently being followed by outpatient case management? No   Patient currently receives any other outside agency services? No   Equipment Currently Used at Home cane, straight   Do you have any problems affording any of your prescribed medications? No   Is the patient taking medications as prescribed? yes   Does the patient have transportation home? Yes   Transportation Anticipated family or friend will provide;health plan transportation   Does the patient receive services at the [facility]? No   Discharge Plan A Home with family   DME Needed Upon Discharge  none   Patient/Family in Agreement with Plan yes     Adelita Lang RN  RN Transition Navigator  [phone]

## 2020-07-27 NOTE — ED NOTES
Pt transported to [place] via [facility]. Belongings with patient. Transfer packet given to EMS. Pt AAOx4, NADN. Pt left in stable condition.

## 2020-07-27 NOTE — H&P
[facility]  History & Physical    Patient Name: Mary Carrillo  MRN: [id number]  Admission Date: 2020  Attending Physician: Rojas Asif, *   Primary Care Provider: Jose Khan MD         Patient information was obtained from patient, past medical records and ER records.     Subjective:     Principal Problem:Hypervolemia associated with renal insufficiency    Chief Complaint: No chief complaint on file.       HPI: Mary Carrillo is a 66 yo AAF with pmh of HTN, HLD, diastolic dysfunction, mild aortic valve stenosis, pott's disease, obesity, gout, sarcoidosis, CKD stage 5, asthma and chronic back pain who presented to OSH with complaint of dyspnea and anuria. Pt currently undergoing outpatient workup for initiation of peritoneal dialysis. Initial labs remarkable for Hbg/Hct , BUN/Cr 66/6.7 previously 46/4.7 on 6/15/20, BNP 1261, , troponin 0.123. CXR consistent with pulmonary edema/CHF.  She received 0.5 inch nitropaste for hypertension and 100 mg furosemide IV. Pt with some hypoxia requiring 3 liters nasal cannula. She denies chest pain, abdominal pain, n/v and fever/chills. COVID negative. Pt admitted to [facility].     Past Medical History:   Diagnosis Date    Arthritis     Asthma     Back pain     BMI 39.0-39.9,adult 2020    CKD (chronic kidney disease) stage 5, GFR less than 15 ml/min 2020    Dehiscence of operative wound 2018    Disorder of kidney and ureter     Glaucoma     Gout     Hyperlipidemia     Hypertension     Obesity     BMI 39    Pott's disease (spinal tuberculosis)     Sarcoidosis     Sarcoidosis     Secondary hyperparathyroidism of renal origin 2020    Vitritis     Vitritis        Past Surgical History:   Procedure Laterality Date    BACK SURGERY  2018    Laminectomy.     BACK SURGERY      CATARACT EXTRACTION Bilateral      SECTION  ,  1977      x2    HYSTERECTOMY       uterus/cervix d/t uterine fibroids.        Review of patient's allergies indicates:   Allergen Reactions    Prednisone Hives and Itching     Pills only, can take if she needs to    Naproxen Rash       Current Facility-Administered Medications on File Prior to Encounter   Medication    [COMPLETED] albuterol-ipratropium 2.5 mg-0.5 mg/3 mL nebulizer solution 3 mL    [COMPLETED] albuterol-ipratropium 2.5 mg-0.5 mg/3 mL nebulizer solution 3 mL    [COMPLETED] atorvastatin tablet 40 mg    [COMPLETED] furosemide injection 100 mg    [COMPLETED] nitroGLYCERIN 2% TD oint ointment 0.5 inch    [COMPLETED] pantoprazole EC tablet 40 mg     Current Outpatient Medications on File Prior to Encounter   Medication Sig    albuterol (PROAIR HFA) 90 mcg/actuation inhaler Inhale 2 puffs into the lungs every 6 (six) hours as needed for Wheezing. Rescue    amLODIPine (NORVASC) 10 MG tablet Take 1 tablet (10 mg total) by mouth once daily.    carvedilol (COREG) 25 MG tablet Take 1 tablet (25 mg total) by mouth 2 (two) times daily with meals.    furosemide (LASIX) 40 MG tablet Take 1 tablet (40 mg total) by mouth 2 (two) times daily. (Patient taking differently: Take 40 mg by mouth once daily. )    hydrALAZINE (APRESOLINE) 50 MG tablet Take 1 tablet (50 mg total) by mouth 3 (three) times daily. (Patient taking differently: Take 50 mg by mouth once daily. )    HYDROcodone-acetaminophen (NORCO)  mg per tablet Take 1 tablet by mouth every 12 (twelve) hours as needed for Pain.    LIPITOR 40 mg tablet Take 1 tablet (40 mg total) by mouth once daily.    tiZANidine (ZANAFLEX) 4 MG tablet Take 4 mg by mouth every 8 (eight) hours as needed.     folic acid (FOLVITE) 1 MG tablet Take 1 tablet (1 mg total) by mouth once daily.    hydrOXYzine pamoate (VISTARIL) 50 MG Cap Take 1 capsule (50 mg total) by mouth every evening.    traZODone (DESYREL) 100 MG tablet Take 100 mg by mouth nightly as needed.      Family History     Problem  Relation (Age of Onset)    Asthma Brother    Diabetes Mother, Father    Hypertension Brother    Kidney disease Mother, Father    No Known Problems Sister, Sister, Sister        Tobacco Use    Smoking status: Former Smoker     Packs/day: 1.00     Years: 35.00     Pack years: 35.00     Types: Cigarettes     Start date: 1967     Quit date: 1994     Years since quittin.6    Smokeless tobacco: Never Used   Substance and Sexual Activity    Alcohol use: No     Alcohol/week: 0.0 standard drinks    Drug use: No    Sexual activity: Yes     Partners: Male     Birth control/protection: Post-menopausal, See Surgical Hx     Review of Systems   Constitutional: Negative for chills, diaphoresis and fever.   Eyes: Negative for photophobia.   Respiratory: Positive for cough and shortness of breath. Negative for chest tightness and wheezing.    Cardiovascular: Positive for leg swelling. Negative for chest pain and palpitations.   Gastrointestinal: Negative for abdominal pain, diarrhea, nausea and vomiting.   Genitourinary: Positive for decreased urine volume. Negative for dysuria, flank pain, frequency and hematuria.   Musculoskeletal: Negative for back pain and myalgias.   Neurological: Negative for dizziness, syncope, light-headedness and headaches.   Psychiatric/Behavioral: Negative for confusion.     Objective:     Vital Signs (Most Recent):  Temp: 96.4 °F (35.8 °C) (20 2300)  Pulse: 82 (20 0400)  Resp: 18 (20 0210)  BP: (!) 151/74 (20 2300)  SpO2: (!) 92 % (20 0210) Vital Signs (24h Range):  Temp:  [96.1 °F (35.6 °C)-98.6 °F (37 °C)] 96.4 °F (35.8 °C)  Pulse:  [81-95] 82  Resp:  [17-32] 18  SpO2:  [68 %-98 %] 92 %  BP: (138-173)/(65-81) 151/74     Weight: 101.6 kg (223 lb 15.8 oz)  Body mass index is 37.27 kg/m².    Physical Exam  Constitutional:       Appearance: She is well-developed.   HENT:      Head: Normocephalic and atraumatic.   Eyes:      Conjunctiva/sclera: Conjunctivae  normal.      Pupils: Pupils are equal, round, and reactive to light.   Neck:      Musculoskeletal: Normal range of motion.      Vascular: No JVD.   Cardiovascular:      Rate and Rhythm: Normal rate and regular rhythm.      Heart sounds: Normal heart sounds.   Pulmonary:      Effort: Pulmonary effort is normal. No respiratory distress.      Breath sounds: No wheezing.      Comments: Breath sounds diminished throughout   Abdominal:      General: Bowel sounds are normal. There is no distension.      Palpations: Abdomen is soft.      Tenderness: There is no abdominal tenderness. There is no guarding.   Musculoskeletal: Normal range of motion.         General: No tenderness.   Skin:     General: Skin is warm and dry.      Capillary Refill: Capillary refill takes less than 2 seconds.   Neurological:      Mental Status: She is alert and oriented to person, place, and time.   Psychiatric:         Behavior: Behavior normal.           CRANIAL NERVES     CN III, IV, VI   Pupils are equal, round, and reactive to light.       Significant Labs:   BMP:   Recent Labs   Lab 07/26/20  1410   GLU 90      K 3.4*      CO2 19*   BUN 66*   CREATININE 6.7*   CALCIUM 5.7*     CBC:   Recent Labs   Lab 07/26/20  1410   WBC 12.50   HGB 7.6*   HCT 25.5*        Urine Studies: No results for input(s): COLORU, APPEARANCEUA, PHUR, SPECGRAV, PROTEINUA, GLUCUA, KETONESU, BILIRUBINUA, OCCULTUA, NITRITE, UROBILINOGEN, LEUKOCYTESUR, RBCUA, WBCUA, BACTERIA, SQUAMEPITHEL, HYALINECASTS in the last 48 hours.    Invalid input(s): WRIGHTSUR    Significant Imaging: I have reviewed all pertinent imaging results/findings within the past 24 hours.    Assessment/Plan:     * Hypervolemia associated with renal insufficiency  CKD (chronic kidney disease) stage 5, GFR less than 15 ml/min    -presented to OSH with dyspnea, tachypnea, anuria, volume overload    -BUN/Cr 66/6.7 GFR 7 on admit, baseline Cr 3-4   - followed by nephrology Dr. Trinidad  Miguelina outpatient- in process of being evaluated for initiation of  PD   -received trial furosemide 100 mg IV, no urinary output   -consult nephrology   -consult surgery for permacath placement  -strict intake and output  -renal diet         Essential hypertension  Continue amlodipine 10 mg daily, carvedilol 25 mg BID and hydralazine 50 mg daily       Anemia of chronic disease  Hbg/Hct 7/25 (basleine Hbg 8-10)       Diastolic dysfunction   Echo 4/16/20 LVEF 55-60%, mild AS and grade 2 diastolic dysfunction         Pott's disease (spinal tuberculosis)  S/p T12-L1 corpectomy with T10-L3 fusion on 3/18/18 due to Pott's disease. Per chart review patient treated with prolonged protocol per ID. No acute issues.        Hyperlipidemia  Continue ASA, statin       Sarcoidosis  Chronic       Gout, chronic  No acute issues         VTE Risk Mitigation (From admission, onward)         Ordered     IP VTE HIGH RISK PATIENT  Once      07/27/20 0003     Place sequential compression device  Until discontinued      07/27/20 0003                   Arlin Bradley NP  Department of Hospital Medicine   [facility]

## 2020-07-27 NOTE — CONSULTS
Nephrology Consult  H&P      Consult Requested By: Rojas Asif, *  Reason for Consult: ESRD    SUBJECTIVE:     History of Present Illness:  Patient is a 65 y.o. female presents with progressively worsening SOB and edema  Follows with Dr. Mcintyre for CKD, in work up for tranpslnt, wants to do PD    Review of Systems   Constitutional: Negative for chills and fever.   Eyes: Negative for blurred vision and double vision.   Respiratory: Positive for shortness of breath. Negative for cough.    Cardiovascular: Positive for leg swelling. Negative for chest pain.   Gastrointestinal: Negative for blood in stool, diarrhea, nausea and vomiting.   Genitourinary: Negative for dysuria, frequency and urgency.   Musculoskeletal: Negative for myalgias and neck pain.   Skin: Negative for itching and rash.   Neurological: Negative for dizziness.   Endo/Heme/Allergies: Does not bruise/bleed easily.   Psychiatric/Behavioral: Negative for depression.       Past Medical History:   Diagnosis Date    Arthritis     Asthma     Back pain     BMI 39.0-39.9,adult 2020    CKD (chronic kidney disease) stage 5, GFR less than 15 ml/min 2020    Dehiscence of operative wound 2018    Disorder of kidney and ureter     Glaucoma     Gout     Hyperlipidemia     Hypertension     Obesity     BMI 39    Pott's disease (spinal tuberculosis)     Sarcoidosis     Sarcoidosis     Secondary hyperparathyroidism of renal origin 2020    Vitritis     Vitritis      Past Surgical History:   Procedure Laterality Date    BACK SURGERY  2018    Laminectomy.     BACK SURGERY      CATARACT EXTRACTION Bilateral      SECTION  ,  1977      x2    HYSTERECTOMY      uterus/cervix d/t uterine fibroids.      Family History   Problem Relation Age of Onset    Diabetes Mother     Kidney disease Mother         ESRD on dialysis    Diabetes Father     Kidney disease Father         ESRD on dialysis     No Known Problems  Sister     Hypertension Brother     Asthma Brother     No Known Problems Sister     No Known Problems Sister      Social History     Tobacco Use    Smoking status: Former Smoker     Packs/day: 1.00     Years: 35.00     Pack years: 35.00     Types: Cigarettes     Start date: 1967     Quit date: 1994     Years since quittin.6    Smokeless tobacco: Never Used   Substance Use Topics    Alcohol use: No     Alcohol/week: 0.0 standard drinks    Drug use: No       Review of patient's allergies indicates:   Allergen Reactions    Prednisone Hives and Itching     Pills only, can take if she needs to    Naproxen Rash            OBJECTIVE:     Vital Signs (Most Recent)  Vitals:    20 0700 20 0838 20 1126 20 1145   BP:    129/68   BP Location:    Left arm   Patient Position:       Pulse: 75  70 68   Resp:   20 18   Temp:    98.7 °F (37.1 °C)   TempSrc:    Oral   SpO2:  95% 95% (!) 77%   Weight:       Height:                     Medications:   sodium chloride 0.9%   Intravenous Once    amLODIPine  10 mg Oral Daily    atorvastatin  40 mg Oral Daily    carvediloL  25 mg Oral BID WM    hydrALAZINE  50 mg Oral Daily    mupirocin   Nasal BID    polyethylene glycol  17 g Oral Daily           Physical Exam   Constitutional: She is oriented to person, place, and time and well-developed, well-nourished, and in no distress. No distress.   HENT:   Head: Normocephalic and atraumatic.   Mouth/Throat: Oropharynx is clear and moist.   Eyes: EOM are normal. No scleral icterus.   Neck: Neck supple. No JVD present.   Cardiovascular: Normal rate and regular rhythm. Exam reveals no friction rub.   No murmur heard.  Pulmonary/Chest: Effort normal and breath sounds normal. No respiratory distress. She has no wheezes. She has no rales.   Abdominal: Soft. Bowel sounds are normal. She exhibits no distension. There is no abdominal tenderness.   Musculoskeletal:         General: No edema.    Neurological: She is alert and oriented to person, place, and time.   Skin: Skin is warm and dry. No rash noted. She is not diaphoretic. No erythema.   Psychiatric: Affect normal.       Laboratory:  Recent Labs   Lab 07/26/20  1410 07/27/20  0508   WBC 12.50 12.46   HGB 7.6* 7.2*   HCT 25.5* 24.1*    309   MONO 12.1  1.5* 12.2  1.5*     Recent Labs   Lab 07/26/20  1410 07/27/20  0508    143   K 3.4* 3.7    109   CO2 19* 20*   BUN 66* 70*   CREATININE 6.7* 7.0*   CALCIUM 5.7* 5.6*   PHOS  --  6.5*       Diagnostic Results:  X-Ray: Reviewed  US: Reviewed  Echo: Reviewed  ASSESSMENT/PLAN:     1. ESRD (N18.6 Z99.2) - pascale today emergent Hd for volume overload, anuric despite high doses Lasix  HD today, lina wed  PD cath placement on Thursday  SW consult to set up with Dr. Mcintyre for Urgent start PD at [facility]    In work-up for renal transplant in [facility]  2. HTN (I10) - reassess after optimizing volume  3. Anemia of chronic kidney disease treated with LYNNETTE (N18.9 D63.1) - venofer 100  EPogen 5K with each HD  Recent Labs   Lab 07/26/20  1410 07/27/20  0508   HGB 7.6* 7.2*   HCT 25.5* 24.1*    309       Iron ordered, pending  No results found for: IRON, TIBC, FERRITIN, SATURATEDIRO    4. MBD (E88.9 M90.80) - start renvela, 3Ca bath, Zemplar 10      Lab Results   Component Value Date    PTH 1,287.0 (H) 07/14/2020    CALCIUM 5.6 (LL) 07/27/2020    PHOS 6.5 (H) 07/27/2020     Lab Results   Component Value Date    ICHKWRTJ37XH 12 (L) 02/13/2020       Lab Results   Component Value Date    CO2 20 (L) 07/27/2020       5. Hemodialysis Access (Z99.2 V45.11)- as in #1  6. Nutrition/Hypoalbuminemia (E88.09) -   Recent Labs   Lab 07/26/20  1410 07/27/20  0508   LABPROT 11.9 11.5   ALBUMIN 2.6*  --      Nepro with meals TID. Renal vitamins daily          Thank you for consult, will follow  With any question please call [phone]  Shahnaz Fish    [facility]  BEATRIZ Che MD,  CAESAR BRISCOE MD,   MD TORI Santo, NP  [address]  [place], [address]  [phone]

## 2020-07-27 NOTE — PROCEDURES
"Mary Carrillo is a 65 y.o. female patient.    Temp: 98.7 °F (37.1 °C) (07/27/20 1145)  Pulse: 68 (07/27/20 1145)  Resp: 18 (07/27/20 1145)  BP: 129/68 (07/27/20 1145)  SpO2: (!) 77 % (07/27/20 1145)  Weight: 101.6 kg (223 lb 15.8 oz) (07/26/20 2300)  Height: 5' 5" (165.1 cm) (07/26/20 2300)       Central Line    Date/Time: 7/27/2020 2:02 PM  Location procedure was performed: [facility] GENERAL SURGERY  Performed by: Robby Flores MD  Consent Done: Yes  Time out: Immediately prior to procedure a "time out" was called to verify the correct patient, procedure, equipment, support staff and site/side marked as required.  Indications: med administration  Anesthesia: local infiltration    Anesthesia:  Local Anesthetic: lidocaine 1% without epinephrine  Preparation: skin prepped with ChloraPrep  Skin prep agent dried: skin prep agent completely dried prior to procedure  Sterile barriers: all five maximum sterile barriers used - cap, mask, sterile gown, sterile gloves, and large sterile sheet  Hand hygiene: hand hygiene performed prior to central venous catheter insertion  Location details: right internal jugular  Catheter type: triple lumen  Catheter size: 12 Fr  Catheter Length: 12cm    Ultrasound guidance: yes  Vessel Caliber: large, patent, compressibility normal  Needle advanced into vessel with real time Ultrasound guidance.  Guidewire confirmed in vessel.  Sterile sheath used.  Manometry: No   Number of attempts: 1  Post-procedure: line sutured,  chlorhexidine patch,  sterile dressing applied and blood return through all ports          Robby Flores  7/27/2020  "

## 2020-07-27 NOTE — CONSULTS
Patient ID: Mary Carrillo is a 65 y.o. female.    Chief Complaint: No chief complaint on file.      HPI:  65F presented to ED with SOB, found to be in renal failure. Never had HD or PD before. Feeling better, makes some urine. Seen by dr fish. Surgery consulted for Temporary HD catheter today for urgent HD and PD catheter placement this week.       Review of Systems   Constitutional: Negative for fever.   HENT: Negative for trouble swallowing.    Respiratory: Positive for shortness of breath.    Cardiovascular: Negative for chest pain.   Gastrointestinal: Negative for abdominal pain, blood in stool, nausea and vomiting.   Genitourinary: Negative for dysuria.   Musculoskeletal: Negative for gait problem.   Skin: Negative for rash and wound.   Allergic/Immunologic: Negative for immunocompromised state.   Neurological: Negative for weakness.   Hematological: Does not bruise/bleed easily.   Psychiatric/Behavioral: Negative for agitation.       Current Facility-Administered Medications   Medication Dose Route Frequency Provider Last Rate Last Dose    0.9%  NaCl infusion   Intravenous PRN Shahnaz Fish MD        0.9%  NaCl infusion   Intravenous Once Shahnaz Fish MD        acetaminophen tablet 1,000 mg  1,000 mg Oral Q8H PRN Rojas Asif MD        acetaminophen tablet 650 mg  650 mg Oral Q4H PRN Rojas Asif MD        albuterol-ipratropium 2.5 mg-0.5 mg/3 mL nebulizer solution 3 mL  3 mL Nebulization Q4H PRN Arlin Bradley NP   3 mL at 07/27/20 1126    amLODIPine tablet 10 mg  10 mg Oral Daily Arlin Bradley NP   10 mg at 07/27/20 0901    atorvastatin tablet 40 mg  40 mg Oral Daily Arlin Bradley NP   40 mg at 07/27/20 0901    carvediloL tablet 25 mg  25 mg Oral BID WM Arlin Bradley NP   25 mg at 07/27/20 0901    dextrose 50% injection 12.5 g  12.5 g Intravenous PRN Rojas Asif MD        dextrose 50% injection 25 g  25 g Intravenous PRN  Rojas Asif MD        epoetin leslye-epbx injection 5,000 Units  5,000 Units Subcutaneous Daily PRN Shahnaz Fish MD        glucagon (human recombinant) injection 1 mg  1 mg Intramuscular PRN Rojas Asif MD        glucose chewable tablet 16 g  16 g Oral PRN Rojas Asif MD        glucose chewable tablet 24 g  24 g Oral PRN Rojas Asif MD        heparin (porcine) injection 1,000 Units  1,000 Units Intra-Catheter PRN Shahnaz Fish MD        hydrALAZINE tablet 50 mg  50 mg Oral Daily Arlin Bradley, NP   50 mg at 07/27/20 0901    insulin aspart U-100 pen 0-5 Units  0-5 Units Subcutaneous QID (AC + HS) PRN Rojas Asif MD        iron sucrose injection 100 mg  100 mg Intravenous Daily PRN Shahnaz Fish MD        melatonin tablet 6 mg  6 mg Oral Nightly PRN Rojas Asif MD   6 mg at 07/27/20 0008    mupirocin 2 % ointment   Nasal BID Shahnaz Fish MD        polyethylene glycol packet 17 g  17 g Oral Daily Rojas Asif MD        sevelamer carbonate tablet 800 mg  800 mg Oral TID WM Shahnaz Fish MD        sodium chloride 0.9% flush 3 mL  3 mL Intravenous PRN Rojas Asif MD        tiZANidine tablet 4 mg  4 mg Oral Q8H PRN Arlin Bradley, NP   4 mg at 07/27/20 0613       Review of patient's allergies indicates:   Allergen Reactions    Prednisone Hives and Itching     Pills only, can take if she needs to    Naproxen Rash       Past Medical History:   Diagnosis Date    Arthritis     Asthma     Back pain     BMI 39.0-39.9,adult 7/14/2020    CKD (chronic kidney disease) stage 5, GFR less than 15 ml/min 7/14/2020    Dehiscence of operative wound 4/1/2018    Disorder of kidney and ureter     Glaucoma     Gout     Hyperlipidemia     Hypertension     Obesity     BMI 39    Pott's disease (spinal tuberculosis)     Sarcoidosis     Sarcoidosis     Secondary hyperparathyroidism of renal  origin 2020    Vitritis     Vitritis        Past Surgical History:   Procedure Laterality Date    BACK SURGERY  2018    Laminectomy.     BACK SURGERY      CATARACT EXTRACTION Bilateral      SECTION  ,  1977      x2    HYSTERECTOMY      uterus/cervix d/t uterine fibroids.        Family History   Problem Relation Age of Onset    Diabetes Mother     Kidney disease Mother         ESRD on dialysis    Diabetes Father     Kidney disease Father         ESRD on dialysis     No Known Problems Sister     Hypertension Brother     Asthma Brother     No Known Problems Sister     No Known Problems Sister        Social History     Socioeconomic History    Marital status:      Spouse name: Lior Carrillo    Number of children: 2    Years of education: 10    Highest education level: Not on file   Occupational History    Occupation: [employer]     Comment: retired   Social Needs    Financial resource strain: Not on file    Food insecurity     Worry: Not on file     Inability: Not on file    Transportation needs     Medical: Not on file     Non-medical: Not on file   Tobacco Use    Smoking status: Former Smoker     Packs/day: 1.00     Years: 35.00     Pack years: 35.00     Types: Cigarettes     Start date: 1967     Quit date: 1994     Years since quittin.6    Smokeless tobacco: Never Used   Substance and Sexual Activity    Alcohol use: No     Alcohol/week: 0.0 standard drinks    Drug use: No    Sexual activity: Yes     Partners: Male     Birth control/protection: Post-menopausal, See Surgical Hx   Lifestyle    Physical activity     Days per week: Not on file     Minutes per session: Not on file    Stress: Not on file   Relationships    Social connections     Talks on phone: Not on file     Gets together: Not on file     Attends Evangelical service: Not on file     Active member of club or organization: Not on file     Attends meetings of clubs or organizations: Not on file      Relationship status: Not on file   Other Topics Concern    Not on file   Social History Narrative    Caregiver 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 They live in [place]       Vitals:    07/27/20 1145   BP: 129/68   Pulse: 68   Resp: 18   Temp: 98.7 °F (37.1 °C)       Physical Exam  Constitutional:       General: She is not in acute distress.     Appearance: She is well-developed. She is obese.   HENT:      Head: Normocephalic and atraumatic.   Eyes:      General: No scleral icterus.  Cardiovascular:      Rate and Rhythm: Normal rate.   Pulmonary:      Effort: Pulmonary effort is normal.      Breath sounds: No stridor.   Abdominal:      General: There is no distension.      Palpations: Abdomen is soft.      Tenderness: There is no abdominal tenderness.   Lymphadenopathy:      Cervical: No cervical adenopathy.   Skin:     General: Skin is warm.      Findings: No erythema.   Neurological:      Mental Status: She is alert and oriented to person, place, and time.   Psychiatric:         Behavior: Behavior normal.      Body mass index is 37.27 kg/m².    WBC 12  Hg 7.2  K 3.7  Cr 7  GFR 6      Assessment & Plan:   65F with renal failure  Bedside HD catheter now  Will place PD catheter in OR on thursday

## 2020-07-27 NOTE — ASSESSMENT & PLAN NOTE
CKD (chronic kidney disease) stage 5, GFR less than 15 ml/min    -presented to OSH with dyspnea, tachypnea, anuria, volume overload    -BUN/Cr 66/6.7 GFR 7 on admit, baseline Cr 3-4   - followed by nephrology Dr. Amos Mcintyre outpatient- in process of being evaluated for initiation of  PD   -received trial furosemide 100 mg IV, no urinary output   -consult nephrology   -consult surgery for permacath placement  -strict intake and output  -renal diet

## 2020-07-27 NOTE — SUBJECTIVE & OBJECTIVE
Past Medical History:   Diagnosis Date    Arthritis     Asthma     Back pain     BMI 39.0-39.9,adult 2020    CKD (chronic kidney disease) stage 5, GFR less than 15 ml/min 2020    Dehiscence of operative wound 2018    Disorder of kidney and ureter     Glaucoma     Gout     Hyperlipidemia     Hypertension     Obesity     BMI 39    Pott's disease (spinal tuberculosis)     Sarcoidosis     Sarcoidosis     Secondary hyperparathyroidism of renal origin 2020    Vitritis     Vitritis        Past Surgical History:   Procedure Laterality Date    BACK SURGERY  2018    Laminectomy.     BACK SURGERY      CATARACT EXTRACTION Bilateral      SECTION  ,  1977      x2    HYSTERECTOMY      uterus/cervix d/t uterine fibroids.        Review of patient's allergies indicates:   Allergen Reactions    Prednisone Hives and Itching     Pills only, can take if she needs to    Naproxen Rash       Current Facility-Administered Medications on File Prior to Encounter   Medication    [COMPLETED] albuterol-ipratropium 2.5 mg-0.5 mg/3 mL nebulizer solution 3 mL    [COMPLETED] albuterol-ipratropium 2.5 mg-0.5 mg/3 mL nebulizer solution 3 mL    [COMPLETED] atorvastatin tablet 40 mg    [COMPLETED] furosemide injection 100 mg    [COMPLETED] nitroGLYCERIN 2% TD oint ointment 0.5 inch    [COMPLETED] pantoprazole EC tablet 40 mg     Current Outpatient Medications on File Prior to Encounter   Medication Sig    albuterol (PROAIR HFA) 90 mcg/actuation inhaler Inhale 2 puffs into the lungs every 6 (six) hours as needed for Wheezing. Rescue    amLODIPine (NORVASC) 10 MG tablet Take 1 tablet (10 mg total) by mouth once daily.    carvedilol (COREG) 25 MG tablet Take 1 tablet (25 mg total) by mouth 2 (two) times daily with meals.    furosemide (LASIX) 40 MG tablet Take 1 tablet (40 mg total) by mouth 2 (two) times daily. (Patient taking differently: Take 40 mg by mouth once daily. )     hydrALAZINE (APRESOLINE) 50 MG tablet Take 1 tablet (50 mg total) by mouth 3 (three) times daily. (Patient taking differently: Take 50 mg by mouth once daily. )    HYDROcodone-acetaminophen (NORCO)  mg per tablet Take 1 tablet by mouth every 12 (twelve) hours as needed for Pain.    LIPITOR 40 mg tablet Take 1 tablet (40 mg total) by mouth once daily.    tiZANidine (ZANAFLEX) 4 MG tablet Take 4 mg by mouth every 8 (eight) hours as needed.     folic acid (FOLVITE) 1 MG tablet Take 1 tablet (1 mg total) by mouth once daily.    hydrOXYzine pamoate (VISTARIL) 50 MG Cap Take 1 capsule (50 mg total) by mouth every evening.    traZODone (DESYREL) 100 MG tablet Take 100 mg by mouth nightly as needed.      Family History     Problem Relation (Age of Onset)    Asthma Brother    Diabetes Mother, Father    Hypertension Brother    Kidney disease Mother, Father    No Known Problems Sister, Sister, Sister        Tobacco Use    Smoking status: Former Smoker     Packs/day: 1.00     Years: 35.00     Pack years: 35.00     Types: Cigarettes     Start date: 1967     Quit date: 1994     Years since quittin.6    Smokeless tobacco: Never Used   Substance and Sexual Activity    Alcohol use: No     Alcohol/week: 0.0 standard drinks    Drug use: No    Sexual activity: Yes     Partners: Male     Birth control/protection: Post-menopausal, See Surgical Hx     Review of Systems   Constitutional: Negative for chills, diaphoresis and fever.   Eyes: Negative for photophobia.   Respiratory: Positive for cough and shortness of breath. Negative for chest tightness and wheezing.    Cardiovascular: Positive for leg swelling. Negative for chest pain and palpitations.   Gastrointestinal: Negative for abdominal pain, diarrhea, nausea and vomiting.   Genitourinary: Positive for decreased urine volume. Negative for dysuria, flank pain, frequency and hematuria.   Musculoskeletal: Negative for back pain and myalgias.    Neurological: Negative for dizziness, syncope, light-headedness and headaches.   Psychiatric/Behavioral: Negative for confusion.     Objective:     Vital Signs (Most Recent):  Temp: 96.4 °F (35.8 °C) (07/26/20 2300)  Pulse: 82 (07/27/20 0400)  Resp: 18 (07/27/20 0210)  BP: (!) 151/74 (07/26/20 2300)  SpO2: (!) 92 % (07/27/20 0210) Vital Signs (24h Range):  Temp:  [96.1 °F (35.6 °C)-98.6 °F (37 °C)] 96.4 °F (35.8 °C)  Pulse:  [81-95] 82  Resp:  [17-32] 18  SpO2:  [68 %-98 %] 92 %  BP: (138-173)/(65-81) 151/74     Weight: 101.6 kg (223 lb 15.8 oz)  Body mass index is 37.27 kg/m².    Physical Exam  Constitutional:       Appearance: She is well-developed.   HENT:      Head: Normocephalic and atraumatic.   Eyes:      Conjunctiva/sclera: Conjunctivae normal.      Pupils: Pupils are equal, round, and reactive to light.   Neck:      Musculoskeletal: Normal range of motion.      Vascular: No JVD.   Cardiovascular:      Rate and Rhythm: Normal rate and regular rhythm.      Heart sounds: Normal heart sounds.   Pulmonary:      Effort: Pulmonary effort is normal. No respiratory distress.      Breath sounds: No wheezing.      Comments: Breath sounds diminished throughout   Abdominal:      General: Bowel sounds are normal. There is no distension.      Palpations: Abdomen is soft.      Tenderness: There is no abdominal tenderness. There is no guarding.   Musculoskeletal: Normal range of motion.         General: No tenderness.   Skin:     General: Skin is warm and dry.      Capillary Refill: Capillary refill takes less than 2 seconds.   Neurological:      Mental Status: She is alert and oriented to person, place, and time.   Psychiatric:         Behavior: Behavior normal.           CRANIAL NERVES     CN III, IV, VI   Pupils are equal, round, and reactive to light.       Significant Labs:   BMP:   Recent Labs   Lab 07/26/20  1410   GLU 90      K 3.4*      CO2 19*   BUN 66*   CREATININE 6.7*   CALCIUM 5.7*     CBC:    Recent Labs   Lab 07/26/20  1410   WBC 12.50   HGB 7.6*   HCT 25.5*        Urine Studies: No results for input(s): COLORU, APPEARANCEUA, PHUR, SPECGRAV, PROTEINUA, GLUCUA, KETONESU, BILIRUBINUA, OCCULTUA, NITRITE, UROBILINOGEN, LEUKOCYTESUR, RBCUA, WBCUA, BACTERIA, SQUAMEPITHEL, HYALINECASTS in the last 48 hours.    Invalid input(s): ROXY    Significant Imaging: I have reviewed all pertinent imaging results/findings within the past 24 hours.

## 2020-07-27 NOTE — PLAN OF CARE
Problem: Adult Inpatient Plan of Care  Goal: Plan of Care Review    Patient arrived to floor via EMS stretcher  Patient is awake and orientedx4. Care plan explained to patient; she verbalized understanding. On 3.5L via nasal cannula, O2 saturation at 95-98%. Patient was wheezing, and complaining of SOB [PRN breathing treatments given; but did not help with the wheezing]. Hooked to heart monitor running normal sinus rhythm at 80-95bpm. Purewick in place. PRN melatonin given for insomnia. No pain/n/v/d during shift. Due medications given. Encouraged to turn every 2 hours as tolerated. Maintained on fall risk precaution. Bed in lowest position, bed alarm on, call light/personal items within reach and instructed to call for help when needed. Will continue to monitor.    0200H: bladder scanned the patient it showed 127mL    0600H: 0 urine output for the shift. Patient is still short of breath and wheezing; breathing treatment did not help. O2 sats stable on the 3.5L.     0615H: PRN tizanidine given for right arm/leg muscle spasms  Outcome: Ongoing, Progressing

## 2020-07-27 NOTE — PLAN OF CARE
Admit completed per flowsheet. Patient's questions answered.  Pt instructed on VTE, diet, and fall precautions. Understanding verbalized. Patient has no complaints at this time.

## 2020-07-27 NOTE — NURSING
Arrived bedside for scheduled HD. POC discussed with patient. Allowed time for questions and concerns. First HD treatment initiated at this time via RIJ with confirmed placement.

## 2020-07-27 NOTE — TELEPHONE ENCOUNTER
----- Message from Amanda Roman sent at 2020 12:45 PM CDT -----  Contact: Michelle dodson/ [facility] [phone]  CONSULTS:     Patient: Mary Carrillo     : 1955    Clinic#: [id number]     Room number: 432    Referring MD: Dr. Asif      Diagnosis: check knee,  cart placement at the end of week     Person calling: Gina w/ [facility]

## 2020-07-28 NOTE — PLAN OF CARE
The proper method of use, as well as anticipated side effects, of this aerosol treatment are discussed and demonstrated to the patient.   Patient on oxygen with documented flow.  Will attempt to wean per O2 order protocol.  Will continue to monitor.

## 2020-07-28 NOTE — PROGRESS NOTES
[facility] Medicine  Progress Note    Patient Name: Mary Carrillo  MRN: [id number]  Patient Class: IP- Inpatient   Admission Date: 7/26/2020  Length of Stay: 2 days  Attending Physician: Ronna Motta MD  Primary Care Provider: Jose Khan MD        Subjective:     Principal Problem:Hypervolemia associated with renal insufficiency        HPI:  Mary aCrrillo is a 66 yo AAF with pmh of HTN, HLD, diastolic dysfunction, mild aortic valve stenosis, pott's disease, obesity, gout, sarcoidosis, CKD stage 5, asthma and chronic back pain who presented to OSH with complaint of dyspnea and anuria. Pt currently undergoing outpatient workup for initiation of peritoneal dialysis. Initial labs remarkable for Hbg/Hct 7/25, BUN/Cr 66/6.7 previously 46/4.7 on 6/15/20, BNP 1261, , troponin 0.123. CXR consistent with pulmonary edema/CHF.  She received 0.5 inch nitropaste for hypertension and 100 mg furosemide IV. Pt with some hypoxia requiring 3 liters nasal cannula. She denies chest pain, abdominal pain, n/v and fever/chills. COVID negative. Pt admitted to [facility] hospital medicine.     Overview/Hospital Course:  No notes on file    Interval History: Patient seen on dialysis.  She is complaining of being hot as well as being short of breath  She has a history of asthma. She does not smoke    She has a right IJ Gualberto. Today is day 2 of hemodialysis   She had 2 L removed yesterday   She is on 3L    CV-RRR  Lungs with crackles at the bases, occasional wheeze  Minimal peripheral edema    Review of Systems   Constitutional: Negative for fever.   HENT: Negative for sore throat.    Eyes: Negative for visual disturbance.   Respiratory: Positive for shortness of breath. Negative for cough and wheezing.    Cardiovascular: Negative for chest pain.   Gastrointestinal: Negative for abdominal pain.   Genitourinary: Negative for dysuria.   Musculoskeletal: Negative for gait problem.   Skin: Negative for rash.    Neurological: Positive for weakness.     Objective:     Vital Signs (Most Recent):  Temp: 98.4 °F (36.9 °C) (07/28/20 1034)  Pulse: (!) 58 (07/28/20 1149)  Resp: 20 (07/28/20 1127)  BP: 119/79 (07/28/20 1040)  SpO2: 95 % (07/28/20 1127) Vital Signs (24h Range):  Temp:  [97.4 °F (36.3 °C)-99.7 °F (37.6 °C)] 98.4 °F (36.9 °C)  Pulse:  [54-85] 58  Resp:  [16-23] 20  SpO2:  [90 %-95 %] 95 %  BP: (117-149)/(55-83) 119/79     Weight: 101.6 kg (223 lb 15.8 oz)  Body mass index is 37.27 kg/m².    Intake/Output Summary (Last 24 hours) at 7/28/2020 1152  Last data filed at 7/28/2020 0900  Gross per 24 hour   Intake 757 ml   Output 4000 ml   Net -3243 ml      Physical Exam  Vitals signs and nursing note reviewed.   Constitutional:       General: She is not in acute distress.     Comments: She appears uncomfortable on dialysis   HENT:      Head: Normocephalic and atraumatic.      Mouth/Throat:      Pharynx: Oropharynx is clear. No oropharyngeal exudate.   Eyes:      General: No scleral icterus.     Conjunctiva/sclera: Conjunctivae normal.   Neck:      Musculoskeletal: Normal range of motion and neck supple.   Cardiovascular:      Rate and Rhythm: Normal rate and regular rhythm.   Pulmonary:      Effort: Pulmonary effort is normal.      Breath sounds: Wheezing and rales present.   Abdominal:      General: Bowel sounds are normal.      Palpations: Abdomen is soft.      Tenderness: There is no abdominal tenderness.   Musculoskeletal:         General: No swelling.   Skin:     General: Skin is warm.   Neurological:      General: No focal deficit present.      Mental Status: She is alert.   Psychiatric:         Mood and Affect: Mood normal.         Significant Labs:   BMP:   Recent Labs   Lab 07/28/20  0432   GLU 84      K 3.4*      CO2 24   BUN 48*   CREATININE 5.1*   CALCIUM 6.6*   MG 1.7     CBC:   Recent Labs   Lab 07/27/20  0508 07/28/20  0432   WBC 12.46 11.83   HGB 7.2* 7.3*   HCT 24.1* 24.2*    298        Significant Imaging: X-Ray Chest AP Portable  Narrative: EXAMINATION:  XR CHEST AP PORTABLE    CLINICAL HISTORY:  RIJ HD catheter;    TECHNIQUE:  Single frontal view of the chest was performed.    COMPARISON:  July 26, 2020.    FINDINGS:  Monitoring leads overlie the chest.  Postop change thoracic spine.  Right-sided central venous dialysis type catheter with its tip projected over the SVC.  Heart is enlarged.  There is increase in the pulmonary vascular markings as well as the perihilar interstitial and alveolar markings.  May be bilateral effusions.  No pneumothorax.  Impression: Right-sided central venous catheter is been placed with its tip overlying the SVC right atrial junction.    Findings concerning for congestive failure/volume overload.    This report was flagged in Epic as abnormal.    Electronically signed by: Derek Ahuja MD  Date:    07/27/2020  Time:    15:09           Assessment/Plan:      * Hypervolemia associated with renal insufficiency  CKD (chronic kidney disease) stage 5, GFR less than 15 ml/min    Continue with dialysis as per nephrology  I did also add some breathing treatments.      Essential hypertension  Continue amlodipine 10 mg daily, carvedilol 25 mg BID and hydralazine 50 mg daily   Blood pressure is controlled at this time      Anemia of chronic disease  Hbg/Hct 7/25 ([facility]leine Hbg 8-10)   No indication for transfusion at this time    venofer 100  EPogen 5K with each HD      CKD (chronic kidney disease) stage 5, GFR less than 15 ml/min  Now with progression to end-stage.  Today was dialysis day 2.  Will get a peritoneal dialysis catheter to begin PD.  Appreciate nephrology input      Diastolic dysfunction   Echo 4/16/20 LVEF 55-60%, mild AS and grade 2 diastolic dysfunction         Pott's disease (spinal tuberculosis)  S/p T12-L1 corpectomy with T10-L3 fusion on 3/18/18 due to Pott's disease. Per chart review patient treated with prolonged protocol per ID. No acute issues.         Hyperlipidemia  Continue ASA, statin       Sarcoidosis  Chronic       Gout, chronic  No acute issues         VTE Risk Mitigation (From admission, onward)         Ordered     heparin (porcine) injection 2,500 Units  As needed (PRN)      07/28/20 1326     IP VTE HIGH RISK PATIENT  Once      07/27/20 0003     Place sequential compression device  Until discontinued      07/27/20 0003                      Ronna Motta MD  Department of Hospital Medicine   [facility]

## 2020-07-28 NOTE — PROCEDURES
Pt seen and examined on HD, tolerating procedure well    1. ESRD (N18.6 Z99.2) - 2nd tx today and plan for lina  PD cath placement on Thursday  SW consult to set up with Dr. Mcintyre for Urgent start PD [facility] on [address]     In work-up for renal transplant in [facility]  2. HTN (I10) - reassess after optimizing volume  3. Anemia of chronic kidney disease treated with LYNNETTE (N18.9 D63.1) - venofer 100  EPogen 5K with each HD

## 2020-07-28 NOTE — PLAN OF CARE
Plan of care reviewed with patient and daughter Patient and daughter verbalized understanding. Patient is currently on 3.5 liters nasal cannula. No signs/symptoms of distress noted. Telemetry applied. No true red alarms noted. Dialysis access placed today by surgeon. Patient went to dialysis. 2L taken off. Vital signs stable. Patient turned every two hours. Bed locked and low, call light within reach, bed alarm on, non skid socks applied, side rails up x2. Instructed to call if needing assistance. Will continue to monitor.

## 2020-07-28 NOTE — PLAN OF CARE
Problem: Adult Inpatient Plan of Care  Goal: Plan of Care Review    Patient is awake and orientedx4. Care plan explained to patient; she verbalized understanding. On 3L via nasal cannula, O2 saturation at 90-94%. Patient did not wheeze too much overnight. Hooked to heart monitor running normal sinus rhythm at 70-85bpm. Purewick in place,draining yellow urine. PRN trazodone  given for insomnia. No pain/n/v/d during shift. R. Trialysis catheter in place. Due medications given. Encouraged to turn every 2 hours as tolerated. Maintained on fall risk precaution. Bed in lowest position, bed alarm on, call light/personal items within reach and instructed to call for help when needed. Will continue to monitor.    Outcome: Ongoing, Progressing

## 2020-07-28 NOTE — ASSESSMENT & PLAN NOTE
Continue amlodipine 10 mg daily, carvedilol 25 mg BID and hydralazine 50 mg daily   Blood pressure is controlled at this time

## 2020-07-28 NOTE — ASSESSMENT & PLAN NOTE
Now with progression to end-stage.  Today was dialysis day 2.  Will get a peritoneal dialysis catheter to begin PD.  Appreciate nephrology input

## 2020-07-28 NOTE — ASSESSMENT & PLAN NOTE
CKD (chronic kidney disease) stage 5, GFR less than 15 ml/min    Continue with dialysis as per nephrology  I did also add some breathing treatments.

## 2020-07-28 NOTE — ASSESSMENT & PLAN NOTE
Hbg/Hct 7/25 ([facility] Hbg 8-10)   No indication for transfusion at this time    venofer 100  EPogen 5K with each HD

## 2020-07-28 NOTE — SUBJECTIVE & OBJECTIVE
Interval History: Patient seen on dialysis.  She is complaining of being hot as well as being short of breath  She has a history of asthma. She does not smoke    She has a right IJ Gualberto. Today is day 2 of hemodialysis   She had 2 L removed yesterday   She is on 3L    CV-RRR  Lungs with crackles at the bases, occasional wheeze  Minimal peripheral edema    Review of Systems   Constitutional: Negative for fever.   HENT: Negative for sore throat.    Eyes: Negative for visual disturbance.   Respiratory: Positive for shortness of breath. Negative for cough and wheezing.    Cardiovascular: Negative for chest pain.   Gastrointestinal: Negative for abdominal pain.   Genitourinary: Negative for dysuria.   Musculoskeletal: Negative for gait problem.   Skin: Negative for rash.   Neurological: Positive for weakness.     Objective:     Vital Signs (Most Recent):  Temp: 98.4 °F (36.9 °C) (07/28/20 1034)  Pulse: (!) 58 (07/28/20 1149)  Resp: 20 (07/28/20 1127)  BP: 119/79 (07/28/20 1040)  SpO2: 95 % (07/28/20 1127) Vital Signs (24h Range):  Temp:  [97.4 °F (36.3 °C)-99.7 °F (37.6 °C)] 98.4 °F (36.9 °C)  Pulse:  [54-85] 58  Resp:  [16-23] 20  SpO2:  [90 %-95 %] 95 %  BP: (117-149)/(55-83) 119/79     Weight: 101.6 kg (223 lb 15.8 oz)  Body mass index is 37.27 kg/m².    Intake/Output Summary (Last 24 hours) at 7/28/2020 1152  Last data filed at 7/28/2020 0900  Gross per 24 hour   Intake 757 ml   Output 4000 ml   Net -3243 ml      Physical Exam  Vitals signs and nursing note reviewed.   Constitutional:       General: She is not in acute distress.     Comments: She appears uncomfortable on dialysis   HENT:      Head: Normocephalic and atraumatic.      Mouth/Throat:      Pharynx: Oropharynx is clear. No oropharyngeal exudate.   Eyes:      General: No scleral icterus.     Conjunctiva/sclera: Conjunctivae normal.   Neck:      Musculoskeletal: Normal range of motion and neck supple.   Cardiovascular:      Rate and Rhythm: Normal rate and  regular rhythm.   Pulmonary:      Effort: Pulmonary effort is normal.      Breath sounds: Wheezing and rales present.   Abdominal:      General: Bowel sounds are normal.      Palpations: Abdomen is soft.      Tenderness: There is no abdominal tenderness.   Musculoskeletal:         General: No swelling.   Skin:     General: Skin is warm.   Neurological:      General: No focal deficit present.      Mental Status: She is alert.   Psychiatric:         Mood and Affect: Mood normal.         Significant Labs:   BMP:   Recent Labs   Lab 07/28/20  0432   GLU 84      K 3.4*      CO2 24   BUN 48*   CREATININE 5.1*   CALCIUM 6.6*   MG 1.7     CBC:   Recent Labs   Lab 07/27/20  0508 07/28/20  0432   WBC 12.46 11.83   HGB 7.2* 7.3*   HCT 24.1* 24.2*    298       Significant Imaging: X-Ray Chest AP Portable  Narrative: EXAMINATION:  XR CHEST AP PORTABLE    CLINICAL HISTORY:  RIJ HD catheter;    TECHNIQUE:  Single frontal view of the chest was performed.    COMPARISON:  July 26, 2020.    FINDINGS:  Monitoring leads overlie the chest.  Postop change thoracic spine.  Right-sided central venous dialysis type catheter with its tip projected over the SVC.  Heart is enlarged.  There is increase in the pulmonary vascular markings as well as the perihilar interstitial and alveolar markings.  May be bilateral effusions.  No pneumothorax.  Impression: Right-sided central venous catheter is been placed with its tip overlying the SVC right atrial junction.    Findings concerning for congestive failure/volume overload.    This report was flagged in Epic as abnormal.    Electronically signed by: Derek Ahuja MD  Date:    07/27/2020  Time:    15:09

## 2020-07-28 NOTE — PLAN OF CARE
TN called in pt's referral for outpatient dialysis with [facility] followed by Dr. Mcintyre. Called in to [phone]. Awaiting return call for clinical request from [facility]. Clinical from dialysis inpatient faxed to [facility] intake via [facility] Fax.       07/28/20 1522   Post-Acute Status   Post-Acute Authorization Dialysis   Diaylsis Status Referrals Sent     Adelita Lang RN  RN Transition Navigator  [phone]

## 2020-07-29 PROBLEM — I63.9 ACUTE ISCHEMIC STROKE: Status: RESOLVED | Noted: 2020-01-01 | Resolved: 2020-01-01

## 2020-07-29 PROBLEM — R79.89 ELEVATED TROPONIN: Status: ACTIVE | Noted: 2020-01-01

## 2020-07-29 PROBLEM — J96.01 ACUTE RESPIRATORY FAILURE WITH HYPOXIA: Status: ACTIVE | Noted: 2020-01-01

## 2020-07-29 PROBLEM — E87.20 METABOLIC ACIDOSIS: Status: ACTIVE | Noted: 2020-01-01

## 2020-07-29 PROBLEM — N28.9 HYPERVOLEMIA ASSOCIATED WITH RENAL INSUFFICIENCY: Status: RESOLVED | Noted: 2020-01-01 | Resolved: 2020-01-01

## 2020-07-29 PROBLEM — I63.9 ACUTE ISCHEMIC STROKE: Status: ACTIVE | Noted: 2020-01-01

## 2020-07-29 PROBLEM — I63.9 ACUTE CVA (CEREBROVASCULAR ACCIDENT): Status: ACTIVE | Noted: 2020-01-01

## 2020-07-29 PROBLEM — E87.70 HYPERVOLEMIA ASSOCIATED WITH RENAL INSUFFICIENCY: Status: RESOLVED | Noted: 2020-01-01 | Resolved: 2020-01-01

## 2020-07-29 NOTE — PROGRESS NOTES
eICU Brief Admission Note       Briefly, 64 yo AAF with HTN, HLD, diastolic dysfunction, mild aortic valve stenosis, pott's disease, obesity, gout, sarcoidosis, CKD stage 5, asthma and chronic back pain who presented to OSH with complaint of dyspnea and anuria. Was being managed for hypervolemia associated with renal insufficiency.   Tonight, her SpO2 were 91%. Patient recently placed catheter was bleeding, so placed on NRB to maintain adequate SpO2.  Then transferred to ICU.     Video assessment :  lying in bed     Vitals reviewed   Afebrile, stable vitals     LABs reviewed       Radiology reviewed         Assessment / Plan :  Acute hypoxic respiratory failure   Fluid overload + CHF diastolic type   Leukocytosis -- to r/o sepsis   CKD stage V   Anemia   Mild rhabdomyolysis   Multiple co-morbidities :  HTN, HLD, mild aortic valve stenosis, pott's disease, obesity, gout, sarcoidosis, asthma and chronic back pain  - get CXR , blood culture; low threshold for antibiotics   - get BP under control   - on home meds   - dialysis as per Nephrology       DVT Px : SCD  GI Px : N/A      Patient seen over video : Yes  Chart reviewed : Yes  Spoke with RN : No

## 2020-07-29 NOTE — PLAN OF CARE
Patient on oxygen with documented flow. Will attempt to wean per protocol.  Prn neb given for wheezing. Will continue to monitor.

## 2020-07-29 NOTE — RESPIRATORY THERAPY
Responded to MET.  Patient SpO2 were 91%. Patient recently placed catheter was bleeding, so placed on NRB to maintain adequate SpO2.  Other RTs were at bedside also.

## 2020-07-29 NOTE — SUBJECTIVE & OBJECTIVE
Past Medical History:   Diagnosis Date    Arthritis     Asthma     Back pain     BMI 39.0-39.9,adult 2020    CKD (chronic kidney disease) stage 5, GFR less than 15 ml/min 2020    Dehiscence of operative wound 2018    Disorder of kidney and ureter     Glaucoma     Gout     Hyperlipidemia     Hypertension     Obesity     BMI 39    Pott's disease (spinal tuberculosis)     Sarcoidosis     Sarcoidosis     Secondary hyperparathyroidism of renal origin 2020    Vitritis     Vitritis        Past Surgical History:   Procedure Laterality Date    BACK SURGERY  2018    Laminectomy.     BACK SURGERY      CATARACT EXTRACTION Bilateral      SECTION  ,  1977      x2    HYSTERECTOMY      uterus/cervix d/t uterine fibroids.        Review of patient's allergies indicates:   Allergen Reactions    Prednisone Hives and Itching     Pills only, can take if she needs to    Naproxen Rash       No current facility-administered medications on file prior to encounter.      Current Outpatient Medications on File Prior to Encounter   Medication Sig    albuterol (PROAIR HFA) 90 mcg/actuation inhaler Inhale 2 puffs into the lungs every 6 (six) hours as needed for Wheezing. Rescue    amLODIPine (NORVASC) 10 MG tablet Take 1 tablet (10 mg total) by mouth once daily.    carvedilol (COREG) 25 MG tablet Take 1 tablet (25 mg total) by mouth 2 (two) times daily with meals.    furosemide (LASIX) 40 MG tablet Take 1 tablet (40 mg total) by mouth 2 (two) times daily. (Patient taking differently: Take 40 mg by mouth once daily. )    hydrALAZINE (APRESOLINE) 50 MG tablet Take 1 tablet (50 mg total) by mouth 3 (three) times daily. (Patient taking differently: Take 50 mg by mouth once daily. )    HYDROcodone-acetaminophen (NORCO)  mg per tablet Take 1 tablet by mouth every 12 (twelve) hours as needed for Pain.    LIPITOR 40 mg tablet Take 1 tablet (40 mg total) by mouth once daily.     tiZANidine (ZANAFLEX) 4 MG tablet Take 4 mg by mouth every 8 (eight) hours as needed.     folic acid (FOLVITE) 1 MG tablet Take 1 tablet (1 mg total) by mouth once daily.    hydrOXYzine pamoate (VISTARIL) 50 MG Cap Take 1 capsule (50 mg total) by mouth every evening.    traZODone (DESYREL) 100 MG tablet Take 100 mg by mouth nightly as needed.      Family History     Problem Relation (Age of Onset)    Asthma Brother    Diabetes Mother, Father    Hypertension Brother    Kidney disease Mother, Father    No Known Problems Sister, Sister, Sister        Tobacco Use    Smoking status: Former Smoker     Packs/day: 1.00     Years: 35.00     Pack years: 35.00     Types: Cigarettes     Start date: 1967     Quit date: 1994     Years since quittin.6    Smokeless tobacco: Never Used   Substance and Sexual Activity    Alcohol use: No     Alcohol/week: 0.0 standard drinks    Drug use: No    Sexual activity: Yes     Partners: Male     Birth control/protection: Post-menopausal, See Surgical Hx     Review of Systems   Unable to perform ROS: mental status change     Objective:     Vital Signs (Most Recent):  Temp: 96.1 °F (35.6 °C) (20 1130)  Pulse: 62 (20 1245)  Resp: 16 (20 1245)  BP: 127/72 (20 1245)  SpO2: 98 % (20 1245) Vital Signs (24h Range):  Temp:  [96.1 °F (35.6 °C)-98.8 °F (37.1 °C)] 96.1 °F (35.6 °C)  Pulse:  [32-87] 62  Resp:  [13-31] 16  SpO2:  [81 %-100 %] 98 %  BP: ()/() 127/72     Weight: 96.2 kg (212 lb)  Body mass index is 35.28 kg/m².    SpO2: 98 %  O2 Device (Oxygen Therapy): High Flow nasal Cannula      Intake/Output Summary (Last 24 hours) at 2020 1311  Last data filed at 2020 0900  Gross per 24 hour   Intake 650 ml   Output 2075 ml   Net -1425 ml       Lines/Drains/Airways     Central Venous Catheter Line                 Hemodialysis Catheter 20 right internal jugular 2 days          Drain                 Urethral Catheter 20  0111 less than 1 day                Physical Exam   Constitutional: She appears well-developed and well-nourished. She appears ill. No distress.   HENT:   Head: Atraumatic.   Neck: JVD present.   Cardiovascular: Normal rate and regular rhythm.   Murmur heard.  Pulmonary/Chest: Effort normal. She has wheezes. She has no rales.   Abdominal: Soft. Bowel sounds are normal.   Musculoskeletal:         General: Edema present.   Neurological: She is alert.   Skin: Skin is warm and dry. She is not diaphoretic.       Significant Labs:   ABG:   Recent Labs   Lab 07/29/20  1022   PH 7.276*   PCO2 47.2*   HCO3 21.9*   POCSATURATED 96   BE -5   , Blood Culture: No results for input(s): LABBLOO in the last 48 hours., BMP:   Recent Labs   Lab 07/28/20  0432 07/29/20  0432 07/29/20  1031   GLU 84 98 119*    137 136   K 3.4* 4.0 3.7    106 106   CO2 24 22* 24   BUN 48* 33* 32*   CREATININE 5.1* 4.1* 4.0*   CALCIUM 6.6* 7.4* 7.1*   MG 1.7 1.7 1.7   , CMP   Recent Labs   Lab 07/28/20  0432 07/29/20  0432 07/29/20  1031    137 136   K 3.4* 4.0 3.7    106 106   CO2 24 22* 24   GLU 84 98 119*   BUN 48* 33* 32*   CREATININE 5.1* 4.1* 4.0*   CALCIUM 6.6* 7.4* 7.1*   PROT  --   --  7.8   ALBUMIN  --   --  2.1*   BILITOT  --   --  0.2   ALKPHOS  --   --  110   AST  --   --  24   ALT  --   --  12   ANIONGAP 13 9 6*   ESTGFRAFRICA 9* 12* 13*   EGFRNONAA 8* 11* 11*   , CBC   Recent Labs   Lab 07/29/20  0242 07/29/20  0432 07/29/20  1031   WBC 20.23* 20.62* 20.91*   HGB 7.1* 7.4* 6.8*   HCT 23.4* 25.1* 22.7*    291 246   , INR No results for input(s): INR, PROTIME in the last 48 hours., Lipid Panel No results for input(s): CHOL, HDL, LDLCALC, TRIG, CHOLHDL in the last 48 hours., Troponin   Recent Labs   Lab 07/29/20  1031   TROPONINI 0.172*    and All pertinent lab results from the last 24 hours have been reviewed.    Significant Imaging: Echocardiogram:   Transthoracic echo (TTE) complete (Cupid Only):   Results  for orders placed or performed during the hospital encounter of 07/26/20   Echo Color Flow Doppler? Yes   Result Value Ref Range    Ascending aorta 3.02 cm    STJ 3.03 cm    AV mean gradient 8 mmHg    Ao peak arias 1.75 m/s    Ao VTI 34.82 cm    IVRT 134.16 msec    IVS 1.29 (A) 0.6 - 1.1 cm    LA size 3.86 cm    Left Atrium Major Axis 5.14 cm    Left Atrium Minor Axis 5.08 cm    LVIDD 4.53 3.5 - 6.0 cm    LVIDS 3.39 2.1 - 4.0 cm    LVOT diameter 2.10 cm    LVOT peak VTI 18.31 cm    PW 1.19 (A) 0.6 - 1.1 cm    MV Peak A Arias 0.71 m/s    E wave decelartion time 285.05 msec    MV Peak E Arias 0.70 m/s    PV Peak D Arias 0.26 m/s    PV Peak S Arias 0.35 m/s    RA Major Axis 4.88 cm    RA Width 4.44 cm    RVDD 3.51 cm    TAPSE 2.63 cm    TR Max Arias 2.85 m/s    TDI LATERAL 0.03 m/s    TDI SEPTAL 0.04 m/s    LA WIDTH 3.92 cm    Ao root annulus 3.33 cm    MV stenosis pressure 1/2 time 82.66 ms    LV Diastolic Volume 93.84 mL    LV Systolic Volume 47.26 mL    LVOT peak arias 0.94 m/s    LV LATERAL E/E' RATIO 23.33 m/s    LV SEPTAL E/E' RATIO 17.50 m/s    FS 25 %    LA volume 65.72 cm3    LV mass 209.86 g    Left Ventricle Relative Wall Thickness 0.53 cm    AV valve area 1.82 cm2    AV Velocity Ratio 0.54     AV index (prosthetic) 0.53     MV valve area p 1/2 method 2.66 cm2    E/A ratio 0.99     Mean e' 0.04 m/s    Pulm vein S/D ratio 1.35     LVOT area 3.5 cm2    LVOT stroke volume 63.39 cm3    AV peak gradient 12 mmHg    E/E' ratio 20.00 m/s    LV Systolic Volume Index 23.3 mL/m2    LV Diastolic Volume Index 46.28 mL/m2    LA Volume Index 32.4 mL/m2    LV Mass Index 103 g/m2    Triscuspid Valve Regurgitation Peak Gradient 32 mmHg    BSA 2.1 m2    Narrative    · Low normal left ventricular systolic function. The estimated ejection   fraction is 50-55%.  · Mild concentric left ventricular hypertrophy.  · Grade II (moderate) left ventricular diastolic dysfunction consistent   with pseudonormalization.  · Moderate right ventricular  enlargement.  · Normal right ventricular systolic function.  · Moderate right atrial enlargement.  · Mild tricuspid regurgitation.

## 2020-07-29 NOTE — PROGRESS NOTES
Progress Note  Nephrology      Consult Requested By: Ronna Motta MD  Reason for Consult: ESRD    SUBJECTIVE:     Review of Systems   Constitutional: Negative for chills and fever.   Respiratory: Negative for cough and shortness of breath.    Cardiovascular: Negative for chest pain and leg swelling.   Gastrointestinal: Negative for nausea.     Patient Active Problem List   Diagnosis    Hemorrhoids, internal    Gout, chronic    Sarcoidosis    Hyperlipidemia    Thoracolumbar back pain    Abnormal chest CT    Uveitis    Fatigue    Pulmonary hypertension    IFG (impaired fasting glucose)    Primary osteoarthritis involving multiple joints    Discitis of thoracolumbar region    Inadequate oral intake    Ovarian mass    Unintentional weight loss    Pulmonary tuberculosis    Discitis    Osteoarthritis of spine with myelopathy, thoracolumbar region    Pott's disease (spinal tuberculosis)    Acute blood loss as cause of postoperative anemia    Generalized abdominal pain    Other insomnia    Status post thoracic spinal fusion    Alteration in skin integrity related to surgical incision    Difficulty sleeping    Right hip pain    Myofascial pain    CHRISTEN (acute kidney injury)    Nephrotic syndrome    Diastolic dysfunction    Aortic valve stenosis    Electrocardiogram showing T wave abnormalities    Aortic atherosclerosis    Cardiomyopathy    Obesity    Secondary hyperparathyroidism of renal origin    CKD (chronic kidney disease) stage 5, GFR less than 15 ml/min    BMI 39.0-39.9,adult    Hypervolemia associated with renal insufficiency    Anemia of chronic disease    Essential hypertension       OBJECTIVE:     Medications:   albuterol-ipratropium  3 mL Nebulization Q4H WAKE    amLODIPine  10 mg Oral Daily    atorvastatin  40 mg Oral Daily    carvediloL  6.25 mg Oral BID WM    hydrALAZINE  50 mg Oral Daily    magnesium sulfate IVPB  1 g Intravenous Once    mupirocin   Nasal  BID    paricalcitoL  0.1 mcg/kg Intravenous Every Mon, Wed, Fri    piperacillin-tazobactam (ZOSYN) IVPB  4.5 g Intravenous Q12H    polyethylene glycol  17 g Oral Daily    sevelamer carbonate  800 mg Oral TID WM    vitamin renal formula (B-complex-vitamin c-folic acid)  1 capsule Oral Daily       Vitals:    07/29/20 1206   BP:    Pulse: 62   Resp: 20   Temp:      I/O last 3 completed shifts:  In: 1007 [P.O.:607; Other:400]  Out: 3500 [Urine:1700; Other:1800]  Physical Exam  Constitutional:       General: She is not in acute distress.     Appearance: She is well-developed.   HENT:      Head: Normocephalic and atraumatic.   Eyes:      General: No scleral icterus.  Neck:      Musculoskeletal: Neck supple.      Vascular: No JVD.   Cardiovascular:      Rate and Rhythm: Normal rate and regular rhythm.      Heart sounds: No murmur.   Pulmonary:      Effort: Pulmonary effort is normal. No respiratory distress.      Breath sounds: Wheezing present. No rales.   Abdominal:      General: Bowel sounds are normal. There is no distension.      Palpations: Abdomen is soft.      Tenderness: There is no abdominal tenderness.   Musculoskeletal:         General: Swelling (1+ BLE) present.   Skin:     General: Skin is warm and dry.      Coloration: Skin is not pale.      Findings: No erythema.   Neurological:      Mental Status: She is alert and oriented to person, place, and time.   Psychiatric:         Judgment: Judgment normal.       Laboratory:  Recent Labs   Lab 07/29/20  0242 07/29/20  0432 07/29/20  1031   WBC 20.23* 20.62* 20.91*   HGB 7.1* 7.4* 6.8*   HCT 23.4* 25.1* 22.7*    291 246   MONO 8.7  1.8* 11.1  2.3* 7.3  1.5*     Recent Labs   Lab 07/27/20  0508 07/28/20  0432 07/29/20  0432 07/29/20  1031    140 137 136   K 3.7 3.4* 4.0 3.7    103 106 106   CO2 20* 24 22* 24   BUN 70* 48* 33* 32*   CREATININE 7.0* 5.1* 4.1* 4.0*   CALCIUM 5.6* 6.6* 7.4* 7.1*   PHOS 6.5* 4.9* 4.9*  --      Labs  reviewed  Diagnostic Results:  X-Ray: Reviewed  US: Reviewed  Echo: Reviewed      ASSESSMENT/PLAN:     1. ESRD (N18.6 Z99.2) - 3rd treatment was today however stopped due  bradycardia  - got better with atropine. Electrolytes acceptable still volume overloaded but much better then yesterday. 4.5L net negative from admission      PD cath placement on Thursday - will clear with cards if safe to proceed. Consult Dr. Yady LEES consult to set up with Dr. Mcintyre for Urgent start PD at [facility] on [address]     In work-up for renal transplant in [facility]  2. HTN (I10) - reassess after optimizing volume  3. Anemia of chronic kidney disease treated with LYNNETTE (N18.9 D63.1) - venofer 100  EPogen 5K with each HD  Bled overnight  Recheck CBC if < 7 transfuse    Recent Labs   Lab 07/29/20  0242 07/29/20  0432 07/29/20  1031   WBC 20.23* 20.62* 20.91*   HGB 7.1* 7.4* 6.8*   HCT 23.4* 25.1* 22.7*    291 246     No results found for: IRON, TIBC, FERRITIN, SATURATEDIRO            4. MBD (E88.9 M90.80) - start renvela, 3Ca bath, Zemplar 10     Lab Results   Component Value Date    PTH 1,287.0 (H) 07/14/2020    CALCIUM 7.1 (L) 07/29/2020    PHOS 4.9 (H) 07/29/2020     Recent Labs   Lab 07/28/20  0432 07/29/20  0432 07/29/20  1031   MG 1.7 1.7 1.7     Lab Results   Component Value Date    [id number] 12 (L) 02/13/2020     Lab Results   Component Value Date    CO2 24 07/29/2020        5. Hemodialysis Access (Z99.2 V45.11)- as in #1  6. Nutrition/Hypoalbuminemia (E88.09) -        Recent Labs   Lab 07/26/20  1410 07/27/20  0508   LABPROT 11.9 11.5   ALBUMIN 2.6*  --      Nepro with meals TID. Renal vitamins daily       Thank you for allowing me to participate in the care of your patients  With any question please call [phone]  Shahnaz Fish    [facility]  BEATRIZ Che MD, CAESAR BRISCOE MD,   MD TORI Santo, NP  [address]  [place], [address] [phone]

## 2020-07-29 NOTE — ASSESSMENT & PLAN NOTE
Initial troponin 0.12 with trend down to 0.075, now back up to 0.17 s/p CODE this am  Continue to trend with EKG  EKG SR without definitive ischemic changes- very poor tracing  No reports of chest pain prior to event this AM  Likely demand in setting of ESRD and possible sepsis

## 2020-07-29 NOTE — CONSULTS
NP called and wanted to discuss case over phone and review CT imaging.  CT reviewed. No clear infarct or other CNS process identified, mild motion artifact.   Leukocytosis amongst other lab abnormalities. MRI brain w/o contrast would definitively r/o CNS origin of AMS.    Andi Harris MD  Vascular Neurology

## 2020-07-29 NOTE — PROGRESS NOTES
Pharmacokinetic Initial Assessment: IV Vancomycin    Assessment/Plan:    Initiate intravenous vancomycin with loading dose of 1500 mg once (15 mg /kg dose due to renal function and starting of concomitant nephrotoxic agent Zosyn) with subsequent doses when random concentrations are less than 20 mcg/mL  Desired empiric serum trough concentration is 10 to 20 mcg/mL  Draw vancomycin random level on 7/31/20 at 0600.  Pharmacy will continue to follow and monitor vancomycin.      Please contact pharmacy at extension [phone] with any questions regarding this assessment.     Thank you for the consult,   Denisha Zayas       Patient brief summary:  Mary Carrillo is a 65 y.o. female initiated on antimicrobial therapy with IV Vancomycin for treatment for providing Broad spectrum coverage       Drug Allergies:   Review of patient's allergies indicates:   Allergen Reactions    Prednisone Hives and Itching     Pills only, can take if she needs to    Naproxen Rash       Actual Body Weight:   96.2    Renal Function:   Estimated Creatinine Clearance: 15.7 mL/min (A) (based on SCr of 4 mg/dL (H)).,     Dialysis Method (if applicable):  intermittent HD    CBC (last 72 hours):  Recent Labs   Lab Result Units 07/26/20  1410 07/27/20  0508 07/28/20  0432 07/29/20  0015 07/29/20  0242 07/29/20  0432 07/29/20  1031   WBC K/uL 12.50 12.46 11.83  --  20.23* 20.62* 20.91*   Hemoglobin g/dL 7.6* 7.2* 7.3* 7.3* 7.1* 7.4* 6.8*   Hematocrit % 25.5* 24.1* 24.2* 26.1* 23.4* 25.1* 22.7*   Platelets K/uL 338 309 298  --  286 291 246   Gran% % 66.6 69.4 72.9  --  83.9* 81.0* 82.0*   Lymph% % 18.0 14.0* 10.7*  --  5.9* 6.7* 6.1*   Mono% % 12.1 12.2 11.6  --  8.7 11.1 7.3   Eosinophil% % 1.9 3.0 3.0  --  0.3 0.2 2.1   Basophil% % 0.3 0.4 0.3  --  0.1 0.1 0.2   Differential Method  Automated Automated Automated  --  Automated Automated Automated       Metabolic Panel (last 72 hours):  Recent Labs   Lab Result Units 07/26/20  4803 07/27/20  3228  07/28/20 0432 07/29/20 0432 07/29/20  1031   Sodium mmol/L 144 143 140 137 136   Potassium mmol/L 3.4* 3.7 3.4* 4.0 3.7   Chloride mmol/L 107 109 103 106 106   CO2 mmol/L 19* 20* 24 22* 24   Glucose mg/dL 90 92 84 98 119*   BUN, Bld mg/dL 66* 70* 48* 33* 32*   Creatinine mg/dL 6.7* 7.0* 5.1* 4.1* 4.0*   Albumin g/dL 2.6*  --   --   --  2.1*   Total Bilirubin mg/dL 0.1  --   --   --  0.2   Alkaline Phosphatase U/L 121  --   --   --  110   AST U/L 16  --   --   --  24   ALT U/L 12  --   --   --  12   Magnesium mg/dL  --  1.7 1.7 1.7 1.7   Phosphorus mg/dL  --  6.5* 4.9* 4.9*  --        Drug levels (last 3 results):  No results for input(s): VANCOMYCINRA, VANCOMYCINPE, VANCOMYCINTR in the last 72 hours.    Microbiologic Results:  Microbiology Results (last 7 days)       Procedure Component Value Units Date/Time    Blood culture [[id number]]     Order Status: Sent Specimen: Blood     Blood culture [[id number]]     Order Status: Sent Specimen: Blood     Blood culture [[id number]] Collected: 07/29/20 0432    Order Status: Sent Specimen: Blood Updated: 07/29/20 0719

## 2020-07-29 NOTE — NURSING
Received pt in ICU after MET. Pt connected to monitors and non rebreather replaced with high flow NC at 12L. VSS after NS bolus and oxygen support.  Pt oriented x3, not to situation.  Pt updated on tansfer to ICU. Pt calm and follows commands. Belongings placed in drawer. Will continue to monitor.

## 2020-07-29 NOTE — CONSULTS
[facility] ICU 5th Floor  Cardiology  Consult Note    Patient Name: Mary Carrillo  MRN: [id number]  Admission Date: 7/26/2020  Hospital Length of Stay: 3 days  Code Status: Full Code   Attending Provider: Ronna Motta MD   Consulting Provider: Joshua Connell NP  Primary Care Physician: Jose Khan MD  Principal Problem:Hypervolemia associated with renal insufficiency    Patient information was obtained from past medical records and ER records.     Inpatient consult to Cardiology-[facility]  Consult performed by: Joshua Connell NP  Consult ordered by: Shahnaz Fish MD        Subjective:     Chief Complaint:  SOB, anuria      HPI:   Mary Carrillo is a 64 yo AAF with HTN, HLD, diastolic dysfunction, mild aortic valve stenosis, pott's disease, obesity, gout, sarcoidosis, CKD stage 5, asthma and chronic back pain who presented to OSH with complaint of dyspnea and anuria.  Initial labs - Hbg/Hct 7/25, BUN/Cr 66/6.7 previously 46/4.7 on 6/15/20, BNP 1261, , troponin 0.123. CXR consistent with pulmonary edema/CHF.  She received 0.5 inch nitropaste for hypertension and 100 mg furosemide IV. Pt with some hypoxia requiring 3 liters nasal cannula. 07/28/2020 MET called 2/2 to acute large blood loss from trialysis catheter, hypotension and altered mental status.  Acute blood loss stopped once trialysis line properly capped.  SBP was in the 90s-80s.  Patient was given fluid bolus.  She was transferred to ICU for closer observation.  This AM, CODE stroke was called for unequal pupils and AMS, no acute findings per CT brain. Patient was sent to the 4th floor for HD as she had transfer to telemetry orders written. Shortly after initiation of HD, CODE BLUE was called due to patient being reportedly unresponsive with marked bradycardia with HR in the 30s which reportedly responded to Atropine (No CODE documentation found). Patient was reportedly hooked up to ICU portable monitor during the  CODE but there are no strips and telemetry review notes no recording for the approximate 30 minutes that she was off the unit. WBC 21 K- pan culture in progress. HH 6.8/22. K+ 3.7, no Mg ordered. Troponin 0.17. EKG SR- poor tracing, no obvious acute ischemic changes. TTE pending.   Cardiology consulted for bradycardia on HD    Past Medical History:   Diagnosis Date    Arthritis     Asthma     Back pain     BMI 39.0-39.9,adult 2020    CKD (chronic kidney disease) stage 5, GFR less than 15 ml/min 2020    Dehiscence of operative wound 2018    Disorder of kidney and ureter     Glaucoma     Gout     Hyperlipidemia     Hypertension     Obesity     BMI 39    Pott's disease (spinal tuberculosis)     Sarcoidosis     Sarcoidosis     Secondary hyperparathyroidism of renal origin 2020    Vitritis     Vitritis        Past Surgical History:   Procedure Laterality Date    BACK SURGERY  2018    Laminectomy.     BACK SURGERY      CATARACT EXTRACTION Bilateral      SECTION  ,  1977      x2    HYSTERECTOMY      uterus/cervix d/t uterine fibroids.        Review of patient's allergies indicates:   Allergen Reactions    Prednisone Hives and Itching     Pills only, can take if she needs to    Naproxen Rash       No current facility-administered medications on file prior to encounter.      Current Outpatient Medications on File Prior to Encounter   Medication Sig    albuterol (PROAIR HFA) 90 mcg/actuation inhaler Inhale 2 puffs into the lungs every 6 (six) hours as needed for Wheezing. Rescue    amLODIPine (NORVASC) 10 MG tablet Take 1 tablet (10 mg total) by mouth once daily.    carvedilol (COREG) 25 MG tablet Take 1 tablet (25 mg total) by mouth 2 (two) times daily with meals.    furosemide (LASIX) 40 MG tablet Take 1 tablet (40 mg total) by mouth 2 (two) times daily. (Patient taking differently: Take 40 mg by mouth once daily. )    hydrALAZINE (APRESOLINE) 50 MG tablet  Take 1 tablet (50 mg total) by mouth 3 (three) times daily. (Patient taking differently: Take 50 mg by mouth once daily. )    HYDROcodone-acetaminophen (NORCO)  mg per tablet Take 1 tablet by mouth every 12 (twelve) hours as needed for Pain.    LIPITOR 40 mg tablet Take 1 tablet (40 mg total) by mouth once daily.    tiZANidine (ZANAFLEX) 4 MG tablet Take 4 mg by mouth every 8 (eight) hours as needed.     folic acid (FOLVITE) 1 MG tablet Take 1 tablet (1 mg total) by mouth once daily.    hydrOXYzine pamoate (VISTARIL) 50 MG Cap Take 1 capsule (50 mg total) by mouth every evening.    traZODone (DESYREL) 100 MG tablet Take 100 mg by mouth nightly as needed.      Family History     Problem Relation (Age of Onset)    Asthma Brother    Diabetes Mother, Father    Hypertension Brother    Kidney disease Mother, Father    No Known Problems Sister, Sister, Sister        Tobacco Use    Smoking status: Former Smoker     Packs/day: 1.00     Years: 35.00     Pack years: 35.00     Types: Cigarettes     Start date: 1967     Quit date: 1994     Years since quittin.6    Smokeless tobacco: Never Used   Substance and Sexual Activity    Alcohol use: No     Alcohol/week: 0.0 standard drinks    Drug use: No    Sexual activity: Yes     Partners: Male     Birth control/protection: Post-menopausal, See Surgical Hx     Review of Systems   Unable to perform ROS: mental status change     Objective:     Vital Signs (Most Recent):  Temp: 96.1 °F (35.6 °C) (20 1130)  Pulse: 62 (20 1245)  Resp: 16 (20 1245)  BP: 127/72 (20 1245)  SpO2: 98 % (20 1245) Vital Signs (24h Range):  Temp:  [96.1 °F (35.6 °C)-98.8 °F (37.1 °C)] 96.1 °F (35.6 °C)  Pulse:  [32-87] 62  Resp:  [13-31] 16  SpO2:  [81 %-100 %] 98 %  BP: ()/() 127/72     Weight: 96.2 kg (212 lb)  Body mass index is 35.28 kg/m².    SpO2: 98 %  O2 Device (Oxygen Therapy): High Flow nasal Cannula      Intake/Output Summary  (Last 24 hours) at 7/29/2020 1311  Last data filed at 7/29/2020 0900  Gross per 24 hour   Intake 650 ml   Output 2075 ml   Net -1425 ml       Lines/Drains/Airways     Central Venous Catheter Line                 Hemodialysis Catheter 07/27/20 right internal jugular 2 days          Drain                 Urethral Catheter 07/29/20 0111 less than 1 day                Physical Exam   Constitutional: She appears well-developed and well-nourished. She appears ill. No distress.   HENT:   Head: Atraumatic.   Neck: JVD present.   Cardiovascular: Normal rate and regular rhythm.   Murmur heard.  Pulmonary/Chest: Effort normal. She has wheezes. She has no rales.   Abdominal: Soft. Bowel sounds are normal.   Musculoskeletal:         General: Edema present.   Neurological: She is alert.   Skin: Skin is warm and dry. She is not diaphoretic.       Significant Labs:   ABG:   Recent Labs   Lab 07/29/20  1022   PH 7.276*   PCO2 47.2*   HCO3 21.9*   POCSATURATED 96   BE -5   , Blood Culture: No results for input(s): LABBLOO in the last 48 hours., BMP:   Recent Labs   Lab 07/28/20  0432 07/29/20  0432 07/29/20  1031   GLU 84 98 119*    137 136   K 3.4* 4.0 3.7    106 106   CO2 24 22* 24   BUN 48* 33* 32*   CREATININE 5.1* 4.1* 4.0*   CALCIUM 6.6* 7.4* 7.1*   MG 1.7 1.7 1.7   , CMP   Recent Labs   Lab 07/28/20  0432 07/29/20  0432 07/29/20  1031    137 136   K 3.4* 4.0 3.7    106 106   CO2 24 22* 24   GLU 84 98 119*   BUN 48* 33* 32*   CREATININE 5.1* 4.1* 4.0*   CALCIUM 6.6* 7.4* 7.1*   PROT  --   --  7.8   ALBUMIN  --   --  2.1*   BILITOT  --   --  0.2   ALKPHOS  --   --  110   AST  --   --  24   ALT  --   --  12   ANIONGAP 13 9 6*   ESTGFRAFRICA 9* 12* 13*   EGFRNONAA 8* 11* 11*   , CBC   Recent Labs   Lab 07/29/20  0242 07/29/20  0432 07/29/20  1031   WBC 20.23* 20.62* 20.91*   HGB 7.1* 7.4* 6.8*   HCT 23.4* 25.1* 22.7*    291 246   , INR No results for input(s): INR, PROTIME in the last 48 hours.,  Lipid Panel No results for input(s): CHOL, HDL, LDLCALC, TRIG, CHOLHDL in the last 48 hours., Troponin   Recent Labs   Lab 07/29/20  1031   TROPONINI 0.172*    and All pertinent lab results from the last 24 hours have been reviewed.    Significant Imaging: Echocardiogram:   Transthoracic echo (TTE) complete (Cupid Only):   Results for orders placed or performed during the hospital encounter of 07/26/20   Echo Color Flow Doppler? Yes   Result Value Ref Range    Ascending aorta 3.02 cm    STJ 3.03 cm    AV mean gradient 8 mmHg    Ao peak arias 1.75 m/s    Ao VTI 34.82 cm    IVRT 134.16 msec    IVS 1.29 (A) 0.6 - 1.1 cm    LA size 3.86 cm    Left Atrium Major Axis 5.14 cm    Left Atrium Minor Axis 5.08 cm    LVIDD 4.53 3.5 - 6.0 cm    LVIDS 3.39 2.1 - 4.0 cm    LVOT diameter 2.10 cm    LVOT peak VTI 18.31 cm    PW 1.19 (A) 0.6 - 1.1 cm    MV Peak A Arias 0.71 m/s    E wave decelartion time 285.05 msec    MV Peak E Arias 0.70 m/s    PV Peak D Arias 0.26 m/s    PV Peak S Arias 0.35 m/s    RA Major Axis 4.88 cm    RA Width 4.44 cm    RVDD 3.51 cm    TAPSE 2.63 cm    TR Max Arias 2.85 m/s    TDI LATERAL 0.03 m/s    TDI SEPTAL 0.04 m/s    LA WIDTH 3.92 cm    Ao root annulus 3.33 cm    MV stenosis pressure 1/2 time 82.66 ms    LV Diastolic Volume 93.84 mL    LV Systolic Volume 47.26 mL    LVOT peak arias 0.94 m/s    LV LATERAL E/E' RATIO 23.33 m/s    LV SEPTAL E/E' RATIO 17.50 m/s    FS 25 %    LA volume 65.72 cm3    LV mass 209.86 g    Left Ventricle Relative Wall Thickness 0.53 cm    AV valve area 1.82 cm2    AV Velocity Ratio 0.54     AV index (prosthetic) 0.53     MV valve area p 1/2 method 2.66 cm2    E/A ratio 0.99     Mean e' 0.04 m/s    Pulm vein S/D ratio 1.35     LVOT area 3.5 cm2    LVOT stroke volume 63.39 cm3    AV peak gradient 12 mmHg    E/E' ratio 20.00 m/s    LV Systolic Volume Index 23.3 mL/m2    LV Diastolic Volume Index 46.28 mL/m2    LA Volume Index 32.4 mL/m2    LV Mass Index 103 g/m2    Triscuspid Valve  Regurgitation Peak Gradient 32 mmHg    BSA 2.1 m2    Narrative    · Low normal left ventricular systolic function. The estimated ejection   fraction is 50-55%.  · Mild concentric left ventricular hypertrophy.  · Grade II (moderate) left ventricular diastolic dysfunction consistent   with pseudonormalization.  · Moderate right ventricular enlargement.  · Normal right ventricular systolic function.  · Moderate right atrial enlargement.  · Mild tricuspid regurgitation.        Assessment and Plan:     Elevated troponin  Initial troponin 0.12 with trend down to 0.075, now back up to 0.17 s/p CODE this am  Continue to trend with EKG  EKG SR without definitive ischemic changes- very poor tracing  No reports of chest pain prior to event this AM  Likely demand in setting of ESRD and possible sepsis      Essential hypertension  Periods of hypotension recorded during CODE stroke this AM in setting of acute blood loss anemia  Mildly hypotensive during CODE blue this am with SBP recorded in the 90s  BP 110s-120s  Would not be opposed to holding antihypertensives given concern for sepsis and possible CODE this AM, Hydralazine 10 mg IV PRN SBP >170     Diastolic dysfunction/ Possible symptomatic bradycardia   LVEF 50-55% with DD, no WMA- unchanged from previous   Presented anuric, now on HD with plans for PD cath placement on 07/30/2020     ~30 minute lapse of tele recording while off the unit in HD, no strips on chart from CODE, No ZOLL strips left in HD     Given the lack of objective evidence of bradycardia this am and lack of documentation recommend continued ICU monitoring with continuous ECG monitoring  Atropine at bedside in the event she has symptomatic bradycardia overnight     It appears patient may be septic with WBC 21K and hypothermic. Primary team culturing and covering with BSABX     Hyperlipidemia  Continue statin         VTE Risk Mitigation (From admission, onward)         Ordered     heparin, porcine (PF)  (heparin flush 100 units/mL) 100 unit/mL injection flush      07/29/20 0006     heparin (porcine) injection 2,500 Units  As needed (PRN)      07/28/20 1326     IP VTE HIGH RISK PATIENT  Once      07/27/20 0003     Place sequential compression device  Until discontinued      07/27/20 0003                Thank you for your consult. I will follow-up with patient. Please contact us if you have any additional questions.    Joshua Connell NP  Cardiology   [facility] ICU 5th Floor

## 2020-07-29 NOTE — PLAN OF CARE
Pt AAO*4. In bed watching TV. No signs of distress noted. Reviewed POC with pt. Verbalized understanding. Later in shift pt became unresponsive with massive blood loss. Transported to ICU given to Yasemin Hardin RN.

## 2020-07-29 NOTE — ASSESSMENT & PLAN NOTE
Periods of hypotension recorded during CODE stroke this AM in setting of acute blood loss anemia  Mildly hypotensive during CODE blue this am with SBP recorded in the 90s  BP 110s-120s  Would not be opposed to holding antihypertensives given concern for sepsis and possible CODE this AM, Hydralazine 10 mg IV PRN SBP >170

## 2020-07-29 NOTE — NURSING
0748- Code Stroke called. Pt is confused to time. Pupils unequal. Went to CT.       0920- Pt has HD ordered for 4 hours. MRI ordered but unable to take pt until 1030. Spoke with Dr Helton,  said to hold MRI for now and send pt to dialysis.

## 2020-07-29 NOTE — SIGNIFICANT EVENT
CODE STROKE DOCUMENTATION  [facility]   PROVIDER LEAD    Code Stroke called: 0748  Time arrived to pt's room: 0749    Reason for Code Stroke as reported by bedside nurse: left pupil smaller size than the right  Time last seen normal (less than or equal to 3 hours = inclusion criteria for tPA): over night per the nurse. The patient was a MET at midnight for AMS and bleeding.    Past Medical History:   Diagnosis Date    Arthritis     Asthma     Back pain     BMI 39.0-39.9,adult 7/14/2020    CKD (chronic kidney disease) stage 5, GFR less than 15 ml/min 7/14/2020    Dehiscence of operative wound 4/1/2018    Disorder of kidney and ureter     Glaucoma     Gout     Hyperlipidemia     Hypertension     Obesity     BMI 39    Pott's disease (spinal tuberculosis)     Sarcoidosis     Sarcoidosis     Secondary hyperparathyroidism of renal origin 7/14/2020    Vitritis     Vitritis        Condition of patient on my arrival: confused. Her left pupil is a 1mm right pupil 3mm. She has good hand  and push pulls. She is able to hold her extremities up on command with no drift.     Blood pressure (SBP > 185 or DBP > 110 excludes for tPA):   BP Readings from Last 3 Encounters:   07/29/20 (!) 145/60   07/26/20 (!) 161/77   07/16/20 (!) 151/74     Pulse Readings from Last 3 Encounters:   07/29/20 66   07/26/20 86   07/16/20 93     Wt Readings from Last 3 Encounters:   07/29/20 96.5 kg (212 lb 11.9 oz)   07/26/20 105.7 kg (233 lb)   07/16/20 101.2 kg (223 lb)       Blood glucose (>400 excludes for tPA): 95    EKG: SR    CT head: No Acute process    PT > 15 seconds is contraindication (Do not collect if don't expect to use tPA): n/a  INR > 1.7 is contraindication (Do not collect if don't expect to use tPA): n/a  Creatinine: 4.1    AM labs reviewed:  - Platelet count (< 100K excludes for tPA): 291    Time of last anticoagulant (< 24 hours excludes for tPA): during HD    Vascular Neurologist via  telemedicine: Dr. MARY Harris    Impression: Mary Carrillo is a 66 yo AAF with pmh of HTN, HLD, diastolic dysfunction, mild aortic valve stenosis, pott's disease, obesity, gout, sarcoidosis, CKD stage 5, asthma and chronic back pain who presented to OSH with complaint of dyspnea and anuria. Pt currently undergoing outpatient workup for initiation of peritoneal dialysis. Initial labs remarkable for Hbg/Hct 7/25, BUN/Cr 66/6.7 previously 46/4.7 on 6/15/20, BNP 1261, , troponin 0.123. CXR consistent with pulmonary edema/CHF.  She received 0.5 inch nitropaste for hypertension and 100 mg furosemide IV. Pt with some hypoxia requiring 3 liters nasal cannula.    Plan:  Because she is a HD patient and receives Heparin during dialysis she is not a candidate for TPA. She was sent for a STAT CT of the head. I called the transfer center and spoke to Dr. Harris. Dr. Harris has reviewed the film and there is no obvious bleed or acute infarct. We are thinking her symptoms are Metabolic but we will complete stroke workup. MRI of the brain ordered STAT.    Code Stroke ended: 0830    Critical care time spent on the evaluation and treatment of severe organ dysfunction, review of pertinent labs and imaging studies, discussions with consulting providers and discussions with patient/family: 30 minutes.    Shikha Cruz NP  [facility]  Pager: [phone]

## 2020-07-29 NOTE — PLAN OF CARE
Plan of care reviewed with patient and daughter Patient and daughter verbalized understanding. Patient is currently on 3.0  liters nasal cannula. No signs/symptoms of distress noted. Telemetry applied. No true red alarms noted. Patient went to dialysis. 1.4L taken off. Vital signs stable. Patient turned every two hours. Bed locked and low, call light within reach, bed alarm on, non skid socks applied, side rails up x2. Instructed to call if needing assistance. Will continue to monitor.

## 2020-07-29 NOTE — CODE/ RAPID DOCUMENTATION
CODE BLUEcalled while patient was in dialysis.  She was about 15 minutes into dialysis. Complained of shortness of breath and was writhing around. HR dropped to 30s. Responded to atropine x 1. VSS  Having difficulty breathing. Pulm was going to intubate but patient awakened and was maintaining sats.  She denies CP and states that she was breathing OK  She knew where she was   Dialysis was stopped and she was moved back to the ICU.  Bedisde ECHO showed thickened RV  Reviewed her ECHO from 4-2020    Check trop  EKG  CXR  Labs  ECHO  US legs

## 2020-07-29 NOTE — SIGNIFICANT EVENT
MET called 2/2 to acute large blood loss from trialysis site, hypotension and altered mental status.  Upon arrival to bedside, large amount of blood and clots draining from trialysis site.  Acute blood loss stopped once trialysis line properly capped.  SBP 90s-80s.  Patient given fluid bolus.  Orders placed for stat H/H and type and screen.  Patient transferred to ICU for closer observation.  Mentation improving upon arrival to ICU and completion of fluid bolus.  Hgb 7.3, unchanged from previous, will continue to closely monitor.  Order placed for CT scan of head once patient stabilizes, will continue to monitor.

## 2020-07-29 NOTE — ASSESSMENT & PLAN NOTE
LVEF 50-55% with DD, no WMA- unchanged from previous   Presented anuric, now on HD with plans for PD cath placement on 07/30/2020     Given the lack of objective evidence of bradycardia this am and lack of documentation recommend continued ICU monitoring with continuous ECG monitoring    It appears patient may be septic with WBC 21K and hypothermic. Primary team culturing and covering with BSABX

## 2020-07-29 NOTE — SIGNIFICANT EVENT
RTs on shift responded to MET called overhead. Patient sat was 91% upon arrival. Blood loss noted and patient placed on NRB able to maintain adequate sats. No other resp orders during MET. Patient transferred to ICU without incident. Ambu bag and mask at bedside.

## 2020-07-29 NOTE — PROGRESS NOTES
[facility] Progress Note  ICU    Admit Date: 7/26/2020   LOS: 3 days     SUBJECTIVE:     Code blue initiated around 10 am after patient was admitted for stroke work up. Patient was sent for HD and at that time a MET was called 2/2 acute large blood loss from trialysis site and hypotensive with AMS. Trialysis line was not properly capped. Fluid was given and blood was drawn for H/H and transferred to the floor. At time of exam she is alert and oriented. She has increased work of breathing with expiratory wheezes and belly breathing. When asked, patient states she feels comfortable. Vital signs are stable on monitor. Otherwise ROS negative.     Continuous Infusions:  Scheduled Meds:   albuterol-ipratropium  3 mL Nebulization Q4H WAKE    amLODIPine  10 mg Oral Daily    atorvastatin  40 mg Oral Daily    carvediloL  6.25 mg Oral BID WM    hydrALAZINE  50 mg Oral Daily    mupirocin   Nasal BID    paricalcitoL  0.1 mcg/kg Intravenous Every Mon, Wed, Fri    piperacillin-tazobactam (ZOSYN) IVPB  4.5 g Intravenous Q12H    polyethylene glycol  17 g Oral Daily    sevelamer carbonate  800 mg Oral TID WM    vancomycin (VANCOCIN) IVPB  15 mg/kg Intravenous Once    vitamin renal formula (B-complex-vitamin c-folic acid)  1 capsule Oral Daily     PRN Meds:sodium chloride 0.9%, sodium chloride 0.9%, acetaminophen, acetaminophen, dextrose 50%, dextrose 50%, epoetin leslye-ebpx (RETACRIT) injection, glucagon (human recombinant), glucose, glucose, heparin (porcine), insulin aspart U-100, iron sucrose, sodium chloride 0.9%, tiZANidine, traZODone, Pharmacy to dose Vancomycin consult **AND** vancomycin - pharmacy to dose    Review of patient's allergies indicates:   Allergen Reactions    Prednisone Hives and Itching     Pills only, can take if she needs to    Naproxen Rash     ROS  Negative other than HPI     OBJECTIVE:     Vital Signs (Most Recent)  Temp: 96.1 °F (35.6 °C) (07/29/20 1130)  Pulse: 61 (07/29/20 1400)  Resp:  (!) 29 (07/29/20 1400)  BP: (!) 105/56 (07/29/20 1400)  SpO2: 100 % (07/29/20 1400)    Vital Signs Range (Last 24H):  Temp:  [96.1 °F (35.6 °C)-98.8 °F (37.1 °C)]   Pulse:  [32-87]   Resp:  [13-31]   BP: ()/()   SpO2:  [81 %-100 %]     Current Diet Order   Procedures    Diet NPO      I & O (Last 24H):    Intake/Output Summary (Last 24 hours) at 7/29/2020 1444  Last data filed at 7/29/2020 1406  Gross per 24 hour   Intake 350 ml   Output 280 ml   Net 70 ml     Wt Readings from Last 3 Encounters:   07/29/20 96.2 kg (212 lb)   07/26/20 105.7 kg (233 lb)   07/16/20 101.2 kg (223 lb)     Ventilator Data (Last 24H):          Hemodynamic Parameters (Last 24H):       Physical Exam   Constitutional: She is oriented to person, place, and time and well-developed, well-nourished, and in no distress. No distress.   HENT:   Head: Normocephalic and atraumatic.   Eyes: Pupils are equal, round, and reactive to light. Conjunctivae and EOM are normal. Right eye exhibits no discharge. Left eye exhibits no discharge. No scleral icterus.   Neck: Normal range of motion. Neck supple. No JVD present. No tracheal deviation present. No thyromegaly present.   Cardiovascular: Normal rate, regular rhythm, normal heart sounds and intact distal pulses.   No murmur heard.  Pulmonary/Chest: Effort normal. No stridor. No respiratory distress. She has wheezes. She has no rales. She exhibits no tenderness.   Abdominal: Soft. Bowel sounds are normal. She exhibits no distension. There is no abdominal tenderness.   Musculoskeletal: Normal range of motion.         General: Edema present. No tenderness.   Lymphadenopathy:     She has no cervical adenopathy.   Neurological: She is alert and oriented to person, place, and time. No cranial nerve deficit. Coordination normal. GCS score is 15.   Skin: Skin is warm and dry. No rash noted. She is not diaphoretic. No erythema.   Psychiatric: Mood and affect normal.   Nursing note and vitals  "reviewed.       Lines/Drains:       Hemodialysis Catheter 07/27/20 right internal jugular (Active)   Verification by X-ray Yes 07/29/20 0835   Site Assessment No drainage;No redness;No swelling;No warmth 07/29/20 1130   Line Securement Device Secured with sutures 07/29/20 1130   Dressing Type Biopatch in place;Central line dressing with pants 07/29/20 1130   Dressing Status Clean;Dry;Intact 07/29/20 1130   Dressing Intervention Integrity maintained 07/29/20 1130   Date on Dressing 07/29/20 07/29/20 1130   Dressing Due to be Changed 08/05/20 07/29/20 1130   Venous Patency/Care heparin locked 07/29/20 1130   Arterial Patency/Care heparin locked 07/29/20 1130   Line Necessity Review CRRT/HD 07/29/20 1130   Number of days: 2            Urethral Catheter 07/29/20 0111 (Active)   Site Assessment Clean;Intact 07/29/20 1130   Collection Container Urimeter 07/29/20 1130   Securement Method secured to top of thigh w/ adhesive device 07/29/20 1130   Catheter Care Performed no 07/29/20 1130   Reason for Continuing Urinary Catheterization Critically ill in ICU requiring intensive monitoring 07/29/20 1130   CAUTI Prevention Bundle StatLock in place w 1" slack;Intact seal between catheter & drainage tubing;Drainage bag/urimeter off the floor;Green sheeting clip in use;No dependent loops or kinks;Drainage bag/urimeter not overfilled (<2/3 full);Drainage bag/urimeter below bladder 07/29/20 1130   Output (mL) 5 mL 07/29/20 1406   Number of days: 0         LABS  CBC  Recent Labs   Lab 07/28/20  0432 07/29/20  0015 07/29/20  0242 07/29/20  0432 07/29/20  1031 07/29/20  1321   WBC 11.83  --  20.23* 20.62* 20.91* 20.75*   RBC 3.04*  --  2.88* 3.04* 2.77* 2.90*   HGB 7.3* 7.3* 7.1* 7.4* 6.8* 7.0*   HCT 24.2* 26.1* 23.4* 25.1* 22.7* 24.0*     --  286 291 246 276   MCV 80*  --  81* 83 82 83   MCH 24.0*  --  24.7* 24.3* 24.5* 24.1*   MCHC 30.2*  --  30.3* 29.5* 30.0* 29.2*       [facility]  Recent Labs   Lab 07/26/20  1410 07/27/20  0508 " 07/28/20  0432 07/29/20  0432 07/29/20  1031    143 140 137 136   K 3.4* 3.7 3.4* 4.0 3.7   CO2 19* 20* 24 22* 24    109 103 106 106   BUN 66* 70* 48* 33* 32*   CREATININE 6.7* 7.0* 5.1* 4.1* 4.0*       CMP  Sodium   Date Value Ref Range Status   07/29/2020 136 136 - 145 mmol/L Final     Potassium   Date Value Ref Range Status   07/29/2020 3.7 3.5 - 5.1 mmol/L Final     Chloride   Date Value Ref Range Status   07/29/2020 106 95 - 110 mmol/L Final     CO2   Date Value Ref Range Status   07/29/2020 24 23 - 29 mmol/L Final     Glucose   Date Value Ref Range Status   07/29/2020 119 (H) 70 - 110 mg/dL Final     BUN, Bld   Date Value Ref Range Status   07/29/2020 32 (H) 8 - 23 mg/dL Final     Creatinine   Date Value Ref Range Status   07/29/2020 4.0 (H) 0.5 - 1.4 mg/dL Final     Calcium   Date Value Ref Range Status   07/29/2020 7.1 (L) 8.7 - 10.5 mg/dL Final     Total Protein   Date Value Ref Range Status   07/29/2020 7.8 6.0 - 8.4 g/dL Final     Albumin   Date Value Ref Range Status   07/29/2020 2.1 (L) 3.5 - 5.2 g/dL Final     Total Bilirubin   Date Value Ref Range Status   07/29/2020 0.2 0.1 - 1.0 mg/dL Final     Comment:     For infants and newborns, interpretation of results should be based  on gestational age, weight and in agreement with clinical  observations.  Premature Infant recommended reference ranges:  Up to 24 hours.............<8.0 mg/dL  Up to 48 hours............<12.0 mg/dL  3-5 days..................<15.0 mg/dL  6-29 days.................<15.0 mg/dL       Alkaline Phosphatase   Date Value Ref Range Status   07/29/2020 110 55 - 135 U/L Final     AST   Date Value Ref Range Status   07/29/2020 24 10 - 40 U/L Final     ALT   Date Value Ref Range Status   07/29/2020 12 10 - 44 U/L Final     Anion Gap   Date Value Ref Range Status   07/29/2020 6 (L) 8 - 16 mmol/L Final     eGFR if    Date Value Ref Range Status   07/29/2020 13 (A) >60 mL/min/1.73 m^2 Final     eGFR if non African  American   Date Value Ref Range Status   07/29/2020 11 (A) >60 mL/min/1.73 m^2 Final     Comment:     Calculation used to obtain the estimated glomerular filtration  rate (eGFR) is the CKD-EPI equation.            POCT-Glucose  POCT Glucose   Date Value Ref Range Status   07/29/2020 136 (H) 70 - 110 mg/dL Final   07/29/2020 95 70 - 110 mg/dL Final   07/29/2020 179 (H) 70 - 110 mg/dL Final   07/28/2020 108 70 - 110 mg/dL Final     Recent Labs   Lab 07/26/20  1410 07/27/20  0508 07/28/20  0432 07/29/20  0432 07/29/20  1031   CALCIUM 5.7* 5.6* 6.6* 7.4* 7.1*   MG  --  1.7 1.7 1.7 1.7   PHOS  --  6.5* 4.9* 4.9*  --          LFT  Recent Labs   Lab 07/26/20  1410 07/29/20  1031   PROT 9.5* 7.8   ALBUMIN 2.6* 2.1*   BILITOT 0.1 0.2   AST 16 24   ALKPHOS 121 110   ALT 12 12         COAGS  Recent Labs   Lab 07/26/20  1410 07/27/20  0508   INR 1.1 1.1   APTT 40.5*  --        CE  Recent Labs   Lab 07/26/20  1410 07/27/20  0709 07/27/20  1234 07/29/20  1031 07/29/20  1321   TROPONINI 0.123* 0.088* 0.075* 0.172* 0.184*     BNP  Recent Labs   Lab 07/26/20  1410   BNP 1,261*       ABGs  Recent Labs   Lab 07/29/20  1022   PH 7.276*   PCO2 47.2*   PO2 95   HCO3 21.9*   POCSATURATED 96   BE -5       UA  Recent Labs   Lab 07/29/20  1335   COLORU Yellow   SPECGRAV >=1.030*   PHUR 6.0   PROTEINUA 2+*   BACTERIA None     LAST HbA1c  Lab Results   Component Value Date    HGBA1C 5.5 10/08/2019               Microbiology Results (last 7 days)     Procedure Component Value Units Date/Time    Blood culture [[id number]] Collected: 07/29/20 1323    Order Status: Sent Specimen: Blood Updated: 07/29/20 1324    Blood culture [[id number]] Collected: 07/29/20 1322    Order Status: Sent Specimen: Blood Updated: 07/29/20 1324    Blood culture [[id number]] Collected: 07/29/20 0432    Order Status: Sent Specimen: Blood Updated: 07/29/20 0719              ASSESSMENT/PLAN:       Mary Carrillo is a 65 y.o. female with extensive pmh who presented with  Hypervolemia associated with renal insufficiency.     Diagnoses of Hypervolemia associated with renal insufficiency, Chest pain, and Shortness of breath were pertinent to this visit.    Problem list  1. Hypervolemia associated with renal insufficiency    2. Chest pain    3. Shortness of breath      NEURO-PSYCH  No acute issues at this time  Continue to monitor    Cardiovascular  Evidenced by history, elevated BNP, pulmonary edema, peripheral edema.  Patient is now on HD  Acute blood loss anemia 2/2 trialysis line improperly closed  H/H with slight drop, repeat stable  Daily Weights  Strict I/O  Fluid restriction to 1,500cc daily  Low-sodium cardiac diet once she is no longer NPO  Reassessing  volume status regularly  Oxygen PRN to keep O2 > 88%  Obtain 2D echo   Chest X-ray: cardiomegaly with pulmonary vascular congestion and bibasilar edema consistent with physical exam    Continue HD for volume overload   Check TSH, albumin, UA       Pulmonary  Oxygen PRN to keep O2 > 88%  Chest X-ray: cardiomegaly with bibasilar edema     RENAL  Patient recently initiated HD   Nephrology consulted   Strict I&Os  Avoid renal toxic meds    FEN-GI  Diet: NPO  Fluid restriction   Keep Mg 2, K 4  Zofran prn for n/v     Endocrine  Maintain glucose 140-180  SSI  HgbA1C:normal in Oct 2019, recommend repeating   TSH:normal 2017    Hematology  Patient had acute large blood loss anemia 2/2 to improperly closed trialysis line   Repeat Hemoglobin 7.0, from 7.1 at admit   Continue to monitor   Vital signs are stable, and reassuring     Infectious Disease  Cxs are NGTD.   Continue ABX.  Urinalysis: trace leukocytes with 2+ protein   Blood culture ordered  urine culture ordered    DVT PPx: SCD's   Diet: NPO  GI PPX: IV Protonix    Dispo: Continue ICU level care       Case discussed with MD Elena Lowery MD   [facility]  07/29/2020 2:45 PM

## 2020-07-29 NOTE — CODE/ RAPID DOCUMENTATION
Code Blue initiated at 1006 AM.  Pt was in the Dialysis unit when she began to C/O of SOB after 15 minutes of dialysis.  Pt heart rate decreased from the 60's down to 32 bpm.  Pt was placed on a bed board to help with breathing.      Pt's vital on arrival to Code Blue  Blood pressure 133/62  Heart rate 82  Sats 88%    Atropine 1mg IV given at 1009 AM    Vitals after Atropine:  Blood pressure 127/61  Heart Rate 80  Sats 88%  CBG 0136    Pt was bagged with AMBU bag at 100% oxygen, then placed on NRB.  ABG was completed at 1016.  Pt transferred back in to her room in  ICU unit at 1025 AM.

## 2020-07-29 NOTE — CODE/ RAPID DOCUMENTATION
Code Stroke initiated on unit at 0750    Brought Code Stroke Computer up to patient room.  Patient already in route to CT.  Pt arrived back in unit at 0805.  Pt siting up and answering questions appropriately.  Pt's right pupil was reactive to light.  Left pupil was pinpoint.  Pt reports that she had cataract surgery on right eye.  No slurring on speech when answering questions.

## 2020-07-29 NOTE — SUBJECTIVE & OBJECTIVE
Interval History:     ICU Rounds  Follow up from [facility] this AM  She is resting quietly . Denies any CP, SOB  Remains on NC oxygen- 8L high flow, and is having some abdominal breathing.             Review of Systems   Unable to perform ROS: Acuity of condition     Objective:     Vital Signs (Most Recent):  Temp: 96.1 °F (35.6 °C) (07/29/20 1130)  Pulse: 66 (07/29/20 1145)  Resp: 18 (07/29/20 1145)  BP: (!) 128/58 (07/29/20 1145)  SpO2: 97 % (07/29/20 1145) Vital Signs (24h Range):  Temp:  [96.1 °F (35.6 °C)-98.8 °F (37.1 °C)] 96.1 °F (35.6 °C)  Pulse:  [32-87] 66  Resp:  [14-31] 18  SpO2:  [81 %-100 %] 97 %  BP: ()/() 128/58     Weight: 96.5 kg (212 lb 11.9 oz)  Body mass index is 35.4 kg/m².    Intake/Output Summary (Last 24 hours) at 7/29/2020 1159  Last data filed at 7/29/2020 0900  Gross per 24 hour   Intake 650 ml   Output 2075 ml   Net -1425 ml      Physical Exam  Vitals signs and nursing note reviewed.   Constitutional:       Comments: Semi awake.  Much more comfortable.  She has the high-flow nasal cannula in place.  He is having some abdominal breathing.   Neck:      Musculoskeletal: Normal range of motion and neck supple.   Cardiovascular:      Rate and Rhythm: Normal rate and regular rhythm.   Pulmonary:      Breath sounds: Wheezing (Scattered) present.   Abdominal:      General: Bowel sounds are normal.      Palpations: Abdomen is soft.      Tenderness: There is no abdominal tenderness.   Musculoskeletal:         General: No swelling.   Skin:     General: Skin is warm and dry.   Neurological:      General: No focal deficit present.         Significant Labs:   ABGs:   Recent Labs   Lab 07/29/20  1022   PH 7.276*   PCO2 47.2*   HCO3 21.9*   POCSATURATED 96   BE -5     Blood Culture:   Recent Labs   Lab 07/29/20  0432   LABBLOO No Growth to date     BMP:   Recent Labs   Lab 07/29/20  1031   *      K 3.7      CO2 24   BUN 32*   CREATININE 4.0*   CALCIUM 7.1*   MG 1.7      CBC:   Recent Labs   Lab 07/29/20  0432 07/29/20  1031 07/29/20  1321   WBC 20.62* 20.91* 20.75*   HGB 7.4* 6.8* 7.0*   HCT 25.1* 22.7* 24.0*    246 276     CMP:   Recent Labs   Lab 07/28/20  0432 07/29/20  0432 07/29/20  1031    137 136   K 3.4* 4.0 3.7    106 106   CO2 24 22* 24   GLU 84 98 119*   BUN 48* 33* 32*   CREATININE 5.1* 4.1* 4.0*   CALCIUM 6.6* 7.4* 7.1*   PROT  --   --  7.8   ALBUMIN  --   --  2.1*   BILITOT  --   --  0.2   ALKPHOS  --   --  110   AST  --   --  24   ALT  --   --  12   ANIONGAP 13 9 6*   EGFRNONAA 8* 11* 11*     Cardiac Markers: No results for input(s): CKMB, MYOGLOBIN, BNP, TROPISTAT in the last 48 hours.  Lactic Acid:   Recent Labs   Lab 07/29/20  1322   LACTATE 0.7     Magnesium:   Recent Labs   Lab 07/28/20  0432 07/29/20  0432 07/29/20  1031   MG 1.7 1.7 1.7     Troponin:   Recent Labs   Lab 07/29/20  1031 07/29/20  1321   TROPONINI 0.172* 0.184*     Urine Culture: No results for input(s): LABURIN in the last 48 hours.  Urine Studies:   Recent Labs   Lab 07/29/20  1335   COLORU Yellow   APPEARANCEUA Clear   PHUR 6.0   SPECGRAV >=1.030*   PROTEINUA 2+*   GLUCUA Negative   KETONESU Negative   BILIRUBINUA Negative   OCCULTUA 1+*   NITRITE Negative   UROBILINOGEN Negative   LEUKOCYTESUR Trace*   RBCUA 4   WBCUA 4   BACTERIA None   HYALINECASTS 1       Significant Imaging: I have reviewed all pertinent imaging results/findings within the past 24 hours.         US Lower Extremity Veins Bilateral  Narrative: EXAMINATION:  US LOWER EXTREMITY VEINS BILATERAL    CLINICAL HISTORY:  r/o DVT;    TECHNIQUE:  Duplex and color flow Doppler and dynamic compression was performed of the bilateral lower extremity veins was performed.    COMPARISON:  None    FINDINGS:  Right thigh veins: The common femoral, femoral, popliteal, upper greater saphenous, and deep femoral veins are patent and free of thrombus. The veins are normally compressible and have normal phasic flow and  augmentation response.    Right calf veins: The visualized calf veins are patent.    Left thigh veins: The common femoral, femoral, popliteal, upper greater saphenous, and deep femoral veins are patent and free of thrombus. The veins are normally compressible and have normal phasic flow and augmentation response.    Left calf veins: The visualized calf veins are patent.    Miscellaneous: None  Impression: No evidence of deep venous thrombosis in either lower extremity.    Electronically signed by: Av Jose MD  Date:    07/29/2020  Time:    11:47  X-Ray Chest AP Portable  Narrative: EXAMINATION:  XR CHEST AP PORTABLE    CLINICAL HISTORY:  chest pain;    TECHNIQUE:  Single frontal view of the chest was performed.    COMPARISON:  No 07/29/2020 ne    FINDINGS:  Postoperative changes in the spine as before.  Central venous catheter in the SVC.  Mild cardiomegaly pulmonary vascular congestion and bibasilar edema.  No significant pleural effusion  Impression: See above    Electronically signed by: Rebel Magallanes MD  Date:    07/29/2020  Time:    10:52  X-Ray Chest AP Portable  Narrative: EXAMINATION:  XR CHEST AP PORTABLE    CLINICAL HISTORY:  dyspnea;    TECHNIQUE:  Single AP view of the chest.  Patient position and levo rotation, limiting evaluation.    COMPARISON:  Chest radiograph 07/27/2020    FINDINGS:  Right internal jugular vein approach NTHDC is stable.  No pneumothorax.    Enlargement of the cardiac silhouette which may reflect cardiomegaly and/or pericardial effusion, not significantly changed.  Mild widening of the mediastinal silhouette which likely is artifactual related to suboptimal patient positioning however, this cannot be confirmed by current imaging.    Severe pulmonary vascular congestion and suspected pulmonary interstitial edema as well as small bilateral layering pleural effusions with overlying atelectasis and/or pneumonia.    Multilevel degenerative changes of the imaged spine.  Impression:  1. Enlargement of the cardiac silhouette and severe pulmonary vascular congestion.  I suspect associated pulmonary interstitial edema and small bilateral layering pleural effusions with overlying atelectasis and/or pneumonia.    Electronically signed by: Garcia Machado  Date:    07/29/2020  Time:    09:26  CT Head Without Contrast  Narrative: EXAMINATION:  CT HEAD WITHOUT CONTRAST    CLINICAL HISTORY:  code stroke;    TECHNIQUE:  Low dose axial CT images obtained throughout the head without intravenous contrast. Sagittal and coronal reconstructions were performed.    COMPARISON:  CT head without contrast dated 03/18/2018    FINDINGS:  No evidence for acute intracranial hemorrhage, midline shift, mass effect, or extra-axial collection. Scattered white matter hypoattenuation with predominant subcortical distribution which appears slightly more conspicuous from 2018.  Possible additional area of hypoattenuation involving the left cerebellar lobe, though degree of streak artifact limits assessment.  Ventricles are stable in size.  Incidental expanded, partially empty sella configuration, similar.    No acute calvarial abnormality.  Right sphenoid mucosal thickening.  Remaining paranasal sinuses and bilateral mastoid air cells are clear.  Impression: Scattered white matter hypoattenuation with predominant subcortical distribution, also potentially involving the left cerebellar lobe, more conspicuous from 2018.  Overall findings are nonspecific to include sequela of chronic microangiopathic change.  Clinical correlation and further evaluation as warranted.    Electronically signed by: Lavelle Motta  Date:    07/29/2020  Time:    08:48

## 2020-07-30 NOTE — ASSESSMENT & PLAN NOTE
No evidence of ACS, no evidence of pulmonary embolus. Likely from volume overload and   Possible sepsis.  Wean oxygen as tolerated. Improving

## 2020-07-30 NOTE — PLAN OF CARE
Problem: SLP Goal  Goal: SLP Goal  Description: STGs:  1. Pt will participate in clinical swallow assessment to determine LRD.-ongoing  2. Pt will tolerate a Mechanical Soft/Thin liquid diet without any overt s/s of aspiration.  3. Pt will participate in cognitive-linguistic assessment to determine baseline function.-ongoing  4. Pt will orient x4.  5. Pt will recall salient personal information after a short delay with 90% recall.  6. Pt will follow complex commands with 100% acc.  7. Pt will complete math reasoning tasks with 75% acc.  Outcome: Ongoing, Progressing  7/30/2020: Bedside swallow study and speech-language exams completed. REC: Mechanical Soft/Thin liquid diet; meds whole 1 at a time. Oxygen needed during intake. Mild-moderate cognitive-linguistic deficits present. Cont'd SLP tx needed while inpatient and at next level of care. SLP to f/u.

## 2020-07-30 NOTE — PROGRESS NOTES
[facility] ICU 5th Floor  Hospital Medicine  Progress Note    Patient Name: Mary Carrillo  MRN: [id number]  Patient Class: IP- Inpatient   Admission Date: 7/26/2020  Length of Stay: 4 days  Attending Physician: Ronna Motta MD  Primary Care Provider: Jose Khan MD        Subjective:     Principal Problem:Hypervolemia associated with renal insufficiency        HPI:  Mary Carrillo is a 64 yo AAF with pmh of HTN, HLD, diastolic dysfunction, mild aortic valve stenosis, pott's disease, obesity, gout, sarcoidosis, CKD stage 5, asthma and chronic back pain who presented to OSH with complaint of dyspnea and anuria. Pt currently undergoing outpatient workup for initiation of peritoneal dialysis. Initial labs remarkable for Hbg/Hct 7/25, BUN/Cr 66/6.7 previously 46/4.7 on 6/15/20, BNP 1261, , troponin 0.123. CXR consistent with pulmonary edema/CHF.  She received 0.5 inch nitropaste for hypertension and 100 mg furosemide IV. Pt with some hypoxia requiring 3 liters nasal cannula. She denies chest pain, abdominal pain, n/v and fever/chills. COVID negative. Pt admitted to [facility] hospital medicine.     Overview/Hospital Course:  No notes on file    Interval History:   ICU rounds  Patient states that she is feeling a lot better  Denies any CP or shortness of breath  She denies Nausea or diarrhea  She is NPO  Will proceed with swallow study  She is getting one unit of blood transfused at this time      Review of Systems   Constitutional: Negative for chills and fever.   HENT: Negative for sore throat.    Eyes: Negative for visual disturbance.   Respiratory: Negative for shortness of breath.    Cardiovascular: Negative for chest pain.   Gastrointestinal: Negative for abdominal pain, diarrhea and nausea.   Genitourinary: Negative for pelvic pain.   Musculoskeletal: Negative for back pain.   Skin: Negative for rash.   Neurological: Negative for tremors, facial asymmetry, numbness and headaches.      Objective:     Vital Signs (Most Recent):  Temp: 99.1 °F (37.3 °C) (07/30/20 0915)  Pulse: 67 (07/30/20 0915)  Resp: 16 (07/30/20 0915)  BP: (!) 120/55 (07/30/20 0915)  SpO2: 98 % (07/30/20 0915) Vital Signs (24h Range):  Temp:  [96.1 °F (35.6 °C)-99.4 °F (37.4 °C)] 99.1 °F (37.3 °C)  Pulse:  [32-77] 67  Resp:  [13-30] 16  SpO2:  [60 %-100 %] 98 %  BP: ()/() 120/55     Weight: 96.2 kg (212 lb)  Body mass index is 35.28 kg/m².    Intake/Output Summary (Last 24 hours) at 7/30/2020 0947  Last data filed at 7/30/2020 0600  Gross per 24 hour   Intake 625 ml   Output 120 ml   Net 505 ml      Physical Exam  Vitals signs and nursing note reviewed.   Constitutional:       General: She is not in acute distress.     Comments: Slight amount of abdominal breathing pattern   HENT:      Head: Normocephalic and atraumatic.      Nose: Nose normal.      Mouth/Throat:      Pharynx: Oropharynx is clear.   Eyes:      Conjunctiva/sclera: Conjunctivae normal.      Comments: Pupils assymetric. Chronic   Neck:      Musculoskeletal: Normal range of motion and neck supple.   Cardiovascular:      Rate and Rhythm: Normal rate and regular rhythm.      Heart sounds: Murmur (2/6 systolic) present.   Pulmonary:      Effort: No respiratory distress.      Breath sounds: Normal breath sounds. No wheezing or rales.   Abdominal:      General: Bowel sounds are normal. There is no distension.      Palpations: Abdomen is soft.      Tenderness: There is no abdominal tenderness.   Musculoskeletal:         General: No swelling.   Skin:     General: Skin is warm and dry.   Neurological:      General: No focal deficit present.      Mental Status: She is alert.   Psychiatric:         Mood and Affect: Mood normal.         Behavior: Behavior normal.         Significant Labs:   BMP:   Recent Labs   Lab 07/30/20  0502   GLU 90  90     137   K 3.9  3.9     105   CO2 21*  21*   BUN 41*  41*   CREATININE 4.9*  4.9*   CALCIUM 6.8*   6.8*   MG 2.1  2.1     CBC:   Recent Labs   Lab 07/29/20  1031 07/29/20  1321 07/30/20  0502   WBC 20.91* 20.75* 14.40*  14.40*   HGB 6.8* 7.0* 6.2*  6.2*   HCT 22.7* 24.0* 20.5*  20.5*    276 250  250       Significant Imaging: I have reviewed all pertinent imaging results/findings within the past 24 hours.      Assessment/Plan:      Acute CVA (cerebrovascular accident)  MRI reviewed.  Does not have any focal deficits.  Will consult neurology.  She needs APA  She has allergy to Naprosyn.  Will begin plavix. Not on BP meds   Will add PPI sonce she is anemai  Will need carotid imaging for completeness.  PT/OT  Swallow screen.    Patient has multiple risk factors for CVA. This was clinically silent.  Pupillary issues are related to surgery. Decreased LOC  Was likely realated to acute blood loss and low BP.  She has no deficits at this time.    Await neurology evaluation      Acute respiratory failure with hypoxia  No evidence of ACS, no evidence of pulmonary embolus. Likely from volume overload and   Possible sepsis.  Wean oxygen as tolerated. Improving    Metabolic acidosis  I suspect that she may been getting  septic.  I have ordered cultures.  Chest x-ray reviewed.  Started on Zosyn and vancomycin.  Lactic acid level is normal.  Procalcitonin is mildly elevated.  Cultures so far are no growth    WBC is down.  Possibly de-escalate abx tomorrow      Elevated troponin  No evidence of ACS. Flat bump. Cardiology reviewed ECHO. No further work up      Essential hypertension  Blood pressure is adequate. Sh is anemic    Home meds: amlodipine 10 mg daily, carvedilol 25 mg BID and hydralazine 50 mg daily         Anemia of chronic disease  With dialysis  venofer 100  EPogen 5K with each HD    Now with superimposed acute blood loss anemia.  Will transfuse one unit.      CKD (chronic kidney disease) stage 5, GFR less than 15 ml/min  Now with progression to end-stage.   Appreciate nephrology input  HD x 2.  Ultimate  goal was to get a peritoneal dialysis catheter.    [facility]- No PD catheter until more stable   PD catheter will be placed on Monday. Cleared from cardiac standpoint    Case DW Dr Harman  Will decide after evaluation if patient needs to have dialysis today      Diastolic dysfunction   Echo 4/16/20 LVEF 55-60%, mild AS and grade 2 diastolic dysfunction         Pott's disease (spinal tuberculosis)  S/p T12-L1 corpectomy with T10-L3 fusion on 3/18/18 due to Pott's disease. Per chart review patient treated with prolonged protocol per ID. No acute issues.        Hyperlipidemia  Add Lipitor 10 mg. At home 40 mg. Will increase.          Sarcoidosis  Chronic       Gout, chronic  No acute issues         VTE Risk Mitigation (From admission, onward)         Ordered     IP VTE HIGH RISK PATIENT  Once      07/29/20 1816     Place sequential compression device  Until discontinued      07/29/20 1816     heparin, porcine (PF) (heparin flush 100 units/mL) 100 unit/mL injection flush      07/29/20 0006     heparin (porcine) injection 2,500 Units  As needed (PRN)      07/28/20 1326     Place sequential compression device  Until discontinued      07/27/20 0003              Critical care time spent on the evaluation and treatment of severe organ dysfunction, review of pertinent labs and imaging studies, discussions with consulting providers, nursing staff  and discussions with patient/family: 45 minutes.  DVT prophylaxis TBD.    Ronna Motta MD  Department of Hospital Medicine   [facility] ICU 5th Floor

## 2020-07-30 NOTE — ASSESSMENT & PLAN NOTE
No evidence of ACS, no evidence of pulmonary embolus but could consider further workup if she does not improve.  Likely still related to volume overload.  Will likely get dialysis tomorrow.  May also be related to sepsis.  Continue high-flow oxygen and breathing treatments.  Will wean as tolerated.  Appreciate input from Pulmonary Critical Care which

## 2020-07-30 NOTE — PT/OT/SLP EVAL
Speech Language Pathology Evaluation  Cognitive/Bedside Swallow    Patient Name:  Mary Carrillo   MRN:  [id number]  Admitting Diagnosis: Hypervolemia associated with renal insufficiency    Recommendations:                  General Recommendations:  Dysphagia therapy and Cognitive-linguistic therapy  Diet recommendations:  Mechanical soft, Thin   Aspiration Precautions: slow rate of intake, 1 bite/sip at a time, Alternating bites/sips, Assistance with meals, Avoid talking while eating, Check for pocketing/oral residue, Eliminate distractions, Feed only when awake/alert, HOB to 90 degrees, Meds whole 1 at a time, Monitor for s/s of aspiration, Remain upright 30 minutes post meal, Small bites/sips, Standard aspiration precautions and Wear oxygen during intake   General Precautions: Standard, aspiration, respiratory, fall  Communication strategies:  provide increased time to answer, go to room if call light pushed and re-orient daily     History:   HPI: Mary Carrillo is a 66 yo AAF with pmh of HTN, HLD, diastolic dysfunction, mild aortic valve stenosis, pott's disease, obesity, gout, sarcoidosis, CKD stage 5, asthma and chronic back pain who presented to OSH with complaint of dyspnea and anuria. Pt currently undergoing outpatient workup for initiation of peritoneal dialysis. Initial labs remarkable for Hbg/Hct 7/25, BUN/Cr 66/6.7 previously 46/4.7 on 6/15/20, BNP 1261, , troponin 0.123. CXR consistent with pulmonary edema/CHF.  She received 0.5 inch nitropaste for hypertension and 100 mg furosemide IV. Pt with some hypoxia requiring 3 liters nasal cannula. She denies chest pain, abdominal pain, n/v and fever/chills. COVID negative. Pt admitted to [facility].      Past Medical History:   Diagnosis Date    Arthritis     Asthma     Back pain     BMI 39.0-39.9,adult 7/14/2020    CKD (chronic kidney disease) stage 5, GFR less than 15 ml/min 7/14/2020    Dehiscence of operative wound 4/1/2018     "Disorder of kidney and ureter     Glaucoma     Gout     Hyperlipidemia     Hypertension     In 2020    Obesity     BMI 39    Pott's disease (spinal tuberculosis)     Sarcoidosis     Sarcoidosis     Secondary hyperparathyroidism of renal origin 2020    Vitritis     Vitritis        Past Surgical History:   Procedure Laterality Date    BACK SURGERY  2018    Laminectomy.     BACK SURGERY      CATARACT EXTRACTION Bilateral      SECTION  ,  1977      x2    HYSTERECTOMY      uterus/cervix d/t uterine fibroids.        Social History: Patient lives with .    Prior Intubation HX:  None this admit.    Modified Barium Swallow: none per EMR.    Chest X-Rays: FINDINGS:   Postoperative changes in the spine as before.  Central venous catheter in the SVC.  Mild cardiomegaly pulmonary vascular congestion and bibasilar edema.  No significant pleural effusion     Prior diet: Regular solids/thin liquids.    Subjective     Pt seen at the bedside for clinical swallow assessment and speech-language exam. SLP did check w/ RN and MD prior to visit. Initially nsg reported pt NPO for PD cath, but MD stated cath won't be placed this date. MD okay-ed BSS.   When asked how she felt, pt simply replied, "tired."    Pain/Comfort:  · Pain Rating 1: 0/10    Objective:     Cognitive Status:    Arousal/Alertness Inconsistent responses    Attention Divided attention deficit   and Sustained attention deficit    Perseveration Present-verbal : pt became perseverative during orientation questions. Ex: when asked what day of the week it is, pt perseverated on month.  Orientation: Oriented to Person and Place (initially reported place as "[place]" though corrected self); not oriented to situation, month, date, or day of the week. Did recall year ind'ly.Redirection to task needed as pt often forgot question posed.   Memory: Immediate Recall : recalled 4/5 unrelated words immediately, Delayed: recalled 1/5 words " after a 2 min filled delay--improved to 5/5 given moderate A; could not recall reason for hospitalization or events of hospitalization and long term recall : WFL for personal/family hx  Problem Solving/Reasoning/Judgment: impaired  Safety awareness : poor deficit insight, fall and safety risk  Managing finances : impaired for simple word problem; 0% acc     Receptive Language:   Comprehension:   Questions Simple yes/no : 100% acc  Complex yes/no : 100% acc  Open ended questions : 75% acc  Commands  One step : 100% acc  two step basic commands : 100% acc  multistep basic commands : 67% acc  complex/abstract commands : 60% acc  Object identifications 100% acc in Fo 10  Conversation Mild verbal comprehension deficit c/b impaired command following, impaired understanding of open ended questions, and carrying out of commands prior to full direction given.     Pragmatics:    Reduced eye contact, topic maintenance, and communication repair. Monotone.     Expressive Language:  Verbal:    Automatic Speech  Counting : 1-10 WFL, Days of the week : 100% acc and Phrase completion : 100% acc  Initiation : Reduced  Repetition: 100% acc for words, phrases, and sentences  Naming Confrontation : 80% acc  Conversational speech : Mild expressive language deficit c/b reduced speech output, fluent though confused speech, and mild word finding difficulty.       Motor Speech:  WFL    Voice:   WFL    Visual-Spatial:  TBD; no obvious field cuts this session    Reading:   TBD     Written Expression:   TBD    Oral Musculature Evaluation  · Oral Musculature: general weakness  · Dentition: scattered dentition  · Secretion Management: adequate  · Mucosal Quality: adequate  · Mandibular Strength and Mobility: impaired  · Oral Labial Strength and Mobility: WFL  · Lingual Strength and Mobility: impaired strength  · Velar Elevation: WFL  · Buccal Strength and Mobility: WFL  · Volitional Cough: (elicited)  · Volitional Swallow: (timely)  · Voice Prior  to PO Intake: (clear)    Bedside Swallow Eval:   Consistencies Assessed:  · Thin liquids : straw sips x3  · Puree : tsp bites pudding x2  · Soft solids : tsp bites diced peaches x3     Oral Phase:   · impaired rotary chew  · Prolonged mastication  · Slow oral transit time     Pharyngeal Phase:   · no overt clinical signs/symptoms of aspiration  · no overt clinical signs/symptoms of pharyngeal dysphagia   · O2 sats remains at 91 or above  · Voice remained clear/dry though congestion noted prior to beginning PO trials  · Mild difficulty coordinating breath: swallow ratio    Compensatory Strategies  · None    Treatment: Pt will benefit from SLP tx 3x/week while inpatient to monitor diet tolerance, modify diet as needed, and provide cognitive-linguistic tx.     Assessment:     Mary Carrillo is a 65 y.o. female with an SLP diagnosis of Dysphagia and Cognitive-Linguistic Impairment.  She presents with adequate tolerance of puree, thin liquids, and soft solids, though hard solids not trialed 2/2 increased effort needed with soft solids. Mild difficulty coordinating breath:swallow ratio. Pt deemed safe for a Mechanical Soft/Thin liquid diet. Meds to be given whole 1-2 at a time. Informal speech-language/cognition assessment completed which revealed mild-moderate cognitive-linguistic deficits c/b impaired attn, confusion, impaired orientation, memory loss, and impaired comprehension of spoken language. Cont'd SLP tx needed while inpatient and at next level of care. Recs discussed with MD. SLP to f/u.    Goals:   Multidisciplinary Problems     SLP Goals        Problem: SLP Goal    Goal Priority Disciplines Outcome   SLP Goal     SLP Ongoing, Progressing   Description: STGs:  1. Pt will participate in clinical swallow assessment to determine LRD.-ongoing  2. Pt will tolerate a Mechanical Soft/Thin liquid diet without any overt s/s of aspiration.  3. Pt will participate in cognitive-linguistic assessment to determine baseline  function.-ongoing  4. Pt will orient x4.  5. Pt will recall salient personal information after a short delay with 90% recall.  6. Pt will follow complex commands with 100% acc.  7. Pt will complete math reasoning tasks with 75% acc.                   Plan:     · Patient to be seen:  3 x/week   · Plan of Care expires:  08/28/20  · Plan of Care reviewed with:  patient(RN and MD)   · SLP Follow-Up:  Yes       Discharge recommendations:  Discharge Facility/Level of Care Needs: (TBD pending progress)   Barriers to Discharge:  None    Time Tracking:     SLP Treatment Date:   07/30/20  Speech Start Time:  1025  Speech Stop Time:  1048     Speech Total Time (min):  23 min    Billable Minutes: Eval 13  and Eval Swallow and Oral Function 10    Marly Rankin CCC-SLP  07/30/2020

## 2020-07-30 NOTE — ASSESSMENT & PLAN NOTE
amlodipine 10 mg daily, carvedilol 25 mg BID and hydralazine 50 mg daily   The carvedilol was discontinued.  Also amlodipine.  Discontinue hydralazine.  Permissive hypertension.  Blood pressure is controlled at this time  Hold parameters

## 2020-07-30 NOTE — PLAN OF CARE
Problem: Physical Therapy Goal  Goal: Physical Therapy Goal  Description: Goals to be met by: 2020     Patient will increase functional independence with mobility by performin. Supine to sit with Modified [place]  2. Sit to stand transfer with Modified [place]  3. Bed to chair transfer with Modified [place] using Rolling Walker  4. Gait  x 150 feet with Modified [place] using Rolling Walker.   5. Lower extremity exercise program x15 reps per handout, with independence    Outcome: Ongoing, Progressing   PT initial evaluation completed. Plan of care and goals established and discussed with patient/daughter.    Discharge Recommendation: HHPT  DME Recommendation: RW (PT has recommended DME)

## 2020-07-30 NOTE — PROGRESS NOTES
[facility] Progress Note  ICU    Admit Date: 7/26/2020   LOS: 4 days     SUBJECTIVE:     Patient is alert and awake on exam, able to participate in care. She had drop in Hgb level and type and screened will transfuse 1 unit pRBC. Otherwise no overnight events.     Continuous Infusions:  Scheduled Meds:   albuterol-ipratropium  3 mL Nebulization Q4H WAKE    atorvastatin  40 mg Oral Daily    clopidogreL  75 mg Oral Daily    mupirocin   Nasal BID    pantoprazole  40 mg Oral Daily    paricalcitoL  0.1 mcg/kg Intravenous Every Mon, Wed, Fri    piperacillin-tazobactam (ZOSYN) IVPB  4.5 g Intravenous Q12H    polyethylene glycol  17 g Oral Daily    sevelamer carbonate  800 mg Oral TID WM    vitamin renal formula (B-complex-vitamin c-folic acid)  1 capsule Oral Daily     PRN Meds:sodium chloride, sodium chloride 0.9%, sodium chloride 0.9%, acetaminophen, acetaminophen, dextrose 50%, dextrose 50%, epoetin leslye-ebpx (RETACRIT) injection, glucagon (human recombinant), glucose, glucose, heparin (porcine), insulin aspart U-100, iron sucrose, labetalol, sodium chloride 0.9%, sodium chloride 0.9%, tiZANidine, traZODone, Pharmacy to dose Vancomycin consult **AND** vancomycin - pharmacy to dose    Review of patient's allergies indicates:   Allergen Reactions    Prednisone Hives and Itching     Pills only, can take if she needs to    Naproxen Rash     ROS  Negative other than HPI     OBJECTIVE:     Vital Signs (Most Recent)  Temp: 98.3 °F (36.8 °C) (07/30/20 1115)  Pulse: 74 (07/30/20 1211)  Resp: (!) 22 (07/30/20 1211)  BP: (!) 154/71 (07/30/20 1200)  SpO2: (!) 94 % (07/30/20 1211)    Vital Signs Range (Last 24H):  Temp:  [96.9 °F (36.1 °C)-99.4 °F (37.4 °C)]   Pulse:  [59-77]   Resp:  [15-30]   BP: (104-154)/(52-71)   SpO2:  [60 %-100 %]     Current Diet Order   Procedures    Diet Dysphagia Mechanical Soft (IDDSI Level 5) [facility]; Renal; Thin     Order Specific Question:   Indicate patient location for  additional diet options:     Answer:   [facility]     Order Specific Question:   Additional Diet Options:     Answer:   Renal     Order Specific Question:   Fluid consistency:     Answer:   Thin      I & O (Last 24H):    Intake/Output Summary (Last 24 hours) at 7/30/2020 1319  Last data filed at 7/30/2020 1115  Gross per 24 hour   Intake 985 ml   Output 120 ml   Net 865 ml     Wt Readings from Last 3 Encounters:   07/29/20 96.2 kg (212 lb)   07/26/20 105.7 kg (233 lb)   07/16/20 101.2 kg (223 lb)     Ventilator Data (Last 24H):          Hemodynamic Parameters (Last 24H):       Physical Exam   Constitutional: She is oriented to person, place, and time and well-developed, well-nourished, and in no distress. No distress.   HENT:   Head: Normocephalic and atraumatic.   Eyes: Conjunctivae and EOM are normal. Right eye exhibits no discharge. Left eye exhibits no discharge. No scleral icterus.   Right pupil irregular and larger than left this is old from surgery and previous glaucoma surgery   Neck: Normal range of motion. Neck supple. No JVD present. No tracheal deviation present. No thyromegaly present.   Cardiovascular: Normal rate, regular rhythm, normal heart sounds and intact distal pulses.   No murmur heard.  Pulmonary/Chest: Effort normal. No stridor. No respiratory distress. She has wheezes. She has no rales. She exhibits no tenderness.   Abdominal: Soft. Bowel sounds are normal. She exhibits no distension. There is no abdominal tenderness.   Musculoskeletal: Normal range of motion.         General: Edema present. No tenderness.   Lymphadenopathy:     She has no cervical adenopathy.   Neurological: She is alert and oriented to person, place, and time. No cranial nerve deficit. Coordination normal. GCS score is 15.   Skin: Skin is warm and dry. No rash noted. She is not diaphoretic. No erythema.   Psychiatric: Mood and affect normal.   Nursing note and vitals reviewed.       Lines/Drains:       Hemodialysis  "Catheter 07/27/20 right internal jugular (Active)   Verification by X-ray Yes 07/29/20 0835   Site Assessment No drainage;No redness;No swelling;No warmth 07/29/20 1130   Line Securement Device Secured with sutures 07/29/20 1130   Dressing Type Biopatch in place;Central line dressing with pants 07/29/20 1130   Dressing Status Clean;Dry;Intact 07/29/20 1130   Dressing Intervention Integrity maintained 07/29/20 1130   Date on Dressing 07/29/20 07/29/20 1130   Dressing Due to be Changed 08/05/20 07/29/20 1130   Venous Patency/Care heparin locked 07/29/20 1130   Arterial Patency/Care heparin locked 07/29/20 1130   Line Necessity Review CRRT/HD 07/29/20 1130   Number of days: 2            Urethral Catheter 07/29/20 0111 (Active)   Site Assessment Clean;Intact 07/29/20 1130   Collection Container Urimeter 07/29/20 1130   Securement Method secured to top of thigh w/ adhesive device 07/29/20 1130   Catheter Care Performed no 07/29/20 1130   Reason for Continuing Urinary Catheterization Critically ill in ICU requiring intensive monitoring 07/29/20 1130   CAUTI Prevention Bundle StatLock in place w 1" slack;Intact seal between catheter & drainage tubing;Drainage bag/urimeter off the floor;Green sheeting clip in use;No dependent loops or kinks;Drainage bag/urimeter not overfilled (<2/3 full);Drainage bag/urimeter below bladder 07/29/20 1130   Output (mL) 5 mL 07/29/20 1406   Number of days: 0         LABS  CBC  Recent Labs   Lab 07/29/20  0242 07/29/20  0432 07/29/20  1031 07/29/20  1321 07/30/20  0502   WBC 20.23* 20.62* 20.91* 20.75* 14.40*  14.40*   RBC 2.88* 3.04* 2.77* 2.90* 2.49*  2.49*   HGB 7.1* 7.4* 6.8* 7.0* 6.2*  6.2*   HCT 23.4* 25.1* 22.7* 24.0* 20.5*  20.5*    291 246 276 250  250   MCV 81* 83 82 83 82  82   MCH 24.7* 24.3* 24.5* 24.1* 24.9*  24.9*   MCHC 30.3* 29.5* 30.0* 29.2* 30.2*  30.2*       BMP  Recent Labs   Lab 07/27/20  0508 07/28/20  0432 07/29/20  0432 07/29/20  1031 07/30/20  0502   NA " 143 140 137 136 137  137   K 3.7 3.4* 4.0 3.7 3.9  3.9   CO2 20* 24 22* 24 21*  21*    103 106 106 105  105   BUN 70* 48* 33* 32* 41*  41*   CREATININE 7.0* 5.1* 4.1* 4.0* 4.9*  4.9*       CMP  Sodium   Date Value Ref Range Status   07/30/2020 137 136 - 145 mmol/L Final   07/30/2020 137 136 - 145 mmol/L Final     Potassium   Date Value Ref Range Status   07/30/2020 3.9 3.5 - 5.1 mmol/L Final   07/30/2020 3.9 3.5 - 5.1 mmol/L Final     Chloride   Date Value Ref Range Status   07/30/2020 105 95 - 110 mmol/L Final   07/30/2020 105 95 - 110 mmol/L Final     CO2   Date Value Ref Range Status   07/30/2020 21 (L) 23 - 29 mmol/L Final   07/30/2020 21 (L) 23 - 29 mmol/L Final     Glucose   Date Value Ref Range Status   07/30/2020 90 70 - 110 mg/dL Final   07/30/2020 90 70 - 110 mg/dL Final     BUN, Bld   Date Value Ref Range Status   07/30/2020 41 (H) 8 - 23 mg/dL Final   07/30/2020 41 (H) 8 - 23 mg/dL Final     Creatinine   Date Value Ref Range Status   07/30/2020 4.9 (H) 0.5 - 1.4 mg/dL Final   07/30/2020 4.9 (H) 0.5 - 1.4 mg/dL Final     Calcium   Date Value Ref Range Status   07/30/2020 6.8 (LL) 8.7 - 10.5 mg/dL Final   07/30/2020 6.8 (LL) 8.7 - 10.5 mg/dL Final     Comment:     CA  critical result(s) called and verbal readback obtained from   JABARI Hardin by ABDI 07/30/2020 06:28       Total Protein   Date Value Ref Range Status   07/30/2020 7.7 6.0 - 8.4 g/dL Final     Albumin   Date Value Ref Range Status   07/30/2020 2.2 (L) 3.5 - 5.2 g/dL Final     Total Bilirubin   Date Value Ref Range Status   07/30/2020 0.2 0.1 - 1.0 mg/dL Final     Comment:     For infants and newborns, interpretation of results should be based  on gestational age, weight and in agreement with clinical  observations.  Premature Infant recommended reference ranges:  Up to 24 hours.............<8.0 mg/dL  Up to 48 hours............<12.0 mg/dL  3-5 days..................<15.0 mg/dL  6-29 days.................<15.0 mg/dL       Alkaline  Phosphatase   Date Value Ref Range Status   07/30/2020 100 55 - 135 U/L Final     AST   Date Value Ref Range Status   07/30/2020 23 10 - 40 U/L Final     ALT   Date Value Ref Range Status   07/30/2020 13 10 - 44 U/L Final     Anion Gap   Date Value Ref Range Status   07/30/2020 11 8 - 16 mmol/L Final   07/30/2020 11 8 - 16 mmol/L Final     eGFR if    Date Value Ref Range Status   07/30/2020 10 (A) >60 mL/min/1.73 m^2 Final   07/30/2020 10 (A) >60 mL/min/1.73 m^2 Final     eGFR if non    Date Value Ref Range Status   07/30/2020 9 (A) >60 mL/min/1.73 m^2 Final     Comment:     Calculation used to obtain the estimated glomerular filtration  rate (eGFR) is the CKD-EPI equation.      07/30/2020 9 (A) >60 mL/min/1.73 m^2 Final     Comment:     Calculation used to obtain the estimated glomerular filtration  rate (eGFR) is the CKD-EPI equation.            POCT-Glucose  POCT Glucose   Date Value Ref Range Status   07/29/2020 136 (H) 70 - 110 mg/dL Final   07/29/2020 95 70 - 110 mg/dL Final   07/29/2020 179 (H) 70 - 110 mg/dL Final   07/28/2020 108 70 - 110 mg/dL Final     Recent Labs   Lab 07/27/20  0508 07/28/20  0432 07/29/20  0432 07/29/20  1031 07/30/20  0502   CALCIUM 5.6* 6.6* 7.4* 7.1* 6.8*  6.8*   MG 1.7 1.7 1.7 1.7 2.1  2.1   PHOS 6.5* 4.9* 4.9*  --  6.3*  6.3*         LFT  Recent Labs   Lab 07/26/20  1410 07/29/20  1031 07/30/20  0502   PROT 9.5* 7.8 7.7   ALBUMIN 2.6* 2.1* 2.2*   BILITOT 0.1 0.2 0.2   AST 16 24 23   ALKPHOS 121 110 100   ALT 12 12 13         COAGS  Recent Labs   Lab 07/26/20  1410 07/27/20  0508 07/30/20  0502   INR 1.1 1.1 1.1   APTT 40.5*  --  36.6*       CE  Recent Labs   Lab 07/27/20  0709 07/27/20  1234 07/29/20  1031 07/29/20  1321 07/30/20  0502   TROPONINI 0.088* 0.075* 0.172* 0.184* 0.125*     BNP  Recent Labs   Lab 07/26/20  1410   BNP 1,261*       ABGs  No results for input(s): PH, PCO2, PO2, HCO3, POCSATURATED, BE in the last 24 hours.    UA  Recent  Labs   Lab 07/29/20  1335   COLORU Yellow   SPECGRAV >=1.030*   PHUR 6.0   PROTEINUA 2+*   BACTERIA None     LAST HbA1c  Lab Results   Component Value Date    HGBA1C 5.2 07/29/2020               Microbiology Results (last 7 days)     Procedure Component Value Units Date/Time    Blood culture [[id number]] Collected: 07/29/20 0432    Order Status: Completed Specimen: Blood Updated: 07/30/20 0812     Blood Culture, Routine No Growth to date      No Growth to date    Blood culture [[id number]] Collected: 07/29/20 1323    Order Status: Completed Specimen: Blood Updated: 07/30/20 0515     Blood Culture, Routine No Growth to date    Blood culture [[id number]] Collected: 07/29/20 1322    Order Status: Completed Specimen: Blood Updated: 07/30/20 0515     Blood Culture, Routine No Growth to date        Mri Brain Without Contrast    Result Date: 7/29/2020  Left thalamic lacunar type infarct, likely acute.  No intracranial hemorrhage or cortical infarct. Chronic microvascular ischemic change. Grossly unchanged chronic expansion with CSF signal of the sella, likely sequela of arachnoid cyst. This report was flagged in Epic as abnormal. Electronically signed by: Bill Horton MD Date:    07/29/2020 Time:    15:55      ASSESSMENT/PLAN:       Mary Carrillo is a 65 y.o. female with extensive pmh who presented with Hypervolemia associated with renal insufficiency.     Diagnoses of Hypervolemia associated with renal insufficiency, Chest pain, and Shortness of breath were pertinent to this visit.    Problem list  1. Hypervolemia associated with renal insufficiency    2. Chest pain    3. Shortness of breath      NEURO-PSYCH  Lacunar stroke on MRI   Neurology following  Continue Statin, ASA and plavix   Permissive HTN x24 hour and slowly add BP medication   Continue to monitor      Cardiovascular  Evidenced by history, elevated BNP, pulmonary edema, peripheral edema.  Patient is now on HD  Acute blood loss anemia 2/2 trialysis line  improperly closed  H/H with slight drop, repeat stable  Daily Weights  Strict I/O  Fluid restriction to 1,500cc daily  Low-sodium cardiac diet once she is no longer NPO  Reassessing  volume status regularly  Oxygen PRN to keep O2 > 88%   2D echo: EF 50-55% with Grade II LVD dysfunction   Chest X-ray: cardiomegaly with pulmonary vascular congestion and bibasilar edema consistent with physical exam    Continue HD for volume overload   Check TSH, albumin, UA       Pulmonary  Oxygen PRN to keep O2 > 88%  Chest X-ray: cardiomegaly with bibasilar edema     RENAL  Patient recently initiated HD   Nephrology consulted   Strict I&Os  Avoid renal toxic meds    FEN-GI  Diet: NPO  Fluid restriction   Keep Mg 2, K 4  Zofran prn for n/v     Endocrine  Maintain glucose 140-180  SSI  HgbA1C:normal in Oct 2019, recommend repeating   TSH:normal 2017    Hematology  Patient had acute large blood loss anemia 2/2 to improperly closed trialysis line   Repeat Hemoglobin 6.2, baseline 7.1   Transfuse 1 unit pRBC   Fu post-transfusion H/H   Continue to monitor   Vital signs are stable, and reassuring     Infectious Disease  Cxs are NGTD.   Continue ABX.  Urinalysis: trace leukocytes with 2+ protein   Blood culture ordered  urine culture ordered    DVT PPx: SCD's   Diet: NPO  GI PPX: IV Protonix    Dispo: Possible step down if continues to be stable during day        Case discussed with Dr. Albertina Nelson MD     Thank you for this interesting consult. We will sign off on the patient, please call with further questions.          Elena Quintero MD   [facility]  07/30/2020 2:45 PM

## 2020-07-30 NOTE — PT/OT/SLP EVAL
"Physical Therapy Evaluation    Patient Name:  Mary Carrillo   MRN:  [id number]    Recommendations:     Discharge Recommendations:  home health PT   Discharge Equipment Recommendations: walker, rolling   Barriers to discharge: None    Assessment:     Mary Carrillo is a 65 y.o. female admitted with a medical diagnosis of Hypervolemia associated with renal insufficiency.  She presents with the following impairments/functional limitations:  weakness, impaired endurance, impaired self care skills, impaired functional mobilty, gait instability, impaired balance, decreased safety awareness, decreased lower extremity function, impaired cardiopulmonary response to activity, decreased coordination.  PT initial evaluation completed. Plan of care and goals established and discussed with patient/daughter.    Rehab Prognosis: Good; patient would benefit from acute skilled PT services to address these deficits and reach maximum level of function.    Recent Surgery: Procedure(s) (LRB):  INSERTION, CATHETER, DIALYSIS, PERITONEAL, LAPAROSCOPIC (N/A) * Surgery Date in Future *    Plan:     During this hospitalization, patient to be seen 6 x/week to address the identified rehab impairments via gait training, therapeutic activities, therapeutic exercises, neuromuscular re-education and progress toward the following goals:    · Plan of Care Expires:  08/30/20    Subjective     Chief Complaint: None  Patient/Family Comments/goals: Pt agreed to PT eval  Pain/Comfort:  · Pain Rating 1: 0/10  · Pain Rating Post-Intervention 1: 0/10    Patients cultural, spiritual, Sikh conflicts given the current situation: no    Living Environment:  Pt lives c/  in [facility], [facility]  Prior to admission, patients level of function was Mod I c/ SPC PRN "when she is tired'.  Equipment used at home: cane, straight, walker, rolling.  DME owned (not currently used): rolling walker.  Upon discharge, patient will have assistance from  and " daughter.    Objective:     Communicated with RNAmanda prior to session.  Patient found supine with blood pressure cuff, central line, ge catheter, oxygen, peripheral IV, pulse ox (continuous), telemetry  upon PT entry to room.    General Precautions: Standard, fall   Orthopedic Precautions:N/A   Braces: N/A     Exams:  · Cognitive Exam:  Patient is oriented to Person, Place, Time and Situation  · Gross Motor Coordination:  significanltly impaired c/ bed mobility and transfer  · Postural Exam:  Patient presented with the following abnormalities:    · -       Habitus  · RLE ROM: WFL  · RLE Strength: 3+/5  · LLE ROM: WFL  · LLE Strength: 3+/5    Functional Mobility:  · Bed Mobility:     · Rolling Left:  contact guard assistance  · Scooting: minimum assistance and moderate assistance  · Supine to Sit: moderate assistance and of 2 persons  · Transfers:     · Sit to Stand:  moderate assistance and of 2 persons with rolling walker  · Bed to Chair: moderate assistance and maximal assistance with  hand-held assist  using  Stand Pivot  · Gait: NA- Pt significantly deconditioned;unbale to perfrom full standing independently nor advance LE to takesteps  · Balance: Sitting EOB-fair at SBA: static/dynamic gait:poor    Therapeutic Activities and Exercises:  PT eval completed c/ progressive mobility as detailed above; pt/daughter educated on PT role/POC; transferred to chair and educated on OOB activity x2hrs c/ HEP of ankle pump x10 3reps as tolerated.    AM-PAC 6 CLICK MOBILITY  Total Score:12     Patient left up in chair with all lines intact, call button in reach, RN notified and daughter present.    GOALS:   Multidisciplinary Problems     Physical Therapy Goals        Problem: Physical Therapy Goal    Goal Priority Disciplines Outcome Goal Variances Interventions   Physical Therapy Goal     PT, PT/OT Ongoing, Progressing     Description: Goals to be met by: 8/30/2020     Patient will increase functional independence with  mobility by performin. Supine to sit with Modified McDavid  2. Sit to stand transfer with Modified McDavid  3. Bed to chair transfer with Modified McDavid using Rolling Walker  4. Gait  x 150 feet with Modified McDavid using Rolling Walker.   5. Lower extremity exercise program x15 reps per handout, with independence                     History:     Past Medical History:   Diagnosis Date    Arthritis     Asthma     Back pain     BMI 39.0-39.9,adult 2020    CKD (chronic kidney disease) stage 5, GFR less than 15 ml/min 2020    Dehiscence of operative wound 2018    Disorder of kidney and ureter     Glaucoma     Gout     Hyperlipidemia     Hypertension     In 2020    Obesity     BMI 39    Pott's disease (spinal tuberculosis)     Sarcoidosis     Sarcoidosis     Secondary hyperparathyroidism of renal origin 2020    Vitritis     Vitritis        Past Surgical History:   Procedure Laterality Date    BACK SURGERY  2018    Laminectomy.     BACK SURGERY      CATARACT EXTRACTION Bilateral      SECTION  ,  1977      x2    HYSTERECTOMY      uterus/cervix d/t uterine fibroids.        Time Tracking:     PT Received On: 20  PT Start Time: 1407     PT Stop Time: 1437  PT Total Time (min): 30 min co-tx c/ OT    Billable Minutes: Evaluation 10 and Therapeutic Activity 10      Tera Colon PT, DPT  2020

## 2020-07-30 NOTE — PROGRESS NOTES
[facility] ICU 5th Floor  Hospital Medicine  Progress Note    Patient Name: Mary Carrillo  MRN: [id number]  Patient Class: IP- Inpatient   Admission Date: 7/26/2020  Length of Stay: 3 days  Attending Physician: Ronna Motta MD  Primary Care Provider: Jose Khan MD        Subjective:     Principal Problem:Hypervolemia associated with renal insufficiency        HPI:  Mary Carrillo is a 64 yo AAF with pmh of HTN, HLD, diastolic dysfunction, mild aortic valve stenosis, pott's disease, obesity, gout, sarcoidosis, CKD stage 5, asthma and chronic back pain who presented to OSH with complaint of dyspnea and anuria. Pt currently undergoing outpatient workup for initiation of peritoneal dialysis. Initial labs remarkable for Hbg/Hct 7/25, BUN/Cr 66/6.7 previously 46/4.7 on 6/15/20, BNP 1261, , troponin 0.123. CXR consistent with pulmonary edema/CHF.  She received 0.5 inch nitropaste for hypertension and 100 mg furosemide IV. Pt with some hypoxia requiring 3 liters nasal cannula. She denies chest pain, abdominal pain, n/v and fever/chills. COVID negative. Pt admitted to [facility] hospital medicine.     Overview/Hospital Course:  No notes on file    Interval History:     ICU Rounds  Follow up from CODE BLUE this AM  She is resting quietly . Denies any CP, SOB  Remains on NC oxygen- 8L high flow, and is having some abdominal breathing.             Review of Systems   Unable to perform ROS: Acuity of condition     Objective:     Vital Signs (Most Recent):  Temp: 96.1 °F (35.6 °C) (07/29/20 1130)  Pulse: 66 (07/29/20 1145)  Resp: 18 (07/29/20 1145)  BP: (!) 128/58 (07/29/20 1145)  SpO2: 97 % (07/29/20 1145) Vital Signs (24h Range):  Temp:  [96.1 °F (35.6 °C)-98.8 °F (37.1 °C)] 96.1 °F (35.6 °C)  Pulse:  [32-87] 66  Resp:  [14-31] 18  SpO2:  [81 %-100 %] 97 %  BP: ()/() 128/58     Weight: 96.5 kg (212 lb 11.9 oz)  Body mass index is 35.4 kg/m².    Intake/Output Summary (Last 24 hours) at  7/29/2020 1159  Last data filed at 7/29/2020 0900  Gross per 24 hour   Intake 650 ml   Output 2075 ml   Net -1425 ml      Physical Exam  Vitals signs and nursing note reviewed.   Constitutional:       Comments: Semi awake.  Much more comfortable.  She has the high-flow nasal cannula in place.  He is having some abdominal breathing.   Neck:      Musculoskeletal: Normal range of motion and neck supple.   Cardiovascular:      Rate and Rhythm: Normal rate and regular rhythm.   Pulmonary:      Breath sounds: Wheezing (Scattered) present.   Abdominal:      General: Bowel sounds are normal.      Palpations: Abdomen is soft.      Tenderness: There is no abdominal tenderness.   Musculoskeletal:         General: No swelling.   Skin:     General: Skin is warm and dry.   Neurological:      General: No focal deficit present.         Significant Labs:   ABGs:   Recent Labs   Lab 07/29/20  1022   PH 7.276*   PCO2 47.2*   HCO3 21.9*   POCSATURATED 96   BE -5     Blood Culture:   Recent Labs   Lab 07/29/20  0432   LABBLOO No Growth to date     BMP:   Recent Labs   Lab 07/29/20  1031   *      K 3.7      CO2 24   BUN 32*   CREATININE 4.0*   CALCIUM 7.1*   MG 1.7     CBC:   Recent Labs   Lab 07/29/20  0432 07/29/20  1031 07/29/20  1321   WBC 20.62* 20.91* 20.75*   HGB 7.4* 6.8* 7.0*   HCT 25.1* 22.7* 24.0*    246 276     CMP:   Recent Labs   Lab 07/28/20  0432 07/29/20  0432 07/29/20  1031    137 136   K 3.4* 4.0 3.7    106 106   CO2 24 22* 24   GLU 84 98 119*   BUN 48* 33* 32*   CREATININE 5.1* 4.1* 4.0*   CALCIUM 6.6* 7.4* 7.1*   PROT  --   --  7.8   ALBUMIN  --   --  2.1*   BILITOT  --   --  0.2   ALKPHOS  --   --  110   AST  --   --  24   ALT  --   --  12   ANIONGAP 13 9 6*   EGFRNONAA 8* 11* 11*     Cardiac Markers: No results for input(s): CKMB, MYOGLOBIN, BNP, TROPISTAT in the last 48 hours.  Lactic Acid:   Recent Labs   Lab 07/29/20  1322   LACTATE 0.7     Magnesium:   Recent Labs   Lab  07/28/20  0432 07/29/20  0432 07/29/20  1031   MG 1.7 1.7 1.7     Troponin:   Recent Labs   Lab 07/29/20  1031 07/29/20  1321   TROPONINI 0.172* 0.184*     Urine Culture: No results for input(s): LABURIN in the last 48 hours.  Urine Studies:   Recent Labs   Lab 07/29/20  1335   COLORU Yellow   APPEARANCEUA Clear   PHUR 6.0   SPECGRAV >=1.030*   PROTEINUA 2+*   GLUCUA Negative   KETONESU Negative   BILIRUBINUA Negative   OCCULTUA 1+*   NITRITE Negative   UROBILINOGEN Negative   LEUKOCYTESUR Trace*   RBCUA 4   WBCUA 4   BACTERIA None   HYALINECASTS 1       Significant Imaging: I have reviewed all pertinent imaging results/findings within the past 24 hours.         US Lower Extremity Veins Bilateral  Narrative: EXAMINATION:  US LOWER EXTREMITY VEINS BILATERAL    CLINICAL HISTORY:  r/o DVT;    TECHNIQUE:  Duplex and color flow Doppler and dynamic compression was performed of the bilateral lower extremity veins was performed.    COMPARISON:  None    FINDINGS:  Right thigh veins: The common femoral, femoral, popliteal, upper greater saphenous, and deep femoral veins are patent and free of thrombus. The veins are normally compressible and have normal phasic flow and augmentation response.    Right calf veins: The visualized calf veins are patent.    Left thigh veins: The common femoral, femoral, popliteal, upper greater saphenous, and deep femoral veins are patent and free of thrombus. The veins are normally compressible and have normal phasic flow and augmentation response.    Left calf veins: The visualized calf veins are patent.    Miscellaneous: None  Impression: No evidence of deep venous thrombosis in either lower extremity.    Electronically signed by: Av Jose MD  Date:    07/29/2020  Time:    11:47  X-Ray Chest AP Portable  Narrative: EXAMINATION:  XR CHEST AP PORTABLE    CLINICAL HISTORY:  chest pain;    TECHNIQUE:  Single frontal view of the chest was performed.    COMPARISON:  No 07/29/2020  ne    FINDINGS:  Postoperative changes in the spine as before.  Central venous catheter in the SVC.  Mild cardiomegaly pulmonary vascular congestion and bibasilar edema.  No significant pleural effusion  Impression: See above    Electronically signed by: Rebel Magallanes MD  Date:    07/29/2020  Time:    10:52  X-Ray Chest AP Portable  Narrative: EXAMINATION:  XR CHEST AP PORTABLE    CLINICAL HISTORY:  dyspnea;    TECHNIQUE:  Single AP view of the chest.  Patient position and levo rotation, limiting evaluation.    COMPARISON:  Chest radiograph 07/27/2020    FINDINGS:  Right internal jugular vein approach NTHDC is stable.  No pneumothorax.    Enlargement of the cardiac silhouette which may reflect cardiomegaly and/or pericardial effusion, not significantly changed.  Mild widening of the mediastinal silhouette which likely is artifactual related to suboptimal patient positioning however, this cannot be confirmed by current imaging.    Severe pulmonary vascular congestion and suspected pulmonary interstitial edema as well as small bilateral layering pleural effusions with overlying atelectasis and/or pneumonia.    Multilevel degenerative changes of the imaged spine.  Impression: 1. Enlargement of the cardiac silhouette and severe pulmonary vascular congestion.  I suspect associated pulmonary interstitial edema and small bilateral layering pleural effusions with overlying atelectasis and/or pneumonia.    Electronically signed by: Garcia Machado  Date:    07/29/2020  Time:    09:26  CT Head Without Contrast  Narrative: EXAMINATION:  CT HEAD WITHOUT CONTRAST    CLINICAL HISTORY:  code stroke;    TECHNIQUE:  Low dose axial CT images obtained throughout the head without intravenous contrast. Sagittal and coronal reconstructions were performed.    COMPARISON:  CT head without contrast dated 03/18/2018    FINDINGS:  No evidence for acute intracranial hemorrhage, midline shift, mass effect, or extra-axial collection. Scattered  white matter hypoattenuation with predominant subcortical distribution which appears slightly more conspicuous from 2018.  Possible additional area of hypoattenuation involving the left cerebellar lobe, though degree of streak artifact limits assessment.  Ventricles are stable in size.  Incidental expanded, partially empty sella configuration, similar.    No acute calvarial abnormality.  Right sphenoid mucosal thickening.  Remaining paranasal sinuses and bilateral mastoid air cells are clear.  Impression: Scattered white matter hypoattenuation with predominant subcortical distribution, also potentially involving the left cerebellar lobe, more conspicuous from 2018.  Overall findings are nonspecific to include sequela of chronic microangiopathic change.  Clinical correlation and further evaluation as warranted.    Electronically signed by: Lavelle Motta  Date:    07/29/2020  Time:    08:48      Assessment/Plan:      Acute CVA (cerebrovascular accident)  MRI reviewed.  Does not have any focal deficits.  Will consult neurology.  Discontinue blood pressure medication.  Permissive hypertension      Acute respiratory failure with hypoxia  No evidence of ACS, no evidence of pulmonary embolus but could consider further workup if she does not improve.  Likely still related to volume overload.  Will likely get dialysis tomorrow.  May also be related to sepsis.  Continue high-flow oxygen and breathing treatments.  Will wean as tolerated.  Appreciate input from Pulmonary Critical Care which      Metabolic acidosis  I suspect that she may be septic.  I have ordered cultures.  Chest x-ray reviewed.  Started on Zosyn and vancomycin.  Lactic acid level is normal.  Procalcitonin is mildly elevated.  Cultures so far are no growth      Elevated troponin  No evidence of ACS      Essential hypertension   amlodipine 10 mg daily, carvedilol 25 mg BID and hydralazine 50 mg daily   The carvedilol was discontinued.  Also amlodipine.   Discontinue hydralazine.  Permissive hypertension.  Blood pressure is controlled at this time  Hold parameters      Anemia of chronic disease  Hbg/Hct 7/25 (basleine Hbg 8-10)   No indication for transfusion at this time    venofer 100  EPogen 5K with each HD    Now with superimposed acute blood loss anemia.  Will transfuse      CKD (chronic kidney disease) stage 5, GFR less than 15 ml/min  Now with progression to end-stage.   Appreciate nephrology input  Attempted 3rd dialysis today.  Was unable to complete secondary to code blue.  Ultimate goal was to get a peritoneal dialysis catheter      Diastolic dysfunction   Echo 4/16/20 LVEF 55-60%, mild AS and grade 2 diastolic dysfunction         Pott's disease (spinal tuberculosis)  S/p T12-L1 corpectomy with T10-L3 fusion on 3/18/18 due to Pott's disease. Per chart review patient treated with prolonged protocol per ID. No acute issues.        Hyperlipidemia  Continue ASA, statin       Sarcoidosis  Chronic       Gout, chronic  No acute issues         VTE Risk Mitigation (From admission, onward)         Ordered     IP VTE HIGH RISK PATIENT  Once      07/29/20 1816     Place sequential compression device  Until discontinued      07/29/20 1816     heparin, porcine (PF) (heparin flush 100 units/mL) 100 unit/mL injection flush      07/29/20 0006     heparin (porcine) injection 2,500 Units  As needed (PRN)      07/28/20 1326     Place sequential compression device  Until discontinued      07/27/20 0003                      Ronna Motta MD  Department of Hospital Medicine   [facility] ICU 5th Floor

## 2020-07-30 NOTE — CARE UPDATE
Meeting with patient, daughter and Kayla Earl.  Reviewed plan of care and current medical situation with patient's daughter Alea  I also reviewed recent events of MET, Stroke alert and CODE BLUE..

## 2020-07-30 NOTE — PLAN OF CARE
Recommendation:   1. Continue current dysphagia [facility] soft, renal diet with Novasource Renal TID.   2. Encourage intake of all meals and ONS.   3. RD to continue to follow and monitor intake    Goals:   Pt intake >/= 75% EEN/EPN by RD follow up    Nutrition Goal Status: new  Communication of RD Recs: other (comment)(POC)      Problem: Oral Intake Inadequate (Acute Kidney Injury/Impairment)  Goal: Optimal Nutrition Intake  Outcome: Ongoing, Progressing

## 2020-07-30 NOTE — ASSESSMENT & PLAN NOTE
Now with progression to end-stage.   Appreciate nephrology input  HD x 2.  Ultimate goal was to get a peritoneal dialysis catheter.    [facility]- No PD catheter until more stable   PD catheter will be placed on Monday. Cleared from cardiac standpoint    Case [facility]  Will decide after evaluation if patient needs to have dialysis today

## 2020-07-30 NOTE — ASSESSMENT & PLAN NOTE
Now with progression to end-stage.   Appreciate nephrology input  Attempted 3rd dialysis today.  Was unable to complete secondary to code blue.  Ultimate goal was to get a peritoneal dialysis catheter

## 2020-07-30 NOTE — ANESTHESIA PREPROCEDURE EVALUATION
07/29/2020  Mary Carrillo is a 65 y.o., female hx HTN, CKD 5, diastolic dysfunction, mild aortic valve stenosis, pott's disease, obesity, gout, sarcoidosis admitted with hypervolemia, dyspnea, and anuria. She received HD with improvement in volume status and pulmonary edema and now feels she is at baseline. Scheduled for peritoneal dialysis catheter placement.    Pt had an episode of hypotension and bradycardia with AMS due to blood loss from trialysis catheter not being capped properly. Hg is at baseline of 7 and patient is now alert and oriented and vitals stable.     Updated covid test pending    Anesthesia Evaluation    I have reviewed the Patient Summary Reports.         Review of Systems  Anesthesia Hx:   Denies Personal Hx of Anesthesia complications.   Hematology/Oncology:     Oncology Normal    -- Anemia:   EENT/Dental:EENT/Dental Normal   Cardiovascular:   Exercise tolerance: poor Hypertension    Pulmonary:   Asthma    Renal/:   Chronic Renal Disease, ESRD, Dialysis    Hepatic/GI:  Hepatic/GI Normal    Musculoskeletal:   Arthritis     Neurological:   Pott's disease   Endocrine:  Endocrine Normal    Psych:  Psychiatric Normal           Physical Exam  General:  Well nourished    Airway/Jaw/Neck:  Airway Findings: Mouth Opening: Normal Tongue: Normal  General Airway Assessment: Adult  TM Distance: Normal, at least 6 cm      Dental:  Dental Findings: Edentulous   Chest/Lungs:  Chest/Lungs Findings: Decreased Breath Sounds Bilateral     Heart/Vascular:  Heart Findings: Rate: Normal  Rhythm: Regular Rhythm        Mental Status:  Mental Status Findings:  Cooperative, Alert and Oriented         Anesthesia Plan  Type of Anesthesia, risks & benefits discussed:  Anesthesia Type:  general  Patient's Preference:   Intra-op Monitoring Plan: standard ASA monitors  Intra-op Monitoring Plan Comments:   Post Op  Pain Control Plan: multimodal analgesia  Post Op Pain Control Plan Comments:   Induction:   IV  Beta Blocker:  Patient is not currently on a Beta-Blocker (No further documentation required).       Informed Consent: Patient understands risks and agrees with Anesthesia plan.  Questions answered. Anesthesia consent signed with patient.  ASA Score: 4     Day of Surgery Review of History & Physical:    H&P update referred to the surgeon.         Ready For Surgery From Anesthesia Perspective.       Echo 7/29/20  · Low normal left ventricular systolic function. The estimated ejection fraction is 50-55%.  · Mild concentric left ventricular hypertrophy.  · Grade II (moderate) left ventricular diastolic dysfunction consistent with pseudonormalization.  · Moderate right ventricular enlargement.  · Normal right ventricular systolic function.  · Moderate right atrial enlargement.  · Mild tricuspid regurgitation.

## 2020-07-30 NOTE — ASSESSMENT & PLAN NOTE
I suspect that she may been getting  septic.  I have ordered cultures.  Chest x-ray reviewed.  Started on Zosyn and vancomycin.  Lactic acid level is normal.  Procalcitonin is mildly elevated.  Cultures so far are no growth    WBC is down.  Possibly de-escalate abx tomorrow

## 2020-07-30 NOTE — PROGRESS NOTES
Progress Note  Nephrology      Consult Requested By: Ronna Motta MD  Reason for Consult: ESRD    SUBJECTIVE:     Review of Systems   Constitutional: Negative for chills and fever.   Respiratory: Negative for cough and shortness of breath.    Cardiovascular: Negative for chest pain and leg swelling.   Gastrointestinal: Negative for nausea.     Patient Active Problem List   Diagnosis    Hemorrhoids, internal    Gout, chronic    Sarcoidosis    Hyperlipidemia    Thoracolumbar back pain    Abnormal chest CT    Uveitis    Fatigue    Pulmonary hypertension    IFG (impaired fasting glucose)    Primary osteoarthritis involving multiple joints    Discitis of thoracolumbar region    Inadequate oral intake    Ovarian mass    Unintentional weight loss    Pulmonary tuberculosis    Discitis    Osteoarthritis of spine with myelopathy, thoracolumbar region    Pott's disease (spinal tuberculosis)    Acute blood loss as cause of postoperative anemia    Generalized abdominal pain    Other insomnia    Status post thoracic spinal fusion    Alteration in skin integrity related to surgical incision    Difficulty sleeping    Right hip pain    Myofascial pain    CHRISTEN (acute kidney injury)    Nephrotic syndrome    Diastolic dysfunction    Aortic valve stenosis    Electrocardiogram showing T wave abnormalities    Aortic atherosclerosis    Cardiomyopathy    Obesity    Secondary hyperparathyroidism of renal origin    CKD (chronic kidney disease) stage 5, GFR less than 15 ml/min    BMI 39.0-39.9,adult    Anemia of chronic disease    Essential hypertension    Elevated troponin    Metabolic acidosis    Acute respiratory failure with hypoxia    Acute CVA (cerebrovascular accident)       OBJECTIVE:     Medications:   albuterol-ipratropium  3 mL Nebulization Q4H WAKE    atorvastatin  40 mg Oral Daily    clopidogreL  75 mg Oral Daily    mupirocin   Nasal BID    pantoprazole  40 mg Oral Daily     paricalcitoL  0.1 mcg/kg Intravenous Every Mon, Wed, Fri    piperacillin-tazobactam (ZOSYN) IVPB  4.5 g Intravenous Q12H    polyethylene glycol  17 g Oral Daily    sevelamer carbonate  800 mg Oral TID WM    vitamin renal formula (B-complex-vitamin c-folic acid)  1 capsule Oral Daily       Vitals:    07/30/20 1211   BP:    Pulse: 74   Resp: (!) 22   Temp:      I/O last 3 completed shifts:  In: 625 [P.O.:75; I.V.:100; IV Piggyback:450]  Out: 395 [Urine:395]  Physical Exam  Constitutional:       General: She is not in acute distress.     Appearance: She is well-developed.   HENT:      Head: Normocephalic and atraumatic.   Eyes:      General: No scleral icterus.  Neck:      Musculoskeletal: Neck supple.      Vascular: No JVD.   Cardiovascular:      Rate and Rhythm: Normal rate and regular rhythm.      Heart sounds: No murmur.   Pulmonary:      Effort: Pulmonary effort is normal. No respiratory distress.      Breath sounds: Wheezing present. No rales.   Abdominal:      General: Bowel sounds are normal. There is no distension.      Palpations: Abdomen is soft.      Tenderness: There is no abdominal tenderness.   Musculoskeletal:         General: Swelling (1+ BLE) present.   Skin:     General: Skin is warm and dry.      Coloration: Skin is not pale.      Findings: No erythema.   Neurological:      Mental Status: She is alert and oriented to person, place, and time.   Psychiatric:         Judgment: Judgment normal.       Laboratory:  Recent Labs   Lab 07/29/20  1031 07/29/20  1321 07/30/20  0502   WBC 20.91* 20.75* 14.40*  14.40*   HGB 6.8* 7.0* 6.2*  6.2*   HCT 22.7* 24.0* 20.5*  20.5*    276 250  250   MONO 7.3  1.5* 7.9  1.6* 11.3  11.3  1.6*  1.6*     Recent Labs   Lab 07/28/20  0432 07/29/20  0432 07/29/20  1031 07/30/20  0502    137 136 137  137   K 3.4* 4.0 3.7 3.9  3.9    106 106 105  105   CO2 24 22* 24 21*  21*   BUN 48* 33* 32* 41*  41*   CREATININE 5.1* 4.1* 4.0* 4.9*  4.9*    CALCIUM 6.6* 7.4* 7.1* 6.8*  6.8*   PHOS 4.9* 4.9*  --  6.3*  6.3*     Labs reviewed  Diagnostic Results:  X-Ray: Reviewed  US: Reviewed  Echo: Reviewed      ASSESSMENT/PLAN:     1. ESRD (N18.6 Z99.2) - 3rd treatment was 7/29/30 however stopped due  bradycardia  - got better with atropine. Electrolytes acceptable still volume overloaded but much better then yesterday. 4.5L net negative from admission    Plan for Hd tomorrow    PD cath placement on Monday     SW consult to set up with Dr. Mcintyre for Urgent start PD at [facility] on [address]     In work-up for renal transplant in [facility]  2. HTN (I10) - reassess after optimizing volume  3. Anemia of chronic kidney disease treated with LYNNETTE (N18.9 D63.1) - venofer 100  EPogen 5K with each HD  Bled overnight  Recheck CBC if < 7 transfuse    Recent Labs   Lab 07/29/20  1031 07/29/20  1321 07/30/20  0502   WBC 20.91* 20.75* 14.40*  14.40*   HGB 6.8* 7.0* 6.2*  6.2*   HCT 22.7* 24.0* 20.5*  20.5*    276 250  250     No results found for: IRON, TIBC, FERRITIN, SATURATEDIRO            4. MBD (E88.9 M90.80) - start renvela, 3Ca bath, Zemplar 10     Lab Results   Component Value Date    PTH 1,287.0 (H) 07/14/2020    CALCIUM 6.8 (LL) 07/30/2020    CALCIUM 6.8 (LL) 07/30/2020    PHOS 6.3 (H) 07/30/2020    PHOS 6.3 (H) 07/30/2020     Recent Labs   Lab 07/29/20  0432 07/29/20  1031 07/30/20  0502   MG 1.7 1.7 2.1  2.1     Lab Results   Component Value Date    [id number] 12 (L) 02/13/2020     Lab Results   Component Value Date    CO2 21 (L) 07/30/2020    CO2 21 (L) 07/30/2020        5. Hemodialysis Access (Z99.2 V45.11)- as in #1  6. Nutrition/Hypoalbuminemia (E88.09) -        Recent Labs   Lab 07/26/20  1410 07/27/20  0508   LABPROT 11.9 11.5   ALBUMIN 2.6*  --      Nepro with meals TID. Renal vitamins daily       Thank you for allowing me to participate in the care of your patients  With any question please call [phone]  Shahnaz Fish    [facility]  BEATRIZ Che MD, CAESAR BRISCOE MD,   MD TORI Santo, NP  [address]  [place], [address] [phone]

## 2020-07-30 NOTE — ASSESSMENT & PLAN NOTE
Blood pressure is adequate. Sh is anemic    Home meds: amlodipine 10 mg daily, carvedilol 25 mg BID and hydralazine 50 mg daily

## 2020-07-30 NOTE — PT/OT/SLP EVAL
Occupational Therapy   Evaluation/Treatment    Name: Mary Carrillo  MRN: [id number]  Admitting Diagnosis:  Hypervolemia associated with renal insufficiency * Surgery Date in Future *  Diagnoses of Hypervolemia associated with renal insufficiency, Chest pain, and Shortness of breath were pertinent to this visit.    Recommendations:     Discharge Recommendations: home with home health, home health OT  Discharge Equipment Recommendations:  none  Barriers to discharge:  None    Assessment:     Mary Carrillo is a 65 y.o. female with a medical diagnosis of Hypervolemia associated with renal insufficiency.  She presents with Performance deficits affecting function: weakness, impaired endurance, impaired self care skills, impaired functional mobilty, gait instability, impaired balance, decreased coordination, decreased lower extremity function, decreased safety awareness, impaired cardiopulmonary response to activity.      Rehab Prognosis: Good; patient would benefit from acute skilled OT services to address these deficits and reach maximum level of function.       Plan:     Patient to be seen 5 x/week to address the above listed problems via self-care/home management, therapeutic activities, therapeutic exercises  · Plan of Care Expires: 08/30/20  · Plan of Care Reviewed with: patient    Subjective     Chief Complaint: none  Patient/Family Comments/goals: to get better, get OOB per daughter request.    Occupational Profile:  Living Environment: Pt. [place] with  [place] [facility] with no steps, walk n shower with shower chair and grab bars.  Previous level of function: Pt was Indep- Christina with ADLS, ambulated with SPC sometimes when tired.   does  and drives. Daughter comes in town and will cook meals.   Roles and Routines:   Equipment Used at Home:  walker, rolling, cane, straight, grab bar, shower chair(has BSC not using)  Assistance upon Discharge: family    Pain/Comfort:  Pain Rating 1: 0/10  Pain Rating Post-Intervention  1: 0/10    Patients cultural, spiritual, Episcopal conflicts given the current situation: no    Objective:     Communicated with: nurse prior to session.  Patient found HOB elevated with telemetry, oxygen upon OT entry to room.    General Precautions: Standard, fall   Orthopedic Precautions:N/A   Braces: N/A     Occupational Performance:    Bed Mobility:    · Patient completed Rolling/Turning to Left with  contact guard assistance and with side rail  · Patient completed Scooting/Bridging with minimum assistance and moderate assistance  · Patient completed Supine to Sit with moderate assistance and 2 persons    Functional Mobility/Transfers:  · Patient completed Sit <> Stand Transfer with moderate assistance and of 2 persons  with RW and second trial hand-held assist   · Patient completed Bed <> Chair Transfer using Step Transfer technique with moderate assistance and of 2 persons with hand-held assist  · Functional Mobility: unable to ambulated 2/2 weakness in LE.    Activities of Daily Living:  · Feeding:  minimum assistance HOB elevated 2/2 fatigue  · Grooming: minimum assistance same  · Lower Body Dressing:  socks  · Toileting: total assistance bed level/ge    Cognitive/Visual Perceptual:  Cognitive/Psychosocial Skills:     -       Oriented to: Person, Place, Time and Situation   -       Follows Commands/attention:Follows one-step commands  -       Communication: clear/fluent  -       Memory: No Deficits noted  -       Safety awareness/insight to disability: impaired   -       Mood/Affect/Coping skills/emotional control: Cooperative  Visual/Perceptual:      -Intact .    Physical Exam:  Balance:    -       sitting:  SBA, fair   standing;  poor  Postural examination/scapula alignment:    -       Rounded shoulders  Motor Planning:    -       intact  Dominant hand:    -       right  Upper Extremity Range of Motion:   wfl  Upper Extremity Strength:  4-/5 left, 4/5 right   Strength: wfl  Fine Motor  Coordination: wfl  Gross motor coordination: wfl  Neurological: normal tone    AMPAC 6 Click ADL:  AMPA Total Score: 19    Treatment & Education:  --Purpose of OT, importance of OOB activity, POC both pt and daughter verbalized understanding  --Family education re:  Bed mobility and transfer assistance needed if pt to go home with HH and family assistance, daughter says she can assist her mom at home.   Education:    Patient left up in chair with all lines intact, call button in reach, nurse notified and daughter present    GOALS:   Multidisciplinary Problems     Occupational Therapy Goals      Goals to be met by: 8/30/2020    Patient will increase functional independence with ADLs by performing:    UE Dressing with Modified [place].  LE Dressing with Modified [place] and Assistive Devices as needed.  Grooming while standing at sink with Modified [place].  Toileting from bedside commode with Modified [place] for hygiene and clothing management.   Rolling to Bilateral with Modified [place].   Supine to sit with Modified [place].  Step transfer with Modified [place]  Toilet transfer to bedside commode with Modified [place].  Increased functional strength to WFL for ADLS.  Upper extremity exercise program 10 reps per handout, with independence.                    History:     Past Medical History:   Diagnosis Date    Arthritis     Asthma     Back pain     BMI 39.0-39.9,adult 7/14/2020    CKD (chronic kidney disease) stage 5, GFR less than 15 ml/min 7/14/2020    Dehiscence of operative wound 4/1/2018    Disorder of kidney and ureter     Glaucoma     Gout     Hyperlipidemia     Hypertension     In 7/29/2020    Obesity     BMI 39    Pott's disease (spinal tuberculosis)     Sarcoidosis     Sarcoidosis     Secondary hyperparathyroidism of renal origin 7/14/2020    Vitritis     Vitritis          Past Surgical History:   Procedure Laterality Date    BACK SURGERY   2018    Laminectomy.     BACK SURGERY      CATARACT EXTRACTION Bilateral      SECTION  ,  1977      x2    HYSTERECTOMY      uterus/cervix d/t uterine fibroids.        Time Tracking:     OT Date of Treatment: 20  OT Start Time: 1100  OT Stop Time: 1133  OT Total Time (min): 33 min COTX eval with PT    Billable Minutes:Evaluation 10    Self Care/Home Management 8    Total Time 18    Franchesca Cerda OT  2020

## 2020-07-30 NOTE — ASSESSMENT & PLAN NOTE
I suspect that she may be septic.  I have ordered cultures.  Chest x-ray reviewed.  Started on Zosyn and vancomycin.  Lactic acid level is normal.  Procalcitonin is mildly elevated.  Cultures so far are no growth

## 2020-07-30 NOTE — NURSING
Pt oriented to self. After reorientation, pt will answer orientation questions correctly for a few minutes, but revert back to self only in followup assessment. Pt opens eyes to voice, and able to follow commands. RR 18-34 breaths per minute. Pulse ox measuring > 92% on 8L HFNC.  Pt repositioned Q2H with use of pillows. Heels floated off mattress at all times.Tubing and other devices remain free from under the patient. Hanks care provided. Andrey screen performed at start of shift, Pt NPO after screen.  NIH SS performed. See flowsheets for details.  Frequent rounds made to assess for safety and discomfort, fall precautions maintained. Call bell within reach. Will continue to monitor

## 2020-07-30 NOTE — SUBJECTIVE & OBJECTIVE
Interval History:   ICU rounds  Patient states that she is feeling a lot better  Denies any CP or shortness of breath  She denies Nausea or diarrhea  She is NPO  Will proceed with swallow study  She is getting one unit of blood transfused at this time      Review of Systems   Constitutional: Negative for chills and fever.   HENT: Negative for sore throat.    Eyes: Negative for visual disturbance.   Respiratory: Negative for shortness of breath.    Cardiovascular: Negative for chest pain.   Gastrointestinal: Negative for abdominal pain, diarrhea and nausea.   Genitourinary: Negative for pelvic pain.   Musculoskeletal: Negative for back pain.   Skin: Negative for rash.   Neurological: Negative for tremors, facial asymmetry, numbness and headaches.     Objective:     Vital Signs (Most Recent):  Temp: 99.1 °F (37.3 °C) (07/30/20 0915)  Pulse: 67 (07/30/20 0915)  Resp: 16 (07/30/20 0915)  BP: (!) 120/55 (07/30/20 0915)  SpO2: 98 % (07/30/20 0915) Vital Signs (24h Range):  Temp:  [96.1 °F (35.6 °C)-99.4 °F (37.4 °C)] 99.1 °F (37.3 °C)  Pulse:  [32-77] 67  Resp:  [13-30] 16  SpO2:  [60 %-100 %] 98 %  BP: ()/() 120/55     Weight: 96.2 kg (212 lb)  Body mass index is 35.28 kg/m².    Intake/Output Summary (Last 24 hours) at 7/30/2020 0947  Last data filed at 7/30/2020 0600  Gross per 24 hour   Intake 625 ml   Output 120 ml   Net 505 ml      Physical Exam  Vitals signs and nursing note reviewed.   Constitutional:       General: She is not in acute distress.     Comments: Slight amount of abdominal breathing pattern   HENT:      Head: Normocephalic and atraumatic.      Nose: Nose normal.      Mouth/Throat:      Pharynx: Oropharynx is clear.   Eyes:      Conjunctiva/sclera: Conjunctivae normal.      Comments: Pupils assymetric. Chronic   Neck:      Musculoskeletal: Normal range of motion and neck supple.   Cardiovascular:      Rate and Rhythm: Normal rate and regular rhythm.      Heart sounds: Murmur (2/6 systolic)  present.   Pulmonary:      Effort: No respiratory distress.      Breath sounds: Normal breath sounds. No wheezing or rales.   Abdominal:      General: Bowel sounds are normal. There is no distension.      Palpations: Abdomen is soft.      Tenderness: There is no abdominal tenderness.   Musculoskeletal:         General: No swelling.   Skin:     General: Skin is warm and dry.   Neurological:      General: No focal deficit present.      Mental Status: She is alert.   Psychiatric:         Mood and Affect: Mood normal.         Behavior: Behavior normal.         Significant Labs:   BMP:   Recent Labs   Lab 07/30/20  0502   GLU 90  90     137   K 3.9  3.9     105   CO2 21*  21*   BUN 41*  41*   CREATININE 4.9*  4.9*   CALCIUM 6.8*  6.8*   MG 2.1  2.1     CBC:   Recent Labs   Lab 07/29/20  1031 07/29/20  1321 07/30/20  0502   WBC 20.91* 20.75* 14.40*  14.40*   HGB 6.8* 7.0* 6.2*  6.2*   HCT 22.7* 24.0* 20.5*  20.5*    276 250  250       Significant Imaging: I have reviewed all pertinent imaging results/findings within the past 24 hours.

## 2020-07-30 NOTE — SUBJECTIVE & OBJECTIVE
Interval History:   3/19 Extubated; Pending 2D Echo, ID recommendations to discontinue Airborne Precautions due to pt being completely treated for TB.   Review of Systems  Review of symptoms: Denies all   Constitutional: Denies fevers or chills.  Pulmonary: Denies shortness of breath or cough.  Cardiology: Denies chest pain or palpitations.  GI: Denies abdominal pain or constipation.  Neurologic: Denies new weakness,  headache, or paresthesias.    Objective:     Vitals:  Temp: 99.1 °F (37.3 °C)  Pulse: 98  Rhythm: normal sinus rhythm  BP: (!) 182/92  MAP (mmHg): 89  Resp: (!) 21  SpO2: 99 %  Oxygen Concentration (%): 40  O2 Device (Oxygen Therapy): nasal cannula  Vent Mode: Spont  Set Rate: 0 bmp  Vt Set: 420 mL  Pressure Support: 7 cmH20  PEEP/CPAP: 5 cmH20  Peak Airway Pressure: 13 cmH2O  Mean Airway Pressure: 7.3 cmH20  Plateau Pressure: 0 cmH20    Temp  Min: 97.5 °F (36.4 °C)  Max: 99.1 °F (37.3 °C)  Pulse  Min: 67  Max: 101  BP  Min: 105/55  Max: 182/92  MAP (mmHg)  Min: 75  Max: 114  Resp  Min: 2  Max: 29  SpO2  Min: 97 %  Max: 100 %  Oxygen Concentration (%)  Min: 40  Max: 40    03/18 0701 - 03/19 0700  In: 72500.8 [I.V.:8426.8]  Out: 4915 [Urine:1530; Drains:385]   Unmeasured Output  Urine Occurrence: 0  Stool Occurrence: 1       Physical Exam   Physical Exam:  GA: Awake, Alert, Extubated, Oriented, Follows commands comfortable, no acute distress.   HEENT: No scleral icterus or JVD.   Pulmonary: Clear to auscultation Anterior. No wheezing, crackles, or rhonchi.  Cardiac: RRR S1 & S2 w/o rubs/murmurs/gallops.   Abdominal: Bowel sounds present x 4. No appreciable hepatosplenomegaly.  Skin: No jaundice, rashes, or visible lesions.  Neuro:  --GCS: E4 V5 M6  --Mental Status: Awake, Alert, Extubated, Oriented, Follows Commands  --CN II-XII grossly intact.   --Pupils 4mm, PERRL.   --Corneal reflex, gag, cough intact.  --LUE strength: 5/5  --RUE strength: 5/5  --LLE strength: 4/5  --RLE strength: 4/5  Unable to test  gait due to level of consciousness.    Medications:  Continuous  sodium chloride 0.9% Last Rate: 75 mL/hr at 03/19/18 1800   Scheduled  chlorhexidine 15 mL BID   [START ON 3/20/2018] isoniazid 900 mg Twice Weekly   pantoprazole 40 mg Daily   polyethylene glycol 17 g Daily   [START ON 3/20/2018] pyridoxine (vitamin B6) 50 mg Daily   [START ON 3/20/2018] rifAMpin 600 mg Twice Weekly   sodium chloride 0.9% 1,000 mL Once   PRN  acetaminophen 650 mg Q4H PRN   dextrose 50% 12.5 g PRN   dextrose 50% 25 g PRN   glucagon (human recombinant) 1 mg PRN   glucose 16 g PRN   glucose 24 g PRN   insulin aspart U-100 0-5 Units QID (AC + HS) PRN   microfibrillar collagen 1 g PRN   oxyCODONE-acetaminophen 2 tablet Q4H PRN   senna-docusate 8.6-50 mg 1 tablet Daily PRN   sodium chloride 0.9% 3 mL PRN   sodium chloride 0.9% 5 mL PRN     Today I personally reviewed pertinent medications, lines/drains/airways, lab results, notably:    Diet  Diet NPO Except for: Sips with Medication  Diet NPO Except for: Sips with Medication       Yes right

## 2020-07-30 NOTE — ASSESSMENT & PLAN NOTE
MRI reviewed.  Does not have any focal deficits.  Will consult neurology.  She needs APA  She has allergy to Naprosyn.  Will begin plavix. Not on BP meds   Will add PPI sonce she is anemai  Will need carotid imaging for completeness.  PT/OT  Swallow screen.    Patient has multiple risk factors for CVA. This was clinically silent.  Pupillary issues are related to surgery. Decreased LOC  Was likely realated to acute blood loss and low BP.  She has no deficits at this time.    Await neurology evaluation

## 2020-07-30 NOTE — ASSESSMENT & PLAN NOTE
MRI reviewed.  Does not have any focal deficits.  Will consult neurology.  Discontinue blood pressure medication.  Permissive hypertension

## 2020-07-30 NOTE — PLAN OF CARE
Pt transferred to ICU yesterday   AS per progress notes:on 7/29/20 a code blue was called while patient was on dialysis whereby patient experienced SOB, low pulse and AMS. MRI brain revealed a lacunar infarct. She was moved upto the ICU for worsening of medical comorbidities.    Neuro, Cards, Neph following pt ; pt receiving PRBC ,neuro work up in progress, PT/OT/ST evals ordered.  Prior to admit, pt lives with her spouse and was independent.  This Tn spoke to  Katharine Lang RN, [place] who noted she sent in pt's referral for outpatient dialysis with [facility] followed by Dr. Mcintyre on 7/28/2020 and provided additional information this am via phone. Pt will be [facility] to [facility]    TN to continue to follow.     07/30/20 1233   Discharge Reassessment   Assessment Type Discharge Planning Reassessment   Provided patient/caregiver education on the expected discharge date and the discharge plan Yes   Do you have any problems affording any of your prescribed medications? No   Discharge Plan A Home with family   Discharge Plan B Home Health   DME Needed Upon Discharge  none   Patient choice form signed by patient/caregiver N/A   Anticipated Discharge Disposition Home   Can the patient/caregiver answer the patient profile reliably? Yes, cognitively intact   Describe the patient's ability to walk at the present time. Minor restrictions or changes   How often would a person be available to care for the patient? Often   During the past month, has the patient often been bothered by feeling down, depressed or hopeless? No   During the past month, has the patient often been bothered by little interest or pleasure in doing things? No

## 2020-07-30 NOTE — ASSESSMENT & PLAN NOTE
With dialysis  venofer 100  EPogen 5K with each HD    Now with superimposed acute blood loss anemia.  Will transfuse one unit.

## 2020-07-30 NOTE — CARE UPDATE
Troponin flat at 0.1  TTE:  · Low normal left ventricular systolic function. The estimated ejection fraction is 50-55%.  · Mild concentric left ventricular hypertrophy.  · Grade II (moderate) left ventricular diastolic dysfunction consistent with pseudonormalization.  · Moderate right ventricular enlargement.  · Normal right ventricular systolic function.  · Moderate right atrial enlargement.  · Mild tricuspid regurgitation.    Hgb 6.2- PRBCs ordered per primary team    No bradycardia noted on tele overnight    No further cardiac work up is needed prior to PD cath placement    Cardiology will follow from ar

## 2020-07-30 NOTE — NURSING
Notified Dr. Baird of low h/h and the need for a blood consent for the pt. Was given telephone order to prepare and transfuse 1 unit of PRBC. Type and screen has been completed. Order read back and verified.

## 2020-07-30 NOTE — CONSULTS
"  [facility] ICU 5th Floor  Adult Nutrition  Consult Note    SUMMARY     Recommendations    Recommendation:   1. Continue current dysphagia mech soft, renal diet with Novasource Renal TID.   2. Encourage intake of all meals and ONS.   3. RD to continue to follow and monitor intake    Goals:   Pt intake >/= 75% EEN/EPN by RD follow up    Nutrition Goal Status: new  Communication of RD Recs: other (comment)(POC)    Reason for Assessment    Reason For Assessment: consult(Assess dietary needs)  Diagnosis: other (see comments)(Hypervolemia associated with renal insufficiency )  Relevant Medical History: HTN, sacoidosis, Gout, HLD, Vitritis, obesity, Pott's disease, hyperparathyroidism of renal origin, CKD stage 5  Interdisciplinary Rounds: did not attend  General Information Comments: Pt recently advanced to dysphagia mech soft diet with renal restrictions. Novasource Renal ordered TID. PD cath to be placed after Code Blue called 7/29 while on HD. NFPE not completed today 2/2 pt with treatment team.   Nutrition Discharge Planning: d/c on renal diet with ONS as needed    Nutrition Risk Screen    Nutrition Risk Screen: no indicators present    Nutrition/Diet History    Food Preferences: JAKY  Food Allergies: NKFA  Factors Affecting Nutritional Intake: None identified at this time    Anthropometrics    Temp: 98.3 °F (36.8 °C)  Height Method: Stated  Height: 5' 5" (165.1 cm)  Height (inches): 65 in  Weight Method: Bed Scale  Weight: 96.2 kg (212 lb 1.3 oz)  Weight (lb): 212.08 lb  Ideal Body Weight (IBW), Female: 125 lb  % Ideal Body Weight, Female (lb): 169.66 %  BMI (Calculated): 35.3  BMI Grade: 35 - 39.9 - obesity - grade II       Lab/Procedures/Meds    Pertinent Labs Reviewed: reviewed  Pertinent Labs Comments: H/H 6.2/20.5, CO2 21, BUN 41, Cr 4.9, eGFR 10, Ca 6.8, Phos 6.3, Alb 2.2, HDL 24, A1C 5.2  Pertinent Medications Reviewed: reviewed  Pertinent Medications Comments: statin, paricalcitol, " piperacillian-tazobactam, poly glycol, sevelamer, renal vit    Physical Findings/Assessment    Bryant Score: 17    Estimated/Assessed Needs    Weight Used For Calorie Calculations: 96.2 kg (212 lb 1.3 oz)  Energy Calorie Requirements (kcal): 1873  Energy Need Method: Millstone-St Jeor(x 1.25)  Protein Requirements: (1.0-1.2 gm/kg)  Weight Used For Protein Calculations: 96.2 kg (212 lb 1.3 oz)     Estimated Fluid Requirement Method: RDA Method(or PER MD)  RDA Method (mL): 1873         Nutrition Prescription Ordered    Current Diet Order: Dysphagia Mech Soft, Renal   Oral Nutrition Supplement: Novasource Renal     Evaluation of Received Nutrient/Fluid Intake    I/O: 625/295  Energy Calories Required: not meeting needs  Protein Required: not meeting needs  Fluid Required: not meeting needs  Comments: LBM 7/29  Tolerance: tolerating  % Intake of Estimated Energy Needs: 0 - 25 %  % Meal Intake: 0 - 25 %, diet recently advanced    Nutrition Risk    Level of Risk/Frequency of Follow-up: (2 x/week)     Assessment and Plan    Nutrition Problem  Inadequate oral intake    Related to (etiology):   dysphagia    Signs and Symptoms (as evidenced by):   Diet recently advanced to mechanical soft with renal restrictions     Interventions:  Collaboration with other providers  Commercial Beverage: Novasource Renal TID to supplement protein and calorie intake     Nutrition Diagnosis Status:   New         Monitor and Evaluation    Food and Nutrient Intake: energy intake, food and beverage intake  Food and Nutrient Adminstration: diet order  Knowledge/Beliefs/Attitudes: food and nutrition knowledge/skill  Physical Activity and Function: nutrition-related ADLs and IADLs  Anthropometric Measurements: weight, weight change, body mass index  Biochemical Data, Medical Tests and Procedures: electrolyte and renal panel, gastrointestinal profile, glucose/endocrine profile, inflammatory profile, lipid profile  Nutrition-Focused Physical  Findings: overall appearance     Malnutrition Assessment     NFPE not completed today 2/2 pt with treatment team    Nutrition Follow-Up    RD Follow-up?: Yes

## 2020-07-30 NOTE — CONSULTS
Neurology Consult and Evaluation Note:    Reason for consult: lacunar stroke    HPI  64 yo AAF with medical hx of HTN, HL, diastolic dysfunction, aortic valve stenosis, todd disease, obestiy, gout, sarcoidosis, CKD 5, asthma, chronic back pain who is admitted for initiation of peritoneal dialysis for anuria, and pulmonary edema/CHF. On 20 a code blue was called while patient was on dialysis whereby patient experienced SOB, low pulse and AMS. MRI brain revealed a lacunar infarct. She was moved upto the ICU for worsening of medical comorbidities.    ROS: 12 point ROS negative except as above    Past Medical History:   Diagnosis Date    Arthritis     Asthma     Back pain     BMI 39.0-39.9,adult 2020    CKD (chronic kidney disease) stage 5, GFR less than 15 ml/min 2020    Dehiscence of operative wound 2018    Disorder of kidney and ureter     Glaucoma     Gout     Hyperlipidemia     Hypertension     In 2020    Obesity     BMI 39    Pott's disease (spinal tuberculosis)     Sarcoidosis     Sarcoidosis     Secondary hyperparathyroidism of renal origin 2020    Vitritis     Vitritis      Past Surgical History:   Procedure Laterality Date    BACK SURGERY  2018    Laminectomy.     BACK SURGERY      CATARACT EXTRACTION Bilateral      SECTION  ,  1977      x2    HYSTERECTOMY      uterus/cervix d/t uterine fibroids.      Family History   Problem Relation Age of Onset    Diabetes Mother     Kidney disease Mother         ESRD on dialysis    Diabetes Father     Kidney disease Father         ESRD on dialysis     No Known Problems Sister     Hypertension Brother     Asthma Brother     No Known Problems Sister     No Known Problems Sister      Social History     Socioeconomic History    Marital status:      Spouse name: Lior Carrillo    Number of children: 2    Years of education: 10    Highest education level: Not on file   Occupational History     Occupation: car[employer]     Comment: retired   Social Needs    Financial resource strain: Not on file    Food insecurity     Worry: Not on file     Inability: Not on file    Transportation needs     Medical: Not on file     Non-medical: Not on file   Tobacco Use    Smoking status: Former Smoker     Packs/day: 1.00     Years: 35.00     Pack years: 35.00     Types: Cigarettes     Start date: 1967     Quit date: 1994     Years since quittin.6    Smokeless tobacco: Never Used   Substance and Sexual Activity    Alcohol use: No     Alcohol/week: 0.0 standard drinks    Drug use: No    Sexual activity: Yes     Partners: Male     Birth control/protection: Post-menopausal, See Surgical Hx   Lifestyle    Physical activity     Days per week: Not on file     Minutes per session: Not on file    Stress: Not on file   Relationships    Social connections     Talks on phone: Not on file     Gets together: Not on file     Attends [employer] service: Not on file     Active member of club or organization: Not on file     Attends meetings of clubs or organizations: Not on file     Relationship status: Not on file   Other Topics Concern    Not on file   Social History Narrative    Caregiver 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 They live in [place]     No current facility-administered medications on file prior to encounter.      Current Outpatient Medications on File Prior to Encounter   Medication Sig Dispense Refill    albuterol (PROAIR HFA) 90 mcg/actuation inhaler Inhale 2 puffs into the lungs every 6 (six) hours as needed for Wheezing. Rescue 1 Inhaler 11    amLODIPine (NORVASC) 10 MG tablet Take 1 tablet (10 mg total) by mouth once daily. 90 tablet 3    carvedilol (COREG) 25 MG tablet Take 1 tablet (25 mg total) by mouth 2 (two) times daily with meals. 60 tablet 11    furosemide (LASIX) 40 MG tablet Take 1 tablet (40 mg total) by mouth 2 (two) times daily. (Patient taking differently: Take 40 mg by mouth once daily. )  60 tablet 11    hydrALAZINE (APRESOLINE) 50 MG tablet Take 1 tablet (50 mg total) by mouth 3 (three) times daily. (Patient taking differently: Take 50 mg by mouth once daily. ) 270 tablet 3    HYDROcodone-acetaminophen (NORCO)  mg per tablet Take 1 tablet by mouth every 12 (twelve) hours as needed for Pain. 28 tablet 0    LIPITOR 40 mg tablet Take 1 tablet (40 mg total) by mouth once daily. 90 tablet 3    tiZANidine (ZANAFLEX) 4 MG tablet Take 4 mg by mouth every 8 (eight) hours as needed.       folic acid (FOLVITE) 1 MG tablet Take 1 tablet (1 mg total) by mouth once daily. 90 tablet 3    hydrOXYzine pamoate (VISTARIL) 50 MG Cap Take 1 capsule (50 mg total) by mouth every evening. 60 capsule 3    traZODone (DESYREL) 100 MG tablet Take 100 mg by mouth nightly as needed.        Exam:  Vitals:    07/30/20 0915   BP: (!) 120/55   Pulse: 67   Resp: 16   Temp: 99.1 °F (37.3 °C)     Exam    Current NIH Stroke Score Values      Most Recent Value   1a. Level of Consciousness  0-->Alert, keenly responsive   1b. LOC Questions  1-->Answers one question correctly   1c. LOC Commands  0-->Performs both tasks correctly   2. Best Gaze  0-->Normal   3. Visual  0-->No visual loss   4. Facial Palsy  0-->Normal symmetrical movements   5a. Motor Arm, Left  0-->No drift, limb holds 90 (or 45) degrees for full 10 secs   5b. Motor Arm, Right  0-->No drift, limb holds 90 (or 45) degrees for full 10 secs   6a. Motor Leg, Left  2-   6b. Motor Leg, Right  0   7. Limb Ataxia  0-->Absent   8. Sensory  0-->Normal, no sensory loss   9. Best Language  0-->No aphasia, normal   10. Dysarthria  0-->Normal   11. Extinction and Inattention (formerly Neglect)  0-->No abnormality   Total (NIH Stroke Scale)  2        GENERAL/CONSTITUTIONAL:   -Well appearing; well nourished     CV:  -NRRR; limbs warm      HIGHER INTEGRATIVE FUNCTIONS:   -Normal attention span and concentration  -Oriented to time, place and person  -Recent and remote memory  intact   -Speech without dysarthria, no aphasia (some changes in speech 2/2 edentulous)  -Normal fund of knowledge     CN:   -visual fields intact  -PERRL  -EOMI with some restriction in left lateral gaze but no deficits in vision, no nystagmus  -Facial sensation intact bilaterally  -Facial strength symmetrical  -Hearing normal BL  -Palate elevates symmetrically  -Normal shoulder shrug and head turn  -Tongue protrudes midline      MOTOR:   -Tone: normal in upper and lower extremities  -RUE 4/5  -RLE 2/5   -LUE 4+/5  -LLE 4/5     SENSORY:   -Sensation normal bilaterally to light touch     REFLEXES:   -bilateral symmetric   -Flexor plantar reflex bilaterally  -No clonus     GAIT & STATION:   Not tested as RLE 2/5 strength      Imaging and laboratory results: reviewed    MRI brain without contrast:   Left thalamic lacunar type infarct, likely acute.  No intracranial hemorrhage or cortical infarct.  Chronic microvascular ischemic change.  Grossly unchanged chronic expansion with CSF signal of the sella, likely sequela of arachnoid cyst.    CT brain  Scattered white matter hypoattenuation with predominant subcortical distribution, also potentially involving the left cerebellar lobe, more conspicuous from 2018.  Overall findings are nonspecific to include sequela of chronic microangiopathic change    Labs:   PTT 36.6  CPK: 354  Electroylte abnormalities: Ca 6.8  CBC: white count 14.4 (high) ; Hb 6.2 (low) Hct 20.5, platelets 250 (nl)  TSH 2.080 normal  Hba1c 5.2  Lipid panel: LDL 92.6    Assessment and Recommendations:   65 y F with multiple medical comorbidites and stroke risk factors including CKD 5 on dialysis, heart failure/pulmonary edema and todd disease, aortic valve stenosis, and autoimmune conditions with predisposition to vasculitis and changes in BP who has a lacunar infarct on brain MRI    Recommendations:  1) Reviewed stroke work up: MRI brain with lacunar infarct in left thalamus. ECHO with low normal EF  50%. Labs with anemia, LDL 92.6. Vitals with steep changes in BP  2) continue telemetry,   3) Preferable to assess intracranial and extracranial vasculature with MRA brain/neck without contrast to make decision re; single vs dual antiplatelet therapy and duration. Since patient with sarcoidosis, intracranial/extracranial vasculitis also needs to be evaluated for via MRA.  3) Inquiry whether ECHO evaluated for shunt  4) After initial permissive HTN window would gradually lower BP over 24-48 hrs to reach goal 140/90. Avoid rapid changes in BP. Avoid hypotension.  BP management extremely important for lacunar strokes.  5) Continue single antiplatelet as Hb 6.2. Currently on plavix; obtain plavix sensitivity assay. Once Hb improves, will need to assess re; dual antiplatelet.  6) continue statin. LDL goal <70 mg/dl  7) PT/OT/ST recs  8) DVT screening and prophylaxis per primary team.  9) risk factor modification per primary/    Pt staffed with Dr Lucia Dillon who examined pt at bedside.   Please call for any questions.

## 2020-07-31 PROBLEM — D72.829 LEUKOCYTOSIS: Status: ACTIVE | Noted: 2020-01-01

## 2020-07-31 PROBLEM — N28.9 HYPERVOLEMIA ASSOCIATED WITH RENAL INSUFFICIENCY: Status: ACTIVE | Noted: 2020-01-01

## 2020-07-31 PROBLEM — E87.70 HYPERVOLEMIA ASSOCIATED WITH RENAL INSUFFICIENCY: Status: ACTIVE | Noted: 2020-01-01

## 2020-07-31 NOTE — PLAN OF CARE
Problem: Occupational Therapy Goal  Goal: Occupational Therapy Goal  Description: Goals to be met by: 8/30/2020    Patient will increase functional independence with ADLs by performing:    UE Dressing with Modified [place].  LE Dressing with Modified [place] and Assistive Devices as needed.  Grooming while standing at sink with Modified [place].  Toileting from bedside commode with Modified [place] for hygiene and clothing management.   Rolling to Bilateral with Modified [place].   Supine to sit with Modified [place].  Step transfer with Modified [place]  Toilet transfer to bedside commode with Modified [place].  Increased functional strength to WFL for ADLS.  Upper extremity exercise program 10 reps per handout, with independence.    Outcome: Ongoing, Progressing    OT initial eval completed and OT recommending HH at discharge.   Continue with OT POC.

## 2020-07-31 NOTE — PLAN OF CARE
07/31/20 1254   Post-Acute Status   Post-Acute Authorization Dialysis   The Sw met with the pt and her daughter Chantal Carrillo [phone] who was at bedside. The Sw spoke to Karri at [facility] [phone]. She faxed this Sw the corrected Welcome Letter for the pt along with her instructions b/c the old one had a center in [place], [place] listed. The Sw gave Chantal a copy of the letter. The pt will be starting at the [facility] Thursday, August 6, 2020 at 9am. She states the clinic asked for this date due to the pt getting transferred to the ICU. The Sw informed her the pt may possibly be ready for d/c prior and she states the TN can just call and thety will be able to change the date. The address for the clinic is [address] [place] [address] Zoya Srinivasan. [id number] clinic #[id number]. The Sw notified the pt and Chantal of this info mentioned above and both are in agreement with the dialysis center and the d/c plan. The pt will be on pd. The Sw left her name and contact info and encouraged them to call should they have any further questions or concerns.

## 2020-07-31 NOTE — SUBJECTIVE & OBJECTIVE
Interval History:  Patient and daughter  She is presently on HD and tolerating well.  Denies CP or SOB  Feels well.  Denies nausea or vomiting or diarrhea  No bleeding from dialysis catheter  No CVA sx    Reviewed carotid US  Reviewed Neuro reccs  Will transfer to the floor  Updated patient and daughter and answered all questions    Review of Systems   Constitutional: Negative for fever.   Respiratory: Negative for shortness of breath.    Cardiovascular: Negative for chest pain.   Gastrointestinal: Negative for abdominal pain and diarrhea.   Neurological: Negative for speech difficulty and numbness.     Objective:     Vital Signs (Most Recent):  Temp: 97.9 °F (36.6 °C) (07/31/20 0850)  Pulse: 72 (07/31/20 1015)  Resp: (!) 32 (07/31/20 1015)  BP: 136/62 (07/31/20 1015)  SpO2: 99 % (07/31/20 1015) Vital Signs (24h Range):  Temp:  [97.6 °F (36.4 °C)-98.6 °F (37 °C)] 97.9 °F (36.6 °C)  Pulse:  [61-80] 72  Resp:  [12-40] 32  SpO2:  [89 %-100 %] 99 %  BP: (107-154)/(51-74) 136/62     Weight: 96.2 kg (212 lb 1.3 oz)  Body mass index is 35.29 kg/m².    Intake/Output Summary (Last 24 hours) at 7/31/2020 1026  Last data filed at 7/31/2020 0600  Gross per 24 hour   Intake 1060 ml   Output 550 ml   Net 510 ml      Physical Exam  Vitals signs and nursing note reviewed.   Constitutional:       General: She is not in acute distress.     Comments: Getting dialysis   HENT:      Head: Normocephalic and atraumatic.      Mouth/Throat:      Pharynx: Oropharynx is clear.   Eyes:      General: No scleral icterus.  Neck:      Musculoskeletal: Normal range of motion and neck supple.   Cardiovascular:      Rate and Rhythm: Normal rate and regular rhythm.      Heart sounds: No murmur.      Comments: RIJ dialysis catheter in place with no oozing or bleeding  Pulmonary:      Effort: No respiratory distress.      Breath sounds: Rales (at bases) present.   Abdominal:      General: Bowel sounds are normal.      Palpations: Abdomen is soft.       Tenderness: There is no abdominal tenderness.   Musculoskeletal:         General: No swelling.   Skin:     General: Skin is warm and dry.      Capillary Refill: Capillary refill takes less than 2 seconds.   Neurological:      General: No focal deficit present.      Mental Status: She is alert.   Psychiatric:         Mood and Affect: Mood normal.         Behavior: Behavior normal.         Significant Labs:   BMP:   Recent Labs   Lab 07/31/20  0610   GLU 84  84     138   K 3.6  3.6     105   CO2 22*  22*   BUN 52*  52*   CREATININE 5.9*  5.9*   CALCIUM 6.6*  6.6*   MG 2.1     CBC:   Recent Labs   Lab 07/30/20  0502 07/31/20  0610   WBC 14.40*  14.40* 11.96   HGB 6.2*  6.2* 7.9*   HCT 20.5*  20.5* 25.1*     250 237       Significant Imaging: I have reviewed all pertinent imaging results/findings within the past 24 hours.   US Carotid Bilateral  Narrative: EXAMINATION:  US CAROTID BILATERAL    CLINICAL HISTORY:  lacunar stroke;    TECHNIQUE:  Grayscale and color Doppler ultrasound examination of the carotid and vertebral artery systems bilaterally.  Stenosis estimates are per the NASCET measurement criteria.    COMPARISON:  No prior similar sonographic imaging.    FINDINGS:  Right:    Internal Carotid Artery (ICA) peak systolic velocity unable to be performed given overlying bandage material from catheter access.    ICA/CCA peak systolic velocity ratio: Not applicable    Vertebral artery: Not evaluated    Left:    Internal Carotid Artery (ICA)  peak systolic velocity 121 cm/sec    ICA/CCA peak systolic velocity ratio: 1.4    Vertebral artery: Not well visualized.  Impression: No evidence of hemodynamically significant carotid bifurcation stenosis on the left by criteria.  Incomplete evaluation of the right ICA given overlying bandage material.    Nonvisualized left vertebral artery, possibly diminutive versus narrowed/occluded.    Electronically signed by: Lavelle  Kalia  Date:    07/30/2020  Time:    16:52

## 2020-07-31 NOTE — PROGRESS NOTES
Therapy with vancomycin complete and/or consult discontinued by provider.  Pharmacy will sign off, please re-consult as needed.    Becky Bentley, DionD, BCPS

## 2020-07-31 NOTE — NURSING
Pt is up feeding herself. AAOX3. Disoriented to time. Morning meds given. Pt shows no difficulty in swallowing or motor movement.

## 2020-07-31 NOTE — PT/OT/SLP PROGRESS
Occupational Therapy   Treatment    Name: Mary Carrillo  MRN: [id number]  Admitting Diagnosis:  Hypervolemia associated with renal insufficiency  * Surgery Date in Future *  Pre-op Diagnosis: Hypervolemia associated with renal insufficiency [E87.70, N28.9] s/p Procedure(s):  INSERTION, CATHETER, DIALYSIS, PERITONEAL, LAPAROSCOPIC   Recommendations:     Discharge Recommendations: home health OT, home with home health  Discharge Equipment Recommendations:  bedside commode  Barriers to discharge:  None    Assessment:     Mary Carrillo is a 65 y.o. female with a medical diagnosis of Hypervolemia associated with renal insufficiency.  She presents with Performance deficits affecting function are weakness, impaired endurance, impaired self care skills, impaired functional mobilty, gait instability, impaired balance, decreased coordination, decreased upper extremity function, decreased lower extremity function, decreased safety awareness, impaired cardiopulmonary response to activity.     Rehab Prognosis:  Good; patient would benefit from acute skilled OT services to address these deficits and reach maximum level of function.       Plan:     Patient to be seen 5 x/week to address the above listed problems via self-care/home management, therapeutic activities, therapeutic exercises  · Plan of Care Expires: 08/30/20  · Plan of Care Reviewed with: patient, daughter    Subjective     Pain/Comfort:  · Pain Rating 1: 0/10  · Pain Rating Post-Intervention 1: 0/10    Objective:     Communicated with: nurse prior to session.  Patient found HOB elevated with blood pressure cuff, pulse ox (continuous), peripheral IV, telemetry, oxygen, ge catheter upon OT entry to room.    General Precautions: Standard, fall   Orthopedic Precautions:N/A   Braces: N/A     Occupational Performance:     Bed Mobility:    · Patient completed Rolling/Turning to Right with minimum assistance and with side rail  · Patient completed Scooting/Bridging with  moderate assistance  · Patient completed Supine to Sit with moderate assistance  · Patient completed Sit to Supine with moderate assistance     Functional Mobility/Transfers:  Pt  too fatigued to stand, had HD today.    [facility] 6 Click ADL: 16    Treatment & Education:  --Purpose of OT visit, verbalized understanding.  --Pt oriented to person, place, month but not year.  -- Agreeable to EOB activity with OT.  Pt. transitioned to EOB with mod A, sat EOB with SBA, slight dizziness, did ankle pumps x 10 reps with slight improvement.    - pre exercise O2 saturation: 100% on 5 L HFNC. Pt. performed 10 x 1 set AROM shld flex and abd/add ex, max vc for PLB and deep breathing with poor return demonstration.  Poor pulse ox reading and connectivity on finger while in sitting, eventually able to get O2 saturation reading 87%-90%.  Pt sat total 20 min with increasng fatigue, c/o feeling tired and did not feel she could stand. Returned to bed, O2 sats 100%.    Patient left right sidelying with all lines intact and call button in reachEducation:      GOALS:   Multidisciplinary Problems     Occupational Therapy Goals        Problem: Occupational Therapy Goal    Goal Priority Disciplines Outcome Interventions   Occupational Therapy Goal     OT, PT/OT Ongoing, Progressing    Description: Goals to be met by: 8/30/2020    Patient will increase functional independence with ADLs by performing:    UE Dressing with Modified [place].  LE Dressing with Modified [place] and Assistive Devices as needed.  Grooming while standing at sink with Modified [place].  Toileting from bedside commode with Modified [place] for hygiene and clothing management.   Rolling to Bilateral with Modified [place].   Supine to sit with Modified [place].  Step transfer with Modified [place]  Toilet transfer to bedside commode with Modified [place].  Increased functional strength to WFL for ADLS.  Upper extremity exercise program  10 reps per handout, with independence.                     Time Tracking:     OT Date of Treatment: 07/31/20  OT Start Time: 1500  OT Stop Time: 1528  OT Total Time (min): 28 min    Billable Minutes:Therapeutic Activity 20  Therapeutic Exercise 8  Total Time 28    Franchesca Cerda OT  7/31/2020

## 2020-07-31 NOTE — PLAN OF CARE
VN cued into pt's room for introduction with pt's permission. Pt transferred to floor from ICU. Family at bedside. Pt noted to be on oxygen. Instructed pt to call for assistance. Pt aware and agreeable. Allowed time for questions. NAD noted.    Patient's chart, progress notes, and care plan reviewed. Will be available as needed.

## 2020-07-31 NOTE — NURSING
Arrived bedside for scheduled HD with UF. POC discussed with patient. Treatment initiated at this time.

## 2020-07-31 NOTE — PHYSICIAN QUERY
PT Name: Mary Carrillo  MR #: [id number]     RENAL CONDITION CLARIFICATION     CDS/: Leann Guthrie RN              Contact information: [phone]  This form is a permanent document in the medical record.     Query Date: July 31, 2020    By submitting this query, we are merely seeking further clarification of documentation.  Please utilize your independent clinical judgment when addressing the question(s) below.    The Medical Record contains the following:   Indicator Supporting Clinical Findings Location in Medical Record    Kidney (Renal) Insufficiency     x Kidney (Renal) Failure/Injury Presented to ED with SOB, found to be in renal failure    Hypervolemia associated with renal insufficiency  CKD 5  BUN/Cr 66/6.7 GFR 7 on admit, baseline Cr 3-4   Presents with volume overload due to renal insufficiency.   Anuric despite trial of IV lasix.   Will initiate HD today then place PD catheter    Patient was admitted with worsening renal failure and to be started on HD.       CKD 5 with progression to ESRD     presents with progressively worsening SOB and edema  ESRD 7/27 General surgery consult      7/27 HP            7/29 Cardiac consult      7/29 Hosp med PN    7/27 Renal consult    Nephrotoxic Agents     x BUN/Creatinine GFR baseline Cr 3-4     Bun/cr= 66/ 6.7   GFR= 7  Bun/cr= 70/ 7.0   GFR= 6  Bun/cr= 48/ 5.1   GFR= 9 7/27 HP    7/26 Lab  7/27 Lab  7/278 Lab     x Urine: Casts         Eosinophils Hyaline cast 1 7/29 Urinalysis      Dehydration      Nausea/Vomiting     x Dialysis/CRRT 3rd treatment was 7/29/30 7/30 Renal MD PN     x Treatment: NS 500mL bolus    Renal consult  General surgery consult  Hemodialysis  Renal panel  Urinalysis  Input and output   7/29 MAR    7/26 NSG orders    Other:        Acute Kidney Injury / Acute Renal Failure has different defining criteria. A generally accepted guideline is:   A greater than 100% (2X) rise in serum creatinine from baseline* occurring during the course of  a single hospital stay.   *Baseline as determined by the providers judgment and consideration of previous lab values and other documentation, if available.    A diagnosis of Acute Kidney Injury/Acute Renal Failure should incorporate abnormal labs and clinical findings that are clinically significant      References: 1. John et al. Acute renal failure-definition, outcome measures, animal models, fluid therapy and information technology needs: the Second International Consensus Conference of the Acute Dialysis Quality Initiative (ADQI) Group. Crit Care 2004; 8:B204; 2. Jennifer et al. Acute Kidney Injury Network: report of an initiative to improve outcomes in acute kidney injury. Crit Care 2007; 11:R31; 3. Kidney Disease: Improving Global Outcomes (KDIGO). Acute Kidney Injury Work Group. KDIGO clinical practice guidelines for acute kidney injury. Kidney Int Suppl 2012; 2:1.    The clinical guidelines noted above are only a system guideline. It does not replace the providers clinical judgment.     Provider, please specify the diagnosis or diagnoses associated with above clinical findings.      [    ] CHRISTEN- ATN and CKD 5 with progression to ESRD now HD        [    ] CHRISTEN and CKD 5 with progression to ESRD now HD      [  x  ]  CKD 5 with progression to ESRD now HD      [    ] Other (please specify): _______________________________   [  ] Clinically Undetermined       Present on admission (POA) status:   [   ] Yes (Y)              [   ] Clinically Undetermined (W)             [   ] No (N)               [   ] Documentation insufficient to determine if condition is POA (U)     Please document in your progress notes daily for the duration of treatment until resolved and include in your discharge summary.

## 2020-07-31 NOTE — PLAN OF CARE
Pt resting in bed, VSS, AAOX3 disoriented to time and place. Pt with no complaints of pain or discomfort, ge in place, bed in low position, call light in reach.

## 2020-07-31 NOTE — PT/OT/SLP PROGRESS
"Speech Language Pathology Treatment    Patient Name:  Mary Carrillo   MRN:  [id number]  Admitting Diagnosis: Hypervolemia associated with renal insufficiency    Recommendations:                 Diet recommendations:  Dental soft, Thin   Aspiration Precautions: slow rate of intake, 1 bite/sip at a time, Alternating bites/sips, Assistance with meals, Avoid talking while eating, Check for pocketing/oral residue, Eliminate distractions, Feed only when awake/alert, HOB to 90 degrees, Meds whole 1 at a time, Monitor for s/s of aspiration, Remain upright 30 minutes post meal, Small bites/sips, Standard aspiration precautions and Wear oxygen during intake   General Precautions: Standard, aspiration, respiratory, fall  Communication strategies:  none     Subjective     Pt seen this date for diet follow up and monitor cognitive status.   Patient goals: "I feel good, can I get something better to eat?"    Pain/Comfort:  Pain Rating 1: 0/10    Objective:     Has the patient been evaluated by SLP for swallowing?   Yes  Keep patient NPO? No   Current Respiratory Status: room air      Cognition/Communication: Pt seen in ICU, she is alert and awake. Pt oriented to self and family in room. Pt stated biographical info (address, , adult dtr;s name, grand kids names, phone number). Pt followed commands with no difficulty. Pt responded to general and personal questions with 100% acc.   Pt named items in categories up to 15 with min cues. Pt able to engage in conversation with no word finding deficits present. Dtr reported "my mom is doing great, I have not noticed any issues."  Pt did recall morning events, she is aware why she was admitted to hospital and knows she is receiving HD.     Swallowing: Pt requesting diet upgrade, states she does not have upper dentures but can masticate with no problems. Pt given juice, pudding, diced pears and solid cracker.   Oral phase: Pt with timely swallow, adequate mastication, adequate bolus " formation/control and timely a/p transfer. Pharyngeal phase: timely swallow noted (larynx  Palpated, adequate movement noted), no change in voice, no coughing or audible dysphagia signs present. Pt safe for diet upgrade to dental soft tray and thin liquids.   SLP did notify RN and MD of diet recs.       Assessment:     Mary Carrillo is a 65 y.o. female admitted with Hypervolemia associated with renal insufficiency who presents with an SLP diagnosis of timely swallow, no audible dysphagia signs and baseline cognition/communication status.       Goals:   Multidisciplinary Problems     SLP Goals     Not on file          Multidisciplinary Problems (Resolved)        Problem: SLP Goal    Goal Priority Disciplines Outcome   SLP Goal   (Resolved)     SLP Met   Description: STGs:  1. Pt will participate in clinical swallow assessment to determine LRD.-ongoing  2. Pt will tolerate a Mechanical Soft/Thin liquid diet without any overt s/s of aspiration.  3. Pt will participate in cognitive-linguistic assessment to determine baseline function.-ongoing  4. Pt will orient x4.  5. Pt will recall salient personal information after a short delay with 90% recall.  6. Pt will follow complex commands with 100% acc.  7. Pt will complete math reasoning tasks with 75% acc.                   Plan:       · Plan of Care expires:  08/28/20  · Plan of Care reviewed with:  patient   · SLP Follow-Up:  No       Discharge recommendations:  home health OT, home health PT       Time Tracking:     SLP Treatment Date:   07/31/20  Speech Start Time:  1038  Speech Stop Time:  1100     Speech Total Time (min):  22 min    Billable Minutes: Speech Therapy Individual 12 and Treatment Swallowing Dysfunction 10    JACKELINE Cheng, CCC-SLP  07/31/2020

## 2020-07-31 NOTE — PLAN OF CARE
Problem: SLP Goal  Goal: SLP Goal  Description: STGs:  1. Pt will participate in clinical swallow assessment to determine LRD.-MET 7/31  2. Pt will tolerate a Mechanical Soft/Thin liquid diet without any overt s/s of aspiration. MET 7/31  3. Pt will participate in cognitive-linguistic assessment to determine baseline function.-MET 7/31  4. Pt will orient x4 MET 7/31  5. Pt will recall salient personal information after a short delay with 90% recall. MET 7/31  6. Pt will follow complex commands with 100% acc. 7/31  7. Pt will complete math reasoning tasks with 75% acc. MET 7/31  Outcome: Met   Pt with significant improvement in all domains, pt communicative, held conversation with SLP. Pt with good appetite and requesting upgrade in diet. Spoke with dtr at bedside who reports her mother is at baseline.

## 2020-07-31 NOTE — NURSING TRANSFER
Nursing Transfer Note      7/31/2020     Transfer To: [address]    Transfer via bed    Transfer with  to O2, cardiac monitoring    Transported by [place] rn/ bebeto arellano and transport    Medicines sent: tubed to 4A    Chart send with patient: Yes    Notified: daughter    Patient reassessed at: 1757 7/31/20 (date, time)    Upon arrival to floor: cardiac monitor applied, patient oriented to room, call bell in reach and bed in lowest position        Pupils R 4mm fixed and L 2mm round and reactive to light.

## 2020-07-31 NOTE — PLAN OF CARE
65 y F with multiple medical comorbidites and stroke risk factors including CKD 5, heart failure/pulmonary edema and todd disease, aortic valve stenosis, and autoimmune conditions with predisposition to vasculitis and changes in BP who has a lacunar infarct on brain MRI.    -Patient receiving/completed breathing treatment during encounter.  KHALIDACHHAYAON.    NIHSS 1  Exam stable, deficits improving.    Stroke work up reviewed:  1) MRI brain with lacunar infarct in left thalamus.   2) US carotids with L ICA patent. Left vertebral artery with possible narrowing occlusion  3) ECHO with low normal EF 50%. Bubble study results not mentioned on ECHO July 2020 and on study from April 2020.   4) Labs with anemia improving 7.9  Hba1c 5.2, LDL 92.6.     Recs:   1) continue telemetry,   2) BP goals 140/90. Avoid rapid changes in BP. Avoid hypotension.  BP management v important for lacunar strokes.  3) Continue single antiplatelet as anemia Hb improving 7.9. Currently on plavix; obtain plavix sensitivity assay.   If MRA brain/neck obtained and anemia stabilizes, will re-assess antiplatelet regimen.  6) continue statin. LDL goal <70 mg/dl  7) PT/OT/ST recs  8) DVT screening and prophylaxis per primary team.  9) risk factor modification including CKD, CHF, HTN and others per primary     Please call for questions.

## 2020-07-31 NOTE — PROGRESS NOTES
Progress Note  Nephrology      Consult Requested By: Ronna Motta MD  Reason for Consult: ESRD    SUBJECTIVE:     Review of Systems   Constitutional: Negative for chills and fever.   Respiratory: Negative for cough and shortness of breath.    Cardiovascular: Negative for chest pain and leg swelling.   Gastrointestinal: Negative for nausea.     Patient Active Problem List   Diagnosis    Hemorrhoids, internal    Gout, chronic    Sarcoidosis    Hyperlipidemia    Thoracolumbar back pain    Abnormal chest CT    Uveitis    Fatigue    Pulmonary hypertension    IFG (impaired fasting glucose)    Primary osteoarthritis involving multiple joints    Discitis of thoracolumbar region    Inadequate oral intake    Ovarian mass    Unintentional weight loss    Pulmonary tuberculosis    Discitis    Osteoarthritis of spine with myelopathy, thoracolumbar region    Pott's disease (spinal tuberculosis)    Acute blood loss as cause of postoperative anemia    Generalized abdominal pain    Other insomnia    Status post thoracic spinal fusion    Alteration in skin integrity related to surgical incision    Difficulty sleeping    Right hip pain    Myofascial pain    CHRISTEN (acute kidney injury)    Nephrotic syndrome    Diastolic dysfunction    Aortic valve stenosis    Electrocardiogram showing T wave abnormalities    Aortic atherosclerosis    Cardiomyopathy    Obesity    Secondary hyperparathyroidism of renal origin    CKD (chronic kidney disease) stage 5, GFR less than 15 ml/min    BMI 39.0-39.9,adult    Anemia of chronic disease    Essential hypertension    Elevated troponin    Metabolic acidosis    Acute respiratory failure with hypoxia    Acute CVA (cerebrovascular accident)    Hypervolemia associated with renal insufficiency       OBJECTIVE:     Medications:   albuterol-ipratropium  3 mL Nebulization Q4H WAKE    atorvastatin  40 mg Oral Daily    clopidogreL  75 mg Oral Daily    mupirocin    Nasal BID    pantoprazole  40 mg Oral Daily    paricalcitoL  0.1 mcg/kg Intravenous Every Mon, Wed, Fri    piperacillin-tazobactam (ZOSYN) IVPB  4.5 g Intravenous Q12H    polyethylene glycol  17 g Oral Daily    sevelamer carbonate  800 mg Oral TID WM    vitamin renal formula (B-complex-vitamin c-folic acid)  1 capsule Oral Daily       Vitals:    07/31/20 1757   BP: 133/65   Pulse: 78   Resp: 18   Temp: 99.6 °F (37.6 °C)     I/O last 3 completed shifts:  In: 1160 [P.O.:500; Blood:360; IV Piggyback:300]  Out: 650 [Urine:650]  Physical Exam  Constitutional:       General: She is not in acute distress.     Appearance: She is well-developed.   HENT:      Head: Normocephalic and atraumatic.   Eyes:      General: No scleral icterus.  Neck:      Musculoskeletal: Neck supple.      Vascular: No JVD.   Cardiovascular:      Rate and Rhythm: Normal rate and regular rhythm.      Heart sounds: No murmur.   Pulmonary:      Effort: Pulmonary effort is normal. No respiratory distress.      Breath sounds: Wheezing present. No rales.   Abdominal:      General: Bowel sounds are normal. There is no distension.      Palpations: Abdomen is soft.      Tenderness: There is no abdominal tenderness.   Musculoskeletal:         General: Swelling (1+ BLE) present.   Skin:     General: Skin is warm and dry.      Coloration: Skin is not pale.      Findings: No erythema.   Neurological:      Mental Status: She is alert and oriented to person, place, and time.   Psychiatric:         Judgment: Judgment normal.       Laboratory:  Recent Labs   Lab 07/29/20  1321 07/30/20  0502 07/31/20  0610   WBC 20.75* 14.40*  14.40* 11.96   HGB 7.0* 6.2*  6.2* 7.9*   HCT 24.0* 20.5*  20.5* 25.1*    250  250 237   MONO 7.9  1.6* 11.3  11.3  1.6*  1.6* 14.4  1.7*     Recent Labs   Lab 07/29/20  0432 07/29/20  1031 07/30/20  0502 07/31/20  0610    136 137  137 138  138   K 4.0 3.7 3.9  3.9 3.6  3.6    106 105  105 105  105    CO2 22* 24 21*  21* 22*  22*   BUN 33* 32* 41*  41* 52*  52*   CREATININE 4.1* 4.0* 4.9*  4.9* 5.9*  5.9*   CALCIUM 7.4* 7.1* 6.8*  6.8* 6.6*  6.6*   PHOS 4.9*  --  6.3*  6.3* 7.0*     Labs reviewed  Diagnostic Results:  X-Ray: Reviewed  US: Reviewed  Echo: Reviewed      ASSESSMENT/PLAN:     1. ESRD (N18.6 Z99.2) - 3rd treatment was 7/29/30 however stopped due  bradycardia  - got better with atropine.today is 4th tx, tolertaed additional 2.5L UF, UO ~ 500 per day, start Lasix 160 BID  PD cath placement on Monday , additional HD on Monday (or possibly Tuesday depending on OR time) then remove pascale    SW consult to set up with Dr. Mcintyre for Urgent start PD at [facility] on [address]     In work-up for renal transplant in [facility]  2. HTN (I10) - reassess after optimizing volume  3. Anemia of chronic kidney disease treated with LYNNETTE (N18.9 D63.1) - venofer 100  EPogen 5K with each HD  Bled overnight 7/28-7/29  SP PRBC trasnfusion    Recent Labs   Lab 07/29/20  1321 07/30/20  0502 07/31/20  0610   WBC 20.75* 14.40*  14.40* 11.96   HGB 7.0* 6.2*  6.2* 7.9*   HCT 24.0* 20.5*  20.5* 25.1*    250  250 237     No results found for: IRON, TIBC, FERRITIN, SATURATEDIRO            4. MBD (E88.9 M90.80) - start renvela, 3Ca bath, Zemplar 10     Lab Results   Component Value Date    PTH 1,287.0 (H) 07/14/2020    CALCIUM 6.6 (LL) 07/31/2020    CALCIUM 6.6 (LL) 07/31/2020    PHOS 7.0 (H) 07/31/2020     Recent Labs   Lab 07/29/20  1031 07/30/20  0502 07/31/20  0610   MG 1.7 2.1  2.1 2.1     Lab Results   Component Value Date    QVCPYRDM27UY 12 (L) 02/13/2020     Lab Results   Component Value Date    CO2 22 (L) 07/31/2020    CO2 22 (L) 07/31/2020        5. Hemodialysis Access (Z99.2 V45.11)- as in #1  6. Nutrition/Hypoalbuminemia (E88.09) -   Lab Results   Component Value Date    LABPROT 11.2 07/30/2020    ALBUMIN 2.2 (L) 07/31/2020     Nepro with meals TID. Renal vitamins daily       Thank you for allowing me to  participate in the care of your patients  With any question please call [phone]  Shahnaz Fish    [facility]  BEATRIZ Che MD, CAESAR BRISCOE MD,   MD TORI Santo, NP  [address]  [place], [address]  [phone]

## 2020-07-31 NOTE — PLAN OF CARE
Problem: Occupational Therapy Goal  Goal: Occupational Therapy Goal  Description: Goals to be met by: 8/30/2020    Patient will increase functional independence with ADLs by performing:    UE Dressing with Modified [place].  LE Dressing with Modified [place] and Assistive Devices as needed.  Grooming while standing at sink with Modified [place].  Toileting from bedside commode with Modified [place] for hygiene and clothing management.   Rolling to Bilateral with Modified [place].   Supine to sit with Modified [place].  Step transfer with Modified [place]  Toilet transfer to bedside commode with Modified [place].  Increased functional strength to WFL for ADLS.  Upper extremity exercise program 10 reps per handout, with independence.    7/31/2020 1750 by Franchesca Cerda OT  Outcome: Ongoing, Progressing

## 2020-07-31 NOTE — PHYSICIAN QUERY
"PT Name: Mary Carrillo  MR #: [id number]    Physician Query Form - Heart  Condition Clarification     CDS/: Leann Guthrie RN               Contact information: [phone]  This form is a permanent document in the medical record.     Query Date: July 31, 2020    By submitting this query, we are merely seeking further clarification of documentation. Please utilize your independent clinical judgment when addressing the question(s) below.    The medical record contains the following   Indicators     Supporting Clinical Findings Location in Medical Record   X BNP BNP 1261 7/26 Lab     x EF EF 50-55% 7/30 CC MD PN     x Radiology findings CXR: cardiomegaly with pulmonary vascular congestion and bibasilar edema consistent    7/30 CC MD PN   x Echo Results  2D echo: EF 50-55% with Grade II LVD dysfunction  7/30 CC MD PN      "Ascites" documented     x "SOB" or "CARRASCO" documented presented to OSH with complaint of dyspnea and anuria 7/27 HP   x "Hypoxia" documented  Pt with some hypoxia requiring 3 liters nasal cannula 7/27 HP   x Heart Failure documented Fluid overload + CHF diastolic type    7/29 ICU MD PN   x "Edema" documented admitted for initiation of peritoneal dialysis for anuria, and pulmonary edema/CHF    Lungs with crackles at the bases    JVD present 7/30 Neurology consult    7/28 Hosp med PN    7/29 Cardiology consultbnp       x Diuretics/Meds Home medications:  Coreg 25mg po bid  Lasix 40mg Daily    Coreg    7/27 HP      7/27 MAR   x Treatment: 3rd treatment was 7/29/30 however stopped due  bradycardia  - 4.5L net negative from admission    Continue with dialysis as per nephrology  She had 2 L removed yesterday    Continue HD for volume overload   Daily Weights  Strict I/O  Fluid restriction to 1,500cc daily  Low-sodium cardiac diet once she is no longer NPO  Reassessing  volume status regularly  Oxygen PRN to keep O2 > 88%   2D echo: EF 50-55% with Grade II LVD dysfunction   Chest X-ray: cardiomegaly " with pulmonary vascular congestion and bibasilar edema consistent with physical exam     7/30 Renal MD PN      7/28 Hosp med PN      7/30 CC MD PN   x Other:  elevated BNP, pulmonary edema, peripheral edema 7/30 CC MD PN       Heart failure (HF) can be acute, chronic or both. It is generally further specificed as systolic, diastolic, or combined. Lastly, it is important to identify an underlying etiology if known or suspected.     Common clues to acute exacerbation:  Rapidly progressive symptoms (w/in 2 weeks of presentation), using IV diuretics to treat, using supplemental O2, pulmonary edema on Xray, MI w/in 4 weeks, and/or BNP >500    Systolic Heart Failure: is defined as chart documentation of a left ventricular ejection fraction (LVEF) less than 40%     Diastolic Heart Failure: is defined as a left ventricular ejection fraction (LVEF) greater than 40%   +      Evidence of diastolic dysfunction on echocardiography OR    Right heart catheterization wedge pressure above 12 mm Hg OR    Left heart catheterization left ventricular end diastolic pressure 18 mm Hg or above.    References: *American Heart Association    The clinical guidelines noted below are only system guidelines, and do not replace the providers clinical judgment.     Provider, please specify the diagnosis associated with above clinical findings    [   ] Acute Diastolic Heart Failure -   New diagnosis.  EF > 40%  and acute HF symptoms documented     [ x  ] Acute on Chronic Diastolic Heart Failure -      Pre-existing diastoic HF diagnosis.  EF > 40%  and acute HF symptoms documented         [   ] Chronic Diastolic Heart Failure -   Pre-existing diastolic HF diagnosis.  EF > 40%  without  acute HF symptoms documented     [   ] Other Type of Heart Failure (please specify type):     [   ] Other (please specify):     [  ] Clinically Undetermined                           Please document in your progress notes daily for the duration of treatment until  resolved and include in your discharge summary.

## 2020-08-01 PROBLEM — E87.20 METABOLIC ACIDOSIS: Status: RESOLVED | Noted: 2020-01-01 | Resolved: 2020-01-01

## 2020-08-01 NOTE — ASSESSMENT & PLAN NOTE
Improving  Was started on Zosyn and Vanco.  DC Vanco. Continues to improve on Zosyn  All cx NGSF

## 2020-08-01 NOTE — PLAN OF CARE
Plan of care reviewed with patient. Normal sinus rhythm on continuous heart monitor, no true red alarms. ,Patient incontinent  Kept clean and dry per staff assist. Patient receiving IV antibiotics.No adverse reaction noted. Neuro chevck Q4 hours. No signs or symptoms of active bleeding noted. Safety maintained at this time. Bed in lowest position, side rails up x 2. Call light in reach.  Encouraged patient to use call light for assistance .Verbalized understanding. Will continue to monitor patient.

## 2020-08-01 NOTE — ASSESSMENT & PLAN NOTE
Improving  Was started on Zosyn and Vanco.  FABRICIO Vanco. Continues to improve on Zosyn  All cx NGSF  White blood cell count at 13,000.  Will monitor 1 more day on Zosyn and decide tomorrow if we can discontinue this.  She does not have any fevers.

## 2020-08-01 NOTE — ASSESSMENT & PLAN NOTE
Adequate control on no meds    Home meds: amlodipine 10 mg daily, carvedilol 25 mg BID and hydralazine 50 mg daily     May need to resume medications as blood pressure increases

## 2020-08-01 NOTE — PT/OT/SLP PROGRESS
Occupational Therapy   Treatment    Name: Mary Carrillo  MRN: [id number]  Admitting Diagnosis:  Hypervolemia associated with renal insufficiency  * Surgery Date in Future *    Recommendations:     Discharge Recommendations: home with home health, home health OT  Discharge Equipment Recommendations:  none  Barriers to discharge:  None    Assessment:     Mary Carrillo is a 65 y.o. female with a medical diagnosis of Hypervolemia associated with renal insufficiency.  She presents with Performance deficits affecting function are weakness, impaired endurance, impaired self care skills, impaired functional mobilty, gait instability, impaired balance, decreased coordination, decreased lower extremity function, decreased safety awareness, impaired cardiopulmonary response to activity.   Pt. demonstrating increased independence in LE dressing skills, bed mobility and BSC t/f with RW. However pt is a high fall risk 2/2 R knee buckling?? but no LOB just becomes very unsteady and fearful of falling. Pt.'s O2 satting at 95% with 2 L NC, mild SOB, mod vc for PLB with good recovery.     Rehab Prognosis:  Good; patient would benefit from acute skilled OT services to address these deficits and reach maximum level of function.       Plan:     Patient to be seen 5 x/week to address the above listed problems via self-care/home management, therapeutic activities, therapeutic exercises  · Plan of Care Expires: 08/30/20  · Plan of Care Reviewed with: patient    Subjective     Pain/Comfort:  Pain Rating 1: 0/10  Pain Rating Post-Intervention 1: 0/10    Objective:     Communicated with: nurse prior to session.  Patient found HOB elevated with telemetry, oxygen upon OT entry to room.    General Precautions: Standard, fall   Orthopedic Precautions:N/A   Braces: N/A     Occupational Performance:     Bed Mobility:    · Patient completed Rolling/Turning to Left with  stand by assistance and with side rail  · Patient completed Scooting/Bridging with  stand by assistance and with side rail  · Patient completed Supine to Sit with stand by assistance and with side rail  · Patient completed Sit to Supine with stand by assistance and with side rail     Functional Mobility/Transfers:  · Patient completed Sit <> Stand Transfer with minimum assistance and moderate assistance  with  rolling walker   · Patient completed Toilet Transfer Step Transfer technique with minimum assistance with  rolling walker and bedside commode      Activities of Daily Living:  · Lower Body Dressing: minimum assistance to thread legs into underwear, crossed left ankle over right knee,able to thread over foot but lifted left foot up toward chest and required assist to thread 2/2 limited bending /reach, body habitus, stood cga with RW to pull over hips.      [facility] 6 Click ADL: 19    Treatment & Education:  --purpose of OT visit and POC for today, PLB, BSC stand step t/f, LB dressing tech  --BUE AROM ex for endurance 10 x 1 set: shld flex/ext/abd/add/chest pres/biceps curls seated EOB with SBA, counted out loud for breathing rhythm with min vc.  --Standing endurance and balance training 10 sec x 2 trials standing with RW and CGA/min A for balance while performing lateral WS, pt. quickly fatigues and mildly SOB.    Patient left HOB elevated with all lines intact, call button in reach, bed alarm on and nurse that pt was itching  notifiedEducation:      GOALS:   Multidisciplinary Problems     Occupational Therapy Goals        Problem: Occupational Therapy Goal    Goal Priority Disciplines Outcome Interventions   Occupational Therapy Goal     OT, PT/OT Ongoing, Progressing    Description: Goals to be met by: 8/30/2020    Patient will increase functional independence with ADLs by performing:    UE Dressing with Modified [place].  LE Dressing with Modified [place] and Assistive Devices as needed.  Grooming while standing at sink with Modified [place].  Toileting from bedside commode with  Modified [place] for hygiene and clothing management.   Rolling to Bilateral with Modified [place].   Supine to sit with Modified [place].  Step transfer with Modified [place]  Toilet transfer to bedside commode with Modified [place].  Increased functional strength to WFL for ADLS.  Upper extremity exercise program 10 reps per handout, with independence.                     Time Tracking:     OT Date of Treatment: 08/02/20  OT Start Time: 1100  OT Stop Time: 1133  OT Total Time (min): 33 min    Billable Minutes:Self Care/Home Management 15  Therapeutic Exercise 9  Total Time 24    Franchesca Cerda OT  8/2/2020

## 2020-08-01 NOTE — PROGRESS NOTES
[facility] Hospital Medicine  Progress Note    Patient Name: Mary Carrillo  MRN: [id number]  Patient Class: IP- Inpatient   Admission Date: 7/26/2020  Length of Stay: 6 days  Attending Physician: Ronna Motta MD  Primary Care Provider: Jose Khan MD        Subjective:     Principal Problem:Hypervolemia associated with renal insufficiency        HPI:  Mary Carrillo is a 66 yo AAF with pmh of HTN, HLD, diastolic dysfunction, mild aortic valve stenosis, pott's disease, obesity, gout, sarcoidosis, CKD stage 5, asthma and chronic back pain who presented to OSH with complaint of dyspnea and anuria. Pt currently undergoing outpatient workup for initiation of peritoneal dialysis. Initial labs remarkable for Hbg/Hct 7/25, BUN/Cr 66/6.7 previously 46/4.7 on 6/15/20, BNP 1261, , troponin 0.123. CXR consistent with pulmonary edema/CHF.  She received 0.5 inch nitropaste for hypertension and 100 mg furosemide IV. Pt with some hypoxia requiring 3 liters nasal cannula. She denies chest pain, abdominal pain, n/v and fever/chills. COVID negative. Pt admitted to [facility] hospital medicine.     Overview/Hospital Course:  No notes on file    Interval History: Patient's  at the bedside.  She is sitting in the chair eating  No distress      Review of Systems   Constitutional: Negative for fever.   Respiratory: Negative for shortness of breath.    Cardiovascular: Negative for chest pain.   Gastrointestinal: Negative for abdominal pain and diarrhea.   Neurological: Negative for speech difficulty and numbness.     Objective:     Vital Signs (Most Recent):  Temp: 98.6 °F (37 °C) (08/01/20 1143)  Pulse: 74 (08/01/20 1144)  Resp: 18 (08/01/20 1143)  BP: (!) 127/59 (08/01/20 1143)  SpO2: 95 % (08/01/20 1123) Vital Signs (24h Range):  Temp:  [98.4 °F (36.9 °C)-99.7 °F (37.6 °C)] 98.6 °F (37 °C)  Pulse:  [65-86] 74  Resp:  [16-26] 18  SpO2:  [94 %-100 %] 95 %  BP: (116-139)/(55-68) 127/59     Weight: 96.2 kg  (212 lb 1.3 oz)  Body mass index is 35.29 kg/m².    Intake/Output Summary (Last 24 hours) at 8/1/2020 1305  Last data filed at 8/1/2020 1200  Gross per 24 hour   Intake 840 ml   Output 251 ml   Net 589 ml      Physical Exam  Vitals signs and nursing note reviewed.   Constitutional:       General: She is not in acute distress.     Comments: Getting dialysis   HENT:      Head: Normocephalic and atraumatic.      Mouth/Throat:      Pharynx: Oropharynx is clear.   Eyes:      General: No scleral icterus.  Neck:      Musculoskeletal: Normal range of motion and neck supple.   Cardiovascular:      Rate and Rhythm: Normal rate and regular rhythm.      Heart sounds: Murmur present.      Comments: RIJ dialysis catheter in place with no oozing or bleeding  Pulmonary:      Effort: Pulmonary effort is normal. No respiratory distress.      Breath sounds: Normal breath sounds. No rales (at bases).   Abdominal:      General: Bowel sounds are normal.      Palpations: Abdomen is soft.      Tenderness: There is no abdominal tenderness.   Musculoskeletal:         General: No swelling.   Skin:     General: Skin is warm and dry.      Capillary Refill: Capillary refill takes less than 2 seconds.      Comments: Stasis changes of lower extremity   Neurological:      General: No focal deficit present.      Mental Status: She is alert.   Psychiatric:         Mood and Affect: Mood normal.         Behavior: Behavior normal.         Significant Labs:   BMP:   Recent Labs   Lab 08/01/20  0509   GLU 84      K 3.6      CO2 25   BUN 33*   CREATININE 4.8*   CALCIUM 7.2*   MG 2.0     CBC:   Recent Labs   Lab 07/31/20  0610 08/01/20  0509   WBC 11.96 13.36*   HGB 7.9* 8.5*   HCT 25.1* 27.7*    250     CMP:   Recent Labs   Lab 07/31/20  0610 08/01/20  0509     138 137   K 3.6  3.6 3.6     105 102   CO2 22*  22* 25   GLU 84  84 84   BUN 52*  52* 33*   CREATININE 5.9*  5.9* 4.8*   CALCIUM 6.6*  6.6* 7.2*   PROT 7.9  --     ALBUMIN 2.2*  --    BILITOT 0.3  --    ALKPHOS 93  --    AST 20  --    ALT 12  --    ANIONGAP 11  11 10   EGFRNONAA 7*  7* 9*             Assessment/Plan:      Leukocytosis  Improving  Was started on Zosyn and Vanco.  DC Vanco. Continues to improve on Zosyn  All cx NGSF  White blood cell count at 13,000.  Will monitor 1 more day on Zosyn and decide tomorrow if we can discontinue this.  She does not have any fevers.    Hypervolemia associated with renal insufficiency  Improved       Acute CVA (cerebrovascular accident)  MRI reviewed.  Does not have any focal deficits.     On plavix. Not on BP meds . On statin    Carotid imaging noted. Will need right carotid eval once the dialysis catheter is removed  PT/OT  Swallow screen.- passed    Will need MRI/MRA of brain and neck, this is been ordered.    Elevated sedimentation rate.  Will discuss with neurology about ruling out associated vasculitis        Acute respiratory failure with hypoxia  No evidence of ACS, no evidence of pulmonary embolus. Likely from volume overload and   Wean oxygen as tolerated. Improving.  Presently on 1 L    Elevated troponin  No evidence of ACS. Flat bump. Cardiology reviewed ECHO. No further work up      Essential hypertension  Adequate control on no meds    Home meds: amlodipine 10 mg daily, carvedilol 25 mg BID and hydralazine 50 mg daily     May need to resume medications as blood pressure increases        Anemia of chronic disease  With dialysis  venofer 100  EPogen 5K with each HD    Stable after one unit 7/30      CKD (chronic kidney disease) stage 5, GFR less than 15 ml/min  Patient now end-stage renal.  Received dialysis yesterday.  Plan for PD catheter on Monday      Diastolic dysfunction  Dialysis to remove fluid.  Presently appears to be euvolemic.  · Low normal left ventricular systolic function. The estimated ejection fraction is 50-55%.  · Mild concentric left ventricular hypertrophy.  · Grade II (moderate) left ventricular  diastolic dysfunction consistent with pseudonormalization.  · Moderate right ventricular enlargement.  · Normal right ventricular systolic function.  · Moderate right atrial enlargement.  · Mild tricuspid regurgitation.        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 Per chart review patient treated with prolonged protocol per ID. No acute issues.        Hyperlipidemia  Lipitor 40 mg daily          Sarcoidosis  Chronic   Sedimentation rate is greater than 120    Gout, chronic  No acute issues         VTE Risk Mitigation (From admission, onward)         Ordered     IP VTE HIGH RISK PATIENT  Once      07/29/20 1816     Place sequential compression device  Until discontinued      07/29/20 1816     heparin, porcine (PF) (heparin flush 100 units/mL) 100 unit/mL injection flush      07/29/20 0006     heparin (porcine) injection 2,500 Units  As needed (PRN)      07/28/20 1326                      Ronna Motta MD  Department of Hospital Medicine   [facility]

## 2020-08-01 NOTE — PLAN OF CARE
VIRTUAL NURSE:  Cued into patient's room.  Permission received per patient to turn camera to view patient. Pt resting in bed at this time. Visitor at bedside. No needs expressed. Informed to call for assistance. Will continue to monitor.

## 2020-08-01 NOTE — PLAN OF CARE
VIRTUAL NURSE:  Cued into patient's room.  Permission received per patient to turn camera to view patient. Pt awake in bed, NC in place.  Introduced as VN for night shift that will be working with floor nurse and nursing assistant.  Educated patient on VN's role in patient care. Plan of care reviewed with patient.  Education per flowsheet. Informed of fall risk and fall precautions.  Patient verbalized understanding.  Call light within reach; bed siderails up x2.  Opportunity given for questions and questions answered.  Patient denies complaints or any needs at this time.  Instructed to use call light for assistance. Will cont to monitor and intervene as needed.    Labs, notes, orders, and care plan reviewed.

## 2020-08-01 NOTE — SUBJECTIVE & OBJECTIVE
Interval History: Patient's  at the bedside.  She is sitting in the chair eating  No distress      Review of Systems   Constitutional: Negative for fever.   Respiratory: Negative for shortness of breath.    Cardiovascular: Negative for chest pain.   Gastrointestinal: Negative for abdominal pain and diarrhea.   Neurological: Negative for speech difficulty and numbness.     Objective:     Vital Signs (Most Recent):  Temp: 98.6 °F (37 °C) (08/01/20 1143)  Pulse: 74 (08/01/20 1144)  Resp: 18 (08/01/20 1143)  BP: (!) 127/59 (08/01/20 1143)  SpO2: 95 % (08/01/20 1123) Vital Signs (24h Range):  Temp:  [98.4 °F (36.9 °C)-99.7 °F (37.6 °C)] 98.6 °F (37 °C)  Pulse:  [65-86] 74  Resp:  [16-26] 18  SpO2:  [94 %-100 %] 95 %  BP: (116-139)/(55-68) 127/59     Weight: 96.2 kg (212 lb 1.3 oz)  Body mass index is 35.29 kg/m².    Intake/Output Summary (Last 24 hours) at 8/1/2020 1305  Last data filed at 8/1/2020 1200  Gross per 24 hour   Intake 840 ml   Output 251 ml   Net 589 ml      Physical Exam  Vitals signs and nursing note reviewed.   Constitutional:       General: She is not in acute distress.     Comments: Getting dialysis   HENT:      Head: Normocephalic and atraumatic.      Mouth/Throat:      Pharynx: Oropharynx is clear.   Eyes:      General: No scleral icterus.  Neck:      Musculoskeletal: Normal range of motion and neck supple.   Cardiovascular:      Rate and Rhythm: Normal rate and regular rhythm.      Heart sounds: Murmur present.      Comments: RIJ dialysis catheter in place with no oozing or bleeding  Pulmonary:      Effort: Pulmonary effort is normal. No respiratory distress.      Breath sounds: Normal breath sounds. No rales (at bases).   Abdominal:      General: Bowel sounds are normal.      Palpations: Abdomen is soft.      Tenderness: There is no abdominal tenderness.   Musculoskeletal:         General: No swelling.   Skin:     General: Skin is warm and dry.      Capillary Refill: Capillary refill takes  less than 2 seconds.      Comments: Stasis changes of lower extremity   Neurological:      General: No focal deficit present.      Mental Status: She is alert.   Psychiatric:         Mood and Affect: Mood normal.         Behavior: Behavior normal.         Significant Labs:   BMP:   Recent Labs   Lab 08/01/20  0509   GLU 84      K 3.6      CO2 25   BUN 33*   CREATININE 4.8*   CALCIUM 7.2*   MG 2.0     CBC:   Recent Labs   Lab 07/31/20  0610 08/01/20  0509   WBC 11.96 13.36*   HGB 7.9* 8.5*   HCT 25.1* 27.7*    250     CMP:   Recent Labs   Lab 07/31/20  0610 08/01/20  0509     138 137   K 3.6  3.6 3.6     105 102   CO2 22*  22* 25   GLU 84  84 84   BUN 52*  52* 33*   CREATININE 5.9*  5.9* 4.8*   CALCIUM 6.6*  6.6* 7.2*   PROT 7.9  --    ALBUMIN 2.2*  --    BILITOT 0.3  --    ALKPHOS 93  --    AST 20  --    ALT 12  --    ANIONGAP 11  11 10   EGFRNONAA 7*  7* 9*

## 2020-08-01 NOTE — PLAN OF CARE
Plan of care reviewed with patient. Patient voiced understanding. Pt placed on monitor. No acute distress noted. Side rails x 2, bed in lowest position, call bell within reach, pt advised to call for assistance. Maintain bed alarm for patient safety.

## 2020-08-01 NOTE — ASSESSMENT & PLAN NOTE
No evidence of ACS, no evidence of pulmonary embolus. Likely from volume overload and   Wean oxygen as tolerated. Improving.  Presently on 1 L

## 2020-08-01 NOTE — PT/OT/SLP PROGRESS
Physical Therapy Treatment    Patient Name:  Mary Carrillo   MRN:  [id number]    Recommendations:     Discharge Recommendations:  home health PT, home health OT   Discharge Equipment Recommendations: walker, rolling   Barriers to discharge: None    Assessment:     Mary Carrillo is a 65 y.o. female admitted with a medical diagnosis of Hypervolemia associated with renal insufficiency.  She presents with the following impairments/functional limitations:  weakness, impaired endurance, impaired self care skills, impaired functional mobilty, gait instability, impaired balance, decreased coordination, decreased upper extremity function, decreased lower extremity function, decreased safety awareness, impaired cardiopulmonary response to activity .    Rehab Prognosis: Good; patient would benefit from acute skilled PT services to address these deficits and reach maximum level of function.    Recent Surgery: Procedure(s) (LRB):  INSERTION, CATHETER, DIALYSIS, PERITONEAL, LAPAROSCOPIC (N/A) * Surgery Date in Future *    Plan:     During this hospitalization, patient to be seen 6 x/week to address the identified rehab impairments via gait training, therapeutic activities, therapeutic exercises, neuromuscular re-education and progress toward the following goals:    · Plan of Care Expires:  08/30/20    Subjective     Chief Complaint: None stated  Patient/Family Comments/goals: Get up and ambulate  Pain/Comfort:  · Pain Rating 1: 0/10  · Pain Rating Post-Intervention 1: 0/10      Objective:     Communicated with JABARI Cohen prior to session.  Patient found HOB elevated with blood pressure cuff, pulse ox (continuous), peripheral IV, telemetry, oxygen, ge catheter upon PT entry to room.     General Precautions: Standard, fall   Orthopedic Precautions:N/A   Braces: N/A     Functional Mobility:  · Bed Mobility:     · Rolling Right: minimum assistance  · Supine to Sit: moderate assistance  · Transfers:     · Sit to Stand:  minimum  assistance with rolling walker  · Bed to Chair: minimum assistance with  rolling walker  using  Step Transfer  · Gait: Patient ambulated 2,3 ft with min A and RW      AM-PAC 6 CLICK MOBILITY  Turning over in bed (including adjusting bedclothes, sheets and blankets)?: 3  Sitting down on and standing up from a chair with arms (e.g., wheelchair, bedside commode, etc.): 2  Moving from lying on back to sitting on the side of the bed?: 2  Moving to and from a bed to a chair (including a wheelchair)?: 3  Need to walk in hospital room?: 3  Climbing 3-5 steps with a railing?: 2  Basic Mobility Total Score: 15       Therapeutic Activities and Exercises:   Patient performed 4 sit to stand transfers with min A. Patient reported dizziness with standing the first repetition. Patient ambulated 2 and 3 ft with min A and RW. Patient transferred to chair with chair alarm on. Patient also educated on and return demonstrates x10 ankle pumps, LAQs, and hip flexion.     Patient left up in chair with all lines intact, call button in reach and chair alarm on..    GOALS:   Multidisciplinary Problems     Physical Therapy Goals        Problem: Physical Therapy Goal    Goal Priority Disciplines Outcome Goal Variances Interventions   Physical Therapy Goal     PT, PT/OT Ongoing, Progressing     Description: Goals to be met by: 2020     Patient will increase functional independence with mobility by performin. Supine to sit with Modified [place]  2. Sit to stand transfer with Modified [place]  3. Bed to chair transfer with Modified [place] using Rolling Walker  4. Gait  x 150 feet with Modified [place] using Rolling Walker.   5. Lower extremity exercise program x15 reps per handout, with independence                     Time Tracking:     PT Received On: 20  PT Start Time: 1037     PT Stop Time: 1100  PT Total Time (min): 23 min     Billable Minutes: Therapeutic Activity 23    Treatment Type:  Treatment  PT/PTA: PT     PTA Visit Number: 0     Zayda Mcmanus, PT  08/01/2020

## 2020-08-01 NOTE — PROGRESS NOTES
Progress Note  Nephrology      Consult Requested By: Ronna Motta MD      SUBJECTIVE:     Overnight events  Patient is a 65 y.o. female     Patient Active Problem List   Diagnosis    Hemorrhoids, internal    Gout, chronic    Sarcoidosis    Hyperlipidemia    Thoracolumbar back pain    Abnormal chest CT    Uveitis    Fatigue    Pulmonary hypertension    IFG (impaired fasting glucose)    Primary osteoarthritis involving multiple joints    Discitis of thoracolumbar region    Inadequate oral intake    Ovarian mass    Unintentional weight loss    Pulmonary tuberculosis    Discitis    Osteoarthritis of spine with myelopathy, thoracolumbar region    Pott's disease (spinal tuberculosis)    Acute blood loss as cause of postoperative anemia    Generalized abdominal pain    Other insomnia    Status post thoracic spinal fusion    Alteration in skin integrity related to surgical incision    Difficulty sleeping    Right hip pain    Myofascial pain    CHRISTEN (acute kidney injury)    Nephrotic syndrome    Diastolic dysfunction    Aortic valve stenosis    Electrocardiogram showing T wave abnormalities    Aortic atherosclerosis    Cardiomyopathy    Obesity    Secondary hyperparathyroidism of renal origin    CKD (chronic kidney disease) stage 5, GFR less than 15 ml/min    BMI 39.0-39.9,adult    Anemia of chronic disease    Essential hypertension    Elevated troponin    Metabolic acidosis    Acute respiratory failure with hypoxia    Acute CVA (cerebrovascular accident)    Hypervolemia associated with renal insufficiency    Leukocytosis     Past Medical History:   Diagnosis Date    Arthritis     Asthma     Back pain     BMI 39.0-39.9,adult 7/14/2020    CKD (chronic kidney disease) stage 5, GFR less than 15 ml/min 7/14/2020    Dehiscence of operative wound 4/1/2018    Disorder of kidney and ureter     Glaucoma     Gout     Hyperlipidemia     Hypertension     In 7/29/2020     Obesity     BMI 39    Pott's disease (spinal tuberculosis)     Sarcoidosis     Sarcoidosis     Secondary hyperparathyroidism of renal origin 7/14/2020    Vitritis     Vitritis               OBJECTIVE:     Vitals:    08/01/20 0843 08/01/20 1123 08/01/20 1143 08/01/20 1144   BP:   (!) 127/59    BP Location:   Left arm    Patient Position:   Sitting    Pulse:  78 75 74   Resp:  18 18    Temp: 99.7 °F (37.6 °C)  98.6 °F (37 °C)    TempSrc:   Oral    SpO2:  95%     Weight:       Height:           Temp: 98.6 °F (37 °C) (08/01/20 1143)  Pulse: 74 (08/01/20 1144)  Resp: 18 (08/01/20 1143)  BP: (!) 127/59 (08/01/20 1143)  SpO2: 95 % (08/01/20 1123)    Date 08/01/20 0700 - 08/02/20 0659   Shift 0120-6549 4806-7383 9736-4807 24 Hour Total   INTAKE   P.O. 240   240   Shift Total(mL/kg) 240(2.5)   240(2.5)   OUTPUT   Stool 1   1   Shift Total(mL/kg) 1(0)   1(0)   Weight (kg) 96.2 96.2 96.2 96.2             Medications:   albuterol-ipratropium  3 mL Nebulization Q4H WAKE    atorvastatin  40 mg Oral Daily    clopidogreL  75 mg Oral Daily    furosemide  160 mg Oral BID    pantoprazole  40 mg Oral Daily    paricalcitoL  0.1 mcg/kg Intravenous Every Mon, Wed, Fri    piperacillin-tazobactam (ZOSYN) IVPB  4.5 g Intravenous Q12H    polyethylene glycol  17 g Oral Daily    sevelamer carbonate  800 mg Oral TID WM    vitamin renal formula (B-complex-vitamin c-folic acid)  1 capsule Oral Daily                 Physical Exam:  General appearance:  No SOB  No cough  No CP  Lungs: diminished breath sounds  Heart: pulse 74  Abdomen: soft  Extremities: edema  Laboratory:  ABG  Labs reviewed  Recent Results (from the past 336 hour(s))   Basic Metabolic Panel (BMP)    Collection Time: 08/01/20  5:09 AM   Result Value Ref Range    Sodium 137 136 - 145 mmol/L    Potassium 3.6 3.5 - 5.1 mmol/L    Chloride 102 95 - 110 mmol/L    CO2 25 23 - 29 mmol/L    BUN, Bld 33 (H) 8 - 23 mg/dL    Creatinine 4.8 (H) 0.5 - 1.4 mg/dL    Calcium 7.2 (L)  8.7 - 10.5 mg/dL    Anion Gap 10 8 - 16 mmol/L   Basic Metabolic Panel (BMP)    Collection Time: 07/31/20  6:10 AM   Result Value Ref Range    Sodium 138 136 - 145 mmol/L    Potassium 3.6 3.5 - 5.1 mmol/L    Chloride 105 95 - 110 mmol/L    CO2 22 (L) 23 - 29 mmol/L    BUN, Bld 52 (H) 8 - 23 mg/dL    Creatinine 5.9 (H) 0.5 - 1.4 mg/dL    Calcium 6.6 (LL) 8.7 - 10.5 mg/dL    Anion Gap 11 8 - 16 mmol/L   Basic Metabolic Panel (BMP)    Collection Time: 07/30/20  5:02 AM   Result Value Ref Range    Sodium 137 136 - 145 mmol/L    Potassium 3.9 3.5 - 5.1 mmol/L    Chloride 105 95 - 110 mmol/L    CO2 21 (L) 23 - 29 mmol/L    BUN, Bld 41 (H) 8 - 23 mg/dL    Creatinine 4.9 (H) 0.5 - 1.4 mg/dL    Calcium 6.8 (LL) 8.7 - 10.5 mg/dL    Anion Gap 11 8 - 16 mmol/L     Recent Results (from the past 336 hour(s))   CBC auto differential    Collection Time: 08/01/20  5:09 AM   Result Value Ref Range    WBC 13.36 (H) 3.90 - 12.70 K/uL    Hemoglobin 8.5 (L) 12.0 - 16.0 g/dL    Hematocrit 27.7 (L) 37.0 - 48.5 %    Platelets 250 150 - 350 K/uL   CBC auto differential    Collection Time: 07/31/20  6:10 AM   Result Value Ref Range    WBC 11.96 3.90 - 12.70 K/uL    Hemoglobin 7.9 (L) 12.0 - 16.0 g/dL    Hematocrit 25.1 (L) 37.0 - 48.5 %    Platelets 237 150 - 350 K/uL   CBC auto differential    Collection Time: 07/30/20  5:02 AM   Result Value Ref Range    WBC 14.40 (H) 3.90 - 12.70 K/uL    Hemoglobin 6.2 (L) 12.0 - 16.0 g/dL    Hematocrit 20.5 (L) 37.0 - 48.5 %    Platelets 250 150 - 350 K/uL     Urinalysis  No results for input(s): COLORU, CLARITYU, SPECGRAV, PHUR, PROTEINUA, GLUCOSEU, BILIRUBINCON, BLOODU, WBCU, RBCU, BACTERIA, MUCUS, NITRITE, LEUKOCYTESUR, UROBILINOGEN, HYALINECASTS in the last 24 hours.    Diagnostic Results:  X-Ray: Reviewed  US: Reviewed  Echo: Reviewed  ACCESS    ASSESSMENT/PLAN:   CKD 5  Metabolic bone disease  Anemia HB 8.5  /59  Dialysis yesterday / Net UF 2.5

## 2020-08-01 NOTE — ASSESSMENT & PLAN NOTE
MRI reviewed.  Does not have any focal deficits.     On plavix. Not on BP meds . On statin    Carotid imaging noted. Will need right carotid eval  PT/OT  Swallow screen.- passed    Will need MRI/MRA of brain and neck

## 2020-08-01 NOTE — PLAN OF CARE
Problem: Physical Therapy Goal  Goal: Physical Therapy Goal  Description: Goals to be met by: 2020     Patient will increase functional independence with mobility by performin. Supine to sit with Modified [place]  2. Sit to stand transfer with Modified [place]  3. Bed to chair transfer with Modified [place]ron using Rolling Walker  4. Gait  x 150 feet with Modified Barron using Rolling Walker.   5. Lower extremity exercise program x15 reps per handout, with independence    Outcome: Ongoing, Progressing     Patient ambulated 2, 3 ft with min A and RW. Patient also demonstrated improvements with transfer assistance and ambulation. Patient educated on exercises to perform between therapy sessions. Patient return demonstrated exercises x10. Patient up in chair with alarm on and nsg notified. Continue POC.

## 2020-08-01 NOTE — ASSESSMENT & PLAN NOTE
Dialysis to remove fluid.  Presently appears to be euvolemic.  · Low normal left ventricular systolic function. The estimated ejection fraction is 50-55%.  · Mild concentric left ventricular hypertrophy.  · Grade II (moderate) left ventricular diastolic dysfunction consistent with pseudonormalization.  · Moderate right ventricular enlargement.  · Normal right ventricular systolic function.  · Moderate right atrial enlargement.  · Mild tricuspid regurgitation.

## 2020-08-01 NOTE — PLAN OF CARE
Problem: Occupational Therapy Goal  Goal: Occupational Therapy Goal  Description: Goals to be met by: 8/30/2020    Patient will increase functional independence with ADLs by performing:    UE Dressing with Modified [place].  LE Dressing with Modified [place] and Assistive Devices as needed.  Grooming while standing at sink with Modified [place].  Toileting from bedside commode with Modified [place] for hygiene and clothing management.   Rolling to Bilateral with Modified [place].   Supine to sit with Modified [place].  Step transfer with Modified [place]  Toilet transfer to bedside commode with Modified [place].  Increased functional strength to WFL for ADLS.  Upper extremity exercise program 10 reps per handout, with independence.    Outcome: Ongoing, Progressing

## 2020-08-01 NOTE — PROGRESS NOTES
[facility]  Progress Note    Patient Name: Mary Carrillo  MRN: [id number]  Patient Class: IP- Inpatient   Admission Date: 7/26/2020  Length of Stay: 5 days  Attending Physician: Ronna Motta MD  Primary Care Provider: Jose Khan MD        Subjective:     Principal Problem:Hypervolemia associated with renal insufficiency        HPI:  Mary Carrillo is a 66 yo AAF with pmh of HTN, HLD, diastolic dysfunction, mild aortic valve stenosis, pott's disease, obesity, gout, sarcoidosis, CKD stage 5, asthma and chronic back pain who presented to OSH with complaint of dyspnea and anuria. Pt currently undergoing outpatient workup for initiation of peritoneal dialysis. Initial labs remarkable for Hbg/Hct 7/25, BUN/Cr 66/6.7 previously 46/4.7 on 6/15/20, BNP 1261, , troponin 0.123. CXR consistent with pulmonary edema/CHF.  She received 0.5 inch nitropaste for hypertension and 100 mg furosemide IV. Pt with some hypoxia requiring 3 liters nasal cannula. She denies chest pain, abdominal pain, n/v and fever/chills. COVID negative. Pt admitted to [facility].     Overview/Hospital Course:  No notes on file    Interval History:  Patient and daughter  She is presently on HD and tolerating well.  Denies CP or SOB  Feels well.  Denies nausea or vomiting or diarrhea  No bleeding from dialysis catheter  No CVA sx    Reviewed carotid US  Reviewed Neuro reccs  Will transfer to the floor  Updated patient and daughter and answered all questions    Review of Systems   Constitutional: Negative for fever.   Respiratory: Negative for shortness of breath.    Cardiovascular: Negative for chest pain.   Gastrointestinal: Negative for abdominal pain and diarrhea.   Neurological: Negative for speech difficulty and numbness.     Objective:     Vital Signs (Most Recent):  Temp: 97.9 °F (36.6 °C) (07/31/20 0850)  Pulse: 72 (07/31/20 1015)  Resp: (!) 32 (07/31/20 1015)  BP: 136/62 (07/31/20  1015)  SpO2: 99 % (07/31/20 1015) Vital Signs (24h Range):  Temp:  [97.6 °F (36.4 °C)-98.6 °F (37 °C)] 97.9 °F (36.6 °C)  Pulse:  [61-80] 72  Resp:  [12-40] 32  SpO2:  [89 %-100 %] 99 %  BP: (107-154)/(51-74) 136/62     Weight: 96.2 kg (212 lb 1.3 oz)  Body mass index is 35.29 kg/m².    Intake/Output Summary (Last 24 hours) at 7/31/2020 1026  Last data filed at 7/31/2020 0600  Gross per 24 hour   Intake 1060 ml   Output 550 ml   Net 510 ml      Physical Exam  Vitals signs and nursing note reviewed.   Constitutional:       General: She is not in acute distress.     Comments: Getting dialysis   HENT:      Head: Normocephalic and atraumatic.      Mouth/Throat:      Pharynx: Oropharynx is clear.   Eyes:      General: No scleral icterus.  Neck:      Musculoskeletal: Normal range of motion and neck supple.   Cardiovascular:      Rate and Rhythm: Normal rate and regular rhythm.      Heart sounds: No murmur.      Comments: RIJ dialysis catheter in place with no oozing or bleeding  Pulmonary:      Effort: No respiratory distress.      Breath sounds: Rales (at bases) present.   Abdominal:      General: Bowel sounds are normal.      Palpations: Abdomen is soft.      Tenderness: There is no abdominal tenderness.   Musculoskeletal:         General: No swelling.   Skin:     General: Skin is warm and dry.      Capillary Refill: Capillary refill takes less than 2 seconds.   Neurological:      General: No focal deficit present.      Mental Status: She is alert.   Psychiatric:         Mood and Affect: Mood normal.         Behavior: Behavior normal.         Significant Labs:   BMP:   Recent Labs   Lab 07/31/20  0610   GLU 84  84     138   K 3.6  3.6     105   CO2 22*  22*   BUN 52*  52*   CREATININE 5.9*  5.9*   CALCIUM 6.6*  6.6*   MG 2.1     CBC:   Recent Labs   Lab 07/30/20  0502 07/31/20  0610   WBC 14.40*  14.40* 11.96   HGB 6.2*  6.2* 7.9*   HCT 20.5*  20.5* 25.1*     250 237       Significant  Imaging: I have reviewed all pertinent imaging results/findings within the past 24 hours.   US Carotid Bilateral  Narrative: EXAMINATION:  US CAROTID BILATERAL    CLINICAL HISTORY:  lacunar stroke;    TECHNIQUE:  Grayscale and color Doppler ultrasound examination of the carotid and vertebral artery systems bilaterally.  Stenosis estimates are per the NASCET measurement criteria.    COMPARISON:  No prior similar sonographic imaging.    FINDINGS:  Right:    Internal Carotid Artery (ICA) peak systolic velocity unable to be performed given overlying bandage material from catheter access.    ICA/CCA peak systolic velocity ratio: Not applicable    Vertebral artery: Not evaluated    Left:    Internal Carotid Artery (ICA)  peak systolic velocity 121 cm/sec    ICA/CCA peak systolic velocity ratio: 1.4    Vertebral artery: Not well visualized.  Impression: No evidence of hemodynamically significant carotid bifurcation stenosis on the left by criteria.  Incomplete evaluation of the right ICA given overlying bandage material.    Nonvisualized left vertebral artery, possibly diminutive versus narrowed/occluded.    Electronically signed by: Lavelle Motta  Date:    07/30/2020  Time:    16:52           Assessment/Plan:      Leukocytosis  Improving  Was started on Zosyn and Vanco.  DC Vanco. Continues to improve on Zosyn  All cx NGSF      Hypervolemia associated with renal insufficiency  Improved       Acute CVA (cerebrovascular accident)  MRI reviewed.  Does not have any focal deficits.     On plavix. Not on BP meds . On statin    Carotid imaging noted. Will need right carotid eval  PT/OT  Swallow screen.- passed    Will need MRI/MRA of brain and neck        Acute respiratory failure with hypoxia  No evidence of ACS, no evidence of pulmonary embolus. Likely from volume overload and   Possible sepsis.  Wean oxygen as tolerated. Improving    Metabolic acidosis  resolved       Elevated troponin  No evidence of ACS. Flat bump.  Cardiology reviewed ECHO. No further work up      Essential hypertension  Adequate control on no meds    Home meds: amlodipine 10 mg daily, carvedilol 25 mg BID and hydralazine 50 mg daily         Anemia of chronic disease  With dialysis  venofer 100  EPogen 5K with each HD    Stable after one unit 7/30      CKD (chronic kidney disease) stage 5, GFR less than 15 ml/min  S/P Hemodialysis today   PD catheter on Monday      Diastolic dysfunction   Echo 4/16/20 LVEF 55-60%, mild AS and grade 2 diastolic dysfunction         Pott's disease (spinal tuberculosis)  S/p T12-L1 corpectomy with T10-L3 fusion on 3/18/18 due to Pott's disease. Per chart review patient treated with prolonged protocol per ID. No acute issues.        Hyperlipidemia  Lipitor 40 mg daily          Sarcoidosis  Chronic   Check ESR    Gout, chronic  No acute issues         VTE Risk Mitigation (From admission, onward)         Ordered     IP VTE HIGH RISK PATIENT  Once      07/29/20 1816     Place sequential compression device  Until discontinued      07/29/20 1816     heparin, porcine (PF) (heparin flush 100 units/mL) 100 unit/mL injection flush      07/29/20 0006     heparin (porcine) injection 2,500 Units  As needed (PRN)      07/28/20 1326     Place sequential compression device  Until discontinued      07/27/20 0003                      Ronna Motta MD  Department of Hospital Medicine   [facility]

## 2020-08-01 NOTE — ASSESSMENT & PLAN NOTE
MRI reviewed.  Does not have any focal deficits.     On plavix. Not on BP meds . On statin    Carotid imaging noted. Will need right carotid eval once the dialysis catheter is removed  PT/OT  Swallow screen.- passed    Will need MRI/MRA of brain and neck, this is been ordered.    Elevated sedimentation rate.  Will discuss with neurology about ruling out associated vasculitis

## 2020-08-01 NOTE — ASSESSMENT & PLAN NOTE
Adequate control on no meds    Home meds: amlodipine 10 mg daily, carvedilol 25 mg BID and hydralazine 50 mg daily

## 2020-08-02 NOTE — PLAN OF CARE
Patient stable. Plan of care reviewed with patient. Verbal reported of understanding. AAOx4. NSR on monitor with no red alarms noted. No falls through the shift. Neuro check performed q4h. Denies of pain throughout the shift. Due meds given per ordered. Fall and aspiration precaution maintained. Safety maintained. No acute distress noted. Bed in lowest position. Bed alarm on. Head of bed elevated. Side rails x 2 are up. Call bell within reach. Patient will be monitored overnight.

## 2020-08-02 NOTE — ASSESSMENT & PLAN NOTE
Chronic   Sedimentation rate is greater than 120  Case was discussed with Neurology.  MRI not indicative of vasculitis.  Will discussed variants with neurology

## 2020-08-02 NOTE — ASSESSMENT & PLAN NOTE
Adequate control on no meds    Home meds: amlodipine 10 mg daily, carvedilol 25 mg BID and hydralazine 50 mg daily     May need to resume medications as blood pressure increases.  Will try to avoid carvedilol since she did have an episode of severe bradycardia the time of her code blue

## 2020-08-02 NOTE — PROGRESS NOTES
[facility] Medicine  Progress Note    Patient Name: Mary Carrillo  MRN: [id number]  Patient Class: IP- Inpatient   Admission Date: 7/26/2020  Length of Stay: 7 days  Attending Physician: Ronna Motta MD  Primary Care Provider: Jose Khan MD        Subjective:     Principal Problem:Hypervolemia associated with renal insufficiency        HPI:  Mary Carrillo is a 64 yo AAF with pmh of HTN, HLD, diastolic dysfunction, mild aortic valve stenosis, pott's disease, obesity, gout, sarcoidosis, CKD stage 5, asthma and chronic back pain who presented to OSH with complaint of dyspnea and anuria. Pt currently undergoing outpatient workup for initiation of peritoneal dialysis. Initial labs remarkable for Hbg/Hct 7/25, BUN/Cr 66/6.7 previously 46/4.7 on 6/15/20, BNP 1261, , troponin 0.123. CXR consistent with pulmonary edema/CHF.  She received 0.5 inch nitropaste for hypertension and 100 mg furosemide IV. Pt with some hypoxia requiring 3 liters nasal cannula. She denies chest pain, abdominal pain, n/v and fever/chills. COVID negative. Pt admitted to [facility].     Overview/Hospital Course:  No notes on file    Interval History: Patient and son  Feels well   K 3.4 - Gave K 20 meq  NPO after midnight for her peritoneal dialysis catheter    Review of Systems   Constitutional: Negative for chills and fever.   Respiratory: Negative for cough, shortness of breath and wheezing.    Cardiovascular: Negative for chest pain, palpitations and leg swelling.   Gastrointestinal: Negative for abdominal pain, blood in stool, constipation, diarrhea, nausea and vomiting.   Genitourinary: Negative for dysuria and hematuria.   Musculoskeletal: Negative for back pain.   Skin: Negative for rash.   Neurological: Negative for dizziness, weakness and headaches.     Objective:     Vital Signs (Most Recent):  Temp: 97.7 °F (36.5 °C) (08/02/20 0911)  Pulse: 98 (08/02/20 1237)  Resp: 18 (08/02/20  1237)  BP: 127/63 (08/02/20 1237)  SpO2: (!) 92 % (08/02/20 1237) Vital Signs (24h Range):  Temp:  [97.7 °F (36.5 °C)-98.8 °F (37.1 °C)] 97.7 °F (36.5 °C)  Pulse:  [74-98] 98  Resp:  [16-20] 18  SpO2:  [88 %-96 %] 92 %  BP: (121-163)/(57-79) 127/63     Weight: 96.2 kg (212 lb 1.3 oz)  Body mass index is 35.29 kg/m².    Intake/Output Summary (Last 24 hours) at 8/2/2020 1403  Last data filed at 8/2/2020 0830  Gross per 24 hour   Intake 125 ml   Output 201 ml   Net -76 ml      Physical Exam  Vitals signs and nursing note reviewed.   Constitutional:       General: She is not in acute distress.     Comments: Getting dialysis   HENT:      Head: Normocephalic and atraumatic.      Mouth/Throat:      Pharynx: Oropharynx is clear.   Eyes:      General: No scleral icterus.  Neck:      Musculoskeletal: Normal range of motion and neck supple.   Cardiovascular:      Rate and Rhythm: Normal rate and regular rhythm.      Heart sounds: Murmur present.      Comments: RIJ dialysis catheter in place with no oozing or bleeding  Pulmonary:      Effort: Pulmonary effort is normal. No respiratory distress.      Breath sounds: Normal breath sounds. No rales (at bases).   Abdominal:      General: Bowel sounds are normal.      Palpations: Abdomen is soft.      Tenderness: There is no abdominal tenderness.   Musculoskeletal:         General: No swelling.   Skin:     General: Skin is warm and dry.      Capillary Refill: Capillary refill takes less than 2 seconds.      Comments: Stasis changes of lower extremity   Neurological:      General: No focal deficit present.      Mental Status: She is alert.   Psychiatric:         Mood and Affect: Mood normal.         Behavior: Behavior normal.         Significant Labs:   BMP:   Recent Labs   Lab 08/02/20  0621   GLU 81      K 3.4*      CO2 25   BUN 42*   CREATININE 6.2*   CALCIUM 7.5*   MG 2.0     CBC:   Recent Labs   Lab 08/01/20  0509 08/02/20  0621   WBC 13.36* 12.71*   HGB 8.5* 8.2*    HCT 27.7* 26.8*    239       MRA Neck without contrast  Narrative: EXAMINATION:  MRA BRAIN WITHOUT CONTRAST; MRA NECK WITHOUT CONTRAST    CLINICAL HISTORY:  Stroke, follow up; .    TECHNIQUE:  Non-contrast 3-D time-of-flight intracranial MR angiography was performed through the [place] [facility] with MIP reformatting.  Subsequently, contrast enhanced MRA of the neck was performed utilizing 3D time-of-flight technique without contrast.    COMPARISON:  Prior brain MRI dated 07/29/2020, carotid ultrasound dated 07/30/20    FINDINGS:  Brain: Anterior intracranial circulation: No high-grade focal stenosis, occlusion, aneurysm, or vascular malformation. Note is made of early branching of the left ophthalmic artery from the cavernous ICA.    Posterior intracranial circulation: The left vertebral artery is not visualized intracranially which may be due to slow flow, hypoplasia, or less likely occlusion.    Neck: There is a left-sided aortic arch with bovine configuration of branch vessels.  Common carotid and cervical internal carotid arteries are widely patent.  Right vertebral artery originates from the right subclavian artery and appears widely patent and dominant.  The left vertebral artery is not visualized which may be due to slow flow, hypoplasia, or occlusion.  Impression: 1. Nonvisualization of the left vertebral artery on MRI of the head and neck which could be due to slow flow, hyperplasia, or occlusion.  2. Remaining visualized vessels appear widely patent without focal stenosis or aneurysm.  3. Anatomic variant of early branching of the left ophthalmic artery from the cavernous ICA.  This report was flagged in Epic as abnormal.    Electronically signed by: Leann Mitchell MD  Date:    08/02/2020  Time:    09:56  MRA Brain without contrast  Narrative: EXAMINATION:  MRA BRAIN WITHOUT CONTRAST; MRA NECK WITHOUT CONTRAST    CLINICAL HISTORY:  Stroke, follow up; .    TECHNIQUE:  Non-contrast 3-D  time-of-flight intracranial MR angiography was performed through the [place] [facility] with MIP reformatting.  Subsequently, contrast enhanced MRA of the neck was performed utilizing 3D time-of-flight technique without contrast.    COMPARISON:  Prior brain MRI dated 07/29/2020, carotid ultrasound dated 07/30/20    FINDINGS:  Brain: Anterior intracranial circulation: No high-grade focal stenosis, occlusion, aneurysm, or vascular malformation. Note is made of early branching of the left ophthalmic artery from the cavernous ICA.    Posterior intracranial circulation: The left vertebral artery is not visualized intracranially which may be due to slow flow, hypoplasia, or less likely occlusion.    Neck: There is a left-sided aortic arch with bovine configuration of branch vessels.  Common carotid and cervical internal carotid arteries are widely patent.  Right vertebral artery originates from the right subclavian artery and appears widely patent and dominant.  The left vertebral artery is not visualized which may be due to slow flow, hypoplasia, or occlusion.  Impression: 1. Nonvisualization of the left vertebral artery on MRI of the head and neck which could be due to slow flow, hyperplasia, or occlusion.  2. Remaining visualized vessels appear widely patent without focal stenosis or aneurysm.  3. Anatomic variant of early branching of the left ophthalmic artery from the cavernous ICA.  This report was flagged in Epic as abnormal.    Electronically signed by: Leann Mitchell MD  Date:    08/02/2020  Time:    09:56           Assessment/Plan:      Leukocytosis  No more fever.  White count down.  Discontinue Zosyn.  All cultures negative.    Hypervolemia associated with renal insufficiency  Improved       Acute CVA (cerebrovascular accident)  MRI reviewed.  Does not have any focal deficits.     On plavix. Not on BP meds . On statin    Carotid imaging noted. Will need right carotid eval once the dialysis catheter is  removed  PT/OT  Swallow screen.- passed     MRI/MRA of brain and neck The studies were reviewed.  Will need to discuss further with Neurology    Elevated sedimentation rate.  Will discuss with neurology about ruling out associated vasculitis        Acute respiratory failure with hypoxia  No evidence of ACS, no evidence of pulmonary embolus. Likely from volume overload and   Wean oxygen as tolerated. Improving.  Presently on 1 L    Elevated troponin  No evidence of ACS. Flat bump. Cardiology reviewed ECHO. No further work up      Essential hypertension  Adequate control on no meds    Home meds: amlodipine 10 mg daily, carvedilol 25 mg BID and hydralazine 50 mg daily     May need to resume medications as blood pressure increases.  Will try to avoid carvedilol since she did have an episode of severe bradycardia the time of her code blue        Anemia of chronic disease  With dialysis  venofer 100  EPogen 5K with each HD    Stable after one unit 7/30      CKD (chronic kidney disease) stage 5, GFR less than 15 ml/min  Patient now end-stage renal. .  Plan for PD catheter on Monday      Diastolic dysfunction  Dialysis to remove fluid.  Presently appears to be euvolemic.  · Low normal left ventricular systolic function. The estimated ejection fraction is 50-55%.  · Mild concentric left ventricular hypertrophy.  · Grade II (moderate) left ventricular diastolic dysfunction consistent with pseudonormalization.  · Moderate right ventricular enlargement.  · Normal right ventricular systolic function.  · Moderate right atrial enlargement.  · Mild tricuspid regurgitation.        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 Per chart review patient treated with prolonged protocol per ID. No acute issues.        Hyperlipidemia  Lipitor 40 mg daily          Sarcoidosis  Chronic   Sedimentation rate is greater than 120  Case was discussed with Neurology.  MRI not  indicative of vasculitis.  Will discussed variants with neurology    Gout, chronic  No acute issues         VTE Risk Mitigation (From admission, onward)         Ordered     IP VTE HIGH RISK PATIENT  Once      07/29/20 1816     Place sequential compression device  Until discontinued      07/29/20 1816     heparin, porcine (PF) (heparin flush 100 units/mL) 100 unit/mL injection flush      07/29/20 0006     heparin (porcine) injection 2,500 Units  As needed (PRN)      07/28/20 1326                NPO after midnight for dialysis catheter placement in the morning      Ronna Motta MD  Department of Hospital Medicine   [facility]

## 2020-08-02 NOTE — PLAN OF CARE
Problem: Occupational Therapy Goal  Goal: Occupational Therapy Goal  Description: Goals to be met by: 8/30/2020    Patient will increase functional independence with ADLs by performing:    UE Dressing with Modified [place].  LE Dressing with Modified [place] and Assistive Devices as needed.  Grooming while standing at sink with Modified [place].  Toileting from bedside commode with Modified [place] for hygiene and clothing management.   Rolling to Bilateral with Modified [place].   Supine to sit with Modified [place].  Step transfer with Modified [place]  Toilet transfer to bedside commode with Modified [place].  Increased functional strength to WFL for ADLS.  Upper extremity exercise program 10 reps per handout, with independence.    Outcome: Ongoing, Progressing

## 2020-08-02 NOTE — PLAN OF CARE
POC reviewed, patient verbalize understanding. Patient denies pain/discomfort. NSR on tele monitor. Fall precaution maintained: bed on lowest position, call light within reach, bed alarm set, side rail up x 2, non skid sock on. Will continue to monitor.

## 2020-08-02 NOTE — ASSESSMENT & PLAN NOTE
MRI reviewed.  Does not have any focal deficits.     On plavix. Not on BP meds . On statin    Carotid imaging noted. Will need right carotid eval once the dialysis catheter is removed  PT/OT  Swallow screen.- passed     MRI/MRA of brain and neck The studies were reviewed.  Will need to discuss further with Neurology    Elevated sedimentation rate.  Will discuss with neurology about ruling out associated vasculitis

## 2020-08-02 NOTE — PLAN OF CARE
POC reviewed, pt verbalize understanding. Pt denies pain/discomfort. Fall precaution maintained: bed on lowest position, call light within reach, bed alarm set, side rail up x 2, non skid sock on. Will continue to monitor

## 2020-08-02 NOTE — PT/OT/SLP PROGRESS
Occupational Therapy   Treatment    Name: Mary Carrillo  MRN: [id number]  Admitting Diagnosis:  Hypervolemia associated with renal insufficiency  * Surgery Date in Future *    Recommendations:     Discharge Recommendations: home with home health, home health OT  Discharge Equipment Recommendations:  None  Barriers to discharge:  None    Assessment:     Mary Carrillo is a 65 y.o. female with a medical diagnosis of Hypervolemia associated with renal insufficiency.  She presents with Performance deficits affecting function are weakness, impaired endurance, impaired self care skills, impaired functional mobilty, gait instability, impaired balance, decreased coordination, decreased lower extremity function, decreased safety awareness, impaired cardiopulmonary response to activity.     Pt. oriented to person, place, situation, month after min vc and not to year.  Pt continued to make strong gains towards goals.  Continue with OT POC.      Rehab Prognosis:  Good; patient would benefit from acute skilled OT services to address these deficits and reach maximum level of function.       Plan:     Patient to be seen 5 x/week to address the above listed problems via self-care/home management, therapeutic activities, therapeutic exercises  · Plan of Care Expires: 08/30/20  · Plan of Care Reviewed with: patient    Subjective     Pain/Comfort:  · Pain Rating 1: 0/10  · Pain Rating Post-Intervention 1: 0/10    Objective:     Communicated with: nurse prior to session.  Patient found up in chair with telemetry, oxygen upon OT entry to room.    General Precautions: Standard, fall   Orthopedic Precautions:N/A   Braces: N/A     Occupational Performance:       Functional Mobility/Transfers:  · Patient completed Sit <> Stand Transfer with contact guard assistance and minimum assistance  with  rolling walker   · Patient completed Toilet Transfer Step Transfer technique with contact guard assistance with  rolling walker and bedside  commode      Activities of Daily Living:  · Lower Body Dressing: minimum assistance to thread sock over left toes with ankle crossed over opposite knee 2/2 limited reach/bending, body habitus, R foot donned with SBA from BS chair  · Toileting: contact guard assistance standing with RW unilateral support on RW for clothes management. sat did perirectal hygiene SBA      [facility] 6 Click ADL: 19    Treatment & Education:  --Initiated instruction in BUE HEP with handout for AROM ex, 10 reps x 1 set as per handout with initial demonstration from OT to start ex, min vc for correctness of exercises for elbow extension and wrist extension exercises seated BS chair, pt denied feeling SOB but says she felt bit fatigued after each ex., pt counted reps out loud for breathing rhythm with min vc.    --Standing WS and endurance exercies with RW to increased LE stability for safe transfers and standing ADLS 2/2 buckling knee mostly noted in L knee during transfers, pt stood 20 sec x 1 trial with CGA until fatigued.     --seated chair push ups 5 x 4 sets with SBA, min vc counted reps out loud for breathing rhythm with min vc for complete follow through.     Patient left up in chair with all lines intact, call button in reach and nurse notifiedEducation:      GOALS:   Multidisciplinary Problems     Occupational Therapy Goals        Problem: Occupational Therapy Goal    Goal Priority Disciplines Outcome Interventions   Occupational Therapy Goal     OT, PT/OT Ongoing, Progressing    Description: Goals to be met by: 8/30/2020    Patient will increase functional independence with ADLs by performing:    UE Dressing with Modified [place].  LE Dressing with Modified [place] and Assistive Devices as needed.  Grooming while standing at sink with Modified [place].  Toileting from bedside commode with Modified [place] for hygiene and clothing management.   Rolling to Bilateral with Modified [place].   Supine to sit with  Modified [place].  Step transfer with Modified [place]  Toilet transfer to bedside commode with Modified [place].  Increased functional strength to WFL for ADLS.  Upper extremity exercise program 10 reps per handout, with independence.                     Time Tracking:     OT Date of Treatment: 08/02/20  OT Start Time: 1100  OT Stop Time: 1133  OT Total Time (min): 33 min    Billable Minutes:Self Care/Home Management 15  Therapeutic Exercise 18  Total Time 33    Franchesca Cerda OT  8/2/2020

## 2020-08-02 NOTE — PROGRESS NOTES
Progress Note  Nephrology      Consult Requested By: Ronna Motta MD      SUBJECTIVE:     Overnight events  Patient is a 65 y.o. female     Patient Active Problem List   Diagnosis    Hemorrhoids, internal    Gout, chronic    Sarcoidosis    Hyperlipidemia    Thoracolumbar back pain    Abnormal chest CT    Uveitis    Fatigue    Pulmonary hypertension    IFG (impaired fasting glucose)    Primary osteoarthritis involving multiple joints    Discitis of thoracolumbar region    Inadequate oral intake    Ovarian mass    Unintentional weight loss    Pulmonary tuberculosis    Discitis    Osteoarthritis of spine with myelopathy, thoracolumbar region    Pott's disease (spinal tuberculosis)    Acute blood loss as cause of postoperative anemia    Generalized abdominal pain    Other insomnia    Status post thoracic spinal fusion    Alteration in skin integrity related to surgical incision    Difficulty sleeping    Right hip pain    Myofascial pain    CHRISTEN (acute kidney injury)    Nephrotic syndrome    Diastolic dysfunction    Aortic valve stenosis    Electrocardiogram showing T wave abnormalities    Aortic atherosclerosis    Cardiomyopathy    Obesity    Secondary hyperparathyroidism of renal origin    CKD (chronic kidney disease) stage 5, GFR less than 15 ml/min    BMI 39.0-39.9,adult    Anemia of chronic disease    Essential hypertension    Elevated troponin    Acute respiratory failure with hypoxia    Acute CVA (cerebrovascular accident)    Hypervolemia associated with renal insufficiency    Leukocytosis     Past Medical History:   Diagnosis Date    Arthritis     Asthma     Back pain     BMI 39.0-39.9,adult 7/14/2020    CKD (chronic kidney disease) stage 5, GFR less than 15 ml/min 7/14/2020    Dehiscence of operative wound 4/1/2018    Disorder of kidney and ureter     Glaucoma     Gout     Hyperlipidemia     Hypertension     In 7/29/2020    Obesity     BMI 39     Pott's disease (spinal tuberculosis)     Sarcoidosis     Sarcoidosis     Secondary hyperparathyroidism of renal origin 7/14/2020    Vitritis     Vitritis               OBJECTIVE:     Vitals:    08/02/20 0911 08/02/20 1100 08/02/20 1208 08/02/20 1237   BP: (!) 121/57   127/63   Patient Position: Sitting      Pulse: 84 80 74 98   Resp: 20  16 18   Temp: 97.7 °F (36.5 °C)      TempSrc:       SpO2: (!) 94%  95% (!) 92%   Weight:       Height:           Temp: 97.7 °F (36.5 °C) (08/02/20 0911)  Pulse: 98 (08/02/20 1237)  Resp: 18 (08/02/20 1237)  BP: 127/63 (08/02/20 1237)  SpO2: (!) 92 % (08/02/20 1237)    Date 08/02/20 0700 - 08/03/20 0659   Shift 7492-3357 7446-2202 2584-4553 24 Hour Total   INTAKE   P.O. 125   125   Shift Total(mL/kg) 125(1.3)   125(1.3)   OUTPUT   Shift Total(mL/kg)       Weight (kg) 96.2 96.2 96.2 96.2             Medications:   albuterol-ipratropium  3 mL Nebulization Q4H WAKE    atorvastatin  40 mg Oral Daily    clopidogreL  75 mg Oral Daily    furosemide  160 mg Oral BID    pantoprazole  40 mg Oral Daily    paricalcitoL  0.1 mcg/kg Intravenous Every Mon, Wed, Fri    polyethylene glycol  17 g Oral Daily    sevelamer carbonate  1,600 mg Oral TID WM    vitamin renal formula (B-complex-vitamin c-folic acid)  1 capsule Oral Daily                 Physical Exam:  General appearance:  NAD  No cough  No SOB  Lungs: diminished breath sounds  Heart: Pulse 98  Abdomen: soft  Extremities: no edema  Skin: dry  Laboratory:  ABG  Labs reviewed  Recent Results (from the past 336 hour(s))   Basic Metabolic Panel (BMP)    Collection Time: 08/02/20  6:21 AM   Result Value Ref Range    Sodium 138 136 - 145 mmol/L    Potassium 3.4 (L) 3.5 - 5.1 mmol/L    Chloride 101 95 - 110 mmol/L    CO2 25 23 - 29 mmol/L    BUN, Bld 42 (H) 8 - 23 mg/dL    Creatinine 6.2 (H) 0.5 - 1.4 mg/dL    Calcium 7.5 (L) 8.7 - 10.5 mg/dL    Anion Gap 12 8 - 16 mmol/L   Basic Metabolic Panel (BMP)    Collection Time: 08/01/20  5:09  AM   Result Value Ref Range    Sodium 137 136 - 145 mmol/L    Potassium 3.6 3.5 - 5.1 mmol/L    Chloride 102 95 - 110 mmol/L    CO2 25 23 - 29 mmol/L    BUN, Bld 33 (H) 8 - 23 mg/dL    Creatinine 4.8 (H) 0.5 - 1.4 mg/dL    Calcium 7.2 (L) 8.7 - 10.5 mg/dL    Anion Gap 10 8 - 16 mmol/L   Basic Metabolic Panel (BMP)    Collection Time: 07/31/20  6:10 AM   Result Value Ref Range    Sodium 138 136 - 145 mmol/L    Potassium 3.6 3.5 - 5.1 mmol/L    Chloride 105 95 - 110 mmol/L    CO2 22 (L) 23 - 29 mmol/L    BUN, Bld 52 (H) 8 - 23 mg/dL    Creatinine 5.9 (H) 0.5 - 1.4 mg/dL    Calcium 6.6 (LL) 8.7 - 10.5 mg/dL    Anion Gap 11 8 - 16 mmol/L     Recent Results (from the past 336 hour(s))   CBC auto differential    Collection Time: 08/02/20  6:21 AM   Result Value Ref Range    WBC 12.71 (H) 3.90 - 12.70 K/uL    Hemoglobin 8.2 (L) 12.0 - 16.0 g/dL    Hematocrit 26.8 (L) 37.0 - 48.5 %    Platelets 239 150 - 350 K/uL   CBC auto differential    Collection Time: 08/01/20  5:09 AM   Result Value Ref Range    WBC 13.36 (H) 3.90 - 12.70 K/uL    Hemoglobin 8.5 (L) 12.0 - 16.0 g/dL    Hematocrit 27.7 (L) 37.0 - 48.5 %    Platelets 250 150 - 350 K/uL   CBC auto differential    Collection Time: 07/31/20  6:10 AM   Result Value Ref Range    WBC 11.96 3.90 - 12.70 K/uL    Hemoglobin 7.9 (L) 12.0 - 16.0 g/dL    Hematocrit 25.1 (L) 37.0 - 48.5 %    Platelets 237 150 - 350 K/uL     Urinalysis  No results for input(s): COLORU, CLARITYU, SPECGRAV, PHUR, PROTEINUA, GLUCOSEU, BILIRUBINCON, BLOODU, WBCU, RBCU, BACTERIA, MUCUS, NITRITE, LEUKOCYTESUR, UROBILINOGEN, HYALINECASTS in the last 24 hours.    Diagnostic Results:  X-Ray: Reviewed  US: Reviewed  Echo: Reviewed  ACCESS    ASSESSMENT/PLAN:   CKD 5  Hypokalemia  Metabolic bone disease  Hyperphosphatemia  Binders  Anemia Hb 8.2  Poor nutrition  Albumin 2.2  /63  HD in am

## 2020-08-02 NOTE — SUBJECTIVE & OBJECTIVE
Interval History: Patient and son  Feels well   K 3.4 - Gave K 20 meq  NPO after midnight for her peritoneal dialysis catheter    Review of Systems   Constitutional: Negative for chills and fever.   Respiratory: Negative for cough, shortness of breath and wheezing.    Cardiovascular: Negative for chest pain, palpitations and leg swelling.   Gastrointestinal: Negative for abdominal pain, blood in stool, constipation, diarrhea, nausea and vomiting.   Genitourinary: Negative for dysuria and hematuria.   Musculoskeletal: Negative for back pain.   Skin: Negative for rash.   Neurological: Negative for dizziness, weakness and headaches.     Objective:     Vital Signs (Most Recent):  Temp: 97.7 °F (36.5 °C) (08/02/20 0911)  Pulse: 98 (08/02/20 1237)  Resp: 18 (08/02/20 1237)  BP: 127/63 (08/02/20 1237)  SpO2: (!) 92 % (08/02/20 1237) Vital Signs (24h Range):  Temp:  [97.7 °F (36.5 °C)-98.8 °F (37.1 °C)] 97.7 °F (36.5 °C)  Pulse:  [74-98] 98  Resp:  [16-20] 18  SpO2:  [88 %-96 %] 92 %  BP: (121-163)/(57-79) 127/63     Weight: 96.2 kg (212 lb 1.3 oz)  Body mass index is 35.29 kg/m².    Intake/Output Summary (Last 24 hours) at 8/2/2020 1403  Last data filed at 8/2/2020 0830  Gross per 24 hour   Intake 125 ml   Output 201 ml   Net -76 ml      Physical Exam  Vitals signs and nursing note reviewed.   Constitutional:       General: She is not in acute distress.     Comments: Getting dialysis   HENT:      Head: Normocephalic and atraumatic.      Mouth/Throat:      Pharynx: Oropharynx is clear.   Eyes:      General: No scleral icterus.  Neck:      Musculoskeletal: Normal range of motion and neck supple.   Cardiovascular:      Rate and Rhythm: Normal rate and regular rhythm.      Heart sounds: Murmur present.      Comments: RIJ dialysis catheter in place with no oozing or bleeding  Pulmonary:      Effort: Pulmonary effort is normal. No respiratory distress.      Breath sounds: Normal breath sounds. No rales (at bases).   Abdominal:       General: Bowel sounds are normal.      Palpations: Abdomen is soft.      Tenderness: There is no abdominal tenderness.   Musculoskeletal:         General: No swelling.   Skin:     General: Skin is warm and dry.      Capillary Refill: Capillary refill takes less than 2 seconds.      Comments: Stasis changes of lower extremity   Neurological:      General: No focal deficit present.      Mental Status: She is alert.   Psychiatric:         Mood and Affect: Mood normal.         Behavior: Behavior normal.         Significant Labs:   BMP:   Recent Labs   Lab 08/02/20  0621   GLU 81      K 3.4*      CO2 25   BUN 42*   CREATININE 6.2*   CALCIUM 7.5*   MG 2.0     CBC:   Recent Labs   Lab 08/01/20  0509 08/02/20  0621   WBC 13.36* 12.71*   HGB 8.5* 8.2*   HCT 27.7* 26.8*    239       MRA Neck without contrast  Narrative: EXAMINATION:  MRA BRAIN WITHOUT CONTRAST; MRA NECK WITHOUT CONTRAST    CLINICAL HISTORY:  Stroke, follow up; .    TECHNIQUE:  Non-contrast 3-D time-of-flight intracranial MR angiography was performed through the [place] [facility] with MIP reformatting.  Subsequently, contrast enhanced MRA of the neck was performed utilizing 3D time-of-flight technique without contrast.    COMPARISON:  Prior brain MRI dated 07/29/2020, carotid ultrasound dated 07/30/20    FINDINGS:  Brain: Anterior intracranial circulation: No high-grade focal stenosis, occlusion, aneurysm, or vascular malformation. Note is made of early branching of the left ophthalmic artery from the cavernous ICA.    Posterior intracranial circulation: The left vertebral artery is not visualized intracranially which may be due to slow flow, hypoplasia, or less likely occlusion.    Neck: There is a left-sided aortic arch with bovine configuration of branch vessels.  Common carotid and cervical internal carotid arteries are widely patent.  Right vertebral artery originates from the right subclavian artery and appears widely patent and  dominant.  The left vertebral artery is not visualized which may be due to slow flow, hypoplasia, or occlusion.  Impression: 1. Nonvisualization of the left vertebral artery on MRI of the head and neck which could be due to slow flow, hyperplasia, or occlusion.  2. Remaining visualized vessels appear widely patent without focal stenosis or aneurysm.  3. Anatomic variant of early branching of the left ophthalmic artery from the cavernous ICA.  This report was flagged in Epic as abnormal.    Electronically signed by: Leann Mitchell MD  Date:    08/02/2020  Time:    09:56  MRA Brain without contrast  Narrative: EXAMINATION:  MRA BRAIN WITHOUT CONTRAST; MRA NECK WITHOUT CONTRAST    CLINICAL HISTORY:  Stroke, follow up; .    TECHNIQUE:  Non-contrast 3-D time-of-flight intracranial MR angiography was performed through the [place] [facility] with MIP reformatting.  Subsequently, contrast enhanced MRA of the neck was performed utilizing 3D time-of-flight technique without contrast.    COMPARISON:  Prior brain MRI dated 07/29/2020, carotid ultrasound dated 07/30/20    FINDINGS:  Brain: Anterior intracranial circulation: No high-grade focal stenosis, occlusion, aneurysm, or vascular malformation. Note is made of early branching of the left ophthalmic artery from the cavernous ICA.    Posterior intracranial circulation: The left vertebral artery is not visualized intracranially which may be due to slow flow, hypoplasia, or less likely occlusion.    Neck: There is a left-sided aortic arch with bovine configuration of branch vessels.  Common carotid and cervical internal carotid arteries are widely patent.  Right vertebral artery originates from the right subclavian artery and appears widely patent and dominant.  The left vertebral artery is not visualized which may be due to slow flow, hypoplasia, or occlusion.  Impression: 1. Nonvisualization of the left vertebral artery on MRI of the head and neck which could be due to slow  flow, hyperplasia, or occlusion.  2. Remaining visualized vessels appear widely patent without focal stenosis or aneurysm.  3. Anatomic variant of early branching of the left ophthalmic artery from the cavernous ICA.  This report was flagged in Epic as abnormal.    Electronically signed by: Leann Mitchell MD  Date:    08/02/2020  Time:    09:56

## 2020-08-03 NOTE — PLAN OF CARE
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 The rest of intra and extracranial vessels have no significant disease.    - recommend to start ASA if OK from anemia stand point  - continue DAPT for three months continued by single antiplatelet  - please obtain plavix response assay (miscellaneous lab)  - will need follow up with vascular neurology in outpatient setting.  - please call with questions.    Discussed with Dr. Wilma Ercikson MD  [facility] PGY4

## 2020-08-03 NOTE — PLAN OF CARE
POC reviewed, patient verbalize understanding. Patient denies pain/discomfort. NSR on tele monitor. NPO at midnight. Fall precaution maintained: bed on lowest position, call light within reach, bed alarm set, side rail up x 2, non skid sock on. Will continue to monitor.

## 2020-08-03 NOTE — PHYSICIAN QUERY
"PT Name: Mary Carrillo  MR #: [id number]    Nutrition Clarification     CDS/: Leann BOWLING              Contact information:  [phone]    This form is a permanent document in the medical record.     Query Date: August 3, 2020    By submitting this query, we are merely seeking further clarification of documentation.. Please utilize your independent clinical judgment when addressing the question(s) below.    The medical record contains the following:   Indicators  Supporting Clinical Findings Location in Medical Record   x % of Estimated Energy Intake over a time frame from p.o., TF, or TPN Inadequate oral intake    Energy Calories Required: not meeting needs  Protein Required: not meeting needs  Fluid Required: not meeting needs  Comments: LBM 7/29  Tolerance: tolerating  % Intake of Estimated Energy Needs: 0 - 25 %  % Meal Intake: 0 - 25 %, diet recently advanced    Assessment and Plan  Nutrition Problem  Inadequate oral intake     Related to (etiology):   dysphagia     Signs and Symptoms (as evidenced by):   Diet recently advanced to mechanical soft with renal restrictions  8/2 Renal MD PN      7/30 Dietitian consult   x Weight Status over a time frame Unintentional weight loss 8/2 Renal MD PN    Subcutaneous Fat and/or Muscle Loss     x Fluid Accumulation or Edema Edema present 7/29 Cards consult   x Wt/BMI/Usual Body Weight BMI 35.3  Ht 5'5"  Wt 101.6kg 8/3 Flowsheet    Delayed Wound Healing/Failure to Thrive     x Acute or Chronic Illness Poor nutrition--Albumin 2.2    CKD 5, now ER renal  Acute respiratory failure with hypoxia  Acute CVA  HTN  Anemia of CKD  Sarcadosis  Gout, chronic  Pott's disease  Hyperlipidemia 8/2 Renal MD PN    8/2 Hosp Med PN    Medication     x Treatment Dietary nutrition supplements:  Novasource renal  Dietitian consult   8/3 NSG orders   x Other Recommendation:   1. Continue current dysphagia [facility] soft, renal diet with Novasource Renal TID.   2. Encourage intake of all " meals and ONS.   3. RD to continue to follow and monitor intake 7/30 Dietitian meño     AND / ASPEN Clinical Characteristics (October 2011)  A minimum of two characteristics is recommended for diagnosing either moderate or severe malnutrition   Mild Malnutrition Moderate Malnutrition Severe Malnutrition   Energy Intake from p.o., TF or TPN. < 75% intake of estimated energy needs for less than 7 days < 75% intake of estimated energy needs for greater than 7 days < 50% intake of estimated energy needs for > 5 days   Weight Loss 1-2% in 1 month  5% in 3 months  7.5% in 6 months  10% in 1 year 1-2 % in 1 week  5% in 1 month  7.5% in 3 months  10% in 6 months  20% in 1 year > 2% in 1 week  > 5% in 1 month  > 7.5% in 3 months  > 10% in 6 months  > 20% in 1 year   Physical Findings     None *Mild subcutaneous fat and/or muscle loss  *Mild fluid accumulation  *Stage II decubitus  *Surgical wound or non-healing wound *Mod/severe subcutaneous fat and/or muscle loss  *Mod/severe fluid accumulation  *Stage III or IV decubitus  *Non-healing surgical wound     Provider, please specify diagnosis or diagnoses associated with above clinical findings.    [x  ] Mild Protein-Calorie Malnutrition   [  ] Other Nutritional Diagnosis (please specify): _______   [  ] Malnutrition ruled out   [  ] Clinically Undetermined     Present on admission (POA) status:   [   ] Yes (Y)                          [  ] Clinically Undetermined (W)              [   ] No (N)                            [   ] Documentation insufficient to determine if condition is POA (U)     Please document in your progress notes daily for the duration of treatment until resolved and include in your discharge summary.

## 2020-08-03 NOTE — ANESTHESIA POSTPROCEDURE EVALUATION
Anesthesia Post Evaluation    Patient: Mary Carrillo    Procedure(s) Performed: Procedure(s) (LRB):  INSERTION, CATHETER, DIALYSIS, PERITONEAL, LAPAROSCOPIC (N/A)    Final Anesthesia Type: general    Patient location during evaluation: PACU  Patient participation: Yes- Able to Participate  Level of consciousness: awake and alert, oriented and awake  Post-procedure vital signs: reviewed and stable  Pain management: adequate  Airway patency: patent    PONV status at discharge: No PONV  Anesthetic complications: no      Cardiovascular status: blood pressure returned to baseline  Respiratory status: unassisted and room air  Hydration status: euvolemic  Follow-up not needed.          Vitals Value Taken Time   /88 08/03/20 1545   Temp 36.4 °C (97.5 °F) 08/03/20 1400   Pulse 85 08/03/20 1545   Resp 16 08/03/20 1400   SpO2 94 % 08/03/20 1122         Event Time   Out of Recovery 08/03/2020 10:23:51         Pain/Isabell Score: Pain Rating Prior to Med Admin: 0 (8/3/2020 10:05 AM)  Pain Rating Post Med Admin: 0 (8/3/2020 10:05 AM)  Isabell Score: 9 (8/3/2020 10:05 AM)

## 2020-08-03 NOTE — PLAN OF CARE
[facility] Plan of Care Note    Imaging reviewed;  Please obtain CTA head and neck to further characterize posterior circulation as MRA did not visualize vertebral artery very well.     Huey Erickson MD  [facility] PGY4

## 2020-08-03 NOTE — PT/OT/SLP PROGRESS
Physical Therapy      Patient Name:  Mary Carrillo   MRN:  [id number]    1329 - Patient getting ready to leave for dialysis.   Will follow-up next tx date.    Faviola Mac, PTA

## 2020-08-03 NOTE — TRANSFER OF CARE
"Anesthesia Transfer of Care Note    Patient: Mary Carrillo    Procedure(s) Performed: Procedure(s) (LRB):  INSERTION, CATHETER, DIALYSIS, PERITONEAL, LAPAROSCOPIC (N/A)    Patient location: PACU    Anesthesia Type: general    Transport from OR: Transported from OR on 100% O2 by closed face mask with adequate spontaneous ventilation    Post pain: adequate analgesia    Post assessment: no apparent anesthetic complications    Post vital signs: stable    Level of consciousness: awake and alert    Nausea/Vomiting: no nausea/vomiting    Complications: none    Transfer of care protocol was followed      Last vitals:   Visit Vitals  BP (!) 162/70 (BP Location: Right arm, Patient Position: Lying)   Pulse 66   Temp 36.7 °C (98.1 °F) (Temporal)   Resp 18   Ht 5' 5" (1.651 m)   Wt 96.2 kg (212 lb 1.3 oz)   LMP  (LMP Unknown)   SpO2 99%   Breastfeeding No   BMI 35.29 kg/m²     "

## 2020-08-03 NOTE — PLAN OF CARE
08/03/20 1554   Post-Acute Status   Post-Acute Authorization Dialysis   Diaylsis Status Authorization Obtained  (TN called to see if pt is still having urgent PD. Spoke with Alea who reports that Dr. Mcintyre is under the impression that she could go without dialysis for two weeks. She reports she will have Dr. Mcintyre contact Dr. Buffy Dawn.)     Updated Dr. Dawn on unit who reports that she will still need to have PD cath flushed and have weekly labs done until it matures.    Adelita Lang RN  RN Transition Navigator  [phone]

## 2020-08-03 NOTE — PLAN OF CARE
Patient A&Ox4. Neuro checks q4h.  2L of oxygen.  NSR on telemetry.  Dysphagia diet. No accuchecks.  Mepliex to coccyx.   PD catheter placed this shift. No signs of bleeding or redness. Lap sites x3 with dermabond.  RIJ trialysis cath with pigtail.  No complaints of pain.  CTA of head and neck done.  Dialysis this evening.   All questions answered.  Wll continue to monitor.    Olga Mendiola RN

## 2020-08-03 NOTE — PLAN OF CARE
"TN met with patient and daughter at bedside. Updated to discharge plan. She had questions about patient relations for a "statement". Dr. Cohn at bedside and will reach out to patient relations for pt and daughter. Pt has R IJ CVC for dialysis. PD line placed today. Will take 30 days to mature. Pt//daughter were updated that she will need HD until PD cath matures.  Updated that she will go to [facility] to be follow by Dr. Mcintyre.    TN printed [facility] for Medicaid Waiver Program and Medicaid Transportation if needed.    This  put name on white board and explained blue discharge folder to patient. Discharge planning brochure and/or business card given to patient.  Patient verbalized understanding.     08/03/20 1154   Discharge Reassessment   Assessment Type Discharge Planning Reassessment   Provided patient/caregiver education on the expected discharge date and the discharge plan Yes   Do you have any problems affording any of your prescribed medications? No   Discharge Plan A Home with family   DME Needed Upon Discharge  other (see comments)  (TBD)   Patient choice form signed by patient/caregiver N/A   Anticipated Discharge Disposition Home  (Lachelle will provide the care for her at home. They both decline home health at present.)   Can the patient/caregiver answer the patient profile reliably? Yes, cognitively intact   How does the patient rate their overall health at the present time? Fair   Describe the patient's ability to walk at the present time. Minor restrictions or changes   How often would a person be available to care for the patient? Whenever needed   Number of comorbid conditions (as recorded on the chart) Five or more   Post-Acute Status   Post-Acute Authorization Dialysis   Diaylsis Status Authorization Obtained  (Pt is set up with [facility] on 8/6. Pt will have Trialysis line on discharge until PD cath matures.)     Adelita Lang RN  RN " Transition Navigator  [phone]

## 2020-08-03 NOTE — SUBJECTIVE & OBJECTIVE
Interval History: Patient and daughter Alea    Feels well  S/P PD catheter    Will get CTA and dialyze after that  SAPNA Dawn. Will need HD, already set up, for one month for dialysis catheter to mature    Later in the day I spoke to Dr Dawn who had talked to her primary nephrologist, Dr Mcintyre.  Will go home without dialysis catheter and will follow up to start PD on This Thursday     Review of Systems   Constitutional: Negative for chills and fever.   Respiratory: Negative for cough, shortness of breath and wheezing.    Cardiovascular: Negative for chest pain, palpitations and leg swelling.   Gastrointestinal: Negative for abdominal pain, blood in stool, constipation, diarrhea, nausea and vomiting.   Genitourinary: Negative for dysuria and hematuria.   Musculoskeletal: Negative for back pain.   Skin: Negative for rash.   Neurological: Negative for dizziness, weakness and headaches.     Objective:     Vital Signs (Most Recent):  Temp: 96.8 °F (36 °C) (08/03/20 1122)  Pulse: 90 (08/03/20 1140)  Resp: 16 (08/03/20 1122)  BP: (!) 155/74 (08/03/20 1122)  SpO2: (!) 94 % (08/03/20 1122) Vital Signs (24h Range):  Temp:  [96.8 °F (36 °C)-99.1 °F (37.3 °C)] 96.8 °F (36 °C)  Pulse:  [] 90  Resp:  [13-20] 16  SpO2:  [89 %-100 %] 94 %  BP: (125-162)/(57-88) 155/74     Weight: 96.2 kg (212 lb 1.3 oz)  Body mass index is 35.29 kg/m².    Intake/Output Summary (Last 24 hours) at 8/3/2020 1156  Last data filed at 8/3/2020 0911  Gross per 24 hour   Intake 810 ml   Output --   Net 810 ml      Physical Exam  Vitals signs and nursing note reviewed.   Constitutional:       General: She is not in acute distress.     Comments: Getting dialysis   HENT:      Head: Normocephalic and atraumatic.      Mouth/Throat:      Pharynx: Oropharynx is clear.   Eyes:      General: No scleral icterus.  Neck:      Musculoskeletal: Normal range of motion and neck supple.   Cardiovascular:      Rate and Rhythm: Normal rate and regular rhythm.       Heart sounds: Murmur present.      Comments: RIJ dialysis catheter in place with no oozing or bleeding  Pulmonary:      Effort: Pulmonary effort is normal. No respiratory distress.      Breath sounds: Normal breath sounds. No rales (at bases).   Abdominal:      General: Bowel sounds are normal.      Palpations: Abdomen is soft.      Tenderness: There is no abdominal tenderness.   Musculoskeletal:         General: No swelling.   Skin:     General: Skin is warm and dry.      Capillary Refill: Capillary refill takes less than 2 seconds.      Comments: Stasis changes of lower extremity   Neurological:      General: No focal deficit present.      Mental Status: She is alert.   Psychiatric:         Mood and Affect: Mood normal.         Behavior: Behavior normal.         Significant Labs:   BMP:   Recent Labs   Lab 08/03/20  0455   GLU 84      K 3.2*      CO2 24   BUN 50*   CREATININE 6.8*   CALCIUM 7.3*   MG 2.0     CBC:   Recent Labs   Lab 08/02/20  0621 08/03/20  0455   WBC 12.71* 12.95*   HGB 8.2* 8.1*   HCT 26.8* 25.8*    249       Significant Imaging: I have reviewed all pertinent imaging results/findings within the past 24 hours.

## 2020-08-03 NOTE — PT/OT/SLP PROGRESS
Occupational Therapy  Missed Visit    Patient Name:  Mary Carrillo   MRN:  [id number]    Patient not seen today secondary to Unavailable (Comment), Dialysis.  Pt JULIANO for AVG placement in AM & JULIANO at HD in PM.   Will follow-up Tues.    DK Lew  8/3/2020

## 2020-08-03 NOTE — NURSING TRANSFER
Nursing Transfer Note      8/3/2020     Transfer To: [id number]    Transfer via bed    Transfer with tele box on    Transported by pct    Medicines sent: no    Chart send with patient: Yes    Notified: family

## 2020-08-03 NOTE — PLAN OF CARE
VN cued in to pt's room with permission for pm rounds. Plan of care reviewed with pt. Pt verbalizes understanding. Pt denies any questions/concerns. Denies any pain. Pt is aware she is to be npo after midnight for surgery in am. Call bell w/in reach. Instructed to call for needs/assist oob.

## 2020-08-03 NOTE — OP NOTE
DATE OF PROCEDURE: 08/03/2020    PREOPERATIVE DIAGNOSIS: CKD V    POSTOPERATIVE DIAGNOSIS: CKD V    PROCEDURE: Laparoscopic placement of peritoneal dialysis catheter, laparoscopic lysis of adhesions, laparoscopic omentopexy    SURGEON: Robby Flores M.D.    ASSISTANT: None.    ANESTHESIA: General endotracheal prep chlorhexidine.    ESTIMATED BLOOD LOSS: Minimal.    SPECIMENS: None.    INDICATIONS: The patient is an 65 y.o. female, who has CKD V and is planning to initiate peritoneal dialysis. The risks of surgery were discussed with   the patient including bleeding, infection, pain, scar, wound complications,   injury to local structures, wanting more extensive surgery, and potential need   for further surgery. The patient demonstrated understanding of these risks and   a consent form was obtained.    PROCEDURE: The patient was identified in the Preoperative Unit and taken back    to the Operating Room, laid supine on the operating room table. IV antibiotics    were administered prior to the induction of general anesthesia. General    anesthesia was induced without complication. The patient was then prepped and    draped in a standard sterile fashion. Local anesthesia was infiltrated into the  skin and subcutaneous tissues to all the incision site. A 5 mm incision was    made in the Left upper quadrant with an #11 blade scalpel. visiport entrance was    Performed without injury.  There was no intraabdominal pathology noted. There was noted to be some filmy adhesions of the bowel to the lower abdominal wall. These were taken down using scissors.   Omentum was sutured to the falciform ligament using 0 vicryl.   A 12mm trocar was placed in the left lateral infra-umbilical location. It was tunneled  In the posterior rectus sheath for a few centimeters before entry into the peritoneum.    The catheter was then Positioned into the deep pelvis space.    It then tunneled in the subcutaneous space to a site superior and     lateral. The 2 cuffs were confirmed to be in the posterior rectus sheath and in    the subcutaneous space. The catheter was flushed and aspirated and then    positioned to gravity and good back flow was appreciated. The skin incisions were  closed using 4-0 Monocryl suture in interrupted fashion. Dermabond was applied  and sterile dressing was applied to the catheter. The patient was awakened    from general anesthesia without complication and returned to the Postoperative    Recovery Unit in stable condition. At the end of the case, sponge, instrument    and needle counts were correct on 2 occasions. I was present and scrubbed    throughout the entirety of the case.        COMPLICATIONS: None.    CONDITION: Stable.    IMPLANTS: Peritoneal dialysis catheter.

## 2020-08-04 NOTE — PLAN OF CARE
Problem: Occupational Therapy Goal  Goal: Occupational Therapy Goal  Description: Goals to be met by: 8/30/2020    Patient will increase functional independence with ADLs by performing:    UE Dressing with Modified [place].  LE Dressing with Modified [place] and Assistive Devices as needed.  Grooming while standing at sink with Modified [place].  Toileting from bedside commode with Modified [place] for hygiene and clothing management.   Rolling to Bilateral with Modified [place].   Supine to sit with Modified [place].  Step transfer with Modified [place]  Toilet transfer to bedside commode with Modified [place].  Increased functional strength to WFL for ADLS.  Upper extremity exercise program 10 reps per handout, with independence.    Outcome: Adequate for Care Transition   No goals met, rec:  home with HHOT, daughter to assist pt at home as well.  Discharge pt from acute OT 2/2 going home today.

## 2020-08-04 NOTE — NURSING
Shift uneventful  PRN norco administered for pain, relief achieved  Pt currently resting in bed with eyes closed, no acute distress noted. 2 L NC, 02 sats between 94-96%, NSR per cardiac monitor  Bed alarm on, call light with reach.  No other significant changes. Prepared to transfer care to oncoming day shift.

## 2020-08-04 NOTE — NURSING
IV site discontinued with no adverse reaction. Discharge packet, oxygen, and rolling walker delivered to bedside. Virtual nurse will review discharge.

## 2020-08-04 NOTE — ASSESSMENT & PLAN NOTE
MRI reviewed.  Does not have any focal deficits.     On plavix. Not on BP meds . On statin    Carotid imaging noted.     PT/OT     MRI/MRA of brain and neck     Elevated sedimentation rate.      Await CTA of head and neck results

## 2020-08-04 NOTE — PLAN OF CARE
08/04/20 1104   Post-Acute Status   Post-Acute Authorization Home Health;HME   HME Status Awaiting Therapy Documentation  (Ambulatory home o2 eval ordered by MD. TN reached out to therapy to see if they would assist. Nikhil BS Nurse about need for eval to see if she will need home O2.)   Home Health Status Referrals Sent  (first choice - [facility] [place] [facility]. [facility] declined citing they can not meet needs. Spoke with Tracee with [facility] and updated her to pt's PD status. Under review.)     Adelita Lang RN  RN Transition Navigator  [phone]

## 2020-08-04 NOTE — PLAN OF CARE
VN rounds completed.  The patient has no complaints at this time and states that she didn't have a bowel movement today.    Patient reminded of fall precautions and verbalized understanding.

## 2020-08-04 NOTE — PLAN OF CARE
[facility] HOME HEALTH ORDERS  FACE TO FACE ENCOUNTER    Patient Name: Mary Carrillo  YOB: 1955    PCP: Jose Khan MD   PCP Address: [address] / [place] [address]  PCP Phone Number: [phone]  PCP Fax: [phone]    Encounter Date: 08/04/2020    Admit to Home Health    Diagnoses:  Active Hospital Problems    Diagnosis  POA    Hypervolemia associated with renal insufficiency [E87.70, N28.9]  Yes    Leukocytosis [D72.829]  No    Elevated troponin [R79.89]  Yes    Acute respiratory failure with hypoxia [J96.01]  Yes    Acute CVA (cerebrovascular accident) [I63.9]  No    Anemia of chronic disease [D63.8]  Yes    Essential hypertension [I10]  Yes    CKD (chronic kidney disease) stage 5, GFR less than 15 ml/min [N18.5]  Yes    Diastolic dysfunction [I51.89]  Yes     Echo (3/2018)  Impaired LV relaxation, elevated LAP (grade 2 diastolic dysfunction).      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]  Yes    Hyperlipidemia [E78.5]  Yes    Sarcoidosis [D86.9]  Yes      Resolved Hospital Problems    Diagnosis Date Resolved POA    *Hypervolemia associated with renal insufficiency [E87.70, N28.9] 07/29/2020 Yes    Metabolic acidosis [E87.2] 08/01/2020 Unknown    In [I63.9] 07/29/2020 No       Future Appointments   Date Time Provider Department Center   8/6/2020 12:35 PM ANNUAL WELLNESS NURSE 2, [facility]   8/17/2020 10:00 AM Robby Flores MD [facility] GENSUR [facility]   10/22/2020  1:45 PM [facility] LAB CH[facility] LAB [facility]   10/29/2020  9:30 AM Alejandro Ram MD [facility] RHEUM NIR ACT   11/9/2020  1:20 PM Jose Khan Jr., MD [facility] FAM MED NIR ACC     Follow-up Information     Amos Mcintyre MD.    Specialty: Hospitalist  Contact information:  [address] [address]  [phone]             Jose Khan MD.    Specialty: Internal Medicine  Contact information:  [address]  [place]  [address]  [phone]                     I have seen and examined this patient face to face today. My clinical findings that support the need for the home health skilled services and home bound status are the following:  Requiring assistive device to leave home due to unsteady gait caused by  Weakness/Debility.  Patient with medication mismanagement issues requiring home bound status as evidenced by  Unstable vital signs (blood pressure, heart rate) and Poor understanding of medication regimen/dosage.    Allergies:  Review of patient's allergies indicates:   Allergen Reactions    Prednisone Hives and Itching     Pills only, can take if she needs to    Naproxen Rash       Diet: renal diet    Activities: activity as tolerated    Nursing:   SN to complete comprehensive assessment including routine vital signs. Instruct on disease process and s/s of complications to report to MD. Review/verify medication list sent home with the patient at time of discharge  and instruct patient/caregiver as needed. Frequency may be adjusted depending on start of care date.    Notify MD if SBP > 160 or < 90; DBP > 90 or < 50; HR > 120 or < 50; Temp > 101; Other:         CONSULTS:    Physical Therapy to evaluate and treat. Evaluate for home safety and equipment needs; Establish/upgrade home exercise program. Perform / instruct on therapeutic exercises, gait training, transfer training, and Range of Motion.  Occupational Therapy to evaluate and treat. Evaluate home environment for safety and equipment needs. Perform/Instruct on transfers, ADL training, ROM, and therapeutic exercises.   to evaluate for community resources/long-range planning.  Aide to provide assistance with personal care, ADLs, and vital signs.    MISCELLANEOUS CARE:  Routine Skin for Bedridden Patients: Instruct patient/caregiver to apply moisture barrier cream to all skin folds and wet areas in perineal area daily and after baths and all bowel  movements.    WOUND CARE ORDERS  n/a      Medications: Review discharge medications with patient and family and provide education.      Current Discharge Medication List      START taking these medications    Details   aspirin (ECOTRIN) 81 MG EC tablet Take 1 tablet (81 mg total) by mouth once daily.  Qty: 30 tablet, Refills: 11      clopidogreL (PLAVIX) 75 mg tablet Take 1 tablet (75 mg total) by mouth once daily.  Qty: 30 tablet, Refills: 2      pantoprazole (PROTONIX) 40 MG tablet Take 1 tablet (40 mg total) by mouth once daily.  Qty: 30 tablet, Refills: 11      sevelamer carbonate (RENVELA) 800 mg Tab Take 3 tablets (2,400 mg total) by mouth 4 (four) times daily with meals and nightly.  Qty: 360 tablet, Refills: 11      vitamin renal formula, B-complex-vitamin c-folic acid, (NEPHROCAP) 1 mg Cap Take 1 capsule by mouth once daily.  Qty: 30 capsule, Refills: 11         CONTINUE these medications which have CHANGED    Details   furosemide (LASIX) 80 MG tablet Take 2 tablets (160 mg total) by mouth 2 (two) times daily.  Qty: 120 tablet, Refills: 11      traZODone (DESYREL) 50 MG tablet Take 1 tablet (50 mg total) by mouth nightly as needed for Insomnia.  Qty:           CONTINUE these medications which have NOT CHANGED    Details   albuterol (PROAIR HFA) 90 mcg/actuation inhaler Inhale 2 puffs into the lungs every 6 (six) hours as needed for Wheezing. Rescue  Qty: 1 Inhaler, Refills: 11      amLODIPine (NORVASC) 10 MG tablet Take 1 tablet (10 mg total) by mouth once daily.  Qty: 90 tablet, Refills: 3    Comments: .      HYDROcodone-acetaminophen (NORCO)  mg per tablet Take 1 tablet by mouth every 12 (twelve) hours as needed for Pain.  Qty: 28 tablet, Refills: 0    Comments: Quantity prescribed more than 7 day supply? Yes, quantity medically necessary. Pt with a  Hx of Pott's disease and vertebral osteomyelitis requiring fusion. NSAIDs c/i with a hx of FSGS.      LIPITOR 40 mg tablet Take 1 tablet (40 mg total)  by mouth once daily.  Qty: 90 tablet, Refills: 3    Associated Diagnoses: Mixed hyperlipidemia      tiZANidine (ZANAFLEX) 4 MG tablet Take 4 mg by mouth every 8 (eight) hours as needed.       folic acid (FOLVITE) 1 MG tablet Take 1 tablet (1 mg total) by mouth once daily.  Qty: 90 tablet, Refills: 3    Associated Diagnoses: Sarcoidosis; Methotrexate, long term, current use         STOP taking these medications       carvedilol (COREG) 25 MG tablet Comments:   Reason for Stopping:         hydrALAZINE (APRESOLINE) 50 MG tablet Comments:   Reason for Stopping:         hydrOXYzine pamoate (VISTARIL) 50 MG Cap Comments:   Reason for Stopping:               I certify that this patient is confined to her home and needs intermittent skilled nursing care, physical therapy and occupational therapy.

## 2020-08-04 NOTE — PROGRESS NOTES
[facility] Medicine  Progress Note    Patient Name: Mary Carrillo  MRN: [id number]  Patient Class: IP- Inpatient   Admission Date: 7/26/2020  Length of Stay: 8 days  Attending Physician: Ronna Motta MD  Primary Care Provider: Jose Khan MD        Subjective:     Principal Problem:Hypervolemia associated with renal insufficiency        HPI:  Mary Carrillo is a 64 yo AAF with pmh of HTN, HLD, diastolic dysfunction, mild aortic valve stenosis, pott's disease, obesity, gout, sarcoidosis, CKD stage 5, asthma and chronic back pain who presented to OSH with complaint of dyspnea and anuria. Pt currently undergoing outpatient workup for initiation of peritoneal dialysis. Initial labs remarkable for Hbg/Hct 7/25, BUN/Cr 66/6.7 previously 46/4.7 on 6/15/20, BNP 1261, , troponin 0.123. CXR consistent with pulmonary edema/CHF.  She received 0.5 inch nitropaste for hypertension and 100 mg furosemide IV. Pt with some hypoxia requiring 3 liters nasal cannula. She denies chest pain, abdominal pain, n/v and fever/chills. COVID negative. Pt admitted to [facility].     Overview/Hospital Course:  No notes on file    Interval History: Patient and daughter Alea    Feels well  S/P PD catheter    Will get CTA and dialyze after that  DW Dr Dawn. Will need HD, already set up, for one month for dialysis catheter to mature    Later in the day I spoke to Dr Dawn who had talked to her primary nephrologist, Dr Mcintyre.  Will go home without dialysis catheter and will follow up to start PD on This Thursday     Review of Systems   Constitutional: Negative for chills and fever.   Respiratory: Negative for cough, shortness of breath and wheezing.    Cardiovascular: Negative for chest pain, palpitations and leg swelling.   Gastrointestinal: Negative for abdominal pain, blood in stool, constipation, diarrhea, nausea and vomiting.   Genitourinary: Negative for dysuria and hematuria.    Musculoskeletal: Negative for back pain.   Skin: Negative for rash.   Neurological: Negative for dizziness, weakness and headaches.     Objective:     Vital Signs (Most Recent):  Temp: 96.8 °F (36 °C) (08/03/20 1122)  Pulse: 90 (08/03/20 1140)  Resp: 16 (08/03/20 1122)  BP: (!) 155/74 (08/03/20 1122)  SpO2: (!) 94 % (08/03/20 1122) Vital Signs (24h Range):  Temp:  [96.8 °F (36 °C)-99.1 °F (37.3 °C)] 96.8 °F (36 °C)  Pulse:  [] 90  Resp:  [13-20] 16  SpO2:  [89 %-100 %] 94 %  BP: (125-162)/(57-88) 155/74     Weight: 96.2 kg (212 lb 1.3 oz)  Body mass index is 35.29 kg/m².    Intake/Output Summary (Last 24 hours) at 8/3/2020 1156  Last data filed at 8/3/2020 0911  Gross per 24 hour   Intake 810 ml   Output --   Net 810 ml      Physical Exam  Vitals signs and nursing note reviewed.   Constitutional:       General: She is not in acute distress.     Comments: Getting dialysis   HENT:      Head: Normocephalic and atraumatic.      Mouth/Throat:      Pharynx: Oropharynx is clear.   Eyes:      General: No scleral icterus.  Neck:      Musculoskeletal: Normal range of motion and neck supple.   Cardiovascular:      Rate and Rhythm: Normal rate and regular rhythm.      Heart sounds: Murmur present.      Comments: RIJ dialysis catheter in place with no oozing or bleeding  Pulmonary:      Effort: Pulmonary effort is normal. No respiratory distress.      Breath sounds: Normal breath sounds. No rales (at bases).   Abdominal:      General: Bowel sounds are normal.      Palpations: Abdomen is soft.      Tenderness: There is no abdominal tenderness.   Musculoskeletal:         General: No swelling.   Skin:     General: Skin is warm and dry.      Capillary Refill: Capillary refill takes less than 2 seconds.      Comments: Stasis changes of lower extremity   Neurological:      General: No focal deficit present.      Mental Status: She is alert.   Psychiatric:         Mood and Affect: Mood normal.         Behavior: Behavior normal.  "        Significant Labs:   BMP:   Recent Labs   Lab 08/03/20  0455   GLU 84      K 3.2*      CO2 24   BUN 50*   CREATININE 6.8*   CALCIUM 7.3*   MG 2.0     CBC:   Recent Labs   Lab 08/02/20  0621 08/03/20  0455   WBC 12.71* 12.95*   HGB 8.2* 8.1*   HCT 26.8* 25.8*    249       Significant Imaging: I have reviewed all pertinent imaging results/findings within the past 24 hours.      Assessment/Plan:      Leukocytosis  No more fever.  White count down.  Discontinue Zosyn.  All cultures negative.    Hypervolemia associated with renal insufficiency  Improved       Acute CVA (cerebrovascular accident)  MRI reviewed.  Does not have any focal deficits.     On plavix. Not on BP meds . On statin    Carotid imaging noted.     PT/OT     MRI/MRA of brain and neck     Elevated sedimentation rate.      Await CTA of head and neck results    Acute respiratory failure with hypoxia  No evidence of ACS, no evidence of pulmonary embolus. Likely from volume overload and   Wean oxygen as tolerated. Improving.      Elevated troponin  No evidence of ACS. Flat bump. Cardiology reviewed ECHO. No further work up      Essential hypertension  Adequate control on no meds    Home meds: amlodipine 10 mg daily, carvedilol 25 mg BID and hydralazine 50 mg daily     May need to resume medications as blood pressure increases.  Will try to avoid carvedilol since she did have an episode of severe bradycardia the time of her code blue        Anemia of chronic disease  With dialysis  venofer 100  EPogen 5K with each HD    Stable after one unit 7/30      CKD (chronic kidney disease) stage 5, GFR less than 15 ml/min  Patient now end-stage renal. .  Hemodialysis done today after CTA. PD catheter placed today.  Will go home without hemodialysis catheter and will follow up with Dr Mcintyre on Thursday to start PD.- "Quick Start"      Diastolic dysfunction  Dialysis to remove fluid.  Presently appears to be euvolemic.  · Low normal left " ventricular systolic function. The estimated ejection fraction is 50-55%.  · Mild concentric left ventricular hypertrophy.  · Grade II (moderate) left ventricular diastolic dysfunction consistent with pseudonormalization.  · Moderate right ventricular enlargement.  · Normal right ventricular systolic function.  · Moderate right atrial enlargement.  · Mild tricuspid regurgitation.        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 Per chart review patient treated with prolonged protocol per ID. No acute issues.        Hyperlipidemia  Lipitor 40 mg daily          Sarcoidosis  Chronic   Sedimentation rate is greater than 120  Await CTA of brain    Gout, chronic  No acute issues         VTE Risk Mitigation (From admission, onward)         Ordered     IP VTE HIGH RISK PATIENT  Once      07/29/20 1816     Place sequential compression device  Until discontinued      07/29/20 1816     heparin, porcine (PF) (heparin flush 100 units/mL) 100 unit/mL injection flush      07/29/20 0006     heparin (porcine) injection 2,500 Units  As needed (PRN)      07/28/20 1326                      Ronna Motta MD  Department of Hospital Medicine   [facility]

## 2020-08-04 NOTE — PLAN OF CARE
VN reviewed discharge instructions with pt and daughter AVS printed and handed to pt by bedside nurse.  Reviewed followup appts, medication, diet, and importance of medication compliance.  Reviewed home care instructions, treatment plan, self management and when to seek medical attention.  Allowed time for questions, all questions answered.  Pt and daughter verbalized complete understanding of discharge instructions and voices no concerns.      Discharge instructions complete.  Bedside delivery done.  Transport wheelchair requested.  Bedside nurse notified.

## 2020-08-04 NOTE — PLAN OF CARE
TN spoke with Alea at [facility]. Pt will need to have appt set back to 8/10 at 0930 am. She will be seen by Dr. Mcintyre to be visit. She reports she will contact pt's daughter to update her.    Amos Mcintyre MD  Go on 8/10/2020  at 930 am; FOLLOW UP WITH NEPHROLOGY AFTER DISCHARGE; Follow up at Dialysis center  [address] [facility] [address]  [phone]   Jose Khan Jr., MD  Go on 8/18/2020  at 1pm; FOLLOW UP WITH PCP  [address] [facility] [address]  [phone]   [facility]  Go on 8/10/2020  at 930 am; PD DIALYSIS NURSE VISIT and , FOLLOW UP WITH NEPHROLOGY AFTER DISCHARGE  [address] [facility] [address]  [phone]   [facility] VASCULAR NEUROLOGY  Schedule an appointment as soon as possible for a visit  FOLLOW UP WITH NEUROLOGY AFTER DISCHARGE, OFFICE TO CALL PATIENT/FAMILY TO SET UP APPT TIME, CRISSY MSG SENT TO OFFICE STAFF  [address] [place] [address]  [phone]        08/04/20 1033   Discharge Phone Number   What phone number can be called within the next 1-3 days to see how you are doing after discharge? [phone]  (Pt's daughter and caregiver, Alea)     Adelita Lang RN  RN Transition Navigator  [phone]

## 2020-08-04 NOTE — ASSESSMENT & PLAN NOTE
No evidence of ACS, no evidence of pulmonary embolus. Likely from volume overload and   Wean oxygen as tolerated. Improving.

## 2020-08-04 NOTE — PLAN OF CARE
Discharge rounds on patient. Discussed followup appointments, blue discharge folder, discharge nurse will go over home medications and reasons for medications and final discharge instructions. All patient/caregiver questions answered. Patient verbalized understanding.    Daughter at bedside to transport patient home.    Amos Mcintyre MD  Go on 8/10/2020  at 930 am; FOLLOW UP WITH NEPHROLOGY AFTER DISCHARGE; Follow up at Dialysis center  [address] [facility] [address]  [phone]   Jose Khan Jr., MD  Go on 8/18/2020  at 1pm; FOLLOW UP WITH PCP  [address] [address]  [phone]   [facility]  Go on 8/10/2020  at 930 am; PD DIALYSIS NURSE VISIT and , FOLLOW UP WITH NEPHROLOGY AFTER DISCHARGE  [address] [address]  [phone]   [facility] VASCULAR NEUROLOGY  Schedule an appointment as soon as possible for a visit  FOLLOW UP WITH NEUROLOGY AFTER DISCHARGE, OFFICE TO CALL PATIENT/FAMILY TO SET UP APPT TIME, CRISSY MSG SENT TO OFFICE STAFF  [address] [place] [address]  [phone]   hospitals [facility]  Go in 1 day  HOME HEALTH, OFFICE TO CALL PATIENT/FAMILY TO SET UP APPT TIME  [address] [facility] [address]  [phone]   [facility]  Call  As needed, DME  [address] [facility] [address]  [phone]          08/04/20 1531   Final Note   Assessment Type Final Discharge Note   Anticipated Discharge Disposition Home-Health  ([facility])   What phone number can be called within the next 1-3 days to see how you are doing after discharge? [phone]   Hospital Follow Up  Appt(s) scheduled? Yes   Discharge plans and expectations educations in teach back method with documentation complete? Yes   Right Care Referral Info   Post Acute Recommendation Home-care   Facility Name [facility]   Post-Acute Status   Post-Acute Authorization HME;Home Health   HME Status Set-up Complete  (Pt has  RW and Oxygen at bedside for transport home)   Home Health Status Set-up Complete  ([facility] [place] [facility] to see patient tmrw)   Diaylsis Status Set-up Complete  (Pt has appt with Dr. Mcintyre and PD nurse with [facility] 8/10 at 0930am.)   Patient choice form signed by patient/caregiver List with quality metrics by geographic area provided;List from CMS Compare;List from System Post-Acute Care  (family gave choices per [facility] [place]  region list given.)     Adelita Lang RN  RN Transition Navigator  [phone]

## 2020-08-04 NOTE — PT/OT/SLP PROGRESS
Occupational Therapy   Treatment    Name: Mary Carrillo  MRN: [id number]  Admitting Diagnosis:  Hypervolemia associated with renal insufficiency  1 Day Post-Op  Pre-op Diagnosis: Hypervolemia associated with renal insufficiency [E87.70, N28.9] s/p Procedure(s):  INSERTION, CATHETER, DIALYSIS, PERITONEAL, LAPAROSCOPIC   Recommendations:     Discharge Recommendations: home with home health, home health OT  Discharge Equipment Recommendations:  none  Barriers to discharge:  None    Assessment:     Mary Carrillo is a 65 y.o. female with a medical diagnosis of Hypervolemia associated with renal insufficiency.  She presents with  Performance deficits affecting function are weakness, impaired endurance, impaired self care skills, impaired functional mobilty, gait instability, impaired balance, decreased coordination, decreased lower extremity function, decreased safety awareness, impaired cardiopulmonary response to activity.     No goals met, rec:  home with HHOT, daughter to assist pt at home as well.  Discharge pt from acute OT 2/2 going home today.     Rehab Prognosis:  Good; patient would benefit from acute skilled OT services to address these deficits and reach maximum level of function.       Plan:     Patient to be seen 5 x/week to address the above listed problems via self-care/home management, therapeutic activities, therapeutic exercises  · Plan of Care Expires: 08/30/20  · Plan of Care Reviewed with: patient, daughter    Subjective     Pain/Comfort:  · Pain Rating 1: 7/10  · Location - Side 1: Left  · Location - Orientation 1: lower  · Location 1: abdomen  · Pain Addressed 1: Reposition, Distraction, Cessation of Activity(site of peritoneal dialysis port)  · Pain Rating Post-Intervention 1: 7/10    Objective:     Communicated with: nurse prior to session.  Patient found up in chair with oxygen, telemetry upon OT entry to room.    General Precautions: Standard, fall   Orthopedic Precautions:N/A   Braces: N/A      Occupational Performance:     Functional Mobility/Transfers:  · Patient completed Sit <> Stand Transfer with contact guard assistance  with  rolling walker   · Patient completed Toilet Transfer Step Transfer technique with contact guard assistance with  rolling walker and bedside commode  · Functional Mobility: pt. ambulated 6 feet x 2 trials with RW and CGA 1 person and chair trailing behind by another 2/2 pt guarded, fearful, anxious about walking because she gets tired easily, 1 seated rest break, O2 sats after first trial 92%, second trial 89-92%, instructed pt in PLB, sats quickly up to 97% on RA.    Activities of Daily Living:  · Upper Body Dressing: minimum assistance to pull gown dwon over hips standing with RW, seated in BS chair  · Toileting: declined use [facility] 6 Click ADL: 19    Treatment & Education:  --Daughter and pt trained in gait belt use for fx ambulation and BSC t/f. Daughter verbalized understanding, issued gait belt to daughter.  --PLB and deep breathing for O2 desaturation with ADLS and fx mobility, dressing and BSC t/f O2 sats dropped to 88% on RA, quickly up to 94% with PLB.     Patient left up in chair with all lines intact, call button in reach, nurse, ,  notified and nurse, PTA presentEducation:      GOALS:   Multidisciplinary Problems     Occupational Therapy Goals        Problem: Occupational Therapy Goal    Goal Priority Disciplines Outcome Interventions   Occupational Therapy Goal     OT, PT/OT Adequate for Care Transition    Description: Goals to be met by: 8/30/2020    Patient will increase functional independence with ADLs by performing:    UE Dressing with Modified [place].  LE Dressing with Modified [place] and Assistive Devices as needed.  Grooming while standing at sink with Modified [place].  Toileting from bedside commode with Modified [place] for hygiene and clothing management.   Rolling to Bilateral with Modified [place].    Supine to sit with Modified [place].  Step transfer with Modified [place]  Toilet transfer to bedside commode with Modified [place].  Increased functional strength to WFL for ADLS.  Upper extremity exercise program 10 reps per handout, with independence.                     Time Tracking:     OT Date of Treatment: 08/04/20  OT Start Time: 1015  OT Stop Time: 1115  OT Total Time (min): 60 min COTX with PTA    Billable Minutes:Self Care/Home Management 30  Therapeutic Activity 15  Total Time 45    Franchesca Cerda OT  8/4/2020

## 2020-08-04 NOTE — PLAN OF CARE
08/04/20 1010   Post-Acute Status   Post-Acute Authorization Home Health   Home Health Status   (Referral list for [facility] given. They are willing to start home health.)     Adelita Lang RN  RN Transition Navigator  [phone]

## 2020-08-04 NOTE — ASSESSMENT & PLAN NOTE
"Patient now end-stage renal. .  Hemodialysis done today after CTA. PD catheter placed today.  Will go home without hemodialysis catheter and will follow up with Dr Mcintyre on Thursday to start PD.- "Quick Start"    "

## 2020-08-04 NOTE — NURSING
Home Oxygen Evaluation    Date Performed: 2020    1) Patient's Home O2 Sat on room air, while at rest: 95%              2) Patient's O2 Sat on room air while exercisin%            3) Patient's O2 Sat while exercising on O2: 95% 1 liter

## 2020-08-04 NOTE — PROGRESS NOTES
Progress Note  Nephrology      Consult Requested By: Rojas Asif, *      SUBJECTIVE:     Overnight events  Patient is a 65 y.o. female     Patient Active Problem List   Diagnosis    Hemorrhoids, internal    Gout, chronic    Sarcoidosis    Hyperlipidemia    Thoracolumbar back pain    Abnormal chest CT    Uveitis    Fatigue    Pulmonary hypertension    IFG (impaired fasting glucose)    Primary osteoarthritis involving multiple joints    Discitis of thoracolumbar region    Inadequate oral intake    Ovarian mass    Unintentional weight loss    Pulmonary tuberculosis    Discitis    Osteoarthritis of spine with myelopathy, thoracolumbar region    Pott's disease (spinal tuberculosis)    Acute blood loss as cause of postoperative anemia    Generalized abdominal pain    Other insomnia    Status post thoracic spinal fusion    Alteration in skin integrity related to surgical incision    Difficulty sleeping    Right hip pain    Myofascial pain    CHRISTEN (acute kidney injury)    Nephrotic syndrome    Diastolic dysfunction    Aortic valve stenosis    Electrocardiogram showing T wave abnormalities    Aortic atherosclerosis    Cardiomyopathy    Obesity    Secondary hyperparathyroidism of renal origin    CKD (chronic kidney disease) stage 5, GFR less than 15 ml/min    BMI 39.0-39.9,adult    Anemia of chronic disease    Essential hypertension    Elevated troponin    Acute respiratory failure with hypoxia    Acute CVA (cerebrovascular accident)    Hypervolemia associated with renal insufficiency    Leukocytosis     Past Medical History:   Diagnosis Date    Arthritis     Asthma     Back pain     BMI 39.0-39.9,adult 7/14/2020    CKD (chronic kidney disease) stage 5, GFR less than 15 ml/min 7/14/2020    Dehiscence of operative wound 4/1/2018    Disorder of kidney and ureter     Glaucoma     Gout     Hyperlipidemia     Hypertension     In 7/29/2020    Obesity     BMI 39     Pott's disease (spinal tuberculosis)     Sarcoidosis     Sarcoidosis     Secondary hyperparathyroidism of renal origin 7/14/2020    Vitritis     Vitritis               OBJECTIVE:     Vitals:    08/04/20 1154 08/04/20 1211 08/04/20 1216 08/04/20 1506   BP: 134/60      BP Location:       Patient Position:       Pulse: 96 85 85 87   Resp:  18 18 18   Temp:       TempSrc:       SpO2: (!) 90% (!) 83% (!) 93% 98%   Weight:       Height:           Temp: 99.3 °F (37.4 °C) (08/04/20 0744)  Pulse: 87 (08/04/20 1506)  Resp: 18 (08/04/20 1506)  BP: 134/60 (08/04/20 1154)  SpO2: 98 % (08/04/20 1506)              Medications:   albuterol-ipratropium  3 mL Nebulization Q4H WAKE    aspirin  81 mg Oral Daily    atorvastatin  40 mg Oral Daily    clopidogreL  75 mg Oral Daily    furosemide  160 mg Oral BID    pantoprazole  40 mg Oral Daily    paricalcitoL  0.1 mcg/kg Intravenous Every Mon, Wed, Fri    polyethylene glycol  17 g Oral Daily    sevelamer carbonate  2,400 mg Oral QID (WM & HS)    vitamin renal formula (B-complex-vitamin c-folic acid)  1 capsule Oral Daily                 Physical Exam:  General appearance:  NAD  No SOB  No cough  Lungs: clear  Heart: pulse 87  Abdomen: soft  Peritoneal dialysis catheter  Extremities: no edema      Laboratory:  ABG  Labs reviewed  Recent Results (from the past 336 hour(s))   Basic metabolic panel    Collection Time: 08/04/20  8:50 AM   Result Value Ref Range    Sodium 135 (L) 136 - 145 mmol/L    Potassium 3.8 3.5 - 5.1 mmol/L    Chloride 102 95 - 110 mmol/L    CO2 23 23 - 29 mmol/L    BUN, Bld 28 (H) 8 - 23 mg/dL    Creatinine 5.8 (H) 0.5 - 1.4 mg/dL    Calcium 8.1 (L) 8.7 - 10.5 mg/dL    Anion Gap 10 8 - 16 mmol/L   Basic Metabolic Panel (BMP)    Collection Time: 08/04/20  8:50 AM   Result Value Ref Range    Sodium 135 (L) 136 - 145 mmol/L    Potassium 3.8 3.5 - 5.1 mmol/L    Chloride 102 95 - 110 mmol/L    CO2 23 23 - 29 mmol/L    BUN, Bld 28 (H) 8 - 23 mg/dL    Creatinine  5.8 (H) 0.5 - 1.4 mg/dL    Calcium 8.1 (L) 8.7 - 10.5 mg/dL    Anion Gap 10 8 - 16 mmol/L   Basic Metabolic Panel (BMP)    Collection Time: 08/03/20  4:55 AM   Result Value Ref Range    Sodium 141 136 - 145 mmol/L    Potassium 3.2 (L) 3.5 - 5.1 mmol/L    Chloride 104 95 - 110 mmol/L    CO2 24 23 - 29 mmol/L    BUN, Bld 50 (H) 8 - 23 mg/dL    Creatinine 6.8 (H) 0.5 - 1.4 mg/dL    Calcium 7.3 (L) 8.7 - 10.5 mg/dL    Anion Gap 13 8 - 16 mmol/L     Recent Results (from the past 336 hour(s))   CBC auto differential    Collection Time: 08/04/20  8:50 AM   Result Value Ref Range    WBC 12.60 3.90 - 12.70 K/uL    Hemoglobin 8.2 (L) 12.0 - 16.0 g/dL    Hematocrit 27.5 (L) 37.0 - 48.5 %    Platelets 245 150 - 350 K/uL   CBC auto differential    Collection Time: 08/03/20  4:55 AM   Result Value Ref Range    WBC 12.95 (H) 3.90 - 12.70 K/uL    Hemoglobin 8.1 (L) 12.0 - 16.0 g/dL    Hematocrit 25.8 (L) 37.0 - 48.5 %    Platelets 249 150 - 350 K/uL   CBC auto differential    Collection Time: 08/02/20  6:21 AM   Result Value Ref Range    WBC 12.71 (H) 3.90 - 12.70 K/uL    Hemoglobin 8.2 (L) 12.0 - 16.0 g/dL    Hematocrit 26.8 (L) 37.0 - 48.5 %    Platelets 239 150 - 350 K/uL     Urinalysis  No results for input(s): COLORU, CLARITYU, SPECGRAV, PHUR, PROTEINUA, GLUCOSEU, BILIRUBINCON, BLOODU, WBCU, RBCU, BACTERIA, MUCUS, NITRITE, LEUKOCYTESUR, UROBILINOGEN, HYALINECASTS in the last 24 hours.    Diagnostic Results:  X-Ray: Reviewed  US: Reviewed  Echo: Reviewed  ACCESS    ASSESSMENT/PLAN:   ESRD  HD yesterday  Gualberto catheter removed  Metabolic bone disease  Anemia  /60  Discussed with patient and her daughter about renal diet.  Weight daily.  Follow up with PD nurse.

## 2020-08-04 NOTE — PT/OT/SLP PROGRESS
Physical Therapy Treatment    Patient Name:  Mary Carrillo   MRN:  [id number]    Recommendations:     Discharge Recommendations:  home health PT, home health OT   Discharge Equipment Recommendations: walker, rolling   Barriers to discharge: None    Assessment:     Mary Carrillo is a 65 y.o. female admitted with a medical diagnosis of Hypervolemia associated with renal insufficiency.  She presents with the following impairments/functional limitations:    pt with increased fatigue with amb requiring one sitting rest break.  Pt amb with decreased devon, decreased step length, and height,  Pt's O2 SATs decreased with amb and exercise to 89% on RA, but increased with rest and vcs for pursed lip breathing to 96-97%. Pt .    Rehab Prognosis: Good; patient would benefit from acute skilled PT services to address these deficits and reach maximum level of function.    Recent Surgery: Procedure(s) (LRB):  INSERTION, CATHETER, DIALYSIS, PERITONEAL, LAPAROSCOPIC (N/A) 1 Day Post-Op    Plan:     During this hospitalization, patient to be seen 6 x/week to address the identified rehab impairments via gait training, therapeutic activities, therapeutic exercises, neuromuscular re-education and progress toward the following goals:    · Plan of Care Expires:  08/30/20    Subjective     Chief Complaint: pain and increased fatigue  Patient/Family Comments/goals: pt agreed to therapy  Pain/Comfort:  · Pain Rating 1: 7/10  · Location - Side 1: Left  · Location - Orientation 1: lower  · Location 1: abdomen  · Pain Addressed 1: Pre-medicate for activity, Reposition, Distraction, Cessation of Activity  · Pain Rating Post-Intervention 1: 7/10      Objective:     Communicated with murse prior to session.  Patient found up in chair with   upon PT entry to room.     General Precautions: Standard, fall   Orthopedic Precautions:N/A   Braces: N/A     Functional Mobility:  · Transfers:     · Sit to Stand:  contact guard assistance with rolling  walker  · Gait: 6 ft x 2 with rw with CGA with chair to follow requiring one sitting rest      AM-PAC 6 CLICK MOBILITY  Turning over in bed (including adjusting bedclothes, sheets and blankets)?: 3  Sitting down on and standing up from a chair with arms (e.g., wheelchair, bedside commode, etc.): 3  Moving from lying on back to sitting on the side of the bed?: 2  Moving to and from a bed to a chair (including a wheelchair)?: 3  Need to walk in hospital room?: 3  Climbing 3-5 steps with a railing?: 1  Basic Mobility Total Score: 15       Therapeutic Activities and Exercises:   BLE TE seated x 10; LAQ, hip flexion, HR, TT    Patient left up in chair with all lines intact, call button in reach, chair alarm on and dtr present..    GOALS:   Multidisciplinary Problems     Physical Therapy Goals        Problem: Physical Therapy Goal    Goal Priority Disciplines Outcome Goal Variances Interventions   Physical Therapy Goal     PT, PT/OT Ongoing, Progressing     Description: Goals to be met by: 2020     Patient will increase functional independence with mobility by performin. Supine to sit with Modified [place]  2. Sit to stand transfer with Modified [place]  3. Bed to chair transfer with Modified [place] using Rolling Walker  4. Gait  x 150 feet with Modified [place] using Rolling Walker.   5. Lower extremity exercise program x15 reps per handout, with independence                     Time Tracking:     PT Received On: 20  PT Start Time: 1100     PT Stop Time: 1125  PT Total Time (min): 25 min     Billable Minutes: Gait Training 8 and Therapeutic Activity 17    Treatment Type: Treatment  PT/PTA: PTA     PTA Visit Number: 2     Kevin Guerin PTA  2020

## 2020-08-04 NOTE — CARE UPDATE
Reviewed CTA and Neurology follow up    [facility] start ASA. Already on Plavix  Continue DAPT for 3 months then can go to monotherapy  Plavix response assay  Vascular neurology follow up as OP        CTA Head and Neck (xpd)  Narrative: EXAMINATION:  CTA HEAD AND NECK (XPD)    CLINICAL HISTORY:  cva;    TECHNIQUE:  Non contrast low dose axial images were obtained through the head. CT angiogram was performed from the level of the adia to the top of the head following the IV administration of 100mL of Omnipaque 350.   Sagittal and coronal reconstructions and maximum intensity projection reconstructions were performed. Arterial stenosis percentages are based on NASCET measurement criteria.    COMPARISON:  Head CT and MRI brain 07/29/2020, carotid ultrasound 07/30/2020 and MRA head and neck 08/01/2020; CT thorax 01/27/2020    FINDINGS:  Patient motion with beam hardening and streak artifact somewhat limits evaluation.    Intracranial Compartment: Ventricles and sulci are normal in size for age without evidence of hydrocephalus. No extra-axial blood or fluid collections.  Chronic empty sella configuration, unchanged.    Grossly stable mild degree of patchy hypoattenuation of the subcortical and periventricular white matter, likely sequela of chronic small vessel ischemic change.  No definite new focal areas of abnormal parenchymal attenuation.  No parenchymal mass, hemorrhage, edema, or major vascular distribution infarct.  Bilateral basal ganglia calcifications noted.    Skull/Extracranial Contents (limited evaluation): Hyperostosis frontalis interna.  No fracture. Mastoid air cells and paranasal sinuses are essentially clear.    Non-Vascular Structures of the Neck/Thoracic Inlet (limited evaluation): Imaged lung apices show grossly similar chronic consolidation and bronchial wall thickening with pleuroparenchymal scarring, and small right pulmonary nodule with patchy pulmonary mosaic attenuation suggestive of air trapping.   No apical pneumothorax.  Included osseous structures appear grossly stable without acute process seen.    Aorta: Left-sided 3 vessel arch with mild calcific atherosclerosis but no dissection or aneurysm seen along the imaged portions, and no high-grade stenosis of the origins of the great vessels, noting the left vertebral artery origin not identified, as discussed below.    Extracranial carotid circulation: Mild calcific atherosclerosis at the left greater than right carotid bifurcations and proximal ICAs without hemodynamically significant stenosis, aneurysmal dilatation, or dissection.    Extracranial vertebral circulation: The origin of the left vertebral artery and majority of the cervical segments of the left vertebral artery are not identified concerning for long segment occlusion.  No definite dissection seen but not entirely excluded within the nonvisualized left vertebral artery.  The distal most cervical left vertebral artery and V4 segment is identified but overall hypoplastic without definite focal high-grade stenosis or dissection seen along this visualized segment.  Right vertebral artery is patent without focal high-grade stenosis, occlusion, aneurysmal dilatation or dissection.    Intracranial Arteries: Calcific atherosclerosis of the bilateral cavernous ICAs with likely up to 75% stenosis on the right and 50% stenosis on the left.  Short-segment stenosis up to 50% at the left A2 segment of the FRANCO.  No occlusion or aneurysm seen.    Venous structures (limited evaluation): Normal.  Impression: No acute large vascular territory infarct or intracranial hemorrhage definitively seen.  Further evaluation/follow-up as warranted.    Majority of the cervical portion of the left vertebral artery and also origin of the left vertebral artery are not identified concerning for long segment occlusion, with dissection not excluded on the basis of this examination.  There is flow seen within the left V4 segment  and distal most left cervical vertebral artery presumably from back filling from the right circulation versus filling from collateral paraspinal arteries.    Mild calcific atherosclerosis of the bilateral cavernous ICAs and left greater than right carotid bifurcations without high-grade stenosis.  No aneurysm seen.    Mild chronic microvascular ischemic changes.    Few additional findings as above.    This report was flagged in Epic as abnormal.    Electronically signed by: Bill Horton MD  Date:    08/03/2020  Time:    14:30

## 2020-08-04 NOTE — PLAN OF CARE
Problem: Physical Therapy Goal  Goal: Physical Therapy Goal  Description: Goals to be met by: 2020     Patient will increase functional independence with mobility by performin. Supine to sit with Modified [place]  2. Sit to stand transfer with Modified [place]  3. Bed to chair transfer with Modified [place] using Rolling Walker  4. Gait  x 150 feet with Modified [place] using Rolling Walker.   5. Lower extremity exercise program x15 reps per handout, with independence    Outcome: Ongoing, Progressing   Sit to stand with CGA. Amb with rw X 6 FT X2 with f/b chair with CGA

## 2020-08-05 NOTE — PATIENT INSTRUCTIONS

## 2020-08-06 NOTE — ASSESSMENT & PLAN NOTE
"Patient now end-stage renal.  Hemodialysis done initially; now with PD catheter placed on 8/3.  Will go home without hemodialysis catheter and will follow up with Dr Mcintyre on Thursday to start PD.- "Quick Start"    "

## 2020-08-06 NOTE — HOSPITAL COURSE
"Ms. Carrillo presented initially with volume overload 2/2 progression of CKD5 to ESRD and need to start dialysis. Initially had trialysis catheter placed for HD to remove volume with plans to ultimately place PD catheter as discussed with her primary nephrologist. Dialyzed on day of admission without issue, but at one point catheter came uncapped and patient lost significant amount of blood, leading to step up to ICU and code stroke. Was found to have small lacunar stroke. Course also complicated by development of bradycardia into 30s while on dialysis. Code blue called and she received dose of atropine with appropriate response and never required intubation or lost pulse. Since that time, she was monitored in ICU with stabilization. She improved and tolerated HD, stepped down to the floor, and had PD catheter placed. Stable for discharge with Nephrology and Neurology f/u.    /60   Pulse 87   Temp 99.3 °F (37.4 °C)   Resp 18   Ht 5' 5" (1.651 m)   Wt 96.2 kg (212 lb 1.3 oz)   LMP  (LMP Unknown)   SpO2 98%   Breastfeeding No   BMI 35.29 kg/m²   Physical Exam  Vitals signs and nursing note reviewed.   Constitutional:       General: She is not in acute distress.  HENT:      Head: Normocephalic and atraumatic.      Mouth/Throat:      Pharynx: Oropharynx is clear.   Eyes:      General: No scleral icterus.  Neck:      Musculoskeletal: Normal range of motion and neck supple.   Cardiovascular:      Rate and Rhythm: Normal rate and regular rhythm.      Heart sounds: Murmur present.      Comments: RIJ dialysis catheter in place with no oozing or bleeding  Pulmonary:      Effort: Pulmonary effort is normal. No respiratory distress.      Breath sounds: Normal breath sounds. No rales (at bases).   Abdominal:      General: Bowel sounds are normal.      Palpations: Abdomen is soft.      Tenderness: There is no abdominal tenderness.   Musculoskeletal:         General: No swelling.   Skin:     General: Skin is warm and " dry.      Capillary Refill: Capillary refill takes less than 2 seconds.      Comments: Stasis changes of lower extremity   Neurological:      General: No focal deficit present.      Mental Status: She is alert.   Psychiatric:         Mood and Affect: Mood normal.         Behavior: Behavior normal.

## 2020-08-06 NOTE — ASSESSMENT & PLAN NOTE
- acute lacunar infarct  - Neurology consulted: discharge on ASA/plavix x3 months with Vascular Neurology f/u

## 2020-08-06 NOTE — ASSESSMENT & PLAN NOTE
Adequate control on no meds    Home meds: amlodipine 10 mg daily, carvedilol 25 mg BID and hydralazine 50 mg daily     Resume meds slowly. Will stop carvedilol since she did have an episode of severe bradycardia the time of her code blue

## 2020-08-06 NOTE — ASSESSMENT & PLAN NOTE
No evidence of ACS, no evidence of pulmonary embolus. Likely from volume overload  Wean oxygen as tolerated. Improving; discharge with home O2

## 2020-08-06 NOTE — DISCHARGE SUMMARY
[facility] Medicine  Discharge Summary      Patient Name: Mary Carrillo  MRN: [id number]  Admission Date: 7/26/2020  Hospital Length of Stay: 9 days  Discharge Date and Time: 8/4/2020  4:52 PM  Attending Physician: No att. providers found   Discharging Provider: Rojas Asif MD  Primary Care Provider: Jose Khan MD      HPI:   Mary Carrillo is a 64 yo AAF with pmh of HTN, HLD, diastolic dysfunction, mild aortic valve stenosis, pott's disease, obesity, gout, sarcoidosis, CKD stage 5, asthma and chronic back pain who presented to OSH with complaint of dyspnea and anuria. Pt currently undergoing outpatient workup for initiation of peritoneal dialysis. Initial labs remarkable for Hbg/Hct 7/25, BUN/Cr 66/6.7 previously 46/4.7 on 6/15/20, BNP 1261, , troponin 0.123. CXR consistent with pulmonary edema/CHF.  She received 0.5 inch nitropaste for hypertension and 100 mg furosemide IV. Pt with some hypoxia requiring 3 liters nasal cannula. She denies chest pain, abdominal pain, n/v and fever/chills. COVID negative. Pt admitted to [facility] hospital medicine.     Procedure(s) (LRB):  INSERTION, CATHETER, DIALYSIS, PERITONEAL, LAPAROSCOPIC (N/A)      Hospital Course:   Ms. Carrillo presented initially with volume overload 2/2 progression of CKD5 to ESRD and need to start dialysis. Initially had trialysis catheter placed for HD to remove volume with plans to ultimately place PD catheter as discussed with her primary nephrologist. Dialyzed on day of admission without issue, but at one point catheter came uncapped and patient lost significant amount of blood, leading to step up to ICU and code stroke. Was found to have small lacunar stroke. Course also complicated by development of bradycardia into 30s while on dialysis. Code blue called and she received dose of atropine with appropriate response and never required intubation or lost pulse. Since that time, she was monitored in ICU with  "stabilization. She improved and tolerated HD, stepped down to the floor, and had PD catheter placed. Stable for discharge with Nephrology and Neurology f/u.    /60   Pulse 87   Temp 99.3 °F (37.4 °C)   Resp 18   Ht 5' 5" (1.651 m)   Wt 96.2 kg (212 lb 1.3 oz)   LMP  (LMP Unknown)   SpO2 98%   Breastfeeding No   BMI 35.29 kg/m²   Physical Exam  Vitals signs and nursing note reviewed.   Constitutional:       General: She is not in acute distress.  HENT:      Head: Normocephalic and atraumatic.      Mouth/Throat:      Pharynx: Oropharynx is clear.   Eyes:      General: No scleral icterus.  Neck:      Musculoskeletal: Normal range of motion and neck supple.   Cardiovascular:      Rate and Rhythm: Normal rate and regular rhythm.      Heart sounds: Murmur present.      Comments: RIJ dialysis catheter in place with no oozing or bleeding  Pulmonary:      Effort: Pulmonary effort is normal. No respiratory distress.      Breath sounds: Normal breath sounds. No rales (at bases).   Abdominal:      General: Bowel sounds are normal.      Palpations: Abdomen is soft.      Tenderness: There is no abdominal tenderness.   Musculoskeletal:         General: No swelling.   Skin:     General: Skin is warm and dry.      Capillary Refill: Capillary refill takes less than 2 seconds.      Comments: Stasis changes of lower extremity   Neurological:      General: No focal deficit present.      Mental Status: She is alert.   Psychiatric:         Mood and Affect: Mood normal.         Behavior: Behavior normal.      Consults:   Consults (From admission, onward)        Status Ordering Provider     Inpatient consult to [facility]iology[facility]  Once     Provider:  Elijah Greer MD    Completed MASON CARDONA     Inpatient consult to General Surgery  Once     Provider:  Robby Flores MD    Completed MASON CARDONA     Inpatient consult to Neurology  Once     Provider:  Ledy Mcdonough MD    Completed TORRES KAUR " "L.     Inpatient consult to Registered Dietitian/Nutritionist  Once     Provider:  (Not yet assigned)    Completed TORRES KAUR     Inpatient consult to Social Work  Once     Provider:  (Not yet assigned)    Completed BROOK LOZA     IP consult to case management/social work  Once     Provider:  (Not yet assigned)    Completed BROOK LOZA          Leukocytosis  No more fever.  White count down.  Discontinue Zosyn.  All cultures negative.    Hypervolemia associated with renal insufficiency  Improved       Acute CVA (cerebrovascular accident)  - acute lacunar infarct  - Neurology consulted: discharge on ASA/plavix x3 months with Vascular Neurology f/u    Acute respiratory failure with hypoxia  No evidence of ACS, no evidence of pulmonary embolus. Likely from volume overload  Wean oxygen as tolerated. Improving; discharge with home O2    Elevated troponin  No evidence of ACS. Flat bump. Cardiology reviewed ECHO. No further work up      Essential hypertension  Adequate control on no meds    Home meds: amlodipine 10 mg daily, carvedilol 25 mg BID and hydralazine 50 mg daily     Resume meds slowly. Will stop carvedilol since she did have an episode of severe bradycardia the time of her code blue        Anemia of chronic disease  With dialysis  venofer 100  EPogen 5K with each HD    Stable after one unit 7/30      CKD (chronic kidney disease) stage 5, GFR less than 15 ml/min  Patient now end-stage renal.  Hemodialysis done initially; now with PD catheter placed on 8/3.  Will go home without hemodialysis catheter and will follow up with Dr Mcintyre on Thursday to start PD.- "Quick Start"      Diastolic dysfunction  Dialysis to remove fluid.  Presently appears to be euvolemic.  · Low normal left ventricular systolic function. The estimated ejection fraction is 50-55%.  · Mild concentric left ventricular hypertrophy.  · Grade II (moderate) left ventricular diastolic dysfunction consistent with " pseudonormalization.  · Moderate right ventricular enlargement.  · Normal right ventricular systolic function.  · Moderate right atrial enlargement.  · Mild tricuspid regurgitation.        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 Per chart review patient treated with prolonged protocol per ID. No acute issues.        Hyperlipidemia  Lipitor 40 mg daily          Sarcoidosis  Chronic   Sedimentation rate is greater than 120      Final Active Diagnoses:    Diagnosis Date Noted POA    Hypervolemia associated with renal insufficiency [E87.70, N28.9] 07/31/2020 Yes    Leukocytosis [D72.829] 07/31/2020 No    Elevated troponin [R79.89] 07/29/2020 Yes    Acute respiratory failure with hypoxia [J96.01] 07/29/2020 Yes    Acute CVA (cerebrovascular accident) [I63.9] 07/29/2020 No    Anemia of chronic disease [D63.8] 07/27/2020 Yes    Essential hypertension [I10] 07/27/2020 Yes    CKD (chronic kidney disease) stage 5, GFR less than 15 ml/min [N18.5] 07/14/2020 Yes    Diastolic dysfunction [I51.89] 03/15/2020 Yes   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]  Yes    Hyperlipidemia [E78.5] 12/26/2014 Yes    Sarcoidosis [D86.9] 12/23/2014 Yes      Problems Resolved During this Admission:    Diagnosis Date Noted Date Resolved POA    PRINCIPAL PROBLEM:  Hypervolemia associated with renal insufficiency [E87.70, N28.9] 07/27/2020 07/29/2020 Yes    Metabolic acidosis [E87.2] 07/29/2020 08/01/2020 Unknown    In [I63.9] 07/29/2020 07/29/2020 No       Discharged Condition: good    Disposition: Home or Self Care    Follow Up:  Follow-up Information     Amos Mcintyre MD. Go on 8/10/2020.    Specialty: Hospitalist  Why: at 930 am; FOLLOW UP WITH NEPHROLOGY AFTER DISCHARGE; Follow up at Dialysis center  Contact information:  [address]  [place] [address]  [phone]             Jose Khan MD. Go on 8/18/2020.    Specialty: Internal  "Medicine  Why: at 1pm; FOLLOW UP WITH PCP  Contact information:  [address] [facility]  [place] [address]  [phone]             [facility] TORI Mendez. Go on 8/10/2020.    Why:  at 930 am; PD DIALYSIS NURSE VISIT and , FOLLOW UP WITH NEPHROLOGY AFTER DISCHARGE  Contact information:  [address]  [place] [address]  [phone]           Schedule an appointment as soon as possible for a visit with [facility] VASCULAR NEUROLOGY.    Specialty: Vascular Neurology  Why: FOLLOW UP WITH NEUROLOGY AFTER DISCHARGE, OFFICE TO CALL PATIENT/FAMILY TO SET UP APPT TIME, [employer] [facility] SENT TO OFFICE STAFF  Contact information:  [address]  [facility] [address]  [phone]           [facility] eventuosity. Go in 1 day.    Why: HOME HEALTH, OFFICE TO CALL PATIENT/FAMILY TO SET UP APPT TIME  Contact information:  [address] [facility] [address]  [phone]           [facility].    Specialty: DME Provider  Why: As needed, DME  Contact information:  [address] [facility] [address]  [phone]                 Patient Instructions:      WALKER FOR HOME USE     Order Specific Question Answer Comments   Type of Walker: Adult (5'4"-6'6")    With wheels? Yes    Height: 5' 5" (1.651 m)    Weight: 96.2 kg (212 lb 1.3 oz)    Length of need (1-99 months): 99    Does patient have medical equipment at home? cane, straight    Please check all that apply: Patient's condition impairs ambulation.    Please check all that apply: Patient is unable to safely ambulate without equipment.      OXYGEN FOR HOME USE     Order Specific Question Answer Comments   Liter Flow 2    Duration With activity    Qualifying SpO2: 88    Testing done at: Exercise/Activity    Route nasal cannula    Portable mode: continuous    Device home concentrator with portable unit    Length of need (in months): 99 mos    Patient condition with qualifying saturation CHF    Height: 5' 5" (1.651 m)    Weight: 96.2 kg (212 " lb 1.3 oz)    Does patient have medical equipment at home? cane, straight    Alternative treatment measures have been tried or considered and deemed clinically ineffective. Yes    Vendor: [facility]    Expected Date of Delivery: 8/4/2020      Platelet Response Plavix   Standing Status: Future Standing Exp. Date: 10/03/21     Ambulatory referral/consult to Neurology   Standing Status: Future   Referral Priority: Routine Referral Type: Consultation   Referral Reason: Specialty Services Required   Requested Specialty: Neurology   Number of Visits Requested: 1     Diet renal     Notify your health care provider if you experience any of the following:  temperature >100.4     Notify your health care provider if you experience any of the following:  redness, tenderness, or signs of infection (pain, swelling, redness, odor or green/yellow discharge around incision site)     Notify your health care provider if you experience any of the following:  difficulty breathing or increased cough     Activity as tolerated       Significant Diagnostic Studies: Labs:   CMP   Recent Labs   Lab 08/04/20  0850   *  135*   K 3.8  3.8     102   CO2 23  23   *  114*   BUN 28*  28*   CREATININE 5.8*  5.8*   CALCIUM 8.1*  8.1*   ANIONGAP 10  10   ESTGFRAFRICA 8*  8*   EGFRNONAA 7*  7*   , CBC   Recent Labs   Lab 08/04/20  0850   WBC 12.60   HGB 8.2*   HCT 27.5*      , INR   Lab Results   Component Value Date    INR 1.1 07/30/2020    INR 1.1 07/27/2020    INR 1.1 07/26/2020   , Lipid Panel   Lab Results   Component Value Date    CHOL 143 07/29/2020    HDL 24 (L) 07/29/2020    LDLCALC 92.6 07/29/2020    TRIG 132 07/29/2020    CHOLHDL 16.8 (L) 07/29/2020   , Troponin   Recent Labs   Lab 07/30/20  0502   TROPONINI 0.125*   , A1C:   Recent Labs   Lab 07/29/20  1845   HGBA1C 5.2    and All labs within the past 24 hours have been reviewed  Microbiology:   Blood Culture   Lab Results   Component Value Date     LABBLOO No growth after 5 days. 07/29/2020    and Urine Culture    Lab Results   Component Value Date    LABURIN  03/16/2018     Multiple organisms isolated. None in predominance.  Repeat if    LABURIN clinically necessary. 03/16/2018     Cardiac Graphics: Echocardiogram:   2D echo with color flow doppler:   Results for orders placed or performed during the hospital encounter of 03/17/18   2D echo with color flow doppler   Result Value Ref Range    QEF 55 55 - 65    Diastolic Dysfunction Yes (A)     Aortic Valve Stenosis MILD (A)     Est. PA Systolic Pressure 33.64     Narrative    Date of Procedure: 03/19/2018        TEST DESCRIPTION   Technical Quality: This is a portable study performed at the patient's bedside. This is a technically challenging study. There is poor endocardial definition.     Aorta: The aortic root is normal in size, measuring 2.9 cm at sinotubular junction and 3.0 cm at Sinuses of Valsalva. The proximal ascending aorta is normal in size, measuring 2.9 cm across.     Left Atrium: The left atrial volume index is normal, measuring 28.21 cc/m2.     Left Ventricle: The left ventricle is normal in size, with an end-diastolic diameter of 4.7 cm, and an end-systolic diameter of 3.6 cm. Wall thickness is increased, with the septum and the posterior wall each measuring 1.2 cm across. Relative wall   thickness was increased at 0.51, and the LV mass index was increased at 129.2 g/m2 consistent with moderate concentric left ventricular hypertrophy. The anterior septum is hypokinetic. Left ventricular systolic function appears normal. Visually estimated   ejection fraction is 55-60%. The LV Doppler derived stroke volume equals 50.0 ccs.     Diastolic indices: E wave velocity 0.8 m/s, E/A ratio 0.9,  msec., E/e' ratio(avg) 14. There is pseudonormalization of mitral inflow pattern consistent with significant diastolic dysfunction.     Right Atrium: The right atrium is normal in size.     Right Ventricle:  The right ventricle is normal in size measuring 3.7 cm at the base in the apical right ventricle-focused view. There is RVH. Global right ventricular systolic function appears normal. Tricuspid annular plane systolic excursion (TAPSE) is   2.5 cm. The estimated PA systolic pressure is greater than 34 mmHg.     Aortic Valve:  The aortic valve is mildly sclerotic with mildly restricted leaflet mobility. The peak velocity obtained across the aortic valve is 2.08 m/s, which translates to a peak gradient of 17 mmHg. The mean gradient is 10 mmHg. Using a left   ventricular outflow tract diameter of 2.0 cm, a left ventricular outflow tract velocity time integral of 17 cm, and a peak instantaneous transvalvular velocity time integral of 28 cm, the calculated aortic valve area is 1.78 cm2(AVAi is 0.92 cm2/m2),   consistent with mild aortic stenosis.     Mitral Valve:  The mitral valve is normal in structure.     Tricuspid Valve:  The tricuspid valve is normal in structure.     Pulmonary Valve:  The pulmonic valve is normal in structure.     IVC: Patient is on ventilator. IVC dynamics are not reliable in assessing right atrial pressure.     Intracavitary: There is no evidence of pericardial effusion, intracavity mass, thrombi, or vegetation.         CONCLUSIONS     1 - Concentric LVH with normal left ventricular systolic function (EF 55-60%).     2 - Wall motion abnormalities.     3 - Impaired LV relaxation, elevated LAP (grade 2 diastolic dysfunction).     4 - Normal right ventricular systolic function .     5 - Mild aortic stenosis, RAJWINDER = 1.78 cm2, AVAi = 0.92 cm2/m2, peak velocity = 2.08 m/s, mean gradient = 10 mmHg.             This document has been electronically    SIGNED BY: Isamar Toribio MD On: 03/19/2018 15:20    and Transthoracic echo (TTE) complete (Cupid Only):   Results for orders placed or performed during the hospital encounter of 07/26/20   Echo Color Flow Doppler? Yes   Result Value Ref Range    Ascending  aorta 3.02 cm    STJ 3.03 cm    AV mean gradient 8 mmHg    Ao peak arias 1.75 m/s    Ao VTI 34.82 cm    IVRT 134.16 msec    IVS 1.29 (A) 0.6 - 1.1 cm    LA size 3.86 cm    Left Atrium Major Axis 5.14 cm    Left Atrium Minor Axis 5.08 cm    LVIDD 4.53 3.5 - 6.0 cm    LVIDS 3.39 2.1 - 4.0 cm    LVOT diameter 2.10 cm    LVOT peak VTI 18.31 cm    PW 1.19 (A) 0.6 - 1.1 cm    MV Peak A Arias 0.71 m/s    E wave decelartion time 285.05 msec    MV Peak E Arias 0.70 m/s    PV Peak D Arias 0.26 m/s    PV Peak S Arias 0.35 m/s    RA Major Axis 4.88 cm    RA Width 4.44 cm    RVDD 3.51 cm    TAPSE 2.63 cm    TR Max Arias 2.85 m/s    TDI LATERAL 0.03 m/s    TDI SEPTAL 0.04 m/s    LA WIDTH 3.92 cm    Ao root annulus 3.33 cm    MV stenosis pressure 1/2 time 82.66 ms    LV Diastolic Volume 93.84 mL    LV Systolic Volume 47.26 mL    LVOT peak arias 0.94 m/s    LV LATERAL E/E' RATIO 23.33 m/s    LV SEPTAL E/E' RATIO 17.50 m/s    FS 25 %    LA volume 65.72 cm3    LV mass 209.86 g    Left Ventricle Relative Wall Thickness 0.53 cm    AV valve area 1.82 cm2    AV Velocity Ratio 0.54     AV index (prosthetic) 0.53     MV valve area p 1/2 method 2.66 cm2    E/A ratio 0.99     Mean e' 0.04 m/s    Pulm vein S/D ratio 1.35     LVOT area 3.5 cm2    LVOT stroke volume 63.39 cm3    AV peak gradient 12 mmHg    E/E' ratio 20.00 m/s    LV Systolic Volume Index 23.3 mL/m2    LV Diastolic Volume Index 46.28 mL/m2    LA Volume Index 32.4 mL/m2    LV Mass Index 103 g/m2    Triscuspid Valve Regurgitation Peak Gradient 32 mmHg    BSA 2.1 m2    Narrative    · Low normal left ventricular systolic function. The estimated ejection   fraction is 50-55%.  · Mild concentric left ventricular hypertrophy.  · Grade II (moderate) left ventricular diastolic dysfunction consistent   with pseudonormalization.  · Moderate right ventricular enlargement.  · Normal right ventricular systolic function.  · Moderate right atrial enlargement.  · Mild tricuspid regurgitation.          Pending  Diagnostic Studies:     None         Medications:  Reconciled Home Medications:      Medication List      START taking these medications    aspirin 81 MG EC tablet  Commonly known as: ECOTRIN  Take 1 tablet (81 mg total) by mouth once daily.     clopidogreL 75 mg tablet  Commonly known as: PLAVIX  Take 1 tablet (75 mg total) by mouth once daily.     pantoprazole 40 MG tablet  Commonly known as: PROTONIX  Take 1 tablet (40 mg total) by mouth once daily.     sevelamer carbonate 800 mg Tab  Commonly known as: RENVELA  Take 3 tablets (2,400 mg total) by mouth 4 (four) times daily with meals and nightly.     VIRT-CAPS 1 mg Cap  Generic drug: vitamin renal formula (B-complex-vitamin c-folic acid)  Take 1 capsule by mouth once daily.        CHANGE how you take these medications    furosemide 80 MG tablet  Commonly known as: LASIX  Take 2 tablets (160 mg total) by mouth 2 (two) times daily.  What changed:   · medication strength  · how much to take     traZODone 50 MG tablet  Commonly known as: DESYREL  Take 1 tablet (50 mg total) by mouth nightly as needed for Insomnia.  What changed:   · medication strength  · how much to take  · reasons to take this        CONTINUE taking these medications    albuterol 90 mcg/actuation inhaler  Commonly known as: PROAIR HFA  Inhale 2 puffs into the lungs every 6 (six) hours as needed for Wheezing. Rescue     amLODIPine 10 MG tablet  Commonly known as: NORVASC  Take 1 tablet (10 mg total) by mouth once daily.     folic acid 1 MG tablet  Commonly known as: FOLVITE  Take 1 tablet (1 mg total) by mouth once daily.     HYDROcodone-acetaminophen  mg per tablet  Commonly known as: NORCO  Take 1 tablet by mouth every 12 (twelve) hours as needed for Pain.     LIPITOR 40 MG tablet  Generic drug: atorvastatin  Take 1 tablet (40 mg total) by mouth once daily.     tiZANidine 4 MG tablet  Commonly known as: ZANAFLEX  Take 4 mg by mouth every 8 (eight) hours as needed.        STOP taking these  medications    carvediloL 25 MG tablet  Commonly known as: COREG     hydrALAZINE 50 MG tablet  Commonly known as: APRESOLINE     hydrOXYzine pamoate 50 MG Cap  Commonly known as: VISTARIL            Indwelling Lines/Drains at time of discharge:   Lines/Drains/Airways     Central Venous Catheter Line                 Hemodialysis Catheter 07/27/20 right internal jugular 9 days                Time spent on the discharge of patient: 50 minutes  Patient was seen and examined on the date of discharge and determined to be suitable for discharge.         Rojas Asif MD  Department of Hospital Medicine  [facility]

## 2020-08-16 NOTE — TELEPHONE ENCOUNTER
Saw in note Pt spoke to PCP office Fri    Reason for Disposition   Caller has already spoken with the PCP and has no further questions.   Caller has already spoken with another triager or PCP AND has further questions AND triager able to answer questions.    Additional Information   Negative: Caller is angry or rude (e.g., hangs up, verbally abusive, yelling)   Negative: Caller hangs up   Negative: Caller has already spoken with another triager and has no further questions.   Negative: Busy signal.  First attempt to contact caller.  Follow-up call scheduled within 15 minutes.   Negative: No answer.  First attempt to contact caller.  Follow-up call scheduled within 15 minutes.   Negative: Message left on identified voice mail   Negative: Message left on unidentified voice mail.  Phone number verified.   Negative: Message left with person in household.   Negative: Wrong number reached.  Phone number verified.   Negative: Second attempt to contact family AND no contact made.  Phone number verified.   Negative: Cell phone out of range.  Phone number verified.   Negative: Pager number given.  Answering service notified.   Negative: Patient already left for the hospital/clinic.   Negative: Caller has cancelled the call before the first contact   Negative: Unable to complete triage due to phone connection issues   Negative: NON-URGENT call redirected to PCP's office because it is open    Protocols used: NO CONTACT OR DUPLICATE CONTACT CALL-A-

## 2020-08-17 NOTE — PROGRESS NOTES
S/p PD catheter placement as inpatient  Doing well. No problems with PD catheter site  No pain, no drainage  Saw PD nurse last week. Sees again tomorrow  Planning to start PD next week  RTC prn

## 2020-08-19 NOTE — PT/OT/SLP PROGRESS
Physical Therapy      Patient Name:  Mary Carrillo   MRN:  [id number]    PT orders acknowledged and discontinued. Pt off the floor for T12/L1 corpectomy and T10-L3 fusion. Re-consult when medically appropriate.     ALFREDO DELAROSA, PT   3/18/2018       no

## 2020-09-24 PROBLEM — J47.9 BRONCHIECTASIS WITHOUT COMPLICATION: Status: ACTIVE | Noted: 2020-01-01

## 2020-12-31 PROBLEM — U07.1 COVID-19: Status: ACTIVE | Noted: 2020-01-01

## 2021-01-01 ENCOUNTER — HOSPITAL ENCOUNTER (INPATIENT)
Facility: HOSPITAL | Age: 66
LOS: 2 days | DRG: 871 | End: 2021-01-09
Attending: INTERNAL MEDICINE | Admitting: INTERNAL MEDICINE
Payer: MEDICARE

## 2021-01-01 VITALS
BODY MASS INDEX: 35.93 KG/M2 | SYSTOLIC BLOOD PRESSURE: 128 MMHG | RESPIRATION RATE: 35 BRPM | DIASTOLIC BLOOD PRESSURE: 68 MMHG | OXYGEN SATURATION: 2 % | WEIGHT: 215.63 LBS | TEMPERATURE: 96 F | HEIGHT: 65 IN

## 2021-01-01 DIAGNOSIS — N18.6 ACUTE RENAL FAILURE SUPERIMPOSED ON CHRONIC KIDNEY DISEASE, ON CHRONIC DIALYSIS, UNSPECIFIED ACUTE RENAL FAILURE TYPE: ICD-10-CM

## 2021-01-01 DIAGNOSIS — E87.5 HYPERKALEMIA: ICD-10-CM

## 2021-01-01 DIAGNOSIS — U07.1 PNEUMONIA DUE TO COVID-19 VIRUS: ICD-10-CM

## 2021-01-01 DIAGNOSIS — N18.6 ESRD ON PERITONEAL DIALYSIS: ICD-10-CM

## 2021-01-01 DIAGNOSIS — I50.30 DIASTOLIC HEART FAILURE: ICD-10-CM

## 2021-01-01 DIAGNOSIS — N17.9 ACUTE RENAL FAILURE SUPERIMPOSED ON CHRONIC KIDNEY DISEASE, ON CHRONIC DIALYSIS, UNSPECIFIED ACUTE RENAL FAILURE TYPE: ICD-10-CM

## 2021-01-01 DIAGNOSIS — Z99.2 ACUTE RENAL FAILURE SUPERIMPOSED ON CHRONIC KIDNEY DISEASE, ON CHRONIC DIALYSIS, UNSPECIFIED ACUTE RENAL FAILURE TYPE: ICD-10-CM

## 2021-01-01 DIAGNOSIS — Z99.2 ESRD ON PERITONEAL DIALYSIS: ICD-10-CM

## 2021-01-01 DIAGNOSIS — J12.82 PNEUMONIA DUE TO COVID-19 VIRUS: ICD-10-CM

## 2021-01-01 PROBLEM — D72.829 LEUKOCYTOSIS: Status: RESOLVED | Noted: 2020-01-01 | Resolved: 2021-01-01

## 2021-01-01 PROBLEM — R10.84 GENERALIZED ABDOMINAL PAIN: Status: RESOLVED | Noted: 2018-03-24 | Resolved: 2021-01-01

## 2021-01-01 PROBLEM — E83.39 HYPERPHOSPHATEMIA: Status: ACTIVE | Noted: 2021-01-01

## 2021-01-01 PROBLEM — I50.32 CHRONIC DIASTOLIC HEART FAILURE: Status: ACTIVE | Noted: 2021-01-01

## 2021-01-01 PROBLEM — R79.89 ELEVATED LFTS: Status: ACTIVE | Noted: 2021-01-01

## 2021-01-01 PROBLEM — E83.51 HYPOCALCEMIA: Status: ACTIVE | Noted: 2021-01-01

## 2021-01-01 LAB
ALBUMIN SERPL BCP-MCNC: 1.2 G/DL (ref 3.5–5.2)
ALBUMIN SERPL BCP-MCNC: 1.2 G/DL (ref 3.5–5.2)
ALBUMIN SERPL BCP-MCNC: 1.3 G/DL (ref 3.5–5.2)
ALBUMIN SERPL BCP-MCNC: 1.4 G/DL (ref 3.5–5.2)
ALBUMIN SERPL BCP-MCNC: 1.5 G/DL (ref 3.5–5.2)
ALBUMIN SERPL BCP-MCNC: 1.7 G/DL (ref 3.5–5.2)
ALBUMIN SERPL BCP-MCNC: 1.7 G/DL (ref 3.5–5.2)
ALLENS TEST: ABNORMAL
ALP SERPL-CCNC: 128 U/L (ref 55–135)
ALP SERPL-CCNC: 136 U/L (ref 55–135)
ALP SERPL-CCNC: 187 U/L (ref 55–135)
ALP SERPL-CCNC: 96 U/L (ref 55–135)
ALT SERPL W/O P-5'-P-CCNC: 16 U/L (ref 10–44)
ALT SERPL W/O P-5'-P-CCNC: 17 U/L (ref 10–44)
ALT SERPL W/O P-5'-P-CCNC: 18 U/L (ref 10–44)
ALT SERPL W/O P-5'-P-CCNC: 4244 U/L (ref 10–44)
ANION GAP SERPL CALC-SCNC: 16 MMOL/L (ref 8–16)
ANION GAP SERPL CALC-SCNC: 16 MMOL/L (ref 8–16)
ANION GAP SERPL CALC-SCNC: 18 MMOL/L (ref 8–16)
ANION GAP SERPL CALC-SCNC: 19 MMOL/L (ref 8–16)
ANION GAP SERPL CALC-SCNC: 20 MMOL/L (ref 8–16)
ANION GAP SERPL CALC-SCNC: 20 MMOL/L (ref 8–16)
ANION GAP SERPL CALC-SCNC: 21 MMOL/L (ref 8–16)
ANION GAP SERPL CALC-SCNC: 23 MMOL/L (ref 8–16)
ANISOCYTOSIS BLD QL SMEAR: SLIGHT
ANISOCYTOSIS BLD QL SMEAR: SLIGHT
APTT BLDCRRT: 43 SEC (ref 21–32)
APTT BLDCRRT: 48.1 SEC (ref 21–32)
ASCENDING AORTA: 2.85 CM
AST SERPL-CCNC: 77 U/L (ref 10–40)
AST SERPL-CCNC: 78 U/L (ref 10–40)
AST SERPL-CCNC: 83 U/L (ref 10–40)
AST SERPL-CCNC: ABNORMAL U/L (ref 10–40)
AV INDEX (PROSTH): 0.7
AV MEAN GRADIENT: 16 MMHG
AV PEAK GRADIENT: 34 MMHG
AV VALVE AREA: 2.66 CM2
AV VELOCITY RATIO: 0.56
BACTERIA BLD CULT: NORMAL
BACTERIA BLD CULT: NORMAL
BASOPHILS # BLD AUTO: 0.08 K/UL (ref 0–0.2)
BASOPHILS # BLD AUTO: ABNORMAL K/UL (ref 0–0.2)
BASOPHILS NFR BLD: 0 % (ref 0–1.9)
BASOPHILS NFR BLD: 0 % (ref 0–1.9)
BASOPHILS NFR BLD: 0.3 % (ref 0–1.9)
BILIRUB SERPL-MCNC: 0.3 MG/DL (ref 0.1–1)
BILIRUB SERPL-MCNC: 0.7 MG/DL (ref 0.1–1)
BNP SERPL-MCNC: 150 PG/ML (ref 0–99)
BSA FOR ECHO PROCEDURE: 2.12 M2
BUN SERPL-MCNC: 11 MG/DL (ref 8–23)
BUN SERPL-MCNC: 11 MG/DL (ref 8–23)
BUN SERPL-MCNC: 22 MG/DL (ref 8–23)
BUN SERPL-MCNC: 54 MG/DL (ref 8–23)
BUN SERPL-MCNC: 82 MG/DL (ref 8–23)
BUN SERPL-MCNC: 82 MG/DL (ref 8–23)
BUN SERPL-MCNC: 89 MG/DL (ref 8–23)
BUN SERPL-MCNC: 96 MG/DL (ref 8–23)
BUN SERPL-MCNC: 96 MG/DL (ref 8–23)
BUN SERPL-MCNC: 99 MG/DL (ref 8–23)
BURR CELLS BLD QL SMEAR: ABNORMAL
CALCIUM SERPL-MCNC: 8 MG/DL (ref 8.7–10.5)
CALCIUM SERPL-MCNC: 8 MG/DL (ref 8.7–10.5)
CALCIUM SERPL-MCNC: 8.2 MG/DL (ref 8.7–10.5)
CALCIUM SERPL-MCNC: 8.2 MG/DL (ref 8.7–10.5)
CALCIUM SERPL-MCNC: 8.3 MG/DL (ref 8.7–10.5)
CALCIUM SERPL-MCNC: 8.4 MG/DL (ref 8.7–10.5)
CALCIUM SERPL-MCNC: 8.5 MG/DL (ref 8.7–10.5)
CALCIUM SERPL-MCNC: 8.5 MG/DL (ref 8.7–10.5)
CALCIUM SERPL-MCNC: 8.7 MG/DL (ref 8.7–10.5)
CALCIUM SERPL-MCNC: 9 MG/DL (ref 8.7–10.5)
CHLORIDE SERPL-SCNC: 103 MMOL/L (ref 95–110)
CHLORIDE SERPL-SCNC: 92 MMOL/L (ref 95–110)
CHLORIDE SERPL-SCNC: 92 MMOL/L (ref 95–110)
CHLORIDE SERPL-SCNC: 93 MMOL/L (ref 95–110)
CHLORIDE SERPL-SCNC: 94 MMOL/L (ref 95–110)
CHLORIDE SERPL-SCNC: 97 MMOL/L (ref 95–110)
CHLORIDE SERPL-SCNC: 97 MMOL/L (ref 95–110)
CHLORIDE SERPL-SCNC: 99 MMOL/L (ref 95–110)
CO2 SERPL-SCNC: 14 MMOL/L (ref 23–29)
CO2 SERPL-SCNC: 15 MMOL/L (ref 23–29)
CO2 SERPL-SCNC: 16 MMOL/L (ref 23–29)
CO2 SERPL-SCNC: 17 MMOL/L (ref 23–29)
CO2 SERPL-SCNC: 17 MMOL/L (ref 23–29)
CO2 SERPL-SCNC: 18 MMOL/L (ref 23–29)
CO2 SERPL-SCNC: 18 MMOL/L (ref 23–29)
CO2 SERPL-SCNC: 19 MMOL/L (ref 23–29)
CO2 SERPL-SCNC: 20 MMOL/L (ref 23–29)
CO2 SERPL-SCNC: 20 MMOL/L (ref 23–29)
CREAT SERPL-MCNC: 1.2 MG/DL (ref 0.5–1.4)
CREAT SERPL-MCNC: 1.2 MG/DL (ref 0.5–1.4)
CREAT SERPL-MCNC: 2.3 MG/DL (ref 0.5–1.4)
CREAT SERPL-MCNC: 4.8 MG/DL (ref 0.5–1.4)
CREAT SERPL-MCNC: 7.3 MG/DL (ref 0.5–1.4)
CREAT SERPL-MCNC: 7.3 MG/DL (ref 0.5–1.4)
CREAT SERPL-MCNC: 8 MG/DL (ref 0.5–1.4)
CREAT SERPL-MCNC: 9.4 MG/DL (ref 0.5–1.4)
CREAT SERPL-MCNC: 9.4 MG/DL (ref 0.5–1.4)
CREAT SERPL-MCNC: 9.5 MG/DL (ref 0.5–1.4)
CRP SERPL-MCNC: 170.3 MG/L (ref 0–8.2)
CRP SERPL-MCNC: 212.1 MG/L (ref 0–8.2)
CRP SERPL-MCNC: 268.6 MG/L (ref 0–8.2)
CV ECHO LV RWT: 0.58 CM
D DIMER PPP IA.FEU-MCNC: 22.62 MG/L FEU
DELSYS: ABNORMAL
DIFFERENTIAL METHOD: ABNORMAL
DOP CALC AO PEAK VEL: 2.93 M/S
DOP CALC AO VTI: 31.42 CM
DOP CALC LVOT AREA: 3.8 CM2
DOP CALC LVOT DIAMETER: 2.2 CM
DOP CALC LVOT PEAK VEL: 1.64 M/S
DOP CALC LVOT STROKE VOLUME: 83.47 CM3
DOP CALCLVOT PEAK VEL VTI: 21.97 CM
E WAVE DECELERATION TIME: 294.4 MSEC
E/A RATIO: 0.91
E/E' RATIO: 20.25 M/S
ECHO LV POSTERIOR WALL: 1.19 CM (ref 0.6–1.1)
EOSINOPHIL # BLD AUTO: 0 K/UL (ref 0–0.5)
EOSINOPHIL # BLD AUTO: ABNORMAL K/UL (ref 0–0.5)
EOSINOPHIL NFR BLD: 0 % (ref 0–8)
ERYTHROCYTE [DISTWIDTH] IN BLOOD BY AUTOMATED COUNT: 17.3 % (ref 11.5–14.5)
ERYTHROCYTE [DISTWIDTH] IN BLOOD BY AUTOMATED COUNT: 17.3 % (ref 11.5–14.5)
ERYTHROCYTE [DISTWIDTH] IN BLOOD BY AUTOMATED COUNT: 18 % (ref 11.5–14.5)
ERYTHROCYTE [SEDIMENTATION RATE] IN BLOOD BY WESTERGREN METHOD: 35 MM/H
ERYTHROCYTE [SEDIMENTATION RATE] IN BLOOD BY WESTERGREN METHOD: >120 MM/HR (ref 0–36)
EST. GFR  (AFRICAN AMERICAN): 10.1 ML/MIN/1.73 M^2
EST. GFR  (AFRICAN AMERICAN): 24.6 ML/MIN/1.73 M^2
EST. GFR  (AFRICAN AMERICAN): 4.4 ML/MIN/1.73 M^2
EST. GFR  (AFRICAN AMERICAN): 4.5 ML/MIN/1.73 M^2
EST. GFR  (AFRICAN AMERICAN): 4.5 ML/MIN/1.73 M^2
EST. GFR  (AFRICAN AMERICAN): 5.5 ML/MIN/1.73 M^2
EST. GFR  (AFRICAN AMERICAN): 54 ML/MIN/1.73 M^2
EST. GFR  (AFRICAN AMERICAN): 54 ML/MIN/1.73 M^2
EST. GFR  (AFRICAN AMERICAN): 6.1 ML/MIN/1.73 M^2
EST. GFR  (AFRICAN AMERICAN): 6.1 ML/MIN/1.73 M^2
EST. GFR  (NON AFRICAN AMERICAN): 21.3 ML/MIN/1.73 M^2
EST. GFR  (NON AFRICAN AMERICAN): 3.8 ML/MIN/1.73 M^2
EST. GFR  (NON AFRICAN AMERICAN): 3.9 ML/MIN/1.73 M^2
EST. GFR  (NON AFRICAN AMERICAN): 3.9 ML/MIN/1.73 M^2
EST. GFR  (NON AFRICAN AMERICAN): 4.7 ML/MIN/1.73 M^2
EST. GFR  (NON AFRICAN AMERICAN): 46.9 ML/MIN/1.73 M^2
EST. GFR  (NON AFRICAN AMERICAN): 46.9 ML/MIN/1.73 M^2
EST. GFR  (NON AFRICAN AMERICAN): 5.3 ML/MIN/1.73 M^2
EST. GFR  (NON AFRICAN AMERICAN): 5.3 ML/MIN/1.73 M^2
EST. GFR  (NON AFRICAN AMERICAN): 8.8 ML/MIN/1.73 M^2
FACT X PPP CHRO-ACNC: 0.22 IU/ML (ref 0.3–0.7)
FACT X PPP CHRO-ACNC: 0.31 IU/ML (ref 0.3–0.7)
FACT X PPP CHRO-ACNC: 0.44 IU/ML (ref 0.3–0.7)
FACT X PPP CHRO-ACNC: 1.02 IU/ML (ref 0.3–0.7)
FACT X PPP CHRO-ACNC: 1.26 IU/ML (ref 0.3–0.7)
FACT X PPP CHRO-ACNC: 1.46 IU/ML (ref 0.3–0.7)
FACT X PPP CHRO-ACNC: 1.49 IU/ML (ref 0.3–0.7)
FACT X PPP CHRO-ACNC: >1.5 IU/ML (ref 0.3–0.7)
FERRITIN SERPL-MCNC: 3785 NG/ML (ref 20–300)
FIO2: 100
FIO2: 93
FRACTIONAL SHORTENING: 27 % (ref 28–44)
GLUCOSE SERPL-MCNC: 191 MG/DL (ref 70–110)
GLUCOSE SERPL-MCNC: 191 MG/DL (ref 70–110)
GLUCOSE SERPL-MCNC: 198 MG/DL (ref 70–110)
GLUCOSE SERPL-MCNC: 272 MG/DL (ref 70–110)
GLUCOSE SERPL-MCNC: 319 MG/DL (ref 70–110)
GLUCOSE SERPL-MCNC: 62 MG/DL (ref 70–110)
GLUCOSE SERPL-MCNC: 63 MG/DL (ref 70–110)
GLUCOSE SERPL-MCNC: 95 MG/DL (ref 70–110)
HCO3 UR-SCNC: 17.1 MMOL/L (ref 24–28)
HCO3 UR-SCNC: 19.1 MMOL/L (ref 24–28)
HCO3 UR-SCNC: 22 MMOL/L (ref 24–28)
HCO3 UR-SCNC: 23 MMOL/L (ref 24–28)
HCT VFR BLD AUTO: 26.9 % (ref 37–48.5)
HCT VFR BLD AUTO: 28.9 % (ref 37–48.5)
HCT VFR BLD AUTO: 32.4 % (ref 37–48.5)
HGB BLD-MCNC: 10 G/DL (ref 12–16)
HGB BLD-MCNC: 8.3 G/DL (ref 12–16)
HGB BLD-MCNC: 9.2 G/DL (ref 12–16)
HYPOCHROMIA BLD QL SMEAR: ABNORMAL
IMM GRANULOCYTES # BLD AUTO: 1.45 K/UL (ref 0–0.04)
IMM GRANULOCYTES # BLD AUTO: ABNORMAL K/UL (ref 0–0.04)
IMM GRANULOCYTES # BLD AUTO: ABNORMAL K/UL (ref 0–0.04)
IMM GRANULOCYTES NFR BLD AUTO: 4.9 % (ref 0–0.5)
IMM GRANULOCYTES NFR BLD AUTO: ABNORMAL % (ref 0–0.5)
IMM GRANULOCYTES NFR BLD AUTO: ABNORMAL % (ref 0–0.5)
INR PPP: 1.1 (ref 0.8–1.2)
INR PPP: 1.1 (ref 0.8–1.2)
INR PPP: 1.6 (ref 0.8–1.2)
INTERVENTRICULAR SEPTUM: 1.3 CM (ref 0.6–1.1)
LA MAJOR: 4.98 CM
LA MINOR: 5 CM
LA WIDTH: 2.9 CM
LACTATE SERPL-SCNC: 2.1 MMOL/L (ref 0.5–2.2)
LDH SERPL L TO P-CCNC: 3.05 MMOL/L (ref 0.36–1.25)
LDH SERPL L TO P-CCNC: 737 U/L (ref 110–260)
LEFT ATRIUM SIZE: 3.67 CM
LEFT ATRIUM VOLUME INDEX MOD: 9.3 ML/M2
LEFT ATRIUM VOLUME INDEX: 22.1 ML/M2
LEFT ATRIUM VOLUME MOD: 19.03 CM3
LEFT ATRIUM VOLUME: 45.14 CM3
LEFT INTERNAL DIMENSION IN SYSTOLE: 2.98 CM (ref 2.1–4)
LEFT VENTRICLE DIASTOLIC VOLUME INDEX: 36.39 ML/M2
LEFT VENTRICLE DIASTOLIC VOLUME: 74.32 ML
LEFT VENTRICLE MASS INDEX: 89 G/M2
LEFT VENTRICLE SYSTOLIC VOLUME INDEX: 16.9 ML/M2
LEFT VENTRICLE SYSTOLIC VOLUME: 34.56 ML
LEFT VENTRICULAR INTERNAL DIMENSION IN DIASTOLE: 4.1 CM (ref 3.5–6)
LEFT VENTRICULAR MASS: 181.37 G
LV LATERAL E/E' RATIO: 27 M/S
LV SEPTAL E/E' RATIO: 16.2 M/S
LYMPHOCYTES # BLD AUTO: 1 K/UL (ref 1–4.8)
LYMPHOCYTES # BLD AUTO: ABNORMAL K/UL (ref 1–4.8)
LYMPHOCYTES NFR BLD: 3 % (ref 18–48)
LYMPHOCYTES NFR BLD: 3 % (ref 18–48)
LYMPHOCYTES NFR BLD: 3.2 % (ref 18–48)
MAGNESIUM SERPL-MCNC: 2.1 MG/DL (ref 1.6–2.6)
MAGNESIUM SERPL-MCNC: 2.2 MG/DL (ref 1.6–2.6)
MCH RBC QN AUTO: 27.1 PG (ref 27–31)
MCH RBC QN AUTO: 27.3 PG (ref 27–31)
MCH RBC QN AUTO: 28 PG (ref 27–31)
MCHC RBC AUTO-ENTMCNC: 30.9 G/DL (ref 32–36)
MCHC RBC AUTO-ENTMCNC: 30.9 G/DL (ref 32–36)
MCHC RBC AUTO-ENTMCNC: 31.8 G/DL (ref 32–36)
MCV RBC AUTO: 88 FL (ref 82–98)
MCV RBC AUTO: 88 FL (ref 82–98)
MCV RBC AUTO: 89 FL (ref 82–98)
METAMYELOCYTES NFR BLD MANUAL: 1 %
METHEMOGLOBIN: 0.8 % (ref 0–3)
MIN VOL: 12.6
MIN VOL: 12.7
MODE: ABNORMAL
MONOCYTES # BLD AUTO: 1.6 K/UL (ref 0.3–1)
MONOCYTES # BLD AUTO: ABNORMAL K/UL (ref 0.3–1)
MONOCYTES NFR BLD: 4 % (ref 4–15)
MONOCYTES NFR BLD: 5.5 % (ref 4–15)
MONOCYTES NFR BLD: 9 % (ref 4–15)
MV PEAK A VEL: 0.89 M/S
MV PEAK E VEL: 0.81 M/S
NEUTROPHILS # BLD AUTO: 25.2 K/UL (ref 1.8–7.7)
NEUTROPHILS NFR BLD: 83 % (ref 38–73)
NEUTROPHILS NFR BLD: 86.1 % (ref 38–73)
NEUTROPHILS NFR BLD: 90 % (ref 38–73)
NEUTS BAND NFR BLD MANUAL: 2 %
NEUTS BAND NFR BLD MANUAL: 3 %
NRBC BLD-RTO: 2 /100 WBC
NRBC BLD-RTO: 2 /100 WBC
NRBC BLD-RTO: 42 /100 WBC
OVALOCYTES BLD QL SMEAR: ABNORMAL
OVALOCYTES BLD QL SMEAR: ABNORMAL
PCO2 BLDA: 35.2 MMHG (ref 35–45)
PCO2 BLDA: 48.6 MMHG (ref 35–45)
PCO2 BLDA: 49.9 MMHG (ref 35–45)
PCO2 BLDA: 51.3 MMHG (ref 35–45)
PCO2 BLDA: 56.1 MMHG (ref 35–45)
PEEP: 14
PEEP: 15
PH SMN: 7.18 [PH] (ref 7.35–7.45)
PH SMN: 7.22 [PH] (ref 7.35–7.45)
PH SMN: 7.25 [PH] (ref 7.35–7.45)
PH SMN: 7.26 [PH] (ref 7.35–7.45)
PH SMN: 7.29 [PH] (ref 7.35–7.45)
PHOSPHATE SERPL-MCNC: 10.4 MG/DL (ref 2.7–4.5)
PHOSPHATE SERPL-MCNC: 10.4 MG/DL (ref 2.7–4.5)
PHOSPHATE SERPL-MCNC: 12.2 MG/DL (ref 2.7–4.5)
PHOSPHATE SERPL-MCNC: 12.2 MG/DL (ref 2.7–4.5)
PHOSPHATE SERPL-MCNC: 6 MG/DL (ref 2.7–4.5)
PHOSPHATE SERPL-MCNC: 6 MG/DL (ref 2.7–4.5)
PHOSPHATE SERPL-MCNC: 6.9 MG/DL (ref 2.7–4.5)
PHOSPHATE SERPL-MCNC: 8.7 MG/DL (ref 2.7–4.5)
PIP: 42
PIP: 42
PISA TR MAX VEL: 2.71 M/S
PLATELET # BLD AUTO: 171 K/UL (ref 150–350)
PLATELET # BLD AUTO: 256 K/UL (ref 150–350)
PLATELET # BLD AUTO: 318 K/UL (ref 150–350)
PLATELET BLD QL SMEAR: ABNORMAL
PMV BLD AUTO: 11 FL (ref 9.2–12.9)
PMV BLD AUTO: 11.6 FL (ref 9.2–12.9)
PMV BLD AUTO: 12.2 FL (ref 9.2–12.9)
PO2 BLDA: 198 MMHG (ref 80–100)
PO2 BLDA: 69 MMHG (ref 80–100)
PO2 BLDA: 69 MMHG (ref 80–100)
PO2 BLDA: 71 MMHG (ref 80–100)
PO2 BLDA: 86 MMHG (ref 80–100)
POC BE: -5 MMOL/L
POC BE: -9 MMOL/L
POC BE: -9 MMOL/L
POC SATURATED O2: 100 % (ref 95–100)
POC SATURATED O2: 88 % (ref 95–100)
POC SATURATED O2: 90 % (ref 95–100)
POC SATURATED O2: 91 % (ref 95–100)
POC SATURATED O2: 94 % (ref 95–100)
POC TCO2: 18 MMOL/L (ref 23–27)
POC TCO2: 21 MMOL/L (ref 23–27)
POC TCO2: 23 MMOL/L (ref 23–27)
POC TCO2: 25 MMOL/L (ref 23–27)
POCT GLUCOSE: 97 MG/DL (ref 70–110)
POIKILOCYTOSIS BLD QL SMEAR: SLIGHT
POIKILOCYTOSIS BLD QL SMEAR: SLIGHT
POLYCHROMASIA BLD QL SMEAR: ABNORMAL
POLYCHROMASIA BLD QL SMEAR: ABNORMAL
POTASSIUM SERPL-SCNC: 5.1 MMOL/L (ref 3.5–5.1)
POTASSIUM SERPL-SCNC: 5.2 MMOL/L (ref 3.5–5.1)
POTASSIUM SERPL-SCNC: 5.5 MMOL/L (ref 3.5–5.1)
POTASSIUM SERPL-SCNC: 5.5 MMOL/L (ref 3.5–5.1)
POTASSIUM SERPL-SCNC: 5.6 MMOL/L (ref 3.5–5.1)
POTASSIUM SERPL-SCNC: 5.7 MMOL/L (ref 3.5–5.1)
POTASSIUM SERPL-SCNC: 5.7 MMOL/L (ref 3.5–5.1)
POTASSIUM SERPL-SCNC: 5.8 MMOL/L (ref 3.5–5.1)
POTASSIUM SERPL-SCNC: 6.3 MMOL/L (ref 3.5–5.1)
POTASSIUM SERPL-SCNC: 6.5 MMOL/L (ref 3.5–5.1)
POTASSIUM SERPL-SCNC: 6.5 MMOL/L (ref 3.5–5.1)
POTASSIUM SERPL-SCNC: 6.9 MMOL/L (ref 3.5–5.1)
POTASSIUM SERPL-SCNC: 6.9 MMOL/L (ref 3.5–5.1)
POTASSIUM SERPL-SCNC: 7.1 MMOL/L (ref 3.5–5.1)
PROCALCITONIN SERPL IA-MCNC: 6.65 NG/ML
PROMYELOCYTES NFR BLD MANUAL: 2 %
PROT SERPL-MCNC: 5.3 G/DL (ref 6–8.4)
PROT SERPL-MCNC: 5.9 G/DL (ref 6–8.4)
PROT SERPL-MCNC: 6.8 G/DL (ref 6–8.4)
PROT SERPL-MCNC: 7.3 G/DL (ref 6–8.4)
PROTHROMBIN TIME: 12.3 SEC (ref 9–12.5)
PROTHROMBIN TIME: 12.3 SEC (ref 9–12.5)
PROTHROMBIN TIME: 17 SEC (ref 9–12.5)
RA MAJOR: 4.71 CM
RA WIDTH: 3.71 CM
RBC # BLD AUTO: 3.04 M/UL (ref 4–5.4)
RBC # BLD AUTO: 3.28 M/UL (ref 4–5.4)
RBC # BLD AUTO: 3.69 M/UL (ref 4–5.4)
RIGHT VENTRICULAR END-DIASTOLIC DIMENSION: 3.31 CM
SAMPLE: ABNORMAL
SINUS: 3.01 CM
SITE: ABNORMAL
SODIUM SERPL-SCNC: 131 MMOL/L (ref 136–145)
SODIUM SERPL-SCNC: 133 MMOL/L (ref 136–145)
SODIUM SERPL-SCNC: 133 MMOL/L (ref 136–145)
SODIUM SERPL-SCNC: 134 MMOL/L (ref 136–145)
SODIUM SERPL-SCNC: 136 MMOL/L (ref 136–145)
SODIUM SERPL-SCNC: 136 MMOL/L (ref 136–145)
SODIUM SERPL-SCNC: 137 MMOL/L (ref 136–145)
SP02: 100
SP02: 96
STJ: 2.26 CM
TARGETS BLD QL SMEAR: ABNORMAL
TARGETS BLD QL SMEAR: ABNORMAL
TDI LATERAL: 0.03 M/S
TDI SEPTAL: 0.05 M/S
TDI: 0.04 M/S
TR MAX PG: 29 MMHG
TRICUSPID ANNULAR PLANE SYSTOLIC EXCURSION: 1.29 CM
TROPONIN I SERPL DL<=0.01 NG/ML-MCNC: 0.32 NG/ML (ref 0–0.03)
VANCOMYCIN SERPL-MCNC: 20.4 UG/ML
VANCOMYCIN SERPL-MCNC: 7.2 UG/ML
VT: 350
VT: 400
WBC # BLD AUTO: 13.34 K/UL (ref 3.9–12.7)
WBC # BLD AUTO: 27.96 K/UL (ref 3.9–12.7)
WBC # BLD AUTO: 29.33 K/UL (ref 3.9–12.7)

## 2021-01-01 PROCEDURE — 85027 COMPLETE CBC AUTOMATED: CPT

## 2021-01-01 PROCEDURE — 36556 PR INSERT NON-TUNNEL CV CATH 5+ YRS OLD: ICD-10-PCS | Mod: GC,,, | Performed by: INTERNAL MEDICINE

## 2021-01-01 PROCEDURE — 99900026 HC AIRWAY MAINTENANCE (STAT)

## 2021-01-01 PROCEDURE — 84132 ASSAY OF SERUM POTASSIUM: CPT | Mod: 91

## 2021-01-01 PROCEDURE — 36556 INSERT NON-TUNNEL CV CATH: CPT | Mod: GC,,, | Performed by: INTERNAL MEDICINE

## 2021-01-01 PROCEDURE — 80053 COMPREHEN METABOLIC PANEL: CPT

## 2021-01-01 PROCEDURE — 80202 ASSAY OF VANCOMYCIN: CPT

## 2021-01-01 PROCEDURE — 90945 DIALYSIS ONE EVALUATION: CPT

## 2021-01-01 PROCEDURE — 85652 RBC SED RATE AUTOMATED: CPT

## 2021-01-01 PROCEDURE — 84145 PROCALCITONIN (PCT): CPT

## 2021-01-01 PROCEDURE — 25000242 PHARM REV CODE 250 ALT 637 W/ HCPCS: Performed by: STUDENT IN AN ORGANIZED HEALTH CARE EDUCATION/TRAINING PROGRAM

## 2021-01-01 PROCEDURE — 80053 COMPREHEN METABOLIC PANEL: CPT | Mod: 91

## 2021-01-01 PROCEDURE — 25000003 PHARM REV CODE 250: Performed by: STUDENT IN AN ORGANIZED HEALTH CARE EDUCATION/TRAINING PROGRAM

## 2021-01-01 PROCEDURE — 63600175 PHARM REV CODE 636 W HCPCS: Performed by: STUDENT IN AN ORGANIZED HEALTH CARE EDUCATION/TRAINING PROGRAM

## 2021-01-01 PROCEDURE — 86140 C-REACTIVE PROTEIN: CPT

## 2021-01-01 PROCEDURE — 83735 ASSAY OF MAGNESIUM: CPT | Mod: 91

## 2021-01-01 PROCEDURE — 25000003 PHARM REV CODE 250: Performed by: INTERNAL MEDICINE

## 2021-01-01 PROCEDURE — 83605 ASSAY OF LACTIC ACID: CPT

## 2021-01-01 PROCEDURE — 90945 DIALYSIS ONE EVALUATION: CPT | Mod: ,,, | Performed by: NURSE PRACTITIONER

## 2021-01-01 PROCEDURE — 99292 PR CRITICAL CARE, ADDL 30 MIN: ICD-10-PCS | Mod: CR,,, | Performed by: EMERGENCY MEDICINE

## 2021-01-01 PROCEDURE — 83735 ASSAY OF MAGNESIUM: CPT

## 2021-01-01 PROCEDURE — 85730 THROMBOPLASTIN TIME PARTIAL: CPT

## 2021-01-01 PROCEDURE — 83880 ASSAY OF NATRIURETIC PEPTIDE: CPT

## 2021-01-01 PROCEDURE — 37799 UNLISTED PX VASCULAR SURGERY: CPT

## 2021-01-01 PROCEDURE — 99291 PR CRITICAL CARE, E/M 30-74 MINUTES: ICD-10-PCS | Mod: 25,CR,GC, | Performed by: INTERNAL MEDICINE

## 2021-01-01 PROCEDURE — 25000003 PHARM REV CODE 250: Performed by: NURSE PRACTITIONER

## 2021-01-01 PROCEDURE — 94761 N-INVAS EAR/PLS OXIMETRY MLT: CPT

## 2021-01-01 PROCEDURE — 83615 LACTATE (LD) (LDH) ENZYME: CPT

## 2021-01-01 PROCEDURE — 85520 HEPARIN ASSAY: CPT

## 2021-01-01 PROCEDURE — 84132 ASSAY OF SERUM POTASSIUM: CPT

## 2021-01-01 PROCEDURE — 84484 ASSAY OF TROPONIN QUANT: CPT

## 2021-01-01 PROCEDURE — 99291 PR CRITICAL CARE, E/M 30-74 MINUTES: ICD-10-PCS | Mod: CR,,, | Performed by: EMERGENCY MEDICINE

## 2021-01-01 PROCEDURE — 20000000 HC ICU ROOM

## 2021-01-01 PROCEDURE — 87040 BLOOD CULTURE FOR BACTERIA: CPT

## 2021-01-01 PROCEDURE — 36415 COLL VENOUS BLD VENIPUNCTURE: CPT

## 2021-01-01 PROCEDURE — 85025 COMPLETE CBC W/AUTO DIFF WBC: CPT

## 2021-01-01 PROCEDURE — 99900035 HC TECH TIME PER 15 MIN (STAT)

## 2021-01-01 PROCEDURE — 85610 PROTHROMBIN TIME: CPT | Mod: 91

## 2021-01-01 PROCEDURE — 27200966 HC CLOSED SUCTION SYSTEM

## 2021-01-01 PROCEDURE — 80048 BASIC METABOLIC PNL TOTAL CA: CPT

## 2021-01-01 PROCEDURE — 82728 ASSAY OF FERRITIN: CPT

## 2021-01-01 PROCEDURE — 85520 HEPARIN ASSAY: CPT | Mod: 91

## 2021-01-01 PROCEDURE — 84100 ASSAY OF PHOSPHORUS: CPT

## 2021-01-01 PROCEDURE — 80100008 HC CRRT DAILY MAINTENANCE

## 2021-01-01 PROCEDURE — 63600175 PHARM REV CODE 636 W HCPCS: Performed by: EMERGENCY MEDICINE

## 2021-01-01 PROCEDURE — 93010 EKG 12-LEAD: ICD-10-PCS | Mod: ,,, | Performed by: INTERNAL MEDICINE

## 2021-01-01 PROCEDURE — 27000221 HC OXYGEN, UP TO 24 HOURS

## 2021-01-01 PROCEDURE — 85610 PROTHROMBIN TIME: CPT

## 2021-01-01 PROCEDURE — 25000003 PHARM REV CODE 250

## 2021-01-01 PROCEDURE — 80069 RENAL FUNCTION PANEL: CPT | Mod: 91

## 2021-01-01 PROCEDURE — 99291 CRITICAL CARE FIRST HOUR: CPT | Mod: 25,CR,GC, | Performed by: INTERNAL MEDICINE

## 2021-01-01 PROCEDURE — 63600367 HC NITRIC OXIDE PER HOUR

## 2021-01-01 PROCEDURE — 85007 BL SMEAR W/DIFF WBC COUNT: CPT

## 2021-01-01 PROCEDURE — 90945 PR DIALYSIS, NOT HEMO, 1 EVAL: ICD-10-PCS | Mod: ,,, | Performed by: NURSE PRACTITIONER

## 2021-01-01 PROCEDURE — 99291 CRITICAL CARE FIRST HOUR: CPT | Mod: CR,,, | Performed by: EMERGENCY MEDICINE

## 2021-01-01 PROCEDURE — 93005 ELECTROCARDIOGRAM TRACING: CPT

## 2021-01-01 PROCEDURE — 99292 CRITICAL CARE ADDL 30 MIN: CPT | Mod: CR,,, | Performed by: EMERGENCY MEDICINE

## 2021-01-01 PROCEDURE — 82803 BLOOD GASES ANY COMBINATION: CPT

## 2021-01-01 PROCEDURE — 25000003 PHARM REV CODE 250: Performed by: EMERGENCY MEDICINE

## 2021-01-01 PROCEDURE — 93010 ELECTROCARDIOGRAM REPORT: CPT | Mod: ,,, | Performed by: INTERNAL MEDICINE

## 2021-01-01 PROCEDURE — 99223 1ST HOSP IP/OBS HIGH 75: CPT | Mod: 25,,, | Performed by: NURSE PRACTITIONER

## 2021-01-01 PROCEDURE — 94003 VENT MGMT INPAT SUBQ DAY: CPT

## 2021-01-01 PROCEDURE — 80069 RENAL FUNCTION PANEL: CPT

## 2021-01-01 PROCEDURE — 99223 PR INITIAL HOSPITAL CARE,LEVL III: ICD-10-PCS | Mod: 25,,, | Performed by: NURSE PRACTITIONER

## 2021-01-01 PROCEDURE — 85379 FIBRIN DEGRADATION QUANT: CPT

## 2021-01-01 RX ORDER — HEPARIN SODIUM 5000 [USP'U]/ML
5000 INJECTION, SOLUTION INTRAVENOUS; SUBCUTANEOUS EVERY 8 HOURS
Status: DISCONTINUED | OUTPATIENT
Start: 2021-01-01 | End: 2021-01-01

## 2021-01-01 RX ORDER — MAGNESIUM SULFATE HEPTAHYDRATE 40 MG/ML
2 INJECTION, SOLUTION INTRAVENOUS
Status: DISCONTINUED | OUTPATIENT
Start: 2021-01-01 | End: 2021-01-01

## 2021-01-01 RX ORDER — MUPIROCIN 20 MG/G
OINTMENT TOPICAL 2 TIMES DAILY
Status: DISCONTINUED | OUTPATIENT
Start: 2021-01-01 | End: 2021-01-10 | Stop reason: HOSPADM

## 2021-01-01 RX ORDER — CHOLECALCIFEROL (VITAMIN D3) 25 MCG
1000 TABLET ORAL DAILY
Status: DISCONTINUED | OUTPATIENT
Start: 2021-01-01 | End: 2021-01-01

## 2021-01-01 RX ORDER — HYDROCODONE BITARTRATE AND ACETAMINOPHEN 500; 5 MG/1; MG/1
TABLET ORAL CONTINUOUS
Status: DISCONTINUED | OUTPATIENT
Start: 2021-01-01 | End: 2021-01-01

## 2021-01-01 RX ORDER — ALLOPURINOL 100 MG/1
200 TABLET ORAL DAILY
Status: DISCONTINUED | OUTPATIENT
Start: 2021-01-01 | End: 2021-01-01

## 2021-01-01 RX ORDER — HEPARIN SODIUM,PORCINE/D5W 25000/250
0-40 INTRAVENOUS SOLUTION INTRAVENOUS CONTINUOUS
Status: DISCONTINUED | OUTPATIENT
Start: 2021-01-01 | End: 2021-01-10 | Stop reason: HOSPADM

## 2021-01-01 RX ORDER — FLUDROCORTISONE ACETATE 0.1 MG/1
100 TABLET ORAL DAILY
Status: DISCONTINUED | OUTPATIENT
Start: 2021-01-01 | End: 2021-01-10 | Stop reason: HOSPADM

## 2021-01-01 RX ORDER — FOLIC ACID 1 MG/1
1 TABLET ORAL DAILY
Status: DISCONTINUED | OUTPATIENT
Start: 2021-01-01 | End: 2021-01-01

## 2021-01-01 RX ORDER — HYDROCODONE BITARTRATE AND ACETAMINOPHEN 500; 5 MG/1; MG/1
TABLET ORAL CONTINUOUS
Status: ACTIVE | OUTPATIENT
Start: 2021-01-01 | End: 2021-01-01

## 2021-01-01 RX ORDER — FENTANYL CITRATE-0.9 % NACL/PF 10 MCG/ML
0-250 PLASTIC BAG, INJECTION (ML) INTRAVENOUS CONTINUOUS
Status: DISCONTINUED | OUTPATIENT
Start: 2021-01-01 | End: 2021-01-10 | Stop reason: HOSPADM

## 2021-01-01 RX ORDER — SEVELAMER CARBONATE FOR ORAL SUSPENSION 2400 MG/1
4.8 POWDER, FOR SUSPENSION ORAL 3 TIMES DAILY
Status: DISCONTINUED | OUTPATIENT
Start: 2021-01-01 | End: 2021-01-10 | Stop reason: HOSPADM

## 2021-01-01 RX ORDER — SODIUM CHLORIDE 0.9 % (FLUSH) 0.9 %
10 SYRINGE (ML) INJECTION
Status: DISCONTINUED | OUTPATIENT
Start: 2021-01-01 | End: 2021-01-10 | Stop reason: HOSPADM

## 2021-01-01 RX ORDER — NOREPINEPHRINE BITARTRATE 1 MG/ML
INJECTION, SOLUTION INTRAVENOUS
Status: COMPLETED
Start: 2021-01-01 | End: 2021-01-01

## 2021-01-01 RX ORDER — ATORVASTATIN CALCIUM 20 MG/1
40 TABLET, FILM COATED ORAL DAILY
Status: DISCONTINUED | OUTPATIENT
Start: 2021-01-01 | End: 2021-01-01

## 2021-01-01 RX ORDER — ASPIRIN 81 MG/1
81 TABLET ORAL DAILY
Status: DISCONTINUED | OUTPATIENT
Start: 2021-01-01 | End: 2021-01-01

## 2021-01-01 RX ORDER — CLOPIDOGREL BISULFATE 75 MG/1
75 TABLET ORAL DAILY
Status: DISCONTINUED | OUTPATIENT
Start: 2021-01-01 | End: 2021-01-01

## 2021-01-01 RX ORDER — DEXTROSE MONOHYDRATE 100 MG/ML
INJECTION, SOLUTION INTRAVENOUS CONTINUOUS PRN
Status: DISCONTINUED | OUTPATIENT
Start: 2021-01-01 | End: 2021-01-10 | Stop reason: HOSPADM

## 2021-01-01 RX ORDER — INSULIN ASPART 100 [IU]/ML
0-5 INJECTION, SOLUTION INTRAVENOUS; SUBCUTANEOUS EVERY 4 HOURS PRN
Status: DISCONTINUED | OUTPATIENT
Start: 2021-01-01 | End: 2021-01-10 | Stop reason: HOSPADM

## 2021-01-01 RX ORDER — EPINEPHRINE 1 MG/ML
INJECTION, SOLUTION INTRACARDIAC; INTRAMUSCULAR; INTRAVENOUS; SUBCUTANEOUS
Status: DISCONTINUED
Start: 2021-01-01 | End: 2021-01-01 | Stop reason: WASHOUT

## 2021-01-01 RX ORDER — PROPOFOL 10 MG/ML
0-50 INJECTION, EMULSION INTRAVENOUS CONTINUOUS
Status: DISCONTINUED | OUTPATIENT
Start: 2021-01-01 | End: 2021-01-10 | Stop reason: HOSPADM

## 2021-01-01 RX ORDER — VASOPRESSIN 20 [USP'U]/ML
INJECTION, SOLUTION INTRAMUSCULAR; SUBCUTANEOUS
Status: DISPENSED
Start: 2021-01-01 | End: 2021-01-01

## 2021-01-01 RX ORDER — DEXAMETHASONE SODIUM PHOSPHATE 4 MG/ML
6 INJECTION, SOLUTION INTRA-ARTICULAR; INTRALESIONAL; INTRAMUSCULAR; INTRAVENOUS; SOFT TISSUE DAILY
Status: DISCONTINUED | OUTPATIENT
Start: 2021-01-01 | End: 2021-01-01

## 2021-01-01 RX ORDER — MAGNESIUM SULFATE HEPTAHYDRATE 40 MG/ML
2 INJECTION, SOLUTION INTRAVENOUS
Status: ACTIVE | OUTPATIENT
Start: 2021-01-01 | End: 2021-01-01

## 2021-01-01 RX ORDER — FAMOTIDINE 20 MG/1
20 TABLET, FILM COATED ORAL DAILY
Status: DISCONTINUED | OUTPATIENT
Start: 2021-01-01 | End: 2021-01-10 | Stop reason: HOSPADM

## 2021-01-01 RX ORDER — FOLIC ACID 1 MG/1
1 TABLET ORAL DAILY
Status: DISCONTINUED | OUTPATIENT
Start: 2021-01-01 | End: 2021-01-10 | Stop reason: HOSPADM

## 2021-01-01 RX ORDER — ALBUTEROL SULFATE 2.5 MG/.5ML
10 SOLUTION RESPIRATORY (INHALATION) EVERY 4 HOURS PRN
Status: DISCONTINUED | OUTPATIENT
Start: 2021-01-01 | End: 2021-01-10 | Stop reason: HOSPADM

## 2021-01-01 RX ORDER — IPRATROPIUM BROMIDE AND ALBUTEROL SULFATE 2.5; .5 MG/3ML; MG/3ML
3 SOLUTION RESPIRATORY (INHALATION) EVERY 6 HOURS
Status: DISCONTINUED | OUTPATIENT
Start: 2021-01-01 | End: 2021-01-01

## 2021-01-01 RX ORDER — GLUCAGON 1 MG
1 KIT INJECTION
Status: DISCONTINUED | OUTPATIENT
Start: 2021-01-01 | End: 2021-01-10 | Stop reason: HOSPADM

## 2021-01-01 RX ORDER — CHLORHEXIDINE GLUCONATE ORAL RINSE 1.2 MG/ML
15 SOLUTION DENTAL 2 TIMES DAILY
Status: DISCONTINUED | OUTPATIENT
Start: 2021-01-01 | End: 2021-01-10 | Stop reason: HOSPADM

## 2021-01-01 RX ORDER — CHOLECALCIFEROL (VITAMIN D3) 25 MCG
1000 TABLET ORAL DAILY
Status: DISCONTINUED | OUTPATIENT
Start: 2021-01-01 | End: 2021-01-10 | Stop reason: HOSPADM

## 2021-01-01 RX ORDER — FAMOTIDINE 20 MG/1
20 TABLET, FILM COATED ORAL 2 TIMES DAILY
Status: DISCONTINUED | OUTPATIENT
Start: 2021-01-01 | End: 2021-01-01

## 2021-01-01 RX ADMIN — PIPERACILLIN AND TAZOBACTAM 4.5 G: 4; .5 INJECTION, POWDER, LYOPHILIZED, FOR SOLUTION INTRAVENOUS; PARENTERAL at 10:01

## 2021-01-01 RX ADMIN — ATORVASTATIN CALCIUM 40 MG: 20 TABLET, FILM COATED ORAL at 08:01

## 2021-01-01 RX ADMIN — VANCOMYCIN HYDROCHLORIDE 1500 MG: 1.5 INJECTION, POWDER, LYOPHILIZED, FOR SOLUTION INTRAVENOUS at 12:01

## 2021-01-01 RX ADMIN — VASOPRESSIN 0.04 UNITS/MIN: 20 INJECTION INTRAVENOUS at 09:01

## 2021-01-01 RX ADMIN — SODIUM CHLORIDE 200 ML/HR: 0.9 INJECTION, SOLUTION INTRAVENOUS at 01:01

## 2021-01-01 RX ADMIN — NOREPINEPHRINE BITARTRATE 0.7 MCG/KG/MIN: 1 INJECTION, SOLUTION, CONCENTRATE INTRAVENOUS at 05:01

## 2021-01-01 RX ADMIN — SEVELAMER CARBONATE 4.8 G: 2.4 POWDER, FOR SUSPENSION ORAL at 08:01

## 2021-01-01 RX ADMIN — FAMOTIDINE 20 MG: 20 TABLET, FILM COATED ORAL at 08:01

## 2021-01-01 RX ADMIN — EPINEPHRINE 0.04 MCG/KG/MIN: 1 INJECTION INTRAMUSCULAR; INTRAVENOUS; SUBCUTANEOUS at 12:01

## 2021-01-01 RX ADMIN — CISATRACURIUM BESYLATE 1 MCG/KG/MIN: 10 INJECTION INTRAVENOUS at 02:01

## 2021-01-01 RX ADMIN — Medication 1000 UNITS: at 08:01

## 2021-01-01 RX ADMIN — PROPOFOL 5 MCG/KG/MIN: 10 INJECTION, EMULSION INTRAVENOUS at 02:01

## 2021-01-01 RX ADMIN — NOREPINEPHRINE BITARTRATE 1.3 MCG/KG/MIN: 1 INJECTION, SOLUTION, CONCENTRATE INTRAVENOUS at 11:01

## 2021-01-01 RX ADMIN — FLUDROCORTISONE ACETATE 100 MCG: 0.1 TABLET ORAL at 03:01

## 2021-01-01 RX ADMIN — EPINEPHRINE 0.02 MCG/KG/MIN: 1 INJECTION INTRAMUSCULAR; INTRAVENOUS; SUBCUTANEOUS at 09:01

## 2021-01-01 RX ADMIN — INSULIN HUMAN 9.78 UNITS: 100 INJECTION, SOLUTION PARENTERAL at 10:01

## 2021-01-01 RX ADMIN — CALCIUM GLUCONATE 1 G: 98 INJECTION, SOLUTION INTRAVENOUS at 01:01

## 2021-01-01 RX ADMIN — NOREPINEPHRINE BITARTRATE 1.6 MCG/KG/MIN: 1 INJECTION, SOLUTION, CONCENTRATE INTRAVENOUS at 12:01

## 2021-01-01 RX ADMIN — NOREPINEPHRINE BITARTRATE 32 MG: 1 INJECTION, SOLUTION, CONCENTRATE INTRAVENOUS at 12:01

## 2021-01-01 RX ADMIN — SODIUM CHLORIDE 200 ML/HR: 0.9 INJECTION, SOLUTION INTRAVENOUS at 05:01

## 2021-01-01 RX ADMIN — Medication 125 MCG/HR: at 01:01

## 2021-01-01 RX ADMIN — VASOPRESSIN 0.04 UNITS/MIN: 20 INJECTION INTRAVENOUS at 12:01

## 2021-01-01 RX ADMIN — Medication 100 MCG/HR: at 08:01

## 2021-01-01 RX ADMIN — DEXTROSE MONOHYDRATE 25 G: 25 INJECTION, SOLUTION INTRAVENOUS at 10:01

## 2021-01-01 RX ADMIN — FLUDROCORTISONE ACETATE 100 MCG: 0.1 TABLET ORAL at 08:01

## 2021-01-01 RX ADMIN — HEPARIN SODIUM AND DEXTROSE 14 UNITS/KG/HR: 10000; 5 INJECTION INTRAVENOUS at 03:01

## 2021-01-01 RX ADMIN — CISATRACURIUM BESYLATE 3 MCG/KG/MIN: 10 INJECTION INTRAVENOUS at 01:01

## 2021-01-01 RX ADMIN — DEXTROSE MONOHYDRATE 12.5 G: 25 INJECTION, SOLUTION INTRAVENOUS at 09:01

## 2021-01-01 RX ADMIN — INSULIN HUMAN 10 UNITS: 100 INJECTION, SOLUTION PARENTERAL at 09:01

## 2021-01-01 RX ADMIN — HYDROCORTISONE SODIUM SUCCINATE 100 MG: 100 INJECTION, POWDER, FOR SOLUTION INTRAMUSCULAR; INTRAVENOUS at 02:01

## 2021-01-01 RX ADMIN — NOREPINEPHRINE BITARTRATE 2 MCG/KG/MIN: 1 INJECTION, SOLUTION, CONCENTRATE INTRAVENOUS at 08:01

## 2021-01-01 RX ADMIN — SEVELAMER CARBONATE 4.8 G: 2.4 POWDER, FOR SUSPENSION ORAL at 09:01

## 2021-01-01 RX ADMIN — FOLIC ACID 1 MG: 1 TABLET ORAL at 08:01

## 2021-01-01 RX ADMIN — NOREPINEPHRINE BITARTRATE 1 MCG/KG/MIN: 1 INJECTION, SOLUTION, CONCENTRATE INTRAVENOUS at 03:01

## 2021-01-01 RX ADMIN — HYDROCORTISONE SODIUM SUCCINATE 100 MG: 100 INJECTION, POWDER, FOR SOLUTION INTRAMUSCULAR; INTRAVENOUS at 10:01

## 2021-01-01 RX ADMIN — PIPERACILLIN AND TAZOBACTAM 4.5 G: 4; .5 INJECTION, POWDER, LYOPHILIZED, FOR SOLUTION INTRAVENOUS; PARENTERAL at 11:01

## 2021-01-01 RX ADMIN — NOREPINEPHRINE BITARTRATE 1 MCG/KG/MIN: 1 INJECTION, SOLUTION, CONCENTRATE INTRAVENOUS at 12:01

## 2021-01-01 RX ADMIN — CHLORHEXIDINE GLUCONATE 0.12% ORAL RINSE 15 ML: 1.2 LIQUID ORAL at 08:01

## 2021-01-01 RX ADMIN — NOREPINEPHRINE BITARTRATE 0.8 MCG/KG/MIN: 1 INJECTION, SOLUTION, CONCENTRATE INTRAVENOUS at 12:01

## 2021-01-01 RX ADMIN — CISATRACURIUM BESYLATE 2 MCG/KG/MIN: 10 INJECTION INTRAVENOUS at 10:01

## 2021-01-01 RX ADMIN — SODIUM CHLORIDE 500 ML: 0.9 INJECTION, SOLUTION INTRAVENOUS at 11:01

## 2021-01-01 RX ADMIN — VASOPRESSIN 0.04 UNITS/MIN: 20 INJECTION INTRAVENOUS at 06:01

## 2021-01-01 RX ADMIN — PIPERACILLIN AND TAZOBACTAM 4.5 G: 4; .5 INJECTION, POWDER, LYOPHILIZED, FOR SOLUTION INTRAVENOUS; PARENTERAL at 09:01

## 2021-01-01 RX ADMIN — HYDROCORTISONE SODIUM SUCCINATE 100 MG: 100 INJECTION, POWDER, FOR SOLUTION INTRAMUSCULAR; INTRAVENOUS at 03:01

## 2021-01-01 RX ADMIN — DEXAMETHASONE SODIUM PHOSPHATE 6 MG: 4 INJECTION INTRA-ARTICULAR; INTRALESIONAL; INTRAMUSCULAR; INTRAVENOUS; SOFT TISSUE at 08:01

## 2021-01-01 RX ADMIN — ALBUTEROL SULFATE 10 MG: 2.5 SOLUTION RESPIRATORY (INHALATION) at 10:01

## 2021-01-01 RX ADMIN — VASOPRESSIN 0.04 UNITS/MIN: 20 INJECTION INTRAVENOUS at 10:01

## 2021-01-01 RX ADMIN — MUPIROCIN: 20 OINTMENT TOPICAL at 08:01

## 2021-01-01 RX ADMIN — SODIUM CHLORIDE 200 ML/HR: 0.9 INJECTION, SOLUTION INTRAVENOUS at 08:01

## 2021-01-01 RX ADMIN — NOREPINEPHRINE BITARTRATE 1.8 MCG/KG/MIN: 1 INJECTION, SOLUTION, CONCENTRATE INTRAVENOUS at 09:01

## 2021-01-01 RX ADMIN — VASOPRESSIN 0.04 UNITS/MIN: 20 INJECTION INTRAVENOUS at 01:01

## 2021-01-01 RX ADMIN — HYDROCORTISONE SODIUM SUCCINATE 100 MG: 100 INJECTION, POWDER, FOR SOLUTION INTRAMUSCULAR; INTRAVENOUS at 06:01

## 2021-01-01 RX ADMIN — EPINEPHRINE 0.02 MCG/KG/MIN: 1 INJECTION INTRAMUSCULAR; INTRAVENOUS; SUBCUTANEOUS at 01:01

## 2021-01-01 RX ADMIN — NEPHROCAP 1 CAPSULE: 1 CAP ORAL at 09:01

## 2021-01-01 RX ADMIN — NEPHROCAP 1 CAPSULE: 1 CAP ORAL at 08:01

## 2021-01-01 RX ADMIN — VASOPRESSIN 0.04 UNITS/MIN: 20 INJECTION INTRAVENOUS at 03:01

## 2021-01-01 RX ADMIN — HEPARIN SODIUM 5000 UNITS: 5000 INJECTION INTRAVENOUS; SUBCUTANEOUS at 09:01

## 2021-01-01 RX ADMIN — NOREPINEPHRINE BITARTRATE 1.5 MCG/KG/MIN: 1 INJECTION, SOLUTION, CONCENTRATE INTRAVENOUS at 10:01

## 2021-01-01 RX ADMIN — PROPOFOL 5 MCG/KG/MIN: 10 INJECTION, EMULSION INTRAVENOUS at 12:01

## 2021-01-01 RX ADMIN — NOREPINEPHRINE BITARTRATE 1.4 MCG/KG/MIN: 1 INJECTION, SOLUTION, CONCENTRATE INTRAVENOUS at 05:01

## 2021-01-01 RX ADMIN — HYDROCORTISONE SODIUM SUCCINATE 100 MG: 100 INJECTION, POWDER, FOR SOLUTION INTRAMUSCULAR; INTRAVENOUS at 05:01

## 2021-01-01 RX ADMIN — SEVELAMER CARBONATE 4.8 G: 2.4 POWDER, FOR SUSPENSION ORAL at 02:01

## 2021-01-01 RX ADMIN — CISATRACURIUM BESYLATE 3 MCG/KG/MIN: 10 INJECTION INTRAVENOUS at 06:01

## 2021-01-01 RX ADMIN — HEPARIN SODIUM AND DEXTROSE 18 UNITS/KG/HR: 10000; 5 INJECTION INTRAVENOUS at 12:01

## 2021-01-04 PROBLEM — R73.9 HYPERGLYCEMIA: Status: ACTIVE | Noted: 2021-01-01

## 2021-01-04 PROBLEM — E63.9 INADEQUATE DIETARY ENERGY INTAKE: Status: ACTIVE | Noted: 2021-01-01

## 2021-01-04 PROBLEM — I95.9 HYPOTENSION: Status: ACTIVE | Noted: 2021-01-01

## 2021-01-04 PROBLEM — E87.29 HIGH ANION GAP METABOLIC ACIDOSIS: Status: ACTIVE | Noted: 2020-01-01

## 2021-01-04 PROBLEM — R73.03 PRE-DIABETES: Status: ACTIVE | Noted: 2021-01-01

## 2021-01-07 PROBLEM — J12.82 PNEUMONIA DUE TO COVID-19 VIRUS: Status: ACTIVE | Noted: 2021-01-01

## 2021-01-07 PROBLEM — U07.1 PNEUMONIA DUE TO COVID-19 VIRUS: Status: ACTIVE | Noted: 2021-01-01

## 2021-01-08 PROBLEM — U07.1 ACUTE HYPOXEMIC RESPIRATORY FAILURE DUE TO COVID-19: Status: ACTIVE | Noted: 2021-01-01

## 2021-01-08 PROBLEM — J96.01 ACUTE HYPOXEMIC RESPIRATORY FAILURE DUE TO COVID-19: Status: ACTIVE | Noted: 2021-01-01

## 2021-01-08 PROBLEM — A41.9 SEPTIC SHOCK: Status: ACTIVE | Noted: 2021-01-01

## 2021-01-08 PROBLEM — R65.21 SEPTIC SHOCK: Status: ACTIVE | Noted: 2021-01-01

## 2021-01-08 PROBLEM — J80 ARDS (ADULT RESPIRATORY DISTRESS SYNDROME): Status: ACTIVE | Noted: 2021-01-01

## 2021-01-08 PROBLEM — Z86.73 HISTORY OF CVA (CEREBROVASCULAR ACCIDENT): Status: ACTIVE | Noted: 2021-01-01

## 2021-01-09 PROBLEM — R74.01 TRANSAMINITIS: Status: ACTIVE | Noted: 2021-01-01

## 2021-01-12 LAB
BACTERIA BLD CULT: ABNORMAL

## 2021-01-13 LAB — BACTERIA BLD CULT: NORMAL

## 2021-01-29 ENCOUNTER — DOCUMENT SCAN (OUTPATIENT)
Dept: HOME HEALTH SERVICES | Facility: HOSPITAL | Age: 66
End: 2021-01-29
Payer: MEDICARE

## 2021-06-25 NOTE — PLAN OF CARE
Problem: Physical Therapy Goal  Goal: Physical Therapy Goal  Goals to be met by: 2018     Patient will increase functional independence with mobility by performin. Supine to sit with Moderate Assistance-met  2. Sit to supine with Moderate Assistance-met  3. Sit to stand transfer with Maximum Assistance-met  4. Bed to chair transfer with Maximum Assistance   5. Wheelchair propulsion x150 feet with Stand-by Assistance using bilateral uppper extremities  6. Lower extremity exercise program x15 reps per handout, with assistance as needed     Pt progressing towards goals. continue with PT POC.Goals remain appropriate.  Rebel Patino PTA  3/25/2018         14-Apr-2021

## 2024-11-24 NOTE — HPI
Mary Carrillo is a 66 yo AAF with HTN, HLD, diastolic dysfunction, mild aortic valve stenosis, pott's disease, obesity, gout, sarcoidosis, CKD stage 5, asthma and chronic back pain who presented to OSH with complaint of dyspnea and anuria.  Initial labs - Hbg/Hct 7/25, BUN/Cr 66/6.7 previously 46/4.7 on 6/15/20, BNP 1261, , troponin 0.123. CXR consistent with pulmonary edema/CHF.  She received 0.5 inch nitropaste for hypertension and 100 mg furosemide IV. Pt with some hypoxia requiring 3 liters nasal cannula. 07/28/2020 MET called 2/2 to acute large blood loss from trialysis catheter, hypotension and altered mental status.  Acute blood loss stopped once trialysis line properly capped.  SBP was in the 90s-80s.  Patient was given fluid bolus.  She was transferred to ICU for closer observation.  This AM, CODE stroke was called for unequal pupils and AMS, no acute findings per CT brain. Patient was sent to the 4th floor for HD as she had transfer to telemetry orders written. Shortly after initiation of HD, CODE BLUE was called due to patient being reportedly unresponsive with marked bradycardia with HR in the 30s which reportedly responded to Atropine (No CODE documentation found). Patient was reportedly hooked up to ICU portable monitor during the CODE but there are no strips and telemetry review notes no recording for the approximate 30 minutes that she was off the unit. WBC 21 K- pan culture in progress. HH 6.8/22. K+ 3.7, no Mg ordered. Troponin 0.17. EKG SR- poor tracing, no obvious acute ischemic changes. TTE pending.   Cardiology consulted for bradycardia on HD  
Mary Carrillo is a 66 yo AAF with pmh of HTN, HLD, diastolic dysfunction, mild aortic valve stenosis, pott's disease, obesity, gout, sarcoidosis, CKD stage 5, asthma and chronic back pain who presented to OSH with complaint of dyspnea and anuria. Pt currently undergoing outpatient workup for initiation of peritoneal dialysis. Initial labs remarkable for Hbg/Hct 7/25, BUN/Cr 66/6.7 previously 46/4.7 on 6/15/20, BNP 1261, , troponin 0.123. CXR consistent with pulmonary edema/CHF.  She received 0.5 inch nitropaste for hypertension and 100 mg furosemide IV. Pt with some hypoxia requiring 3 liters nasal cannula. She denies chest pain, abdominal pain, n/v and fever/chills. COVID negative. Pt admitted to [facility].   
80

## (undated) DEVICE — ELECTRODE REM PLYHSV RETURN 9

## (undated) DEVICE — DEVICE PICC SECURE SORBA VIEW

## (undated) DEVICE — CORD BIPOLAR 12 FOOT

## (undated) DEVICE — DRAPE INCISE IOBAN 2 23X23IN

## (undated) DEVICE — CARTRIDGE OIL

## (undated) DEVICE — HEMOSTAT SURGICEL 4X8IN

## (undated) DEVICE — SEE MEDLINE ITEM 157131

## (undated) DEVICE — ADHESIVE DERMABOND ADVANCED

## (undated) DEVICE — KIT EVACUATOR 3-SPRING 1/8 DRN

## (undated) DEVICE — STAPLER SKIN PROXIMATE WIDE

## (undated) DEVICE — TUBE FRAZIER 5MM 2FT SOFT TIP

## (undated) DEVICE — DRESSING SORVAVIEW SHIELD

## (undated) DEVICE — SEE MEDLINE ITEM 154981

## (undated) DEVICE — COVER SNAP 36IN X 30IN

## (undated) DEVICE — COTTON BALLS 1IN

## (undated) DEVICE — SEE MEDLINE ITEM 156952

## (undated) DEVICE — GLOVE SURGICAL LATEX SZ 7

## (undated) DEVICE — Device

## (undated) DEVICE — ELECTRODE BLADE INSULATED 1 IN

## (undated) DEVICE — DRAPE LAP TIBURON 77X122IN

## (undated) DEVICE — SEE MEDLINE ITEM 152622

## (undated) DEVICE — SUT MCRYL PLUS 4-0 PS2 27IN

## (undated) DEVICE — DRAPE INCISE IOBAN 2 23X17IN

## (undated) DEVICE — COVER LIGHT HANDLE 80/CA

## (undated) DEVICE — SUT 0 VICRYL / UR6 (J603)

## (undated) DEVICE — SEE MEDLINE ITEM 157150

## (undated) DEVICE — SEE MEDLINE ITEM 146313

## (undated) DEVICE — SEE MEDLINE ITEM 157117

## (undated) DEVICE — TRAY FOLEY 16FR INFECTION CONT

## (undated) DEVICE — DRAPE STERI INSTRUMENT 1018

## (undated) DEVICE — DRAPE ABDOMINAL TIBURON 14X11

## (undated) DEVICE — DIFFUSER

## (undated) DEVICE — CATH SWAN NECK 2CUFF 112.8CM

## (undated) DEVICE — SPONGE GAUZE 16PLY 4X4

## (undated) DEVICE — SUT VICRYL PLUS 2-0 CT1 18

## (undated) DEVICE — MARKER SKIN STND TIP BLUE BARR

## (undated) DEVICE — DRESSING AQUACEL FOAM 5 X 5

## (undated) DEVICE — DRESSING ADH ISLAND 3.6 X 14

## (undated) DEVICE — SEE MEDLINE ITEM 156905

## (undated) DEVICE — TROCAR ENDOPATH XCEL 5MM 7.5CM

## (undated) DEVICE — SYS CLSR WND ENDOSCP XL

## (undated) DEVICE — DRESSING MEPILEX BORDER 4 X 4

## (undated) DEVICE — BLADE ELECTRO EDGE INSULATED

## (undated) DEVICE — SOL IRR NACL .9% 1000CC

## (undated) DEVICE — GAUZE SPONGE 4X4 12PLY

## (undated) DEVICE — SET CYSTO IRRIGATION UNIV SPIK

## (undated) DEVICE — NDL HYPO REG 25G X 1 1/2

## (undated) DEVICE — DRAPE C-ARMOR EQUIPMENT COVER

## (undated) DEVICE — BUR BONE CUT MICRO TPS 3X3.8MM

## (undated) DEVICE — SPONGE LAP 18X18 PREWASHED

## (undated) DEVICE — DRESSING ANTIMICROBIAL 1 INCH

## (undated) DEVICE — PACK SET UP CONVERTORS

## (undated) DEVICE — KIT SURGIFLO EVITHROM

## (undated) DEVICE — MANIFOLD 4 PORT

## (undated) DEVICE — DRAPE STERI-DRAPE 1000 17X11IN